# Patient Record
Sex: MALE | Race: WHITE | NOT HISPANIC OR LATINO | Employment: FULL TIME | URBAN - METROPOLITAN AREA
[De-identification: names, ages, dates, MRNs, and addresses within clinical notes are randomized per-mention and may not be internally consistent; named-entity substitution may affect disease eponyms.]

---

## 2017-04-11 ENCOUNTER — TELEPHONE (OUTPATIENT)
Dept: FAMILY MEDICINE | Facility: CLINIC | Age: 62
End: 2017-04-11

## 2017-04-11 NOTE — TELEPHONE ENCOUNTER
Message handled by Nurse Triage with [facility] - provider name: DANIELITO VÁSQUEZ     Called pt advised appt needed due to dx of HTN.  The patient indicates understanding of these issues and agrees with the plan.     Appt made    Lois Bhatia RN    [facility] [facility]  .

## 2017-04-11 NOTE — TELEPHONE ENCOUNTER
Reason for Call:  Other prescription    Detailed comments: Pt calling to see if we can prescribe him Viagra without seeing him.  States he ws last in about 7 months ago and states he did not bring this up with MD.  If he needs to be seen please advise.  If we can call something in he uses [facility].    Phone Number Patient can be reached at: Cell number on file:    Telephone Information:   Mobile [phone]       Best Time:     Can we leave a detailed message on this number? YES    Call taken on 4/11/2017 at 12:30 PM by Heather Milton

## 2017-06-26 DIAGNOSIS — I10 HYPERTENSION GOAL BP (BLOOD PRESSURE) < 140/90: ICD-10-CM

## 2017-06-26 NOTE — TELEPHONE ENCOUNTER
Lisinopril      Last Written Prescription Date: 4-20-16  Last Fill Quantity: 90, # refills: 3  Last Office Visit with [facility], [facility] or [facility] prescribing provider: 10-11-16       Potassium   Date Value Ref Range Status   05/03/2016 4.9 3.4 - 5.3 mmol/L Final     Creatinine   Date Value Ref Range Status   05/03/2016 2.57 (H) 0.66 - 1.25 mg/dL Final     BP Readings from Last 3 Encounters:   10/11/16 112/66   05/17/16 (!) 138/100   05/03/16 122/84     Thank you,  Erin Mortensen, [facility] [place]

## 2017-06-28 RX ORDER — LISINOPRIL/HYDROCHLOROTHIAZIDE 10-12.5 MG
2 TABLET ORAL DAILY
Qty: 60 TABLET | Refills: 0 | Status: SHIPPED | OUTPATIENT
Start: 2017-06-28 | End: 2017-09-22

## 2017-06-28 NOTE — TELEPHONE ENCOUNTER
Medication is being filled for 30 days only due to:  Patient needs to be seen because it has been more than one year since last visit. Due for labs as well.   Pharmacy advised.    KATHY Oro, RN, PHN  [facility]

## 2017-09-08 ENCOUNTER — TELEPHONE (OUTPATIENT)
Dept: FAMILY MEDICINE | Facility: CLINIC | Age: 62
End: 2017-09-08

## 2017-09-08 NOTE — TELEPHONE ENCOUNTER
----- Message from Jennie Rios sent at 9/6/2017  4:07 PM CDT -----  Regarding: Patient for MTM Referral  [employer] has identified this patient as someone who may benefit from Medication Therapy Management (MTM) Services. This patient was identified as being at risk of experiencing medication related problems based upon their medical and prescription claims history.    We plan to contact this patient on your behalf to indicate that they may benefit from a visit with an MTM pharmacist. They will be encouraged to schedule an MTM appointment. If you have any reservations regarding contacting this patient, please notify me within the next week.    Thank you,  Cathi Rios    On behalf of:  Daina Coleman, Medication Therapy Management Pharmacist, [facility]

## 2017-09-08 NOTE — TELEPHONE ENCOUNTER
Please call him to arrange a blood pressure med recheck appointment or annual physical would be fine too.

## 2017-09-08 NOTE — TELEPHONE ENCOUNTER
Will have  call patient.    Lois Henriquez, KATHY, RN, N  [facility]) [phone]

## 2017-09-08 NOTE — TELEPHONE ENCOUNTER
Left non-detailed message for patient to call back.  (see message below)    Mariajose Ramírez

## 2017-09-22 ENCOUNTER — RADIANT APPOINTMENT (OUTPATIENT)
Dept: GENERAL RADIOLOGY | Facility: CLINIC | Age: 62
End: 2017-09-22
Payer: COMMERCIAL

## 2017-09-22 ENCOUNTER — OFFICE VISIT (OUTPATIENT)
Dept: FAMILY MEDICINE | Facility: CLINIC | Age: 62
End: 2017-09-22
Payer: COMMERCIAL

## 2017-09-22 VITALS
HEART RATE: 92 BPM | TEMPERATURE: 98 F | BODY MASS INDEX: 26.81 KG/M2 | HEIGHT: 69 IN | OXYGEN SATURATION: 96 % | RESPIRATION RATE: 12 BRPM | SYSTOLIC BLOOD PRESSURE: 160 MMHG | DIASTOLIC BLOOD PRESSURE: 90 MMHG | WEIGHT: 181 LBS

## 2017-09-22 DIAGNOSIS — I10 HYPERTENSION GOAL BP (BLOOD PRESSURE) < 140/90: ICD-10-CM

## 2017-09-22 DIAGNOSIS — S89.92XA KNEE INJURY, LEFT, INITIAL ENCOUNTER: Primary | ICD-10-CM

## 2017-09-22 DIAGNOSIS — S89.92XA LEFT KNEE INJURY: ICD-10-CM

## 2017-09-22 PROCEDURE — 99213 OFFICE O/P EST LOW 20 MIN: CPT | Performed by: PHYSICIAN ASSISTANT

## 2017-09-22 PROCEDURE — 73560 X-RAY EXAM OF KNEE 1 OR 2: CPT | Mod: LT

## 2017-09-22 RX ORDER — LISINOPRIL/HYDROCHLOROTHIAZIDE 10-12.5 MG
TABLET ORAL
Qty: 60 TABLET | Refills: 0 | OUTPATIENT
Start: 2017-09-22

## 2017-09-22 RX ORDER — LISINOPRIL/HYDROCHLOROTHIAZIDE 10-12.5 MG
2 TABLET ORAL DAILY
Qty: 60 TABLET | Refills: 0 | Status: SHIPPED | OUTPATIENT
Start: 2017-09-22 | End: 2017-11-01

## 2017-09-22 NOTE — TELEPHONE ENCOUNTER
Reason for Call:  Medication or medication refill:    Do you use a [facility]?  Name of the pharmacy and phone number for the current request: [facility] [place] [address] [place] [address]    Name of the medication requested: lisinopril-hydrochlorothiazide (PRINZIDE/ZESTORETIC) 10-12.5 MG per tablet     Other request: Pt states he is out of medication     Can we leave a detailed message on this number? YES    Phone number patient can be reached at: Home number on file [phone] (home)    Best Time: anytime     Call taken on 9/22/2017 at 10:37 AM by Anu Macias

## 2017-09-22 NOTE — TELEPHONE ENCOUNTER
agree - needs recheck - please call and schedule an appointment with me. 1 month sent already by RN

## 2017-09-22 NOTE — TELEPHONE ENCOUNTER
Noted. Declined and close.  See other refill encounter for today.    Lois Henriquez, BS, RN, PHN  [facility]) [phone]

## 2017-09-22 NOTE — NURSING NOTE
"Chief Complaint   Patient presents with     Knee Pain     Left Knee pain/DOI 09/16/2017       Initial /90  Pulse 92  Temp 98  F (36.7  C) (Tympanic)  Resp 12  Ht 5' 9\" (1.753 m)  Wt 181 lb (82.1 kg)  SpO2 96%  BMI 26.73 kg/m2 Estimated body mass index is 26.73 kg/(m^2) as calculated from the following:    Height as of this encounter: 5' 9\" (1.753 m).    Weight as of this encounter: 181 lb (82.1 kg).  Medication Reconciliation: complete   Marcellamike Orozco LPN    "

## 2017-09-22 NOTE — TELEPHONE ENCOUNTER
LOV: 09/22/2017; patient still due for labs/PX  Due for an Office visit for further refills, only fill for 30 days       Elise Bowling RN  [place] [facility]

## 2017-09-22 NOTE — MR AVS SNAPSHOT
"              After Visit Summary   9/22/2017    Matteo Barnes    MRN: [id number]           Patient Information     Date Of Birth          1955        Visit Information        Provider Department      9/22/2017 10:00 AM Beryl Cruz PA-C [facility]        Today's Diagnoses     Knee injury, left, initial encounter    -  1       Follow-ups after your visit        Who to contact     If you have questions or need follow up information about today's clinic visit or your schedule please contact [facility] directly at [phone].  Normal or non-critical lab and imaging results will be communicated to you by eXelatehart, letter or phone within 4 business days after the clinic has received the results. If you do not hear from us within 7 days, please contact the clinic through [employer] or phone. If you have a critical or abnormal lab result, we will notify you by phone as soon as possible.  Submit refill requests through [employer] or call your pharmacy and they will forward the refill request to us. Please allow 3 business days for your refill to be completed.          Additional Information About Your Visit        MyChart Information     [employer] gives you secure access to your electronic health record. If you see a primary care provider, you can also send messages to your care team and make appointments. If you have questions, please call your primary care clinic.  If you do not have a primary care provider, please call [phone] and they will assist you.        Care EveryWhere ID     This is your Care EveryWhere ID. This could be used by other organizations to access your [facility] medical records  [id number]        Your Vitals Were     Pulse Temperature Respirations Height Pulse Oximetry BMI (Body Mass Index)    92 98  F (36.7  C) (Tympanic) 12 5' 9\" (1.753 m) 96% 26.73 kg/m2       Blood Pressure from Last 3 Encounters:   09/22/17 160/90   10/11/16 112/66   05/17/16 (!) " 138/100    Weight from Last 3 Encounters:   09/22/17 181 lb (82.1 kg)   10/11/16 179 lb (81.2 kg)   05/17/16 175 lb 12.8 oz (79.7 kg)                 Where to get your medicines      These medications were sent to [facility] [place] - [place], [place] - [address]  [address], [facility] [address]     Phone:  [phone]     lisinopril-hydrochlorothiazide 10-12.5 MG per tablet          Primary Care Provider Office Phone # Fax #    Davidson Capone [phone]       [address] [place] [address]        Equal Access to Services     JUSTEN SUMMERS : Hadii sherron rogero Solucio, waaxda luqadaha, qaybta kaalmada adeegyada, pernell marshall. So [facility] [phone].    ATENCIÓN: Si habla español, tiene a trejo disposición servicios gratuitos de asistencia lingüística. [facility] [phone].    We comply with applicable federal civil rights laws and [place] laws. We do not discriminate on the basis of race, color, national origin, age, disability sex, sexual orientation or gender identity.            Thank you!     Thank you for choosing [facility]  for your care. Our goal is always to provide you with excellent care. Hearing back from our patients is one way we can continue to improve our services. Please take a few minutes to complete the written survey that you may receive in the mail after your visit with us. Thank you!             Your Updated Medication List - Protect others around you: Learn how to safely use, store and throw away your medicines at www.disposemymeds.org.          This list is accurate as of: 9/22/17 11:59 PM.  Always use your most recent med list.                   Brand Name Dispense Instructions for use Diagnosis    cyclobenzaprine 5 MG tablet    FLEXERIL    30 tablet    Take 1 tablet (5 mg) by mouth 3 times daily as needed for muscle spasms    Back muscle spasm        lisinopril-hydrochlorothiazide 10-12.5 MG per tablet    PRINZIDE/ZESTORETIC    60 tablet    Take 2 tablets by mouth daily    Hypertension goal BP (blood pressure) < 140/90

## 2017-09-22 NOTE — PROGRESS NOTES
"  SUBJECTIVE:                                                    Matteo Barnes is a 62 year old male who presents to clinic today for the following health issues:      Joint Pain / Twisted  Left  Knee    Onset: 09/16/2017    Description:   Location: left knee  Character: Dull ache    Intensity: moderate    Progression of Symptoms: worse    Accompanying Signs & Symptoms:  Other symptoms: swelling    History:   Previous similar pain: no       Precipitating factors:   Trauma or overuse:   YES   Fell on left knee on [place]    Alleviating factors:  Improved by: ice and NSAID -     Therapies Tried and outcome: Advil    Patient reports that he did not twist the knee, he fell onto [place] when he was playing with the dog.  This happened this past Sat night.    Swelling has improved.  Patient has been icing it.    He reports that the pain has gotten better since Sat.  He says that it is not getting worse.       Problem list and histories reviewed & adjusted, as indicated.  Additional history: as documented      ROS:  Constitutional, HEENT, cardiovascular, pulmonary, GI, , musculoskeletal, neuro, skin, endocrine and psych systems are negative, except as otherwise noted.    OBJECTIVE:                                                    /90  Pulse 92  Temp 98  F (36.7  C) (Tympanic)  Resp 12  Ht 5' 9\" (1.753 m)  Wt 181 lb (82.1 kg)  SpO2 96%  BMI 26.73 kg/m2  Body mass index is 26.73 kg/(m^2).  GENERAL: healthy, alert and no distress  EYES: Eyes grossly normal to inspection, PERRL and conjunctivae and sclerae normal  MS: no gross musculoskeletal defects noted, no edema  SKIN: no suspicious lesions or rashes  NEURO: Normal strength and tone, mentation intact and speech normal  PSYCH: mentation appears normal, affect normal/bright    Diagnostic Test Results:  Xray - Unremarkable     ASSESSMENT/PLAN:                                                      Matteo was seen today for knee pain.    Diagnoses and all " orders for this visit:    Left knee injury  -     XR Knee Left 1/2 Views; Future    - XR does not show any evidence of fracture.  Patient advised to continue home cares of icing and/or heat for 20 minutes at a time.  Patient advised not to take ibuprofen or aleve for pain control due to kidney disease.  He can take tylenol instead.    - Patient to followup if symptoms do not continue to improve.      - Patient agrees with and understands the plan today.      See Patient Instructions        Beryl Cruz PA-C    [facility] [place]

## 2017-09-22 NOTE — TELEPHONE ENCOUNTER
Routing refill request to provider for review/approval because:  Labs out of range:  BP      KATHY Oro, RN, PHN  [facility]  Ph) [phone]

## 2017-09-22 NOTE — TELEPHONE ENCOUNTER
lisinopril-hydrochlorothiazide (PRINZIDE/ZESTORETIC) 10-12.5 MG per tablet      Last Written Prescription Date: 6/28/2017  Last Fill Quantity: 60 tablet, # refills: 0  Last Office Visit with [facility], [facility] or [facility] prescribing provider: 10/11/2016       Potassium   Date Value Ref Range Status   05/03/2016 4.9 3.4 - 5.3 mmol/L Final     Creatinine   Date Value Ref Range Status   05/03/2016 2.57 (H) 0.66 - 1.25 mg/dL Final     BP Readings from Last 3 Encounters:   09/22/17 160/90   10/11/16 112/66   05/17/16 (!) 138/100

## 2017-09-22 NOTE — TELEPHONE ENCOUNTER
lisinopril-hydrochlorothiazide (PRINZIDE/ZESTORETIC) 10-12.5 MG per tablet      This is a duplicate refill request.  Please decline and close.

## 2017-09-26 NOTE — TELEPHONE ENCOUNTER
Left non-detailed message for patient to call back.  (see message below)  Patient seen 9/22/17    Mariajose Ramírez

## 2017-11-01 DIAGNOSIS — I10 HYPERTENSION GOAL BP (BLOOD PRESSURE) < 140/90: ICD-10-CM

## 2017-11-01 NOTE — TELEPHONE ENCOUNTER
Requested Prescriptions   Pending Prescriptions Disp Refills     lisinopril-hydrochlorothiazide (PRINZIDE/ZESTORETIC) 10-12.5 MG per tablet [Pharmacy Med Name: LISINOPRIL-HYDROCHLORO 10-12.5 TABS] 60 tablet 0     Sig: TAKE TWO TABLETS BY MOUTH EVERY DAY (NEED TO BE SEEN IN CLINIC FOR FURTHER REFILLS)    Diuretics (Including Combos) Protocol Failed    11/1/2017  2:08 PM       Failed - Blood pressure under 140/90    BP Readings from Last 3 Encounters:   09/22/17 160/90   10/11/16 112/66   05/17/16 (!) 138/100                Failed - Normal serum creatinine on file in past 12 months    Recent Labs   Lab Test  05/03/16   1550   CR  2.57*             Failed - Normal serum potassium on file in past 12 months    Recent Labs   Lab Test  05/03/16   1550   POTASSIUM  4.9                   Failed - Normal serum sodium on file in past 12 months    Recent Labs   Lab Test  05/03/16   1550   NA  140             Passed - Recent or future visit with authorizing provider's specialty    Patient had office visit in the last year or has a visit in the next 30 days with authorizing provider.  See chart review.              Passed - Patient is age 18 or older        Pt did not follow up per last refill request, pt is due for BP check and OV.   Routing refill request to provider for review/approval because:  Barbara given x1 and patient did not follow up, please advise  Diya Smith RN  [place] [facility]

## 2017-11-01 NOTE — LETTER
[facility]  [address] [place] [address]                                                                                                       [phone]    November 10, 2017    Matteo Barnes  [address] [address]      To Whom it May Concern:    My staff have been attempting to reach you in regards to a recent refill request for:    lisinopril-hydrochlorothiazide (PRINZIDE/ZESTORETIC) 10-12.5 MG per tablet    After reviewing your chart, you are overdue for a fasting physical.  You can schedule this via [employer] or by calling the clinic at [phone].     Thank you for your time.        Sincerely,          Romeo Capone M.D./PHOEBE, RN

## 2017-11-03 RX ORDER — LISINOPRIL/HYDROCHLOROTHIAZIDE 10-12.5 MG
TABLET ORAL
Qty: 30 TABLET | Refills: 0 | Status: SHIPPED | OUTPATIENT
Start: 2017-11-03 | End: 2018-01-03

## 2017-11-03 NOTE — TELEPHONE ENCOUNTER
Refill(s) for 15 days only - given 30 day marine period last month.  Please call the patient and schedule a followup appointment  - needs labs and recheck of blood pressure -

## 2017-11-03 NOTE — TELEPHONE ENCOUNTER
Attempt #1  Called [phone] - Left a non-detailed message to call back.    If patient calls back, please schedule a FASTING PHYSICAL and close encounter    Elise Bowling RN  [facility]

## 2017-11-08 NOTE — TELEPHONE ENCOUNTER
Second attempt - Left non-detailed message for patient to call back.  (see message below)    Mariajose Ramírez

## 2017-11-10 NOTE — TELEPHONE ENCOUNTER
Attempt #3  Called # below - Left a non-detailed message to call back and speak with any triage nurse.    Letter Sent  Encounter Closed    Elise Bowling RN  [place] [facility]

## 2018-01-03 DIAGNOSIS — I10 HYPERTENSION GOAL BP (BLOOD PRESSURE) < 140/90: ICD-10-CM

## 2018-01-03 NOTE — TELEPHONE ENCOUNTER
Spoke to patient--- stated that he ran out of medication this last Saturday.  Told him he needs an appointment and he stated that he would not be able to come in for at least a week or two--- told him clinic would call him to discuss.     -Martha Broussard,Certified Pharmacy Technician, [facility], Ph. [phone]    Or on behalf of [facility], [phone],      and [facility], [phone]

## 2018-01-06 NOTE — TELEPHONE ENCOUNTER
Last Written Prescription Date:  11/3/2017  Last Fill Quantity: 30,  # refills: 0   Last Office Visit with [facility], [facility] or [facility] prescribing provider:  9/22/2017   Future Office Visit:       Requested Prescriptions   Pending Prescriptions Disp Refills     lisinopril-hydrochlorothiazide (PRINZIDE/ZESTORETIC) 10-12.5 MG per tablet [Pharmacy Med Name: LISINOPRIL-HYDROCHLORO 10-12.5 TABS] 30 tablet 0     Sig: TAKE TWO TABLETS BY MOUTH EVERY DAY (NEED TO BE SEEN IN CLINIC FOR FURTHER REFILLS)    Diuretics (Including Combos) Protocol Failed    1/3/2018  1:05 PM       Failed - Blood pressure under 140/90    BP Readings from Last 3 Encounters:   09/22/17 160/90   10/11/16 112/66   05/17/16 (!) 138/100                Failed - Normal serum creatinine on file in past 12 months    Recent Labs   Lab Test  05/03/16   1550   CR  2.57*             Failed - Normal serum potassium on file in past 12 months    Recent Labs   Lab Test  05/03/16   1550   POTASSIUM  4.9                   Failed - Normal serum sodium on file in past 12 months    Recent Labs   Lab Test  05/03/16   1550   NA  140             Passed - Recent or future visit with authorizing provider's specialty    Patient had office visit in the last year or has a visit in the next 30 days with authorizing provider.  See chart review.              Passed - Patient is age 18 or older        Due for BP check and labs.   Routing refill request to provider for review/approval because:  Patient needs to be seen because:  See note above    Diya Smith RN  [facility]

## 2018-01-08 RX ORDER — LISINOPRIL/HYDROCHLOROTHIAZIDE 10-12.5 MG
TABLET ORAL
Qty: 15 TABLET | Refills: 0 | Status: SHIPPED | OUTPATIENT
Start: 2018-01-08 | End: 2018-01-29

## 2018-01-08 NOTE — TELEPHONE ENCOUNTER
Called [phone]    Advised of MD DANIELITO message below - Patient stated an understanding and agreed with plan.  Patient stated he did not know his work schedule yet and will call back to schedule.     Elise Bowling RN  [facility]

## 2018-01-08 NOTE — TELEPHONE ENCOUNTER
2 weeks supply - please schedule a followup appointment and needs labs too - OK for complete physical instead as that is due too.

## 2018-01-24 ENCOUNTER — TELEPHONE (OUTPATIENT)
Dept: FAMILY MEDICINE | Facility: CLINIC | Age: 63
End: 2018-01-24

## 2018-01-24 NOTE — TELEPHONE ENCOUNTER
1/24/2018    Call Regarding Preventive Health Screening Colonoscopy    Attempt 1    Message on voicemail     Comments:       Outreach   CC

## 2018-01-29 DIAGNOSIS — I10 HYPERTENSION GOAL BP (BLOOD PRESSURE) < 140/90: ICD-10-CM

## 2018-01-29 RX ORDER — LISINOPRIL/HYDROCHLOROTHIAZIDE 10-12.5 MG
TABLET ORAL
Qty: 15 TABLET | Refills: 0 | Status: SHIPPED | OUTPATIENT
Start: 2018-01-29 | End: 2018-03-20

## 2018-01-29 NOTE — TELEPHONE ENCOUNTER
Patient calling for refill of his Lisinopril-HCTZ.  He is all out of medication.  He is reporting lightheadedness, but denies headache, blurred vision or chest pain.  Appointment scheduled for 2/9/2018 with RL for follow up.  #15 approved and sent to our clinic pharmacy.      KATHY Oro, RN, N  [facility]) [phone]

## 2018-02-02 NOTE — TELEPHONE ENCOUNTER
2/2/2018    Call Regarding Preventive Health Screening Colonoscopy    Attempt 2    Message spoke with patient    Comments:   Patient would like a callback in 1 week     Outreach   Ignacia Anton

## 2018-02-12 NOTE — TELEPHONE ENCOUNTER
2/12/2018    Call Regarding Preventive Health Screening Colonoscopy    Attempt 3    Message on voicemail    Comments:       Outreach   Ignacia Anton

## 2018-03-20 ENCOUNTER — OFFICE VISIT (OUTPATIENT)
Dept: FAMILY MEDICINE | Facility: CLINIC | Age: 63
End: 2018-03-20
Payer: COMMERCIAL

## 2018-03-20 VITALS
TEMPERATURE: 98.4 F | WEIGHT: 193 LBS | BODY MASS INDEX: 28.58 KG/M2 | HEIGHT: 69 IN | DIASTOLIC BLOOD PRESSURE: 80 MMHG | SYSTOLIC BLOOD PRESSURE: 178 MMHG | OXYGEN SATURATION: 98 % | HEART RATE: 74 BPM

## 2018-03-20 DIAGNOSIS — I10 HYPERTENSION GOAL BP (BLOOD PRESSURE) < 140/90: Primary | ICD-10-CM

## 2018-03-20 DIAGNOSIS — N18.4 CKD (CHRONIC KIDNEY DISEASE) STAGE 4, GFR 15-29 ML/MIN (H): ICD-10-CM

## 2018-03-20 LAB
ERYTHROCYTE [DISTWIDTH] IN BLOOD BY AUTOMATED COUNT: 12.5 % (ref 10–15)
HBA1C MFR BLD: 4.9 % (ref 4.3–6)
HCT VFR BLD AUTO: 38.5 % (ref 40–53)
HGB BLD-MCNC: 13.2 G/DL (ref 13.3–17.7)
MCH RBC QN AUTO: 31.7 PG (ref 26.5–33)
MCHC RBC AUTO-ENTMCNC: 34.3 G/DL (ref 31.5–36.5)
MCV RBC AUTO: 92 FL (ref 78–100)
PLATELET # BLD AUTO: 208 10E9/L (ref 150–450)
RBC # BLD AUTO: 4.17 10E12/L (ref 4.4–5.9)
WBC # BLD AUTO: 7.6 10E9/L (ref 4–11)

## 2018-03-20 PROCEDURE — 83036 HEMOGLOBIN GLYCOSYLATED A1C: CPT | Performed by: PHYSICIAN ASSISTANT

## 2018-03-20 PROCEDURE — 36415 COLL VENOUS BLD VENIPUNCTURE: CPT | Performed by: PHYSICIAN ASSISTANT

## 2018-03-20 PROCEDURE — 80053 COMPREHEN METABOLIC PANEL: CPT | Performed by: PHYSICIAN ASSISTANT

## 2018-03-20 PROCEDURE — 84443 ASSAY THYROID STIM HORMONE: CPT | Performed by: PHYSICIAN ASSISTANT

## 2018-03-20 PROCEDURE — 99214 OFFICE O/P EST MOD 30 MIN: CPT | Performed by: PHYSICIAN ASSISTANT

## 2018-03-20 PROCEDURE — 82043 UR ALBUMIN QUANTITATIVE: CPT | Performed by: PHYSICIAN ASSISTANT

## 2018-03-20 PROCEDURE — 85027 COMPLETE CBC AUTOMATED: CPT | Performed by: PHYSICIAN ASSISTANT

## 2018-03-20 RX ORDER — LISINOPRIL/HYDROCHLOROTHIAZIDE 10-12.5 MG
TABLET ORAL
Qty: 30 TABLET | Refills: 0 | Status: SHIPPED | OUTPATIENT
Start: 2018-03-20 | End: 2018-04-03 | Stop reason: DRUGHIGH

## 2018-03-20 NOTE — MR AVS SNAPSHOT
After Visit Summary   3/20/2018    Matteo Barnes    MRN: [id number]           Patient Information     Date Of Birth          1955        Visit Information        Provider Department      3/20/2018 4:00 PM Daylin Harrell PA-C [facility]        Today's Diagnoses     Hypertension goal BP (blood pressure) < 140/90    -  1    CKD (chronic kidney disease) stage 4, GFR 15-29 ml/min (H)           Follow-ups after your visit        Additional Services     NEPHROLOGY ADULT REFERRAL       Your provider has referred you to: [facility]: Kidney (Nephrology) Clinic [facility] [phone]   [email or web address]    Please be aware that coverage of these services is subject to the terms and limitations of your health insurance plan.  Call member services at your health plan with any benefit or coverage questions.      Reason for referral:  Single eGFR in past 12 months <30 ml/min.   Please bring the following to your appointment:    >>   Any x-rays, CTs or MRIs which have been performed.  Contact the facility where they were done to arrange for  prior to your scheduled appointment.   >>   List of current medications   >>   This referral request   >>   Any documents/labs given to you for this referral                  Future tests that were ordered for you today     Open Future Orders        Priority Expected Expires Ordered    US Abdomen Complete w Doppler Complete Routine  3/20/2019 3/20/2018            Who to contact     If you have questions or need follow up information about today's clinic visit or your schedule please contact [facility] directly at [phone].  Normal or non-critical lab and imaging results will be communicated to you by MyChart, letter or phone within 4 business days after the clinic has received the results. If you do not hear from us within 7 days, please contact the clinic through MYRt or  "phone. If you have a critical or abnormal lab result, we will notify you by phone as soon as possible.  Submit refill requests through [employer] or call your pharmacy and they will forward the refill request to us. Please allow 3 business days for your refill to be completed.          Additional Information About Your Visit        [email or web address] Information     [employer] gives you secure access to your electronic health record. If you see a primary care provider, you can also send messages to your care team and make appointments. If you have questions, please call your primary care clinic.  If you do not have a primary care provider, please call [phone] and they will assist you.        Care EveryWhere ID     This is your Care EveryWhere ID. This could be used by other organizations to access your [place] medical records  [id number]        Your Vitals Were     Pulse Temperature Height Pulse Oximetry BMI (Body Mass Index)       74 98.4  F (36.9  C) (Oral) 5' 9\" (1.753 m) 98% 28.5 kg/m2        Blood Pressure from Last 3 Encounters:   03/20/18 178/80   09/22/17 160/90   10/11/16 112/66    Weight from Last 3 Encounters:   03/20/18 193 lb (87.5 kg)   09/22/17 181 lb (82.1 kg)   10/11/16 179 lb (81.2 kg)              We Performed the Following     Albumin Random Urine Quantitative with Creat Ratio     CBC with platelets     Comprehensive metabolic panel     Hemoglobin A1c     NEPHROLOGY ADULT REFERRAL          Where to get your medicines      These medications were sent to [facility] [place] [id number]     Phone:  [phone]     lisinopril-hydrochlorothiazide 10-12.5 MG per tablet          Primary Care Provider Office Phone # Fax #    Davidson Capone [phone]       [address] [place] [address]        Equal Access to Services     JUSTEN SUMMERS AH: Radha Brysno, ester dover, qaybta " pernell ro alysia swan ah. [facility] [phone].    ATENCIÓN: Si gordo mooney, tiene a trejo disposición servicios gratuitos de asistencia lingüística. Estrella al [phone].    We comply with applicable federal civil rights laws and [place] laws. We do not discriminate on the basis of race, color, national origin, age, disability, sex, sexual orientation, or gender identity.            Thank you!     Thank you for choosing [facility]  for your care. Our goal is always to provide you with excellent care. Hearing back from our patients is one way we can continue to improve our services. Please take a few minutes to complete the written survey that you may receive in the mail after your visit with us. Thank you!             Your Updated Medication List - Protect others around you: Learn how to safely use, store and throw away your medicines at www.disposemymeds.org.          This list is accurate as of 3/20/18  4:08 PM.  Always use your most recent med list.                   Brand Name Dispense Instructions for use Diagnosis    cyclobenzaprine 5 MG tablet    FLEXERIL    30 tablet    Take 1 tablet (5 mg) by mouth 3 times daily as needed for muscle spasms    Back muscle spasm       lisinopril-hydrochlorothiazide 10-12.5 MG per tablet    PRINZIDE/ZESTORETIC    30 tablet    TAKE TWO TABLETS BY MOUTH EVERY DAY (NEED TO BE SEEN IN CLINIC FOR FURTHER REFILLS)    Hypertension goal BP (blood pressure) < 140/90, CKD (chronic kidney disease) stage 4, GFR 15-29 ml/min (H)

## 2018-03-20 NOTE — NURSING NOTE
"Chief Complaint   Patient presents with     Recheck Medication     BP med - out for 2 weeks       Initial /80 (BP Location: Right arm, Patient Position: Chair, Cuff Size: Adult Large)  Pulse 74  Temp 98.4  F (36.9  C) (Oral)  Ht 5' 9\" (1.753 m)  Wt 193 lb (87.5 kg)  SpO2 98%  BMI 28.5 kg/m2 Estimated body mass index is 28.5 kg/(m^2) as calculated from the following:    Height as of this encounter: 5' 9\" (1.753 m).    Weight as of this encounter: 193 lb (87.5 kg).  Medication Reconciliation: complete   Csaba Mlnarik CMA    "
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
## 2018-03-21 LAB
ALBUMIN SERPL-MCNC: 3.8 G/DL (ref 3.4–5)
ALP SERPL-CCNC: 65 U/L (ref 40–150)
ALT SERPL W P-5'-P-CCNC: 21 U/L (ref 0–70)
ANION GAP SERPL CALCULATED.3IONS-SCNC: 9 MMOL/L (ref 3–14)
AST SERPL W P-5'-P-CCNC: 18 U/L (ref 0–45)
BILIRUB SERPL-MCNC: 0.4 MG/DL (ref 0.2–1.3)
BUN SERPL-MCNC: 35 MG/DL (ref 7–30)
CALCIUM SERPL-MCNC: 8.7 MG/DL (ref 8.5–10.1)
CHLORIDE SERPL-SCNC: 109 MMOL/L (ref 94–109)
CO2 SERPL-SCNC: 21 MMOL/L (ref 20–32)
CREAT SERPL-MCNC: 2.55 MG/DL (ref 0.66–1.25)
CREAT UR-MCNC: 76 MG/DL
GFR SERPL CREATININE-BSD FRML MDRD: 26 ML/MIN/1.7M2
GLUCOSE SERPL-MCNC: 103 MG/DL (ref 70–99)
MICROALBUMIN UR-MCNC: 553 MG/L
MICROALBUMIN/CREAT UR: 728.59 MG/G CR (ref 0–17)
POTASSIUM SERPL-SCNC: 4.7 MMOL/L (ref 3.4–5.3)
PROT SERPL-MCNC: 7 G/DL (ref 6.8–8.8)
SODIUM SERPL-SCNC: 139 MMOL/L (ref 133–144)
TSH SERPL DL<=0.005 MIU/L-ACNC: 2.54 MU/L (ref 0.4–4)

## 2018-04-03 ENCOUNTER — OFFICE VISIT (OUTPATIENT)
Dept: FAMILY MEDICINE | Facility: CLINIC | Age: 63
End: 2018-04-03
Payer: COMMERCIAL

## 2018-04-03 VITALS
HEIGHT: 69 IN | SYSTOLIC BLOOD PRESSURE: 142 MMHG | TEMPERATURE: 97.6 F | HEART RATE: 77 BPM | WEIGHT: 188.38 LBS | DIASTOLIC BLOOD PRESSURE: 82 MMHG | BODY MASS INDEX: 27.9 KG/M2 | OXYGEN SATURATION: 100 %

## 2018-04-03 DIAGNOSIS — R80.9 PROTEINURIA, UNSPECIFIED TYPE: ICD-10-CM

## 2018-04-03 DIAGNOSIS — I10 HYPERTENSION GOAL BP (BLOOD PRESSURE) < 140/90: Primary | ICD-10-CM

## 2018-04-03 DIAGNOSIS — N18.4 CKD (CHRONIC KIDNEY DISEASE) STAGE 4, GFR 15-29 ML/MIN (H): ICD-10-CM

## 2018-04-03 PROCEDURE — 36415 COLL VENOUS BLD VENIPUNCTURE: CPT | Performed by: PHYSICIAN ASSISTANT

## 2018-04-03 PROCEDURE — 99214 OFFICE O/P EST MOD 30 MIN: CPT | Performed by: PHYSICIAN ASSISTANT

## 2018-04-03 PROCEDURE — 80048 BASIC METABOLIC PNL TOTAL CA: CPT | Performed by: PHYSICIAN ASSISTANT

## 2018-04-03 RX ORDER — LISINOPRIL AND HYDROCHLOROTHIAZIDE 20; 25 MG/1; MG/1
1 TABLET ORAL DAILY
Qty: 30 TABLET | Refills: 0 | Status: SHIPPED | OUTPATIENT
Start: 2018-04-03 | End: 2018-05-01

## 2018-04-03 RX ORDER — AMLODIPINE BESYLATE 5 MG/1
5 TABLET ORAL DAILY
Qty: 30 TABLET | Refills: 0 | Status: SHIPPED | OUTPATIENT
Start: 2018-04-03 | End: 2018-05-01

## 2018-04-03 NOTE — MR AVS SNAPSHOT
After Visit Summary   4/3/2018    Matteo Barnes    MRN: [id number]           Patient Information     Date Of Birth          1955        Visit Information        Provider Department      4/3/2018 3:00 PM Daylin Harrell PA-C [facility]        Today's Diagnoses     Hypertension goal BP (blood pressure) < 140/90    -  1    CKD (chronic kidney disease) stage 4, GFR 15-29 ml/min (H)        Proteinuria, unspecified type          Care Instructions      Diet for Chronic Kidney Disease  Following a special diet when you have kidney disease can help you stay as healthy as possible. Your healthcare provider or dietitian should make a special diet plan just for you.    Eating right  Here are some good eating rules to follow:    Protein. Eating protein is important for your body. But too much protein can put a strain on your kidneys. Eating less protein may slow the progression of chronic kidney disease. Foods high in protein include meat, fish, eggs, cheese, and other dairy products. A registered dietitian can help you plan a diet that has the right amount of protein for you.    Sodium. Having too much salt in your diet can make your body hold onto (retain) water. Ask your provider or dietitian how much sodium per day you are allowed. This will help you avoid fluid buildup in your body (fluid retention). It can also help control high blood pressure. Learn to read food labels to know how much sodium is in one serving. Foods high in salt include processed meats, canned and boxed foods, sauces, salted chips and snacks, pickled foods, frozen dinners, and restaurant and fast food.    Fluids. If you have advanced kidney disease, you will need to limit the water and fluids you drink. If you don t, then too much water will build up in your body. The exact amount of fluid you can drink depends on how well your kidneys are working. Ask your provider how much water you can safely drink each  "day.    Potassium. In advanced kidney disease, your potassium level can go dangerously high. This affects your heart. It can cause an irregular heartbeat (arrhythmia). Ask your provider or dietitian if you should limit potassium in your diet. Foods high in potassium include dairy products (milk, yogurt, cheese), dried beans, bananas, oranges, potatoes, tomatoes, spinach, cantaloupe, honeydew melon, dried fruits, and nuts.     Calcium. Calcium is important to build strong bones. But foods high in calcium are also high in phosphorus, which can take calcium from your bones. Limiting foods high in phosphorus will help keep calcium in your bones. Ask your provider how much calcium you should get each day.    Phosphorus. In advanced kidney disease, your phosphorus level can go dangerously high. This affects many systems in the body and can damage your heart. Limit your intake of phosphorus-rich foods. These include dried beans and peas, nuts, peanut butter, cocoa, beer, cola drinks, and dairy products.  Date Last Reviewed: 8/1/2016 2000-2017 [employer]. [address] [place] [address]. All rights reserved. This information is not intended as a substitute for professional medical care. Always follow your healthcare professional's instructions.      High Blood Pressure and Chronic Kidney Disease (CKD)  What is high blood pressure?  For CKD patients, a normal blood pressure is 130/80 (or \"130 over 80\"). When blood pressure stays at 140/90 or higher, it is called high blood pressure (hypertension).  How are kidney disease and high blood pressure related?    More than half of the patients who have chronic kidney disease also have high blood pressure.    High blood pressure increases the chance that kidney disease will get worse.    High blood pressure is a major cause of CKD.   Damaged blood vessels send less blood to the important organs in your body, including your kidneys. Your kidneys filter " waste from your blood. When your kidneys can't clean your blood as well as they should, this waste builds up in your body.    CKD can also cause high blood pressure. Your kidneys help keep your blood pressure in a healthy range. Controlling your blood pressure will keep your kidneys from getting worse. This will make CKD easier for you and your doctor to manage.  How do I treat high blood pressure and CKD?    Your doctor will give you blood pressure goals to meet and show you how to check your blood pressure at home.    It is important that you check your pressure as directed. High blood pressure often has no symptoms.    Make sure to write down the date, time and result of your readings.    If you take blood pressure medicine, take it before you measure blood pressure. This helps us know if your medicine is working the way it should.    Stop smoking.    Follow your diet and exercise plan.    Take all medicines as directed.    If you have kidney disease from diabetes or if you have protein in your urine, the best blood pressure medicines for your treatment are angiotensin converting enzyme (ACE) inhibitors or angiotensin receptor blockers (ARB).    If you have any side effects from your blood pressure medicine, tell your doctor. He or she may switch you to a different medicine.  How often should I see my doctor?    Your CKD team will outline a treatment plan for you after you are diagnosed. Most patients come to the clinic 1 or 2 times per year. We'll ask you to come in more often if:    You start a new medicine or we change your medicine dose    Your kidney function is getting worse    You blood pressure is not controlled    At each visit, we will test your blood and urine and measure your blood pressure. (Remember to check your blood pressure at home to help guide your treatment.)    DON'T be afraid to ask questions. We are here to help you.  Other resources  National Kidney Foundation    www.kidney.org  8-989-946-7149  For informational purposes only. Not to replace the advice of your health care provider.   Copyright   2016 [facility]. All rights reserved. [employer] 386089 - 03/16.            Follow-ups after your visit        Additional Services     NEPHROLOGY ADULT REFERRAL       Your provider has referred you to: [facility]: Lisbet Grover [phone]   [email or web address]    Please be aware that coverage of these services is subject to the terms and limitations of your health insurance plan.  Call member services at your health plan with any benefit or coverage questions.      Reason for referral:  Single eGFR in past 12 months <30 ml/min.   Please bring the following to your appointment:    >>   Any x-rays, CTs or MRIs which have been performed.  Contact the facility where they were done to arrange for  prior to your scheduled appointment.   >>   List of current medications   >>   This referral request   >>   Any documents/labs given to you for this referral                  Follow-up notes from your care team     Return in about 3 months (around 7/3/2018) for Lab Work, Routine Visit.      Your next 10 appointments already scheduled     Apr 06, 2018  8:00 AM CDT   US ABDOMEN/PELVIS DUPLEX COMPLETE with [id number]   [facility] [place] ([facility])    [address] [address] [address] [phone]           Please bring a list of your medicines (including vitamins, minerals and over-the-counter drugs). Also, tell your doctor about any allergies you may have. Wear comfortable clothes and leave your valuables at home.  Adults: No eating or drinking for 8 hours before the exam. You may take medicine with a small sip of water.  Children: - Children 6+ years: No food or drink for 6 hours before exam. - Children 1-5 years: No food or drink for 4 hours before exam. - Infants, breast-fed: may  "have breast milk up to 2 hours before exam. - Infants, formula: may have bottle until 4 hours before exam.  Please call the Imaging Department at your exam site with any questions.              Who to contact     If you have questions or need follow up information about today's clinic visit or your schedule please contact [facility] directly at [phone].  Normal or non-critical lab and imaging results will be communicated to you by MyChart, letter or phone within 4 business days after the clinic has received the results. If you do not hear from us within 7 days, please contact the clinic through [employer] or phone. If you have a critical or abnormal lab result, we will notify you by phone as soon as possible.  Submit refill requests through [employer] or call your pharmacy and they will forward the refill request to us. Please allow 3 business days for your refill to be completed.          Additional Information About Your Visit        [employer]logs Information     [employer] gives you secure access to your electronic health record. If you see a primary care provider, you can also send messages to your care team and make appointments. If you have questions, please call your primary care clinic.  If you do not have a primary care provider, please call [phone] and they will assist you.        Care EveryWhere ID     This is your Care EveryWhere ID. This could be used by other organizations to access your [facility] medical records  [id number]        Your Vitals Were     Pulse Temperature Height Pulse Oximetry BMI (Body Mass Index)       77 97.6  F (36.4  C) (Tympanic) 5' 9\" (1.753 m) 100% 27.82 kg/m2        Blood Pressure from Last 3 Encounters:   04/03/18 142/82   03/20/18 178/80   09/22/17 160/90    Weight from Last 3 Encounters:   04/03/18 188 lb 6 oz (85.4 kg)   03/20/18 193 lb (87.5 kg)   09/22/17 181 lb (82.1 kg)              We Performed the Following     Basic metabolic panel  (Ca, Cl, CO2, Creat, " Gluc, K, Na, BUN)     NEPHROLOGY ADULT REFERRAL          Today's Medication Changes          These changes are accurate as of 4/3/18  3:14 PM.  If you have any questions, ask your nurse or doctor.               Start taking these medicines.        Dose/Directions    amLODIPine 5 MG tablet   Commonly known as:  NORVASC   Used for:  Hypertension goal BP (blood pressure) < 140/90, CKD (chronic kidney disease) stage 4, GFR 15-29 ml/min (H)   Started by:  Daylin Harrell PA-C        Dose:  5 mg   Take 1 tablet (5 mg) by mouth daily   Quantity:  30 tablet   Refills:  0       lisinopril-hydrochlorothiazide 20-25 MG per tablet   Commonly known as:  PRINZIDE/ZESTORETIC   Used for:  Hypertension goal BP (blood pressure) < 140/90, CKD (chronic kidney disease) stage 4, GFR 15-29 ml/min (H)   Replaces:  lisinopril-hydrochlorothiazide 10-12.5 MG per tablet   Started by:  Daylin Harrell PA-C        Dose:  1 tablet   Take 1 tablet by mouth daily   Quantity:  30 tablet   Refills:  0         Stop taking these medicines if you haven't already. Please contact your care team if you have questions.     lisinopril-hydrochlorothiazide 10-12.5 MG per tablet   Commonly known as:  PRINZIDE/ZESTORETIC   Replaced by:  lisinopril-hydrochlorothiazide 20-25 MG per tablet   Stopped by:  Daylin Harrell PA-C                Where to get your medicines      These medications were sent to [facility] [address] [address]     Phone:  [phone]     amLODIPine 5 MG tablet    lisinopril-hydrochlorothiazide 20-25 MG per tablet                Primary Care Provider Office Phone # Fax #    Davidson Capone [phone]       [address] [place] [id number]        Equal Access to Services     JUSTEN SUMMERS AH: Radha lal hadasho Soomaali, waaxda luqadaha, qaybta kaalmada adeegyada, pernell marshall. So  [facility] [phone].    ATENCIÓN: Si lainela jesica, tiene a trejo disposición servicios gratuitos de asistencia lingüística. Estrella beth [phone].    We comply with applicable federal civil rights laws and [place] laws. We do not discriminate on the basis of race, color, national origin, age, disability, sex, sexual orientation, or gender identity.            Thank you!     Thank you for choosing [facility]  for your care. Our goal is always to provide you with excellent care. Hearing back from our patients is one way we can continue to improve our services. Please take a few minutes to complete the written survey that you may receive in the mail after your visit with us. Thank you!             Your Updated Medication List - Protect others around you: Learn how to safely use, store and throw away your medicines at www.disposemymeds.org.          This list is accurate as of 4/3/18  3:14 PM.  Always use your most recent med list.                   Brand Name Dispense Instructions for use Diagnosis    amLODIPine 5 MG tablet    NORVASC    30 tablet    Take 1 tablet (5 mg) by mouth daily    Hypertension goal BP (blood pressure) < 140/90, CKD (chronic kidney disease) stage 4, GFR 15-29 ml/min (H)       lisinopril-hydrochlorothiazide 20-25 MG per tablet    PRINZIDE/ZESTORETIC    30 tablet    Take 1 tablet by mouth daily    Hypertension goal BP (blood pressure) < 140/90, CKD (chronic kidney disease) stage 4, GFR 15-29 ml/min (H)

## 2018-04-03 NOTE — NURSING NOTE
"Chief Complaint   Patient presents with     Recheck Medication     lisinopril refill        Initial /82  Pulse 77  Temp 97.6  F (36.4  C) (Tympanic)  Ht 5' 9\" (1.753 m)  Wt 188 lb 6 oz (85.4 kg)  SpO2 100%  BMI 27.82 kg/m2 Estimated body mass index is 27.82 kg/(m^2) as calculated from the following:    Height as of this encounter: 5' 9\" (1.753 m).    Weight as of this encounter: 188 lb 6 oz (85.4 kg).  Medication Reconciliation: complete    "
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
## 2018-04-03 NOTE — PATIENT INSTRUCTIONS
Diet for Chronic Kidney Disease  Following a special diet when you have kidney disease can help you stay as healthy as possible. Your healthcare provider or dietitian should make a special diet plan just for you.    Eating right  Here are some good eating rules to follow:    Protein. Eating protein is important for your body. But too much protein can put a strain on your kidneys. Eating less protein may slow the progression of chronic kidney disease. Foods high in protein include meat, fish, eggs, cheese, and other dairy products. A registered dietitian can help you plan a diet that has the right amount of protein for you.    Sodium. Having too much salt in your diet can make your body hold onto (retain) water. Ask your provider or dietitian how much sodium per day you are allowed. This will help you avoid fluid buildup in your body (fluid retention). It can also help control high blood pressure. Learn to read food labels to know how much sodium is in one serving. Foods high in salt include processed meats, canned and boxed foods, sauces, salted chips and snacks, pickled foods, frozen dinners, and restaurant and fast food.    Fluids. If you have advanced kidney disease, you will need to limit the water and fluids you drink. If you don t, then too much water will build up in your body. The exact amount of fluid you can drink depends on how well your kidneys are working. Ask your provider how much water you can safely drink each day.    Potassium. In advanced kidney disease, your potassium level can go dangerously high. This affects your heart. It can cause an irregular heartbeat (arrhythmia). Ask your provider or dietitian if you should limit potassium in your diet. Foods high in potassium include dairy products (milk, yogurt, cheese), dried beans, bananas, oranges, potatoes, tomatoes, spinach, cantaloupe, honeydew melon, dried fruits, and nuts.     Calcium. Calcium is important to build strong bones. But foods  "high in calcium are also high in phosphorus, which can take calcium from your bones. Limiting foods high in phosphorus will help keep calcium in your bones. Ask your provider how much calcium you should get each day.    Phosphorus. In advanced kidney disease, your phosphorus level can go dangerously high. This affects many systems in the body and can damage your heart. Limit your intake of phosphorus-rich foods. These include dried beans and peas, nuts, peanut butter, cocoa, beer, cola drinks, and dairy products.  Date Last Reviewed: 8/1/2016 2000-2017 [employer]. [address] [place] [address]. All rights reserved. This information is not intended as a substitute for professional medical care. Always follow your healthcare professional's instructions.      High Blood Pressure and Chronic Kidney Disease (CKD)  What is high blood pressure?  For CKD patients, a normal blood pressure is 130/80 (or \"130 over 80\"). When blood pressure stays at 140/90 or higher, it is called high blood pressure (hypertension).  How are kidney disease and high blood pressure related?    More than half of the patients who have chronic kidney disease also have high blood pressure.    High blood pressure increases the chance that kidney disease will get worse.    High blood pressure is a major cause of CKD.   Damaged blood vessels send less blood to the important organs in your body, including your kidneys. Your kidneys filter waste from your blood. When your kidneys can't clean your blood as well as they should, this waste builds up in your body.    CKD can also cause high blood pressure. Your kidneys help keep your blood pressure in a healthy range. Controlling your blood pressure will keep your kidneys from getting worse. This will make CKD easier for you and your doctor to manage.  How do I treat high blood pressure and CKD?    Your doctor will give you blood pressure goals to meet and show you how to check " your blood pressure at home.    It is important that you check your pressure as directed. High blood pressure often has no symptoms.    Make sure to write down the date, time and result of your readings.    If you take blood pressure medicine, take it before you measure blood pressure. This helps us know if your medicine is working the way it should.    Stop smoking.    Follow your diet and exercise plan.    Take all medicines as directed.    If you have kidney disease from diabetes or if you have protein in your urine, the best blood pressure medicines for your treatment are angiotensin converting enzyme (ACE) inhibitors or angiotensin receptor blockers (ARB).    If you have any side effects from your blood pressure medicine, tell your doctor. He or she may switch you to a different medicine.  How often should I see my doctor?    Your CKD team will outline a treatment plan for you after you are diagnosed. Most patients come to the clinic 1 or 2 times per year. We'll ask you to come in more often if:    You start a new medicine or we change your medicine dose    Your kidney function is getting worse    You blood pressure is not controlled    At each visit, we will test your blood and urine and measure your blood pressure. (Remember to check your blood pressure at home to help guide your treatment.)    DON'T be afraid to ask questions. We are here to help you.  Other resources  National Kid[employer] Foundation   www.kidney.org  1-708.316.5008  For informational purposes only. Not to replace the advice of your health care provider.   Copyright   2016 [employer]. All rights reserved. Cleo [id number] - 03/16.

## 2018-04-04 LAB
ANION GAP SERPL CALCULATED.3IONS-SCNC: 7 MMOL/L (ref 3–14)
BUN SERPL-MCNC: 54 MG/DL (ref 7–30)
CALCIUM SERPL-MCNC: 8.8 MG/DL (ref 8.5–10.1)
CHLORIDE SERPL-SCNC: 111 MMOL/L (ref 94–109)
CO2 SERPL-SCNC: 23 MMOL/L (ref 20–32)
CREAT SERPL-MCNC: 3.01 MG/DL (ref 0.66–1.25)
GFR SERPL CREATININE-BSD FRML MDRD: 21 ML/MIN/1.7M2
GLUCOSE SERPL-MCNC: 93 MG/DL (ref 70–99)
POTASSIUM SERPL-SCNC: 5.5 MMOL/L (ref 3.4–5.3)
SODIUM SERPL-SCNC: 141 MMOL/L (ref 133–144)

## 2018-04-06 ENCOUNTER — HOSPITAL ENCOUNTER (OUTPATIENT)
Dept: ULTRASOUND IMAGING | Facility: CLINIC | Age: 63
Discharge: HOME | End: 2018-04-06
Attending: PHYSICIAN ASSISTANT | Admitting: PHYSICIAN ASSISTANT
Payer: COMMERCIAL

## 2018-04-06 DIAGNOSIS — N18.4 CKD (CHRONIC KIDNEY DISEASE) STAGE 4, GFR 15-29 ML/MIN (H): ICD-10-CM

## 2018-04-06 DIAGNOSIS — I10 HYPERTENSION GOAL BP (BLOOD PRESSURE) < 140/90: ICD-10-CM

## 2018-04-06 PROCEDURE — 76770 US EXAM ABDO BACK WALL COMP: CPT

## 2018-04-06 NOTE — PROGRESS NOTES
Triage: please call pt with results and fax results to lucina Felipe  Here are your recent results.  Your kidney ultrasound does not show any evidence of narrowing of the blood vessels to the kidneys.  It does, however, show some changes consistient with chronic kidney disease from high blood pressure.  It is imperative that you follow up with the nephrologist as soon as possible.  Their office should have reached out to you to facilitate scheduling.    If you have any questions please do not hesitate to contact our office via phone ([phone]) or MyChart.    Daylin Harrell, MS, PA-C  [facility]

## 2018-04-09 NOTE — PROGRESS NOTES
If they are able to get him in 3 weeks that is fine.  I will see him this week for f/u.  Reminder I am only here 2.5 days this week.

## 2018-04-10 ENCOUNTER — TELEPHONE (OUTPATIENT)
Dept: FAMILY MEDICINE | Facility: CLINIC | Age: 63
End: 2018-04-10

## 2018-04-10 DIAGNOSIS — I10 HYPERTENSION GOAL BP (BLOOD PRESSURE) < 140/90: ICD-10-CM

## 2018-04-10 PROCEDURE — 36415 COLL VENOUS BLD VENIPUNCTURE: CPT | Performed by: FAMILY MEDICINE

## 2018-04-10 PROCEDURE — 80048 BASIC METABOLIC PNL TOTAL CA: CPT | Performed by: FAMILY MEDICINE

## 2018-04-10 NOTE — TELEPHONE ENCOUNTER
Pt blood pressure 04/10/2018 132/84. BMP was drawn waiting results. Pt did call the specialists today and is awaiting call back for appt. . Darya Driver CMA

## 2018-04-10 NOTE — TELEPHONE ENCOUNTER
Please advise pt that I called and spoke with the kidney specialists and unfortunately he was scheduled with them twice in the past (I'm not sure exactly when) and then no showed his last scheduled appointment.  They have the ability to see him in July.  I would recommend he make that appointment and I will try to call the U to get him seen with them sooner - but if they cannot then July should be okay.  He cannot no-show any other appointments or they will likely not schedule him.      Daylin Harrell MS, PA-C

## 2018-04-11 LAB
ANION GAP SERPL CALCULATED.3IONS-SCNC: 9 MMOL/L (ref 3–14)
BUN SERPL-MCNC: 44 MG/DL (ref 7–30)
CALCIUM SERPL-MCNC: 9.4 MG/DL (ref 8.5–10.1)
CHLORIDE SERPL-SCNC: 109 MMOL/L (ref 94–109)
CO2 SERPL-SCNC: 22 MMOL/L (ref 20–32)
CREAT SERPL-MCNC: 3.19 MG/DL (ref 0.66–1.25)
GFR SERPL CREATININE-BSD FRML MDRD: 20 ML/MIN/1.7M2
GLUCOSE SERPL-MCNC: 92 MG/DL (ref 70–99)
POTASSIUM SERPL-SCNC: 4.9 MMOL/L (ref 3.4–5.3)
SODIUM SERPL-SCNC: 140 MMOL/L (ref 133–144)

## 2018-04-11 NOTE — TELEPHONE ENCOUNTER
BMP still in process.     The patient indicates understanding of these issues and agrees with the plan.  Diya Smith RN  [facility]

## 2018-04-12 NOTE — TELEPHONE ENCOUNTER
BMP done    Component Results   Component Value Flag Ref Range Units Status Collected Lab   Sodium 140  133 - 144 mmol/L Final 04/10/2018  2:58 PM 65   Potassium 4.9  3.4 - 5.3 mmol/L Final 04/10/2018  2:58 PM 65   Chloride 109  94 - 109 mmol/L Final 04/10/2018  2:58 PM 65   Carbon Dioxide 22  20 - 32 mmol/L Final 04/10/2018  2:58 PM 65   Anion Gap 9  3 - 14 mmol/L Final 04/10/2018  2:58 PM 65   Glucose 92  70 - 99 mg/dL Final 04/10/2018  2:58 PM 65   Urea Nitrogen 44 (H) 7 - 30 mg/dL Final 04/10/2018  2:58 PM 65   Creatinine 3.19 (H) 0.66 - 1.25 mg/dL Final 04/10/2018  2:58 PM 65   GFR Estimate 20 (L) >60 mL/min/1.7m2 Final 04/10/2018  2:58 PM 65   Comment:   Non  GFR Calc   GFR Estimate If Black 24 (L) >60 mL/min/1.7m2 Final 04/10/2018  2:58 PM 65   Comment:   African American GFR Calc   Calcium 9.4  8.5 - 10.1 mg/dL Final 04/10/2018  2:58 PM 65       Microalbumin quantitative, random urine (Order [id number])   Component Results   Component Value Flag Ref Range Units Status Collected Lab   Creatinine Urine 161   mg/dL Final 11/24/2015  9:55 AM FrStHsLb   Albumin Urine mg/L 689   mg/L Final 11/24/2015  9:55 AM FrStHsLb   Albumin Urine mg/g Cr 427.95 (H) 0 - 17 mg/g Cr Final 11/24/2015  9:55 AM FrStHsLb       Routing to PCP for further review/recommendations/orders.    Elise Bowling RN  [facility]

## 2018-04-17 ENCOUNTER — TELEPHONE (OUTPATIENT)
Dept: FAMILY MEDICINE | Facility: CLINIC | Age: 63
End: 2018-04-17

## 2018-04-17 DIAGNOSIS — N18.4 CKD (CHRONIC KIDNEY DISEASE) STAGE 4, GFR 15-29 ML/MIN (H): Primary | ICD-10-CM

## 2018-04-17 DIAGNOSIS — I10 HYPERTENSION GOAL BP (BLOOD PRESSURE) < 140/90: ICD-10-CM

## 2018-04-17 NOTE — TELEPHONE ENCOUNTER
Please review and advised on result on 4/10/2018    Thank you     Soila Weiner RN, BSN  [facility]

## 2018-04-17 NOTE — TELEPHONE ENCOUNTER
Reason for Call:  Request for results:    Name of test or procedure: Labs     Date of test of procedure: 4/10/2018    Location of the test or procedure: [facility]    OK to leave the result message on voice mail or with a family member? NO    Phone number Patient can be reached at:  Cell number on file:    Telephone Information:   Mobile [phone]       Additional comments: any    Call taken on 4/17/2018 at 12:12 PM by Diana Castaneda

## 2018-04-20 NOTE — TELEPHONE ENCOUNTER
Called and kidney fxn is down but stable -  Will recheck in ~ 1 mo - BP is better,   Has nephology appt in early June too.

## 2018-04-20 NOTE — PROGRESS NOTES
Dear Matteo,    Here is a summary of your recent test results:  -called with results  Will f/u with nephrology    For additional lab test information, labtestsonline.org is an excellent reference.    Please call us at [phone] (or use [employer]) to address the above recommendations if needed.           Thank you very much for trusting me and [facility].     Healthy regards,  Romeo Capone MD

## 2018-05-01 DIAGNOSIS — R80.9 PROTEINURIA, UNSPECIFIED TYPE: Primary | ICD-10-CM

## 2018-05-01 DIAGNOSIS — N18.4 CKD (CHRONIC KIDNEY DISEASE) STAGE 4, GFR 15-29 ML/MIN (H): ICD-10-CM

## 2018-05-01 DIAGNOSIS — I10 HYPERTENSION GOAL BP (BLOOD PRESSURE) < 140/90: ICD-10-CM

## 2018-05-01 NOTE — TELEPHONE ENCOUNTER
"Requested Prescriptions   Pending Prescriptions Disp Refills     amLODIPine (NORVASC) 5 MG tablet 30 tablet 0        Last Written Prescription Date:  4.3.18  Last Fill Quantity: 30,  # refills: 0   Last Office Visit: No previous visit found   Future Office Visit:      Sig: Take 1 tablet (5 mg) by mouth daily    Calcium Channel Blockers Protocol  Failed    5/1/2018 10:02 AM       Failed - Blood pressure under 140/90 in past 12 months    BP Readings from Last 3 Encounters:   04/03/18 142/82   03/20/18 178/80   09/22/17 160/90                Failed - Normal serum creatinine on file in past 12 months    Recent Labs   Lab Test  04/10/18   1458   CR  3.19*            Passed - Recent (12 mo) or future (30 days) visit within the authorizing provider's specialty    Patient had office visit in the last 12 months or has a visit in the next 30 days with authorizing provider or within the authorizing provider's specialty.  See \"Patient Info\" tab in inbasket, or \"Choose Columns\" in Meds & Orders section of the refill encounter.           Passed - Patient is age 18 or older        lisinopril-hydrochlorothiazide (PRINZIDE/ZESTORETIC) 20-25 MG per tablet 30 tablet 0        Last Written Prescription Date:  4.3.18  Last Fill Quantity: 30,  # refills: 0   Last Office Visit: No previous visit found   Future Office Visit:      Sig: Take 1 tablet by mouth daily    Diuretics (Including Combos) Protocol Failed    5/1/2018 10:02 AM       Failed - Blood pressure under 140/90 in past 12 months    BP Readings from Last 3 Encounters:   04/03/18 142/82   03/20/18 178/80   09/22/17 160/90                Failed - Normal serum creatinine on file in past 12 months    Recent Labs   Lab Test  04/10/18   1458   CR  3.19*             Passed - Recent (12 mo) or future (30 days) visit within the authorizing provider's specialty    Patient had office visit in the last 12 months or has a visit in the next 30 days with authorizing provider or within the " "authorizing provider's specialty.  See \"Patient Info\" tab in inbasket, or \"Choose Columns\" in Meds & Orders section of the refill encounter.           Passed - Patient is age 18 or older       Passed - Normal serum potassium on file in past 12 months    Recent Labs   Lab Test  04/10/18   1458   POTASSIUM  4.9                   Passed - Normal serum sodium on file in past 12 months    Recent Labs   Lab Test  04/10/18   1458   NA  140                "

## 2018-05-01 NOTE — TELEPHONE ENCOUNTER
Reason for Call:  Medication or medication refill:    Do you use a [facility]?  Name of the pharmacy and phone number for the current request:  [facility] - [phone]    Name of the medication requested: lisinopril/HCTZ, amlodipine    Other request: Pt calling asking for refill to be sent to [facility] pharmacy.  Was notified of 72 hour time frame.    Can we leave a detailed message on this number? YES    Phone number patient can be reached at: Cell number on file:    Telephone Information:   Mobile [phone]       Best Time:     Call taken on 5/1/2018 at 10:01 AM by Heather Milton

## 2018-05-01 NOTE — TELEPHONE ENCOUNTER
Routing refill request to provider for review/approval because:  Labs out of range:  creatinine  BP is not at goal.    KATHY Oro, RN, N  [facility]) [phone]

## 2018-05-02 ENCOUNTER — OFFICE VISIT (OUTPATIENT)
Dept: FAMILY MEDICINE | Facility: CLINIC | Age: 63
End: 2018-05-02
Payer: COMMERCIAL

## 2018-05-02 VITALS
HEIGHT: 69 IN | OXYGEN SATURATION: 98 % | DIASTOLIC BLOOD PRESSURE: 84 MMHG | TEMPERATURE: 98.2 F | BODY MASS INDEX: 27.4 KG/M2 | WEIGHT: 185 LBS | HEART RATE: 74 BPM | SYSTOLIC BLOOD PRESSURE: 134 MMHG

## 2018-05-02 DIAGNOSIS — N18.4 CKD (CHRONIC KIDNEY DISEASE) STAGE 4, GFR 15-29 ML/MIN (H): ICD-10-CM

## 2018-05-02 DIAGNOSIS — M77.11 LATERAL EPICONDYLITIS OF RIGHT ELBOW: ICD-10-CM

## 2018-05-02 DIAGNOSIS — M25.521 RIGHT ELBOW PAIN: Primary | ICD-10-CM

## 2018-05-02 LAB
BASOPHILS # BLD AUTO: 0 10E9/L (ref 0–0.2)
BASOPHILS NFR BLD AUTO: 0.4 %
DIFFERENTIAL METHOD BLD: ABNORMAL
EOSINOPHIL # BLD AUTO: 0.1 10E9/L (ref 0–0.7)
EOSINOPHIL NFR BLD AUTO: 1.8 %
ERYTHROCYTE [DISTWIDTH] IN BLOOD BY AUTOMATED COUNT: 12.5 % (ref 10–15)
HCT VFR BLD AUTO: 38.3 % (ref 40–53)
HGB BLD-MCNC: 13.1 G/DL (ref 13.3–17.7)
LYMPHOCYTES # BLD AUTO: 1.8 10E9/L (ref 0.8–5.3)
LYMPHOCYTES NFR BLD AUTO: 22 %
MCH RBC QN AUTO: 31.9 PG (ref 26.5–33)
MCHC RBC AUTO-ENTMCNC: 34.2 G/DL (ref 31.5–36.5)
MCV RBC AUTO: 93 FL (ref 78–100)
MONOCYTES # BLD AUTO: 0.8 10E9/L (ref 0–1.3)
MONOCYTES NFR BLD AUTO: 9.7 %
NEUTROPHILS # BLD AUTO: 5.3 10E9/L (ref 1.6–8.3)
NEUTROPHILS NFR BLD AUTO: 66.1 %
PLATELET # BLD AUTO: 219 10E9/L (ref 150–450)
RBC # BLD AUTO: 4.11 10E12/L (ref 4.4–5.9)
WBC # BLD AUTO: 8 10E9/L (ref 4–11)

## 2018-05-02 PROCEDURE — 36415 COLL VENOUS BLD VENIPUNCTURE: CPT | Performed by: PHYSICIAN ASSISTANT

## 2018-05-02 PROCEDURE — 99213 OFFICE O/P EST LOW 20 MIN: CPT | Performed by: PHYSICIAN ASSISTANT

## 2018-05-02 PROCEDURE — 84550 ASSAY OF BLOOD/URIC ACID: CPT | Performed by: PHYSICIAN ASSISTANT

## 2018-05-02 PROCEDURE — 85025 COMPLETE CBC W/AUTO DIFF WBC: CPT | Performed by: PHYSICIAN ASSISTANT

## 2018-05-02 RX ORDER — AMLODIPINE BESYLATE 5 MG/1
5 TABLET ORAL DAILY
Qty: 30 TABLET | Refills: 0 | Status: SHIPPED | OUTPATIENT
Start: 2018-05-02 | End: 2018-05-07

## 2018-05-02 RX ORDER — LISINOPRIL AND HYDROCHLOROTHIAZIDE 20; 25 MG/1; MG/1
1 TABLET ORAL DAILY
Qty: 30 TABLET | Refills: 0 | Status: SHIPPED | OUTPATIENT
Start: 2018-05-02 | End: 2018-05-07

## 2018-05-02 RX ORDER — PREDNISONE 20 MG/1
TABLET ORAL
Qty: 20 TABLET | Refills: 0 | Status: SHIPPED | OUTPATIENT
Start: 2018-05-02 | End: 2018-06-27

## 2018-05-02 NOTE — MR AVS SNAPSHOT
After Visit Summary   5/2/2018    Matteo Barnes    MRN: [id number]           Patient Information     Date Of Birth          1955        Visit Information        Provider Department      5/2/2018 3:00 PM Beryl Cruz PA-C [facility]        Today's Diagnoses     Right elbow pain    -  1    CKD (chronic kidney disease) stage 4, GFR 15-29 ml/min (H)          Care Instructions      Understanding Lateral Epicondylitis    Tendons are strong bands of tissue that connect muscles to bones. Lateral epicondylitis affects the tendons that connect muscles in the forearm to the lateral epicondyle. This is the bony knob on the outer side of the elbow. The condition occurs if the extensor tendons of the wrist become red and swollen (irritated). This can cause pain in the elbow, forearm, and wrist. Because the condition is sometimes caused by playing tennis, it is also known as  tennis elbow.   How to say it  LA-tuhr-xavier [id number]-[id number]-LY-tis   What causes lateral epicondylitis?  The condition most often occurs because of overuse. This can be from any activity that repeatedly puts stress on the forearm extensor muscles or tendons and wrist. For instance, playing tennis, lifting weights, cutting meat, painting, and typing can all cause the condition. Wear and tear of the tendons from aging or an injury to the tendons can also cause the condition.  Symptoms of lateral epicondylitis  The most common symptom is pain. You may feel it on the outer side of the elbow and down the back of the forearm. It may be worse when moving or using the elbow, forearm, or wrist. You may also feel pain when gripping or lifting things.  Treatment for lateral epicondylitis  Treatments may include:    Resting the elbow, forearm, and wrist. You ll need to avoid movements that can make your symptoms worse. You also may need to avoid certain sports and types of work for a time. This helps relieve symptoms and prevent  further damage to the tendons.    Changing the action that caused the problem. For instance, if the tendons were damaged from playing tennis, it may help to change your playing technique or use different equipment. This helps prevent further damage to the tendons.    Using cold packs. Putting an ice pack on the injured area can help reduce pain and swelling.    Taking pain medicines. Taking prescription or over-the-counter pain medicines may help reduce pain and swelling.      Wearing a brace. This helps reduce strain on the muscles and tendons in the forearm, which may relieve symptoms. It is very important to wear the brace properly.    Doing exercises and physical therapy. These help improve strength and range of motion in the elbow, forearm, and wrist.    Getting shots of medicine into the injured area. These may help relieve symptoms for a time.    Having surgery. This may be an option if other treatments fail to relieve symptoms. In many cases, the surgeon removes the damaged tissue.  Possible complications of lateral epicondylitis  If the tendons involved don t heal properly, symptoms may return or get worse. To help prevent this, follow your treatment plan as directed.  When to call your healthcare provider  Call your healthcare provider right away if you have any of these:    Fever of 100.4 F (38 C) or higher, or as directed    Redness, swelling, or warmth in the elbow or forearm that gets worse    Symptoms that don t get better with treatment, or get worse    New symptoms   Date Last Reviewed: 3/10/2016    9710-0710 [employer]. [address] [place] [address]. All rights reserved. This information is not intended as a substitute for professional medical care. Always follow your healthcare professional's instructions.                Follow-ups after your visit        Follow-up notes from your care team     Return in about 5 days (around 5/7/2018) for If not improving, please be seen  sooner if needed.      Your next 10 appointments already scheduled     May 09, 2018  3:00 PM CDT   LAB with RV LAB   [facility]    [address] [place] [id number] [phone]           Please do not eat 10-12 hours before your appointment if you are coming in fasting for labs on lipids, cholesterol, or glucose (sugar). This does not apply to pregnant women. Water, hot tea and black coffee (with nothing added) are okay. Do not drink other fluids, diet soda or chew gum.              Who to contact     If you have questions or need follow up information about today's clinic visit or your schedule please contact [facility] directly at [phone].  Normal or non-critical lab and imaging results will be communicated to you by MyChart, letter or phone within 4 business days after the clinic has received the results. If you do not hear from us within 7 days, please contact the clinic through MyChart or phone. If you have a critical or abnormal lab result, we will notify you by phone as soon as possible.  Submit refill requests through [employer] or call your pharmacy and they will forward the refill request to us. Please allow 3 business days for your refill to be completed.          Additional Information About Your Visit        [employer] Information     [employer] gives you secure access to your electronic health record. If you see a primary care provider, you can also send messages to your care team and make appointments. If you have questions, please call your primary care clinic.  If you do not have a primary care provider, please call [phone] and they will assist you.        Care EveryWhere ID     This is your Care EveryWhere ID. This could be used by other organizations to access your [place] medical records  [id number]        Your Vitals Were     Pulse Temperature Height Pulse Oximetry BMI (Body Mass Index)       74 98.2  F  "(36.8  C) (Oral) 5' 9\" (1.753 m) 98% 27.32 kg/m2        Blood Pressure from Last 3 Encounters:   05/02/18 134/84   04/03/18 142/82   03/20/18 178/80    Weight from Last 3 Encounters:   05/02/18 185 lb (83.9 kg)   04/03/18 188 lb 6 oz (85.4 kg)   03/20/18 193 lb (87.5 kg)              We Performed the Following     CBC with platelets differential     Uric acid          Today's Medication Changes          These changes are accurate as of 5/2/18  3:56 PM.  If you have any questions, ask your nurse or doctor.               Start taking these medicines.        Dose/Directions    predniSONE 20 MG tablet   Commonly known as:  DELTASONE   Used for:  Right elbow pain   Started by:  Beryl Cruz PA-C        Take 3 tabs (60 mg) by mouth daily x 3 days, 2 tabs (40 mg) daily x 3 days, 1 tab (20 mg) daily x 3 days, then 1/2 tab (10 mg) x 3 days.   Quantity:  20 tablet   Refills:  0            Where to get your medicines      These medications were sent to [facility] [place] [address]     Phone:  [phone]     predniSONE 20 MG tablet                Primary Care Provider Office Phone # Fax #    Davidson Capone [phone]       [address] [place], [place] [id number]        Equal Access to Services     CLARICE SUMMERS AH: Hadii sherron lal hadasho Soomaali, waaxda luqadaha, qaybta kaalmada adeegyada, waxay juan marshall. So [facility] [phone].    ATENCIÓN: Si habla español, tiene a trejo disposición servicios gratuitos de asistencia lingüística. Llame al [phone].    We comply with applicable federal civil rights laws and [place] laws. We do not discriminate on the basis of race, color, national origin, age, disability, sex, sexual orientation, or gender identity.            Thank you!     Thank you for choosing [facility]  for your care. Our goal is always to provide you with " excellent care. Hearing back from our patients is one way we can continue to improve our services. Please take a few minutes to complete the written survey that you may receive in the mail after your visit with us. Thank you!             Your Updated Medication List - Protect others around you: Learn how to safely use, store and throw away your medicines at www.disposemymeds.org.          This list is accurate as of 5/2/18  3:56 PM.  Always use your most recent med list.                   Brand Name Dispense Instructions for use Diagnosis    amLODIPine 5 MG tablet    NORVASC    30 tablet    Take 1 tablet (5 mg) by mouth daily    Hypertension goal BP (blood pressure) < 140/90, CKD (chronic kidney disease) stage 4, GFR 15-29 ml/min (H)       lisinopril-hydrochlorothiazide 20-25 MG per tablet    PRINZIDE/ZESTORETIC    30 tablet    Take 1 tablet by mouth daily    Hypertension goal BP (blood pressure) < 140/90, CKD (chronic kidney disease) stage 4, GFR 15-29 ml/min (H)       predniSONE 20 MG tablet    DELTASONE    20 tablet    Take 3 tabs (60 mg) by mouth daily x 3 days, 2 tabs (40 mg) daily x 3 days, 1 tab (20 mg) daily x 3 days, then 1/2 tab (10 mg) x 3 days.    Right elbow pain

## 2018-05-02 NOTE — TELEPHONE ENCOUNTER
Another month sent - needs followup -  Does he have nephrology appointment set up yet.  He has significant decreased kidney function.

## 2018-05-02 NOTE — NURSING NOTE
"Chief Complaint   Patient presents with     Musculoskeletal Problem       Initial /84 (BP Location: Right arm, Patient Position: Chair, Cuff Size: Adult Large)  Pulse 74  Temp 98.2  F (36.8  C) (Oral)  Ht 5' 9\" (1.753 m)  Wt 185 lb (83.9 kg)  SpO2 98%  BMI 27.32 kg/m2 Estimated body mass index is 27.32 kg/(m^2) as calculated from the following:    Height as of this encounter: 5' 9\" (1.753 m).    Weight as of this encounter: 185 lb (83.9 kg).  Medication Reconciliation: complete   Csaba Mlnarik CMA    "

## 2018-05-02 NOTE — PROGRESS NOTES
SUBJECTIVE:                                                    Matteo Barnes is a 62 year old male who presents to clinic today for the following health issues:      Joint Pain    Onset: x2 days    Description:   Location: right elbow - Right hand dominate  Character: sore - pain - throbbing - cannot sleep    Intensity: moderate, severe    Progression of Symptoms: same    Accompanying Signs & Symptoms:  Other symptoms: restricted movement, weakness    History:   Previous similar pain: YES- 1.5 years ago      Precipitating factors:   Trauma or overuse: no     Alleviating factors:  Improved by: elastic band - gives back some strength    Therapies Tried and outcome: Tylenol, ice - no relief    Patient is here today with elbow pain.  He reports that he has been diagnosed with tennis elbow in the past and this feels similar.  He says that the symptoms came on pretty suddenly on Monday.  He reports that the pain is intense enough to affect his ability to sleep at night.  He is having a harder time holding and grasping things not because of hand pain but because it hurts the elbow.  He reports that he is recently being worked up for kidney disease and therefore has been strongly advised to avoid ibuprofen or any other NSAID use.  He has been trying Tylenol for pain relief but getting minimal benefit.  He reports that he is here today because he needs something to help with the pain.  He denies having any fevers chills sweats or any other symptoms of concern.  No swelling is noted by the patient either.    Patient denies any injury to the elbow.    Problem list and histories reviewed & adjusted, as indicated.  Additional history: as documented      ROS:  Constitutional, HEENT, cardiovascular, pulmonary, GI, , musculoskeletal, neuro, skin, endocrine and psych systems are negative, except as otherwise noted.    OBJECTIVE:                                                    /84 (BP Location: Right arm, Patient  "Position: Chair, Cuff Size: Adult Large)  Pulse 74  Temp 98.2  F (36.8  C) (Oral)  Ht 5' 9\" (1.753 m)  Wt 185 lb (83.9 kg)  SpO2 98%  BMI 27.32 kg/m2  Body mass index is 27.32 kg/(m^2).  GENERAL: healthy, alert and no distress  MS: no gross musculoskeletal defects noted, no edema, tenderness noted along the lateral epicondyle, mild tenderness noted with wrist extension on exam.  No surrounding rash, redness, joint tenderness or warmth on exam.    SKIN: no suspicious lesions or rashes  NEURO: Normal strength and tone, mentation intact and speech normal  PSYCH: mentation appears normal, affect normal/bright    Diagnostic Test Results:  Labs - pending     ASSESSMENT/PLAN:                                                      Matteo was seen today for musculoskeletal problem.    Diagnoses and all orders for this visit:    Right elbow pain  -     Uric acid  -     CBC with platelets differential  -     predniSONE (DELTASONE) 20 MG tablet; Take 3 tabs (60 mg) by mouth daily x 3 days, 2 tabs (40 mg) daily x 3 days, 1 tab (20 mg) daily x 3 days, then 1/2 tab (10 mg) x 3 days.    Lateral epicondylitis of right elbow    CKD (chronic kidney disease) stage 4, GFR 15-29 ml/min (H)      - Exam is consistent with tennis elbow.  Patient was fit with a tennis elbow brace and sent out of clinic with it.   - Labs pending to r/o gout.    - Patient advised to avoid any NSAID use due to his decreased kidney function.  He can use tylenol for pain control and was given Prednisone taper.   - Patient given home cares for tennis elbow and advised to followup if not improving.    - Patient should be seen sooner if needed.   - Patient advised to keep kidney appointments as scheduled in the next month.      -- I have discussed the patient's diagnosis, and my plan of treatment with the patient and/or family. Patient is aware to followup if symptoms do not improve.  Patient has been advised to be seen sooner or seek more immediate care if " symptoms change or worsen.  Patient agrees with and understands the plan today.     See Patient Instructions        Beryl Cruz PA-C    [facility] [place]

## 2018-05-02 NOTE — PATIENT INSTRUCTIONS
Understanding Lateral Epicondylitis    Tendons are strong bands of tissue that connect muscles to bones. Lateral epicondylitis affects the tendons that connect muscles in the forearm to the lateral epicondyle. This is the bony knob on the outer side of the elbow. The condition occurs if the extensor tendons of the wrist become red and swollen (irritated). This can cause pain in the elbow, forearm, and wrist. Because the condition is sometimes caused by playing tennis, it is also known as  tennis elbow.   How to say it  LA-tuhr-xavier [id number]-ty-JBH-duh-LY-tis   What causes lateral epicondylitis?  The condition most often occurs because of overuse. This can be from any activity that repeatedly puts stress on the forearm extensor muscles or tendons and wrist. For instance, playing tennis, lifting weights, cutting meat, painting, and typing can all cause the condition. Wear and tear of the tendons from aging or an injury to the tendons can also cause the condition.  Symptoms of lateral epicondylitis  The most common symptom is pain. You may feel it on the outer side of the elbow and down the back of the forearm. It may be worse when moving or using the elbow, forearm, or wrist. You may also feel pain when gripping or lifting things.  Treatment for lateral epicondylitis  Treatments may include:    Resting the elbow, forearm, and wrist. You ll need to avoid movements that can make your symptoms worse. You also may need to avoid certain sports and types of work for a time. This helps relieve symptoms and prevent further damage to the tendons.    Changing the action that caused the problem. For instance, if the tendons were damaged from playing tennis, it may help to change your playing technique or use different equipment. This helps prevent further damage to the tendons.    Using cold packs. Putting an ice pack on the injured area can help reduce pain and swelling.    Taking pain medicines. Taking prescription or  over-the-counter pain medicines may help reduce pain and swelling.      Wearing a brace. This helps reduce strain on the muscles and tendons in the forearm, which may relieve symptoms. It is very important to wear the brace properly.    Doing exercises and physical therapy. These help improve strength and range of motion in the elbow, forearm, and wrist.    Getting shots of medicine into the injured area. These may help relieve symptoms for a time.    Having surgery. This may be an option if other treatments fail to relieve symptoms. In many cases, the surgeon removes the damaged tissue.  Possible complications of lateral epicondylitis  If the tendons involved don t heal properly, symptoms may return or get worse. To help prevent this, follow your treatment plan as directed.  When to call your healthcare provider  Call your healthcare provider right away if you have any of these:    Fever of 100.4 F (38 C) or higher, or as directed    Redness, swelling, or warmth in the elbow or forearm that gets worse    Symptoms that don t get better with treatment, or get worse    New symptoms   Date Last Reviewed: 3/10/2016    6549-4169 [employer]. [address] [place] [address], [place] [address]. All rights reserved. This information is not intended as a substitute for professional medical care. Always follow your healthcare professional's instructions.

## 2018-05-03 LAB — URATE SERPL-MCNC: 11.4 MG/DL (ref 3.5–7.2)

## 2018-05-04 NOTE — TELEPHONE ENCOUNTER
Attempt #1  Called [phone] - Left a non-detailed message to call back and speak with any triage nurse.    Elise Bowling RN  [facility]

## 2018-05-07 RX ORDER — AMLODIPINE BESYLATE 5 MG/1
5 TABLET ORAL DAILY
Qty: 30 TABLET | Refills: 0 | Status: SHIPPED | OUTPATIENT
Start: 2018-05-07 | End: 2018-06-27 | Stop reason: ALTCHOICE

## 2018-05-07 RX ORDER — LISINOPRIL AND HYDROCHLOROTHIAZIDE 20; 25 MG/1; MG/1
1 TABLET ORAL DAILY
Qty: 30 TABLET | Refills: 0 | Status: SHIPPED | OUTPATIENT
Start: 2018-05-07 | End: 2018-06-27 | Stop reason: ALTCHOICE

## 2018-05-07 NOTE — TELEPHONE ENCOUNTER
Patient returning call    States he has been working with MADISON Harrell PA-C with everything. Would like to stay with [facility] regarding this. Would like refill sent to that provider to review.   Patient is seeing a nephrologist on 06/11/2018.     Patient scheduled for a Lab Only on 05/09/2018 to recheck kidney function.     Routing to [facility] for further review/recommendations/orders.    Elise Bowling RN  [facility]

## 2018-05-07 NOTE — PROGRESS NOTES
Chris Felipe ,    The results from your recent lab work appear stable to labs done previously, but the uric acid level is elevated, and therefore the elbow pain may be due to gout.  Prednisone is also used to treat gout.  Please continued the medication as prescribed.    Please followup if symptoms do not improve.  Please be seen sooner if symptoms change or worsen in any way.        Thank you for choosing [facility] for your health care needs,      Beryl Cruz PA-C

## 2018-05-07 NOTE — TELEPHONE ENCOUNTER
Attempt #2  Called # below - Left a non-detailed message to call back and speak with any triage nurse.    Elise Bowling RN  [facility]

## 2018-05-09 DIAGNOSIS — N18.4 CKD (CHRONIC KIDNEY DISEASE) STAGE 4, GFR 15-29 ML/MIN (H): ICD-10-CM

## 2018-05-09 DIAGNOSIS — R80.9 PROTEINURIA, UNSPECIFIED TYPE: ICD-10-CM

## 2018-05-09 DIAGNOSIS — I10 HYPERTENSION GOAL BP (BLOOD PRESSURE) < 140/90: ICD-10-CM

## 2018-05-09 PROCEDURE — 36415 COLL VENOUS BLD VENIPUNCTURE: CPT | Performed by: PHYSICIAN ASSISTANT

## 2018-05-09 PROCEDURE — 80048 BASIC METABOLIC PNL TOTAL CA: CPT | Performed by: PHYSICIAN ASSISTANT

## 2018-05-09 PROCEDURE — 82043 UR ALBUMIN QUANTITATIVE: CPT | Performed by: PHYSICIAN ASSISTANT

## 2018-05-10 ENCOUNTER — TELEPHONE (OUTPATIENT)
Dept: FAMILY MEDICINE | Facility: CLINIC | Age: 63
End: 2018-05-10

## 2018-05-10 LAB
ANION GAP SERPL CALCULATED.3IONS-SCNC: 12 MMOL/L (ref 3–14)
BUN SERPL-MCNC: 57 MG/DL (ref 7–30)
CALCIUM SERPL-MCNC: 9 MG/DL (ref 8.5–10.1)
CHLORIDE SERPL-SCNC: 109 MMOL/L (ref 94–109)
CO2 SERPL-SCNC: 20 MMOL/L (ref 20–32)
CREAT SERPL-MCNC: 2.82 MG/DL (ref 0.66–1.25)
CREAT UR-MCNC: 55 MG/DL
GFR SERPL CREATININE-BSD FRML MDRD: 23 ML/MIN/1.7M2
GLUCOSE SERPL-MCNC: 126 MG/DL (ref 70–99)
MICROALBUMIN UR-MCNC: 126 MG/L
MICROALBUMIN/CREAT UR: 228.26 MG/G CR (ref 0–17)
POTASSIUM SERPL-SCNC: 5.2 MMOL/L (ref 3.4–5.3)
SODIUM SERPL-SCNC: 141 MMOL/L (ref 133–144)

## 2018-05-10 NOTE — TELEPHONE ENCOUNTER
Reason for Call:  Other call back    Detailed comments: Pt called this afternoon and needs to speak with a  Nurse in regards to his most recent lab results. Please give pt a call once these results are up. Pt lost his login information to [employer] so he cannot see it. Thank you.    Phone Number Patient can be reached at: Home number on file [phone] (home)    Best Time:     Can we leave a detailed message on this number? YES    Call taken on 5/10/2018 at 3:53 PM by Katiuska Busch

## 2018-05-10 NOTE — PROGRESS NOTES
Triage: please advise pt of results and f/u recommendations.  Please also fax to [facility].    Matteo  Here are your recent results.  While still quite abnormal, your kidney function has improved very slightly from 1 month ago.  We will fax these results to [facility] (the kidney doctors) and they should recheck your labs in 1 month.  If your appointment is more than 45 days from now then I would want you to repeat your kidney function in 1 month with a lab only appointment.    If you have any questions please do not hesitate to contact our office via phone ([phone]) or [employer].    Daylin Harrell, MS, PA-C  [facility] [place]

## 2018-06-11 ENCOUNTER — TRANSFERRED RECORDS (OUTPATIENT)
Dept: HEALTH INFORMATION MANAGEMENT | Facility: CLINIC | Age: 63
End: 2018-06-11

## 2018-06-27 ENCOUNTER — OFFICE VISIT (OUTPATIENT)
Dept: FAMILY MEDICINE | Facility: CLINIC | Age: 63
End: 2018-06-27
Payer: COMMERCIAL

## 2018-06-27 VITALS
WEIGHT: 184 LBS | BODY MASS INDEX: 27.25 KG/M2 | HEIGHT: 69 IN | OXYGEN SATURATION: 98 % | SYSTOLIC BLOOD PRESSURE: 112 MMHG | TEMPERATURE: 98 F | HEART RATE: 89 BPM | DIASTOLIC BLOOD PRESSURE: 70 MMHG

## 2018-06-27 DIAGNOSIS — M25.511 ACUTE PAIN OF RIGHT SHOULDER: Primary | ICD-10-CM

## 2018-06-27 DIAGNOSIS — Z12.11 SPECIAL SCREENING FOR MALIGNANT NEOPLASMS, COLON: ICD-10-CM

## 2018-06-27 PROCEDURE — 20610 DRAIN/INJ JOINT/BURSA W/O US: CPT | Mod: RT | Performed by: FAMILY MEDICINE

## 2018-06-27 RX ORDER — ALLOPURINOL 100 MG/1
100 TABLET ORAL DAILY
COMMUNITY
Start: 2018-06-15 | End: 2018-08-08

## 2018-06-27 RX ORDER — AMLODIPINE BESYLATE 10 MG/1
TABLET ORAL
COMMUNITY
Start: 2018-06-15 | End: 2021-09-02

## 2018-06-27 RX ORDER — LISINOPRIL 10 MG/1
TABLET ORAL
COMMUNITY
Start: 2018-06-15 | End: 2018-07-25

## 2018-06-27 NOTE — MR AVS SNAPSHOT
After Visit Summary   6/27/2018    Matteo Barnes    MRN: [id number]           Patient Information     Date Of Birth          1955        Visit Information        Provider Department      6/27/2018 2:20 PM Grzegorz Gleason MD [facility]        Today's Diagnoses     Acute pain of right shoulder    -  1    Special screening for malignant neoplasms, colon           Follow-ups after your visit        Additional Services     GASTROENTEROLOGY ADULT REF PROCEDURE ONLY       Last Lab Result: Creatinine (mg/dL)       Date                     Value                 05/09/2018               2.82 (H)         ----------  There is no height or weight on file to calculate BMI.     Needed:  No  Language:  English    Patient will be contacted to schedule procedure.     Please be aware that coverage of these services is subject to the terms and limitations of your health insurance plan.  Call member services at your health plan with any benefit or coverage questions.  Any procedures must be performed at a [facility] facility OR coordinated by your clinic's referral office.    Please bring the following with you to your appointment:    (1) Any X-Rays, CTs or MRIs which have been performed.  Contact the facility where they were done to arrange for  prior to your scheduled appointment.    (2) List of current medications   (3) This referral request   (4) Any documents/labs given to you for this referral                  Who to contact     If you have questions or need follow up information about today's clinic visit or your schedule please contact [facility] directly at [phone].  Normal or non-critical lab and imaging results will be communicated to you by MyChart, letter or phone within 4 business days after the clinic has received the results. If you do not hear from us within 7 days, please contact the clinic through MyChart or phone. If you have a critical or  "abnormal lab result, we will notify you by phone as soon as possible.  Submit refill requests through [employer] or call your pharmacy and they will forward the refill request to us. Please allow 3 business days for your refill to be completed.          Additional Information About Your Visit        [employer] Information     [employer] gives you secure access to your electronic health record. If you see a primary care provider, you can also send messages to your care team and make appointments. If you have questions, please call your primary care clinic.  If you do not have a primary care provider, please call [phone] and they will assist you.        Care EveryWhere ID     This is your Care EveryWhere ID. This could be used by other organizations to access your [facility] medical records  [id number]        Your Vitals Were     Pulse Temperature Height Pulse Oximetry BMI (Body Mass Index)       89 98  F (36.7  C) (Oral) 5' 9\" (1.753 m) 98% 27.17 kg/m2        Blood Pressure from Last 3 Encounters:   06/27/18 112/70   05/02/18 134/84   04/03/18 142/82    Weight from Last 3 Encounters:   06/27/18 184 lb (83.5 kg)   05/02/18 185 lb (83.9 kg)   04/03/18 188 lb 6 oz (85.4 kg)              We Performed the Following     GASTROENTEROLOGY ADULT REF PROCEDURE ONLY     Kenalog  80 MG         []     Large Joint/Bursa injection and/or drainage (Shoulder, Knee)          Today's Medication Changes          These changes are accurate as of 6/27/18  3:29 PM.  If you have any questions, ask your nurse or doctor.               These medicines have changed or have updated prescriptions.        Dose/Directions    amLODIPine 10 MG tablet   Commonly known as:  NORVASC   This may have changed:  Another medication with the same name was removed. Continue taking this medication, and follow the directions you see here.   Changed by:  Grzegorz Gleason MD        Refills:  0         Stop taking these medicines if you haven't already. Please " contact your care team if you have questions.     lisinopril-hydrochlorothiazide 20-25 MG per tablet   Commonly known as:  PRINZIDE/ZESTORETIC   Stopped by:  Grzegorz Gleason MD                    Primary Care Provider Office Phone # Fax #    Davidson Capone [phone]       [address] [place] [address]        Equal Access to Services     [facility]: Hadii aad ku hadasho Soomaali, waaxda luqadaha, qaybta kaalmada adeegyada, waxay idiin hayaan adeeg kharash la'aan ah. So [facility] [phone].    ATENCIÓN: Si habla español, tiene a trejo disposición servicios gratuitos de asistencia lingüística. Llame al [phone].    We comply with applicable federal civil rights laws and [place] laws. We do not discriminate on the basis of race, color, national origin, age, disability, sex, sexual orientation, or gender identity.            Thank you!     Thank you for choosing [facility]  for your care. Our goal is always to provide you with excellent care. Hearing back from our patients is one way we can continue to improve our services. Please take a few minutes to complete the written survey that you may receive in the mail after your visit with us. Thank you!             Your Updated Medication List - Protect others around you: Learn how to safely use, store and throw away your medicines at www.disposemymeds.org.          This list is accurate as of 6/27/18  3:29 PM.  Always use your most recent med list.                   Brand Name Dispense Instructions for use Diagnosis    allopurinol 100 MG tablet    ZYLOPRIM     Take 100 mg by mouth daily        amLODIPine 10 MG tablet    NORVASC          lisinopril 10 MG tablet    PRINIVIL/ZESTRIL

## 2018-06-27 NOTE — PROGRESS NOTES
"  SUBJECTIVE:                                                    Matteo Barnes is a 62 year old male who presents to clinic today for the following health issues:    Shoulder Pain    Onset: 2-3 days     Description:   Location: right shoulder - no known injury  Character: ache with sharp intermittent pain    Intensity: at its worst 9/10    Progression of Symptoms: worse    Accompanying Signs & Symptoms:  Other symptoms: none    History:   Previous similar pain: YES- has had injection in that shoulder      Precipitating factors:   Trauma or overuse: no     Alleviating factors:  Improved by: nothing    Therapies Tried and outcome: heat, tylenol with no relief    ROS:  General, neuro, sleep, psych, musculoskeletal system otherwise negative.     /70 (BP Location: Right arm, Patient Position: Chair, Cuff Size: Adult Large)  Pulse 89  Temp 98  F (36.7  C) (Oral)  Ht 5' 9\" (1.753 m)  Wt 184 lb (83.5 kg)  SpO2 98%  BMI 27.17 kg/m2    R shoulder ROM limited with forward flexion  NV intact  supraspinatous painful and subjectively weak, infraspinatous painful, subscap painful ROM  Skin normal    ASSESSMENT:  1. Acute pain of right shoulder  Injection laterally with 1%lido/0.25% marcaine, 80 mg kenalog.   Cleansed with alcohol wipes and done aseptically.  Patient tolerated well.   rc exercises discussed    Pt instructed to come back to the clinic for worsening sx      2. Special screening for malignant neoplasms, colon  Due for scope  - GASTROENTEROLOGY ADULT REF PROCEDURE ONLY     "

## 2018-06-28 ENCOUNTER — TELEPHONE (OUTPATIENT)
Dept: FAMILY MEDICINE | Facility: CLINIC | Age: 63
End: 2018-06-28

## 2018-06-28 NOTE — TELEPHONE ENCOUNTER
Patient calling to report:    Shoulder Pain    Onset: 3-4 days    Description:   Location: right shoulder  Character: just really sore and decreased range of motion - unable to lift shoulder up to touch head    Intensity: 9/10    Progression of Symptoms: same, maybe a little bit worse    Accompanying Signs & Symptoms:  Other symptoms: none    History:   Previous similar pain: YES      Precipitating factors:   Trauma or overuse: no     Alleviating factors:  Improved by: none    Therapies Tried and outcome: 2 Tylenol this morning - has not helped    Patient reports that he felt OK yesterday evening and slept pretty good last night. Is wondering if the injection he had yesterday just needs more time to work or if there is something else he should be doing.    Patient can be reached at [phone]. Please advise. Thank you.    Fiorella Smith RN, BSN  [facility]

## 2018-06-28 NOTE — TELEPHONE ENCOUNTER
Reviewed patient's chart. MD BRANDEN sent patient a [employer] message responding to call.  Fiorella Smith RN, BSN  [facility]

## 2018-07-26 DIAGNOSIS — E87.5 HYPERPOTASSEMIA: ICD-10-CM

## 2018-07-26 DIAGNOSIS — I10 HYPERTENSION GOAL BP (BLOOD PRESSURE) < 140/90: Primary | ICD-10-CM

## 2018-07-26 DIAGNOSIS — N18.4 CKD (CHRONIC KIDNEY DISEASE) STAGE 4, GFR 15-29 ML/MIN (H): ICD-10-CM

## 2018-08-08 ENCOUNTER — OFFICE VISIT (OUTPATIENT)
Dept: FAMILY MEDICINE | Facility: CLINIC | Age: 63
End: 2018-08-08
Payer: COMMERCIAL

## 2018-08-08 VITALS
TEMPERATURE: 98.5 F | SYSTOLIC BLOOD PRESSURE: 126 MMHG | OXYGEN SATURATION: 99 % | WEIGHT: 178.5 LBS | DIASTOLIC BLOOD PRESSURE: 82 MMHG | HEART RATE: 96 BPM | HEIGHT: 69 IN | BODY MASS INDEX: 26.44 KG/M2

## 2018-08-08 DIAGNOSIS — M10.9 ACUTE GOUT OF LEFT ANKLE, UNSPECIFIED CAUSE: Primary | ICD-10-CM

## 2018-08-08 DIAGNOSIS — N18.4 CKD (CHRONIC KIDNEY DISEASE) STAGE 4, GFR 15-29 ML/MIN (H): ICD-10-CM

## 2018-08-08 DIAGNOSIS — I10 HYPERTENSION GOAL BP (BLOOD PRESSURE) < 140/90: ICD-10-CM

## 2018-08-08 PROCEDURE — 99214 OFFICE O/P EST MOD 30 MIN: CPT | Performed by: PHYSICIAN ASSISTANT

## 2018-08-08 PROCEDURE — 36415 COLL VENOUS BLD VENIPUNCTURE: CPT | Performed by: PHYSICIAN ASSISTANT

## 2018-08-08 PROCEDURE — 84550 ASSAY OF BLOOD/URIC ACID: CPT | Performed by: PHYSICIAN ASSISTANT

## 2018-08-08 PROCEDURE — 80069 RENAL FUNCTION PANEL: CPT | Performed by: PHYSICIAN ASSISTANT

## 2018-08-08 RX ORDER — ALLOPURINOL 100 MG/1
200 TABLET ORAL DAILY
Qty: 30 TABLET | COMMUNITY
Start: 2018-08-08 | End: 2021-09-02

## 2018-08-08 RX ORDER — METHYLPREDNISOLONE 4 MG
TABLET, DOSE PACK ORAL
COMMUNITY
Start: 2018-07-31 | End: 2018-08-08

## 2018-08-08 RX ORDER — PREDNISONE 20 MG/1
TABLET ORAL
Qty: 20 TABLET | Refills: 0 | Status: SHIPPED | OUTPATIENT
Start: 2018-08-08 | End: 2018-09-05

## 2018-08-08 NOTE — LETTER
[facility]  [address] [place] [id number]                  [phone]   August 14, 2018    Matteo Barnes  [address] [facility] [address]      Dear Matteo,    Here is a summary of your recent test results:    Great news!  Your kidney function is stable and your uric acid level has decreased dramatically since 3 months ago.  Continue with your nephrologist (and current medications) and we will fax these records over to them for review.     Your test results are enclosed.      Please contact me if you have any questions.    In addition, here is a list of due or overdue Health Maintenance reminders.    Health Maintenance Due   Topic Date Due     Pneumovax Vaccine  08/10/1957     HIV SCREEN (SYSTEM ASSIGNED)  08/10/1973     Colon Cancer Screening - every 10 years.  08/10/2005     Discuss Advance Directive Planning  08/10/2010     Wellness Visit with your Primary Provider - yearly  10/08/2016     Depression Assessment 2 - yearly  10/08/2016     Cholesterol Lab - yearly  11/24/2016     Prostate Test (PSA) - yearly  11/24/2016       Please call us at [phone] (or use [employer]) to address the above recommendations.            Thank you very much for trusting [facility]..     Healthy regards,    Daylin Harrell PA-C        Results for orders placed or performed in visit on 08/08/18   Uric acid   Result Value Ref Range    Uric Acid 5.0 3.5 - 7.2 mg/dL   Renal panel   Result Value Ref Range    Sodium 139 133 - 144 mmol/L    Potassium 5.1 3.4 - 5.3 mmol/L    Chloride 109 94 - 109 mmol/L    Carbon Dioxide 19 (L) 20 - 32 mmol/L    Anion Gap 11 3 - 14 mmol/L    Glucose 86 70 - 99 mg/dL    Urea Nitrogen 58 (H) 7 - 30 mg/dL    Creatinine 2.80 (H) 0.66 - 1.25 mg/dL    GFR Estimate 23 (L) >60 mL/min/1.7m2    GFR Estimate If Black 28 (L) >60 mL/min/1.7m2    Calcium 8.7 8.5 - 10.1 mg/dL    Phosphorus 3.8 2.5 - 4.5 mg/dL    Albumin 3.6 3.4 - 5.0 g/dL
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
## 2018-08-08 NOTE — MR AVS SNAPSHOT
"              After Visit Summary   8/8/2018    Matteo Barnes    MRN: [id number]           Patient Information     Date Of Birth          1955        Visit Information        Provider Department      8/8/2018 3:00 PM Daylin Harrell PA-C [facility]        Today's Diagnoses     Acute gout of left ankle, unspecified cause    -  1    Hypertension goal BP (blood pressure) < 140/90        CKD (chronic kidney disease) stage 4, GFR 15-29 ml/min (H)           Follow-ups after your visit        Who to contact     If you have questions or need follow up information about today's clinic visit or your schedule please contact [facility] directly at [phone].  Normal or non-critical lab and imaging results will be communicated to you by Lightspeedhart, letter or phone within 4 business days after the clinic has received the results. If you do not hear from us within 7 days, please contact the clinic through Lightspeedhart or phone. If you have a critical or abnormal lab result, we will notify you by phone as soon as possible.  Submit refill requests through [employer] or call your pharmacy and they will forward the refill request to us. Please allow 3 business days for your refill to be completed.          Additional Information About Your Visit        MyChart Information     [employer] gives you secure access to your electronic health record. If you see a primary care provider, you can also send messages to your care team and make appointments. If you have questions, please call your primary care clinic.  If you do not have a primary care provider, please call [phone] and they will assist you.        Care EveryWhere ID     This is your Care EveryWhere ID. This could be used by other organizations to access your [place] medical records  [id number]        Your Vitals Were     Pulse Temperature Height Pulse Oximetry BMI (Body Mass Index)       96 98.5  F (36.9  C) (Tympanic) 5' 9\" (1.753 m) 99% " 26.36 kg/m2        Blood Pressure from Last 3 Encounters:   08/08/18 126/82   06/27/18 112/70   05/02/18 134/84    Weight from Last 3 Encounters:   08/08/18 178 lb 8 oz (81 kg)   06/27/18 184 lb (83.5 kg)   05/02/18 185 lb (83.9 kg)              We Performed the Following     Renal panel     Uric acid          Today's Medication Changes          These changes are accurate as of 8/8/18  3:28 PM.  If you have any questions, ask your nurse or doctor.               Start taking these medicines.        Dose/Directions    predniSONE 20 MG tablet   Commonly known as:  DELTASONE   Used for:  Acute gout of left ankle, unspecified cause   Started by:  Daylin Harrell PA-C        Take 60 mg daily for 3 days, then 40 mg daily for 3 days, then 20 mg daily for 3 days, then 10 mg for 4 days   Quantity:  20 tablet   Refills:  0         These medicines have changed or have updated prescriptions.        Dose/Directions    allopurinol 100 MG tablet   Commonly known as:  ZYLOPRIM   This may have changed:  how much to take   Changed by:  Daylin Harrell PA-C        Dose:  200 mg   Take 2 tablets (200 mg) by mouth daily   Quantity:  30 tablet   Refills:  0         Stop taking these medicines if you haven't already. Please contact your care team if you have questions.     lisinopril 10 MG tablet   Commonly known as:  PRINIVIL/ZESTRIL   Stopped by:  Daylin Harrell PA-C                Where to get your medicines      These medications were sent to [facility] - [address] [address]     Phone:  [phone]     predniSONE 20 MG tablet                Primary Care Provider Office Phone # Fax #    Davidson Capone [phone]       [address] [place] [id number]        Equal Access to Services     JUSTEN SUMMERS AH: Radha Bryson, ester luqadaha, qaraimundo escobar, pernell hernandes  tashialexandresigrid luisPitoaadaisy ah. So [facility] [phone].    ATENCIÓN: Si habla jesica, tiene a trejo disposición servicios gratuitos de asistencia lingüística. Estrella al [phone].    We comply with applicable federal civil rights laws and [place] laws. We do not discriminate on the basis of race, color, national origin, age, disability, sex, sexual orientation, or gender identity.            Thank you!     Thank you for choosing [facility]  for your care. Our goal is always to provide you with excellent care. Hearing back from our patients is one way we can continue to improve our services. Please take a few minutes to complete the written survey that you may receive in the mail after your visit with us. Thank you!             Your Updated Medication List - Protect others around you: Learn how to safely use, store and throw away your medicines at www.disposemymeds.org.          This list is accurate as of 8/8/18  3:28 PM.  Always use your most recent med list.                   Brand Name Dispense Instructions for use Diagnosis    allopurinol 100 MG tablet    ZYLOPRIM    30 tablet    Take 2 tablets (200 mg) by mouth daily        amLODIPine 10 MG tablet    NORVASC          predniSONE 20 MG tablet    DELTASONE    20 tablet    Take 60 mg daily for 3 days, then 40 mg daily for 3 days, then 20 mg daily for 3 days, then 10 mg for 4 days    Acute gout of left ankle, unspecified cause

## 2018-08-09 LAB
ALBUMIN SERPL-MCNC: 3.6 G/DL (ref 3.4–5)
ANION GAP SERPL CALCULATED.3IONS-SCNC: 11 MMOL/L (ref 3–14)
BUN SERPL-MCNC: 58 MG/DL (ref 7–30)
CALCIUM SERPL-MCNC: 8.7 MG/DL (ref 8.5–10.1)
CHLORIDE SERPL-SCNC: 109 MMOL/L (ref 94–109)
CO2 SERPL-SCNC: 19 MMOL/L (ref 20–32)
CREAT SERPL-MCNC: 2.8 MG/DL (ref 0.66–1.25)
GFR SERPL CREATININE-BSD FRML MDRD: 23 ML/MIN/1.7M2
GLUCOSE SERPL-MCNC: 86 MG/DL (ref 70–99)
PHOSPHATE SERPL-MCNC: 3.8 MG/DL (ref 2.5–4.5)
POTASSIUM SERPL-SCNC: 5.1 MMOL/L (ref 3.4–5.3)
SODIUM SERPL-SCNC: 139 MMOL/L (ref 133–144)
URATE SERPL-MCNC: 5 MG/DL (ref 3.5–7.2)

## 2018-08-09 NOTE — PROGRESS NOTES
Team:   Please fax last note and labs to Dr. Tia Bernard:    Matteo  Here are your recent results.  Great news!  Your kidney function is stable and your uric acid level has decreased dramatically since 3 months ago.  Continue with your nephrologist (and current medications) and we will fax these records over to them for review.  If you have any questions please do not hesitate to contact our office via phone ([phone]) or MyChart.    Daylin Harrell, MS, PA-C  [facility]

## 2018-08-16 DIAGNOSIS — M10.9 ACUTE GOUT OF LEFT ANKLE, UNSPECIFIED CAUSE: ICD-10-CM

## 2018-08-16 RX ORDER — PREDNISONE 20 MG/1
TABLET ORAL
Qty: 20 TABLET | Refills: 0 | Status: CANCELLED | OUTPATIENT
Start: 2018-08-16

## 2018-08-16 NOTE — TELEPHONE ENCOUNTER
I spoke with patient.  He said he has to go to [place] for a .  He said he would like some extra pills of Prednisone just in case symptoms return after he finishes current supply.  He is unsure of how long he will be in [place].  He is leaving town tomorrow.        He said he does not need a full prescription only a few pills.    Routing to  to review and advise.      KATHY Oro, RN, N  [facility]) [phone]

## 2018-08-16 NOTE — TELEPHONE ENCOUNTER
Reason for Call:  Medication or medication refill:    Do you use a [facility]?  Name of the pharmacy and phone number for the current request:  [facility] - [phone]    Name of the medication requested: predniSONE (DELTASONE) 20 MG tablet    Ptn is not asking for the entire refill. States he is leaving tomorrow  for a  and would like a couple pills in case of a gout flare up.    Other request: NA    Can we leave a detailed message on this number? YES    Phone number patient can be reached at: Home number on file [phone] (home)    Best Time: any    Call taken on 2018 at 11:02 AM by Elise Galvan

## 2018-08-16 NOTE — TELEPHONE ENCOUNTER
Please advise patient that if his symptoms recur we need him to call in and give us advise of his symptoms.  Taking prednisone needs to be done strategically as it interferes with the natural prednisone/steroid made by the body.  Advised him that we always have an on-call physician and she can do E visit as well.

## 2018-08-17 NOTE — TELEPHONE ENCOUNTER
Requested Prescriptions   Pending Prescriptions Disp Refills     predniSONE (DELTASONE) 20 MG tablet    Last Written Prescription Date:  8.8.18  Last Fill Quantity: 30,  # refills: 0   Last Office Visit: 8/8/2018   Future Office Visit:      20 tablet 0     Sig: Take 60 mg daily for 3 days, then 40 mg daily for 3 days, then 20 mg daily for 3 days, then 10 mg for 4 days    There is no refill protocol information for this order

## 2018-08-20 NOTE — TELEPHONE ENCOUNTER
Called #   Telephone Information:   Mobile [phone]       Advised pt on the information below     Patient stated an understanding and agreed with plan.      Soila Weiner RN, BSN  [facility]

## 2018-09-05 ENCOUNTER — TELEPHONE (OUTPATIENT)
Dept: FAMILY MEDICINE | Facility: CLINIC | Age: 63
End: 2018-09-05

## 2018-09-05 ENCOUNTER — NURSE TRIAGE (OUTPATIENT)
Dept: NURSING | Facility: CLINIC | Age: 63
End: 2018-09-05

## 2018-09-05 ENCOUNTER — TELEPHONE (OUTPATIENT)
Dept: NURSING | Facility: CLINIC | Age: 63
End: 2018-09-05

## 2018-09-05 ENCOUNTER — E-VISIT (OUTPATIENT)
Dept: FAMILY MEDICINE | Facility: CLINIC | Age: 63
End: 2018-09-05
Payer: COMMERCIAL

## 2018-09-05 DIAGNOSIS — M10.9 ACUTE GOUT OF LEFT ANKLE, UNSPECIFIED CAUSE: ICD-10-CM

## 2018-09-05 PROCEDURE — 99444 ZZC PHYSICIAN ONLINE EVALUATION & MANAGEMENT SERVICE: CPT | Performed by: PHYSICIAN ASSISTANT

## 2018-09-05 RX ORDER — PREDNISONE 20 MG/1
TABLET ORAL
Qty: 20 TABLET | Refills: 0 | Status: SHIPPED | OUTPATIENT
Start: 2018-09-05 | End: 2019-06-25

## 2018-09-05 NOTE — TELEPHONE ENCOUNTER
Patient is requesting a prescription of prednisone to treat his gout, from Daylin Harrell PA-C. This is the only way they've been able to treat it in the past. Please call the patient about a prescription of prednisone today.  Bere Meyer RN-[facility]

## 2018-09-05 NOTE — TELEPHONE ENCOUNTER
"Name of caller: Matteo  Relationship of Patient: Self    Reason for Call: PT called because he is having a gout flair up and would like to get his steroid injection. Per previous message form LP, PT needs to report his symptoms. He stated \"its the same as always\".     Best phone number to reach pt at is: [phone]  Ok to leave a message with medical info? Yes    Pharmacy preferred (if calling for a refill): [facility]    Fiorella Aguayo  Patient Representative - [facility]    "

## 2018-09-05 NOTE — TELEPHONE ENCOUNTER
Called #   Telephone Information:   Mobile [phone]       Gout/ single inflamed joint   Onset: yesterday morning     Description:   Location: right ankle/heel - right  Joint Swelling: YES  Redness: no   Pain: YES    Intensity: severe, 8-9/10    Progression of Symptoms:  worsening    Accompanying Signs & Symptoms:  Fevers: no     History:   Trauma to the area: no   Previous history of gout: YES   Recent illness:  no     Precipitating factors:   Diet-rich in purine: no  Alcohol use: no   Diuretic use: no     Alleviating factors:  None noted     Therapies Tried and outcome: none    Pt stated he needs steroids for this because he is not allowed to take NSAIDs  r/t his decreased kidney function     Please review and advise     Ok to LM on the above number     Soila Weiner RN, BSN  [place] T[facility]

## 2018-09-05 NOTE — TELEPHONE ENCOUNTER
Pt is frustrated, they feel like they have to jump through so many hoops to get this medication or treated.      Pt does not know their password and declined the help desk number.  Triage said they could leave a detailed vm with the [employer] help desk number for the pt to call later. Pt declined.    Pt wanted this routed back to LP as a FYI. They said they may change to [facility] because of the run around they are getting here. Triage advised the pt that an evisit is a very simple way to do an appointment without being seen and get treatment or recommendations.    Diya Smith, RN  [facility]

## 2018-09-05 NOTE — TELEPHONE ENCOUNTER
See note with LP recommendations. Pt refused evisit.  Diya Smith RN  [facility]

## 2018-12-19 ENCOUNTER — TRANSFERRED RECORDS (OUTPATIENT)
Dept: HEALTH INFORMATION MANAGEMENT | Facility: CLINIC | Age: 63
End: 2018-12-19

## 2018-12-26 DIAGNOSIS — N18.4 CHRONIC KIDNEY DISEASE, STAGE IV (SEVERE) (H): Primary | ICD-10-CM

## 2019-01-24 ENCOUNTER — OFFICE VISIT (OUTPATIENT)
Dept: FAMILY MEDICINE | Facility: CLINIC | Age: 64
End: 2019-01-24
Payer: COMMERCIAL

## 2019-01-24 VITALS
HEIGHT: 69 IN | WEIGHT: 186 LBS | OXYGEN SATURATION: 97 % | BODY MASS INDEX: 27.55 KG/M2 | DIASTOLIC BLOOD PRESSURE: 98 MMHG | TEMPERATURE: 97.7 F | SYSTOLIC BLOOD PRESSURE: 128 MMHG | HEART RATE: 87 BPM

## 2019-01-24 DIAGNOSIS — M10.9 ACUTE GOUTY ARTHRITIS: Primary | ICD-10-CM

## 2019-01-24 DIAGNOSIS — N18.4 CKD (CHRONIC KIDNEY DISEASE) STAGE 4, GFR 15-29 ML/MIN (H): ICD-10-CM

## 2019-01-24 PROCEDURE — 99213 OFFICE O/P EST LOW 20 MIN: CPT | Performed by: NURSE PRACTITIONER

## 2019-01-24 RX ORDER — LISINOPRIL 2.5 MG/1
TABLET ORAL
COMMUNITY
Start: 2018-12-26 | End: 2019-06-25

## 2019-01-24 RX ORDER — PREDNISONE 20 MG/1
40 TABLET ORAL DAILY
Qty: 10 TABLET | Refills: 1 | Status: SHIPPED | OUTPATIENT
Start: 2019-01-24 | End: 2019-06-25

## 2019-01-24 ASSESSMENT — MIFFLIN-ST. JEOR: SCORE: 1629.07
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
## 2019-01-24 NOTE — PATIENT INSTRUCTIONS
Matteo was seen today for arthritis.    Diagnoses and all orders for this visit:    Acute gouty arthritis  -     predniSONE (DELTASONE) 20 MG tablet; Take 40 mg by mouth daily for 5 days May use as needed for gout flare up.  Recommend follow-up in clinic to address further gout flare ups.      Follow-up with no improvement or worsening of symptoms.

## 2019-06-19 ENCOUNTER — MEDICAL CORRESPONDENCE (OUTPATIENT)
Dept: HEALTH INFORMATION MANAGEMENT | Facility: CLINIC | Age: 64
End: 2019-06-19

## 2019-06-19 ENCOUNTER — TELEPHONE (OUTPATIENT)
Dept: FAMILY MEDICINE | Facility: CLINIC | Age: 64
End: 2019-06-19

## 2019-06-19 NOTE — TELEPHONE ENCOUNTER
Due for office visit - wellness visit would be fine if desired and will fill then as I have never prescribed this for him .

## 2019-06-19 NOTE — TELEPHONE ENCOUNTER
Reason for Call:  Medication or medication refill:    Do you use a [facility]?  Name of the pharmacy and phone number for the current request:  [facility] - [phone]    Name of the medication requested: viagra    Other request: na    Can we leave a detailed message on this number? YES    Phone number patient can be reached at: Home number on file [phone] (home)    Best Time: anytime, Kidney dr ordered other meds and is wanting to  altogether   Pt would like today or asap.  Pt told to give us 3 business days for meds    Call taken on 6/19/2019 at 9:51 AM by Ella Claire

## 2019-06-19 NOTE — TELEPHONE ENCOUNTER
Won called back, stated Dr. Luna okayed Rx yesterday.  Said Dr. Luna was going to put in orders for it.  Won is going to call Dr. Luna.       Katherine Han

## 2019-06-19 NOTE — TELEPHONE ENCOUNTER
Looks like Patient has not been prescribed this medication in the past, asked for it in 2017 then no showed the appointment. Has not been seen for this medication.     Routing to PCP, Does patient need appointment for this new prescription?    Marie Bond MA

## 2019-06-19 NOTE — TELEPHONE ENCOUNTER
Call to patient,  for him to call back regarding his medication request. See Dr. Capone's message, patient needs to schedule.     Marie Bond MA

## 2019-06-20 ENCOUNTER — TELEPHONE (OUTPATIENT)
Dept: FAMILY MEDICINE | Facility: CLINIC | Age: 64
End: 2019-06-20

## 2019-06-20 DIAGNOSIS — I10 ESSENTIAL HYPERTENSION, MALIGNANT: ICD-10-CM

## 2019-06-20 DIAGNOSIS — E87.5 HYPERPOTASSEMIA: ICD-10-CM

## 2019-06-20 DIAGNOSIS — N18.4 CHRONIC KIDNEY DISEASE, STAGE IV (SEVERE) (H): Primary | ICD-10-CM

## 2019-06-20 NOTE — TELEPHONE ENCOUNTER
Reason for Call:  Medication or medication refill:    Do you use a [facility]?  Name of the pharmacy and phone number for the current request:  [facility] - [phone]    Name of the medication requested: Viagra 100MG.   Patient said 3 tablets because it's $20 each    Other request: First fill of this    Can we leave a detailed message on this number? YES    Phone number patient can be reached at: Cell number on file:    Telephone Information:   Mobile [phone]       Best Time: any    Call taken on 6/20/2019 at 9:16 AM by Diana Castaneda

## 2019-06-20 NOTE — TELEPHONE ENCOUNTER
Patient was already informed to contact Dr. Miguel who the patient said approved it for him.     Check with pharmacy to see if there was an rx sent in, there was not.    See Telephone encounter: 6/19/2019    Marie Bond MA

## 2019-06-25 ENCOUNTER — TELEPHONE (OUTPATIENT)
Dept: FAMILY MEDICINE | Facility: CLINIC | Age: 64
End: 2019-06-25

## 2019-06-25 ENCOUNTER — OFFICE VISIT (OUTPATIENT)
Dept: FAMILY MEDICINE | Facility: CLINIC | Age: 64
End: 2019-06-25
Payer: COMMERCIAL

## 2019-06-25 VITALS
DIASTOLIC BLOOD PRESSURE: 88 MMHG | TEMPERATURE: 97.8 F | HEART RATE: 84 BPM | WEIGHT: 192 LBS | HEIGHT: 69 IN | SYSTOLIC BLOOD PRESSURE: 138 MMHG | BODY MASS INDEX: 28.44 KG/M2 | OXYGEN SATURATION: 95 %

## 2019-06-25 DIAGNOSIS — E78.5 HYPERLIPIDEMIA LDL GOAL <130: Primary | ICD-10-CM

## 2019-06-25 DIAGNOSIS — N52.9 ERECTILE DYSFUNCTION, UNSPECIFIED ERECTILE DYSFUNCTION TYPE: Primary | ICD-10-CM

## 2019-06-25 DIAGNOSIS — I10 HYPERTENSION GOAL BP (BLOOD PRESSURE) < 140/90: ICD-10-CM

## 2019-06-25 DIAGNOSIS — N52.9 ERECTILE DYSFUNCTION, UNSPECIFIED ERECTILE DYSFUNCTION TYPE: ICD-10-CM

## 2019-06-25 DIAGNOSIS — Z12.11 SCREEN FOR COLON CANCER: ICD-10-CM

## 2019-06-25 DIAGNOSIS — Z12.5 SCREENING FOR PROSTATE CANCER: ICD-10-CM

## 2019-06-25 DIAGNOSIS — N18.4 CKD (CHRONIC KIDNEY DISEASE) STAGE 4, GFR 15-29 ML/MIN (H): ICD-10-CM

## 2019-06-25 PROCEDURE — 99214 OFFICE O/P EST MOD 30 MIN: CPT | Performed by: NURSE PRACTITIONER

## 2019-06-25 RX ORDER — SILDENAFIL 25 MG/1
25 TABLET, FILM COATED ORAL DAILY PRN
Qty: 20 TABLET | Refills: 1 | Status: SHIPPED | OUTPATIENT
Start: 2019-06-25 | End: 2019-06-25

## 2019-06-25 RX ORDER — SILDENAFIL CITRATE 20 MG/1
20-40 TABLET ORAL DAILY PRN
Qty: 30 TABLET | Refills: 2 | Status: SHIPPED | OUTPATIENT
Start: 2019-06-25 | End: 2020-08-03

## 2019-06-25 RX ORDER — SILDENAFIL 50 MG/1
25-50 TABLET, FILM COATED ORAL DAILY PRN
Qty: 20 TABLET | Refills: 1 | Status: SHIPPED | OUTPATIENT
Start: 2019-06-25 | End: 2019-06-25

## 2019-06-25 ASSESSMENT — MIFFLIN-ST. JEOR: SCORE: 1656.29

## 2019-06-25 NOTE — TELEPHONE ENCOUNTER
Matteo Perez had a new RX come in today for Sildenafil 50mg. Insurance does not cover this and it's very expensive. It's also not eligible for a prior authorization.   After talking to Matteo I suggested that we could try and switch to 20mg tablets, which are roughly a dollar per pill, much cheaper than the 50mg. If you agree with this change, could you please send new RX to [facility]?  Thank you.
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
## 2019-07-18 DIAGNOSIS — I10 HYPERTENSION GOAL BP (BLOOD PRESSURE) < 140/90: ICD-10-CM

## 2019-07-18 DIAGNOSIS — E87.5 HYPERPOTASSEMIA: ICD-10-CM

## 2019-07-18 DIAGNOSIS — Z12.5 SCREENING FOR PROSTATE CANCER: ICD-10-CM

## 2019-07-18 DIAGNOSIS — E78.5 HYPERLIPIDEMIA LDL GOAL <130: ICD-10-CM

## 2019-07-18 DIAGNOSIS — E78.5 HYPERLIPIDEMIA, UNSPECIFIED HYPERLIPIDEMIA TYPE: Primary | ICD-10-CM

## 2019-07-18 DIAGNOSIS — I10 ESSENTIAL HYPERTENSION, MALIGNANT: ICD-10-CM

## 2019-07-18 DIAGNOSIS — N18.4 CHRONIC KIDNEY DISEASE, STAGE IV (SEVERE) (H): ICD-10-CM

## 2019-07-18 LAB
CHOLEST SERPL-MCNC: 239 MG/DL
CREAT UR-MCNC: 139 MG/DL
ERYTHROCYTE [DISTWIDTH] IN BLOOD BY AUTOMATED COUNT: 12.9 % (ref 10–15)
HCT VFR BLD AUTO: 36.7 % (ref 40–53)
HDLC SERPL-MCNC: 58 MG/DL
HGB BLD-MCNC: 12.5 G/DL (ref 13.3–17.7)
LDLC SERPL CALC-MCNC: 133 MG/DL
MCH RBC QN AUTO: 31.5 PG (ref 26.5–33)
MCHC RBC AUTO-ENTMCNC: 34.1 G/DL (ref 31.5–36.5)
MCV RBC AUTO: 92 FL (ref 78–100)
MICROALBUMIN UR-MCNC: 1220 MG/L
MICROALBUMIN/CREAT UR: 877.7 MG/G CR (ref 0–17)
NONHDLC SERPL-MCNC: 181 MG/DL
PLATELET # BLD AUTO: 225 10E9/L (ref 150–450)
RBC # BLD AUTO: 3.97 10E12/L (ref 4.4–5.9)
TRIGL SERPL-MCNC: 242 MG/DL
WBC # BLD AUTO: 5.4 10E9/L (ref 4–11)

## 2019-07-18 PROCEDURE — 80048 BASIC METABOLIC PNL TOTAL CA: CPT | Performed by: INTERNAL MEDICINE

## 2019-07-18 PROCEDURE — 82043 UR ALBUMIN QUANTITATIVE: CPT | Performed by: NURSE PRACTITIONER

## 2019-07-18 PROCEDURE — 85027 COMPLETE CBC AUTOMATED: CPT | Performed by: NURSE PRACTITIONER

## 2019-07-18 PROCEDURE — 36415 COLL VENOUS BLD VENIPUNCTURE: CPT | Performed by: INTERNAL MEDICINE

## 2019-07-18 PROCEDURE — 99000 SPECIMEN HANDLING OFFICE-LAB: CPT | Performed by: NURSE PRACTITIONER

## 2019-07-18 PROCEDURE — 80061 LIPID PANEL: CPT | Performed by: NURSE PRACTITIONER

## 2019-07-18 PROCEDURE — 84154 ASSAY OF PSA FREE: CPT | Mod: 90 | Performed by: NURSE PRACTITIONER

## 2019-07-18 PROCEDURE — 84153 ASSAY OF PSA TOTAL: CPT | Mod: 90 | Performed by: NURSE PRACTITIONER

## 2019-07-18 NOTE — RESULT ENCOUNTER NOTE
Dear Matteo,     WBC (white blood cell) is normal, platelets is normal and hemoglobin is stable at 12.5 and similar to previous levels.      -LDL(bad) cholesterol level is elevated, and your triglycerides are elevated which can increase your heart disease risk.  A diet high in fat and simple carbohydrates, genetics and being overweight can contribute to this. ADVISE: exercising 150 minutes of aerobic exercise per week (30 minutes for 5 days per week or 50 minutes for 3 days per week are options), eating a low saturated fat/low carbohydrate diet, and omega-3 fatty acids (fish oil) 7190-8319 mg daily are helpful to improve this. In 3-6 months, you should recheck your fasting cholesterol panel by scheduling a lab-only appointment.  Your 10 year risk score for stroke and heart disease is 15.1% and I would want to consider starting a cholesterol medication.  Let's plan to repeat your lipid panel in 3-6 months and re-evaluate at that time.        -Microalbumin (urine protein) level is elevated.     I asked lab to fax your BMP and urine microalbumin results to your nephrologist.       Please send a [employer] message or call [phone]  if you have any questions.      Molly Mascorro, ZEHRA, CNP  [facility]    If you have further questions about the interpretation of your labs, [email or web address] is a good website to check out for further information.

## 2019-07-19 LAB
ANION GAP SERPL CALCULATED.3IONS-SCNC: 12 MMOL/L (ref 3–14)
BUN SERPL-MCNC: 44 MG/DL (ref 7–30)
CALCIUM SERPL-MCNC: 8.8 MG/DL (ref 8.5–10.1)
CHLORIDE SERPL-SCNC: 113 MMOL/L (ref 94–109)
CO2 SERPL-SCNC: 16 MMOL/L (ref 20–32)
CREAT SERPL-MCNC: 2.9 MG/DL (ref 0.66–1.25)
GFR SERPL CREATININE-BSD FRML MDRD: 22 ML/MIN/{1.73_M2}
GLUCOSE SERPL-MCNC: 85 MG/DL (ref 70–99)
POTASSIUM SERPL-SCNC: 4.5 MMOL/L (ref 3.4–5.3)
PSA FREE MFR SERPL: 50 %
PSA FREE SERPL-MCNC: 0.4 NG/ML
PSA SERPL-MCNC: 0.8 NG/ML (ref 0–4)
SODIUM SERPL-SCNC: 141 MMOL/L (ref 133–144)

## 2019-07-19 NOTE — RESULT ENCOUNTER NOTE
Dear Matteo,     -PSA (prostate specific antigen) test is normal.  This indicates a low likelihood of prostate cancer.  ADVISE: rechecking this in 1 year.      Please send a [employer] message or call [phone]  if you have any questions.      Molly Mascorro, ZEHRA, CNP  [facility]    If you have further questions about the interpretation of your labs, [email or web address] is a good website to check out for further information.

## 2020-06-01 DIAGNOSIS — R80.9 PROTEINURIA, UNSPECIFIED TYPE: ICD-10-CM

## 2020-06-01 DIAGNOSIS — M10.9 GOUT, UNSPECIFIED: ICD-10-CM

## 2020-06-01 DIAGNOSIS — N18.4 CHRONIC KIDNEY DISEASE, STAGE IV (SEVERE) (H): Primary | ICD-10-CM

## 2020-06-09 DIAGNOSIS — N18.4 CHRONIC KIDNEY DISEASE, STAGE IV (SEVERE) (H): ICD-10-CM

## 2020-06-09 DIAGNOSIS — R80.9 PROTEINURIA, UNSPECIFIED TYPE: ICD-10-CM

## 2020-06-09 DIAGNOSIS — M10.9 GOUT, UNSPECIFIED: ICD-10-CM

## 2020-06-09 LAB
ALBUMIN SERPL-MCNC: 3.8 G/DL (ref 3.4–5)
ANION GAP SERPL CALCULATED.3IONS-SCNC: 6 MMOL/L (ref 3–14)
BUN SERPL-MCNC: 48 MG/DL (ref 7–30)
CALCIUM SERPL-MCNC: 8.7 MG/DL (ref 8.5–10.1)
CHLORIDE SERPL-SCNC: 113 MMOL/L (ref 94–109)
CO2 SERPL-SCNC: 20 MMOL/L (ref 20–32)
CREAT SERPL-MCNC: 4.19 MG/DL (ref 0.66–1.25)
CREAT UR-MCNC: 165 MG/DL
GFR SERPL CREATININE-BSD FRML MDRD: 14 ML/MIN/{1.73_M2}
GLUCOSE SERPL-MCNC: 94 MG/DL (ref 70–99)
HGB BLD-MCNC: 12 G/DL (ref 13.3–17.7)
MICROALBUMIN UR-MCNC: 1180 MG/L
MICROALBUMIN/CREAT UR: 715.15 MG/G CR (ref 0–17)
PHOSPHATE SERPL-MCNC: 3.6 MG/DL (ref 2.5–4.5)
POTASSIUM SERPL-SCNC: 4.9 MMOL/L (ref 3.4–5.3)
SODIUM SERPL-SCNC: 139 MMOL/L (ref 133–144)
URATE SERPL-MCNC: 5.8 MG/DL (ref 3.5–7.2)

## 2020-06-09 PROCEDURE — 80069 RENAL FUNCTION PANEL: CPT | Performed by: INTERNAL MEDICINE

## 2020-06-09 PROCEDURE — 82043 UR ALBUMIN QUANTITATIVE: CPT | Performed by: INTERNAL MEDICINE

## 2020-06-09 PROCEDURE — 85018 HEMOGLOBIN: CPT | Performed by: INTERNAL MEDICINE

## 2020-06-09 PROCEDURE — 84550 ASSAY OF BLOOD/URIC ACID: CPT | Performed by: INTERNAL MEDICINE

## 2020-06-09 PROCEDURE — 36415 COLL VENOUS BLD VENIPUNCTURE: CPT | Performed by: INTERNAL MEDICINE

## 2020-06-11 DIAGNOSIS — R80.8 NEPHROGENOUS PROTEINURIA: ICD-10-CM

## 2020-06-11 DIAGNOSIS — N18.5 CHRONIC KIDNEY DISEASE, STAGE V (H): Primary | ICD-10-CM

## 2020-06-30 DIAGNOSIS — N18.5 CHRONIC KIDNEY DISEASE, STAGE V (H): ICD-10-CM

## 2020-06-30 DIAGNOSIS — R80.8 NEPHROGENOUS PROTEINURIA: ICD-10-CM

## 2020-06-30 LAB
ALBUMIN SERPL-MCNC: 3.5 G/DL (ref 3.4–5)
ALBUMIN UR-MCNC: >=300 MG/DL
ANION GAP SERPL CALCULATED.3IONS-SCNC: 11 MMOL/L (ref 3–14)
APPEARANCE UR: CLEAR
BACTERIA #/AREA URNS HPF: ABNORMAL /HPF
BASOPHILS # BLD AUTO: 0 10E9/L (ref 0–0.2)
BASOPHILS NFR BLD AUTO: 0.7 %
BILIRUB UR QL STRIP: NEGATIVE
BUN SERPL-MCNC: 46 MG/DL (ref 7–30)
CALCIUM SERPL-MCNC: 8.6 MG/DL (ref 8.5–10.1)
CHLORIDE SERPL-SCNC: 110 MMOL/L (ref 94–109)
CO2 SERPL-SCNC: 18 MMOL/L (ref 20–32)
COLOR UR AUTO: YELLOW
CREAT SERPL-MCNC: 4.54 MG/DL (ref 0.66–1.25)
CREAT UR-MCNC: 116 MG/DL
DEPRECATED CALCIDIOL+CALCIFEROL SERPL-MC: 80 UG/L (ref 20–75)
DIFFERENTIAL METHOD BLD: ABNORMAL
EOSINOPHIL # BLD AUTO: 0.2 10E9/L (ref 0–0.7)
EOSINOPHIL NFR BLD AUTO: 2.8 %
ERYTHROCYTE [DISTWIDTH] IN BLOOD BY AUTOMATED COUNT: 12.8 % (ref 10–15)
GFR SERPL CREATININE-BSD FRML MDRD: 13 ML/MIN/{1.73_M2}
GLUCOSE SERPL-MCNC: 91 MG/DL (ref 70–99)
GLUCOSE UR STRIP-MCNC: NEGATIVE MG/DL
HCT VFR BLD AUTO: 34.1 % (ref 40–53)
HGB BLD-MCNC: 11.7 G/DL (ref 13.3–17.7)
HGB UR QL STRIP: ABNORMAL
KETONES UR STRIP-MCNC: NEGATIVE MG/DL
LEUKOCYTE ESTERASE UR QL STRIP: NEGATIVE
LYMPHOCYTES # BLD AUTO: 1.1 10E9/L (ref 0.8–5.3)
LYMPHOCYTES NFR BLD AUTO: 18.8 %
MCH RBC QN AUTO: 31.5 PG (ref 26.5–33)
MCHC RBC AUTO-ENTMCNC: 34.3 G/DL (ref 31.5–36.5)
MCV RBC AUTO: 92 FL (ref 78–100)
MONOCYTES # BLD AUTO: 0.5 10E9/L (ref 0–1.3)
MONOCYTES NFR BLD AUTO: 7.9 %
MUCOUS THREADS #/AREA URNS LPF: PRESENT /LPF
NEUTROPHILS # BLD AUTO: 4 10E9/L (ref 1.6–8.3)
NEUTROPHILS NFR BLD AUTO: 69.8 %
NITRATE UR QL: NEGATIVE
PH UR STRIP: 5.5 PH (ref 5–7)
PHOSPHATE SERPL-MCNC: 3.6 MG/DL (ref 2.5–4.5)
PLATELET # BLD AUTO: 229 10E9/L (ref 150–450)
POTASSIUM SERPL-SCNC: 4.7 MMOL/L (ref 3.4–5.3)
PROT UR-MCNC: 1.8 G/L
PROT/CREAT 24H UR: 1.55 G/G CR (ref 0–0.2)
PTH-INTACT SERPL-MCNC: 123 PG/ML (ref 18–80)
RBC # BLD AUTO: 3.71 10E12/L (ref 4.4–5.9)
RBC #/AREA URNS AUTO: ABNORMAL /HPF
SODIUM SERPL-SCNC: 139 MMOL/L (ref 133–144)
SOURCE: ABNORMAL
SP GR UR STRIP: 1.02 (ref 1–1.03)
UROBILINOGEN UR STRIP-ACNC: 0.2 EU/DL (ref 0.2–1)
WBC # BLD AUTO: 5.7 10E9/L (ref 4–11)
WBC #/AREA URNS AUTO: ABNORMAL /HPF

## 2020-06-30 PROCEDURE — 83970 ASSAY OF PARATHORMONE: CPT | Performed by: PHYSICIAN ASSISTANT

## 2020-06-30 PROCEDURE — 82306 VITAMIN D 25 HYDROXY: CPT | Performed by: PHYSICIAN ASSISTANT

## 2020-06-30 PROCEDURE — 85025 COMPLETE CBC W/AUTO DIFF WBC: CPT | Performed by: PHYSICIAN ASSISTANT

## 2020-06-30 PROCEDURE — 36415 COLL VENOUS BLD VENIPUNCTURE: CPT | Performed by: PHYSICIAN ASSISTANT

## 2020-06-30 PROCEDURE — 84156 ASSAY OF PROTEIN URINE: CPT | Performed by: PHYSICIAN ASSISTANT

## 2020-06-30 PROCEDURE — 80069 RENAL FUNCTION PANEL: CPT | Performed by: PHYSICIAN ASSISTANT

## 2020-06-30 PROCEDURE — 81001 URINALYSIS AUTO W/SCOPE: CPT | Performed by: PHYSICIAN ASSISTANT

## 2020-07-07 ENCOUNTER — TRANSFERRED RECORDS (OUTPATIENT)
Dept: HEALTH INFORMATION MANAGEMENT | Facility: CLINIC | Age: 65
End: 2020-07-07

## 2020-07-07 LAB
CREAT SERPL-MCNC: 4.41 MG/DL (ref 0.7–1.25)
GFR SERPL CREATININE-BSD FRML MDRD: 13 ML/MIN/1.73M2
GLUCOSE SERPL-MCNC: 97 MG/DL (ref 65–99)
HEP C HIM: NORMAL
POTASSIUM SERPL-SCNC: 4.7 MMOL/L (ref 3.5–5.3)

## 2020-07-08 ENCOUNTER — VIRTUAL VISIT (OUTPATIENT)
Dept: FAMILY MEDICINE | Facility: CLINIC | Age: 65
End: 2020-07-08
Payer: COMMERCIAL

## 2020-07-08 DIAGNOSIS — Z53.9 NO SHOW: Primary | ICD-10-CM

## 2020-07-08 NOTE — PROGRESS NOTES
Called patient at 10:38 Am, patient said he was at work, I asked for a better time and he said call him back in 20 min, called back twice and left voicemail stating that we will have to reschedule this apt since it is 11:03 Am     Kimmie Mercado Certified Medical Assistant

## 2020-07-14 ENCOUNTER — HOSPITAL ENCOUNTER (OUTPATIENT)
Dept: ULTRASOUND IMAGING | Facility: CLINIC | Age: 65
Discharge: HOME | End: 2020-07-14
Attending: INTERNAL MEDICINE | Admitting: INTERNAL MEDICINE
Payer: COMMERCIAL

## 2020-07-14 DIAGNOSIS — N17.9 ARF (ACUTE RENAL FAILURE) (H): ICD-10-CM

## 2020-07-14 DIAGNOSIS — N18.4 CHRONIC KIDNEY DISEASE (CKD), STAGE IV (SEVERE) (H): ICD-10-CM

## 2020-07-14 PROCEDURE — 76770 US EXAM ABDO BACK WALL COMP: CPT

## 2020-07-21 ENCOUNTER — MEDICAL CORRESPONDENCE (OUTPATIENT)
Dept: HEALTH INFORMATION MANAGEMENT | Facility: CLINIC | Age: 65
End: 2020-07-21

## 2020-07-21 DIAGNOSIS — N18.4 CHRONIC KIDNEY DISEASE, STAGE IV (SEVERE) (H): Primary | ICD-10-CM

## 2020-07-30 DIAGNOSIS — N18.4 CHRONIC KIDNEY DISEASE, STAGE IV (SEVERE) (H): ICD-10-CM

## 2020-07-30 PROCEDURE — 36415 COLL VENOUS BLD VENIPUNCTURE: CPT | Performed by: INTERNAL MEDICINE

## 2020-07-30 PROCEDURE — 80048 BASIC METABOLIC PNL TOTAL CA: CPT | Performed by: INTERNAL MEDICINE

## 2020-07-31 LAB
ANION GAP SERPL CALCULATED.3IONS-SCNC: 10 MMOL/L (ref 3–14)
BUN SERPL-MCNC: 65 MG/DL (ref 7–30)
CALCIUM SERPL-MCNC: 8.1 MG/DL (ref 8.5–10.1)
CHLORIDE SERPL-SCNC: 114 MMOL/L (ref 94–109)
CO2 SERPL-SCNC: 16 MMOL/L (ref 20–32)
CREAT SERPL-MCNC: 4.95 MG/DL (ref 0.66–1.25)
GFR SERPL CREATININE-BSD FRML MDRD: 11 ML/MIN/{1.73_M2}
GLUCOSE SERPL-MCNC: 104 MG/DL (ref 70–99)
POTASSIUM SERPL-SCNC: 4.7 MMOL/L (ref 3.4–5.3)
SODIUM SERPL-SCNC: 140 MMOL/L (ref 133–144)

## 2020-07-31 NOTE — PROGRESS NOTES
"Matteo Barnes is a 64 year old male who is being evaluated via a billable telephone visit.      The patient has been notified of following:     \"This telephone visit will be conducted via a call between you and your physician/provider. We have found that certain health care needs can be provided without the need for a physical exam.  This service lets us provide the care you need with a short phone conversation.  If a prescription is necessary we can send it directly to your pharmacy.  If lab work is needed we can place an order for that and you can then stop by our lab to have the test done at a later time.    Telephone visits are billed at different rates depending on your insurance coverage. During this emergency period, for some insurers they may be billed the same as an in-person visit.  Please reach out to your insurance provider with any questions.    If during the course of the call the physician/provider feels a telephone visit is not appropriate, you will not be charged for this service.\"    Patient has given verbal consent for Telephone visit?  Yes    What phone number would you like to be contacted at? [phone]    How would you like to obtain your AVS? Patience Stanton     Matteo Barnes is a 64 year old male who presents via phone visit today for the following health issues:    HPI    Medication Followup of sildenafil (REVATIO) 20 MG tablet     Taking Medication as prescribed: yes    Side Effects:  None    Medication Helping Symptoms:  yes     Taking between 20-60 mg when needed. Medication is helpful. No adverse effects -- denies any chest pain, palpitations, lightheadedness, dizziness, flushing.      Reviewed and updated as needed this visit by Provider  Allergies  Meds  Med Hx  Surg Hx         Review of Systems   Constitutional, HEENT, cardiovascular, pulmonary, gi and gu systems are negative, except as otherwise noted.       Objective   Reported vitals:  There were no vitals taken " for this visit.   healthy, alert and no distress  PSYCH: Alert and oriented times 3; coherent speech, normal   rate and volume, able to articulate logical thoughts, able   to abstract reason, no tangential thoughts, no hallucinations   or delusions  His affect is normal  RESP: No cough, no audible wheezing, able to talk in full sentences  Remainder of exam unable to be completed due to telephone visits    Diagnostic Test Results:  none         Assessment/Plan:    1. Erectile dysfunction, unspecified erectile dysfunction type: medication working well. Refill signed.  - sildenafil (REVATIO) 20 MG tablet; Take 1-3 tablets (20-60 mg) by mouth daily as needed (30 min to 1 hour prior to intercourse)  Dispense: 30 tablet; Refill: 3    Return if symptoms worsen or fail to improve.      Phone call duration:  7 minutes    Carvedilol 12.5 mg BID    Dutch Duke,

## 2020-08-03 ENCOUNTER — VIRTUAL VISIT (OUTPATIENT)
Dept: FAMILY MEDICINE | Facility: CLINIC | Age: 65
End: 2020-08-03
Payer: COMMERCIAL

## 2020-08-03 DIAGNOSIS — N52.9 ERECTILE DYSFUNCTION, UNSPECIFIED ERECTILE DYSFUNCTION TYPE: ICD-10-CM

## 2020-08-03 DIAGNOSIS — N18.4 CKD (CHRONIC KIDNEY DISEASE) STAGE 4, GFR 15-29 ML/MIN (H): ICD-10-CM

## 2020-08-03 PROCEDURE — 99213 OFFICE O/P EST LOW 20 MIN: CPT | Performed by: FAMILY MEDICINE

## 2020-08-03 RX ORDER — SILDENAFIL CITRATE 20 MG/1
20-60 TABLET ORAL DAILY PRN
Qty: 30 TABLET | Refills: 3 | Status: SHIPPED | OUTPATIENT
Start: 2020-08-03 | End: 2022-03-07

## 2020-08-03 RX ORDER — CARVEDILOL 12.5 MG/1
12.5 TABLET ORAL 2 TIMES DAILY WITH MEALS
COMMUNITY
Start: 2020-08-03 | End: 2021-09-02

## 2020-08-05 DIAGNOSIS — Z11.59 ENCOUNTER FOR SCREENING FOR OTHER VIRAL DISEASES: Primary | ICD-10-CM

## 2020-08-10 DIAGNOSIS — Z11.59 ENCOUNTER FOR SCREENING FOR OTHER VIRAL DISEASES: ICD-10-CM

## 2020-08-10 PROCEDURE — U0003 INFECTIOUS AGENT DETECTION BY NUCLEIC ACID (DNA OR RNA); SEVERE ACUTE RESPIRATORY SYNDROME CORONAVIRUS 2 (SARS-COV-2) (CORONAVIRUS DISEASE [COVID-19]), AMPLIFIED PROBE TECHNIQUE, MAKING USE OF HIGH THROUGHPUT TECHNOLOGIES AS DESCRIBED BY CMS-2020-01-R: HCPCS | Performed by: INTERNAL MEDICINE

## 2020-08-11 LAB
SARS-COV-2 RNA SPEC QL NAA+PROBE: NOT DETECTED
SPECIMEN SOURCE: NORMAL

## 2020-08-12 ENCOUNTER — TELEPHONE (OUTPATIENT)
Dept: MEDSURG UNIT | Facility: CLINIC | Age: 65
End: 2020-08-12

## 2020-08-12 RX ORDER — DEXTROSE MONOHYDRATE 25 G/50ML
25-50 INJECTION, SOLUTION INTRAVENOUS
Status: CANCELLED | OUTPATIENT
Start: 2020-08-12

## 2020-08-12 RX ORDER — LIDOCAINE 40 MG/G
CREAM TOPICAL
Status: CANCELLED | OUTPATIENT
Start: 2020-08-12

## 2020-08-12 RX ORDER — NALOXONE HYDROCHLORIDE 0.4 MG/ML
.1-.4 INJECTION, SOLUTION INTRAMUSCULAR; INTRAVENOUS; SUBCUTANEOUS
Status: CANCELLED | OUTPATIENT
Start: 2020-08-12

## 2020-08-12 RX ORDER — HYDROMORPHONE HYDROCHLORIDE 1 MG/ML
0.2 INJECTION, SOLUTION INTRAMUSCULAR; INTRAVENOUS; SUBCUTANEOUS ONCE
Status: CANCELLED | OUTPATIENT
Start: 2020-08-12 | End: 2020-08-12

## 2020-08-12 RX ORDER — CLONIDINE HYDROCHLORIDE 0.1 MG/1
0.1 TABLET ORAL
Status: CANCELLED | OUTPATIENT
Start: 2020-08-12

## 2020-08-12 RX ORDER — NICOTINE POLACRILEX 4 MG
15-30 LOZENGE BUCCAL
Status: CANCELLED | OUTPATIENT
Start: 2020-08-12

## 2020-08-12 NOTE — TELEPHONE ENCOUNTER
Pre-Procedure Negative COVID Test     Step 1 Patient Notification  Patient notified of the negative COVID test result    Step 2 Patient Information  Verified the patient remains symptom free   Patient informed to contact the ordering provider if any of the symptoms develop prior to the procedure    Fever/Chills   Cough   Shortness of breath   New loss of taste or smell  Sore throat  Muscle or body aches  Headaches  Fatigue  Vomiting or diarrhea    Step 3 Review Visitor Policy  Patient informed of the updated visitor policy   1 visitor allowed per patient   Visitor must screen negative for COVID symptoms   Visitor must wear a mask  Waiting rooms continue to be closed to visitors    Elise Acevedo RN

## 2020-08-13 ENCOUNTER — HOSPITAL ENCOUNTER (OUTPATIENT)
Facility: CLINIC | Age: 65
Discharge: HOME | End: 2020-08-13
Admitting: INTERNAL MEDICINE
Payer: COMMERCIAL

## 2020-08-13 ENCOUNTER — TRANSFERRED RECORDS (OUTPATIENT)
Dept: HEALTH INFORMATION MANAGEMENT | Facility: CLINIC | Age: 65
End: 2020-08-13

## 2020-08-13 ENCOUNTER — HOSPITAL ENCOUNTER (OUTPATIENT)
Dept: ULTRASOUND IMAGING | Facility: CLINIC | Age: 65
End: 2020-08-13
Attending: INTERNAL MEDICINE
Payer: COMMERCIAL

## 2020-08-13 VITALS
HEIGHT: 69 IN | TEMPERATURE: 97.7 F | OXYGEN SATURATION: 94 % | HEART RATE: 64 BPM | BODY MASS INDEX: 28.44 KG/M2 | DIASTOLIC BLOOD PRESSURE: 89 MMHG | SYSTOLIC BLOOD PRESSURE: 139 MMHG | RESPIRATION RATE: 16 BRPM | WEIGHT: 192 LBS

## 2020-08-13 DIAGNOSIS — N18.5 CHRONIC KIDNEY DISEASE, STAGE V (H): ICD-10-CM

## 2020-08-13 LAB
CLOSURE TME COLL+EPINEP BLD: 91 SEC
ERYTHROCYTE [DISTWIDTH] IN BLOOD BY AUTOMATED COUNT: 13.5 % (ref 10–15)
HCT VFR BLD AUTO: 35.3 % (ref 40–53)
HGB BLD-MCNC: 11.9 G/DL (ref 13.3–17.7)
INR PPP: 1.09 (ref 0.86–1.14)
MCH RBC QN AUTO: 31.2 PG (ref 26.5–33)
MCHC RBC AUTO-ENTMCNC: 33.7 G/DL (ref 31.5–36.5)
MCV RBC AUTO: 93 FL (ref 78–100)
PLATELET # BLD AUTO: 222 10E9/L (ref 150–450)
RBC # BLD AUTO: 3.81 10E12/L (ref 4.4–5.9)
WBC # BLD AUTO: 5.6 10E9/L (ref 4–11)

## 2020-08-13 PROCEDURE — 96374 THER/PROPH/DIAG INJ IV PUSH: CPT

## 2020-08-13 PROCEDURE — 88305 TISSUE EXAM BY PATHOLOGIST: CPT | Performed by: PATHOLOGY

## 2020-08-13 PROCEDURE — 88348 ELECTRON MICROSCOPY DX: CPT | Performed by: PATHOLOGY

## 2020-08-13 PROCEDURE — 76942 ECHO GUIDE FOR BIOPSY: CPT

## 2020-08-13 PROCEDURE — 85027 COMPLETE CBC AUTOMATED: CPT | Performed by: INTERNAL MEDICINE

## 2020-08-13 PROCEDURE — 88313 SPECIAL STAINS GROUP 2: CPT | Performed by: PATHOLOGY

## 2020-08-13 PROCEDURE — 85576 BLOOD PLATELET AGGREGATION: CPT | Performed by: INTERNAL MEDICINE

## 2020-08-13 PROCEDURE — 25000128 H RX IP 250 OP 636: Performed by: INTERNAL MEDICINE

## 2020-08-13 PROCEDURE — 85610 PROTHROMBIN TIME: CPT | Performed by: INTERNAL MEDICINE

## 2020-08-13 PROCEDURE — 40000863 ZZH STATISTIC RADIOLOGY XRAY, US, CT, MAR, NM

## 2020-08-13 PROCEDURE — 88346 IMFLUOR 1ST 1ANTB STAIN PX: CPT

## 2020-08-13 PROCEDURE — 36415 COLL VENOUS BLD VENIPUNCTURE: CPT

## 2020-08-13 PROCEDURE — 25000132 ZZH RX MED GY IP 250 OP 250 PS 637: Performed by: INTERNAL MEDICINE

## 2020-08-13 PROCEDURE — 25000125 ZZHC RX 250: Performed by: INTERNAL MEDICINE

## 2020-08-13 RX ORDER — NICOTINE POLACRILEX 4 MG
15-30 LOZENGE BUCCAL
Status: DISCONTINUED | OUTPATIENT
Start: 2020-08-13 | End: 2020-08-13

## 2020-08-13 RX ORDER — CLONIDINE HYDROCHLORIDE 0.1 MG/1
0.1 TABLET ORAL
Status: DISCONTINUED | OUTPATIENT
Start: 2020-08-13 | End: 2020-08-13

## 2020-08-13 RX ORDER — LIDOCAINE HYDROCHLORIDE 10 MG/ML
10 INJECTION, SOLUTION EPIDURAL; INFILTRATION; INTRACAUDAL; PERINEURAL ONCE
Status: DISCONTINUED | OUTPATIENT
Start: 2020-08-13 | End: 2020-08-13 | Stop reason: HOSPADM

## 2020-08-13 RX ORDER — LIDOCAINE HYDROCHLORIDE 10 MG/ML
15 INJECTION, SOLUTION EPIDURAL; INFILTRATION; INTRACAUDAL; PERINEURAL ONCE
Status: COMPLETED | OUTPATIENT
Start: 2020-08-13 | End: 2020-08-13

## 2020-08-13 RX ORDER — ACETAMINOPHEN 325 MG/1
650 TABLET ORAL EVERY 4 HOURS PRN
Status: DISCONTINUED | OUTPATIENT
Start: 2020-08-13 | End: 2020-08-13 | Stop reason: HOSPADM

## 2020-08-13 RX ORDER — NALOXONE HYDROCHLORIDE 0.4 MG/ML
.1-.4 INJECTION, SOLUTION INTRAMUSCULAR; INTRAVENOUS; SUBCUTANEOUS
Status: DISCONTINUED | OUTPATIENT
Start: 2020-08-13 | End: 2020-08-13

## 2020-08-13 RX ORDER — OXYCODONE HYDROCHLORIDE 5 MG/1
5 TABLET ORAL EVERY 4 HOURS PRN
Status: DISCONTINUED | OUTPATIENT
Start: 2020-08-13 | End: 2020-08-13 | Stop reason: HOSPADM

## 2020-08-13 RX ORDER — DEXTROSE MONOHYDRATE 25 G/50ML
25-50 INJECTION, SOLUTION INTRAVENOUS
Status: DISCONTINUED | OUTPATIENT
Start: 2020-08-13 | End: 2020-08-13

## 2020-08-13 RX ORDER — LIDOCAINE 40 MG/G
CREAM TOPICAL
Status: DISCONTINUED | OUTPATIENT
Start: 2020-08-13 | End: 2020-08-13

## 2020-08-13 RX ORDER — HYDROMORPHONE HYDROCHLORIDE 1 MG/ML
0.2 INJECTION, SOLUTION INTRAMUSCULAR; INTRAVENOUS; SUBCUTANEOUS ONCE
Status: COMPLETED | OUTPATIENT
Start: 2020-08-13 | End: 2020-08-13

## 2020-08-13 RX ADMIN — DIAZEPAM 1 MG: 2 TABLET ORAL at 08:06

## 2020-08-13 RX ADMIN — OXYCODONE HYDROCHLORIDE 5 MG: 5 TABLET ORAL at 11:33

## 2020-08-13 RX ADMIN — HYDROMORPHONE HYDROCHLORIDE 0.2 MG: 1 INJECTION, SOLUTION INTRAMUSCULAR; INTRAVENOUS; SUBCUTANEOUS at 08:06

## 2020-08-13 RX ADMIN — LIDOCAINE HYDROCHLORIDE 15 ML: 10 INJECTION, SOLUTION EPIDURAL; INFILTRATION; INTRACAUDAL; PERINEURAL at 09:09

## 2020-08-13 ASSESSMENT — MIFFLIN-ST. JEOR: SCORE: 1646.29

## 2020-08-13 NOTE — DISCHARGE INSTRUCTIONS
Renal Biopsy Discharge Instructions     After you go home:      You may resume your normal diet  Watch closely for blood in your urine. If excess blood in urine, notify Dr. Ray of lots of blood or clots of blood in urine.       For 24 hours - due to the sedation you received:    Relax and take it easy    Do NOT make any important or legal decisions    Do NOT drive or operate machines at home or at work    Do NOT drink alcohol    Care of Puncture Site:      For the first 48 hrs, check your puncture site every couple hours while you are awake     You may remove/change the bandaid tomorrow    You may shower tomorrow    No tub baths, whirlpools or swimming until your puncture site has fully healed    Activity       You may go back to normal activity in 24 hours     Wait 48 hours before lifting, straining, exercise or other strenuous activity    Medicines:      You may resume all medications    Resume your Platelet Inhibitors and Aspirin tomorrow at your regular dose    For minor pain, you may take Acetaminophen (Tylenol) or Ibuprofen (Advil)            Call the provider who ordered this test if:      Increased pain or a large or growing hard lump around the site    Blood or fluid is draining from the site    The site is red, swollen, hot or tender    Chills or a fever greater than 101 F (38 C)    Pain that is getting worse    Any questions or concerns    Call  911 or go to the Emergency Room if:      Severe pain or trouble breathing    Bleeding that you cannot control        If you have questions call:          [facility] Radiology Dept @ [phone]      The provider who performed your procedure was Dr. Ray.

## 2020-08-13 NOTE — PROGRESS NOTES
1220 Report received from Esther Green RN.  1230 Pt A/O. Resting quietly. No complaints.   1325 Report given to Esther Green RN.

## 2020-08-13 NOTE — PROGRESS NOTES
Care Suites Post Procedure Note    Patient Information  Name: Matteo Barnes  Age: 65 year old    Post Procedure  Time patient returned to Care Suites: 0940  Concerns/abnormal assessment: none  If abnormal assessment, provider notified: N/A  Plan/Other: observe until discharge. Discharge planned for 1400. Pt states tolerated left renal biopsy well. Bandaid site left flank soft, clean, dry. Pt sipping on coffee and water now. VSS, denies any pain, very pleasant, cooperative. Pt instructed to notify nurse of any pink or red in urine when he gets up to void.     Esther Green RN

## 2020-08-13 NOTE — PROCEDURES
After obtaining informed consent patient was transferred to US and placed prone. He was identified by name tag and inquiry (Pause ) for identity and procedure. The left kidney was identified by US and noted to be 10 cm below the skin surface. The area was prepped, draped and infiltrated with lidocaine. A skin incision was made and a biopsy device was advanced to the cortex. A core of tissue was obtained. This was repeated for 5 cores total. Tissue was sent for LM, EM and IF.    Complications: no immediate complications. Pt transferred to Care Suites for observation in good condition.

## 2020-08-13 NOTE — PROGRESS NOTES
Care Suites Discharge Nursing Note    Patient Information  Name: Matteo Barnes  Age: 65 year old    Discharge Education:  Discharge instructions reviewed: Yes  Additional education/resources provided: n/a  Patient/patient representative verbalizes understanding: Yes  Patient discharging on new medications: No  Medication education completed: N/A    Discharge Plans:   Discharge location: home  Discharge ride contacted: Yes  Approximate discharge time: 1400    Discharge Criteria:  Discharge criteria met and vital signs stable: Yes, up to BR 2nd time to void and states no red or pink in urine. Taking PO fluids well, VSS, discharged to home per S/O in stable condition.     Patient Belongs:  Patient belongings returned to patient: Yes    Esther Green RN

## 2020-08-13 NOTE — PROGRESS NOTES
PATIENT/VISITOR WELLNESS SCREENING    Step 1 Patient Screening    1. In the last month, have you been in contact with someone who was confirmed or suspected to have Coronavirus/COVID-19? No    2. Do you have the following symptoms?  Fever/Chills? No   Cough? No   Shortness of breath? No   New loss of taste or smell? No  Sore throat? No  Muscle or body aches? No  Headaches? No  Fatigue? No  Vomiting or diarrhea? No        Step 3 Refer to logic grid below for actions    NO SYMPTOM(S)    ACTIONS:  1. Standard rooming process  2. Provider to assess per normal protocol  3. Implement precautions as needed and per guidelines     POSITIVE SYMPTOM(S)  If positive for ANY of the following symptoms: fever, cough, shortness of breath, rash    ACTION:  1. Continue to have the patient wear a mask   2. Room patient as soon as possible  3. Don appropriate PPE when entering room  4. Provider evaluation

## 2020-08-13 NOTE — PROGRESS NOTES
Care Suites Admission Nursing Note    Patient Information  Name: Matteo Barnes  Age: 65 year old  Reason for admission: renal biopsy  Care Suites arrival time: 0700    Visitor Information  Name: none  Informed of visitor restrictions: N/A  1 visitor allowed per patient   Visitor must screen negative for COVID symptoms   Visitor must wear a mask  Waiting rooms closed to visitors    Patient Admission/Assessment   Pre-procedure assessment complete: Yes  If abnormal assessment/labs, provider notified: N/A  NPO: Yes  Medications held per instructions/orders: Yes  Consent: deferred  If applicable, pregnancy test status: deferred  Patient oriented to room: Yes  Education/questions answered: Yes  Plan/other: renal biopsy    Discharge Planning  Discharge name/phone number: S/O Vanita [phone]  Overnight post sedation caregiver: Vanita  Discharge location: [place]    Esther Green RN

## 2020-08-13 NOTE — PROGRESS NOTES
"         Assessment and Plan:   Patient admitted for kidney biopsy . He is followed by Dr. Roy. I reviewed the H&P from 20. No changes in meds or medical condition. RIsks of procedure explained and patient agreed to proceed. Consent signed.     Labs show Hgb 11.9, Plt count 222. INR 1.09. PFCT nl at 91.   Will proceed with biopsy.             Interval History:   Progressive CKD, proteinuria  Hypertension        Hyperuricemia           Review of Systems:   No new complaints.           Medications:     Medications Prior to Admission   Medication Sig Dispense Refill Last Dose     allopurinol (ZYLOPRIM) 100 MG tablet Take 2 tablets (200 mg) by mouth daily 30 tablet  2020 at 0530     amLODIPine (NORVASC) 10 MG tablet    2020 at 0530     carvedilol (COREG) 12.5 MG tablet Take 1 tablet (12.5 mg) by mouth 2 times daily (with meals)   2020 at 0530     sildenafil (REVATIO) 20 MG tablet Take 1-3 tablets (20-60 mg) by mouth daily as needed (30 min to 1 hour prior to intercourse) 30 tablet 3 More than a month at Unknown time     Current active medications and PTA medications reviewed, see medication list for details.            Physical Exam:   Vitals were reviewed  Patient Vitals for the past 24 hrs:   BP Temp Temp src Pulse Resp SpO2 Height Weight   20 0703 (!) 149/90 97.4  F (36.3  C) Oral 60 16 97 % 1.753 m (5' 9\") 87.1 kg (192 lb)       Temp:  [97.4  F (36.3  C)] 97.4  F (36.3  C)  Pulse:  [60] 60  Resp:  [16] 16  BP: (149)/(90) 149/90  SpO2:  [97 %] 97 %    Temperatures:  Current - Temp: 97.4  F (36.3  C); Max - Temp  Av.4  F (36.3  C)  Min: 97.4  F (36.3  C)  Max: 97.4  F (36.3  C)  Respiration range: Resp  Av  Min: 16  Max: 16  Pulse range: Pulse  Av  Min: 60  Max: 60  Blood pressure range: Systolic (24hrs), Av , Min:149 , Max:149   ; Diastolic (24hrs), Av, Min:90, Max:90    Pulse oximetry range: SpO2  Av %  Min: 97 %  Max: 97 %    No intake/output data " recorded.    No intake or output data in the 24 hours ending 08/13/20 0810    Alert and responsive  Skin neg  Lungs with clear BS, no wheezes or rales  Cor RRR nl S1 S2 no M or rub  LE no edema, good PT pulses       Wt Readings from Last 4 Encounters:   08/13/20 87.1 kg (192 lb)   06/25/19 87.1 kg (192 lb)   01/24/19 84.4 kg (186 lb)   08/08/18 81 kg (178 lb 8 oz)          Data:          Lab Results   Component Value Date     07/30/2020     06/30/2020     06/09/2020    Lab Results   Component Value Date    CHLORIDE 114 07/30/2020    CHLORIDE 110 06/30/2020    CHLORIDE 113 06/09/2020    Lab Results   Component Value Date    BUN 65 07/30/2020    BUN 46 06/30/2020    BUN 48 06/09/2020      Lab Results   Component Value Date    POTASSIUM 4.7 07/30/2020    POTASSIUM 4.7 06/30/2020    POTASSIUM 4.9 06/09/2020    Lab Results   Component Value Date    CO2 16 07/30/2020    CO2 18 06/30/2020    CO2 20 06/09/2020    Lab Results   Component Value Date    CR 4.95 07/30/2020    CR 4.54 06/30/2020    CR 4.19 06/09/2020        Recent Labs   Lab Test 08/13/20  0730 06/30/20  0738 06/09/20  0859 07/18/19  0757   WBC 5.6 5.7  --  5.4   HGB 11.9* 11.7* 12.0* 12.5*   HCT 35.3* 34.1*  --  36.7*   MCV 93 92  --  92    229  --  225     Recent Labs   Lab Test 03/20/18  1609   AST 18   ALT 21   ALKPHOS 65   BILITOTAL 0.4       No results for input(s): MAG in the last 57931 hours.  Recent Labs   Lab Test 06/30/20  0738 06/09/20  0859 08/08/18  1538   PHOS 3.6 3.6 3.8     Recent Labs   Lab Test 07/30/20  1429 06/30/20  0738 06/09/20  0859   ANNETTE 8.1* 8.6 8.7       Lab Results   Component Value Date    ANNETTE 8.1 (L) 07/30/2020     Lab Results   Component Value Date    WBC 5.6 08/13/2020    HGB 11.9 (L) 08/13/2020    HCT 35.3 (L) 08/13/2020    MCV 93 08/13/2020     08/13/2020     Lab Results   Component Value Date     07/30/2020    POTASSIUM 4.7 07/30/2020    CHLORIDE 114 (H) 07/30/2020    CO2 16 (L) 07/30/2020      (H) 07/30/2020     Lab Results   Component Value Date    BUN 65 (H) 07/30/2020    CR 4.95 (H) 07/30/2020     No results found for: MAG  Lab Results   Component Value Date    PHOS 3.6 06/30/2020       Creatinine   Date Value Ref Range Status   07/30/2020 4.95 (H) 0.66 - 1.25 mg/dL Final   06/30/2020 4.54 (H) 0.66 - 1.25 mg/dL Final   06/09/2020 4.19 (H) 0.66 - 1.25 mg/dL Final   07/18/2019 2.90 (H) 0.66 - 1.25 mg/dL Final   08/08/2018 2.80 (H) 0.66 - 1.25 mg/dL Final   05/09/2018 2.82 (H) 0.66 - 1.25 mg/dL Final       Attestation:  I have reviewed today's vital signs, notes, medications, labs and imaging.     Michele Ray MD

## 2020-08-13 NOTE — PROGRESS NOTES
Pt up to BR to void without any difficulty and pt states urine was yellow. Oral pain med given for c/o left flank pain 6/10. Biopsy site remains soft with bandaid clean and dry. Pt eating box lunch now, taking PO fluids well. AVS reviewed and given to pt.

## 2020-09-02 DIAGNOSIS — N18.5 UNSPECIFIED HYPERTENSIVE KIDNEY DISEASE WITH CHRONIC KIDNEY DISEASE STAGE V OR END STAGE RENAL DISEASE(403.91) (H): Primary | ICD-10-CM

## 2020-09-02 DIAGNOSIS — I12.0 UNSPECIFIED HYPERTENSIVE KIDNEY DISEASE WITH CHRONIC KIDNEY DISEASE STAGE V OR END STAGE RENAL DISEASE(403.91) (H): Primary | ICD-10-CM

## 2020-09-17 ENCOUNTER — TRANSFERRED RECORDS (OUTPATIENT)
Dept: HEALTH INFORMATION MANAGEMENT | Facility: CLINIC | Age: 65
End: 2020-09-17

## 2020-09-17 DIAGNOSIS — I12.0 UNSPECIFIED HYPERTENSIVE KIDNEY DISEASE WITH CHRONIC KIDNEY DISEASE STAGE V OR END STAGE RENAL DISEASE(403.91) (H): ICD-10-CM

## 2020-09-17 DIAGNOSIS — N18.5 UNSPECIFIED HYPERTENSIVE KIDNEY DISEASE WITH CHRONIC KIDNEY DISEASE STAGE V OR END STAGE RENAL DISEASE(403.91) (H): ICD-10-CM

## 2020-09-17 PROCEDURE — 80048 BASIC METABOLIC PNL TOTAL CA: CPT | Performed by: INTERNAL MEDICINE

## 2020-09-17 PROCEDURE — 36415 COLL VENOUS BLD VENIPUNCTURE: CPT | Performed by: INTERNAL MEDICINE

## 2020-09-18 LAB
ANION GAP SERPL CALCULATED.3IONS-SCNC: 8 MMOL/L (ref 3–14)
BUN SERPL-MCNC: 65 MG/DL (ref 7–30)
CALCIUM SERPL-MCNC: 8.6 MG/DL (ref 8.5–10.1)
CHLORIDE SERPL-SCNC: 111 MMOL/L (ref 94–109)
CO2 SERPL-SCNC: 18 MMOL/L (ref 20–32)
CREAT SERPL-MCNC: 4.56 MG/DL (ref 0.66–1.25)
GFR SERPL CREATININE-BSD FRML MDRD: 13 ML/MIN/{1.73_M2}
GLUCOSE SERPL-MCNC: 90 MG/DL (ref 70–99)
POTASSIUM SERPL-SCNC: 5.3 MMOL/L (ref 3.4–5.3)
SODIUM SERPL-SCNC: 137 MMOL/L (ref 133–144)

## 2020-11-10 ENCOUNTER — TRANSFERRED RECORDS (OUTPATIENT)
Dept: HEALTH INFORMATION MANAGEMENT | Facility: CLINIC | Age: 65
End: 2020-11-10

## 2020-11-16 ENCOUNTER — MEDICAL CORRESPONDENCE (OUTPATIENT)
Dept: HEALTH INFORMATION MANAGEMENT | Facility: CLINIC | Age: 65
End: 2020-11-16

## 2020-11-24 ENCOUNTER — REFERRAL (OUTPATIENT)
Dept: TRANSPLANT | Facility: CLINIC | Age: 65
End: 2020-11-24

## 2020-11-24 DIAGNOSIS — Z01.818 PRE-TRANSPLANT EVALUATION FOR KIDNEY TRANSPLANT: ICD-10-CM

## 2020-11-24 DIAGNOSIS — N05.1 FSGS (FOCAL SEGMENTAL GLOMERULOSCLEROSIS): Primary | ICD-10-CM

## 2020-11-24 DIAGNOSIS — N18.9 CHRONIC RENAL FAILURE: ICD-10-CM

## 2020-11-24 NOTE — LETTER
December 3, 2020    Matteo Barnes  [address]  [facility] [address]    Dear Matteo,    Thank you for your interest in the Transplant Center at [facility], [facility]. We look forward to being a part of your care team and assisting you through the transplant process.    As we discussed, your transplant coordinator is Julian Beltrán (Kidney).  You may call your coordinator at any time with questions or concerns.  Your first scheduled call will be on December 14, 2020.  If this needs to change, call [phone].    Please complete the following.    1. Fill out and return the enclosed forms    Authorization for Electronic Communication    Authorization to Discuss Protected Health Information    Authorization for Release of Protected Health Information    Authorization for Care Everywhere Release of Information    2. Sign up for:    [employer], access to your electronic medical record (see enclosed pamphlet)    [email or web address], a transplant education website    You can use these tools to learn more about your transplant, communicate with your care team, and track your medical details    Sincerely,    Solid Organ Transplant  [facility], [facility]    cc: KENYA Roy MD; Davidson Capone MD (PCP)

## 2020-11-30 VITALS — HEIGHT: 69 IN | WEIGHT: 194 LBS | BODY MASS INDEX: 28.73 KG/M2

## 2020-11-30 ASSESSMENT — MIFFLIN-ST. JEOR: SCORE: 1655.36

## 2020-11-30 NOTE — TELEPHONE ENCOUNTER
PCP: Davidson Capone MD   Referring Provider: Yomi Miguel MD  Referring Diagnosis: Focal glomerulosclerosis    Is patient under the age of 65? n  Is patient diabetic? n  Is patient on insulin? n  Was patient offered a pancreas transplant referral? n    Is patient in a group home/assisted living? y  Does patient have a guardian? y    Referral intake process completed.  Patient is aware that after financial approval is received, medical records will be requested.   Patient confirmed for a callback from transplant coordinator on December 14, 2020. (within 2 weeks)  Tentative evaluation date December 30, 2020. (within 4 weeks)    Confirmed coordinator will discuss evaluation process in more detail at the time of their call.   Patient is aware of the need to arrange age appropriate cancer screening, vaccinations, and dental care.  Reminded patient to complete questionnaire, complete medical records release, and review packet prior to evaluation visit .  Assessed patient for special needs (ie--wheelchair, assistance, guardian, and ):  none   Patient instructed to call [phone] with questions.     Patient gave verbal consent during intake call to obtain medical records and documents outside of [facility]:  yes

## 2020-12-15 NOTE — TELEPHONE ENCOUNTER
Contacted patient and introduced myself as their Transplant Coordinator, also introduced the role of the Transplant Coordinator in the transplant process.  Explained the purpose of this call including reviewing next steps and answering questions.    Confirmed Referring Provider and Primary Care Physician. Notified patient of the importance of continued communication with referring providers and primary care physicians.    Reviewed components of transplant evaluation process including necessary appointments, tests, and procedures. Confirmed date for virtual PKE on 12/30/20.  Answered questions for patient regarding evaluation, provided my name and contact information and requested they call with any additional questions.    Notified patients that they will hear from a Transplant  to confirm evaluation.    Patient not on dialysis, understands testing will be after virtual PKE. He will review MTP, sign up for MyChart and expect Docusign consents via email prior to eval.

## 2020-12-29 ENCOUNTER — DOCUMENTATION ONLY (OUTPATIENT)
Dept: TRANSPLANT | Facility: CLINIC | Age: 65
End: 2020-12-29

## 2020-12-29 NOTE — PROGRESS NOTES
Kidney Transplant Referral - 11/24/2020  VAL/Nicole Milton, RN BSN called Matteo Barnes he requests to be cancelled as he did not take off the morning for the Evaluation.  He requests to be rescheduled in January.    VAL/Nicole will send note to Karen Wyatt, Julian Beltrán, Nancy Henriquez, Sarah Miller to cancel PKE for 12/30.  Pt is not on dialysis and is aware and understands he will take a full block of time off from Work 7:30 to 2pm on the EVAL date that is rescheduled.   Matteo has NOT viewed the 15 video modules yet and is aware he will do so prior to his EVAL date in the future.  He is at work today and cannot talk any longer.

## 2020-12-30 NOTE — TELEPHONE ENCOUNTER
Called patient to schedule PKE clinic. Reviewed equipment needed for virtual visits. Patient confirmed he has equipment for virtual evaluation and would like to schedule for Tuesday 1/5/2021. Reviewed that patient will receive a call between 730-8am from [facility] on date of evaluation, and then will have appts with Surgery, Nephrology, SW and Dietician to follow. Informed patient that evaluation clinic will last 3-4hours. Informed patient we would send information about appointments via [employer] week prior to clinic and Transplant coordinator will call to review education.
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
## 2021-01-05 ENCOUNTER — ALLIED HEALTH/NURSE VISIT (OUTPATIENT)
Dept: TRANSPLANT | Facility: CLINIC | Age: 66
End: 2021-01-05
Attending: INTERNAL MEDICINE
Payer: COMMERCIAL

## 2021-01-05 ENCOUNTER — DOCUMENTATION ONLY (OUTPATIENT)
Dept: TRANSPLANT | Facility: CLINIC | Age: 66
End: 2021-01-05

## 2021-01-05 VITALS — BODY MASS INDEX: 27.49 KG/M2 | WEIGHT: 192 LBS | HEIGHT: 70 IN

## 2021-01-05 DIAGNOSIS — N18.4 CKD (CHRONIC KIDNEY DISEASE) STAGE 4, GFR 15-29 ML/MIN (H): Primary | ICD-10-CM

## 2021-01-05 DIAGNOSIS — Z76.82 ORGAN TRANSPLANT CANDIDATE: Primary | ICD-10-CM

## 2021-01-05 DIAGNOSIS — Z76.82 ORGAN TRANSPLANT CANDIDATE: ICD-10-CM

## 2021-01-05 DIAGNOSIS — N18.5 CHRONIC KIDNEY DISEASE, STAGE 5, KIDNEY FAILURE (H): Primary | ICD-10-CM

## 2021-01-05 DIAGNOSIS — N05.1 FSGS (FOCAL SEGMENTAL GLOMERULOSCLEROSIS): ICD-10-CM

## 2021-01-05 DIAGNOSIS — Z01.818 PRE-TRANSPLANT EVALUATION FOR KIDNEY TRANSPLANT: ICD-10-CM

## 2021-01-05 DIAGNOSIS — N18.9 CHRONIC RENAL FAILURE: ICD-10-CM

## 2021-01-05 DIAGNOSIS — I10 HYPERTENSION GOAL BP (BLOOD PRESSURE) < 140/90: ICD-10-CM

## 2021-01-05 DIAGNOSIS — I10 HYPERTENSION, UNSPECIFIED TYPE: ICD-10-CM

## 2021-01-05 PROCEDURE — 99205 OFFICE O/P NEW HI 60 MIN: CPT | Mod: 95

## 2021-01-05 PROCEDURE — 99204 OFFICE O/P NEW MOD 45 MIN: CPT | Mod: 95 | Performed by: TRANSPLANT SURGERY

## 2021-01-05 ASSESSMENT — MIFFLIN-ST. JEOR: SCORE: 1654.22

## 2021-01-05 ASSESSMENT — PAIN SCALES - GENERAL: PAINLEVEL: NO PAIN (0)

## 2021-01-05 NOTE — PROGRESS NOTES
Kidney eval note for 64 yo man with CKD 4/5 (eGFR about 15).  Feels fine, not come to  with impending need for renal support.    I/P:  1. Good candidate.  No obvious surgical contraindications or concerns.  2. At 64 yo old and hx of HTN, needs thorough eval for CAD and cerebrovascular disease.  3. Routine Ca and infection screening.  4. He was surprised about need for impending renal replacement therapy.  Needs time to talk with Dr Luna (nephrol).  We spoke at length about different types of therapy and relative risks/benefits associated with different types.  5. Will eventually need assessment of adequacy of LE vascular and bladder, but until can put off until he is a bit more ready.  6. Would do much better with a LDKT, if feasible.  This was stressed and the idea of a preemptive transplant was also encouraged.  I spent over 45 min on video call with him, over half in counseling.    HPI.  Impending ESRD.  For kidney eval.  Lkely HTN as cause.  Not on dialysis.  eGFR 10-15.  No heart disease, stroke, infection.  No diseases other than HTN.  No prior abdominal surgery.    PE alert, moves everything symmetrically.  Conjugate gaze  No cough, unlabored breathing  Non tender abd to self palpation  Able to interact appropriately    Current Outpatient Medications   Medication     allopurinol (ZYLOPRIM) 100 MG tablet     amLODIPine (NORVASC) 10 MG tablet     carvedilol (COREG) 12.5 MG tablet     sildenafil (REVATIO) 20 MG tablet     No current facility-administered medications for this visit.          I had a long discussion with the patient regarding kidney transplantation which included but was not limited to the following points:    Kidney transplant evaluation process to assess whether (s)he can safely undergo a kidney transplant and take long-term immunosuppression.    The selection committee process was discussed.  The federal rules for cadaveric waiting list and blood type matching of the organ. The  availability of living-related donor transplantation.  The types of donors: brain death donors, non-heart beating donors and living donor grafts  Extended criteria  Donors and the increased  risk of primary non-function using these extended criteria donors  [facility] high risk donors, risk of donor transmitted infections and donor transmitted malignancy  The kidney transplant operation and the associated risks and technical complications which can include intraoperative death, post operative death, primary non-function, bleeding requiring re-operations, vascular and ureteral complications, bowel perforations, and intra abdominal abscess. Some of these complications may require a second operation.  The postoperative course and risk of postoperative complications which can include sepsis, MI, stroke, brain injury, pneumonia, pleural effusions, and renal dysfunction.  The current 1 year and 5 year graft and patient survivals.  The need for life long immunosuppressive therapy and the side effects of these medications, including the possibility of toxicity, opportunistic infections, risk of cancer including lymphoma, and the possibility of rejection even if the patient is taking the medication exactly as prescribed.  The need for compliance with medications and follow-up visits in the clinic and thereafter.  The patient and family understand these risks and wish to proceed to transplantation

## 2021-01-05 NOTE — PROGRESS NOTES
"Psychosocial Assessment  Patient Name/ Age: Matteo Barnes 65 year old   Medical Record #: [id number]  Duration of Interview:     40   min  Process:   Phone Interview                (counseling < 50%)   Present at Appointment: Matteo        :TEN Martinez, [facility] Date:  January 5, 2021        Type of transplant: Kidney    Donor type:   Matteo indicated he does not know of any potential donors at this time.   Cadaver   Prior Transplants:    No Status of Transplant:       Current Living Situation    Location:   [address] [place] [address]  With Whom: lives alone       Family/ Social Support:    Matteo has two brothers ([facility] [place]) and one sister ([place], [place]).   Available, helpful   Committed relationship:  Matteo indicated he is divorce and does not consider his ex-spouse to be a part of his support system.    Matteo indicated he has been in a relationship for 10 years with Vanita and does consider her to be a part of his support system.   Single          Available, helpful   Other supports:   Friends Available, helpful       Activities/ Functional Ability    Current level: Ambulatory and independent with ADL's     Transportation Drives self       Vocational/Employment/Financial     Employment  [employer]   Full time   Job Description  Commercial       Income   Salary/wages   Insurance  [employer] through employer and Medicare Part A    At this time, patient can afford medication costs:  Yes  Private Insurance and Medicare       Medical Status    Current mode of treatment for ESRD None   Complications - None diabetic        Behavioral    Tobacco Use No Chemical Dependency No    Matteo indicated he may have a beer a week.     Psychiatric Impairment No    Reading ability Good  Education Level: Some College Recent Legal History No    Coping Style/Strategies: Matteo indicated when under stress he will \"shake it off\".     Ability to Adhere to Complex Medical Regime: " Yes     Adherence History:  Matteo indicated he will usually follow his physician's recommendations.        Education  _X_ Medicare  _X_ Rehabilitation  _X_ Donor issues  _X_ Community resources  _X_ Post discharge housing  _X_ Financial resources  _X_ Medical insurance options  _X_ Psych adjustment  _X_ Family adjustment  _X_ Health Care Directive - Yes, Will Provide Psychosocial Risks of Transplant Reviewed and Discussed:  _X_ Increased stress related to emotional,            family, social, employment or financial           situation  _X_ Affect on work and/or disability benefits  _X_ Affect on future life insurance  _X_ Transplant outcome expectations may           not be met  _X_ Mental Health Risks: anxiety,           depression, PTSD, guilt, grief and           chronic fatigue     Notable Items:   None noted.       Final Evaluation/Assessment   Patient seemed to process information well. Appeared well informed, motivated and able to follow post transplant requirements. Behavior was appropriate during interview. Has adequate income and insurance coverage. Adequate social support. No major contraindications noted for transplant.  At this time patient appears to understand the risks and benefits of transplant.      Recommendation  Acceptable    Selection Criteria Met:  Plan for support Yes   Chemical Dependence Yes   Smoking Yes   Mental Health Yes   Adequate Finances Yes    Signature: TEN Martinez, [facility] HospitalSW   Title: Clinical

## 2021-01-05 NOTE — PROGRESS NOTES
[facility] Solid Organ Transplant  Outpatient MNT: Kidney Transplant Evaluation    Current BMI: 28.7 (HT 69 in,  lbs/88 kg)- data from 11/30   BMI is within recommendation of <35 for kidney transplant     Time Spent: 15 minutes  Visit Type: Initial   Referring Physician: Maegan   Pt accompanied by: self     History of previous txp: none   Dialysis: no     Nutrition Assessment  No specific renal diets. Feels good and healthy overall, maybe a little thirstier than baseline. Trying to eat lower sodium. Threw away his salt shaker.     Appetite: good/baseline     Vitamins, Supplements, Pertinent Meds: MVI   Herbal Medicines/Supplements: none     Weight hx: stable     Edema: none     Diet Recall  Breakfast Oatmeal with apples    Lunch Sub sandwich; [employer]; ham and cheese s/w that he brings from home    Dinner Seafood, ribs, leftovers + veggie; has reduced potatoes d/t K    Snacks Very occasional beef jerky    Beverages Water, black tea, coffee    Alcohol 1-2 beers/week    Dining out 2x/week      Physical Activity  Goes to the gym (pre-covid)  Walks with dog 2-4x/week, up to 30 min (weather dependent)  Active at work     Labs  11/10 K 5.5 with h/o some elevated values  Phos 3.9 with h/o wnl values     Nutrition Diagnosis  No nutrition diagnosis identified at this time     Nutrition Intervention  Nutrition education provided:  Discussed sodium intake (low sodium foods and drinks, seasoning food without salt and tips for low sodium diet). Reviewed K/Phos levels and K-rich foods to limit. Consider looking online ahead of ordering take out to help guide decision based on Na content.     Reviewed post txp diet guidelines in brief (will review in further detail post txp):  (1) Review of proper food safety measures d/t immunosuppressant therapy post-op and increased risk for food-borne illness    (2) Avoid the following post txp d/t risk for rejection, unknown effects on the organs, and/or potential interactions  with immunosuppressants:  - Herbal, Chinese, holistic, chiropractic, natural, alternative medicines and supplements  - Detoxes and cleanses  - Weight loss pills  - Protein powders or other products with extracts or herbs (ie green tea extract)    (3) Med regimen and possible side effects    Patient Understanding: Pt verbalized understanding of education provided.  Expected Engagement: Good  Follow-Up Plans: PRN     Nutrition Goals  No nutrition goals identified at this time     Beryl Guerrero, RD, LD, CCTD    Pt evaluated via billable telephone visit: 15 min

## 2021-01-05 NOTE — LETTER
1/5/2021         RE: Matteo Barnes  [address]  [facility] [address]        Dear Colleague,    Thank you for referring your patient, Matteo Barnes, to the [facility] TRANSPLANT [facility]INIC. Please see a copy of my visit note below.    Kidney eval note for 64 yo man with CKD 4/5 (eGFR about 15).  Feels fine, not come to  with impending need for renal support.    I/P:  1. Good candidate.  No obvious surgical contraindications or concerns.  2. At 64 yo old and hx of HTN, needs thorough eval for CAD and cerebrovascular disease.  3. Routine Ca and infection screening.  4. He was surprised about need for impending renal replacement therapy.  Needs time to talk with Dr Luna (nephrol).  We spoke at length about different types of therapy and relative risks/benefits associated with different types.  5. Will eventually need assessment of adequacy of LE vascular and bladder, but until can put off until he is a bit more ready.  6. Would do much better with a LDKT, if feasible.  This was stressed and the idea of a preemptive transplant was also encouraged.  I spent over 45 min on video call with him, over half in counseling.    HPI.  Impending ESRD.  For kidney eval.  Lkely HTN as cause.  Not on dialysis.  eGFR 10-15.  No heart disease, stroke, infection.  No diseases other than HTN.  No prior abdominal surgery.    PE alert, moves everything symmetrically.  Conjugate gaze  No cough, unlabored breathing  Non tender abd to self palpation  Able to interact appropriately    Current Outpatient Medications   Medication     allopurinol (ZYLOPRIM) 100 MG tablet     amLODIPine (NORVASC) 10 MG tablet     carvedilol (COREG) 12.5 MG tablet     sildenafil (REVATIO) 20 MG tablet     No current facility-administered medications for this visit.          I had a long discussion with the patient regarding kidney transplantation which included but was not limited to the following points:    Kidney transplant  evaluation process to assess whether (s)he can safely undergo a kidney transplant and take long-term immunosuppression.    The selection committee process was discussed.  The federal rules for cadaveric waiting list and blood type matching of the organ. The availability of living-related donor transplantation.  The types of donors: brain death donors, non-heart beating donors and living donor grafts  Extended criteria  Donors and the increased  risk of primary non-function using these extended criteria donors  The [facility] high risk donors, risk of donor transmitted infections and donor transmitted malignancy  The kidney transplant operation and the associated risks and technical complications which can include intraoperative death, post operative death, primary non-function, bleeding requiring re-operations, vascular and ureteral complications, bowel perforations, and intra abdominal abscess. Some of these complications may require a second operation.  The postoperative course and risk of postoperative complications which can include sepsis, MI, stroke, brain injury, pneumonia, pleural effusions, and renal dysfunction.  The current 1 year and 5 year graft and patient survivals.  The need for life long immunosuppressive therapy and the side effects of these medications, including the possibility of toxicity, opportunistic infections, risk of cancer including lymphoma, and the possibility of rejection even if the patient is taking the medication exactly as prescribed.  The need for compliance with medications and follow-up visits in the clinic and thereafter.  The patient and family understand these risks and wish to proceed to transplantation        Again, thank you for allowing me to participate in the care of your patient.        Sincerely,        LEIA

## 2021-01-05 NOTE — PROGRESS NOTES
Summary    Team s concerns/comments: Called patient today and spoke to him after evaluation appts. Pt expressed all went well, he got all of his questions answered today. Pt wanting to know next steps, explained that Julian will compile the outcome of his appts for the Selection Committee Meeting on 01/13/2021 where we make 3 standard decisions: candidacy/next steps/listing. After the meeting Julian will call him with the outcome of the meeting and review plans to complete evaluation as warranted. Pt expressed very good understanding of all and was in good agreement with the plan.     Team recommends the following items to complete evaluation:  - cardiology   - colonoscopy     Candidacy category: Yellow    Action/Plan: Continue evaluation     Expected Selection Meeting Discussion: 01/13/2021
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
## 2021-01-13 ENCOUNTER — COMMITTEE REVIEW (OUTPATIENT)
Dept: TRANSPLANT | Facility: CLINIC | Age: 66
End: 2021-01-13

## 2021-01-13 DIAGNOSIS — N18.6 ESRD (END STAGE RENAL DISEASE) (H): Primary | ICD-10-CM

## 2021-01-13 DIAGNOSIS — Z01.818 PRE-TRANSPLANT EVALUATION FOR KIDNEY TRANSPLANT: ICD-10-CM

## 2021-01-13 NOTE — LETTER
01/21/21        Matteo Barnes  [address]  [facility] [address]        Dear Matteo,    It was a pleasure to see you recently for consideration of kidney transplantation. Your pre-transplant evaluation results were reviewed at our Multidisciplinary Selection Committee. The committee is requesting the following items are completed before determining your candidacy:    1. EKG  2. Echocardiogram  3. Chest Xray  4. Evaluation lab work  5. Colonoscopy  6. PSA  7. CT Abdomen/Pelvis w/o  8. Iliac Ultrasound    For any questions, please contact the Transplant Office at [phone].      Sincerely,      Solid Organ Transplant  [facility], [facility]

## 2021-01-13 NOTE — LETTER
01/21/21        Matteo Barnes  [address]  [facility] [address]        Dear Matteo,    It was a pleasure to see you recently for consideration of kidney transplantation. Your pre-transplant evaluation results were reviewed at our Multidisciplinary Selection Committee. The committee is requesting the following items are completed before determining your candidacy:    1. EKG  2. Echocardiogram  3. Chest Xray  4. Evaluation lab work  5. Colonoscopy  6. PSA  7. CT Abdomen/Pelvis w/o  8. Iliac Ultrasound    For any questions, please contact the Transplant Office at [phone].      Sincerely,      Solid Organ Transplant  [facility], [facility]

## 2021-01-13 NOTE — COMMITTEE REVIEW
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)  Secondary Diagnosis:     Committee Review Members:  Nephrology Kaitlin Andersen PA-C, Manny Bolaños, APRN CNP, Alden Marrufo MD   Nutrition Beryl Guerrero,    Pharmacy Judith Escobedo, [facility]    - Clinical Haley Cueva, [facility], Edith Haq, [facility]   Transplant Kaitlin Andersen PA-C, Carolyne Lopez, JONATHAN, Graciela Stevens, RN, Andrew Gonzalez MD, Lucy Colmenares, RN, Jessica Hernandez, RN, Daylin Brothers, RN, Julian Beltrán, RN, Ilda Terry, RN, Nicole Milton, JONATHAN, Marcel Dale MD, Hue Dickson RN       Transplant Eligibility: Irreversible chronic kidney disease treated w/dialysis or expected need for dialysis    Committee Review Decision: Needs Re-presentation    Relative Contraindications: None    Absolute Contraindications: None    Committee Chair Andrew Gonzalez MD verbally attested to the committee's decision.    Committee Discussion Details: May continue evaluation and list inactive when listing required testing complete.  Will also need:  1. EKG, ECHO, CXR, Labs  2. Health maintenance  3. CT Abd/pelvis  4. Iliac US  5. In-person surgeon

## 2021-01-15 ENCOUNTER — HEALTH MAINTENANCE LETTER (OUTPATIENT)
Age: 66
End: 2021-01-15

## 2021-01-20 ENCOUNTER — TELEPHONE (OUTPATIENT)
Dept: TRANSPLANT | Facility: CLINIC | Age: 66
End: 2021-01-20

## 2021-01-20 NOTE — TELEPHONE ENCOUNTER
Patient Call: Matteo is wondering about  the out come of the committee review  Please call Matteo         Call back needed? Yes    Return Call Needed  Same as documented in contacts section  When to return call?: Same day: Route High Priority

## 2021-02-01 ENCOUNTER — ANCILLARY PROCEDURE (OUTPATIENT)
Dept: GENERAL RADIOLOGY | Facility: CLINIC | Age: 66
End: 2021-02-01
Attending: TRANSPLANT SURGERY
Payer: COMMERCIAL

## 2021-02-01 ENCOUNTER — ANCILLARY PROCEDURE (OUTPATIENT)
Dept: CT IMAGING | Facility: CLINIC | Age: 66
End: 2021-02-01
Attending: TRANSPLANT SURGERY
Payer: COMMERCIAL

## 2021-02-01 ENCOUNTER — OFFICE VISIT (OUTPATIENT)
Dept: TRANSPLANT | Facility: CLINIC | Age: 66
End: 2021-02-01
Attending: SURGERY
Payer: COMMERCIAL

## 2021-02-01 ENCOUNTER — ANCILLARY PROCEDURE (OUTPATIENT)
Dept: CARDIOLOGY | Facility: CLINIC | Age: 66
End: 2021-02-01
Attending: TRANSPLANT SURGERY
Payer: COMMERCIAL

## 2021-02-01 VITALS
BODY MASS INDEX: 27.95 KG/M2 | SYSTOLIC BLOOD PRESSURE: 167 MMHG | OXYGEN SATURATION: 97 % | HEART RATE: 71 BPM | WEIGHT: 192 LBS | DIASTOLIC BLOOD PRESSURE: 112 MMHG

## 2021-02-01 DIAGNOSIS — N18.6 ESRD (END STAGE RENAL DISEASE) (H): ICD-10-CM

## 2021-02-01 DIAGNOSIS — Z76.82 ORGAN TRANSPLANT CANDIDATE: ICD-10-CM

## 2021-02-01 DIAGNOSIS — Z01.818 PRE-TRANSPLANT EVALUATION FOR KIDNEY TRANSPLANT: ICD-10-CM

## 2021-02-01 DIAGNOSIS — N18.4 CKD (CHRONIC KIDNEY DISEASE) STAGE 4, GFR 15-29 ML/MIN (H): ICD-10-CM

## 2021-02-01 DIAGNOSIS — I10 HYPERTENSION GOAL BP (BLOOD PRESSURE) < 140/90: ICD-10-CM

## 2021-02-01 LAB
ABO + RH BLD: NORMAL
ALBUMIN SERPL-MCNC: 4.1 G/DL (ref 3.4–5)
ALBUMIN UR-MCNC: 100 MG/DL
ALP SERPL-CCNC: 101 U/L (ref 40–150)
ALT SERPL W P-5'-P-CCNC: 31 U/L (ref 0–70)
ANION GAP SERPL CALCULATED.3IONS-SCNC: 9 MMOL/L (ref 3–14)
APPEARANCE UR: CLEAR
APTT PPP: 29 SEC (ref 22–37)
AST SERPL W P-5'-P-CCNC: 12 U/L (ref 0–45)
BASOPHILS # BLD AUTO: 0 10E9/L (ref 0–0.2)
BASOPHILS NFR BLD AUTO: 0.7 %
BILIRUB SERPL-MCNC: 0.4 MG/DL (ref 0.2–1.3)
BILIRUB UR QL STRIP: NEGATIVE
BLD GP AB SCN SERPL QL: NORMAL
BLOOD BANK CMNT PATIENT-IMP: NORMAL
BUN SERPL-MCNC: 66 MG/DL (ref 7–30)
CALCIUM SERPL-MCNC: 8.8 MG/DL (ref 8.5–10.1)
CARDIOLIPIN ANTIBODY IGG: <1.6 GPL-U/ML (ref 0–19.9)
CARDIOLIPIN ANTIBODY IGM: 2.1 MPL-U/ML (ref 0–19.9)
CHLORIDE SERPL-SCNC: 109 MMOL/L (ref 94–109)
CMV IGG SERPL QL IA: 0.2 AI (ref 0–0.8)
CO2 SERPL-SCNC: 21 MMOL/L (ref 20–32)
COLOR UR AUTO: YELLOW
CREAT SERPL-MCNC: 4.77 MG/DL (ref 0.66–1.25)
DIFFERENTIAL METHOD BLD: ABNORMAL
EBV VCA IGG SER QL IA: >8 AI (ref 0–0.8)
EOSINOPHIL # BLD AUTO: 0.1 10E9/L (ref 0–0.7)
EOSINOPHIL NFR BLD AUTO: 2 %
ERYTHROCYTE [DISTWIDTH] IN BLOOD BY AUTOMATED COUNT: 13.1 % (ref 10–15)
GFR SERPL CREATININE-BSD FRML MDRD: 12 ML/MIN/{1.73_M2}
GLUCOSE SERPL-MCNC: 96 MG/DL (ref 70–99)
GLUCOSE UR STRIP-MCNC: NEGATIVE MG/DL
HBV CORE AB SERPL QL IA: NONREACTIVE
HBV SURFACE AB SERPL IA-ACNC: 0 M[IU]/ML
HBV SURFACE AG SERPL QL IA: NONREACTIVE
HCT VFR BLD AUTO: 37.2 % (ref 40–53)
HCV AB SERPL QL IA: NONREACTIVE
HGB BLD-MCNC: 12.5 G/DL (ref 13.3–17.7)
HGB UR QL STRIP: NEGATIVE
HIV 1+2 AB+HIV1 P24 AG SERPL QL IA: NONREACTIVE
IMM GRANULOCYTES # BLD: 0 10E9/L (ref 0–0.4)
IMM GRANULOCYTES NFR BLD: 0.7 %
INR PPP: 1.12 (ref 0.86–1.14)
KETONES UR STRIP-MCNC: NEGATIVE MG/DL
LEUKOCYTE ESTERASE UR QL STRIP: NEGATIVE
LYMPHOCYTES # BLD AUTO: 1 10E9/L (ref 0.8–5.3)
LYMPHOCYTES NFR BLD AUTO: 18.1 %
MCH RBC QN AUTO: 31.3 PG (ref 26.5–33)
MCHC RBC AUTO-ENTMCNC: 33.6 G/DL (ref 31.5–36.5)
MCV RBC AUTO: 93 FL (ref 78–100)
MONOCYTES # BLD AUTO: 0.4 10E9/L (ref 0–1.3)
MONOCYTES NFR BLD AUTO: 6.9 %
NEUTROPHILS # BLD AUTO: 3.9 10E9/L (ref 1.6–8.3)
NEUTROPHILS NFR BLD AUTO: 71.6 %
NITRATE UR QL: NEGATIVE
NRBC # BLD AUTO: 0 10*3/UL
NRBC BLD AUTO-RTO: 0 /100
PH UR STRIP: 6 PH (ref 5–7)
PHOSPHATE SERPL-MCNC: 3.8 MG/DL (ref 2.5–4.5)
PLATELET # BLD AUTO: 211 10E9/L (ref 150–450)
POTASSIUM SERPL-SCNC: 4.5 MMOL/L (ref 3.4–5.3)
PROT SERPL-MCNC: 7.8 G/DL (ref 6.8–8.8)
PSA SERPL-ACNC: 1.18 UG/L (ref 0–4)
RBC # BLD AUTO: 3.99 10E12/L (ref 4.4–5.9)
RBC #/AREA URNS AUTO: <1 /HPF (ref 0–2)
SODIUM SERPL-SCNC: 139 MMOL/L (ref 133–144)
SOURCE: ABNORMAL
SP GR UR STRIP: 1.01 (ref 1–1.03)
SPECIMEN EXP DATE BLD: NORMAL
SPECIMEN EXP DATE BLD: NORMAL
T PALLIDUM AB SER QL: NONREACTIVE
THROMBIN TIME: 16.4 SEC (ref 13–19)
UROBILINOGEN UR STRIP-MCNC: 0 MG/DL (ref 0–2)
VZV IGG SER QL IA: 6.8 AI (ref 0–0.8)
WBC # BLD AUTO: 5.5 10E9/L (ref 4–11)
WBC #/AREA URNS AUTO: 0 /HPF (ref 0–5)

## 2021-02-01 PROCEDURE — 86481 TB AG RESPONSE T-CELL SUSP: CPT | Mod: 90 | Performed by: PATHOLOGY

## 2021-02-01 PROCEDURE — 86706 HEP B SURFACE ANTIBODY: CPT | Mod: 90 | Performed by: PATHOLOGY

## 2021-02-01 PROCEDURE — 86644 CMV ANTIBODY: CPT | Mod: 90 | Performed by: PATHOLOGY

## 2021-02-01 PROCEDURE — 86780 TREPONEMA PALLIDUM: CPT | Mod: 90 | Performed by: PATHOLOGY

## 2021-02-01 PROCEDURE — 81241 F5 GENE: CPT | Mod: 90 | Performed by: PATHOLOGY

## 2021-02-01 PROCEDURE — 86665 EPSTEIN-BARR CAPSID VCA: CPT | Mod: 90 | Performed by: PATHOLOGY

## 2021-02-01 PROCEDURE — 86147 CARDIOLIPIN ANTIBODY EA IG: CPT | Mod: 90 | Performed by: PATHOLOGY

## 2021-02-01 PROCEDURE — 81001 URINALYSIS AUTO W/SCOPE: CPT | Performed by: PATHOLOGY

## 2021-02-01 PROCEDURE — 87340 HEPATITIS B SURFACE AG IA: CPT | Mod: 90 | Performed by: PATHOLOGY

## 2021-02-01 PROCEDURE — 86850 RBC ANTIBODY SCREEN: CPT | Mod: 90 | Performed by: PATHOLOGY

## 2021-02-01 PROCEDURE — 86787 VARICELLA-ZOSTER ANTIBODY: CPT | Mod: 90 | Performed by: PATHOLOGY

## 2021-02-01 PROCEDURE — 80053 COMPREHEN METABOLIC PANEL: CPT | Performed by: PATHOLOGY

## 2021-02-01 PROCEDURE — 86803 HEPATITIS C AB TEST: CPT | Mod: 90 | Performed by: PATHOLOGY

## 2021-02-01 PROCEDURE — 85610 PROTHROMBIN TIME: CPT | Performed by: PATHOLOGY

## 2021-02-01 PROCEDURE — 71046 X-RAY EXAM CHEST 2 VIEWS: CPT | Mod: GC | Performed by: RADIOLOGY

## 2021-02-01 PROCEDURE — 86901 BLOOD TYPING SEROLOGIC RH(D): CPT | Mod: 90 | Performed by: PATHOLOGY

## 2021-02-01 PROCEDURE — 36415 COLL VENOUS BLD VENIPUNCTURE: CPT | Performed by: PATHOLOGY

## 2021-02-01 PROCEDURE — 99215 OFFICE O/P EST HI 40 MIN: CPT | Performed by: SURGERY

## 2021-02-01 PROCEDURE — 85730 THROMBOPLASTIN TIME PARTIAL: CPT | Performed by: PATHOLOGY

## 2021-02-01 PROCEDURE — 85730 THROMBOPLASTIN TIME PARTIAL: CPT | Mod: 90 | Performed by: PATHOLOGY

## 2021-02-01 PROCEDURE — 93306 TTE W/DOPPLER COMPLETE: CPT | Performed by: INTERNAL MEDICINE

## 2021-02-01 PROCEDURE — 74176 CT ABD & PELVIS W/O CONTRAST: CPT | Performed by: RADIOLOGY

## 2021-02-01 PROCEDURE — 86900 BLOOD TYPING SEROLOGIC ABO: CPT | Mod: 90 | Performed by: PATHOLOGY

## 2021-02-01 PROCEDURE — 85613 RUSSELL VIPER VENOM DILUTED: CPT | Performed by: PATHOLOGY

## 2021-02-01 PROCEDURE — 99000 SPECIMEN HANDLING OFFICE-LAB: CPT | Performed by: PATHOLOGY

## 2021-02-01 PROCEDURE — 81240 F2 GENE: CPT | Mod: 90 | Performed by: PATHOLOGY

## 2021-02-01 PROCEDURE — 85670 THROMBIN TIME PLASMA: CPT | Mod: 90 | Performed by: PATHOLOGY

## 2021-02-01 PROCEDURE — 86886 COOMBS TEST INDIRECT TITER: CPT | Mod: 90 | Performed by: PATHOLOGY

## 2021-02-01 PROCEDURE — G0103 PSA SCREENING: HCPCS | Mod: 90 | Performed by: PATHOLOGY

## 2021-02-01 PROCEDURE — G0452 MOLECULAR PATHOLOGY INTERPR: HCPCS | Mod: 90 | Performed by: PATHOLOGY

## 2021-02-01 PROCEDURE — 85025 COMPLETE CBC W/AUTO DIFF WBC: CPT | Performed by: PATHOLOGY

## 2021-02-01 PROCEDURE — 86704 HEP B CORE ANTIBODY TOTAL: CPT | Mod: 90 | Performed by: PATHOLOGY

## 2021-02-01 PROCEDURE — 84100 ASSAY OF PHOSPHORUS: CPT | Performed by: PATHOLOGY
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
## 2021-02-02 LAB
BLD GP AB SCN TITR SERPL: NORMAL {TITER}
GAMMA INTERFERON BACKGROUND BLD IA-ACNC: 0.01 IU/ML
INTERPRETATION ECG - MUSE: NORMAL
M TB IFN-G CD4+ BCKGRND COR BLD-ACNC: 9.99 IU/ML
M TB TUBERC IFN-G BLD QL: NEGATIVE
MITOGEN IGNF BCKGRD COR BLD-ACNC: 0.03 IU/ML
MITOGEN IGNF BCKGRD COR BLD-ACNC: 0.06 IU/ML

## 2021-02-03 LAB
A* LOCUS: NORMAL
A*: NORMAL
ABTEST METHOD: NORMAL
B* LOCUS: NORMAL
B*: NORMAL
BW-1: NORMAL
C* LOCUS: NORMAL
C*: NORMAL
DPA1* NMDP: NORMAL
DPA1*: NORMAL
DPB1* NMDP: NORMAL
DPB1*: NORMAL
DQA1*: NORMAL
DQA1*LOCUS: NORMAL
DQB1* LOCUS: NORMAL
DQB1*: NORMAL
DRB1* LOCUS: NORMAL
DRB1*: NORMAL
DRB5* LOCUS: NORMAL
DRSSO TEST METHOD: NORMAL

## 2021-02-04 LAB — COPATH REPORT: NORMAL

## 2021-02-04 NOTE — RESULT ENCOUNTER NOTE
Dear Matteo,    Here is a summary of your recent test results:  The patient is negative for the Factor V 1691G>A ([place]) and negative for    the Factor 2 mutation.    For additional lab test information, labtestsonline.org is an excellent reference.    In addition, here is a list of due or overdue Health Maintenance reminders:    Colorectal Cancer Screening due on 08/10/1965    Annual Wellness Visit due on 08/10/2020        Please call us at [phone] (or use NodePing) to address the above recommendations if needed.           Thank you very much for trusting me and [facility].     Have a peaceful day.    Healthy regards,  Romeo Capone MD

## 2021-02-05 LAB — LA PPP-IMP: NEGATIVE

## 2021-02-07 LAB
PROTOCOL CUTOFF: NORMAL
SA1 CELL: NORMAL
SA1 COMMENTS: NORMAL
SA1 HI RISK ABY: NORMAL
SA1 MOD RISK ABY: NORMAL
SA1 TEST METHOD: NORMAL
SA2 CELL: NORMAL
SA2 COMMENTS: NORMAL
SA2 HI RISK ABY UA: NORMAL
SA2 MOD RISK ABY: NORMAL
SA2 TEST METHOD: NORMAL
UNACCEPTABLE ANTIGEN: NORMAL
UNOS CPRA: 54

## 2021-03-01 ENCOUNTER — TELEPHONE (OUTPATIENT)
Dept: TRANSPLANT | Facility: CLINIC | Age: 66
End: 2021-03-01

## 2021-03-02 NOTE — TELEPHONE ENCOUNTER
Notified Matteo that he is ready to be listed Inactive for kidney transplant and he will have a new coordinator, Hue Jeff.  Once he has completed the following, will be reviewed for active status:  1. Cardiology  2. Iliacs  3. Review Health maintenance and dental

## 2021-03-03 ENCOUNTER — TELEPHONE (OUTPATIENT)
Dept: TRANSPLANT | Facility: CLINIC | Age: 66
End: 2021-03-03

## 2021-03-03 ENCOUNTER — DOCUMENTATION ONLY (OUTPATIENT)
Dept: TRANSPLANT | Facility: CLINIC | Age: 66
End: 2021-03-03

## 2021-03-03 NOTE — PROGRESS NOTES
Contacted patient to review outcome of selection committee meeting (See selection committee encounter).   Explained to patient that he/she needs to complete all components of the evaluation to be eligible for active status on the waiting list or to proceed with a live donor kidney transplant.   Reviewed next steps based on outcomes:   Patient will be listed as inactive (is not on dialysis and evaluation is not complete)-will receive:   -An Evaluation Summary Letter indicating what is needed to complete evaluation-discussed with patient if they would like to have testing done with [facility] or locally   -A listing letter indicating inactive status  Confirmed with patient that on successful completion of outstanding components, patient is eligible for active status and they will receive a follow-up call.   Confirmed that patient has contact information for additional questions or concerns.

## 2021-03-03 NOTE — TELEPHONE ENCOUNTER
Spoke with Matteo about listing inactive. He understands that he needs a colonoscopy, cardiology for risk assessment and iliac US, and has an appt for the dentist (last appt 7 months ago). He is working on getting the rest of the items scheduled. When asked about living donors, he reported that he did not have any that he knew of - previously documented that he did have living donors. He had no other questions and understands that his care will be transferred to the wait list team.

## 2021-03-03 NOTE — LETTER
03/03/21    Matteo Barnes  [address]  [place] [address]    Dear Matteo,    It was a pleasure to see you recently for consideration of kidney transplantation. As a reminder, the committee is requesting the following items are completed before determining your candidacy:    1. Cardiology consult for risk assessment before surgery- on the phone today, we weren't sure if you needed this but while looking through your chart, the surgery team did think it was necessary. This can be a virtual visit. You have already completed the testing.  2. Health Maintenance updates- Talk to your primary care doctor about setting up a colonoscopy, and be sure to call us when you see your dentist next.  3. Iliac ultrasound- this test helps the transplant team assess your vessels and plan where a kidney could be hooked up.    I will have our schedulers call you to set up #1 and #3. For any questions, please contact the Transplant Office at [phone].      Sincerely,    Hue Dickson RN BSN CCTN  Solid Organ Transplant  [facility], [facility]            CC: Davidson Capone MD; Dutch Duke DO; KENYA Roy MD

## 2021-03-03 NOTE — Clinical Note
Hello! Patient was listed inactive on the wait list for kidney transplant today. Thank you for your help!

## 2021-03-03 NOTE — LETTER
March 3, 2021    Matteo Barnes  [address]  [place] [address]      Dear Mr. Barnes,    This letter is sent to confirm that you are in the process of completing your transplant work-up and you are a candidate in the kidney transplant program at the [facility].  You were placed on the kidney inactive waitlist on 21.  This means you will accumulate waiting time but not receive  donor calls.       Items we will need from you:      We have received approval from you insurance company for the transplant procedure.  It is critical that you notify us if there is any change in your insurance.  It is also important that you familiarize yourself with the details of your specific insurance policy.  Our patient  is available to assist you if you should have any questions regarding your coverage.      During this waiting period, we may request additional periodic laboratory tests with your primary physician.  It will be your responsibility to remind your physician to forward your results to the Transplant Office.      We need to be kept informed of any changes in your medical condition such as:    o changes in your medications,   o significant changes in your health  o significant infections (such as pneumonia or abscesses)  o blood transfusions  o any condition which requires hospitalization  o any surgery      Remember to complete any routine cancer screening tests required before your transplant.  This includes colonoscopy; prostrate screening for men, and mammogram and gynecologic testing for women, as well as dental work.  Your primary care clinic can assist you with arranging for these exams.  Remind your caregivers to forward copies of the records and final reports.    We want you to know that our program has physician and surgeon coverage 24 hours a day, 365 days a year. If this coverage changes or there are substantial  program changes, you will be notified in writing by letter.     Attached is a letter from the [employer] for Organ Sharing (UNOS). It describes the services and information offered to patients by UNOS and the Organ Procurement and Transplantation Network.    We appreciate having had the opportunity to participate in your care.  If you have questions, please feel free to call the Transplant Office at [phone] or [phone].      Sincerely,     Hue Dickson RN BSN CCTN  Kidney Transplant Program    Enclosures: Telephone Contact List, Travel Resources, UNOS Letter, Waitlist Information Update and While You Are Waiting  CC:   Davidson Capone MD; KENYA Roy MD                                The Organ Procurement and Transplantation Network  Toll-free patient services line:     Your resource for organ transplant information    If you have a question regarding your own medical care, you always should call your transplant hospital first. However, for general organ transplant-related information, you can call the Organ Procurement and Transplantation Network (OPTN) toll-free patient services line at [phone]. Anyone, including potential transplant candidates, candidates, recipients, family members, friends, living donors, and donor family members, can call this number to:          Talk about organ donation, living donation, the transplant process, the donation process, and transplant policies.    Get a free patient information kit with helpful booklets, waiting list and transplant information, and a list of all transplant hospitals.    Ask questions about the OPTN website (https://optn.transplant.hrsa.gov/), the [employer] for Organ Sharing s (UNOS) website (https://unos.org/), or the UNOS website for living donors and transplant recipients. (https://www.transplantliving.org/).    Learn how the OPTN can help you.    Talk about any concerns that you may have with a transplant hospital.    The  [facility] transplant system, the OPTN, is managed under federal contract by the [employer] Organ Sharing (UNOS), which is a non-profit charitable organization. The OPTN helps create and define organ sharing policies that make the best use of donated organs. This process continuously evaluating new advances and discoveries so policies can be adapted to best serve patients waiting for transplants. To do so, the OPTN works closely with transplant professionals, transplant patients, transplant candidates, donor families, living donors, and the public. All transplant programs and organ procurement organizations throughout the country are OPTN members and are obligated to follow the policies the OPTN creates for allocating organs.    The OPTN also is responsible for:      Providing educational material for patients, the public, and professionals.    Raising awareness of the need for donated organs and tissue.    Coordinating organ procurement, matching, and placement.    Collecting information about every organ transplant and donation that occurs in the [place].    Remember, you should contact your transplant hospital directly if you have questions or concerns about your own medical care including medical records, work-up progress, and test results.    We are not your transplant hospital, and our staff will not be able to answer questions about your case, so please keep your transplant hospital s phone number handy.    However, while you research your transplant needs and learn as much as you can about transplantation and donation, we welcome your call to our toll-free patient services line at [phone].          Updated 4/1/2019

## 2021-05-04 ENCOUNTER — TELEPHONE (OUTPATIENT)
Dept: TRANSPLANT | Facility: CLINIC | Age: 66
End: 2021-05-04

## 2021-05-04 NOTE — TELEPHONE ENCOUNTER
Called pt to introduce myself as [facility] and go over items needed for active status. Pt will need cards appt rescheduled, iliacs rescheduled and colonoscopy set up with PCP. Pt will get these completed and let RNCC know. Gave pt direct line. Pt verbalized understanding of information and has no further questions. Encouraged to reach out if questions arise.

## 2021-05-05 ENCOUNTER — TELEPHONE (OUTPATIENT)
Dept: TRANSPLANT | Facility: CLINIC | Age: 66
End: 2021-05-05

## 2021-05-05 ENCOUNTER — DOCUMENTATION ONLY (OUTPATIENT)
Dept: TRANSPLANT | Facility: CLINIC | Age: 66
End: 2021-05-05

## 2021-06-01 ENCOUNTER — VIRTUAL VISIT (OUTPATIENT)
Dept: CARDIOLOGY | Facility: CLINIC | Age: 66
End: 2021-06-01
Attending: SURGERY
Payer: COMMERCIAL

## 2021-06-01 DIAGNOSIS — N18.5 CKD (CHRONIC KIDNEY DISEASE), STAGE V (H): Primary | ICD-10-CM

## 2021-06-01 DIAGNOSIS — I10 HYPERTENSION GOAL BP (BLOOD PRESSURE) < 140/90: ICD-10-CM

## 2021-06-01 DIAGNOSIS — Z76.82 ORGAN TRANSPLANT CANDIDATE: ICD-10-CM

## 2021-06-01 DIAGNOSIS — R06.01 ORTHOPNEA: ICD-10-CM

## 2021-06-01 PROCEDURE — 99203 OFFICE O/P NEW LOW 30 MIN: CPT | Mod: 95 | Performed by: INTERNAL MEDICINE
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
## 2021-06-01 NOTE — PROGRESS NOTES
"Matteo is a 65 year old who is being evaluated via a billable video visit.      How would you like to obtain your AVS? Mail a copy  If the video visit is dropped, the invitation should be resent by: Text to cell phone: [phone]  Will anyone else be joining your video visit? No      Vitals - Patient Reported  Weight (Patient Reported): 85.7 kg (189 lb)  Height (Patient Reported): 175.3 cm (5' 9\")  BMI (Based on Pt Reported Ht/Wt): 27.91  Pain Score: No Pain (0)(Pt has a liitle SOB)      Vitals were taken and medications where reconciled.    Jamie Nolasco, EMT  12:03 PM    "

## 2021-06-01 NOTE — PATIENT INSTRUCTIONS
You were seen at the [facility] today.  You saw Dr. Prince Medina  Here are your Instructions:    1. Exercise stress echocardiogram at your convenience.  2. Follow up with Dr. Medina to be determined based on results of stress echocardiogram.        If you have questions after this visit:  Send a [employer] message or contact Heidi Styles LPN  Office:  [phone] option #1, then #4 & ask for Heidi (nurse line)  Fax:  [phone]  After Hours:  [phone] option #4 ask to speak to he on-call Cardiologist  Appointments:  [phone] option #1, then option #1

## 2021-06-29 ENCOUNTER — TELEPHONE (OUTPATIENT)
Dept: CARDIOLOGY | Facility: CLINIC | Age: 66
End: 2021-06-29

## 2021-07-22 ENCOUNTER — TELEPHONE (OUTPATIENT)
Dept: TRANSPLANT | Facility: CLINIC | Age: 66
End: 2021-07-22

## 2021-07-22 NOTE — TELEPHONE ENCOUNTER
Patient Call: Voicemail  Date/Time: 07/22/2021 at 0913  Reason for call: Would like to get a US scheduled in [place] if possible     room air

## 2021-07-23 ENCOUNTER — TELEPHONE (OUTPATIENT)
Dept: TRANSPLANT | Facility: CLINIC | Age: 66
End: 2021-07-23

## 2021-07-23 NOTE — TELEPHONE ENCOUNTER
Left message for patient to schedule [facility] Ultrasound and Exercise Stress test.  Asked patient to call back to schedule.

## 2021-08-17 ENCOUNTER — HOSPITAL ENCOUNTER (OUTPATIENT)
Dept: CARDIOLOGY | Facility: CLINIC | Age: 66
Discharge: HOME | End: 2021-08-17
Attending: INTERNAL MEDICINE | Admitting: INTERNAL MEDICINE
Payer: COMMERCIAL

## 2021-08-17 DIAGNOSIS — N18.5 CKD (CHRONIC KIDNEY DISEASE), STAGE V (H): ICD-10-CM

## 2021-08-17 DIAGNOSIS — Z76.82 ORGAN TRANSPLANT CANDIDATE: ICD-10-CM

## 2021-08-17 DIAGNOSIS — I10 HYPERTENSION GOAL BP (BLOOD PRESSURE) < 140/90: ICD-10-CM

## 2021-08-17 PROCEDURE — 93325 DOPPLER ECHO COLOR FLOW MAPG: CPT | Mod: 26 | Performed by: STUDENT IN AN ORGANIZED HEALTH CARE EDUCATION/TRAINING PROGRAM

## 2021-08-17 PROCEDURE — 93321 DOPPLER ECHO F-UP/LMTD STD: CPT | Mod: 26 | Performed by: STUDENT IN AN ORGANIZED HEALTH CARE EDUCATION/TRAINING PROGRAM

## 2021-08-17 PROCEDURE — 93018 CV STRESS TEST I&R ONLY: CPT | Performed by: STUDENT IN AN ORGANIZED HEALTH CARE EDUCATION/TRAINING PROGRAM

## 2021-08-17 PROCEDURE — 93350 STRESS TTE ONLY: CPT | Mod: 26 | Performed by: STUDENT IN AN ORGANIZED HEALTH CARE EDUCATION/TRAINING PROGRAM

## 2021-08-17 PROCEDURE — 93016 CV STRESS TEST SUPVJ ONLY: CPT | Performed by: STUDENT IN AN ORGANIZED HEALTH CARE EDUCATION/TRAINING PROGRAM

## 2021-08-17 PROCEDURE — 255N000002 HC RX 255 OP 636: Performed by: STUDENT IN AN ORGANIZED HEALTH CARE EDUCATION/TRAINING PROGRAM

## 2021-08-17 PROCEDURE — C8928 TTE W OR W/O FOL W/CON,STRES: HCPCS

## 2021-08-17 PROCEDURE — 999N000208 ECHO STRESS ECHOCARDIOGRAM

## 2021-08-17 RX ADMIN — PERFLUTREN 3 ML: 6.52 INJECTION, SUSPENSION INTRAVENOUS at 08:17

## 2021-08-18 ENCOUNTER — MEDICAL CORRESPONDENCE (OUTPATIENT)
Dept: HEALTH INFORMATION MANAGEMENT | Facility: CLINIC | Age: 66
End: 2021-08-18

## 2021-08-19 ENCOUNTER — DOCUMENTATION ONLY (OUTPATIENT)
Dept: LAB | Facility: CLINIC | Age: 66
End: 2021-08-19

## 2021-08-25 ENCOUNTER — LAB (OUTPATIENT)
Dept: LAB | Facility: CLINIC | Age: 66
End: 2021-08-25
Payer: COMMERCIAL

## 2021-08-25 DIAGNOSIS — N18.5 CHRONIC KIDNEY DISEASE (CKD), STAGE V (H): ICD-10-CM

## 2021-08-25 DIAGNOSIS — E87.5 HYPERKALURIA: ICD-10-CM

## 2021-08-25 PROCEDURE — 36415 COLL VENOUS BLD VENIPUNCTURE: CPT

## 2021-08-25 PROCEDURE — 80069 RENAL FUNCTION PANEL: CPT

## 2021-08-27 ENCOUNTER — TELEPHONE (OUTPATIENT)
Dept: FAMILY MEDICINE | Facility: CLINIC | Age: 66
End: 2021-08-27

## 2021-08-27 LAB
ALBUMIN SERPL-MCNC: 3.7 G/DL (ref 3.4–5)
ANION GAP SERPL CALCULATED.3IONS-SCNC: 12 MMOL/L (ref 3–14)
BUN SERPL-MCNC: 76 MG/DL (ref 7–30)
CALCIUM SERPL-MCNC: 8.4 MG/DL (ref 8.5–10.1)
CHLORIDE BLD-SCNC: 110 MMOL/L (ref 94–109)
CO2 SERPL-SCNC: 17 MMOL/L (ref 20–32)
CREAT SERPL-MCNC: 6.67 MG/DL (ref 0.66–1.25)
GFR SERPL CREATININE-BSD FRML MDRD: 8 ML/MIN/1.73M2
GLUCOSE BLD-MCNC: 106 MG/DL (ref 70–99)
PHOSPHATE SERPL-MCNC: 4.8 MG/DL (ref 2.5–4.5)
POTASSIUM BLD-SCNC: 5.3 MMOL/L (ref 3.4–5.3)
SODIUM SERPL-SCNC: 139 MMOL/L (ref 133–144)

## 2021-09-02 ENCOUNTER — OFFICE VISIT (OUTPATIENT)
Dept: FAMILY MEDICINE | Facility: CLINIC | Age: 66
End: 2021-09-02
Payer: COMMERCIAL

## 2021-09-02 VITALS
BODY MASS INDEX: 26.92 KG/M2 | WEIGHT: 188 LBS | HEIGHT: 70 IN | OXYGEN SATURATION: 98 % | TEMPERATURE: 97.8 F | SYSTOLIC BLOOD PRESSURE: 128 MMHG | HEART RATE: 62 BPM | DIASTOLIC BLOOD PRESSURE: 76 MMHG | RESPIRATION RATE: 14 BRPM

## 2021-09-02 DIAGNOSIS — N18.5 CKD (CHRONIC KIDNEY DISEASE) STAGE 5, GFR LESS THAN 15 ML/MIN (H): ICD-10-CM

## 2021-09-02 DIAGNOSIS — E78.5 HYPERLIPIDEMIA LDL GOAL <130: ICD-10-CM

## 2021-09-02 DIAGNOSIS — M10.9 GOUT, UNSPECIFIED CAUSE, UNSPECIFIED CHRONICITY, UNSPECIFIED SITE: ICD-10-CM

## 2021-09-02 DIAGNOSIS — F41.9 ANXIETY: Primary | ICD-10-CM

## 2021-09-02 DIAGNOSIS — I10 HYPERTENSION GOAL BP (BLOOD PRESSURE) < 140/90: ICD-10-CM

## 2021-09-02 DIAGNOSIS — R80.9 PROTEINURIA, UNSPECIFIED TYPE: ICD-10-CM

## 2021-09-02 PROBLEM — T39.395A ADVERSE EFFECT OF OTHER NONSTEROIDAL ANTI-INFLAMMATORY DRUGS (NSAID), INITIAL ENCOUNTER: Status: ACTIVE | Noted: 2018-06-11

## 2021-09-02 PROBLEM — N05.1 FOCAL SEGMENTAL GLOMERULOSCLEROSIS: Status: ACTIVE | Noted: 2020-08-21

## 2021-09-02 PROCEDURE — 99214 OFFICE O/P EST MOD 30 MIN: CPT | Performed by: FAMILY MEDICINE

## 2021-09-02 PROCEDURE — 96127 BRIEF EMOTIONAL/BEHAV ASSMT: CPT | Mod: 59 | Performed by: FAMILY MEDICINE

## 2021-09-02 RX ORDER — CALCITRIOL 0.25 UG/1
0.25 CAPSULE, LIQUID FILLED ORAL DAILY
COMMUNITY
Start: 2021-07-20 | End: 2023-01-26

## 2021-09-02 RX ORDER — SODIUM BICARBONATE 650 MG/1
1950 TABLET ORAL 2 TIMES DAILY
COMMUNITY
Start: 2021-08-27 | End: 2022-08-28

## 2021-09-02 RX ORDER — ALPRAZOLAM 0.25 MG
0.25 TABLET ORAL DAILY PRN
Qty: 20 TABLET | Refills: 0 | Status: SHIPPED | OUTPATIENT
Start: 2021-09-02 | End: 2021-10-11

## 2021-09-02 RX ORDER — CARVEDILOL 12.5 MG/1
TABLET ORAL
COMMUNITY
Start: 2021-07-13 | End: 2023-01-01

## 2021-09-02 RX ORDER — AMLODIPINE BESYLATE 10 MG/1
10 TABLET ORAL
COMMUNITY
Start: 2021-07-13 | End: 2022-07-14

## 2021-09-02 ASSESSMENT — MIFFLIN-ST. JEOR: SCORE: 1631.07

## 2021-09-02 ASSESSMENT — PATIENT HEALTH QUESTIONNAIRE - PHQ9
SUM OF ALL RESPONSES TO PHQ QUESTIONS 1-9: 2
SUM OF ALL RESPONSES TO PHQ QUESTIONS 1-9: 2
10. IF YOU CHECKED OFF ANY PROBLEMS, HOW DIFFICULT HAVE THESE PROBLEMS MADE IT FOR YOU TO DO YOUR WORK, TAKE CARE OF THINGS AT HOME, OR GET ALONG WITH OTHER PEOPLE: NOT DIFFICULT AT ALL

## 2021-09-02 ASSESSMENT — ANXIETY QUESTIONNAIRES
7. FEELING AFRAID AS IF SOMETHING AWFUL MIGHT HAPPEN: NOT AT ALL
6. BECOMING EASILY ANNOYED OR IRRITABLE: NOT AT ALL
GAD7 TOTAL SCORE: 5
5. BEING SO RESTLESS THAT IT IS HARD TO SIT STILL: SEVERAL DAYS
4. TROUBLE RELAXING: SEVERAL DAYS
8. IF YOU CHECKED OFF ANY PROBLEMS, HOW DIFFICULT HAVE THESE MADE IT FOR YOU TO DO YOUR WORK, TAKE CARE OF THINGS AT HOME, OR GET ALONG WITH OTHER PEOPLE?: NOT DIFFICULT AT ALL
GAD7 TOTAL SCORE: 5
1. FEELING NERVOUS, ANXIOUS, OR ON EDGE: SEVERAL DAYS
GAD7 TOTAL SCORE: 5
7. FEELING AFRAID AS IF SOMETHING AWFUL MIGHT HAPPEN: NOT AT ALL
3. WORRYING TOO MUCH ABOUT DIFFERENT THINGS: SEVERAL DAYS
2. NOT BEING ABLE TO STOP OR CONTROL WORRYING: SEVERAL DAYS

## 2021-09-02 ASSESSMENT — ENCOUNTER SYMPTOMS: NERVOUS/ANXIOUS: 1

## 2021-09-02 NOTE — PROGRESS NOTES
"    Assessment & Plan   Problem List Items Addressed This Visit        Active Problems    Hyperlipidemia LDL goal <130    Hypertension goal BP (blood pressure) < 140/90    CKD (chronic kidney disease) stage 5, GFR less than 15 ml/min (H)    Proteinuria    Gout      Other Visit Diagnoses     Anxiety    -  Primary    Relevant Medications    ALPRAZolam (XANAX) 0.25 MG tablet         We also discussed other possible etiologies / further evaluation of his symptoms.  Recommend that his girlfriend watch him closer any possible apneic episodes.  If she does notice any of these things we should pursue a sleep study for evaluation of BOY.  He does not describe any palpitations or chest pain or pressure and with recent negative stress test I think these episodes are less likely to be cardiac in etiology.  He does describe what sounds like new onset panic attacks.  Will prescribe as needed Xanax to be used very sparingly.  Discussed risks of sedation dependence/addiction.  If it seems like he is taking medication more than a few times per month, would recommend daily antianxiety medication.       BMI:   Estimated body mass index is 27.36 kg/m  as calculated from the following:    Height as of this encounter: 1.765 m (5' 9.5\").    Weight as of this encounter: 85.3 kg (188 lb).       Return in about 1 month (around 10/2/2021) for follow up if symptoms not improving.    Dutch Duke DO  [facility] YEISON Felipe is a 66 year old who presents for the following health issues     Anxiety    History of Present Illness       Mental Health Follow-up:  Patient presents to follow-up on Anxiety.    Patient's anxiety since last visit has been:  Good  The patient is not having other symptoms associated with anxiety.  Any significant life events: job concerns  Patient is feeling anxious or having panic attacks.  Patient has no concerns about alcohol or drug use.     Social History  Tobacco Use    Smoking " "status: Never Smoker    Smokeless tobacco: Never Used  Alcohol use: Yes    Alcohol/week: 0.0 standard drinks    Comment: 1-2 beers twice weekly  Drug use: No      Today's PHQ-9         PHQ-9 Total Score:     (P) 2   PHQ-9 Q9 Thoughts of better off dead/self-harm past 2 weeks :   (P) Not at all   Thoughts of suicide or self harm:      Self-harm Plan:        Self-harm Action:          Safety concerns for self or others:            ROCIO-7 SCORE 9/2/2021   Total Score 5 (mild anxiety)   Total Score 5     PHQ 9/2/2021   PHQ-9 Total Score 2   Q9: Thoughts of better off dead/self-harm past 2 weeks Not at all     Over the past month, Matteo has had 2 episodes where he was going to bed and started having dyspnea, shortness of breath, and swelling of his hands and feet.  He felt like he was \"freaking out\".  He did not know what was going on so he called his girlfriend.  She told him that she should take one of her Xanax tablets.  He did and his symptoms completely resolved afterwards.  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  He states once he falls asleep he is sleeping well.  His girlfriend has told him he snores but he does not think he has had any apparent apneic episodes.  He feels well rested throughout the day.  During these episodes, he did not experience any chest pain or pressure, wheezing, nausea or vomiting, headaches/lightheadedness/dizziness, vision changes.  He does not describe any lower extremity edema.  No orthopnea.    Matteo does not use any tobacco or recreational drugs.  He might drink 1-2 beers per week.  He actually had a stress echocardiogram completed within the past month which was largely normal.  He has not noticed any change in his endurance or exercise tolerance.  He states he is quite active at work " "without issue.      Social History     Tobacco Use     Smoking status: Never Smoker     Smokeless tobacco: Never Used   Vaping Use     Vaping Use: Never used   Substance Use Topics     Alcohol use: Yes     Alcohol/week: 0.0 standard drinks     Comment: 1-2 beers twice weekly     Drug use: No     PHQ 9/2/2021   PHQ-9 Total Score 2   Q9: Thoughts of better off dead/self-harm past 2 weeks Not at all     ROCIO-7 SCORE 9/2/2021   Total Score 5 (mild anxiety)   Total Score 5     Last PHQ-9 9/2/2021   1.  Little interest or pleasure in doing things 0   2.  Feeling down, depressed, or hopeless 0   3.  Trouble falling or staying asleep, or sleeping too much 1   4.  Feeling tired or having little energy 1   5.  Poor appetite or overeating 0   6.  Feeling bad about yourself 0   7.  Trouble concentrating 0   8.  Moving slowly or restless 0   Q9: Thoughts of better off dead/self-harm past 2 weeks 0   PHQ-9 Total Score 2     ROCIO-7  9/2/2021   1. Feeling nervous, anxious, or on edge 1   2. Not being able to stop or control worrying 1   3. Worrying too much about different things 1   4. Trouble relaxing 1   5. Being so restless that it is hard to sit still 1   6. Becoming easily annoyed or irritable 0   7. Feeling afraid, as if something awful might happen 0   ROCIO-7 Total Score 5       Review of Systems   Psychiatric/Behavioral: The patient is nervous/anxious.       Constitutional, HEENT, cardiovascular, pulmonary, gi and gu systems are negative, except as otherwise noted.      Objective    /76   Pulse 62   Temp 97.8  F (36.6  C) (Tympanic)   Resp 14   Ht 1.765 m (5' 9.5\")   Wt 85.3 kg (188 lb)   SpO2 98%   BMI 27.36 kg/m    Body mass index is 27.36 kg/m .  Physical Exam   GENERAL: healthy, alert and no distress  NECK: no adenopathy, no asymmetry, masses, or scars and thyroid normal to palpation  RESP: lungs clear to auscultation - no rales, rhonchi or wheezes  CV: regular rate and rhythm, normal S1 S2, no S3 or S4, no " murmur, click or rub, no peripheral edema and peripheral pulses strong  MS: no gross musculoskeletal defects noted, no edema

## 2021-09-03 ASSESSMENT — PATIENT HEALTH QUESTIONNAIRE - PHQ9: SUM OF ALL RESPONSES TO PHQ QUESTIONS 1-9: 2

## 2021-09-03 ASSESSMENT — ANXIETY QUESTIONNAIRES: GAD7 TOTAL SCORE: 5

## 2021-09-05 ENCOUNTER — HEALTH MAINTENANCE LETTER (OUTPATIENT)
Age: 66
End: 2021-09-05

## 2021-09-15 DIAGNOSIS — N18.5 CKD (CHRONIC KIDNEY DISEASE), STAGE V (H): ICD-10-CM

## 2021-09-15 DIAGNOSIS — Z01.818 PRE-TRANSPLANT EVALUATION FOR KIDNEY TRANSPLANT: ICD-10-CM

## 2021-09-15 DIAGNOSIS — R93.1 EQUIVOCAL STRESS ECHOCARDIOGRAM: ICD-10-CM

## 2021-09-15 DIAGNOSIS — Z76.82 ORGAN TRANSPLANT CANDIDATE: Primary | ICD-10-CM

## 2021-09-16 ENCOUNTER — TELEPHONE (OUTPATIENT)
Dept: CARDIOLOGY | Facility: CLINIC | Age: 66
End: 2021-09-16

## 2021-09-21 ENCOUNTER — TELEPHONE (OUTPATIENT)
Dept: CARDIOLOGY | Facility: CLINIC | Age: 66
End: 2021-09-21

## 2021-09-21 NOTE — TELEPHONE ENCOUNTER
Called Matteo regarding results of stress echo. Left call back number for scheduling dobutamine echo.    Natalie Phelps RN

## 2021-09-28 ENCOUNTER — TELEPHONE (OUTPATIENT)
Dept: TRANSPLANT | Facility: CLINIC | Age: 66
End: 2021-09-28

## 2021-09-28 NOTE — TELEPHONE ENCOUNTER
Called pt for update on colonoscopy and iliac US as those are the last two things he needs done for active status consideration. Left VM with direct line for return call.

## 2021-10-05 DIAGNOSIS — F41.9 ANXIETY: ICD-10-CM

## 2021-10-06 ENCOUNTER — HOSPITAL ENCOUNTER (OUTPATIENT)
Dept: CARDIOLOGY | Facility: CLINIC | Age: 66
Discharge: HOME | End: 2021-10-06
Attending: INTERNAL MEDICINE | Admitting: INTERNAL MEDICINE
Payer: COMMERCIAL

## 2021-10-06 DIAGNOSIS — N18.5 CKD (CHRONIC KIDNEY DISEASE), STAGE V (H): ICD-10-CM

## 2021-10-06 DIAGNOSIS — Z76.82 ORGAN TRANSPLANT CANDIDATE: ICD-10-CM

## 2021-10-06 DIAGNOSIS — R93.1 EQUIVOCAL STRESS ECHOCARDIOGRAM: ICD-10-CM

## 2021-10-06 DIAGNOSIS — Z01.818 PRE-TRANSPLANT EVALUATION FOR KIDNEY TRANSPLANT: ICD-10-CM

## 2021-10-06 PROCEDURE — C8928 TTE W OR W/O FOL W/CON,STRES: HCPCS

## 2021-10-06 PROCEDURE — 93325 DOPPLER ECHO COLOR FLOW MAPG: CPT | Mod: 26 | Performed by: INTERNAL MEDICINE

## 2021-10-06 PROCEDURE — 93321 DOPPLER ECHO F-UP/LMTD STD: CPT | Mod: 26 | Performed by: INTERNAL MEDICINE

## 2021-10-06 PROCEDURE — 250N000011 HC RX IP 250 OP 636: Performed by: INTERNAL MEDICINE

## 2021-10-06 PROCEDURE — 255N000002 HC RX 255 OP 636: Performed by: INTERNAL MEDICINE

## 2021-10-06 PROCEDURE — 93016 CV STRESS TEST SUPVJ ONLY: CPT | Performed by: INTERNAL MEDICINE

## 2021-10-06 PROCEDURE — 93018 CV STRESS TEST I&R ONLY: CPT | Performed by: INTERNAL MEDICINE

## 2021-10-06 PROCEDURE — 250N000009 HC RX 250: Performed by: INTERNAL MEDICINE

## 2021-10-06 PROCEDURE — 93350 STRESS TTE ONLY: CPT | Mod: 26 | Performed by: INTERNAL MEDICINE

## 2021-10-06 PROCEDURE — 93321 DOPPLER ECHO F-UP/LMTD STD: CPT | Mod: TC

## 2021-10-06 RX ORDER — ATROPINE SULFATE 0.4 MG/ML
.2-2 AMPUL (ML) INJECTION
Status: COMPLETED | OUTPATIENT
Start: 2021-10-06 | End: 2021-10-06

## 2021-10-06 RX ORDER — DOBUTAMINE HYDROCHLORIDE 200 MG/100ML
10-50 INJECTION INTRAVENOUS CONTINUOUS
Status: ACTIVE | OUTPATIENT
Start: 2021-10-06 | End: 2021-10-06

## 2021-10-06 RX ORDER — METOPROLOL TARTRATE 1 MG/ML
1-20 INJECTION, SOLUTION INTRAVENOUS
Status: ACTIVE | OUTPATIENT
Start: 2021-10-06 | End: 2021-10-06

## 2021-10-06 RX ORDER — SODIUM CHLORIDE 9 MG/ML
INJECTION, SOLUTION INTRAVENOUS CONTINUOUS
Status: ACTIVE | OUTPATIENT
Start: 2021-10-06 | End: 2021-10-06

## 2021-10-06 RX ADMIN — DOBUTAMINE HYDROCHLORIDE 10 MCG/KG/MIN: 200 INJECTION INTRAVENOUS at 09:53

## 2021-10-06 RX ADMIN — ATROPINE SULFATE 0.4 MG: 0.4 INJECTION, SOLUTION INTRAMUSCULAR; INTRAVENOUS; SUBCUTANEOUS at 09:56

## 2021-10-06 RX ADMIN — METOPROLOL TARTRATE 2 MG: 1 INJECTION, SOLUTION INTRAVENOUS at 10:00

## 2021-10-06 RX ADMIN — PERFLUTREN 4 ML: 6.52 INJECTION, SUSPENSION INTRAVENOUS at 10:07

## 2021-10-06 NOTE — PROGRESS NOTES
Pt here for dobutamine stress test. Test, meds and side effects reviewed with patient. Achieved target HR at 20 mcg Dobutamine and a total of 0.4 mg IV atropine. Gave a total of 2 mg IV Metoprolol to bring HR back to baseline. Post monitoring complete and VSS. Pt escorted out to the gold waiting room.

## 2021-10-07 RX ORDER — ALPRAZOLAM 0.25 MG
0.25 TABLET ORAL DAILY PRN
Qty: 20 TABLET | Refills: 0 | OUTPATIENT
Start: 2021-10-07

## 2021-10-07 NOTE — TELEPHONE ENCOUNTER
Controlled Substance Refill Request for   ALPRAZolam (XANAX) 0.25 MG tablet 20 tablet 0 9/2/2021  --   Sig - Route: Take 1 tablet (0.25 mg) by mouth daily as needed for anxiety - Oral   Sent to pharmacy as: ALPRAZolam 0.25 MG Oral Tablet (XANAX)   Class: E-Prescribe       Problem List Complete:  No     PROVIDER TO CONSIDER COMPLETION OF PROBLEM LIST AND OVERVIEW/CONTROLLED SUBSTANCE AGREEMENT    THE MOST RECENT OFFICE VISIT MUST BE WITHIN THE PAST 3 MONTHS. AT LEAST ONE FACE TO FACE VISIT MUST OCCUR EVERY 6 MONTHS. ADDITIONAL VISITS CAN BE VIRTUAL.  (THIS STATEMENT SHOULD BE DELETED.)    [place] Office Visit with [facility] [place] care provider: 9/2/2021     Future Office visit: No future appointments.     Controlled substance agreement:   Encounter-Level CSA:    There are no encounter-level csa.     Patient-Level CSA:    There are no patient-level csa.         Last Urine Drug Screen: No results found for: CDAUT, No results found for: COMDAT, No results found for: THC13, PCP13, COC13, MAMP13, OPI13, AMP13, BZO13, TCA13, MTD13, BAR13, OXY13, PPX13, BUP13     Processing:  Rx to be electronically transmitted to pharmacy by provider      [email or web address]     Routing refill request to provider for review/approval because:  Drug not on the [facility] refill protocol     Conchita Moffett RN  [facility]

## 2021-10-08 NOTE — TELEPHONE ENCOUNTER
Follow-up was recommended in 1 month at last visit with Dr. Duke.  Please set up follow-up and further discussion can be had about whether to refill this medicine or also consider adding a daily medicine to reduce anxiety and the need for Xanax.

## 2021-10-08 NOTE — TELEPHONE ENCOUNTER
Called and spoke with patient.     Virtual appointment scheduled for Monday 10/11    Conchita Moffett RN  [facility]

## 2021-10-11 ENCOUNTER — VIRTUAL VISIT (OUTPATIENT)
Dept: FAMILY MEDICINE | Facility: CLINIC | Age: 66
End: 2021-10-11
Payer: COMMERCIAL

## 2021-10-11 DIAGNOSIS — F41.9 ANXIETY: ICD-10-CM

## 2021-10-11 PROCEDURE — 99213 OFFICE O/P EST LOW 20 MIN: CPT | Mod: TEL | Performed by: FAMILY MEDICINE

## 2021-10-11 RX ORDER — ALPRAZOLAM 0.25 MG
0.25 TABLET ORAL DAILY PRN
Qty: 20 TABLET | Refills: 0 | Status: SHIPPED | OUTPATIENT
Start: 2021-10-11 | End: 2021-11-30

## 2021-10-11 NOTE — PROGRESS NOTES
"Matteo is a 66 year old who is being evaluated via a billable telephone visit.      What phone number would you like to be contacted at? [phone]  How would you like to obtain your AVS? MyChart    Assessment & Plan     Anxiety: Okay to continue as needed Xanax for anxiety.  Discussed need to use sparingly.  Could consider controller medication if starting to use more frequently.  Again advised him to have girlfriend monitor sleeping to rule out apneic episodes that could be triggering symptoms as well.  - ALPRAZolam (XANAX) 0.25 MG tablet; Take 1 tablet (0.25 mg) by mouth daily as needed for anxiety          BMI:   Estimated body mass index is 27.36 kg/m  as calculated from the following:    Height as of 9/2/21: 1.765 m (5' 9.5\").    Weight as of 9/2/21: 85.3 kg (188 lb).         Return in about 3 months (around 1/11/2022) for follow up anxiety.    Dutch Duke DO  [facility] [place]    Subjective   Matteo is a 66 year old who presents for the following health issues     HPI     Anxiety Follow-Up    How are you doing with your anxiety since your last visit? Improved slightly - sleep is better with it     Are you having other symptoms that might be associated with anxiety? No    Have you had a significant life event? No     Are you feeling depressed? No    Do you have any concerns with your use of alcohol or other drugs? No     Overall he is doing better since we last talked.  As needed Xanax anxiety.  His girlfriend has not been able to evaluate for any apneic episodes or excessive snoring.    Social History     Tobacco Use     Smoking status: Never Smoker     Smokeless tobacco: Never Used   Vaping Use     Vaping Use: Never used   Substance Use Topics     Alcohol use: Yes     Alcohol/week: 0.0 standard drinks     Comment: 1-2 beers twice weekly     Drug use: No     ROCIO-7 SCORE 9/2/2021   Total Score 5 (mild anxiety)   Total Score 5     PHQ 9/2/2021   PHQ-9 Total Score 2   Q9: Thoughts of better " off dead/self-harm past 2 weeks Not at all       Review of Systems   Constitutional, HEENT, cardiovascular, pulmonary, gi and gu systems are negative, except as otherwise noted.      Objective           Vitals:  No vitals were obtained today due to virtual visit.    Physical Exam   healthy, alert and no distress  PSYCH: Alert and oriented times 3; coherent speech, normal   rate and volume, able to articulate logical thoughts, able   to abstract reason, no tangential thoughts, no hallucinations   or delusions  His affect is normal  RESP: No cough, no audible wheezing, able to talk in full sentences  Remainder of exam unable to be completed due to telephone visits          Phone call duration: 3 minutes

## 2021-10-16 ENCOUNTER — APPOINTMENT (OUTPATIENT)
Dept: GENERAL RADIOLOGY | Facility: CLINIC | Age: 66
DRG: 177 | End: 2021-10-16
Attending: EMERGENCY MEDICINE
Payer: COMMERCIAL

## 2021-10-16 ENCOUNTER — HOSPITAL ENCOUNTER (INPATIENT)
Facility: CLINIC | Age: 66
LOS: 7 days | Discharge: HOME | DRG: 177 | End: 2021-10-23
Attending: EMERGENCY MEDICINE | Admitting: HOSPITALIST
Payer: COMMERCIAL

## 2021-10-16 DIAGNOSIS — R53.1 WEAKNESS: ICD-10-CM

## 2021-10-16 DIAGNOSIS — R05.9 COUGH: ICD-10-CM

## 2021-10-16 DIAGNOSIS — N17.9 ACUTE RENAL FAILURE SUPERIMPOSED ON STAGE 5 CHRONIC KIDNEY DISEASE, NOT ON CHRONIC DIALYSIS, UNSPECIFIED ACUTE RENAL FAILURE TYPE (H): Primary | ICD-10-CM

## 2021-10-16 DIAGNOSIS — N18.5 ACUTE RENAL FAILURE SUPERIMPOSED ON STAGE 5 CHRONIC KIDNEY DISEASE, NOT ON CHRONIC DIALYSIS, UNSPECIFIED ACUTE RENAL FAILURE TYPE (H): Primary | ICD-10-CM

## 2021-10-16 DIAGNOSIS — J12.82 PNEUMONIA DUE TO 2019 NOVEL CORONAVIRUS: ICD-10-CM

## 2021-10-16 DIAGNOSIS — U07.1 PNEUMONIA DUE TO 2019 NOVEL CORONAVIRUS: ICD-10-CM

## 2021-10-16 LAB
ALBUMIN SERPL-MCNC: 3.7 G/DL (ref 3.4–5)
ALBUMIN UR-MCNC: 300 MG/DL
ALP SERPL-CCNC: 68 U/L (ref 40–150)
ALT SERPL W P-5'-P-CCNC: 26 U/L (ref 0–70)
ANION GAP SERPL CALCULATED.3IONS-SCNC: 12 MMOL/L (ref 3–14)
APPEARANCE UR: CLEAR
AST SERPL W P-5'-P-CCNC: 25 U/L (ref 0–45)
BACTERIA #/AREA URNS HPF: ABNORMAL /HPF
BASOPHILS # BLD AUTO: 0 10E3/UL (ref 0–0.2)
BASOPHILS # BLD MANUAL: 0 10E3/UL (ref 0–0.2)
BASOPHILS NFR BLD AUTO: 0 %
BASOPHILS NFR BLD MANUAL: 0 %
BILIRUB SERPL-MCNC: 0.4 MG/DL (ref 0.2–1.3)
BILIRUB UR QL STRIP: NEGATIVE
BUN SERPL-MCNC: 62 MG/DL (ref 7–30)
CALCIUM SERPL-MCNC: 8.4 MG/DL (ref 8.5–10.1)
CHLORIDE BLD-SCNC: 103 MMOL/L (ref 94–109)
CO2 SERPL-SCNC: 17 MMOL/L (ref 20–32)
COLOR UR AUTO: ABNORMAL
CREAT SERPL-MCNC: 8.25 MG/DL (ref 0.66–1.25)
EOSINOPHIL # BLD AUTO: 0 10E3/UL (ref 0–0.7)
EOSINOPHIL # BLD MANUAL: 0 10E3/UL (ref 0–0.7)
EOSINOPHIL NFR BLD AUTO: 0 %
EOSINOPHIL NFR BLD MANUAL: 1 %
ERYTHROCYTE [DISTWIDTH] IN BLOOD BY AUTOMATED COUNT: 13 % (ref 10–15)
ERYTHROCYTE [DISTWIDTH] IN BLOOD BY AUTOMATED COUNT: 13.1 % (ref 10–15)
GFR SERPL CREATININE-BSD FRML MDRD: 6 ML/MIN/1.73M2
GLUCOSE BLD-MCNC: 103 MG/DL (ref 70–99)
GLUCOSE UR STRIP-MCNC: NEGATIVE MG/DL
HCT VFR BLD AUTO: 31.2 % (ref 40–53)
HCT VFR BLD AUTO: 31.7 % (ref 40–53)
HGB BLD-MCNC: 10.6 G/DL (ref 13.3–17.7)
HGB BLD-MCNC: 10.6 G/DL (ref 13.3–17.7)
HGB UR QL STRIP: ABNORMAL
HOLD SPECIMEN: NORMAL
IMM GRANULOCYTES # BLD: 0 10E3/UL
IMM GRANULOCYTES NFR BLD: 1 %
KETONES UR STRIP-MCNC: NEGATIVE MG/DL
LACTATE SERPL-SCNC: 1 MMOL/L (ref 0.7–2)
LEUKOCYTE ESTERASE UR QL STRIP: NEGATIVE
LIPASE SERPL-CCNC: 461 U/L (ref 73–393)
LYMPHOCYTES # BLD AUTO: 0.4 10E3/UL (ref 0.8–5.3)
LYMPHOCYTES # BLD MANUAL: 0.2 10E3/UL (ref 0.8–5.3)
LYMPHOCYTES NFR BLD AUTO: 9 %
LYMPHOCYTES NFR BLD MANUAL: 5 %
MCH RBC QN AUTO: 31.6 PG (ref 26.5–33)
MCH RBC QN AUTO: 32.6 PG (ref 26.5–33)
MCHC RBC AUTO-ENTMCNC: 33.4 G/DL (ref 31.5–36.5)
MCHC RBC AUTO-ENTMCNC: 34 G/DL (ref 31.5–36.5)
MCV RBC AUTO: 95 FL (ref 78–100)
MCV RBC AUTO: 96 FL (ref 78–100)
MONOCYTES # BLD AUTO: 0.3 10E3/UL (ref 0–1.3)
MONOCYTES # BLD MANUAL: 0.1 10E3/UL (ref 0–1.3)
MONOCYTES NFR BLD AUTO: 7 %
MONOCYTES NFR BLD MANUAL: 3 %
MUCOUS THREADS #/AREA URNS LPF: PRESENT /LPF
NEUTROPHILS # BLD AUTO: 3.9 10E3/UL (ref 1.6–8.3)
NEUTROPHILS # BLD MANUAL: 4.3 10E3/UL (ref 1.6–8.3)
NEUTROPHILS NFR BLD AUTO: 83 %
NEUTROPHILS NFR BLD MANUAL: 91 %
NITRATE UR QL: NEGATIVE
NRBC # BLD AUTO: 0 10E3/UL
NRBC BLD AUTO-RTO: 0 /100
PH UR STRIP: 6.5 [PH] (ref 5–7)
PLAT MORPH BLD: ABNORMAL
PLATELET # BLD AUTO: 150 10E3/UL (ref 150–450)
PLATELET # BLD AUTO: 162 10E3/UL (ref 150–450)
POTASSIUM BLD-SCNC: 5.1 MMOL/L (ref 3.4–5.3)
PROT SERPL-MCNC: 8 G/DL (ref 6.8–8.8)
RBC # BLD AUTO: 3.25 10E6/UL (ref 4.4–5.9)
RBC # BLD AUTO: 3.35 10E6/UL (ref 4.4–5.9)
RBC MORPH BLD: ABNORMAL
RBC URINE: 1 /HPF
SARS-COV-2 RNA RESP QL NAA+PROBE: POSITIVE
SODIUM SERPL-SCNC: 132 MMOL/L (ref 133–144)
SP GR UR STRIP: 1.01 (ref 1–1.03)
UROBILINOGEN UR STRIP-MCNC: NORMAL MG/DL
WBC # BLD AUTO: 4.6 10E3/UL (ref 4–11)
WBC # BLD AUTO: 4.7 10E3/UL (ref 4–11)
WBC URINE: 1 /HPF

## 2021-10-16 PROCEDURE — 94640 AIRWAY INHALATION TREATMENT: CPT

## 2021-10-16 PROCEDURE — 250N000011 HC RX IP 250 OP 636: Performed by: HOSPITALIST

## 2021-10-16 PROCEDURE — 36415 COLL VENOUS BLD VENIPUNCTURE: CPT | Performed by: EMERGENCY MEDICINE

## 2021-10-16 PROCEDURE — 83605 ASSAY OF LACTIC ACID: CPT | Performed by: EMERGENCY MEDICINE

## 2021-10-16 PROCEDURE — 96374 THER/PROPH/DIAG INJ IV PUSH: CPT

## 2021-10-16 PROCEDURE — 99285 EMERGENCY DEPT VISIT HI MDM: CPT | Mod: 25

## 2021-10-16 PROCEDURE — 87635 SARS-COV-2 COVID-19 AMP PRB: CPT | Performed by: EMERGENCY MEDICINE

## 2021-10-16 PROCEDURE — 80053 COMPREHEN METABOLIC PANEL: CPT | Performed by: EMERGENCY MEDICINE

## 2021-10-16 PROCEDURE — 81001 URINALYSIS AUTO W/SCOPE: CPT | Performed by: EMERGENCY MEDICINE

## 2021-10-16 PROCEDURE — 83690 ASSAY OF LIPASE: CPT | Performed by: EMERGENCY MEDICINE

## 2021-10-16 PROCEDURE — 85025 COMPLETE CBC W/AUTO DIFF WBC: CPT | Performed by: EMERGENCY MEDICINE

## 2021-10-16 PROCEDURE — 250N000013 HC RX MED GY IP 250 OP 250 PS 637: Performed by: HOSPITALIST

## 2021-10-16 PROCEDURE — 87040 BLOOD CULTURE FOR BACTERIA: CPT | Performed by: EMERGENCY MEDICINE

## 2021-10-16 PROCEDURE — 85027 COMPLETE CBC AUTOMATED: CPT | Performed by: HOSPITALIST

## 2021-10-16 PROCEDURE — 96361 HYDRATE IV INFUSION ADD-ON: CPT

## 2021-10-16 PROCEDURE — 36415 COLL VENOUS BLD VENIPUNCTURE: CPT | Performed by: HOSPITALIST

## 2021-10-16 PROCEDURE — 999N000157 HC STATISTIC RCP TIME EA 10 MIN

## 2021-10-16 PROCEDURE — C9803 HOPD COVID-19 SPEC COLLECT: HCPCS

## 2021-10-16 PROCEDURE — XW043F3 INTRODUCTION OF OTHER NEW TECHNOLOGY THERAPEUTIC SUBSTANCE INTO CENTRAL VEIN, PERCUTANEOUS APPROACH, NEW TECHNOLOGY GROUP 3: ICD-10-PCS | Performed by: HOSPITALIST

## 2021-10-16 PROCEDURE — 99223 1ST HOSP IP/OBS HIGH 75: CPT | Mod: AI | Performed by: HOSPITALIST

## 2021-10-16 PROCEDURE — 250N000011 HC RX IP 250 OP 636: Performed by: EMERGENCY MEDICINE

## 2021-10-16 PROCEDURE — 258N000003 HC RX IP 258 OP 636: Performed by: EMERGENCY MEDICINE

## 2021-10-16 PROCEDURE — 71045 X-RAY EXAM CHEST 1 VIEW: CPT

## 2021-10-16 PROCEDURE — 120N000001 HC R&B MED SURG/OB

## 2021-10-16 PROCEDURE — 94640 AIRWAY INHALATION TREATMENT: CPT | Mod: 76

## 2021-10-16 PROCEDURE — 96375 TX/PRO/DX INJ NEW DRUG ADDON: CPT

## 2021-10-16 RX ORDER — AMOXICILLIN 250 MG
2 CAPSULE ORAL 2 TIMES DAILY PRN
Status: DISCONTINUED | OUTPATIENT
Start: 2021-10-16 | End: 2021-10-23 | Stop reason: HOSPADM

## 2021-10-16 RX ORDER — DEXAMETHASONE SODIUM PHOSPHATE 10 MG/ML
10 INJECTION, SOLUTION INTRAMUSCULAR; INTRAVENOUS ONCE
Status: COMPLETED | OUTPATIENT
Start: 2021-10-16 | End: 2021-10-16

## 2021-10-16 RX ORDER — ACETAMINOPHEN 500 MG
500-1000 TABLET ORAL EVERY 6 HOURS PRN
COMMUNITY

## 2021-10-16 RX ORDER — ALBUTEROL SULFATE 90 UG/1
2 AEROSOL, METERED RESPIRATORY (INHALATION) 4 TIMES DAILY
Status: DISCONTINUED | OUTPATIENT
Start: 2021-10-16 | End: 2021-10-17 | Stop reason: ALTCHOICE

## 2021-10-16 RX ORDER — ALPRAZOLAM 0.25 MG
0.25 TABLET ORAL DAILY PRN
Status: DISCONTINUED | OUTPATIENT
Start: 2021-10-16 | End: 2021-10-23 | Stop reason: HOSPADM

## 2021-10-16 RX ORDER — LIDOCAINE 40 MG/G
CREAM TOPICAL
Status: DISCONTINUED | OUTPATIENT
Start: 2021-10-16 | End: 2021-10-23 | Stop reason: HOSPADM

## 2021-10-16 RX ORDER — POLYETHYLENE GLYCOL 3350 17 G/17G
17 POWDER, FOR SOLUTION ORAL DAILY PRN
Status: DISCONTINUED | OUTPATIENT
Start: 2021-10-16 | End: 2021-10-23 | Stop reason: HOSPADM

## 2021-10-16 RX ORDER — ONDANSETRON 2 MG/ML
4 INJECTION INTRAMUSCULAR; INTRAVENOUS ONCE
Status: COMPLETED | OUTPATIENT
Start: 2021-10-16 | End: 2021-10-16

## 2021-10-16 RX ORDER — AMOXICILLIN 250 MG
1 CAPSULE ORAL 2 TIMES DAILY PRN
Status: DISCONTINUED | OUTPATIENT
Start: 2021-10-16 | End: 2021-10-23 | Stop reason: HOSPADM

## 2021-10-16 RX ORDER — BISACODYL 10 MG
10 SUPPOSITORY, RECTAL RECTAL DAILY PRN
Status: DISCONTINUED | OUTPATIENT
Start: 2021-10-16 | End: 2021-10-23 | Stop reason: HOSPADM

## 2021-10-16 RX ORDER — ONDANSETRON 2 MG/ML
4 INJECTION INTRAMUSCULAR; INTRAVENOUS EVERY 6 HOURS PRN
Status: DISCONTINUED | OUTPATIENT
Start: 2021-10-16 | End: 2021-10-23 | Stop reason: HOSPADM

## 2021-10-16 RX ORDER — HEPARIN SODIUM 5000 [USP'U]/.5ML
5000 INJECTION, SOLUTION INTRAVENOUS; SUBCUTANEOUS EVERY 8 HOURS SCHEDULED
Status: DISCONTINUED | OUTPATIENT
Start: 2021-10-16 | End: 2021-10-23 | Stop reason: HOSPADM

## 2021-10-16 RX ORDER — ONDANSETRON 4 MG/1
4 TABLET, ORALLY DISINTEGRATING ORAL EVERY 6 HOURS PRN
Status: DISCONTINUED | OUTPATIENT
Start: 2021-10-16 | End: 2021-10-23 | Stop reason: HOSPADM

## 2021-10-16 RX ORDER — ALBUTEROL SULFATE 90 UG/1
2 AEROSOL, METERED RESPIRATORY (INHALATION) EVERY 4 HOURS PRN
Status: DISCONTINUED | OUTPATIENT
Start: 2021-10-16 | End: 2021-10-23 | Stop reason: HOSPADM

## 2021-10-16 RX ORDER — ACETAMINOPHEN 325 MG/1
650 TABLET ORAL EVERY 4 HOURS PRN
Status: DISCONTINUED | OUTPATIENT
Start: 2021-10-16 | End: 2021-10-23 | Stop reason: HOSPADM

## 2021-10-16 RX ORDER — ALLOPURINOL 100 MG/1
100 TABLET ORAL DAILY
Status: ON HOLD | COMMUNITY
End: 2023-01-01

## 2021-10-16 RX ORDER — ACETAMINOPHEN 650 MG/1
650 SUPPOSITORY RECTAL EVERY 4 HOURS PRN
Status: DISCONTINUED | OUTPATIENT
Start: 2021-10-16 | End: 2021-10-23 | Stop reason: HOSPADM

## 2021-10-16 RX ADMIN — ACETAMINOPHEN 650 MG: 325 TABLET, FILM COATED ORAL at 17:33

## 2021-10-16 RX ADMIN — ALBUTEROL SULFATE 2 PUFF: 90 AEROSOL, METERED RESPIRATORY (INHALATION) at 19:50

## 2021-10-16 RX ADMIN — ONDANSETRON 4 MG: 2 INJECTION INTRAMUSCULAR; INTRAVENOUS at 10:04

## 2021-10-16 RX ADMIN — SODIUM CHLORIDE 1000 ML: 9 INJECTION, SOLUTION INTRAVENOUS at 10:03

## 2021-10-16 RX ADMIN — ALBUTEROL SULFATE 2 PUFF: 90 AEROSOL, METERED RESPIRATORY (INHALATION) at 16:39

## 2021-10-16 RX ADMIN — HEPARIN SODIUM 5000 UNITS: 10000 INJECTION, SOLUTION INTRAVENOUS; SUBCUTANEOUS at 16:53

## 2021-10-16 RX ADMIN — HEPARIN SODIUM 5000 UNITS: 10000 INJECTION, SOLUTION INTRAVENOUS; SUBCUTANEOUS at 21:26

## 2021-10-16 RX ADMIN — DEXAMETHASONE SODIUM PHOSPHATE 10 MG: 10 INJECTION INTRAMUSCULAR; INTRAVENOUS at 13:36

## 2021-10-16 ASSESSMENT — ENCOUNTER SYMPTOMS
WEAKNESS: 1
DYSURIA: 0
COUGH: 1
NAUSEA: 1
VOMITING: 1
BLOOD IN STOOL: 0
ABDOMINAL PAIN: 0
MYALGIAS: 1

## 2021-10-16 ASSESSMENT — MIFFLIN-ST. JEOR: SCORE: 1580.96

## 2021-10-16 ASSESSMENT — ACTIVITIES OF DAILY LIVING (ADL)
ADLS_ACUITY_SCORE: 12
ADLS_ACUITY_SCORE: 5

## 2021-10-16 NOTE — PHARMACY-ADMISSION MEDICATION HISTORY
Admission medication history interview status for this patient is complete. See [facility] admission navigator for allergy information, prior to admission medications and immunization status.     Medication history interview done, indicate source(s): Patient  Medication history resources (including written lists, pill bottles, clinic record): SureScripts and Care Everywhere  Pharmacy: [facility] for discharge    Changes made to PTA medication list:  Added: APAP and allopurinol  Changed: none  Reported as Not Taking: none  Removed: none    Actions taken by pharmacist (provider contacted, etc): Called patient (due to COVID+) to verify home med list.     Additional medication history information: Patient last took medications 3 days ago but vomited soon after taking meds. Of note, patient has been taking 1500-2000mg acetaminophen PRN, patient was counseled on taking 500-1000mg APAP at a time, max of 3000mg/day.     Medication reconciliation/reorder completed by provider prior to medication history?  Y   (Y/N)     Prior to Admission medications    Medication Sig Last Dose Taking? Auth Provider   acetaminophen (TYLENOL) 500 MG tablet Take 500-1,000 mg by mouth every 6 hours as needed for mild pain 10/15/2021 at PM Yes Unknown, Entered By History   allopurinol (ZYLOPRIM) 100 MG tablet Take 200 mg by mouth daily 10/13/2021 at AM Yes Unknown, Entered By History   amLODIPine (NORVASC) 10 MG tablet Take 10 mg by mouth 10/13/2021 at AM Yes Reported, Patient   calcitRIOL (ROCALTROL) 0.25 MCG capsule Take 0.25 mcg by mouth daily  10/13/2021 at AM Yes Reported, Patient   carvedilol (COREG) 12.5 MG tablet Take 25 mg (2 tablets) in the am and 12.5 mg (1 tablet) in the pm 10/13/2021 at AM Yes Reported, Patient   sildenafil (REVATIO) 20 MG tablet Take 1-3 tablets (20-60 mg) by mouth daily as needed (30 min to 1 hour prior to intercourse) Past Month at Unknown time Yes Dutch Duke, DO   sodium bicarbonate 650 MG  tablet Take 1,950 mg by mouth 2 times daily  10/13/2021 at AM Yes Reported, Patient   ALPRAZolam (XANAX) 0.25 MG tablet Take 1 tablet (0.25 mg) by mouth daily as needed for anxiety   Dutch Duke, DO
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
## 2021-10-16 NOTE — ED TRIAGE NOTES
Patient presents with complaints of cough, fever, nausea, and  Fever which started on Monday. Patient had a negative COVID test but continues to have symptoms. . ABC intact without need for intervention at this time.

## 2021-10-16 NOTE — LETTER
[facility]  Dialysis Referral    Patient Name: Matteo Barnes  YOB: 1955  Attending Nephrologist (Outpatient Dialysis): Dr. Acuña    Dialysis Diagnosis: End Stage Renal Disease  Modality: In-Center Hemodialysis  Access Type: Catheter    First Day of Dialysis Ever: 10/18/21  Anticipated Start Date for this referral: 10/22/21  Preferred Schedule: Patient is Flexible    Requested Facility(s) or [facility] Code: [address] [place] [address]    Special Needs: Pt is Covid +    Allergies: No Known Allergies    Current Facility-Administered Medications   Medication     acetaminophen (TYLENOL) tablet 650 mg    Or     acetaminophen (TYLENOL) Suppository 650 mg     albuterol (PROAIR HFA/PROVENTIL HFA/VENTOLIN HFA) 108 (90 Base) MCG/ACT inhaler 2 puff     ALPRAZolam (XANAX) tablet 0.25 mg     bisacodyl (DULCOLAX) Suppository 10 mg     calcium acetate (PHOSLO) capsule 667 mg     dexamethasone (DECADRON) tablet 6 mg     heparin ANTICOAGULANT injection 5,000 Units     lidocaine (LMX4) cream     lidocaine 1 % 0.1-1 mL     Medication instructions: Do NOT use nebulized medications     NaCl 0.45 % 1,000 mL with sodium bicarbonate 100 mEq/L infusion     ondansetron (ZOFRAN-ODT) ODT tab 4 mg    Or     ondansetron (ZOFRAN) injection 4 mg     polyethylene glycol (MIRALAX) Packet 17 g     senna-docusate (SENOKOT-S/PERICOLACE) 8.6-50 MG per tablet 1 tablet    Or     senna-docusate (SENOKOT-S/PERICOLACE) 8.6-50 MG per tablet 2 tablet     sodium chloride (PF) 0.9% PF flush 3 mL     sodium chloride (PF) 0.9% PF flush 3 mL       Pertinent Labs:   Recent Labs   Lab Test 02/01/21  0830   AUSAB 0.00   HEPBANG Nonreactive   HBCAB Nonreactive       Recent Labs   Lab Test 10/17/21  0813   *   POTASSIUM 5.5*   CHLORIDE 106   ANNETTE 8.0*   CO2 14*   BUN 75*   CR 8.06*   *       Imaging Studies:  Results for orders placed or performed during the hospital encounter of 10/16/21   XR Chest Port 1 View     Narrative    EXAM: XR CHEST PORT 1 VIEW  LOCATION: [facility]  DATE/TIME: 10/16/2021 10:01 AM    INDICATION: fever, Cough, ?COVID, fully vaccinated  COMPARISON: 02/01/2021      Impression    IMPRESSION: There are new minimal, peripheral, asymmetric opacities in the right upper lobe and both lower lobes. Findings certainly consistent with a Covid pneumonia. No effusions. Heart and pulmonary vascularity are normal.       Gisel Shaffer RN

## 2021-10-16 NOTE — ED PROVIDER NOTES
History   Chief Complaint:  Cough and Nausea & Vomiting       The history is provided by the patient.      Matteo Barnes is a 66 year old male with coughing, nausea, and vomiting who presents alone. The patient is fully vaccinated against COVID-19, however, on 10/11 he has an onset of coughing, myalgias, vomiting, and feeling unwell. On 10/11, the patient also tested negative for COVID-19, but his fevers and productive cough continued and progressed into nausea, vomiting, and weakness. In addition, there has been a decrease in appetite. He is able to urinate without dysuria and has no blood in stool. He denies any over the counter medications being taken at home. The patient denies any chest pain and abdominal pain. In addition, he has not had any recent travels or known sick contacts.    Of note, the patient is in chronic renal failure and is scheduled to have a fistula placed next week. The nephrologist is going to place the fistula.    Review of Systems   Respiratory: Positive for cough (productive).    Cardiovascular: Negative for chest pain.   Gastrointestinal: Positive for nausea and vomiting. Negative for abdominal pain and blood in stool.   Genitourinary: Negative for dysuria.   Musculoskeletal: Positive for myalgias.   Neurological: Positive for weakness.   All other systems reviewed and are negative.      Allergies:  The patient has no known allergies.    Medications:  ALPRAZolam (XANAX) 0.25 MG tablet  amLODIPine (NORVASC) 10 MG tablet  calcitRIOL (ROCALTROL) 0.25 MCG capsule  carvedilol (COREG) 12.5 MG tablet  sildenafil (REVATIO) 20 MG tablet  sodium bicarbonate 650 MG tablet    Past Medical History:     CKD (chronic kidney disease), stage IV  Gout   Hyperlipidemia  Hypertension  Proteinuria  Erectile dysfunction  Gout  Focal segmental glomerulosclerosis  Adverse effect of other nonsteroidal anti-inflammatory drugs (NSAID), initial encounter    Past Surgical History:     Biopsy  Colonoscopy  Orthopedic Surgery  Facial fracture repair   Tonsillectomy      Family History:    Hypertension, Mother  Hyperlipidemia, Mother  Myocardial Infarction, Mother    Physical Exam     Patient Vitals for the past 24 hrs:   BP Temp Pulse Resp SpO2   10/16/21 1200 (!) 167/103 -- 94 -- 94 %   10/16/21 1130 (!) 163/100 -- 93 -- 93 %   10/16/21 1100 (!) 174/97 -- 93 -- 93 %   10/16/21 1045 (!) 165/85 -- 90 -- 95 %   10/16/21 1030 (!) 171/87 -- 87 -- 95 %   10/16/21 1015 (!) 174/99 -- 90 -- 96 %   10/16/21 1000 (!) 162/111 -- 89 -- 93 %   10/16/21 0945 (!) 155/97 -- -- -- 96 %   10/16/21 0930 (!) 177/104 -- 88 -- --   10/16/21 0918 (!) 170/109 98.9  F (37.2  C) 92 20 98 %     Physical Exam  General: Alert, appears well-developed and well-nourished. Cooperative.     In mild distress  HEENT:  Head:  Atraumatic  Ears:  External ears are normal  Mouth/Throat:  Oropharynx is without erythema or exudate and mucous membranes are moist.   Eyes:   Conjunctivae normal and EOM are normal. No scleral icterus.  CV:  Normal rate, regular rhythm, normal heart sounds and radial pulses are 2+ and symmetric.  No murmur.  Resp:  Breath sounds are clear bilaterally.  Dry cough..     Non-labored, no retractions or accessory muscle use  GI:  Abdomen is soft, no distension, no tenderness. No rebound or guarding.  No CVA tenderness bilaterally  MS:  Normal range of motion. No edema.    Normal strength in all 4 extremities.     Back atraumatic.    No midline cervical, thoracic, or lumbar tenderness  Skin:  Warm and dry.  No rash or lesions noted.  Neuro: Alert. Normal strength.  GCS: 15  Psych:  Normal mood and affect.    Emergency Department Course     Imaging:  XR Chest Port 1 View   Final Result   IMPRESSION: There are new minimal, peripheral, asymmetric opacities in the right upper lobe and both lower lobes. Findings certainly consistent with a Covid pneumonia. No effusions. Heart and pulmonary vascularity are normal.         Report per radiology    Laboratory:  Labs Ordered and Resulted from Time of ED Arrival Up to the Time of Departure from the ED   COMPREHENSIVE METABOLIC PANEL - Abnormal; Notable for the following components:       Result Value    Sodium 132 (*)     Carbon Dioxide (CO2) 17 (*)     Urea Nitrogen 62 (*)     Creatinine 8.25 (*)     Calcium 8.4 (*)     Glucose 103 (*)     GFR Estimate 6 (*)     All other components within normal limits   LIPASE - Abnormal; Notable for the following components:    Lipase 461 (*)     All other components within normal limits   COVID-19 VIRUS (CORONAVIRUS) BY PCR - Abnormal; Notable for the following components:    SARS CoV2 PCR Positive (*)     All other components within normal limits    Narrative:     Testing was performed using the bharat  SARS-CoV-2 & Influenza A/B Assay on the bharat  [facility]  System.  This test should be ordered for the detection of SARS-COV-2 in individuals who meet SARS-CoV-2 clinical and/or epidemiological criteria. Test performance is unknown in asymptomatic patients.  This test is for in vitro diagnostic use under the FDA EUA for laboratories certified under CLIA to perform moderate and/or high complexity testing. This test has not been FDA cleared or approved.  A negative test does not rule out the presence of PCR inhibitors in the specimen or target RNA in concentration below the limit of detection for the assay. The possibility of a false negative should be considered if the patient's recent exposure or clinical presentation suggests COVID-19.  [facility] are certified under the Clinical Laboratory Improvement Amendments of 1988 (CLIA-88) as qualified to perform moderate and/or high complexity laboratory testing.   ROUTINE UA WITH MICROSCOPIC REFLEX TO CULTURE - Abnormal; Notable for the following components:    Blood Urine Moderate (*)     Protein Albumin Urine 300  (*)     Bacteria Urine Few (*)     Mucus Urine Present (*)     All other  components within normal limits    Narrative:     Urine Culture not indicated   CBC WITH PLATELETS AND DIFFERENTIAL - Abnormal; Notable for the following components:    RBC Count 3.35 (*)     Hemoglobin 10.6 (*)     Hematocrit 31.7 (*)     Absolute Lymphocytes 0.4 (*)     All other components within normal limits   LACTIC ACID WHOLE BLOOD - Normal   NURSING DRAW AND HOLD   BLOOD CULTURE   BLOOD CULTURE   CBC WITH PLATELETS & DIFFERENTIAL    Narrative:     The following orders were created for panel order CBC with platelets differential.  Procedure                               Abnormality         Status                     ---------                               -----------         ------                     CBC with platelets and d...[[id number]]  Abnormal            Final result                 Please view results for these tests on the individual orders.      Emergency Department Course:  Reviewed:  I reviewed nursing notes, vitals, past medical history, Care Everywhere and [facility]    Assessments:  0933 I obtained history and examined the patient as noted above.   1105 I rechecked the patient and explained findings.     Consults:  1235 I discussed the case with Dr. Hensley, the hospitalist.     Interventions:  1003: 0.9% sodium chloride BOLUS 1,000 mL, IV  1004: ondansetron (ZOFRAN) injection 4 mg, IV    Disposition:  The patient was admitted to the hospital under the care of Dr. Hensley.     Impression & Plan     Medical Decision Making:   Patient is a 66-year-old male with a history of end-stage renal disease who is awaiting fistula formation prior to dialysis initiation. He presents with fatigue, cough, and fever since Monday.  Patient had a negative Covid test on Monday and is fully vaccinated but unfortunately Covid returned positive today.  I do have concern he has been symptomatic for approximately 5 days at this point.  Chest x-ray today concerning for minimal peripheral asymmetric opacities in the right upper lobe  and both lower lobes consistent with Covid pneumonia.  I do suspect this is viral and have low suspicion for superimposed bacterial pneumonia particularly with no active fever here or leukocytosis.  Patient does appear to have acute on chronic renal failure and in discussion with nephrology they would like to have the patient admitted to the hospital for fluid resuscitation and determination of whether he may need to start hemodialysis sooner through peripheral catheter versus awaiting surgical fistula intervention.  Unfortunately patient was planned to have a fistula placed this week and certainly this may be placed on hold due to active Covid infection at this time.  Urinalysis thankfully shows no evidence of infection.  I spoke with , the hospitalist service who agreed to admission and advised the use of Decadron.  First dose of decadron given while in ED.  Admitted.     Diagnosis:    ICD-10-CM    1. Acute renal failure superimposed on stage 5 chronic kidney disease, not on chronic dialysis, unspecified acute renal failure type (H)  N17.9     N18.5    2. Pneumonia due to 2019 novel coronavirus  U07.1     J12.82    3. Cough  R05.9    4. Weakness  R53.1        Discharge Medications:  New Prescriptions    No medications on file       Scribe Disclosure:  IElsa, am serving as a scribe at 9:33 AM on 10/16/2021 to document services personally performed by Dutch Coles MD based on my observations and the provider's statements to me.     I, Veena Saenz, am serving as a scribe at 9:33 AM on 10/16/2021 to document services personally performed by Dutch Coles MD based on my observations and the provider's statements to me.      Dutch Coles MD  10/16/21 1959

## 2021-10-16 NOTE — ED NOTES
.  [facility]  ED Nurse Handoff Report    Matteo Barnes is a 66 year old male   ED Chief complaint: Cough and Nausea & Vomiting  . ED Diagnosis:   Final diagnoses:   None     Allergies: No Known Allergies    Code Status: Full Code  Activity level - Baseline/Home:  Independent. Activity Level - Current:   Stand by Assist. Lift room needed: No. Bariatric: No   Needed: No   Isolation: Yes. Infection: Not Applicable  COVID r/o and special precautions.     Vital Signs:   Vitals:    10/16/21 1000 10/16/21 1015 10/16/21 1030 10/16/21 1045   BP: (!) 162/111 (!) 174/99 (!) 171/87 (!) 165/85   Pulse: 89 90 87 90   Resp:       Temp:       SpO2: 93% 96% 95% 95%       Cardiac Rhythm:  ,      Pain level:    Patient confused: No. Patient Falls Risk: Yes.   Elimination Status: Has voided   Patient Report - Initial Complaint: 66 year old male with coughing, nausea, and vomiting who presents alone. The patient is fully vaccinated, however was experiencing coughing, myalgias, vomiting, and felt unwell. On Monday (10/11), the patient tested negative for COVID-19, with a continuation of fevers and coughs. He has not had any recent travels or known sick contacts. Since then, the prior symptoms have continued and progressed into nausea, vomiting, and weakness. In addition, there has been a decrease in appetite. He is quincy to urinate without dysuria and has no blood in stool. There are currently no over the counter medications being taken at home. The patient denies any chest pain and abdominal pain.     Of note, the patient is in chronic renal failure and is scheduled to have a fistula placed next week. The nephrologist is going to place the fistula.  Focused Assessment:   Respiratory: Positive for cough.    Cardiovascular: Negative for chest pain.   Gastrointestinal: Positive for nausea and vomiting. Negative for abdominal pain and blood in stool.   Genitourinary: Negative for dysuria.   Musculoskeletal:  Positive for myalgias.   Neurological: Positive for weakness.      Tests Performed: labs, UA, CXR. Abnormal Results: .  Labs Ordered and Resulted from Time of ED Arrival Up to the Time of Departure from the ED   COMPREHENSIVE METABOLIC PANEL - Abnormal; Notable for the following components:       Result Value    Sodium 132 (*)     Carbon Dioxide (CO2) 17 (*)     Urea Nitrogen 62 (*)     Creatinine 8.25 (*)     Calcium 8.4 (*)     Glucose 103 (*)     GFR Estimate 6 (*)     All other components within normal limits   LIPASE - Abnormal; Notable for the following components:    Lipase 461 (*)     All other components within normal limits   COVID-19 VIRUS (CORONAVIRUS) BY PCR - Abnormal; Notable for the following components:    SARS CoV2 PCR Positive (*)     All other components within normal limits    Narrative:     Testing was performed using the bharat  SARS-CoV-2 & Influenza A/B Assay on the bharat  Elaine  System.  This test should be ordered for the detection of SARS-COV-2 in individuals who meet SARS-CoV-2 clinical and/or epidemiological criteria. Test performance is unknown in asymptomatic patients.  This test is for in vitro diagnostic use under the FDA EUA for laboratories certified under CLIA to perform moderate and/or high complexity testing. This test has not been FDA cleared or approved.  A negative test does not rule out the presence of PCR inhibitors in the specimen or target RNA in concentration below the limit of detection for the assay. The possibility of a false negative should be considered if the patient's recent exposure or clinical presentation suggests COVID-19.  [facility] are certified under the Clinical Laboratory Improvement Amendments of 1988 (CLIA-88) as qualified to perform moderate and/or high complexity laboratory testing.   ROUTINE UA WITH MICROSCOPIC REFLEX TO CULTURE - Abnormal; Notable for the following components:    Blood Urine Moderate (*)     Protein Albumin Urine  300  (*)     Bacteria Urine Few (*)     Mucus Urine Present (*)     All other components within normal limits    Narrative:     Urine Culture not indicated   CBC WITH PLATELETS AND DIFFERENTIAL - Abnormal; Notable for the following components:    RBC Count 3.35 (*)     Hemoglobin 10.6 (*)     Hematocrit 31.7 (*)     Absolute Lymphocytes 0.4 (*)     All other components within normal limits   LACTIC ACID WHOLE BLOOD - Normal   NURSING DRAW AND HOLD   BLOOD CULTURE   BLOOD CULTURE   CBC WITH PLATELETS & DIFFERENTIAL    Narrative:     The following orders were created for panel order CBC with platelets differential.  Procedure                               Abnormality         Status                     ---------                               -----------         ------                     CBC with platelets and d...[[id number]]  Abnormal            Final result                 Please view results for these tests on the individual orders.     .  XR Chest Port 1 View   Final Result   IMPRESSION: There are new minimal, peripheral, asymmetric opacities in the right upper lobe and both lower lobes. Findings certainly consistent with a Covid pneumonia. No effusions. Heart and pulmonary vascularity are normal.         Treatments provided: see ED meds below  Family Comments: NA  OBS brochure/video discussed/provided to patient:  N/A  ED Medications:   Medications   0.9% sodium chloride BOLUS (0 mLs Intravenous Stopped 10/16/21 1116)   ondansetron (ZOFRAN) injection 4 mg (4 mg Intravenous Given 10/16/21 1004)     Drips infusing:  No  For the majority of the shift, the patient's behavior Green. Interventions performed were NA.    Sepsis treatment initiated: No     Patient tested for COVID 19 prior to admission: YES - is positive    ED Nurse Name/Phone Number: Adela Kent RN,   11:16 AM  RECEIVING UNIT ED HANDOFF REVIEW    Above ED Nurse Handoff Report was reviewed: Yes  Reviewed by: Mary Jo Severance, RN on October 16, 2021 at  2:12 PM

## 2021-10-17 LAB
ANION GAP SERPL CALCULATED.3IONS-SCNC: 10 MMOL/L (ref 3–14)
BUN SERPL-MCNC: 75 MG/DL (ref 7–30)
CALCIUM SERPL-MCNC: 8 MG/DL (ref 8.5–10.1)
CHLORIDE BLD-SCNC: 106 MMOL/L (ref 94–109)
CO2 SERPL-SCNC: 14 MMOL/L (ref 20–32)
CREAT SERPL-MCNC: 8.06 MG/DL (ref 0.66–1.25)
CRP SERPL-MCNC: 143 MG/L (ref 0–8)
D DIMER PPP FEU-MCNC: 1.17 UG/ML FEU (ref 0–0.5)
ERYTHROCYTE [DISTWIDTH] IN BLOOD BY AUTOMATED COUNT: 12.9 % (ref 10–15)
GFR SERPL CREATININE-BSD FRML MDRD: 6 ML/MIN/1.73M2
GLUCOSE BLD-MCNC: 118 MG/DL (ref 70–99)
HCT VFR BLD AUTO: 29.7 % (ref 40–53)
HGB BLD-MCNC: 10 G/DL (ref 13.3–17.7)
MCH RBC QN AUTO: 31.6 PG (ref 26.5–33)
MCHC RBC AUTO-ENTMCNC: 33.7 G/DL (ref 31.5–36.5)
MCV RBC AUTO: 94 FL (ref 78–100)
PLATELET # BLD AUTO: 167 10E3/UL (ref 150–450)
POTASSIUM BLD-SCNC: 5.5 MMOL/L (ref 3.4–5.3)
RBC # BLD AUTO: 3.16 10E6/UL (ref 4.4–5.9)
SODIUM SERPL-SCNC: 130 MMOL/L (ref 133–144)
WBC # BLD AUTO: 5.3 10E3/UL (ref 4–11)

## 2021-10-17 PROCEDURE — 85379 FIBRIN DEGRADATION QUANT: CPT | Performed by: HOSPITALIST

## 2021-10-17 PROCEDURE — 120N000001 HC R&B MED SURG/OB

## 2021-10-17 PROCEDURE — 250N000011 HC RX IP 250 OP 636: Performed by: HOSPITALIST

## 2021-10-17 PROCEDURE — 250N000012 HC RX MED GY IP 250 OP 636 PS 637: Performed by: HOSPITALIST

## 2021-10-17 PROCEDURE — 86140 C-REACTIVE PROTEIN: CPT | Performed by: HOSPITALIST

## 2021-10-17 PROCEDURE — 99223 1ST HOSP IP/OBS HIGH 75: CPT | Performed by: INTERNAL MEDICINE

## 2021-10-17 PROCEDURE — 999N000156 HC STATISTIC RCP CONSULT EA 30 MIN

## 2021-10-17 PROCEDURE — 250N000009 HC RX 250: Performed by: INTERNAL MEDICINE

## 2021-10-17 PROCEDURE — 94640 AIRWAY INHALATION TREATMENT: CPT | Mod: 76

## 2021-10-17 PROCEDURE — 36415 COLL VENOUS BLD VENIPUNCTURE: CPT | Performed by: HOSPITALIST

## 2021-10-17 PROCEDURE — 250N000013 HC RX MED GY IP 250 OP 250 PS 637: Performed by: HOSPITALIST

## 2021-10-17 PROCEDURE — 85027 COMPLETE CBC AUTOMATED: CPT | Performed by: HOSPITALIST

## 2021-10-17 PROCEDURE — G0463 HOSPITAL OUTPT CLINIC VISIT: HCPCS

## 2021-10-17 PROCEDURE — 250N000013 HC RX MED GY IP 250 OP 250 PS 637: Performed by: INTERNAL MEDICINE

## 2021-10-17 PROCEDURE — 99233 SBSQ HOSP IP/OBS HIGH 50: CPT | Performed by: HOSPITALIST

## 2021-10-17 PROCEDURE — 80048 BASIC METABOLIC PNL TOTAL CA: CPT | Performed by: HOSPITALIST

## 2021-10-17 PROCEDURE — 999N000157 HC STATISTIC RCP TIME EA 10 MIN

## 2021-10-17 PROCEDURE — 258N000002 HC RX IP 258 OP 250: Performed by: INTERNAL MEDICINE

## 2021-10-17 PROCEDURE — 94640 AIRWAY INHALATION TREATMENT: CPT

## 2021-10-17 RX ORDER — NALOXONE HYDROCHLORIDE 0.4 MG/ML
0.2 INJECTION, SOLUTION INTRAMUSCULAR; INTRAVENOUS; SUBCUTANEOUS
Status: DISCONTINUED | OUTPATIENT
Start: 2021-10-17 | End: 2021-10-23 | Stop reason: HOSPADM

## 2021-10-17 RX ORDER — OXYCODONE HYDROCHLORIDE 5 MG/1
5 TABLET ORAL EVERY 4 HOURS PRN
Status: COMPLETED | OUTPATIENT
Start: 2021-10-17 | End: 2021-10-17

## 2021-10-17 RX ORDER — CALCIUM ACETATE 667 MG/1
667 CAPSULE ORAL
Status: DISCONTINUED | OUTPATIENT
Start: 2021-10-17 | End: 2021-10-23 | Stop reason: HOSPADM

## 2021-10-17 RX ORDER — NALOXONE HYDROCHLORIDE 0.4 MG/ML
0.4 INJECTION, SOLUTION INTRAMUSCULAR; INTRAVENOUS; SUBCUTANEOUS
Status: DISCONTINUED | OUTPATIENT
Start: 2021-10-17 | End: 2021-10-23 | Stop reason: HOSPADM

## 2021-10-17 RX ORDER — SODIUM POLYSTYRENE SULFONATE 15 G/60ML
30 SUSPENSION ORAL; RECTAL ONCE
Status: COMPLETED | OUTPATIENT
Start: 2021-10-17 | End: 2021-10-17

## 2021-10-17 RX ADMIN — HEPARIN SODIUM 5000 UNITS: 10000 INJECTION, SOLUTION INTRAVENOUS; SUBCUTANEOUS at 22:06

## 2021-10-17 RX ADMIN — SODIUM BICARBONATE: 84 INJECTION, SOLUTION INTRAVENOUS at 14:33

## 2021-10-17 RX ADMIN — ONDANSETRON 4 MG: 4 TABLET, ORALLY DISINTEGRATING ORAL at 18:34

## 2021-10-17 RX ADMIN — CALCIUM ACETATE 667 MG: 667 CAPSULE ORAL at 18:16

## 2021-10-17 RX ADMIN — ALBUTEROL SULFATE 2 PUFF: 90 AEROSOL, METERED RESPIRATORY (INHALATION) at 13:19

## 2021-10-17 RX ADMIN — HEPARIN SODIUM 5000 UNITS: 10000 INJECTION, SOLUTION INTRAVENOUS; SUBCUTANEOUS at 06:29

## 2021-10-17 RX ADMIN — DEXAMETHASONE 6 MG: 2 TABLET ORAL at 13:15

## 2021-10-17 RX ADMIN — OXYCODONE HYDROCHLORIDE 5 MG: 5 TABLET ORAL at 22:04

## 2021-10-17 RX ADMIN — HEPARIN SODIUM 5000 UNITS: 10000 INJECTION, SOLUTION INTRAVENOUS; SUBCUTANEOUS at 13:16

## 2021-10-17 RX ADMIN — ACETAMINOPHEN 650 MG: 325 TABLET, FILM COATED ORAL at 08:57

## 2021-10-17 RX ADMIN — SODIUM POLYSTYRENE SULFONATE 30 G: 15 SUSPENSION ORAL; RECTAL at 10:15

## 2021-10-17 RX ADMIN — ALPRAZOLAM 0.25 MG: 0.25 TABLET ORAL at 18:17

## 2021-10-17 RX ADMIN — ACETAMINOPHEN 650 MG: 325 TABLET, FILM COATED ORAL at 18:16

## 2021-10-17 RX ADMIN — CALCIUM ACETATE 667 MG: 667 CAPSULE ORAL at 13:16

## 2021-10-17 ASSESSMENT — ACTIVITIES OF DAILY LIVING (ADL)
ADLS_ACUITY_SCORE: 3
ADLS_ACUITY_SCORE: 3
ADLS_ACUITY_SCORE: 5
ADLS_ACUITY_SCORE: 5
ADLS_ACUITY_SCORE: 3
ADLS_ACUITY_SCORE: 3

## 2021-10-17 NOTE — PLAN OF CARE
No fevers or chills .  VSS.  Appetite down.  Vding ok but reminded to use urinal for I+O.  Plan for broderick  cath placement tomorrow followed by dialyse    Several diarrhea stools today after kayexalate

## 2021-10-17 NOTE — PLAN OF CARE
VSS.  AOX4.  Breathing is unlabored and pt denies SOB.  Regular diet with poor appetite; pt did state he had a good portion of dinner but the writer did not witness.  Independent mobility.  Pt denies pain.    Pt had temp of 99.6; PRN tylenol given and down to 98.3; pt stated he also felt a lot better.    Cr 8.25    K+ 5.1    Pt dehydrated and encouraged to drink fluids.    IS given to encourage activity.

## 2021-10-17 NOTE — CONSULTS
Care Management Initial Consult    General Information  Assessment completed with:  ,         Primary Care Provider verified and updated as needed:     Readmission within the last 30 days:           Advance Care Planning:            Communication Assessment  Patient's communication style: spoken language (English or Bilingual)    Hearing Difficulty or Deaf: no   Wear Glasses or Blind: no    Cognitive  Cognitive/Neuro/Behavioral: WDL                      Living Environment:   People in home:       Current living Arrangements:        Able to return to prior arrangements:         Family/Social Support:  Care provided by:    Provides care for:                  Description of Support System:           Current Resources:   Patient receiving home care services:       Community Resources:    Equipment currently used at home: none  Supplies currently used at home:      Employment/Financial:  Employment Status:          Financial Concerns:             Lifestyle & Psychosocial Needs:  Social Determinants of Health     Tobacco Use: Low Risk      Smoking Tobacco Use: Never Smoker     Smokeless Tobacco Use: Never Used   Alcohol Use:      Frequency of Alcohol Consumption:      Average Number of Drinks:      Frequency of Binge Drinking:    Financial Resource Strain:      Difficulty of Paying Living Expenses:    Food Insecurity:      Worried About Running Out of Food in the Last Year:      Ran Out of Food in the Last Year:    Transportation Needs:      Lack of Transportation (Medical):      Lack of Transportation (Non-Medical):    Physical Activity:      Days of Exercise per Week:      Minutes of Exercise per Session:    Stress:      Feeling of Stress :    Social Connections:      Frequency of Communication with Friends and Family:      Frequency of Social Gatherings with Friends and Family:      Attends [employer] Services:      Active Member of Clubs or Organizations:      Attends Club or Organization Meetings:      Marital Status:     Intimate Partner Violence:      Fear of Current or Ex-Partner:      Emotionally Abused:      Physically Abused:      Sexually Abused:    Depression: Not at risk     PHQ-2 Score: 0   Housing Stability:      Unable to Pay for Housing in the Last Year:      Number of Places Lived in the Last Year:      Unstable Housing in the Last Year:          Additional Information:  Pt needing new dialysis, lives [place] and would like to start at [facility]. New dialysis referral sent to [facility] Admissions 10/17. Pt likely having catheter place Monday and first run. Of note pt is Covid +.    CM to continue to follow with dialysis needs.    Breonna Chan RN, BSN CTS  Care Coordinator  [facility]   [phone]

## 2021-10-17 NOTE — PROGRESS NOTES
"[facility] RCAT     Date: 10/17/21  Admission Dx: COVID-19 PNA  Pulmonary History: None  Home Nebulizer/MDI Use: None  Home Oxygen: None  Acuity Level (RCAT flow sheet): 4  Aerosol Therapy initiated: Albuterol MDI Q4 prn      Pulmonary Hygiene initiated: Coughing techniques       Volume Expansion initiated: Incentive Spirometry       Current Oxygen Requirements: Room air  Current SpO2: 97%    Re-evaluation date: 10/20/21    Patient Education: Discussed use and benefits of respiratory medications.       See \"RT Assessments\" flow sheet for patient assessment scoring and Acuity Level Details.             "

## 2021-10-17 NOTE — CONSULTS
Consult Date: 10/17/2021    REQUESTING PHYSICIAN:  Dr. Hensley    CONSULTANT:  Dr. Ray    REASON FOR CONSULTATION:  End-stage renal disease.    HISTORY OF PRESENT ILLNESS:  This is a 66-year-old admitted last night with fever, shortness of breath.  He turned out to be COVID positive and has a diagnosis of COVID pneumonia.  He has had symptoms for about a week.  He is status post 2 doses of the Pfizer vaccine in 03/2021 and 04/2021.  He has had poor p.o. intake and anorexia over the last week.  He follows with Dr. Diaz for biopsy-demonstrated focal segmental glomerulosclerosis and has been approaching end-stage renal disease.  He was due for a fistula placement and has been referred for transplant at the [facility].  Prior to admission, he was on amlodipine, sodium bicarbonate and calcitriol.    PAST MEDICAL HISTORY:  Includes:  1.  Chronic kidney disease, stage V, with proteinuria and FSGS.  2.  Hypertension.  3.  Gout.    PAST SURGICAL HISTORY:  Includes:  1.  Left arm fracture.  2.  T plus A.  3.  Kidney biopsy in 08/2020.    SOCIAL HISTORY:  He does not smoke.  He has an occasional drink.  He works in a car dealership, and he lives in [place], [place].    REVIEW OF SYSTEMS:  He feels improved today.  He has mild shortness of breath, cough and anorexia.    PHYSICAL EXAMINATION:   He is alert and oriented with normal speech, mood and affect.  He is a good medical historian.  I talked to him on the phone next to the room rather than go in due to his COVID positivity.  His weight is 81.1 kg, blood pressure 133/76, respiratory rate 18, pulse 81.    LABORATORY DATA:  In 08/2021, the creatinine was 6.67, bicarbonate 17, potassium 5.3.  Today, his sodium is 130, potassium 5.5, chloride 106, bicarbonate 14, BUN 75, creatinine 8.06, estimated GFR 6.  Hemoglobin 10.  In 02/2021, a CT scan showed some stones on the left with normal kidneys, no hydronephrosis and normal bladder.    IMPRESSION:     1.   COVID-19 pneumonia, being treated with Decadron:  This will hopefully be a mild case as he is vaccinated.  2.  End-stage renal disease:  I put in an order for a tunneled catheter to be placed in the morning and for dialysis to follow.  We will dialyze him a few days in a row to get him tuned up.  For tonight, we will put him on half normal saline with 100 of bicarbonate at 75 an hour.  He has been referred for transplant at the [facility].  I will ask the care coordinator to arrange for outpatient dialysis at the [facility].  3.  Anemia:  We will start EPO and Venofer while he is on dialysis.    Michele Ray MD        D: 10/17/2021   T: 10/17/2021   MT: REAL    Name:     MARQUES TOUSSAINT  MRN:      [id number]        Account:      [id number]   :      1955           Consult Date: 10/17/2021     Document: [id number]

## 2021-10-17 NOTE — PROGRESS NOTES
[facility]    Hospitalist Progress Note  Name: Matteo Barnes    MRN: [id number]  Provider:  Matteo Hensley DO MPH  Date of Service: 10/17/2021    Summary of Stay: Matteo Barnes is a 66 year old male with a history of stage V chronic kidney disease, gout, hypertension, hyperlipidemia, proteinuria, FSGS, and anxiety who presents with fever and shortness of breath.     The patient is fully vaccinated against COVID-19 with the Pfizer series starting in March and completing in April.  He has not yet had his booster.  On 10/11 he noticed onset of coughing, myalgias, vomiting, and generalized weakness with fatigue.  He also tested negative for COVID-19 on that date.  His symptoms continued and he has had decreased appetite and tells me he has not eaten in 1 week he does have advancing a progressive chronic kidney disease and has been setting up for initiation of dialysis in the near future.  He was supposed to have a fistula created this week.  Due to ongoing deterioration in his symptoms and clinical status he came to the ED today for evaluation.     Here his temperature is 98.9, heart rate 88, blood pressure 155/97, respiratory 20 and oxygen saturation 96% on room air.  Laboratory work is notable for sodium 132, potassium 5.1, creatinine 8.2, lipase 461.  Lactic acid and liver function tests are normal.  CBC notable for hemoglobin of 10.6 with normal platelets and normal white blood cells.  COVID-19 PCR is positive.  Blood culture has been sent.  Chest x-ray shows infiltrates consistent with COVID-19 pneumonia.  He was given dexamethasone.  Nephrology contacted and will see him in consultation.     Problem List:  #COVID-19 pneumonia: Currently not hypoxic.  Given infiltrate seen on chest x-ray and high risk patient will initiate 10-day course of oral dexamethasone.  Currently has contraindications to remdesivir (and doesn't meet criteria for usage) in the sense of renal failure so that will not be  administered.  Not an appropriate candidate for Tocilizumab.  Continue to monitor oxygenation closely.  Reassuringly he is fully vaccinated for COVID-19 with the Pfizer series.    #Stage V chronic kidney disease: Creatinine has progressively been worsening over the past several months.  He tells me he follows with Dr. Roy in the nephrology clinic and has been preparing for initiation of dialysis and actually was supposed to have fistula creation later this week.  We will request formal consultation with nephrology.  He does report making urine at baseline.  He received 1 L of normal saline in the emergency department.  He does now have hyperkalemia which will need to be monitored closely and I will give him 30 gm Kayexalate x1.      #Hypovolemic hyponatremia: Received 1 L of normal saline in the ED.  Will recheck basic metabolic panel tomorrow but actually lower now at 130.    #Hyperkalemia: Ordered kayexalate.  Low potassium diet ordered.    #Non-anion gap metabolic acidosis: Likely due to deteriorating renal function.  Monitor closely.  I believe he is on oral bicarbonate supplements as an outpatient.    #Hypertension: Blood pressure is well controlled.  We will watch for now while holding prior to admission amlodipine and carvedilol until we ensure stability from infection.    DVT Prophylaxis: Heparin SQ  Code Status: Full Code  Diet: Renal Diet (non-dialysis)    Duval Catheter: Not present  Disposition: Expected discharge in 1-3 days to home. Goals prior to discharge include manage renal failure.   Incidental Findings: None.  Family updated today: No     Interval History   The patient reports doing well. No chest pain or shortness of breath. No nausea, vomiting, diarrhea, constipation. No fevers. No other specific complaints identified.     -Data reviewed today: I personally reviewed all new labs and imaging results over the last 24 hours.     Physical Exam   Temp: 97.6  F (36.4  C) Temp src: Oral BP: 133/76  Pulse: 81   Resp: 18 SpO2: 93 % O2 Device: None (Room air)    Vitals:    10/16/21 1444   Weight: 81.1 kg (178 lb 11.2 oz)     Vital Signs with Ranges  Temp:  [97.6  F (36.4  C)-100.3  F (37.9  C)] 97.6  F (36.4  C)  Pulse:  [] 81  Resp:  [18-20] 18  BP: (119-177)/() 133/76  SpO2:  [93 %-97 %] 93 %  I/O last 3 completed shifts:  In: 1000 [IV Piggyback:1000]  Out: -     GENERAL: No apparent distress. Awake, alert, and fully oriented.  HEENT: Normocephalic, atraumatic. Extraocular movements intact.  CARDIOVASCULAR: Regular rate and rhythm without murmurs or rubs. No S3.  PULMONARY: Clear bilaterally.  GASTROINTESTINAL: Soft, non-tender, non-distended. Bowel sounds normoactive.   EXTREMITIES: No cyanosis or clubbing. No edema.  NEUROLOGICAL: CN 2-12 grossly intact, no focal neurological deficits.  DERMATOLOGICAL: No rash, ulcer, bruising, nor jaundice.     Medications     - MEDICATION INSTRUCTIONS -         albuterol  2 puff Inhalation 4x Daily     dexamethasone  6 mg Oral Daily     heparin ANTICOAGULANT  5,000 Units Subcutaneous Q8H JOSE     sodium chloride (PF)  3 mL Intracatheter Q8H     sodium polystyrene  30 g Oral Once     Data     Laboratory:  Recent Labs   Lab 10/17/21  0813 10/16/21  1659 10/16/21  0942   WBC 5.3 4.7 4.6   HGB 10.0* 10.6* 10.6*   HCT 29.7* 31.2* 31.7*   MCV 94 96 95    150 162     Recent Labs   Lab 10/17/21  0813 10/16/21  0942   * 132*   POTASSIUM 5.5* 5.1   CHLORIDE 106 103   CO2 14* 17*   ANIONGAP 10 12   * 103*   BUN 75* 62*   CR 8.06* 8.25*   GFRESTIMATED 6* 6*   ANNETTE 8.0* 8.4*     No results for input(s): CULT in the last 168 hours.    Imaging:  Recent Results (from the past 24 hour(s))   XR Chest Port 1 View    Narrative    EXAM: XR CHEST PORT 1 VIEW  LOCATION: [facility]  DATE/TIME: 10/16/2021 10:01 AM    INDICATION: fever, Cough, ?COVID, fully vaccinated  COMPARISON: 02/01/2021      Impression    IMPRESSION: There are new minimal,  peripheral, asymmetric opacities in the right upper lobe and both lower lobes. Findings certainly consistent with a Covid pneumonia. No effusions. Heart and pulmonary vascularity are normal.         Matteo Hensley DO MPH  [facility] Hospitalist  [address] [place] [address]  10/17/2021

## 2021-10-17 NOTE — PLAN OF CARE
Patient is in the office today for a follow up. 1. Have you been to the ER, urgent care clinic since your last visit? Hospitalized since your last visit? No    2. Have you seen or consulted any other health care providers outside of the [address] [place] [address] since your last visit? Include any pap smears or colon screening. No     Verbal order read back per Dr. Gm Ramires flu vaccine. Patient received flu vaccine in right deltoid. Patient was observed for 15 minutes and no signs or symptoms of allergic reaction noted at this time. Patient tolerated well and left without complaints. Patient received flu VIS. Please see flowsheets for detailed vital signs and assessments.   Neuro: A/O  Vital Signs: stable  Pain: denies  O2: mid 90s RA  Tele: N/A  Respiratory: LS WDL  GI: WDL  : voiding w/o difficulty  Skin: WDL  Activity: independent in room  IVF: saline locked  Notable labs: K 5.1, Cr 8.25, Na 132  Protocols: N/A  Plan: anticoagulation, nephrology to see. Continue with plan of care.   Discharge: 2+ days

## 2021-10-18 ENCOUNTER — APPOINTMENT (OUTPATIENT)
Dept: INTERVENTIONAL RADIOLOGY/VASCULAR | Facility: CLINIC | Age: 66
DRG: 177 | End: 2021-10-18
Attending: INTERNAL MEDICINE
Payer: COMMERCIAL

## 2021-10-18 LAB
ALBUMIN SERPL-MCNC: 2.7 G/DL (ref 3.4–5)
ANION GAP SERPL CALCULATED.3IONS-SCNC: 11 MMOL/L (ref 3–14)
BUN SERPL-MCNC: 76 MG/DL (ref 7–30)
CALCIUM SERPL-MCNC: 7.6 MG/DL (ref 8.5–10.1)
CHLORIDE BLD-SCNC: 100 MMOL/L (ref 94–109)
CO2 SERPL-SCNC: 19 MMOL/L (ref 20–32)
CREAT SERPL-MCNC: 7.43 MG/DL (ref 0.66–1.25)
CRP SERPL-MCNC: 108 MG/L (ref 0–8)
D DIMER PPP FEU-MCNC: 1.17 UG/ML FEU (ref 0–0.5)
ERYTHROCYTE [DISTWIDTH] IN BLOOD BY AUTOMATED COUNT: 12.6 % (ref 10–15)
GFR SERPL CREATININE-BSD FRML MDRD: 7 ML/MIN/1.73M2
GLUCOSE BLD-MCNC: 121 MG/DL (ref 70–99)
GLUCOSE BLDC GLUCOMTR-MCNC: 119 MG/DL (ref 70–99)
HCT VFR BLD AUTO: 26.7 % (ref 40–53)
HGB BLD-MCNC: 9.2 G/DL (ref 13.3–17.7)
INR PPP: 1.05 (ref 0.85–1.15)
LACTATE SERPL-SCNC: 1.4 MMOL/L (ref 0.7–2)
MCH RBC QN AUTO: 31.8 PG (ref 26.5–33)
MCHC RBC AUTO-ENTMCNC: 34.5 G/DL (ref 31.5–36.5)
MCV RBC AUTO: 92 FL (ref 78–100)
PHOSPHATE SERPL-MCNC: 4.6 MG/DL (ref 2.5–4.5)
PLATELET # BLD AUTO: 168 10E3/UL (ref 150–450)
POTASSIUM BLD-SCNC: 4 MMOL/L (ref 3.4–5.3)
PROCALCITONIN SERPL-MCNC: 1.11 NG/ML
RBC # BLD AUTO: 2.89 10E6/UL (ref 4.4–5.9)
SODIUM SERPL-SCNC: 130 MMOL/L (ref 133–144)
WBC # BLD AUTO: 7.5 10E3/UL (ref 4–11)

## 2021-10-18 PROCEDURE — 83605 ASSAY OF LACTIC ACID: CPT | Performed by: HOSPITALIST

## 2021-10-18 PROCEDURE — 84145 PROCALCITONIN (PCT): CPT | Performed by: HOSPITALIST

## 2021-10-18 PROCEDURE — 120N000001 HC R&B MED SURG/OB

## 2021-10-18 PROCEDURE — 77001 FLUOROGUIDE FOR VEIN DEVICE: CPT

## 2021-10-18 PROCEDURE — 250N000009 HC RX 250

## 2021-10-18 PROCEDURE — 85027 COMPLETE CBC AUTOMATED: CPT | Performed by: HOSPITALIST

## 2021-10-18 PROCEDURE — 99233 SBSQ HOSP IP/OBS HIGH 50: CPT | Performed by: INTERNAL MEDICINE

## 2021-10-18 PROCEDURE — 99233 SBSQ HOSP IP/OBS HIGH 50: CPT | Performed by: HOSPITALIST

## 2021-10-18 PROCEDURE — 634N000001 HC RX 634: Performed by: INTERNAL MEDICINE

## 2021-10-18 PROCEDURE — 250N000013 HC RX MED GY IP 250 OP 250 PS 637: Performed by: INTERNAL MEDICINE

## 2021-10-18 PROCEDURE — 258N000002 HC RX IP 258 OP 250: Performed by: INTERNAL MEDICINE

## 2021-10-18 PROCEDURE — C1769 GUIDE WIRE: HCPCS

## 2021-10-18 PROCEDURE — 258N000003 HC RX IP 258 OP 636: Performed by: INTERNAL MEDICINE

## 2021-10-18 PROCEDURE — 87040 BLOOD CULTURE FOR BACTERIA: CPT | Performed by: HOSPITALIST

## 2021-10-18 PROCEDURE — 86704 HEP B CORE ANTIBODY TOTAL: CPT | Performed by: INTERNAL MEDICINE

## 2021-10-18 PROCEDURE — 272N000504 HC NEEDLE CR4

## 2021-10-18 PROCEDURE — 36558 INSERT TUNNELED CV CATH: CPT

## 2021-10-18 PROCEDURE — 250N000011 HC RX IP 250 OP 636: Performed by: HOSPITALIST

## 2021-10-18 PROCEDURE — 5A1D70Z PERFORMANCE OF URINARY FILTRATION, INTERMITTENT, LESS THAN 6 HOURS PER DAY: ICD-10-PCS | Performed by: INTERNAL MEDICINE

## 2021-10-18 PROCEDURE — 250N000011 HC RX IP 250 OP 636: Performed by: INTERNAL MEDICINE

## 2021-10-18 PROCEDURE — 250N000012 HC RX MED GY IP 250 OP 636 PS 637: Performed by: HOSPITALIST

## 2021-10-18 PROCEDURE — 80069 RENAL FUNCTION PANEL: CPT | Performed by: INTERNAL MEDICINE

## 2021-10-18 PROCEDURE — 76937 US GUIDE VASCULAR ACCESS: CPT

## 2021-10-18 PROCEDURE — 85610 PROTHROMBIN TIME: CPT | Performed by: INTERNAL MEDICINE

## 2021-10-18 PROCEDURE — 99152 MOD SED SAME PHYS/QHP 5/>YRS: CPT

## 2021-10-18 PROCEDURE — 250N000011 HC RX IP 250 OP 636

## 2021-10-18 PROCEDURE — 90937 HEMODIALYSIS REPEATED EVAL: CPT

## 2021-10-18 PROCEDURE — 272N000116 HC CATH CR1

## 2021-10-18 PROCEDURE — 87340 HEPATITIS B SURFACE AG IA: CPT | Performed by: INTERNAL MEDICINE

## 2021-10-18 PROCEDURE — 02HV33Z INSERTION OF INFUSION DEVICE INTO SUPERIOR VENA CAVA, PERCUTANEOUS APPROACH: ICD-10-PCS | Performed by: RADIOLOGY

## 2021-10-18 PROCEDURE — 36415 COLL VENOUS BLD VENIPUNCTURE: CPT | Performed by: HOSPITALIST

## 2021-10-18 PROCEDURE — 250N000013 HC RX MED GY IP 250 OP 250 PS 637: Performed by: HOSPITALIST

## 2021-10-18 PROCEDURE — 86140 C-REACTIVE PROTEIN: CPT | Performed by: HOSPITALIST

## 2021-10-18 PROCEDURE — 86706 HEP B SURFACE ANTIBODY: CPT | Performed by: INTERNAL MEDICINE

## 2021-10-18 PROCEDURE — C1750 CATH, HEMODIALYSIS,LONG-TERM: HCPCS

## 2021-10-18 PROCEDURE — 0JH63XZ INSERTION OF TUNNELED VASCULAR ACCESS DEVICE INTO CHEST SUBCUTANEOUS TISSUE AND FASCIA, PERCUTANEOUS APPROACH: ICD-10-PCS | Performed by: RADIOLOGY

## 2021-10-18 PROCEDURE — 272N000604 HC WOUND GLUE CR3

## 2021-10-18 PROCEDURE — 85379 FIBRIN DEGRADATION QUANT: CPT | Performed by: HOSPITALIST

## 2021-10-18 PROCEDURE — 250N000009 HC RX 250: Performed by: INTERNAL MEDICINE

## 2021-10-18 RX ORDER — CEFAZOLIN SODIUM 2 G/100ML
INJECTION, SOLUTION INTRAVENOUS
Status: COMPLETED
Start: 2021-10-18 | End: 2021-10-18

## 2021-10-18 RX ORDER — NALOXONE HYDROCHLORIDE 0.4 MG/ML
0.4 INJECTION, SOLUTION INTRAMUSCULAR; INTRAVENOUS; SUBCUTANEOUS
Status: DISCONTINUED | OUTPATIENT
Start: 2021-10-18 | End: 2021-10-21

## 2021-10-18 RX ORDER — CEFAZOLIN SODIUM 2 G/100ML
2 INJECTION, SOLUTION INTRAVENOUS
Status: COMPLETED | OUTPATIENT
Start: 2021-10-18 | End: 2021-10-18

## 2021-10-18 RX ORDER — HEPARIN SODIUM 1000 [USP'U]/ML
INJECTION, SOLUTION INTRAVENOUS; SUBCUTANEOUS
Status: DISCONTINUED
Start: 2021-10-18 | End: 2021-10-23 | Stop reason: HOSPADM

## 2021-10-18 RX ORDER — LIDOCAINE HYDROCHLORIDE AND EPINEPHRINE 10; 10 MG/ML; UG/ML
INJECTION, SOLUTION INFILTRATION; PERINEURAL
Status: DISCONTINUED
Start: 2021-10-18 | End: 2021-10-18 | Stop reason: WASHOUT

## 2021-10-18 RX ORDER — LIDOCAINE HYDROCHLORIDE AND EPINEPHRINE 10; 10 MG/ML; UG/ML
1-30 INJECTION, SOLUTION INFILTRATION; PERINEURAL
Status: DISCONTINUED | OUTPATIENT
Start: 2021-10-18 | End: 2021-10-21

## 2021-10-18 RX ORDER — ALBUMIN (HUMAN) 12.5 G/50ML
50 SOLUTION INTRAVENOUS
Status: DISCONTINUED | OUTPATIENT
Start: 2021-10-18 | End: 2021-10-18

## 2021-10-18 RX ORDER — LIDOCAINE HYDROCHLORIDE 10 MG/ML
INJECTION, SOLUTION EPIDURAL; INFILTRATION; INTRACAUDAL; PERINEURAL
Status: COMPLETED
Start: 2021-10-18 | End: 2021-10-18

## 2021-10-18 RX ORDER — HEPARIN SODIUM 1000 [USP'U]/ML
500 INJECTION, SOLUTION INTRAVENOUS; SUBCUTANEOUS
Status: DISCONTINUED | OUTPATIENT
Start: 2021-10-18 | End: 2021-10-18

## 2021-10-18 RX ORDER — FENTANYL CITRATE 50 UG/ML
INJECTION, SOLUTION INTRAMUSCULAR; INTRAVENOUS
Status: COMPLETED
Start: 2021-10-18 | End: 2021-10-18

## 2021-10-18 RX ORDER — FENTANYL CITRATE 50 UG/ML
25-50 INJECTION, SOLUTION INTRAMUSCULAR; INTRAVENOUS EVERY 5 MIN PRN
Status: DISCONTINUED | OUTPATIENT
Start: 2021-10-18 | End: 2021-10-21

## 2021-10-18 RX ORDER — HEPARIN SODIUM 1000 [USP'U]/ML
500 INJECTION, SOLUTION INTRAVENOUS; SUBCUTANEOUS CONTINUOUS
Status: DISCONTINUED | OUTPATIENT
Start: 2021-10-18 | End: 2021-10-18

## 2021-10-18 RX ORDER — DOXERCALCIFEROL 4 UG/2ML
4 INJECTION INTRAVENOUS
Status: COMPLETED | OUTPATIENT
Start: 2021-10-18 | End: 2021-10-18

## 2021-10-18 RX ORDER — FLUMAZENIL 0.1 MG/ML
0.2 INJECTION, SOLUTION INTRAVENOUS
Status: DISCONTINUED | OUTPATIENT
Start: 2021-10-18 | End: 2021-10-21

## 2021-10-18 RX ORDER — LIDOCAINE HYDROCHLORIDE 10 MG/ML
1-30 INJECTION, SOLUTION EPIDURAL; INFILTRATION; INTRACAUDAL; PERINEURAL
Status: DISCONTINUED | OUTPATIENT
Start: 2021-10-18 | End: 2021-10-21

## 2021-10-18 RX ORDER — NALOXONE HYDROCHLORIDE 0.4 MG/ML
0.2 INJECTION, SOLUTION INTRAMUSCULAR; INTRAVENOUS; SUBCUTANEOUS
Status: DISCONTINUED | OUTPATIENT
Start: 2021-10-18 | End: 2021-10-21

## 2021-10-18 RX ORDER — SODIUM CHLORIDE 9 MG/ML
INJECTION, SOLUTION INTRAVENOUS CONTINUOUS
Status: DISCONTINUED | OUTPATIENT
Start: 2021-10-18 | End: 2021-10-18

## 2021-10-18 RX ORDER — HEPARIN SODIUM 1000 [USP'U]/ML
500-6000 INJECTION, SOLUTION INTRAVENOUS; SUBCUTANEOUS
Status: DISCONTINUED | OUTPATIENT
Start: 2021-10-18 | End: 2021-10-21

## 2021-10-18 RX ADMIN — Medication 1 CAPSULE: at 18:20

## 2021-10-18 RX ADMIN — DEXAMETHASONE 6 MG: 2 TABLET ORAL at 12:45

## 2021-10-18 RX ADMIN — SODIUM BICARBONATE: 84 INJECTION, SOLUTION INTRAVENOUS at 00:55

## 2021-10-18 RX ADMIN — EPOETIN ALFA-EPBX 4000 UNITS: 4000 INJECTION, SOLUTION INTRAVENOUS; SUBCUTANEOUS at 15:20

## 2021-10-18 RX ADMIN — FENTANYL CITRATE 50 MCG: 50 INJECTION, SOLUTION INTRAMUSCULAR; INTRAVENOUS at 10:38

## 2021-10-18 RX ADMIN — CEFAZOLIN SODIUM 2 G: 2 INJECTION, SOLUTION INTRAVENOUS at 10:28

## 2021-10-18 RX ADMIN — LIDOCAINE HYDROCHLORIDE 10 ML: 10 INJECTION, SOLUTION EPIDURAL; INFILTRATION; INTRACAUDAL; PERINEURAL at 10:44

## 2021-10-18 RX ADMIN — SODIUM BICARBONATE: 84 INJECTION, SOLUTION INTRAVENOUS at 12:28

## 2021-10-18 RX ADMIN — SODIUM CHLORIDE 300 ML: 9 INJECTION, SOLUTION INTRAVENOUS at 15:19

## 2021-10-18 RX ADMIN — DOXERCALCIFEROL 4 MCG: 4 INJECTION INTRAVENOUS at 15:20

## 2021-10-18 RX ADMIN — HEPARIN SODIUM 5000 UNITS: 10000 INJECTION, SOLUTION INTRAVENOUS; SUBCUTANEOUS at 05:15

## 2021-10-18 RX ADMIN — CALCIUM ACETATE 667 MG: 667 CAPSULE ORAL at 18:20

## 2021-10-18 RX ADMIN — SODIUM CHLORIDE 250 ML: 9 INJECTION, SOLUTION INTRAVENOUS at 15:19

## 2021-10-18 RX ADMIN — MIDAZOLAM 1 MG: 1 INJECTION INTRAMUSCULAR; INTRAVENOUS at 10:38

## 2021-10-18 RX ADMIN — HEPARIN SODIUM 1600 UNITS: 1000 INJECTION, SOLUTION INTRAVENOUS; SUBCUTANEOUS at 16:45

## 2021-10-18 RX ADMIN — ACETAMINOPHEN 650 MG: 325 TABLET, FILM COATED ORAL at 12:45

## 2021-10-18 RX ADMIN — ONDANSETRON 4 MG: 4 TABLET, ORALLY DISINTEGRATING ORAL at 05:15

## 2021-10-18 RX ADMIN — IRON SUCROSE 100 MG: 20 INJECTION, SOLUTION INTRAVENOUS at 15:21

## 2021-10-18 RX ADMIN — CALCIUM ACETATE 667 MG: 667 CAPSULE ORAL at 12:45

## 2021-10-18 ASSESSMENT — ACTIVITIES OF DAILY LIVING (ADL)
ADLS_ACUITY_SCORE: 8
ADLS_ACUITY_SCORE: 8
ADLS_ACUITY_SCORE: 7
ADLS_ACUITY_SCORE: 5
ADLS_ACUITY_SCORE: 8
ADLS_ACUITY_SCORE: 5
ADLS_ACUITY_SCORE: 8
ADLS_ACUITY_SCORE: 8
ADLS_ACUITY_SCORE: 5
ADLS_ACUITY_SCORE: 8
ADLS_ACUITY_SCORE: 5
ADLS_ACUITY_SCORE: 8
ADLS_ACUITY_SCORE: 5

## 2021-10-18 ASSESSMENT — MIFFLIN-ST. JEOR: SCORE: 1584.13

## 2021-10-18 NOTE — CONSULTS
Care Management Follow Up    Length of Stay (days): 2    Expected Discharge Date: 10/20/2021     Concerns to be Addressed: discharge planning     Patient plan of care discussed at interdisciplinary rounds: Yes    Anticipated Discharge Disposition: Dialysis Services         Additional Information:  RN CC is following for discharge planning and need for new OP HD placement. Referral for new OP HD was sent to [facility] Admissions yesterday. They have received initial referral but will need additional information when received.     Information needing to be faxed to [facility] Admissions at fax: [phone] is:  1. Hepatitis panel (drawn on 10/18, still pending)  2. Catheter placement (10/18)  3. Initial HD notes (10/18)    ** [facility] is aware of patient's wishes to dialyze at [facility] as his preferred facility. Patient is currently covid positive as of 10/16. Per [facility], he will need to dialyze initially at [facility] facility until he is 14 days post covid symptoms and then he can go to his preferred facility. They will continue to process referral request after additional above information is received. Meenu will be the [facility] representative following patient for OP HD placement.    Will cont to follow for discharge plan of care         Gisel Shaffer RN BSN CM  Inpatient Care Coordination  [facility]  [phone]

## 2021-10-18 NOTE — PROCEDURES
[facility]    Procedure: Right Ij tunneled catheter placement.     Date/Time: 10/18/2021 10:30 AM  Performed by: Elise Jaramillo DO  Authorized by: Elise Jaramillo DO     UNIVERSAL PROTOCOL   Site Marked: Yes  Prior Images Obtained and Reviewed:  Yes  Required items: Required blood products, implants, devices and special equipment available    Patient identity confirmed:  Verbally with patient, arm band, provided demographic data and hospital-assigned identification number  Patient was reevaluated immediately before administering moderate or deep sedation or anesthesia  Confirmation Checklist:  Patient's identity using two indicators, relevant allergies, procedure was appropriate and matched the consent or emergent situation and correct equipment/implants were available  Time out: Immediately prior to the procedure a time out was called    Universal Protocol: the Joint Commission Universal Protocol was followed    Preparation: Patient was prepped and draped in usual sterile fashion           ANESTHESIA    Anesthesia: Local infiltration  Local Anesthetic:  Lidocaine 1% without epinephrine      SEDATION    Patient Sedated: Yes    Sedation Type:  Moderate (conscious) sedation  Vital signs: Vital signs monitored during sedation    See dictated procedure note for full details.  Findings: Right internal jugular tunneled catheter placement.     Specimens: none    Complications: None    Condition: Stable    Plan: Ok to use.    PROCEDURE   Patient Tolerance:  Patient tolerated the procedure well with no immediate complications    Length of time physician/provider present for 1:1 monitoring during sedation: 15

## 2021-10-18 NOTE — PROGRESS NOTES
Inpatient Dialysis Progress Note            Assessment and Plan:   1.  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  Initiating HD.  He will need placement into output HD.     2.  Anemia. On EPO.  Will check iron stores.      3.  MBD:  Will check PTH     4.  FEN. K 4.0. Will use K3.    Plan:    HD #2 tomorrow.  Placement.          Interval History:     No N/V today.  Still does not feel great.  He is hoping HD will help him feel better.         Dialysis Parameters:     Wt Readings from Last 4 Encounters:   10/18/21 81.4 kg (179 lb 6.4 oz)   09/02/21 85.3 kg (188 lb)   02/01/21 87.1 kg (192 lb)   01/05/21 87.1 kg (192 lb)     I/O last 3 completed shifts:  In: 120 [P.O.:120]  Out: -   BP Readings from Last 3 Encounters:   10/18/21 135/80   09/02/21 128/76   02/01/21 (!) 167/112       Routine, ONE TIME, Starting today For 1 Occurrences  Weight Loss (kg): 0  Dialysis Temp: 36.5  C  Access Device: CVC  Access Site: R IJ  Dialyzer: Revaclear  Dialysis Bath: K 3  Blood Flow Rate (mL/min): 250  Total Treatment Time (hrs): 2.5  Heparin: no         Medications and Allergies:   Reviewed in EPIC      sodium chloride 0.9%  250 mL Intravenous Once in dialysis/CRRT     sodium chloride 0.9%  300 mL Hemodialysis Machine Once     calcium acetate  667 mg Oral TID w/meals     dexamethasone  6 mg Oral Daily     doxercalciferol  4 mcg Intravenous Once in dialysis/CRRT     epoetin lisa-epbx (RETACRIT) inj ESRD  4,000 Units Intravenous Once in dialysis/CRRT     heparin (porcine)         sodium chloride (PF) 0.9%  1.3-2.6 mL Intracatheter Once    Followed by     heparin  3 mL Intracatheter Once     sodium chloride (PF) 0.9%  1.3-2.6 mL Intracatheter Once in dialysis/CRRT    Followed by     heparin  3 mL Intracatheter Once in dialysis/CRRT     sodium chloride (PF) 0.9%  1.3-2.6 mL Intracatheter Once    Followed by     heparin  3 mL Intracatheter Once     sodium chloride (PF) 0.9%  1.3-2.6 mL Intracatheter Once in dialysis/CRRT    Followed by     heparin  3 mL  Intracatheter Once in dialysis/CRRT     [Held by provider] heparin ANTICOAGULANT  5,000 Units Subcutaneous Q8H JOSE     iron sucrose  100 mg Intravenous Once in dialysis/CRRT     sodium chloride (PF)  3 mL Intracatheter Q8H     sodium chloride (PF)  9 mL Intracatheter During Dialysis/CRRT (from stock)     sodium chloride (PF)  9 mL Intracatheter During Dialysis/CRRT (from stock)     sodium chloride 0.9%, acetaminophen **OR** acetaminophen, albumin human, albuterol, ALPRAZolam, alteplase, alteplase, bisacodyl, fentaNYL, flumazenil, heparin (porcine), lidocaine 4%, lidocaine HCl, lidocaine (buffered or not buffered), lidocaine 1% with EPINEPHrine 1:100,000, - MEDICATION INSTRUCTIONS -, - MEDICATION INSTRUCTIONS -, midazolam, naloxone **OR** naloxone **OR** naloxone **OR** naloxone, naloxone **OR** naloxone **OR** naloxone **OR** naloxone, ondansetron **OR** ondansetron, polyethylene glycol, senna-docusate **OR** senna-docusate, sodium chloride (PF), sodium chloride (PF), sodium chloride (PF), sodium chloride 0.9%   No Known Allergies           Labs:     BMP  Recent Labs   Lab 10/18/21  1200 10/18/21  0653 10/17/21  0813 10/16/21  0942   NA  --  130* 130* 132*   POTASSIUM  --  4.0 5.5* 5.1   CHLORIDE  --  100 106 103   ANNETTE  --  7.6* 8.0* 8.4*   CO2  --  19* 14* 17*   BUN  --  76* 75* 62*   CR  --  7.43* 8.06* 8.25*   * 121* 118* 103*     CBC  Recent Labs   Lab 10/18/21  0653 10/17/21  0813 10/16/21  1659 10/16/21  0942   WBC 7.5 5.3 4.7 4.6   HGB 9.2* 10.0* 10.6* 10.6*   HCT 26.7* 29.7* 31.2* 31.7*   MCV 92 94 96 95    167 150 162     Lab Results   Component Value Date    AST 25 10/16/2021    ALT 26 10/16/2021    ALKPHOS 68 10/16/2021    BILITOTAL 0.4 10/16/2021            Physical Exam:   Vitals were reviewed in [facility]    Wt Readings from Last 3 Encounters:   10/18/21 81.4 kg (179 lb 6.4 oz)   09/02/21 85.3 kg (188 lb)   02/01/21 87.1 kg (192 lb)       Intake/Output Summary (Last 24 hours) at 10/18/2021  1359  Last data filed at 10/18/2021 1232  Gross per 24 hour   Intake 2318 ml   Output --   Net 2318 ml       GENERAL APPEARANCE: pleasant, no distress, a & o  HEENT:  Eyes/ears/nose grossly normal, neck supple  RESP: lungs clear to auscultation with good efforts, no crackles, rhonchi or wheezes  CV: regular rate and rhythm, normal S1 S2, no murmur, click or rub   ABDOMEN: soft, nontender, bowel sounds normal  EXTREMITIES/SKIN: tr ANY edema, no rashes or lesions       Pt seen on dialysis.  Stable run.  Good BFR.      Attestation:  I have reviewed today's vital signs, notes, medications, labs and imaging.     Michelet Curiel MD  [facility] - Nephrology  [phone]

## 2021-10-18 NOTE — PLAN OF CARE
VSS on RA. Pt A/O x 4, able to make needs known. Up independent in room. Denies pain, CP, SOB. PO zofran given x1 for nausea. PIV in left arm, 0.45 NS + bicarb infusing @ 100 mL/hr. K 5.5, AM draw scheduled following dose of kayexalate. PO decadron for Covid. No BM overnight. NPO @ 0000 for Kodak placement this AM for dialysis. Nephro following. Plans to discharge home in 2-3 days. Will continue with plan of care.

## 2021-10-18 NOTE — PRE-PROCEDURE
GENERAL PRE-PROCEDURE:   Procedure:  Tunneled catheter placement  Date/Time:  10/18/2021 10:28 AM    Verbal consent obtained?: Yes    Written consent obtained?: Yes    Risks and benefits: Risks, benefits and alternatives were discussed    Consent given by:  Patient  Patient states understanding of procedure being performed: Yes    Patient's understanding of procedure matches consent: Yes    Procedure consent matches procedure scheduled: Yes    Expected level of sedation:  Moderate  Appropriately NPO:  Yes  ASA Class:  2  Mallampati  :  Grade 2- soft palate, base of uvula, tonsillar pillars, and portion of posterior pharyngeal wall visible  Lungs:  Lungs clear with good breath sounds bilaterally  Heart:  Normal heart sounds and rate  History & Physical reviewed:  History and physical reviewed and no updates needed  Statement of review:  I have reviewed the lab findings, diagnostic data, medications, and the plan for sedation

## 2021-10-18 NOTE — PLAN OF CARE
VSS.  AOX4.  Breathing is unlabored and pt denies SOB.  Regular diet with poor appetite. Independent mobility.  Pt complained of 7/10 generalized pain and had a temp of 99.7; he was very frustrated and somewhat restless; the writer offered PRN xanax with tylenol and pt took but did not improve his discomfort at all; MD paged for something stronger and 5 mg oxycodone PRN ordered; pt states he feels much better and think he will get some sleep tonight.      Cr 8.06     K+ 5.5; kayexalate given on day shift.     Pt had diarrhea x2 this shift; pt on IV bicarb fluids at 100 ml/hr.    Pt NPO at midnight for chadwick placement and HD tomorrow.

## 2021-10-18 NOTE — PROVIDER NOTIFICATION
"MD paged: \"pt had decent amount of bleeding w/ CVC placement, I had to have IR RN come up and help clot/change dsg. Pt has scheduled SQ heparin due, should I give or hold?\"    MD responded: \"let's hold the heparin.\"  "

## 2021-10-18 NOTE — PLAN OF CARE
A/O x 4, flat affect. VSS on 1L O2 per NC except elevated BP and m-temp 101 axillary, PRN PO tylenol given for fever and reported aches; improved. Denied pain, CP, SOB. PIV to left  intact, infusing w/ 0.45% NS + bicarb @ 100 ml/hr. Right CVC placed this AM, hemo at site post procedure, manual pressure applied, cath lab RN evaluated site and changed dsg once hemostasis achieved. Poor PO intake, pt reports no appetite. HD at bedside now doing dialysis run. Maintained covid iso precautions. Will continue POC.

## 2021-10-18 NOTE — PROGRESS NOTES
Potassium   Date Value Ref Range Status   10/18/2021 4.0 3.4 - 5.3 mmol/L Final   02/01/2021 4.5 3.4 - 5.3 mmol/L Final     Hemoglobin   Date Value Ref Range Status   10/18/2021 9.2 (L) 13.3 - 17.7 g/dL Final   02/01/2021 12.5 (L) 13.3 - 17.7 g/dL Final     Creatinine   Date Value Ref Range Status   10/18/2021 7.43 (H) 0.66 - 1.25 mg/dL Final   02/01/2021 4.77 (H) 0.66 - 1.25 mg/dL Final     Urea Nitrogen   Date Value Ref Range Status   10/18/2021 76 (H) 7 - 30 mg/dL Final   02/01/2021 66 (H) 7 - 30 mg/dL Final     Sodium   Date Value Ref Range Status   10/18/2021 130 (L) 133 - 144 mmol/L Final   02/01/2021 139 133 - 144 mmol/L Final     INR   Date Value Ref Range Status   10/18/2021 1.05 0.85 - 1.15 Final   02/01/2021 1.12 0.86 - 1.14 Final       DIALYSIS PROCEDURE NOTE  Hepatitis status of previous patient on machine log was checked and verified ok to use with this patients hepatitis status.  Patient dialyzed for 2.5 hrs. on a K3 bath with a net fluid removal of  0L.  A BFR of 250 ml/min was obtained via a RIJ catheter that was placed today via IR with Ok to use per Dr Jaramillo.    The treatment plan was discussed with Dr. Curiel during the treatment.    Total heparin received during the treatment: 0 units. .   Line flushed, clamped and capped with heparin 1:1000 1.6 mL (1600 units) per lumen    Meds  given: EPO 4,000units Hertoral 4mcg IV Venofer 100mg IV   Complications: Pt was bleeding from cath. Site before dialysis per RNgary pressure dressing applied and bleeding stopped.      Person educated: pt. Knowledge base NONE. Barriers to learning: none. Educated on procedure via verbal mode. Patient verbalized understanding. Pt prefers verbal education style.     ICEBOAT? Timeout performed pre-treatment  I: Patient was identified using 2 identifiers  C:  Consent Signed Yes  E: Equipment preventative maintenance is current and dialysis delivery system OK to use  B: Hepatitis B Surface Antigen: unknown; Draw Date:  10/18/2021      Hepatitis B Surface Antibody: unknown; Draw Date: 10/18/2021  O: Dialysis orders present and complete prior to treatment  A: Vascular access verified and assessed prior to treatment  T: Treatment was performed at a clinically appropriate time  ?: Patient was allowed to ask questions and address concerns prior to treatment  See flowsheet in EPIC for further details and post assessment.  Machine water alarm in place and functioning. Transducer pods intact and checked every 15min.   Pt dialyzed in own room.  Chlorine/Chloramine water system checked every 4 hours.  Outpatient Dialysis at [facility]

## 2021-10-18 NOTE — PROVIDER NOTIFICATION
"MD paged: \"Just updating you that pt has temp of 101 axillary, says he feels weak and tired. I gave him Tylenol. BP is 163/95. Now on 1L O2 per NC.  Kodak sultana is in. No callback needed unless necessary, thanks\"  "

## 2021-10-19 ENCOUNTER — APPOINTMENT (OUTPATIENT)
Dept: GENERAL RADIOLOGY | Facility: CLINIC | Age: 66
DRG: 177 | End: 2021-10-19
Attending: HOSPITALIST
Payer: COMMERCIAL

## 2021-10-19 ENCOUNTER — APPOINTMENT (OUTPATIENT)
Dept: CT IMAGING | Facility: CLINIC | Age: 66
DRG: 177 | End: 2021-10-19
Attending: HOSPITALIST
Payer: COMMERCIAL

## 2021-10-19 LAB
ALBUMIN SERPL-MCNC: 2.4 G/DL (ref 3.4–5)
ANION GAP SERPL CALCULATED.3IONS-SCNC: 5 MMOL/L (ref 3–14)
BUN SERPL-MCNC: 45 MG/DL (ref 7–30)
CALCIUM SERPL-MCNC: 7.1 MG/DL (ref 8.5–10.1)
CHLORIDE BLD-SCNC: 97 MMOL/L (ref 94–109)
CO2 SERPL-SCNC: 29 MMOL/L (ref 20–32)
CREAT SERPL-MCNC: 5.41 MG/DL (ref 0.66–1.25)
CRP SERPL-MCNC: 141 MG/L (ref 0–8)
D DIMER PPP FEU-MCNC: 1.61 UG/ML FEU (ref 0–0.5)
ERYTHROCYTE [DISTWIDTH] IN BLOOD BY AUTOMATED COUNT: 12.6 % (ref 10–15)
FERRITIN SERPL-MCNC: 1735 NG/ML (ref 26–388)
GFR SERPL CREATININE-BSD FRML MDRD: 10 ML/MIN/1.73M2
GLUCOSE BLD-MCNC: 121 MG/DL (ref 70–99)
HBV CORE AB SERPL QL IA: NONREACTIVE
HBV SURFACE AB SERPL IA-ACNC: 0.08 M[IU]/ML
HCT VFR BLD AUTO: 26 % (ref 40–53)
HGB BLD-MCNC: 9.1 G/DL (ref 13.3–17.7)
IRON SATN MFR SERPL: 26 % (ref 15–46)
IRON SERPL-MCNC: 49 UG/DL (ref 35–180)
LACTATE SERPL-SCNC: 1 MMOL/L (ref 0.7–2)
MCH RBC QN AUTO: 32.5 PG (ref 26.5–33)
MCHC RBC AUTO-ENTMCNC: 35 G/DL (ref 31.5–36.5)
MCV RBC AUTO: 93 FL (ref 78–100)
PHOSPHATE SERPL-MCNC: 3.6 MG/DL (ref 2.5–4.5)
PLATELET # BLD AUTO: 152 10E3/UL (ref 150–450)
POTASSIUM BLD-SCNC: 4 MMOL/L (ref 3.4–5.3)
PTH-INTACT SERPL-MCNC: 139 PG/ML (ref 18–80)
RBC # BLD AUTO: 2.8 10E6/UL (ref 4.4–5.9)
SODIUM SERPL-SCNC: 131 MMOL/L (ref 133–144)
TIBC SERPL-MCNC: 188 UG/DL (ref 240–430)
WBC # BLD AUTO: 6.2 10E3/UL (ref 4–11)

## 2021-10-19 PROCEDURE — 82728 ASSAY OF FERRITIN: CPT | Performed by: INTERNAL MEDICINE

## 2021-10-19 PROCEDURE — 83605 ASSAY OF LACTIC ACID: CPT | Performed by: HOSPITALIST

## 2021-10-19 PROCEDURE — 250N000013 HC RX MED GY IP 250 OP 250 PS 637: Performed by: INTERNAL MEDICINE

## 2021-10-19 PROCEDURE — 36415 COLL VENOUS BLD VENIPUNCTURE: CPT | Performed by: INTERNAL MEDICINE

## 2021-10-19 PROCEDURE — 99233 SBSQ HOSP IP/OBS HIGH 50: CPT | Performed by: INTERNAL MEDICINE

## 2021-10-19 PROCEDURE — 85379 FIBRIN DEGRADATION QUANT: CPT | Performed by: HOSPITALIST

## 2021-10-19 PROCEDURE — 250N000013 HC RX MED GY IP 250 OP 250 PS 637: Performed by: HOSPITALIST

## 2021-10-19 PROCEDURE — 83550 IRON BINDING TEST: CPT | Performed by: INTERNAL MEDICINE

## 2021-10-19 PROCEDURE — 250N000011 HC RX IP 250 OP 636: Performed by: HOSPITALIST

## 2021-10-19 PROCEDURE — 71045 X-RAY EXAM CHEST 1 VIEW: CPT

## 2021-10-19 PROCEDURE — 83970 ASSAY OF PARATHORMONE: CPT | Performed by: INTERNAL MEDICINE

## 2021-10-19 PROCEDURE — 258N000003 HC RX IP 258 OP 636: Performed by: INTERNAL MEDICINE

## 2021-10-19 PROCEDURE — 634N000001 HC RX 634: Performed by: INTERNAL MEDICINE

## 2021-10-19 PROCEDURE — 258N000003 HC RX IP 258 OP 636: Performed by: HOSPITALIST

## 2021-10-19 PROCEDURE — 250N000009 HC RX 250: Performed by: HOSPITALIST

## 2021-10-19 PROCEDURE — 250N000012 HC RX MED GY IP 250 OP 636 PS 637: Performed by: HOSPITALIST

## 2021-10-19 PROCEDURE — 36415 COLL VENOUS BLD VENIPUNCTURE: CPT | Performed by: HOSPITALIST

## 2021-10-19 PROCEDURE — 80069 RENAL FUNCTION PANEL: CPT | Performed by: INTERNAL MEDICINE

## 2021-10-19 PROCEDURE — 71275 CT ANGIOGRAPHY CHEST: CPT

## 2021-10-19 PROCEDURE — 120N000001 HC R&B MED SURG/OB

## 2021-10-19 PROCEDURE — 85027 COMPLETE CBC AUTOMATED: CPT | Performed by: HOSPITALIST

## 2021-10-19 PROCEDURE — 250N000011 HC RX IP 250 OP 636: Performed by: INTERNAL MEDICINE

## 2021-10-19 PROCEDURE — 99233 SBSQ HOSP IP/OBS HIGH 50: CPT | Performed by: HOSPITALIST

## 2021-10-19 PROCEDURE — 86140 C-REACTIVE PROTEIN: CPT | Performed by: HOSPITALIST

## 2021-10-19 PROCEDURE — 90937 HEMODIALYSIS REPEATED EVAL: CPT

## 2021-10-19 RX ORDER — CEFTRIAXONE 2 G/1
2 INJECTION, POWDER, FOR SOLUTION INTRAMUSCULAR; INTRAVENOUS EVERY 24 HOURS
Status: DISCONTINUED | OUTPATIENT
Start: 2021-10-19 | End: 2021-10-23 | Stop reason: HOSPADM

## 2021-10-19 RX ORDER — IOPAMIDOL 755 MG/ML
500 INJECTION, SOLUTION INTRAVASCULAR ONCE
Status: COMPLETED | OUTPATIENT
Start: 2021-10-19 | End: 2021-10-19

## 2021-10-19 RX ORDER — AZITHROMYCIN 500 MG/5ML
500 INJECTION, POWDER, LYOPHILIZED, FOR SOLUTION INTRAVENOUS ONCE
Status: COMPLETED | OUTPATIENT
Start: 2021-10-19 | End: 2021-10-19

## 2021-10-19 RX ORDER — AZITHROMYCIN 250 MG/1
250 TABLET, FILM COATED ORAL DAILY
Status: COMPLETED | OUTPATIENT
Start: 2021-10-20 | End: 2021-10-23

## 2021-10-19 RX ADMIN — HEPARIN SODIUM 5000 UNITS: 10000 INJECTION, SOLUTION INTRAVENOUS; SUBCUTANEOUS at 10:16

## 2021-10-19 RX ADMIN — CALCIUM ACETATE 667 MG: 667 CAPSULE ORAL at 16:52

## 2021-10-19 RX ADMIN — IOPAMIDOL 63 ML: 755 INJECTION, SOLUTION INTRAVENOUS at 12:27

## 2021-10-19 RX ADMIN — AZITHROMYCIN MONOHYDRATE 500 MG: 500 INJECTION, POWDER, LYOPHILIZED, FOR SOLUTION INTRAVENOUS at 12:41

## 2021-10-19 RX ADMIN — CEFTRIAXONE 2 G: 2 INJECTION, POWDER, FOR SOLUTION INTRAMUSCULAR; INTRAVENOUS at 10:12

## 2021-10-19 RX ADMIN — Medication 1 CAPSULE: at 10:13

## 2021-10-19 RX ADMIN — ACETAMINOPHEN 650 MG: 325 TABLET, FILM COATED ORAL at 18:46

## 2021-10-19 RX ADMIN — ALPRAZOLAM 0.25 MG: 0.25 TABLET ORAL at 00:30

## 2021-10-19 RX ADMIN — HEPARIN SODIUM 3000 UNITS: 1000 INJECTION, SOLUTION INTRAVENOUS; SUBCUTANEOUS at 07:38

## 2021-10-19 RX ADMIN — EPOETIN ALFA-EPBX 4000 UNITS: 4000 INJECTION, SOLUTION INTRAVENOUS; SUBCUTANEOUS at 07:35

## 2021-10-19 RX ADMIN — SODIUM CHLORIDE 250 ML: 9 INJECTION, SOLUTION INTRAVENOUS at 07:35

## 2021-10-19 RX ADMIN — HEPARIN SODIUM 5000 UNITS: 10000 INJECTION, SOLUTION INTRAVENOUS; SUBCUTANEOUS at 18:46

## 2021-10-19 RX ADMIN — ACETAMINOPHEN 650 MG: 325 TABLET, FILM COATED ORAL at 10:13

## 2021-10-19 RX ADMIN — SODIUM CHLORIDE 82 ML: 9 INJECTION, SOLUTION INTRAVENOUS at 12:27

## 2021-10-19 RX ADMIN — CALCIUM ACETATE 667 MG: 667 CAPSULE ORAL at 10:14

## 2021-10-19 RX ADMIN — DEXAMETHASONE 6 MG: 2 TABLET ORAL at 12:00

## 2021-10-19 RX ADMIN — SODIUM CHLORIDE 300 ML: 9 INJECTION, SOLUTION INTRAVENOUS at 07:34

## 2021-10-19 RX ADMIN — TOCILIZUMAB 600 MG: 20 INJECTION, SOLUTION, CONCENTRATE INTRAVENOUS at 10:53

## 2021-10-19 RX ADMIN — ACETAMINOPHEN 650 MG: 325 TABLET, FILM COATED ORAL at 13:57

## 2021-10-19 ASSESSMENT — ACTIVITIES OF DAILY LIVING (ADL)
ADLS_ACUITY_SCORE: 5
ADLS_ACUITY_SCORE: 5
ADLS_ACUITY_SCORE: 7
ADLS_ACUITY_SCORE: 5
ADLS_ACUITY_SCORE: 7
ADLS_ACUITY_SCORE: 5
ADLS_ACUITY_SCORE: 5
ADLS_ACUITY_SCORE: 7
ADLS_ACUITY_SCORE: 5

## 2021-10-19 ASSESSMENT — MIFFLIN-ST. JEOR: SCORE: 1590.03

## 2021-10-19 NOTE — PROGRESS NOTES
[facility]    Hospitalist Progress Note  Name: Matteo Barnes    MRN: [id number]  Provider:  Matteo Hensley DO MPH  Date of Service: 10/19/2021    Summary of Stay: Matteo Barnes is a 66 year old male with a history of stage V chronic kidney disease, gout, hypertension, hyperlipidemia, proteinuria, FSGS, and anxiety who presents with fever and shortness of breath.     The patient is fully vaccinated against COVID-19 with the Pfizer series starting in March and completing in April.  He has not yet had his booster.  On 10/11 he noticed onset of coughing, myalgias, vomiting, and generalized weakness with fatigue.  He also tested negative for COVID-19 on that date.  His symptoms continued and he has had decreased appetite and tells me he has not eaten in 1 week he does have advancing a progressive chronic kidney disease and has been setting up for initiation of dialysis in the near future.  He was supposed to have a fistula created this week.  Due to ongoing deterioration in his symptoms and clinical status he came to the ED today for evaluation.     Here his temperature is 98.9, heart rate 88, blood pressure 155/97, respiratory 20 and oxygen saturation 96% on room air.  Laboratory work is notable for sodium 132, potassium 5.1, creatinine 8.2, lipase 461.  Lactic acid and liver function tests are normal.  CBC notable for hemoglobin of 10.6 with normal platelets and normal white blood cells.  COVID-19 PCR is positive.  Blood culture has been sent.  Chest x-ray shows infiltrates consistent with COVID-19 pneumonia.  He was given dexamethasone.  Nephrology was consulted.    Following admission recommendation was to initiate on dialysis.  Yesterday he had a Kodak HD catheter placed.  After returning from  he developed a new fever with worsening hypoxia into the evening and overnight.  Dexamethasone has now been started.  Discussed with the intensivist and will give a dose of Tocilizumab today.   Unfortunately appears as though he is now showing fairly rapid deterioration from COVID-19 induced respiratory failure.     Problem List:  #Acute hypoxic respiratory failure secondary to COVID-19 pneumonia: He is fully vaccinated with the Pfizer series.  Initially and for a few days was not hypoxic.  Given infiltrate seen on chest x-ray and high risk patient we initiated a 10-day course of oral dexamethasone.  Currently has contraindications to remdesivir in the sense of renal failure so that will not be administered.  Yesterday developed fever and worsening hypoxia into the evening and overnight.  Now on 10 L oxygen mask.  -Continue 6 mg dexamethasone for 10 days.  -Continue supplemental oxygen and adjust as needed.  -Discussed with pulmonary ICU MD today, will give a dose of Tocilizumab.  -Continue subcutaneous heparin for now.  -Not appropriate for remdesivir given renal failure.  -Discussed with nephrology, will be fairly aggressive with fluid removal during dialysis.  -Discussed with nephrology, will check CT PE protocol to rule out pulmonary malaise and.  -Patient showing fairly rapid clinical deterioration and I am concerned that he may require BiPAP or even intubation later today.  I have updated the ANS regarding this concern given critical ICU bed shortage in [place] and [place].    #ESRD: Creatinine has progressively been worsening over the past several months.  He tells me he follows with Dr. Roy in the nephrology clinic and has been preparing for initiation of dialysis and actually was supposed to have fistula creation later this week.  Appreciate formal consultation from nephrology.  Bina Candelario catheter placement yesterday and initiation of dialysis.  Had another run this morning.    #Hypovolemic hyponatremia: Received 1 L of normal saline in the ED. sodium fairly stable at 131.    #Hyperkalemia: Ordered kayexalate and potassium normalized.  Can be further addressed with  dialysis.    #Non-anion gap metabolic acidosis: Likely due to deteriorating renal function.  Monitor closely.  I believe he is on oral bicarbonate supplements as an outpatient.  This has improved with bicarbonate infusion initially now hemodialysis.    #Hypertension: Blood pressure is stable.  We will watch for now while holding prior to admission amlodipine and carvedilol.  Tolerating dialysis.    DVT Prophylaxis: Heparin SQ  Code Status: Full Code  Diet: Renal Diet (non-dialysis)    Duval Catheter: Not present  Disposition: Expected discharge in 3+ days to home. Goals prior to discharge include manage renal failure and respiratory failure from COVID.   Incidental Findings: None.  Family updated today: No     Interval History   The patient reports doing well. No chest pain but now coughing and shortness of breath. No nausea, vomiting, diarrhea, constipation. Had fevers overnight. No other specific complaints identified.     Now hypoxic showing deterioration.  Discussed the case with bedside RN, Dr. Curiel, and Dr. Demetria Boyd.    -Data reviewed today: I personally reviewed all new labs and imaging results over the last 24 hours.     Physical Exam   Temp: (!) 101  F (38.3  C) Temp src: Axillary BP: (!) 160/93 Pulse: 96   Resp: 22 SpO2: 96 % O2 Device: Oxymask Oxygen Delivery: 10 LPM  Vitals:    10/16/21 1444 10/18/21 0512 10/19/21 0401   Weight: 81.1 kg (178 lb 11.2 oz) 81.4 kg (179 lb 6.4 oz) 82 kg (180 lb 11.2 oz)     Vital Signs with Ranges  Temp:  [98  F (36.7  C)-101  F (38.3  C)] 101  F (38.3  C)  Pulse:  [] 96  Resp:  [15-24] 22  BP: ()/(52-97) 160/93  SpO2:  [85 %-96 %] 96 %  I/O last 3 completed shifts:  In: 2561 [P.O.:480; I.V.:2081]  Out: 0     GENERAL: No apparent distress. Awake, alert, and fully oriented.  HEENT: Normocephalic, atraumatic. Extraocular movements intact.  CARDIOVASCULAR: Regular rate and rhythm without murmurs or rubs. No S3.  PULMONARY: Clear  bilaterally.  GASTROINTESTINAL: Soft, non-tender, non-distended. Bowel sounds normoactive.   EXTREMITIES: No cyanosis or clubbing. No edema.  NEUROLOGICAL: CN 2-12 grossly intact, no focal neurological deficits.  DERMATOLOGICAL: No rash, ulcer, bruising, nor jaundice.     Medications     - MEDICATION INSTRUCTIONS -       - MEDICATION INSTRUCTIONS -       sodium chloride 0.9%         [START ON 10/20/2021] azithromycin  250 mg Oral Daily     azithromycin  500 mg Intravenous Once     calcium acetate  667 mg Oral TID w/meals     cefTRIAXone  2 g Intravenous Q24H     dexamethasone  6 mg Oral Daily     heparin ANTICOAGULANT  5,000 Units Subcutaneous Q8H JOSE     multivitamin RENAL  1 capsule Oral Daily     sodium chloride (PF)  3 mL Intracatheter Q8H     tocilizumab  600 mg Intravenous Once     Data     Laboratory:  Recent Labs   Lab 10/19/21  0640 10/18/21  0653 10/17/21  0813   WBC 6.2 7.5 5.3   HGB 9.1* 9.2* 10.0*   HCT 26.0* 26.7* 29.7*   MCV 93 92 94    168 167     Recent Labs   Lab 10/19/21  0640 10/18/21  1200 10/18/21  0653 10/17/21  0813 10/17/21  0813   *  --  130*  --  130*   POTASSIUM 4.0  --  4.0  --  5.5*   CHLORIDE 97  --  100  --  106   CO2 29  --  19*  --  14*   ANIONGAP 5  --  11  --  10   * 119* 121*   < > 118*   BUN 45*  --  76*  --  75*   CR 5.41*  --  7.43*  --  8.06*   GFRESTIMATED 10*  --  7*  --  6*   ANNETTE 7.1*  --  7.6*  --  8.0*    < > = values in this interval not displayed.     No results for input(s): CULT in the last 168 hours.    Imaging:  No results found for this or any previous visit (from the past 24 hour(s)).      Matteo Hensley DO MPH  Formerly [facility] Hospitalist  [address] [facility] [place] [address]  10/19/2021

## 2021-10-19 NOTE — CONSULTS
"NUTRITION ASSESSMENT    Recommendations Ordered by Registered Dietitian (RD):   Oral nutrition supplement   Future/Additional Recommendations:  COVID19 trajectory prior to intubation if prolonged BiPAP reliance anticipated beyond next 1-2 days: consider early nasoenteric FT placement while window exists for placement on HFNC   Malnutrition:   % Intake:</= 50% for >/= 5 days  % Weight Loss:Up to 5% in 1 month   Subcutaneous Fat Loss: Mild, as outlined below  Muscle Loss: Moderate, as outlined below  Fluid/Edema: none charted, HD     Malnutrition diagnosis: Severe malnutrition  In the context of: Acute illness or injury with underlying   Chronic illness or disease     REASON FOR ASSESSMENT:  Matteo Barnes is a 66 year old male seen by Registered Dietitian for positive malnutrition risk screen   History of stage V chronic kidney disease, gout, hypertension, hyperlipidemia, proteinuria, FSGS, and anxiety who presents with fever and shortness of breath.  Admitted with complications related to COVID19 infection  CURRENT DIET AND NOURISHMENT ORDER:    Information obtained from patient, limited during HD run    Food allergies/intolerances: NKFA    Patient is on a regular diet at home - renal restrictions, no added salt and avoiding high potassium foods    Typical food/fluid intake: minimal intake x7 days prior to admit, consistent with COVID19 infection    Factors affecting nutrition intake include:nausea, vomiting, decreased appetite, malaise, generalized weakness       Current Diet: Renal Diet (non-dialysis)    Current Intake/Tolerance: Per flow sheet review, % intake for documented meals - minimal portions and low intake. Overall meeting </=50% of estimated needs since admit    Factors affecting nutrition intake include: decreased appetite, early satiety, dyspnea    ANTHROPOMETRICS  Height: 5' 9\"  Weight: 81 kg   Body mass index is 26.68 kg/m .  Weight Status:  Overweight BMI 25-29.9  Weight History: 4.2% weight " loss related to COVID infection, as noted below  Wt Readings from Last 10 Encounters:   10/19/21 82 kg (180 lb 11.2 oz)   09/02/21 85.3 kg (188 lb)   02/01/21 87.1 kg (192 lb)   01/05/21 87.1 kg (192 lb)   11/30/20 88 kg (194 lb)   08/13/20 87.1 kg (192 lb)   06/25/19 87.1 kg (192 lb)   01/24/19 84.4 kg (186 lb)   08/08/18 81 kg (178 lb 8 oz)   06/27/18 83.5 kg (184 lb)       ASSESSED NUTRITION NEEDS (PER APPROVED PRACTICE GUIDELINES, Dosing weight: 82 kg)  Estimated Energy Needs:  25-30 kcal per kg  Justification: minimum maintenance while hospitalized with active COVID19 infection  Estimated Protein Needs: 1.2-1.5+ grams per kg  Justification: preservation of lean body mass, new HD  Estimated Fluid Needs: per provider     LABS/MEDS/PHYSICAL FINDINGS:  Nutrition focused physical exam: limited due to HD run    Muscle loss: moderate in calves, clavicle, acromion and temporal regions    Fat loss: mild in upper and lower arms, thoracic region; moderate in orbital region    Pt reports decreased functional capacity  Tenuous respiratory status - BiPAP needs this afternoon   Last BM: 10/19, diarrhea  Generalized weakness   Fluid: no edema documented  Meds reviewed  Labs reviewed    Nutrition Diagnosis:  Malnutrition related to poor appetite and oral intake, now with tenuous respiratory status due to active COVID19 infection as evidenced by intake meeting of <50% of estimated needs for >5  days, 4% weight loss, fat and muscle wasting     INTERVENTIONS  Recommendations / Nutrition Prescription  Dialysis diet - change in medical condition this AM (defer diet changes for now) + oral nutrition supplements to increase calorie and protein intake  Renal Multivitamin  COVID19 trajectory prior to intubation if prolonged BiPAP reliance anticipated beyond next 1-2 days: consider early nasoenteric FT placement while window exists for placement on HFNC    Implementation:  Nutrition Education:  reviewed oral nutrition supplements  options, increased calories and protein through nutrient dense options, small frequent meals, hypercatabolic nature of COVID and goal of strength + weight preservation  Vitamins/Minerals: nephrocaps  Diet order: consider liberalization to regular as medically able  Medical food supplement: Ensure 10-2  Collaboration and Referral of care: Discussed patient during interdisciplinary care rounds this morning    Goals  Diet tolerance (O2 needs) within 48 hours versus nutrition support initiation     Follow Up/Monitoring:   Progress towards goals will be monitored and evaluated per protocol and Practice Guidelines        Radha Ospina MS, RDN-AP, LD, CNSC  Pager - 3rd floor/ICU: [phone]  Pager - All other floors: [phone]  Pager - Weekend/holiday: [phone]  Office: [phone]

## 2021-10-19 NOTE — PLAN OF CARE
A/Ox4.  Pt desatted to 85% on RA, placed 2.5L O2 NC and pt now maintaining sats in low to mid 90s.  Other VSS.  Denies pain.  LS dim, GROVES.  IS at bedside, self-administers.  No tele, denies CP.  Kodak to the R chest wall, minimal dried blood on dressing otherwise WDL.  L PIV SL.  Renal diet.  Up ad phan.  Covid precautions maintained.  Anticipatory HD tomm.  Discharge tbd.  Continue POC.

## 2021-10-19 NOTE — PLAN OF CARE
Ox4. VSS on 5-10 L O2 per oxymask except m-temp 101 axillary, improved w/ PRN PO tylenol. Denied pain, CP. GROVES. Dsg to right chest CVC CDI, had HD run this AM, CHG bath given. PIV to left intact, SL. Poor PO intake, but pt does report liking Ensure. Anticipatory HD run 10/20. Maintained covid iso precautions. Will continue POC.

## 2021-10-19 NOTE — PROGRESS NOTES
Care Management Follow Up    Length of Stay (days): 3    Expected Discharge Date: 10/22/2021     Concerns to be Addressed: discharge planning     Patient plan of care discussed at interdisciplinary rounds: Yes    Anticipated Discharge Disposition: Dialysis Services     Referrals Placed by CM/SW: Specialty Providers    Additional Information:  CM following for coordination of new OP Hemodialysis with [facility]. Sent records for CVC placement, HD notes and Hep B antibody. Still awaiting results for Hep B surface antigen and Hep B core antibody, will send once resulted.     CM will continue to follow along and assess pt for additional discharge needs once medically appropriate.    Vita Blevins RN BSN   Inpatient Care Coordination  [facility]   Phone [phone]

## 2021-10-19 NOTE — PROGRESS NOTES
Potassium   Date Value Ref Range Status   10/19/2021 4.0 3.4 - 5.3 mmol/L Final   02/01/2021 4.5 3.4 - 5.3 mmol/L Final     Hemoglobin   Date Value Ref Range Status   10/19/2021 9.1 (L) 13.3 - 17.7 g/dL Final   02/01/2021 12.5 (L) 13.3 - 17.7 g/dL Final     Creatinine   Date Value Ref Range Status   10/19/2021 5.41 (H) 0.66 - 1.25 mg/dL Final   02/01/2021 4.77 (H) 0.66 - 1.25 mg/dL Final     Urea Nitrogen   Date Value Ref Range Status   10/19/2021 45 (H) 7 - 30 mg/dL Final   02/01/2021 66 (H) 7 - 30 mg/dL Final     Sodium   Date Value Ref Range Status   10/19/2021 131 (L) 133 - 144 mmol/L Final   02/01/2021 139 133 - 144 mmol/L Final     INR   Date Value Ref Range Status   10/18/2021 1.05 0.85 - 1.15 Final   02/01/2021 1.12 0.86 - 1.14 Final       DIALYSIS PROCEDURE NOTE  Hepatitis status of previous patient on machine log was checked and verified ok to use with this patients hepatitis status.  Patient dialyzed for 3 hrs. on a K3 bath with a net fluid removal of  2.5L started out a 1kg then increased due to pt needing more oxygen ok per Dr Curiel.  A BFR of 300 ml/min was obtained via a RIJ catheter.      The treatment plan was discussed with Dr. Curiel during the treatment.    Total heparin received during the treatment: 0 units.   Line flushed, clamped and capped with heparin 1:1000 1.6 mL (1600 units) per lumen    Meds  given: EPO 4,000units IV   Complications: Demand for oxygen needs went up to a oxymask at 10L with O2 sat at 94%, Dr Curiel notified and increased UF to 2.5 kg per Dr Curiel. Pt had low BP at the end of the treatment. Bp went back up after NS flush and rinseback. Dr Curiel aware.    Person educated: pt. Knowledge base none. Barriers to learning: none. Educated on access care via verbal mode. patient verbalized understanding. Pt prefers verbal education style.     ICEBOAT? Timeout performed pre-treatment  I: Patient was identified using 2 identifiers  C:  Consent Signed Yes  E: Equipment  preventative maintenance is current and dialysis delivery system OK to use  B: Hepatitis B Surface Antigen: unknown; Draw Date: 10/18/2021      Hepatitis B Surface Antibody: unknown; Draw Date: 10/18/2021  O: Dialysis orders present and complete prior to treatment  A: Vascular access verified and assessed prior to treatment  T: Treatment was performed at a clinically appropriate time  ?: Patient was allowed to ask questions and address concerns prior to treatment  See flowsheet in EPIC for further details and post assessment.  Machine water alarm in place and functioning. Transducer pods intact and checked every 15min.   Pt dialyzed in own room.  Chlorine/Chloramine water system checked every 4 hours.  Outpatient Dialysis at [facility]

## 2021-10-19 NOTE — PROGRESS NOTES
Inpatient Dialysis Progress Note            Assessment and Plan:   1.  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  HD #2 today.  . 3H.  Needs placement in outpt HD though it may be to COVID-19 cohort unit first.     2.  COVID-19: Increased oxygen demands.  Pulling 3 kg today to see if it helps hypoxia.  He is on dexamethasone.  Toci ordered today.      2.  Anemia. HGB 9.1.  On EPO 4000 units.  Ferritin 1735 reflecting COVID-19 and acute inflammation.  ISAT 26%.    3.  HTN. BP was ~160/90 falling to 127/88 with UF 3 kg.      4.  MBD: PTH in process.    5.  FEN:  K 4.0.  On K 3 bath.    Plan:    Next run tomorrow.  HD #3.      As BP fell at end of treatment, we removed only 2.5 L.          Interval History:     Oxygen demands increasing.  He is on 10 LPM oxymask.  Pulling 3 kg today to see if helps, though it may not be volume related.   He is on dexamethasone and toci added today.  TDC exit site no longer bleeding.  He is on subcutaneous heparin.          Dialysis Parameters:     Wt Readings from Last 4 Encounters:   10/19/21 82 kg (180 lb 11.2 oz)   09/02/21 85.3 kg (188 lb)   02/01/21 87.1 kg (192 lb)   01/05/21 87.1 kg (192 lb)     I/O last 3 completed shifts:  In: 2561 [P.O.:480; I.V.:2081]  Out: 0   BP Readings from Last 3 Encounters:   10/19/21 (!) 146/97   09/02/21 128/76   02/01/21 (!) 167/112       Routine, ONE TIME, Starting today For 1 Occurrences  Weight Loss (kg): 3  Dialysis Temp: 36.5  C  Access Device: CVC   Access Site: R IJ  Dialyzer: Revaclear  Dialysis Bath: K 3  Blood Flow Rate (mL/min): 300  Total Treatment Time (hrs): 3  Heparin: no         Medications and Allergies:   Reviewed in EPIC      [START ON 10/20/2021] azithromycin  250 mg Oral Daily     azithromycin  500 mg Intravenous Once     calcium acetate  667 mg Oral TID w/meals     cefTRIAXone  2 g Intravenous Q24H     dexamethasone  6 mg Oral Daily     heparin ANTICOAGULANT  5,000 Units Subcutaneous Q8H JOSE     multivitamin RENAL  1 capsule Oral Daily      sodium chloride (PF)  3 mL Intracatheter Q8H     tocilizumab  600 mg Intravenous Once     acetaminophen **OR** acetaminophen, albuterol, ALPRAZolam, bisacodyl, fentaNYL, flumazenil, heparin (porcine), lidocaine 4%, lidocaine HCl, lidocaine (buffered or not buffered), lidocaine 1% with EPINEPHrine 1:100,000, - MEDICATION INSTRUCTIONS -, - MEDICATION INSTRUCTIONS -, midazolam, naloxone **OR** naloxone **OR** naloxone **OR** naloxone, naloxone **OR** naloxone **OR** naloxone **OR** naloxone, ondansetron **OR** ondansetron, polyethylene glycol, senna-docusate **OR** senna-docusate, sodium chloride (PF), sodium chloride 0.9%   No Known Allergies           Labs:     BMP  Recent Labs   Lab 10/19/21  0640 10/18/21  1200 10/18/21  0653 10/17/21  0813 10/16/21  0942 10/16/21  0942   *  --  130* 130*  --  132*   POTASSIUM 4.0  --  4.0 5.5*  --  5.1   CHLORIDE 97  --  100 106  --  103   ANNETTE 7.1*  --  7.6* 8.0*  --  8.4*   CO2 29  --  19* 14*  --  17*   BUN 45*  --  76* 75*  --  62*   CR 5.41*  --  7.43* 8.06*  --  8.25*   * 119* 121* 118*   < > 103*    < > = values in this interval not displayed.     CBC  Recent Labs   Lab 10/19/21  0640 10/18/21  0653 10/17/21  0813 10/16/21  1659   WBC 6.2 7.5 5.3 4.7   HGB 9.1* 9.2* 10.0* 10.6*   HCT 26.0* 26.7* 29.7* 31.2*   MCV 93 92 94 96    168 167 150     Lab Results   Component Value Date    AST 25 10/16/2021    ALT 26 10/16/2021    ALKPHOS 68 10/16/2021    BILITOTAL 0.4 10/16/2021            Physical Exam:   Vitals were reviewed in EPIC    Wt Readings from Last 3 Encounters:   10/19/21 82 kg (180 lb 11.2 oz)   09/02/21 85.3 kg (188 lb)   02/01/21 87.1 kg (192 lb)       Intake/Output Summary (Last 24 hours) at 10/19/2021 0942  Last data filed at 10/18/2021 2051  Gross per 24 hour   Intake 2561 ml   Output 0 ml   Net 2561 ml       Pt seen on dialysis.  Stable run.  Good BFR.      Attestation:  I have reviewed today's vital signs, notes, medications, labs and  imaging.     Michelet Curiel MD  [facility] - Nephrology  [phone]

## 2021-10-19 NOTE — PLAN OF CARE
VSS on 5 L O2 NC. Started on 2.5 L, sating 87-89%, increased O2 needs throughout shift. Pt A/O x 4, able to make needs known. Up independent in room. Denies pain. PRN Xanax given x1 for anxiety. [employer] placed 10/18, dressing C/D/I, covered w/ foam tape. Scheduled for dialysis early this AM. Plans to discharge home in 2-3 days w/ outpatient dialysis, SW following. Will continue with plan of care.

## 2021-10-20 LAB
ALBUMIN SERPL-MCNC: 2.5 G/DL (ref 3.4–5)
ANION GAP SERPL CALCULATED.3IONS-SCNC: 8 MMOL/L (ref 3–14)
BUN SERPL-MCNC: 40 MG/DL (ref 7–30)
CALCIUM SERPL-MCNC: 7.6 MG/DL (ref 8.5–10.1)
CHLORIDE BLD-SCNC: 98 MMOL/L (ref 94–109)
CO2 SERPL-SCNC: 26 MMOL/L (ref 20–32)
CREAT SERPL-MCNC: 4.69 MG/DL (ref 0.66–1.25)
CRP SERPL-MCNC: 140 MG/L (ref 0–8)
D DIMER PPP FEU-MCNC: 2.08 UG/ML FEU (ref 0–0.5)
ERYTHROCYTE [DISTWIDTH] IN BLOOD BY AUTOMATED COUNT: 12.9 % (ref 10–15)
GFR SERPL CREATININE-BSD FRML MDRD: 12 ML/MIN/1.73M2
GLUCOSE BLD-MCNC: 123 MG/DL (ref 70–99)
HBV SURFACE AG SERPL QL IA: NONREACTIVE
HCT VFR BLD AUTO: 27.4 % (ref 40–53)
HGB BLD-MCNC: 9.2 G/DL (ref 13.3–17.7)
LACTATE SERPL-SCNC: 1.5 MMOL/L (ref 0.7–2)
MCH RBC QN AUTO: 31.1 PG (ref 26.5–33)
MCHC RBC AUTO-ENTMCNC: 33.6 G/DL (ref 31.5–36.5)
MCV RBC AUTO: 93 FL (ref 78–100)
PHOSPHATE SERPL-MCNC: 3.5 MG/DL (ref 2.5–4.5)
PLATELET # BLD AUTO: 181 10E3/UL (ref 150–450)
POTASSIUM BLD-SCNC: 3.9 MMOL/L (ref 3.4–5.3)
RBC # BLD AUTO: 2.96 10E6/UL (ref 4.4–5.9)
SODIUM SERPL-SCNC: 132 MMOL/L (ref 133–144)
WBC # BLD AUTO: 8.4 10E3/UL (ref 4–11)

## 2021-10-20 PROCEDURE — 250N000012 HC RX MED GY IP 250 OP 636 PS 637: Performed by: HOSPITALIST

## 2021-10-20 PROCEDURE — 36415 COLL VENOUS BLD VENIPUNCTURE: CPT | Performed by: HOSPITALIST

## 2021-10-20 PROCEDURE — 258N000003 HC RX IP 258 OP 636: Performed by: INTERNAL MEDICINE

## 2021-10-20 PROCEDURE — 86140 C-REACTIVE PROTEIN: CPT | Performed by: HOSPITALIST

## 2021-10-20 PROCEDURE — 90937 HEMODIALYSIS REPEATED EVAL: CPT

## 2021-10-20 PROCEDURE — 250N000013 HC RX MED GY IP 250 OP 250 PS 637: Performed by: INTERNAL MEDICINE

## 2021-10-20 PROCEDURE — 99232 SBSQ HOSP IP/OBS MODERATE 35: CPT | Performed by: INTERNAL MEDICINE

## 2021-10-20 PROCEDURE — 85379 FIBRIN DEGRADATION QUANT: CPT | Performed by: HOSPITALIST

## 2021-10-20 PROCEDURE — 99233 SBSQ HOSP IP/OBS HIGH 50: CPT | Performed by: INTERNAL MEDICINE

## 2021-10-20 PROCEDURE — 120N000001 HC R&B MED SURG/OB

## 2021-10-20 PROCEDURE — 250N000013 HC RX MED GY IP 250 OP 250 PS 637: Performed by: HOSPITALIST

## 2021-10-20 PROCEDURE — 634N000001 HC RX 634: Performed by: INTERNAL MEDICINE

## 2021-10-20 PROCEDURE — 250N000011 HC RX IP 250 OP 636: Performed by: HOSPITALIST

## 2021-10-20 PROCEDURE — 85027 COMPLETE CBC AUTOMATED: CPT | Performed by: HOSPITALIST

## 2021-10-20 PROCEDURE — 83605 ASSAY OF LACTIC ACID: CPT | Performed by: HOSPITALIST

## 2021-10-20 PROCEDURE — 80069 RENAL FUNCTION PANEL: CPT | Performed by: INTERNAL MEDICINE

## 2021-10-20 RX ORDER — HEPARIN SODIUM 1000 [USP'U]/ML
500 INJECTION, SOLUTION INTRAVENOUS; SUBCUTANEOUS
Status: DISCONTINUED | OUTPATIENT
Start: 2021-10-20 | End: 2021-10-20

## 2021-10-20 RX ORDER — HEPARIN SODIUM 1000 [USP'U]/ML
500 INJECTION, SOLUTION INTRAVENOUS; SUBCUTANEOUS CONTINUOUS
Status: DISCONTINUED | OUTPATIENT
Start: 2021-10-20 | End: 2021-10-20

## 2021-10-20 RX ORDER — CARVEDILOL 3.12 MG/1
3.12 TABLET ORAL 2 TIMES DAILY WITH MEALS
Status: DISCONTINUED | OUTPATIENT
Start: 2021-10-20 | End: 2021-10-22

## 2021-10-20 RX ORDER — ALLOPURINOL 100 MG/1
200 TABLET ORAL DAILY
Status: DISCONTINUED | OUTPATIENT
Start: 2021-10-20 | End: 2021-10-23 | Stop reason: HOSPADM

## 2021-10-20 RX ADMIN — SODIUM CHLORIDE 300 ML: 9 INJECTION, SOLUTION INTRAVENOUS at 16:04

## 2021-10-20 RX ADMIN — HEPARIN SODIUM 5000 UNITS: 10000 INJECTION, SOLUTION INTRAVENOUS; SUBCUTANEOUS at 03:28

## 2021-10-20 RX ADMIN — CARVEDILOL 3.12 MG: 3.12 TABLET, FILM COATED ORAL at 19:27

## 2021-10-20 RX ADMIN — EPOETIN ALFA-EPBX 4000 UNITS: 4000 INJECTION, SOLUTION INTRAVENOUS; SUBCUTANEOUS at 16:04

## 2021-10-20 RX ADMIN — Medication 1 CAPSULE: at 08:27

## 2021-10-20 RX ADMIN — HEPARIN SODIUM 5000 UNITS: 10000 INJECTION, SOLUTION INTRAVENOUS; SUBCUTANEOUS at 19:28

## 2021-10-20 RX ADMIN — DEXAMETHASONE 6 MG: 2 TABLET ORAL at 13:57

## 2021-10-20 RX ADMIN — CEFTRIAXONE 2 G: 2 INJECTION, POWDER, FOR SOLUTION INTRAMUSCULAR; INTRAVENOUS at 08:28

## 2021-10-20 RX ADMIN — AZITHROMYCIN MONOHYDRATE 250 MG: 250 TABLET ORAL at 08:27

## 2021-10-20 RX ADMIN — CALCIUM ACETATE 667 MG: 667 CAPSULE ORAL at 08:27

## 2021-10-20 RX ADMIN — CALCIUM ACETATE 667 MG: 667 CAPSULE ORAL at 13:57

## 2021-10-20 RX ADMIN — CALCIUM ACETATE 667 MG: 667 CAPSULE ORAL at 19:26

## 2021-10-20 RX ADMIN — SODIUM CHLORIDE 250 ML: 9 INJECTION, SOLUTION INTRAVENOUS at 16:05

## 2021-10-20 RX ADMIN — HEPARIN SODIUM 5000 UNITS: 10000 INJECTION, SOLUTION INTRAVENOUS; SUBCUTANEOUS at 09:47

## 2021-10-20 RX ADMIN — ALLOPURINOL 200 MG: 100 TABLET ORAL at 19:27

## 2021-10-20 ASSESSMENT — ACTIVITIES OF DAILY LIVING (ADL)
ADLS_ACUITY_SCORE: 5
ADLS_ACUITY_SCORE: 6
ADLS_ACUITY_SCORE: 5

## 2021-10-20 ASSESSMENT — MIFFLIN-ST. JEOR: SCORE: 1571.89

## 2021-10-20 NOTE — PLAN OF CARE
VSS except SPO2 (Desats to mid-upper 80s). Pt is alert and oriented x 4. Pt is sinus rhythm. Lung sounds are diminished. IS at bedside. Pt using the oxymask @7L. Able to use NC at 6L to eat dinner this shift. Pt on a renal diet. Bowel sounds are hyperactive and pt reported episodes of diarrhea. Pt is indpendent in room. Hemodialysis scheduled for 10/20.

## 2021-10-20 NOTE — PROGRESS NOTES
I faxed Hep B surface antigen, Hep B CORE antibody and covid results to [facility] fax: [phone].      Estefany Miller RN BSN   Inpatient Care Coordination  [facility]  [phone]

## 2021-10-20 NOTE — PLAN OF CARE
VSS trying to wean o2. 7L oxymask to 5L NC 92-95% GROVES. On PO Decadron, PO Zithromax and IV Rocephin. Encouraging IS. Pt up independently in room. Pt stated feel better today and having more energy. Cr elevated.  Right Dialysis cath site. Plan for Dialysis this afternoon.

## 2021-10-20 NOTE — PROGRESS NOTES
[facility]    Medicine Progress Note - Hospitalist Service       Date of Admission:  10/16/2021    Assessment & Plan           Matteo Barnes is a 66 year old male with a history of stage V chronic kidney disease, gout, hypertension, hyperlipidemia, proteinuria, FSGS, and anxiety who presents with fever and shortness of breath.     The patient is fully vaccinated against COVID-19 with the Pfizer series starting in March and completing in April.  He has not yet had his booster.  On 10/11 he noticed onset of coughing, myalgias, vomiting, and generalized weakness with fatigue.  He also tested negative for COVID-19 on that date.  His symptoms continued and he has had decreased appetite and tells me he has not eaten in 1 week he does have advancing a progressive chronic kidney disease and has been setting up for initiation of dialysis in the near future.  He was supposed to have a fistula created this week.  Due to ongoing deterioration in his symptoms and clinical status he came to the ED for evaluation.    COVID course.  Onset of symptoms 10/11.  Initial positive SARS-CoV-2 PCR 10/16.  Hospitalized 10/16.    1.  COVID-19 pneumonia.  Despite previous fully vaccinated state.  Possible bacterial superinfection.  Not a candidate for IV remdesivir due to end-stage kidney disease.  Was started on dexamethasone initially.  -Continue dexamethasone 6 mg a day with plan for 10-day course.  -Did have dose of IV Tocilizumab on 10/19.  -Started on azithromycin and IV ceftriaxone on 10/19 to cover possible bacterial superinfection.  -Supplemental oxygen as needed.  -Subcutaneous heparin for DVT prophylaxis.    2.  Acute hypoxic respiratory failure.  Likely multifactorial due to COVID-19 pneumonia, possible bacterial pneumonia superinfection, and fluid overload.  -Hemodialysis per nephrology.  -Treat COVID-19 pneumonia and possible bacterial superinfection as above.  -Supplemental oxygen as needed.    3.   End-stage kidney disease.  Not previously on hemodialysis.  Appreciate nephrology input.  Initiated on hemodialysis during this hospital stay.  -Continue hemodialysis per nephrology.    4.  Hypertension.  Normally on amlodipine and carvedilol in outpatient setting.  -Restart carvedilol at 3.125 mg twice a day.  -Hold amlodipine for now.    5.  Hyperkalemia.  Did have a dose of sodium polystyrene on 10/17.  Potassium is now normalized.  -Continue to monitor metabolic panel intermittently.    6.  Hyponatremia.  Mild.  Stable.  -Monitor metabolic panel intermittently.    7.  Hypocalcemia.  Mild.  -Monitor metabolic panel intermittently.    8.  Anemia.  Acute on chronic.  Suspect chronic is due to chronic kidney disease.  Mild acute worsening likely due to COVID-19 infection and iatrogenic blood draws.  No known recent acute hemorrhage.  Did have a dose of IV iron sucrose on 10/18.  -Continue to monitor CBC intermittently.       Diet: Dialysis Diet  Snacks/Supplements Adult: Ensure Enlive; Between Meals    DVT Prophylaxis: Heparin SQ  Duval Catheter: Not present  Central Lines: None  Code Status: Full Code        Mt Reed DO  Hospitalist Service  [facility]  Securely message with the Vocera Web Console (learn more here)  Text page via [employer] Paging/Directory      Clinically Significant Risk Factors Present on Admission                ______________________________________________________________________    Interval History   Short of breath.  Occasional cough.  Loose stools.  Notes that breathing seems to be better than previous.  Denies chest pain, fevers, chills, nausea, or vomiting.    Data reviewed today: I reviewed all medications, new labs and imaging results over the last 24 hours.    Physical Exam   Vital Signs: Temp: 97.9  F (36.6  C) Temp src: Oral BP: (!) 155/98 Pulse: 90   Resp: 20 SpO2: 92 % O2 Device: Nasal cannula Oxygen Delivery: 5 LPM  Weight: 176 lbs 11.2 oz  Gen:   NAD, A&Ox3.  Eyes:  PERRL, sclera anicteric.  OP:  MMM, no lesions.  Neck:  Supple.  CV:  Regular, no murmurs.  Lung:  CTA b/l, normal effort.  Ab:  +BS, soft.  Skin:  Warm, dry to touch.  No rash.  Ext:  No pitting edema LE b/l.      Data   Recent Labs   Lab 10/20/21  0553 10/19/21  0640 10/18/21  1200 10/18/21  0653 10/18/21  0653 10/16/21  1659 10/16/21  0942   WBC 8.4 6.2  --   --  7.5   < > 4.6   HGB 9.2* 9.1*  --   --  9.2*   < > 10.6*   MCV 93 93  --   --  92   < > 95    152  --   --  168   < > 162   INR  --   --   --   --  1.05  --   --    * 131*  --   --  130*   < > 132*   POTASSIUM 3.9 4.0  --   --  4.0   < > 5.1   CHLORIDE 98 97  --   --  100   < > 103   CO2 26 29  --   --  19*   < > 17*   BUN 40* 45*  --   --  76*   < > 62*   CR 4.69* 5.41*  --   --  7.43*   < > 8.25*   ANIONGAP 8 5  --   --  11   < > 12   ANNETTE 7.6* 7.1*  --   --  7.6*   < > 8.4*   * 121* 119*  --  121*   < > 103*   ALBUMIN 2.5* 2.4*  --    < > 2.7*  --  3.7   PROTTOTAL  --   --   --   --   --   --  8.0   BILITOTAL  --   --   --   --   --   --  0.4   ALKPHOS  --   --   --   --   --   --  68   ALT  --   --   --   --   --   --  26   AST  --   --   --   --   --   --  25   LIPASE  --   --   --   --   --   --  461*    < > = values in this interval not displayed.

## 2021-10-20 NOTE — PROGRESS NOTES
Potassium   Date Value Ref Range Status   10/20/2021 3.9 3.4 - 5.3 mmol/L Final   02/01/2021 4.5 3.4 - 5.3 mmol/L Final     Hemoglobin   Date Value Ref Range Status   10/20/2021 9.2 (L) 13.3 - 17.7 g/dL Final   02/01/2021 12.5 (L) 13.3 - 17.7 g/dL Final     Creatinine   Date Value Ref Range Status   10/20/2021 4.69 (H) 0.66 - 1.25 mg/dL Final   02/01/2021 4.77 (H) 0.66 - 1.25 mg/dL Final     Urea Nitrogen   Date Value Ref Range Status   10/20/2021 40 (H) 7 - 30 mg/dL Final   02/01/2021 66 (H) 7 - 30 mg/dL Final     Sodium   Date Value Ref Range Status   10/20/2021 132 (L) 133 - 144 mmol/L Final   02/01/2021 139 133 - 144 mmol/L Final     INR   Date Value Ref Range Status   10/18/2021 1.05 0.85 - 1.15 Final   02/01/2021 1.12 0.86 - 1.14 Final       DIALYSIS PROCEDURE NOTE  Hepatitis status of previous patient on machine log was checked and verified ok to use with this patients hepatitis status.  Patient dialyzed for 3 hrs. on a K3 bath with a net fluid removal of  2L.  A BFR of 400 ml/min was obtained via a RIJ.      The treatment plan was discussed with Dr. Pickering during the treatment.    Total heparin received during the treatment: 2000units.     Line flushed, clamped and capped with heparin 1:1000 1.6 mL (1600 units) per lumen    Meds  given: epogen   Complications: none      Person educated: patient. Knowledge base minimal. Barriers to learning: none. Educated on access care via verbal mode. patient/family verbalized understanding. Pt prefers verbal education style.     ICEBOAT? Timeout performed pre-treatment  I: Patient was identified using 2 identifiers  C:  Consent Signed Yes  E: Equipment preventative maintenance is current and dialysis delivery system OK to use  B: Hepatitis B Surface Antigen: Negative; Draw Date: 10/18/21      Hepatitis B Surface Antibody: Susceptible; Draw Date: 10/18/21  O: Dialysis orders present and complete prior to treatment  A: Vascular access verified and assessed prior to  treatment  T: Treatment was performed at a clinically appropriate time  ?: Patient was allowed to ask questions and address concerns prior to treatment  See flowsheet in EPIC for further details and post assessment.  Machine water alarm in place and functioning. Transducer pods intact and checked every 15min.   Pt dialyzed at bedside.  Chlorine/Chloramine water system checked every 4 hours.

## 2021-10-20 NOTE — PLAN OF CARE
VSS pt desats to 87-89% while ambulating around room and shortly after rest. 92-94% on 9L O2 oxymask. A&Ox4. Pt denies chest pain or SOB. Independent in room and bathroom. LS diminished. IS at bedside did not complete overnight. Pt is on a renal diet. Hemodialysis scheduled for am 10/20.

## 2021-10-20 NOTE — PROGRESS NOTES
Inpatient Dialysis Progress Note            Assessment and Plan:   1.  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  HD #3 today.    Needs placement in outpt HD though it may be to COVID-19 cohort unit first.  Discussed with .     2.  COVID-19: Increased oxygen demands.  Pulling 3 kg today to see if it helps hypoxia.  He is on dexamethasone.  Toci ordered today.      2.  Anemia. HGB 9.2.  On EPO 4000 units.  Ferritin 1735 reflecting COVID-19 and acute inflammation.  ISAT 26%.    3.  HTN.  BP trending down with UF.  2 to 3 L ultrafiltration today.    4.  MBD: , acceptable for level of CKD..  Phosphorus okay on PhosLo.    5.  FEN:  K 3.9.  On K 3 bath.    Plan:    Dialysis today.  Discussed with care coordinator to look for outpatient placement.            Interval History:     He reports feeling much better.  Had dialysis yesterday with 2.5 L ultrafiltration.  Oxygen requirement weaned down to 5 L oxygen mask.  Feels stronger.  For dialysis later today.  Third session today.  Afebrile.  No nausea, vomiting.        Dialysis Parameters:     Wt Readings from Last 4 Encounters:   10/20/21 80.2 kg (176 lb 11.2 oz)   09/02/21 85.3 kg (188 lb)   02/01/21 87.1 kg (192 lb)   01/05/21 87.1 kg (192 lb)     I/O last 3 completed shifts:  In: 363 [P.O.:360; I.V.:3]  Out: 2775 [Urine:275; Other:2500]  BP Readings from Last 3 Encounters:   10/20/21 (!) 155/98   09/02/21 128/76   02/01/21 (!) 167/112       Routine, ONE TIME, Starting today For 1 Occurrences  Weight Loss (kg): 3  Dialysis Temp: 36.5  C  Access Device: CVC   Access Site: R IJ  Dialyzer: Revaclear  Dialysis Bath: K 3  Blood Flow Rate (mL/min): 300  Total Treatment Time (hrs): 3  Heparin: no         Medications and Allergies:   Reviewed in EPIC      - MEDICATION INSTRUCTIONS for Dialysis Patients -   Does not apply See Admin Instructions     azithromycin  250 mg Oral Daily     calcium acetate  667 mg Oral TID w/meals     cefTRIAXone  2 g Intravenous Q24H     dexamethasone  6  mg Oral Daily     heparin ANTICOAGULANT  5,000 Units Subcutaneous Q8H JOSE     multivitamin RENAL  1 capsule Oral Daily     sodium chloride (PF)  3 mL Intracatheter Q8H     acetaminophen **OR** acetaminophen, albuterol, ALPRAZolam, bisacodyl, fentaNYL, flumazenil, heparin (porcine), lidocaine 4%, lidocaine HCl, lidocaine (buffered or not buffered), lidocaine 1% with EPINEPHrine 1:100,000, - MEDICATION INSTRUCTIONS -, - MEDICATION INSTRUCTIONS -, midazolam, naloxone **OR** naloxone **OR** naloxone **OR** naloxone, naloxone **OR** naloxone **OR** naloxone **OR** naloxone, ondansetron **OR** ondansetron, polyethylene glycol, senna-docusate **OR** senna-docusate, sodium chloride (PF), sodium chloride 0.9%   No Known Allergies           Labs:     BMP  Recent Labs   Lab 10/20/21  0553 10/19/21  0640 10/18/21  1200 10/18/21  0653 10/17/21  0813 10/17/21  0813   * 131*  --  130*  --  130*   POTASSIUM 3.9 4.0  --  4.0  --  5.5*   CHLORIDE 98 97  --  100  --  106   ANNETTE 7.6* 7.1*  --  7.6*  --  8.0*   CO2 26 29  --  19*  --  14*   BUN 40* 45*  --  76*  --  75*   CR 4.69* 5.41*  --  7.43*  --  8.06*   * 121* 119* 121*   < > 118*    < > = values in this interval not displayed.     CBC  Recent Labs   Lab 10/20/21  0553 10/19/21  0640 10/18/21  0653 10/17/21  0813   WBC 8.4 6.2 7.5 5.3   HGB 9.2* 9.1* 9.2* 10.0*   HCT 27.4* 26.0* 26.7* 29.7*   MCV 93 93 92 94    152 168 167     Lab Results   Component Value Date    AST 25 10/16/2021    ALT 26 10/16/2021    ALKPHOS 68 10/16/2021    BILITOTAL 0.4 10/16/2021            Physical Exam:   Vitals were reviewed in [facility]    Wt Readings from Last 3 Encounters:   10/20/21 80.2 kg (176 lb 11.2 oz)   09/02/21 85.3 kg (188 lb)   02/01/21 87.1 kg (192 lb)       Limited examination secondary to COVID-19 positive status.  Interviewed through telehealth.  Plan for dialysis later today.      Attestation:  I have reviewed today's vital signs, notes, medications, labs and imaging.      Meeta Pickering MD  [facility] - Nephrology  [phone]

## 2021-10-21 LAB
BACTERIA BLD CULT: NO GROWTH
BACTERIA BLD CULT: NO GROWTH

## 2021-10-21 PROCEDURE — 250N000013 HC RX MED GY IP 250 OP 250 PS 637: Performed by: INTERNAL MEDICINE

## 2021-10-21 PROCEDURE — 250N000012 HC RX MED GY IP 250 OP 636 PS 637: Performed by: HOSPITALIST

## 2021-10-21 PROCEDURE — 250N000013 HC RX MED GY IP 250 OP 250 PS 637: Performed by: RADIOLOGY

## 2021-10-21 PROCEDURE — 120N000001 HC R&B MED SURG/OB

## 2021-10-21 PROCEDURE — 250N000011 HC RX IP 250 OP 636: Performed by: RADIOLOGY

## 2021-10-21 PROCEDURE — 250N000011 HC RX IP 250 OP 636: Performed by: HOSPITALIST

## 2021-10-21 PROCEDURE — 99233 SBSQ HOSP IP/OBS HIGH 50: CPT | Performed by: INTERNAL MEDICINE

## 2021-10-21 PROCEDURE — 250N000013 HC RX MED GY IP 250 OP 250 PS 637: Performed by: HOSPITALIST

## 2021-10-21 RX ORDER — HEPARIN SODIUM 1000 [USP'U]/ML
500 INJECTION, SOLUTION INTRAVENOUS; SUBCUTANEOUS
Status: CANCELLED | OUTPATIENT
Start: 2021-10-21 | End: 2021-10-21

## 2021-10-21 RX ORDER — AMLODIPINE BESYLATE 2.5 MG/1
2.5 TABLET ORAL DAILY
Status: DISCONTINUED | OUTPATIENT
Start: 2021-10-21 | End: 2021-10-22

## 2021-10-21 RX ORDER — HEPARIN SODIUM 1000 [USP'U]/ML
500 INJECTION, SOLUTION INTRAVENOUS; SUBCUTANEOUS CONTINUOUS
Status: CANCELLED | OUTPATIENT
Start: 2021-10-21

## 2021-10-21 RX ADMIN — AZITHROMYCIN MONOHYDRATE 250 MG: 250 TABLET ORAL at 08:40

## 2021-10-21 RX ADMIN — HEPARIN SODIUM 5000 UNITS: 10000 INJECTION, SOLUTION INTRAVENOUS; SUBCUTANEOUS at 02:28

## 2021-10-21 RX ADMIN — CALCIUM ACETATE 667 MG: 667 CAPSULE ORAL at 16:10

## 2021-10-21 RX ADMIN — CALCIUM ACETATE 667 MG: 667 CAPSULE ORAL at 12:50

## 2021-10-21 RX ADMIN — HEPARIN SODIUM 5000 UNITS: 10000 INJECTION, SOLUTION INTRAVENOUS; SUBCUTANEOUS at 18:00

## 2021-10-21 RX ADMIN — ACETAMINOPHEN 650 MG: 325 TABLET, FILM COATED ORAL at 13:36

## 2021-10-21 RX ADMIN — CALCIUM ACETATE 667 MG: 667 CAPSULE ORAL at 08:40

## 2021-10-21 RX ADMIN — ALLOPURINOL 200 MG: 100 TABLET ORAL at 08:40

## 2021-10-21 RX ADMIN — CARVEDILOL 3.12 MG: 3.12 TABLET, FILM COATED ORAL at 18:00

## 2021-10-21 RX ADMIN — AMLODIPINE BESYLATE 2.5 MG: 2.5 TABLET ORAL at 13:31

## 2021-10-21 RX ADMIN — CARVEDILOL 3.12 MG: 3.12 TABLET, FILM COATED ORAL at 08:45

## 2021-10-21 RX ADMIN — Medication 1 CAPSULE: at 08:40

## 2021-10-21 RX ADMIN — DEXAMETHASONE 6 MG: 2 TABLET ORAL at 12:50

## 2021-10-21 RX ADMIN — ALPRAZOLAM 0.25 MG: 0.25 TABLET ORAL at 21:33

## 2021-10-21 RX ADMIN — HEPARIN SODIUM 5000 UNITS: 10000 INJECTION, SOLUTION INTRAVENOUS; SUBCUTANEOUS at 12:50

## 2021-10-21 RX ADMIN — CEFTRIAXONE 2 G: 2 INJECTION, POWDER, FOR SOLUTION INTRAMUSCULAR; INTRAVENOUS at 08:40

## 2021-10-21 ASSESSMENT — ACTIVITIES OF DAILY LIVING (ADL)
ADLS_ACUITY_SCORE: 5
ADLS_ACUITY_SCORE: 6
ADLS_ACUITY_SCORE: 5
ADLS_ACUITY_SCORE: 6
ADLS_ACUITY_SCORE: 5
ADLS_ACUITY_SCORE: 6
ADLS_ACUITY_SCORE: 5
ADLS_ACUITY_SCORE: 6
ADLS_ACUITY_SCORE: 6
ADLS_ACUITY_SCORE: 5

## 2021-10-21 NOTE — PROGRESS NOTES
Care Management Follow Up    Length of Stay (days): 5    Expected Discharge Date: 10/23/2021     Concerns to be Addressed: discharge planning       Anticipated Discharge Disposition: Dialysis Services       Additional Information:  RN CC following in collaboration with [facility] Admissions and Nephrology for initiation of new OP HD.    Final confirmation plan for new OP HD is as follows:  Patient will be discharging to home. Because of his covid status he will initiall have outpatient dialysis at:    [facility],   [address], [place], [phone]    First Outpatient run will be determined.   Patient should arrive at 0915 for the first run to complete paperwork.  For subsequent runs, chair time will be every TRS at 1000am. Arrival should be 15 min before chair time. He will be at this facility for 10-20 days from his 10/16 +covid test. He will then move to the [facility], [address] , [address]  [place] [address]. He will then dialyze on MWF schedule at 3:30pm. He will need to arrive at 2:45 firtst day.  Transportation to and from dialysis will be provided by: his sister, brother or friend.    Once patient is at the [facility] he will need to inform them that he prefers [facility]. They will then put him on a waiting list and get him transferred when a bed opens up.     AVS discharge instructions and patient were updated on final plan of care. RNCC called into patient room and explained above process. Information was written up, printed and given to patient. Spoke with MD on expected discharge date. He is not yet sure when patient will be ready for discharge and OP HD start date. RN CC will call and update Meenu [phone] [phone] at [facility] Intake tomorrow regarding discharge plan.        Gisel Shaffer RN BSN CM  Inpatient Care Coordination  [facility]  [phone]

## 2021-10-21 NOTE — PLAN OF CARE
A&Ox4. VSS. 5L O2 via NC, pt says this feels ok but has GROVES. Up ad phan. Renal diet, good appetite. Had dialysis, 2L off. Cath side intact. PIV saline locked. LS dim, encouraging IS use. Will continue POC    Cruz Davenport RN on 10/20/2021 at 10:29 PM

## 2021-10-21 NOTE — PROGRESS NOTES
Care Management Follow Up    Length of Stay (days): 5    Expected Discharge Date: 10/23/2021     Concerns to be Addressed: discharge planning     Patient plan of care discussed at interdisciplinary rounds: Yes    Anticipated Discharge Disposition: Dialysis Services       Additional Information:  Call received from Meenu at [facility]missions [phone] ext [phone]. They have received all of patient's referral information and are continuing to work on facility placement. At this time the following [facility] Facilities are at capacity: [facility], [place], [place], [place], [place], [place], [place], [place], and [place]. Depending on where patient is placed will determine his HD placement at Covid facility. He will need to dialyze at a [facility] [place] facility for between 10-20 days from his +covid test on 10/16 depending on the policy of his final OP HD facility. Meenu will call RN CC when facilities have been found.         Gisel Shaffer RN BSN CM  Inpatient Care Coordination  [facility]  [phone]

## 2021-10-21 NOTE — PLAN OF CARE
VS slightly elevated on Coreg added Noravsc. Trying to wean o2 as able. 3Lo2 92-96%. Pt able to take a shower, GROVES/SOB felt light headed, O2 sats on RA 86%,applied 2Lo2 87-88%, increased 3Lo2. Pt on PO Decadron, IV Rocephin and PO Zithromax. Right Dialysis site C/D/I. Nephrology following.

## 2021-10-21 NOTE — PROGRESS NOTES
[facility]    Medicine Progress Note - Hospitalist Service       Date of Admission:  10/16/2021    Assessment & Plan           Matteo Barnes is a 66 year old male with a history of stage V chronic kidney disease, gout, hypertension, hyperlipidemia, proteinuria, FSGS, and anxiety who presents with fever and shortness of breath.     The patient is fully vaccinated against COVID-19 with the Pfizer series starting in March and completing in April.  He has not yet had his booster.  On 10/11 he noticed onset of coughing, myalgias, vomiting, and generalized weakness with fatigue.  He also tested negative for COVID-19 on that date.  His symptoms continued and he has had decreased appetite and tells me he has not eaten in 1 week he does have advancing a progressive chronic kidney disease and has been setting up for initiation of dialysis in the near future.  He was supposed to have a fistula created this week.  Due to ongoing deterioration in his symptoms and clinical status he came to the ED for evaluation.     COVID course.  Onset of symptoms 10/11.  Initial positive SARS-CoV-2 PCR 10/16.  Hospitalized 10/16.     1.  COVID-19 pneumonia.  Despite previous fully vaccinated state.  Possible bacterial superinfection.  Not a candidate for IV remdesivir due to end-stage kidney disease.  Was started on dexamethasone initially.  -Continue dexamethasone 6 mg a day with plan for 10-day course.  -Did have dose of IV Tocilizumab on 10/19.  -Continue azithromycin and ceftriaxone to cover possible bacterial superinfection.  -Supplemental oxygen as needed.  -Subcutaneous heparin for DVT prophylaxis.     2.  Acute hypoxic respiratory failure.  Likely multifactorial due to COVID-19 pneumonia, possible bacterial pneumonia superinfection, and fluid overload.  -Hemodialysis per nephrology.  -Treat COVID-19 pneumonia and possible bacterial superinfection as above.  -Supplemental oxygen as needed.     3.  End-stage  kidney disease.  Not previously on hemodialysis.  Appreciate nephrology input.  Initiated on hemodialysis during this hospital stay.  -Continue hemodialysis per nephrology.     4.  Hypertension.  Normally on amlodipine and carvedilol in outpatient setting.  -Continue carvedilol 3.125 mg twice a day.  -Restart amlodipine 2.5 mg a day.     5.  Hyperkalemia.  Did have a dose of sodium polystyrene on 10/17.  Potassium normalized.  -Continue to monitor metabolic panel intermittently.     6.  Hyponatremia.  Mild.  Stable.  -Monitor metabolic panel intermittently.     7.  Hypocalcemia.  -Monitor metabolic panel intermittently.     8.  Anemia.  Acute on chronic.  Suspect chronic is due to chronic kidney disease.  Mild acute worsening likely due to COVID-19 infection and iatrogenic blood draws.  No known recent acute hemorrhage.  Did have a dose of IV iron sucrose on 10/18.  Hemoglobin has been stable during hospital stay.  -Monitor CBC intermittently.       Diet: Dialysis Diet  Snacks/Supplements Adult: Ensure Enlive; Between Meals    DVT Prophylaxis: Heparin SQ  Duval Catheter: Not present  Central Lines: None  Code Status: Full Code        Mt Reed DO  Hospitalist Service  [facility]  Securely message with the Vocera Web Console (learn more here)  Text page via eflow Paging/Directory      Clinically Significant Risk Factors Present on Admission                ______________________________________________________________________    Interval History   Short of breath.  Improved from previous.  Cough improved.  Denies chest pain, fevers, chills, nausea, or vomiting.    Data reviewed today: I reviewed all medications, new labs and imaging results over the last 24 hours.     Physical Exam   Vital Signs: Temp: 98.2  F (36.8  C) Temp src: Oral BP: (!) 155/99 Pulse: 82   Resp: 20 SpO2: 92 % O2 Device: Nasal cannula Oxygen Delivery: 3 LPM  Weight: 176 lbs 11.2 oz  Gen:  NAD, A&Ox3.  Eyes:   PERRL, sclera anicteric.  OP:  MMM, no lesions.  Neck:  Supple.  CV:  Regular, no loud murmurs.  Lung:  CTA b/l, normal effort.  Ab:  +BS, soft.  Skin:  Warm, dry to touch.  No rash.  Ext:  No pitting edema LE b/l.      Data   Recent Labs   Lab 10/20/21  0553 10/19/21  0640 10/18/21  1200 10/18/21  0653 10/18/21  0653 10/16/21  1659 10/16/21  0942   WBC 8.4 6.2  --   --  7.5   < > 4.6   HGB 9.2* 9.1*  --   --  9.2*   < > 10.6*   MCV 93 93  --   --  92   < > 95    152  --   --  168   < > 162   INR  --   --   --   --  1.05  --   --    * 131*  --   --  130*   < > 132*   POTASSIUM 3.9 4.0  --   --  4.0   < > 5.1   CHLORIDE 98 97  --   --  100   < > 103   CO2 26 29  --   --  19*   < > 17*   BUN 40* 45*  --   --  76*   < > 62*   CR 4.69* 5.41*  --   --  7.43*   < > 8.25*   ANIONGAP 8 5  --   --  11   < > 12   ANNETTE 7.6* 7.1*  --   --  7.6*   < > 8.4*   * 121* 119*  --  121*   < > 103*   ALBUMIN 2.5* 2.4*  --    < > 2.7*  --  3.7   PROTTOTAL  --   --   --   --   --   --  8.0   BILITOTAL  --   --   --   --   --   --  0.4   ALKPHOS  --   --   --   --   --   --  68   ALT  --   --   --   --   --   --  26   AST  --   --   --   --   --   --  25   LIPASE  --   --   --   --   --   --  461*    < > = values in this interval not displayed.

## 2021-10-21 NOTE — PLAN OF CARE
"Care from [id number]    Inpatient Progress Note:  For complete assessment see flow sheet documentation.  Vital signs:  Temp: 97.9  F (36.6  C) Temp src: Oral BP: 135/88 Pulse: 84   Resp: 18 SpO2: 91 % O2 Device: Nasal cannula Oxygen Delivery: 5 LPM Height: 175.3 cm (5' 9\") Weight: 80.2 kg (176 lb 11.2 oz)  Estimated body mass index is 26.09 kg/m  as calculated from the following:    Height as of this encounter: 1.753 m (5' 9\").    Weight as of this encounter: 80.2 kg (176 lb 11.2 oz).    Orientation: A&O x 4.  Neuro: WNL  Pain status: Denies   Activity: Independent   Peripheral edema: None noted  Resp: Diminished with infrequent productive cough. 5 L's of O2 via NC.  Cardiac: WNL  GI: WNL  : Pt on hemodialysis    Skin: WNL  LDA: Peripheral and CVC double lumen  Infusions: NS locked  Pertinent Labs: CRP:140  Diet: Renal diet  Consults: Being followed by nephrology    Discharge Plan: TBD    Will continue to monitor and provide cares.     Heather Burden RN   "

## 2021-10-21 NOTE — PROVIDER NOTIFICATION
MARIAELENAI pt  triggered sepsis protocol. Lactic 1.5, VSS     Cruz Davenport RN on 10/20/2021 at 8:58 PM

## 2021-10-22 ENCOUNTER — TRANSFERRED RECORDS (OUTPATIENT)
Dept: HEALTH INFORMATION MANAGEMENT | Facility: CLINIC | Age: 66
End: 2021-10-22
Payer: COMMERCIAL

## 2021-10-22 LAB
ALBUMIN SERPL-MCNC: 2.5 G/DL (ref 3.4–5)
ALP SERPL-CCNC: 67 U/L (ref 40–150)
ALT SERPL W P-5'-P-CCNC: 96 U/L (ref 0–70)
ANION GAP SERPL CALCULATED.3IONS-SCNC: 12 MMOL/L (ref 3–14)
AST SERPL W P-5'-P-CCNC: 88 U/L (ref 0–45)
BILIRUB SERPL-MCNC: 0.3 MG/DL (ref 0.2–1.3)
BUN SERPL-MCNC: 57 MG/DL (ref 7–30)
CALCIUM SERPL-MCNC: 8 MG/DL (ref 8.5–10.1)
CHLORIDE BLD-SCNC: 97 MMOL/L (ref 94–109)
CO2 SERPL-SCNC: 24 MMOL/L (ref 20–32)
CREAT SERPL-MCNC: 5.08 MG/DL (ref 0.66–1.25)
CRP SERPL-MCNC: 42 MG/L (ref 0–8)
D DIMER PPP FEU-MCNC: 2.03 UG/ML FEU (ref 0–0.5)
GFR SERPL CREATININE-BSD FRML MDRD: 11 ML/MIN/1.73M2
GLUCOSE BLD-MCNC: 96 MG/DL (ref 70–99)
POTASSIUM BLD-SCNC: 4 MMOL/L (ref 3.4–5.3)
PROT SERPL-MCNC: 6.4 G/DL (ref 6.8–8.8)
SODIUM SERPL-SCNC: 133 MMOL/L (ref 133–144)

## 2021-10-22 PROCEDURE — 85379 FIBRIN DEGRADATION QUANT: CPT | Performed by: INTERNAL MEDICINE

## 2021-10-22 PROCEDURE — 250N000013 HC RX MED GY IP 250 OP 250 PS 637: Performed by: INTERNAL MEDICINE

## 2021-10-22 PROCEDURE — 250N000011 HC RX IP 250 OP 636: Performed by: HOSPITALIST

## 2021-10-22 PROCEDURE — 86140 C-REACTIVE PROTEIN: CPT | Performed by: INTERNAL MEDICINE

## 2021-10-22 PROCEDURE — 250N000012 HC RX MED GY IP 250 OP 636 PS 637: Performed by: RADIOLOGY

## 2021-10-22 PROCEDURE — 80053 COMPREHEN METABOLIC PANEL: CPT | Performed by: INTERNAL MEDICINE

## 2021-10-22 PROCEDURE — 250N000013 HC RX MED GY IP 250 OP 250 PS 637: Performed by: HOSPITALIST

## 2021-10-22 PROCEDURE — 250N000013 HC RX MED GY IP 250 OP 250 PS 637: Performed by: RADIOLOGY

## 2021-10-22 PROCEDURE — 36415 COLL VENOUS BLD VENIPUNCTURE: CPT | Performed by: INTERNAL MEDICINE

## 2021-10-22 PROCEDURE — 99232 SBSQ HOSP IP/OBS MODERATE 35: CPT | Performed by: INTERNAL MEDICINE

## 2021-10-22 PROCEDURE — 120N000001 HC R&B MED SURG/OB

## 2021-10-22 PROCEDURE — 250N000011 HC RX IP 250 OP 636: Performed by: RADIOLOGY

## 2021-10-22 RX ORDER — AMLODIPINE BESYLATE 5 MG/1
5 TABLET ORAL DAILY
Status: DISCONTINUED | OUTPATIENT
Start: 2021-10-23 | End: 2021-10-22

## 2021-10-22 RX ORDER — CARVEDILOL 6.25 MG/1
6.25 TABLET ORAL 2 TIMES DAILY WITH MEALS
Status: DISCONTINUED | OUTPATIENT
Start: 2021-10-22 | End: 2021-10-23 | Stop reason: HOSPADM

## 2021-10-22 RX ORDER — AMLODIPINE BESYLATE 10 MG/1
10 TABLET ORAL DAILY
Status: DISCONTINUED | OUTPATIENT
Start: 2021-10-23 | End: 2021-10-23 | Stop reason: HOSPADM

## 2021-10-22 RX ADMIN — HEPARIN SODIUM 5000 UNITS: 10000 INJECTION, SOLUTION INTRAVENOUS; SUBCUTANEOUS at 02:34

## 2021-10-22 RX ADMIN — DEXAMETHASONE 6 MG: 2 TABLET ORAL at 12:43

## 2021-10-22 RX ADMIN — CARVEDILOL 3.12 MG: 3.12 TABLET, FILM COATED ORAL at 10:58

## 2021-10-22 RX ADMIN — HEPARIN SODIUM 5000 UNITS: 10000 INJECTION, SOLUTION INTRAVENOUS; SUBCUTANEOUS at 17:40

## 2021-10-22 RX ADMIN — Medication 1 CAPSULE: at 10:59

## 2021-10-22 RX ADMIN — CARVEDILOL 6.25 MG: 6.25 TABLET, FILM COATED ORAL at 17:40

## 2021-10-22 RX ADMIN — ACETAMINOPHEN 650 MG: 325 TABLET, FILM COATED ORAL at 12:47

## 2021-10-22 RX ADMIN — CALCIUM ACETATE 667 MG: 667 CAPSULE ORAL at 10:59

## 2021-10-22 RX ADMIN — AZITHROMYCIN MONOHYDRATE 250 MG: 250 TABLET ORAL at 10:59

## 2021-10-22 RX ADMIN — ALLOPURINOL 200 MG: 100 TABLET ORAL at 10:59

## 2021-10-22 RX ADMIN — AMLODIPINE BESYLATE 2.5 MG: 2.5 TABLET ORAL at 10:59

## 2021-10-22 RX ADMIN — CEFTRIAXONE 2 G: 2 INJECTION, POWDER, FOR SOLUTION INTRAMUSCULAR; INTRAVENOUS at 10:58

## 2021-10-22 RX ADMIN — HEPARIN SODIUM 5000 UNITS: 10000 INJECTION, SOLUTION INTRAVENOUS; SUBCUTANEOUS at 10:58

## 2021-10-22 RX ADMIN — CALCIUM ACETATE 667 MG: 667 CAPSULE ORAL at 17:40

## 2021-10-22 RX ADMIN — ALPRAZOLAM 0.25 MG: 0.25 TABLET ORAL at 21:02

## 2021-10-22 ASSESSMENT — ACTIVITIES OF DAILY LIVING (ADL)
ADLS_ACUITY_SCORE: 5
ADLS_ACUITY_SCORE: 3
ADLS_ACUITY_SCORE: 5
ADLS_ACUITY_SCORE: 5
ADLS_ACUITY_SCORE: 3
ADLS_ACUITY_SCORE: 5
ADLS_ACUITY_SCORE: 3
ADLS_ACUITY_SCORE: 5

## 2021-10-22 ASSESSMENT — MIFFLIN-ST. JEOR: SCORE: 1570.53

## 2021-10-22 NOTE — PLAN OF CARE
VSS on 2L O2. Able to tolerate on RA for short periods of time. A/O. Independent in the room. LS, dim, GROVES. Tylenol given for headache x1, see MAR. Plan for dialysis in the am and discharge to home after dialysis. Writer performed frequent checks on pt this shift. Will continue POC.

## 2021-10-22 NOTE — PROGRESS NOTES
[facility]    Medicine Progress Note - Hospitalist Service       Date of Admission:  10/16/2021    Assessment & Plan           Matteo Barnes is a 66 year old male with a history of stage V chronic kidney disease, gout, hypertension, hyperlipidemia, proteinuria, FSGS, and anxiety who presents with fever and shortness of breath.     The patient is fully vaccinated against COVID-19 with the Pfizer series starting in March and completing in April.  He has not yet had his booster.  On 10/11 he noticed onset of coughing, myalgias, vomiting, and generalized weakness with fatigue.  He also tested negative for COVID-19 on that date.  His symptoms continued and he has had decreased appetite and tells me he has not eaten in 1 week he does have advancing a progressive chronic kidney disease and has been setting up for initiation of dialysis in the near future.  He was supposed to have a fistula created this week.  Due to ongoing deterioration in his symptoms and clinical status he came to the ED for evaluation.     COVID course.  Onset of symptoms 10/11.  Initial positive SARS-CoV-2 PCR 10/16.  Hospitalized 10/16.     1.  COVID-19 pneumonia.  Despite previous fully vaccinated state.  Possible bacterial superinfection.  Not a candidate for IV remdesivir due to end-stage kidney disease.  Was started on dexamethasone initially.  -Continue dexamethasone 6 mg a day with plan for 10-day course.  -Did have dose of IV Tocilizumab on 10/19.  -Continue azithromycin and ceftriaxone to cover possible bacterial superinfection.  -Supplemental oxygen as needed.  -Subcutaneous heparin for DVT prophylaxis.     2.  Acute hypoxic respiratory failure.  Likely multifactorial due to COVID-19 pneumonia, possible bacterial pneumonia superinfection, and fluid overload.  -Hemodialysis per nephrology.  -Treat COVID-19 pneumonia and possible bacterial superinfection as above.  -Supplemental oxygen as needed.     3.  End-stage  kidney disease.  Not previously on hemodialysis.  Appreciate nephrology input.  Initiated on hemodialysis during this hospital stay.  -Continue hemodialysis per nephrology.     4.  Hypertension.  Normally on amlodipine and carvedilol in outpatient setting.  -Increase carvedilol 6.25 mg twice a day.  -Increase amlodipine to 10 mg a day.     5.  Hyperkalemia.  Did have a dose of sodium polystyrene on 10/17.  Potassium normalized.  -Continue to monitor metabolic panel intermittently.     6.  Hyponatremia.  Mild.  Stable.  -Monitor metabolic panel intermittently.     7.  Hypocalcemia.  -Monitor metabolic panel intermittently.     8.  Anemia.  Acute on chronic.  Suspect chronic is due to chronic kidney disease.  Mild acute worsening likely due to COVID-19 infection and iatrogenic blood draws.  No known recent acute hemorrhage.  Did have a dose of IV iron sucrose on 10/18.  Hemoglobin has been stable during hospital stay.  -Monitor CBC intermittently.          Diet: Dialysis Diet  Snacks/Supplements Adult: Ensure Enlive; Between Meals    DVT Prophylaxis: Heparin SQ  Duval Catheter: Not present  Central Lines: None  Code Status: Full Code          Mt Reed DO  Hospitalist Service  [facility]  Securely message with the Vocera Web Console (learn more here)  Text page via [employer] Paging/Directory      Clinically Significant Risk Factors Present on Admission                ______________________________________________________________________    Interval History   Short of breath.  Still coughing.  Does feel that breathing is mildly improved from previous.  Denies chest pain, fevers, chills, nausea, or vomiting.    Data reviewed today: I reviewed all medications, new labs and imaging results over the last 24 hours.     Physical Exam   Vital Signs: Temp: 97.4  F (36.3  C) Temp src: Oral BP: (!) 160/101 Pulse: 74   Resp: 20 SpO2: 94 % O2 Device: Nasal cannula Oxygen Delivery: 3 LPM  Weight: 176  lbs 6.4 oz  Gen:  NAD, A&Ox3.  Eyes:  PERRL, sclera anicteric.  OP:  MMM, no lesions.  Neck:  Supple.  CV:  Regular, no murmurs.  Lung:  CTA b/l, normal effort.  Ab:  +BS, soft.  Skin:  Warm, dry to touch.  No rash.  Ext:  No pitting edema LE b/l.      Data   Recent Labs   Lab 10/22/21  0649 10/20/21  0553 10/19/21  0640 10/19/21  0640 10/18/21  1200 10/18/21  0653 10/16/21  1659 10/16/21  0942   WBC  --  8.4  --  6.2  --  7.5   < > 4.6   HGB  --  9.2*  --  9.1*  --  9.2*   < > 10.6*   MCV  --  93  --  93  --  92   < > 95   PLT  --  181  --  152  --  168   < > 162   INR  --   --   --   --   --  1.05  --   --     132*  --  131*  --  130*   < > 132*   POTASSIUM 4.0 3.9  --  4.0  --  4.0   < > 5.1   CHLORIDE 97 98  --  97  --  100   < > 103   CO2 24 26  --  29  --  19*   < > 17*   BUN 57* 40*  --  45*  --  76*   < > 62*   CR 5.08* 4.69*  --  5.41*  --  7.43*   < > 8.25*   ANIONGAP 12 8  --  5  --  11   < > 12   ANNETTE 8.0* 7.6*  --  7.1*  --  7.6*   < > 8.4*   GLC 96 123*  --  121*   < > 121*   < > 103*   ALBUMIN 2.5* 2.5*   < > 2.4*  --  2.7*  --  3.7   PROTTOTAL 6.4*  --   --   --   --   --   --  8.0   BILITOTAL 0.3  --   --   --   --   --   --  0.4   ALKPHOS 67  --   --   --   --   --   --  68   ALT 96*  --   --   --   --   --   --  26   AST 88*  --   --   --   --   --   --  25   LIPASE  --   --   --   --   --   --   --  461*    < > = values in this interval not displayed.

## 2021-10-22 NOTE — PROGRESS NOTES
Inpatient Dialysis Progress Note            Assessment and Plan:   1.  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  HD #3 today.    Needs placement in outpt HD though it may be to [facility] cohort unit first.  Discussed with .     2.  COVID-19: Oxygen demand improving.  On dexamethasone.      2.  Anemia. HGB 9.2.  On EPO 4000 units.  Ferritin 1735 reflecting COVID-19 and acute inflammation.  ISAT 26%.    3.  HTN.  Blood pressure high.  Increase amlodipine to 5 mg daily.  Ultrafilter 2 to 3 L with dialysis tomorrow.    4.  MBD: , acceptable for level of CKD..  Phosphorus okay on PhosLo.    5.  FEN:  K 4.  On K 3 bath.    Plan:  Patient has a chair time for Tuesday Thursday Saturday at 10 AM at [facility] unit.  We will skip dialysis today and plan to dialyze tomorrow morning to get into Tuesday Thursday Saturday schedule.  2 to 3 L ultrafiltration with dialysis.  He is scheduled to move to [place] unit on a Monday Wednesday Friday schedule at 3:30 PM after Covid isolation is done.  Eventually wants to move to [facility].  We will work on fistula placement as outpatient once he is over the Covid isolation.    Okay to discharge from renal standpoint after dialysis tomorrow if otherwise stable.            Interval History:     He reports feeling much better.  Had 3 dialysis sessions in a row.  Oxygen requirement down to 3 L.  Eager to be discharged.  Afebrile.  No nausea, vomiting.        Dialysis Parameters:     Wt Readings from Last 4 Encounters:   10/22/21 80 kg (176 lb 6.4 oz)   09/02/21 85.3 kg (188 lb)   02/01/21 87.1 kg (192 lb)   01/05/21 87.1 kg (192 lb)     No intake/output data recorded.  BP Readings from Last 3 Encounters:   10/22/21 (!) 160/101   09/02/21 128/76   02/01/21 (!) 167/112       Routine, ONE TIME, Starting today For 1 Occurrences  Weight Loss (kg): 3  Dialysis Temp: 36.5  C  Access Device: CVC   Access Site: R IJ  Dialyzer: Revaclear  Dialysis Bath: K 3  Blood Flow Rate (mL/min): 300  Total  Treatment Time (hrs): 3  Heparin: no         Medications and Allergies:   Reviewed in EPIC      - MEDICATION INSTRUCTIONS for Dialysis Patients -   Does not apply See Admin Instructions     allopurinol  200 mg Oral Daily     [START ON 10/23/2021] amLODIPine  5 mg Oral Daily     azithromycin  250 mg Oral Daily     calcium acetate  667 mg Oral TID w/meals     carvedilol  3.125 mg Oral BID w/meals     cefTRIAXone  2 g Intravenous Q24H     dexamethasone  6 mg Oral Daily     heparin ANTICOAGULANT  5,000 Units Subcutaneous Q8H JOSE     multivitamin RENAL  1 capsule Oral Daily     sodium chloride (PF)  3 mL Intracatheter Q8H     acetaminophen **OR** acetaminophen, albuterol, ALPRAZolam, bisacodyl, lidocaine 4%, lidocaine (buffered or not buffered), - MEDICATION INSTRUCTIONS -, naloxone **OR** naloxone **OR** naloxone **OR** naloxone, ondansetron **OR** ondansetron, polyethylene glycol, senna-docusate **OR** senna-docusate, sodium chloride (PF)   No Known Allergies           Labs:     BMP  Recent Labs   Lab 10/22/21  0649 10/20/21  0553 10/19/21  0640 10/18/21  1200 10/18/21  0653    132* 131*  --  130*   POTASSIUM 4.0 3.9 4.0  --  4.0   CHLORIDE 97 98 97  --  100   ANNETTE 8.0* 7.6* 7.1*  --  7.6*   CO2 24 26 29  --  19*   BUN 57* 40* 45*  --  76*   CR 5.08* 4.69* 5.41*  --  7.43*   GLC 96 123* 121* 119* 121*     CBC  Recent Labs   Lab 10/20/21  0553 10/19/21  0640 10/18/21  0653 10/17/21  0813   WBC 8.4 6.2 7.5 5.3   HGB 9.2* 9.1* 9.2* 10.0*   HCT 27.4* 26.0* 26.7* 29.7*   MCV 93 93 92 94    152 168 167     Lab Results   Component Value Date    AST 88 (H) 10/22/2021    ALT 96 (H) 10/22/2021    ALKPHOS 67 10/22/2021    BILITOTAL 0.3 10/22/2021            Physical Exam:   Vitals were reviewed in EPIC    Wt Readings from Last 3 Encounters:   10/22/21 80 kg (176 lb 6.4 oz)   09/02/21 85.3 kg (188 lb)   02/01/21 87.1 kg (192 lb)       Limited examination secondary to COVID-19 positive status.  Interviewed through  telehealth.        Attestation:  I have reviewed today's vital signs, notes, medications, labs and imaging.     Meeta Pickering MD  [facility] - Nephrology  [phone]

## 2021-10-22 NOTE — PLAN OF CARE
A&O x 4. Vital signs stable ex elevated BPs ; treated with meds (see mar). Pain denied. Ambulation: independent. O2 stable on 3L via NC at beginning of shift.  O2 dropped to 2.5L mid shift and O2 remained stable. Continue POC, discharge plan 10/23.

## 2021-10-22 NOTE — PROGRESS NOTES
Care Management Follow Up    Length of Stay (days): 6    Expected Discharge Date: 10/23/2021     Concerns to be Addressed: discharge planning     Patient plan of care discussed at interdisciplinary rounds: Yes    Anticipated Discharge Disposition: Dialysis Services     Patient/Family in Agreement with the Plan: yes    Referrals Placed by CM/SW: Specialty Providers    Additional Information:  CM following for new HD, per chart review, pt has a confirmed chair time for [facility] for Tuesday, Thursday, Saturday schedule at 10am. Per Dr. Reed, pt will discharge from [facility] tomorrow after his HD run.     Called [facility] admissions to update, pt will start at [facility] on Tuesday 10/26, he should arrive at 0915 to complete paperwork. AVS updated.     Called and updated pt, no other needs at this time.     Vita Blevins RN BSN   Inpatient Care Coordination  [facility]   Phone [phone]

## 2021-10-22 NOTE — PLAN OF CARE
"BP (!) 152/97 (BP Location: Right arm)   Pulse 72   Temp 98.6  F (37  C) (Oral)   Resp 22   Ht 1.753 m (5' 9\")   Wt 80.2 kg (176 lb 11.2 oz)   SpO2 92%   BMI 26.09 kg/m        Afebrile. 3L oxygen NC. Ao x4. Ind in room. Denies pain. Dialysis today. PIV SL. Expected discharge 10/23. Will continue POC.   "

## 2021-10-23 ENCOUNTER — DOCUMENTATION ONLY (OUTPATIENT)
Dept: MEDSURG UNIT | Facility: CLINIC | Age: 66
End: 2021-10-23

## 2021-10-23 VITALS
SYSTOLIC BLOOD PRESSURE: 149 MMHG | HEIGHT: 69 IN | WEIGHT: 176.4 LBS | DIASTOLIC BLOOD PRESSURE: 97 MMHG | HEART RATE: 78 BPM | BODY MASS INDEX: 26.13 KG/M2 | TEMPERATURE: 97.4 F | RESPIRATION RATE: 18 BRPM | OXYGEN SATURATION: 94 %

## 2021-10-23 LAB
ANION GAP SERPL CALCULATED.3IONS-SCNC: 11 MMOL/L (ref 3–14)
BACTERIA BLD CULT: NO GROWTH
BACTERIA BLD CULT: NO GROWTH
BUN SERPL-MCNC: 76 MG/DL (ref 7–30)
CALCIUM SERPL-MCNC: 8.6 MG/DL (ref 8.5–10.1)
CHLORIDE BLD-SCNC: 98 MMOL/L (ref 94–109)
CO2 SERPL-SCNC: 22 MMOL/L (ref 20–32)
CREAT SERPL-MCNC: 5.63 MG/DL (ref 0.66–1.25)
CRP SERPL-MCNC: 22.2 MG/L (ref 0–8)
D DIMER PPP FEU-MCNC: 1.8 UG/ML FEU (ref 0–0.5)
ERYTHROCYTE [DISTWIDTH] IN BLOOD BY AUTOMATED COUNT: 12.8 % (ref 10–15)
GFR SERPL CREATININE-BSD FRML MDRD: 10 ML/MIN/1.73M2
GLUCOSE BLD-MCNC: 105 MG/DL (ref 70–99)
HCT VFR BLD AUTO: 27.3 % (ref 40–53)
HGB BLD-MCNC: 9.5 G/DL (ref 13.3–17.7)
HOLD SPECIMEN: NORMAL
MCH RBC QN AUTO: 32.2 PG (ref 26.5–33)
MCHC RBC AUTO-ENTMCNC: 34.8 G/DL (ref 31.5–36.5)
MCV RBC AUTO: 93 FL (ref 78–100)
PLATELET # BLD AUTO: 235 10E3/UL (ref 150–450)
POTASSIUM BLD-SCNC: 4.2 MMOL/L (ref 3.4–5.3)
RBC # BLD AUTO: 2.95 10E6/UL (ref 4.4–5.9)
SODIUM SERPL-SCNC: 131 MMOL/L (ref 133–144)
WBC # BLD AUTO: 12 10E3/UL (ref 4–11)

## 2021-10-23 PROCEDURE — 85027 COMPLETE CBC AUTOMATED: CPT | Performed by: INTERNAL MEDICINE

## 2021-10-23 PROCEDURE — 250N000011 HC RX IP 250 OP 636: Performed by: HOSPITALIST

## 2021-10-23 PROCEDURE — 250N000012 HC RX MED GY IP 250 OP 636 PS 637: Performed by: RADIOLOGY

## 2021-10-23 PROCEDURE — 250N000013 HC RX MED GY IP 250 OP 250 PS 637: Performed by: RADIOLOGY

## 2021-10-23 PROCEDURE — 90937 HEMODIALYSIS REPEATED EVAL: CPT

## 2021-10-23 PROCEDURE — 99239 HOSP IP/OBS DSCHRG MGMT >30: CPT | Performed by: INTERNAL MEDICINE

## 2021-10-23 PROCEDURE — 80048 BASIC METABOLIC PNL TOTAL CA: CPT | Performed by: INTERNAL MEDICINE

## 2021-10-23 PROCEDURE — 85379 FIBRIN DEGRADATION QUANT: CPT | Performed by: INTERNAL MEDICINE

## 2021-10-23 PROCEDURE — 86140 C-REACTIVE PROTEIN: CPT | Performed by: INTERNAL MEDICINE

## 2021-10-23 PROCEDURE — 90935 HEMODIALYSIS ONE EVALUATION: CPT | Performed by: INTERNAL MEDICINE

## 2021-10-23 PROCEDURE — 250N000011 HC RX IP 250 OP 636: Performed by: RADIOLOGY

## 2021-10-23 PROCEDURE — 250N000013 HC RX MED GY IP 250 OP 250 PS 637: Performed by: INTERNAL MEDICINE

## 2021-10-23 PROCEDURE — 36415 COLL VENOUS BLD VENIPUNCTURE: CPT | Performed by: INTERNAL MEDICINE

## 2021-10-23 PROCEDURE — 250N000013 HC RX MED GY IP 250 OP 250 PS 637: Performed by: HOSPITALIST

## 2021-10-23 RX ORDER — HEPARIN SODIUM 1000 [USP'U]/ML
500 INJECTION, SOLUTION INTRAVENOUS; SUBCUTANEOUS
Status: DISCONTINUED | OUTPATIENT
Start: 2021-10-23 | End: 2021-10-23

## 2021-10-23 RX ORDER — DEXAMETHASONE 6 MG/1
6 TABLET ORAL DAILY
Qty: 2 TABLET | Refills: 0 | Status: SHIPPED | OUTPATIENT
Start: 2021-10-24 | End: 2021-11-18

## 2021-10-23 RX ORDER — HEPARIN SODIUM 1000 [USP'U]/ML
500 INJECTION, SOLUTION INTRAVENOUS; SUBCUTANEOUS CONTINUOUS
Status: DISCONTINUED | OUTPATIENT
Start: 2021-10-23 | End: 2021-10-23

## 2021-10-23 RX ADMIN — CEFTRIAXONE 2 G: 2 INJECTION, POWDER, FOR SOLUTION INTRAMUSCULAR; INTRAVENOUS at 12:30

## 2021-10-23 RX ADMIN — AZITHROMYCIN MONOHYDRATE 250 MG: 250 TABLET ORAL at 12:23

## 2021-10-23 RX ADMIN — Medication 1 CAPSULE: at 12:23

## 2021-10-23 RX ADMIN — ALLOPURINOL 200 MG: 100 TABLET ORAL at 12:23

## 2021-10-23 RX ADMIN — CARVEDILOL 6.25 MG: 6.25 TABLET, FILM COATED ORAL at 12:23

## 2021-10-23 RX ADMIN — ACETAMINOPHEN 650 MG: 325 TABLET, FILM COATED ORAL at 02:54

## 2021-10-23 RX ADMIN — AMLODIPINE BESYLATE 10 MG: 10 TABLET ORAL at 12:23

## 2021-10-23 RX ADMIN — CALCIUM ACETATE 667 MG: 667 CAPSULE ORAL at 12:23

## 2021-10-23 RX ADMIN — HEPARIN SODIUM 5000 UNITS: 10000 INJECTION, SOLUTION INTRAVENOUS; SUBCUTANEOUS at 02:51

## 2021-10-23 RX ADMIN — DEXAMETHASONE 6 MG: 2 TABLET ORAL at 12:24

## 2021-10-23 RX ADMIN — HEPARIN SODIUM 5000 UNITS: 10000 INJECTION, SOLUTION INTRAVENOUS; SUBCUTANEOUS at 12:23

## 2021-10-23 ASSESSMENT — ACTIVITIES OF DAILY LIVING (ADL)
ADLS_ACUITY_SCORE: 3

## 2021-10-23 NOTE — PROGRESS NOTES
Patient has been assessed for Home Oxygen needs. Oxygen readings:    *Pulse oximetry (SpO2) = 87% on room air at rest while awake.    *SpO2 improved to 94% on 2liters/minute at rest.    *SpO2 = 84% on room air during activity/with exercise.    *SpO2 improved to 92% on 2liters/minute during activity/with exercise.

## 2021-10-23 NOTE — PROGRESS NOTES
Potassium   Date Value Ref Range Status   10/23/2021 4.2 3.4 - 5.3 mmol/L Final   02/01/2021 4.5 3.4 - 5.3 mmol/L Final     Hemoglobin   Date Value Ref Range Status   10/23/2021 9.5 (L) 13.3 - 17.7 g/dL Final   02/01/2021 12.5 (L) 13.3 - 17.7 g/dL Final     Creatinine   Date Value Ref Range Status   10/23/2021 5.63 (H) 0.66 - 1.25 mg/dL Final   02/01/2021 4.77 (H) 0.66 - 1.25 mg/dL Final     Urea Nitrogen   Date Value Ref Range Status   10/23/2021 76 (H) 7 - 30 mg/dL Final   02/01/2021 66 (H) 7 - 30 mg/dL Final     Sodium   Date Value Ref Range Status   10/23/2021 131 (L) 133 - 144 mmol/L Final   02/01/2021 139 133 - 144 mmol/L Final     INR   Date Value Ref Range Status   10/18/2021 1.05 0.85 - 1.15 Final   02/01/2021 1.12 0.86 - 1.14 Final       DIALYSIS PROCEDURE NOTE  Hepatitis status of previous patient on machine log was checked and verified ok to use with this patients hepatitis status.  Patient dialyzed for 3.5 hrs. on a K3 bath with a net fluid removal of  3 L.  A BFR of 400 ml/min was obtained via a RIJ tunneled catheter.  The treatment plan was discussed with Dr. Pickering during the treatment.    Total heparin received during the treatment: 2200 units.   Line flushed, clamped and capped with heparin 1:1000 1.6 mL (1600 units) per lumen -verbal order from Dr. Pickering for Heparin lock    Meds  given: Epoetin 4000u IV   Complications: none      Person educated: Patient. Knowledge base minimal. Barriers to learning: none. Educated on procedure, medications, access care via verbal mode. Patient verbalized understanding. Pt prefers verbal education style.     ICEBOAT? Timeout performed pre-treatment  I: Patient was identified using 2 identifiers  C:  Consent Signed Yes  E: Equipment preventative maintenance is current and dialysis delivery system OK to use  B: Hepatitis B Surface Antigen: Negative; Draw Date: 10/18/2021      Hepatitis B Surface Antibody: Susceptible; Draw Date: 10/18/2021  O: Dialysis orders present  and complete prior to treatment  A: Vascular access verified and assessed prior to treatment  T: Treatment was performed at a clinically appropriate time  ?: Patient was allowed to ask questions and address concerns prior to treatment  See flowsheet in EPIC for further details and post assessment.  Machine water alarm in place and functioning. Transducer pods intact and checked every 15min.   Pt dialyzed at bedside due to covid-19 isolation, isolation measures maintained.  Chlorine/Chloramine water system checked every 4 hours.  Outpatient Dialysis at [facility], then MWF at [facility].

## 2021-10-23 NOTE — PROGRESS NOTES
Received intake call for home oxygen at 1:58pm. Reviewed patient's chart; Patient qualifies under insurance guidelines and all documentation is in the chart including a good order. However, length of need is one month.  2:03pm- Called care coordinator Hue to see if length of need can be addended to 99 or lifetime. Left voicemail  2:33pm- Called Hue again, she said she paged provider.   3:15pm- Called Hue back in regards to a page she sent. She said provider is unwilling to extend length of need. I let her know I would call patient to offer choice.  3:17pm- Called to offer choice and patient is okay with [facility] setting them up. Discussed equipment with patient. He told me he is discharging at 4:30pm, and is giong to leave whether or not we are there to deliver oxygen. I encouraged him to stay because we want to make sure he has oxygen on the way home, and that the 4:30pm timeframe is not possible with where  currently is. He said oxygen can be delivered to his home but that he is not waiting for it.  3:20pm- I called uHe to let her know patient may leave before oxygen is delivered. Lvm.  3:39pm- I called Hue again, let her know patient is refusing to stay to wait for oxygen, and that we will deliver to his home.

## 2021-10-23 NOTE — PLAN OF CARE
Pt to discharge back home. All discharge medications and instructions were reviewed with patient and all questions were answered. Pt acknowledged and verbalized understanding. All belongings were sent home with patient at time of discharge. Per care coordinator home O2 will be delivered to house per pt request as he is not willing to wait for it to be delivered to the hospital prior to discharge. All VSS. Pt tolerating RA sats 94%.

## 2021-10-23 NOTE — PROGRESS NOTES
Care Management Discharge Note    Discharge Date: 10/23/2021       Discharge Disposition: Dialysis Services    Patient/Family in Agreement with the Plan: yes    Handoff Referral Completed: Yes    Additional Information:  CM following for new home oxygen needs.  Patient's SpO2 readings at rest and with activity have been completed and documented by nursing staff.   Awaiting provider to place orders for home oxygen with face to face progress note.   Once provider documentation complete, then CM will call [facility] [phone] to make them aware of new orders.    Update 1320: Paged provider Derek requesting documentation for oxygen at discharge.     Update 1350: Noted documentation completed by provider. Awaiting call back from [facility] to review documentation and arrange for oxygen delivery.     Update 5077: Received call back from Home Medical. Paged provider per home medical request to change length of oxygen need to greater than 99 years/ lifetime.  [facility] will follow-up with patient to discuss process, offer choice and update AVS.     Update 1500 Spoke with provider Derek who stated that will keep current length of oxygen order for 1 month as does not anticipate patient to need long term. Called and updated home medical of provider's order for oxygen.     Update 1540: Received call from home medical. Patient is agreeable to oxygen through [facility]. Per home medical, patient is not agreeable to staying in hospital for oxygen delivery, but is agreeable to oxygen delivery at home. Updated bedside RN of plan for oxygen delivery to home and patient refusing to stay in hospital for delivery.         Hue Larson, RN      Hue Larson RN Case Manager  Inpatient Care Coordination  [facility]   [phone]

## 2021-10-23 NOTE — DISCHARGE SUMMARY
[facility]  Hospitalist Discharge Summary      Date of Admission:  10/16/2021  Date of Discharge:  10/23/2021  Discharging Provider: [facility] Reed DO      Discharge Diagnoses   1.  Acute hypoxic respiratory failure.  2.  COVID-19 pneumonia with possible bacterial superinfection.  3.  End-stage kidney disease that has progressed to need for hemodialysis.  4.  Hypertension.  5.  Hyperkalemia.  6.  Hyponatremia.  7.  Hypocalcemia.  8.  Acute on chronic anemia.    Follow-ups Needed After Discharge   Follow-up Appointments     Follow-up and recommended labs and tests       Follow up with primary care provider, Davidson Capone, within 7 days for   hospital follow- up.  The following labs/tests are recommended: BMP in 7   days.  Follow-up with dialysis in 3 days.             Discharge Disposition   Discharged to home  Condition at discharge: Stable      Hospital Course   Matteo Barnes is a 66 year old male with a history of stage V chronic kidney disease, gout, hypertension, hyperlipidemia, proteinuria, FSGS, and anxiety who presents with fever and shortness of breath.     The patient is fully vaccinated against COVID-19 with the Pfizer series starting in March and completing in April.  He has not yet had his booster.  On 10/11 he noticed onset of coughing, myalgias, vomiting, and generalized weakness with fatigue.  He also tested negative for COVID-19 on that date.  His symptoms continued and he has had decreased appetite and tells me he has not eaten in 1 week he does have advancing a progressive chronic kidney disease and has been setting up for initiation of dialysis in the near future.  He was supposed to have a fistula created this week.  Due to ongoing deterioration in his symptoms and clinical status he came to the ED for evaluation.     COVID course.  Onset of symptoms 10/11.  Initial positive SARS-CoV-2 PCR 10/16.  Hospitalized 10/16.    He is requiring supplemental oxygen during  hospital stay.  He was started on dexamethasone.  Continue dexamethasone in the outpatient setting for 2 more days to complete a 10-day course.  6 mg a day.  He did have a dose of Tocilizumab IV once during hospital stay.  He was also on antibiotics with azithromycin and ceftriaxone to cover possible bacterial superinfection.  He has completed a 5-day course of both antibiotics.  Stop antibiotics at time of discharge.  He does need continued oxygen in the outpatient setting at this time due to continued episodes of intermittent hypoxia.  Did require initiation of hemodialysis during hospital stay.  Has been followed by nephrology.  Continue with hemodialysis in the outpatient setting per nephrology.  He has been on subcutaneous heparin during hospital stay for DVT prophylaxis.  He is up and mobile at this time.  Stop heparin.  No other anticoagulants at this time.  Continue to be up and mobile every day.    Consultations This Hospital Stay   NEPHROLOGY IP CONSULT  SOCIAL WORK IP CONSULT  CARE MANAGEMENT / SOCIAL WORK IP CONSULT  CARE MANAGEMENT / SOCIAL WORK IP CONSULT    Code Status   Full Code    Time Spent on this Encounter   I spent 40 minutes with Mr. Barnes and working on discharge on 10/23/2021.       [facility] [address] [facility]  [address] [place] [address]  Phone: [phone]  Fax: [phone]  ______________________________________________________________________    Physical Exam   Vital Signs: Temp: 97.9  F (36.6  C) Temp src: Axillary BP: (!) 136/94 Pulse: 80   Resp: 20 SpO2: 96 % O2 Device: None (Room air) Oxygen Delivery: 2.5 LPM  Weight: 176 lbs 6.4 oz  Gen:  NAD, A&Ox3.  Eyes:  PERRL, sclera anicteric.  OP:  MMM, no lesions.  Neck:  Supple.  CV:  Regular, no murmurs.  Lung:  CTA b/l, normal effort.  Ab:  +BS, soft.  Skin:  Warm, dry to touch.  No rash.  Ext:  No pitting edema LE b/l.         Primary Care Physician   Davidson RETANA  Liborio    Discharge Orders      Care Coordination Referral      Reason for your hospital stay    COVID-19 pneumonia.     Follow-up and recommended labs and tests     Follow up with primary care provider, Davidson Capone, within 7 days for hospital follow- up.  The following labs/tests are recommended: BMP in 7 days.  Follow-up with dialysis in 3 days.     Activity    Your activity upon discharge: activity as tolerated     Oxygen Adult/Peds    Oxygen Documentation:   I certify that this patient, Matteo Barnes has been under my care (or a nurse practitioner or physican's assistant working with me). This is the face-to-face encounter for oxygen medical necessity.      Matteo Barnes is now in a chronic stable state and continues to require supplemental oxygen. Patient has continued oxygen desaturation due to COVID-19 pneumonia.    Alternative treatment(s) tried or considered and deemed clinically infective for treatment of COVID-19 pneumonia include inhalers and steroids.  If portability is ordered, is the patient mobile within the home? yes     Diet    Follow this diet upon discharge:             Dialysis Diet         Discharge Medications   Current Discharge Medication List      START taking these medications    Details   dexamethasone (DECADRON) 6 MG tablet Take 1 tablet (6 mg) by mouth daily for 2 days  Qty: 2 tablet, Refills: 0    Associated Diagnoses: Pneumonia due to 2019 novel coronavirus      multivitamin RENAL (NEPHROCAPS/TRIPHROCAPS) 1 MG capsule Take 1 capsule by mouth daily  Qty: 30 capsule, Refills: 0    Associated Diagnoses: Pneumonia due to 2019 novel coronavirus         CONTINUE these medications which have NOT CHANGED    Details   acetaminophen (TYLENOL) 500 MG tablet Take 500-1,000 mg by mouth every 6 hours as needed for mild pain      allopurinol (ZYLOPRIM) 100 MG tablet Take 200 mg by mouth daily      amLODIPine (NORVASC) 10 MG tablet Take 10 mg by mouth      calcitRIOL (ROCALTROL) 0.25 MCG  capsule Take 0.25 mcg by mouth daily       carvedilol (COREG) 12.5 MG tablet Take 25 mg (2 tablets) in the am and 12.5 mg (1 tablet) in the pm      sildenafil (REVATIO) 20 MG tablet Take 1-3 tablets (20-60 mg) by mouth daily as needed (30 min to 1 hour prior to intercourse)  Qty: 30 tablet, Refills: 3    Associated Diagnoses: Erectile dysfunction, unspecified erectile dysfunction type      sodium bicarbonate 650 MG tablet Take 1,950 mg by mouth 2 times daily       ALPRAZolam (XANAX) 0.25 MG tablet Take 1 tablet (0.25 mg) by mouth daily as needed for anxiety  Qty: 20 tablet, Refills: 0    Associated Diagnoses: Anxiety           Allergies   No Known Allergies

## 2021-10-23 NOTE — PLAN OF CARE
"BP (!) 158/108 (BP Location: Right arm)   Pulse 68   Temp 97.9  F (36.6  C) (Oral)   Resp 22   Ht 1.753 m (5' 9\")   Wt 80 kg (176 lb 6.4 oz)   SpO2 93%   BMI 26.05 kg/m    A&Ox4. Tylenol at 0300 for headache. Effective. 02 @2.5 L. Denies SOB. Infrequent cough, non-prod cough.  Isolation precautions maintained. Dialysis schedule T, TH, SAT. LS diminished. Plan: discharge home today.   "

## 2021-10-23 NOTE — DISCHARGE INSTRUCTIONS
Because of your Covid status, you will get your outpatient dialysis initially at:  [facility]  [address]   [phone]    Your first outpatient run will be on Tuesday 10/26.  Please arrive at 9:15am to complete paperwork.  For subsequent runs, your chair time at this facility will be every  Tuesday, Thursday and Saturday at 10:00am.  You should plan to arrive 15 minutes prior to your chair time.  You will have dialysis at this facility for 10-20 days from your covid + date 10/16. You will then go to [facility]. They will determine your start date at their facility.     [facility]  [address] , [address]  [place] [address]  At the [facility] you will dialyze on a MWF schedule at 3:30pm. You will need to arrive at 2:45am on the first day.    Please bring:   Two forms of ID  Insurance cards  Your CURRENT medication list  Wear a button up shirt  Bring something quiet to do  Bring a pillow or a small blanket because the room can be chilly.    **Once you are at the [facility] please let them know that you would like the [facility] closer to your home. They will put you on a waiting list for when a spot opens and transfer you to that facility.     Oxygen Provider:  Arranged through [facility] [employer] [facility], contact number [phone].  If you have any questions or concerns please call the oxygen company directly.

## 2021-10-23 NOTE — PLAN OF CARE
VSS on 2L. A/O. Independent in the room. Plan to discharge home this evening with home O2. Will continue POC.

## 2021-10-23 NOTE — PROGRESS NOTES
Oxygen Documentation:   I certify that this patient, Matteo Barnes has been under my care (or a nurse practitioner or physican's assistant working with me). This is the face-to-face encounter for oxygen medical necessity.       Matteo Barnes is now in a chronic stable state and continues to require supplemental oxygen. Patient has continued oxygen desaturation due to COVID-19 pneumonia.     Alternative treatment(s) tried or considered and deemed clinically infective for treatment of COVID-19 pneumonia include inhalers and steroids.  If portability is ordered, is the patient mobile within the home? yes

## 2021-10-23 NOTE — PROGRESS NOTES
Inpatient Dialysis Progress Note            Assessment and Plan:   1.  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      2.  COVID-19: Oxygen demand improving.  On dexamethasone.      2.  Anemia. HGB 9.2.  On EPO 4000 units.  Ferritin 1735 reflecting COVID-19 and acute inflammation.  ISAT 26%.    3.  HTN.  Blood pressure high, amlodipine dose increased.  Blood pressure better than yesterday.    Ultrafilter 2 to 3 L with dialysis tomorrow.    4.  MBD: , acceptable for level of CKD..  Phosphorus okay on PhosLo.    5.  FEN:  K 4.  On K 3 bath.    Plan:  Patient has a chair time for Tuesday Thursday Saturday at 10 AM at [facility].  He is scheduled to move to [facility] unit on a Monday Wednesday Friday schedule at 3:30 PM after Covid isolation is done.  Eventually wants to move to [facility].  We will work on fistula placement as outpatient once he is over the Covid isolation.    Okay to discharge from renal standpoint after dialysis today            Interval History:     On dialysis.  Doing much better.  Laying flat.  On room air.  Dialysis running okay.  2 L 3 L ultrafiltration plan.        Dialysis Parameters:     Wt Readings from Last 4 Encounters:   10/22/21 80 kg (176 lb 6.4 oz)   09/02/21 85.3 kg (188 lb)   02/01/21 87.1 kg (192 lb)   01/05/21 87.1 kg (192 lb)     I/O last 3 completed shifts:  In: 240 [P.O.:240]  Out: -   BP Readings from Last 3 Encounters:   10/23/21 (!) 146/97   09/02/21 128/76   02/01/21 (!) 167/112       Routine, ONE TIME, Starting today For 1 Occurrences  Weight Loss (kg): 3  Dialysis Temp: 36.5  C  Access Device: CVC   Access Site: R IJ  Dialyzer: Revaclear  Dialysis Bath: K 3  Blood Flow Rate (mL/min): 300  Total Treatment Time (hrs): 3  Heparin: no         Medications and Allergies:   Reviewed in EPIC      - MEDICATION INSTRUCTIONS for Dialysis Patients -   Does not apply See Admin Instructions     sodium chloride 0.9%  250 mL Intravenous Once in dialysis/CRRT     sodium chloride 0.9%  300 mL  Hemodialysis Machine Once     allopurinol  200 mg Oral Daily     amLODIPine  10 mg Oral Daily     sodium chloride (PF) 0.9%  1.3-2.6 mL Intracatheter Once in dialysis/CRRT    Followed by     anticoagulant citrate  3 mL Intracatheter Once in dialysis/CRRT     sodium chloride (PF) 0.9%  1.3-2.6 mL Intracatheter Once in dialysis/CRRT    Followed by     anticoagulant citrate  3 mL Intracatheter Once in dialysis/CRRT     azithromycin  250 mg Oral Daily     calcium acetate  667 mg Oral TID w/meals     carvedilol  6.25 mg Oral BID w/meals     cefTRIAXone  2 g Intravenous Q24H     dexamethasone  6 mg Oral Daily     epoetin lisa-epbx (RETACRIT) inj ESRD  4,000 Units Intravenous Once in dialysis/CRRT     heparin (porcine)  500 Units Hemodialysis Machine OR IV Push Once in dialysis/CRRT     heparin ANTICOAGULANT  5,000 Units Subcutaneous Q8H JOSE     multivitamin RENAL  1 capsule Oral Daily     sodium chloride (PF)  3 mL Intracatheter Q8H     sodium chloride 0.9%, acetaminophen **OR** acetaminophen, albuterol, ALPRAZolam, bisacodyl, lidocaine 4%, lidocaine (buffered or not buffered), - MEDICATION INSTRUCTIONS -, naloxone **OR** naloxone **OR** naloxone **OR** naloxone, ondansetron **OR** ondansetron, polyethylene glycol, senna-docusate **OR** senna-docusate, sodium chloride (PF)   No Known Allergies           Labs:     BMP  Recent Labs   Lab 10/23/21  0619 10/22/21  0649 10/20/21  0553 10/19/21  0640   * 133 132* 131*   POTASSIUM 4.2 4.0 3.9 4.0   CHLORIDE 98 97 98 97   ANNETTE 8.6 8.0* 7.6* 7.1*   CO2 22 24 26 29   BUN 76* 57* 40* 45*   CR 5.63* 5.08* 4.69* 5.41*   * 96 123* 121*     CBC  Recent Labs   Lab 10/23/21  0619 10/20/21  0553 10/19/21  0640 10/18/21  0653   WBC 12.0* 8.4 6.2 7.5   HGB 9.5* 9.2* 9.1* 9.2*   HCT 27.3* 27.4* 26.0* 26.7*   MCV 93 93 93 92    181 152 168     Lab Results   Component Value Date    AST 88 (H) 10/22/2021    ALT 96 (H) 10/22/2021    ALKPHOS 67 10/22/2021    BILITOTAL 0.3  10/22/2021            Physical Exam:   Vitals were reviewed in EPIC    Wt Readings from Last 3 Encounters:   10/22/21 80 kg (176 lb 6.4 oz)   09/02/21 85.3 kg (188 lb)   02/01/21 87.1 kg (192 lb)       Limited examination secondary to COVID-19 positive status.  Interviewed through telehealth.        Attestation:  I have reviewed today's vital signs, notes, medications, labs and imaging.     Meeta Pickering MD  [facility] - Nephrology  [phone]

## 2021-10-25 ENCOUNTER — PATIENT OUTREACH (OUTPATIENT)
Dept: FAMILY MEDICINE | Facility: CLINIC | Age: 66
End: 2021-10-25

## 2021-10-25 NOTE — TELEPHONE ENCOUNTER
"    Called #   Telephone Information:   Mobile [phone]     ED/Discharge Protocol    \"Hi, my name is Soila Weiner RN, a registered nurse, and I am calling on behalf of Dr. Capone's office at [place].  I am calling to follow up and see how things are going for you after your recent visit.\"    \"I see that you were in the (ER/UC/IP) on 10/23/2021.    How are you doing now that you are home?\" I am doing okay     Is patient experiencing symptoms that may require a hospital visit?  None     Discharge Instructions    \"Let's review your discharge instructions.  What is/are the follow-up recommendations?  Pt. Response: I do need to be seen for f/u     \"Were you instructed to make a follow-up appointment?\"  Pt. Response: Yes.  Has appointment been made?   No.  \"Can I help you schedule that appointment?\" I will       \"When you see the provider, I would recommend that you bring your discharge instructions with you.    Medications    \"How many new medications are you on since your hospitalization/ED visit?\"    0-1  \"How many of your current medicines changed (dose, timing, name, etc.) while you were in the hospital/ED visit?\"   0-1  \"Do you have questions about your medications?\"   No  \"Were you newly diagnosed with heart failure, COPD, diabetes or did you have a heart attack?\"   No  For patients on insulin: \"Did you start on insulin in the hospital or did you have your insulin dose changed?\"   No  Post Discharge Medication Reconciliation Status: discharge medications reconciled, continue medications without change.    Was MTM referral placed (*Make sure to put transitions as reason for referral)?   No    Call Summary    \"Do you have any questions or concerns about your condition or care plan at the moment?\"    No  Triage nurse advice given: once you know your dialysis schedule please call the clinic to make the hosp f/u cathie    Patient was in ER 1 in the past year (assess appropriateness of ER visits.)      \"If you " "have questions or things don't continue to improve, we encourage you contact us through the main clinic number,  [phone].  Even if the clinic is not open, triage nurses are available 24/7 to help you.     We would like you to know that our clinic has extended hours (provide information).  We also have urgent care (provide details on closest location and hours/contact info)\"      \"Thank you for your time and take care!\"             "

## 2021-10-27 ENCOUNTER — TRANSFERRED RECORDS (OUTPATIENT)
Dept: HEALTH INFORMATION MANAGEMENT | Facility: CLINIC | Age: 66
End: 2021-10-27
Payer: COMMERCIAL

## 2021-10-29 ENCOUNTER — TELEPHONE (OUTPATIENT)
Dept: OTHER | Facility: CLINIC | Age: 66
End: 2021-10-29

## 2021-10-29 DIAGNOSIS — I77.0 AVF (ARTERIOVENOUS FISTULA) (H): Primary | ICD-10-CM

## 2021-10-29 NOTE — TELEPHONE ENCOUNTER
Patient is being referred by Dr. Pickering and Lizbeth Aaron for vein mapping and consult to discuss AVF creation. I held time on 11/18/21 for imaging and consult with Dr. Monterroso. Routing to nurse to enter orders.

## 2021-10-29 NOTE — TELEPHONE ENCOUNTER
Bilateral upper extremity vein mapping ordered.    Fabienne CHAVEZN, RN    [facility]  [facility]  Office: [phone]  Fax: [phone]

## 2021-11-01 ENCOUNTER — APPOINTMENT (OUTPATIENT)
Dept: INTERVENTIONAL RADIOLOGY/VASCULAR | Facility: CLINIC | Age: 66
End: 2021-11-01
Attending: RADIOLOGY
Payer: COMMERCIAL

## 2021-11-01 ENCOUNTER — HOSPITAL ENCOUNTER (OUTPATIENT)
Facility: CLINIC | Age: 66
Discharge: HOME | End: 2021-11-01
Attending: RADIOLOGY | Admitting: RADIOLOGY
Payer: COMMERCIAL

## 2021-11-01 VITALS
RESPIRATION RATE: 18 BRPM | DIASTOLIC BLOOD PRESSURE: 87 MMHG | HEIGHT: 69 IN | WEIGHT: 182 LBS | SYSTOLIC BLOOD PRESSURE: 149 MMHG | BODY MASS INDEX: 26.96 KG/M2 | OXYGEN SATURATION: 96 % | TEMPERATURE: 98.1 F | HEART RATE: 83 BPM

## 2021-11-01 DIAGNOSIS — Z99.2 DIALYSIS PATIENT (H): ICD-10-CM

## 2021-11-01 PROCEDURE — C1750 CATH, HEMODIALYSIS,LONG-TERM: HCPCS

## 2021-11-01 PROCEDURE — 250N000011 HC RX IP 250 OP 636: Performed by: RADIOLOGY

## 2021-11-01 PROCEDURE — 250N000011 HC RX IP 250 OP 636: Performed by: NURSE PRACTITIONER

## 2021-11-01 PROCEDURE — C1769 GUIDE WIRE: HCPCS

## 2021-11-01 PROCEDURE — 250N000009 HC RX 250: Performed by: NURSE PRACTITIONER

## 2021-11-01 PROCEDURE — 36581 REPLACE TUNNELED CV CATH: CPT

## 2021-11-01 PROCEDURE — 999N000163 HC STATISTIC SIMPLE TUBE INSERTION/CHARGE, PORT, CATH, FISTULOGRAM

## 2021-11-01 RX ORDER — ACETAMINOPHEN 325 MG/1
650 TABLET ORAL
Status: DISCONTINUED | OUTPATIENT
Start: 2021-11-01 | End: 2021-11-01 | Stop reason: HOSPADM

## 2021-11-01 RX ORDER — FENTANYL CITRATE 50 UG/ML
25-50 INJECTION, SOLUTION INTRAMUSCULAR; INTRAVENOUS EVERY 5 MIN PRN
Status: DISCONTINUED | OUTPATIENT
Start: 2021-11-01 | End: 2021-11-01 | Stop reason: HOSPADM

## 2021-11-01 RX ORDER — FLUMAZENIL 0.1 MG/ML
0.2 INJECTION, SOLUTION INTRAVENOUS
Status: DISCONTINUED | OUTPATIENT
Start: 2021-11-01 | End: 2021-11-01 | Stop reason: HOSPADM

## 2021-11-01 RX ORDER — CEFAZOLIN SODIUM 2 G/100ML
2 INJECTION, SOLUTION INTRAVENOUS
Status: COMPLETED | OUTPATIENT
Start: 2021-11-01 | End: 2021-11-01

## 2021-11-01 RX ORDER — NALOXONE HYDROCHLORIDE 0.4 MG/ML
0.2 INJECTION, SOLUTION INTRAMUSCULAR; INTRAVENOUS; SUBCUTANEOUS
Status: DISCONTINUED | OUTPATIENT
Start: 2021-11-01 | End: 2021-11-01 | Stop reason: HOSPADM

## 2021-11-01 RX ORDER — NALOXONE HYDROCHLORIDE 0.4 MG/ML
0.4 INJECTION, SOLUTION INTRAMUSCULAR; INTRAVENOUS; SUBCUTANEOUS
Status: DISCONTINUED | OUTPATIENT
Start: 2021-11-01 | End: 2021-11-01 | Stop reason: HOSPADM

## 2021-11-01 RX ORDER — HEPARIN SODIUM 1000 [USP'U]/ML
5000 INJECTION, SOLUTION INTRAVENOUS; SUBCUTANEOUS ONCE
Status: COMPLETED | OUTPATIENT
Start: 2021-11-01 | End: 2021-11-01

## 2021-11-01 RX ADMIN — MIDAZOLAM HYDROCHLORIDE 1 MG: 1 INJECTION, SOLUTION INTRAMUSCULAR; INTRAVENOUS at 12:35

## 2021-11-01 RX ADMIN — HEPARIN SODIUM 5000 UNITS: 1000 INJECTION INTRAVENOUS; SUBCUTANEOUS at 12:42

## 2021-11-01 RX ADMIN — FENTANYL CITRATE 50 MCG: 50 INJECTION, SOLUTION INTRAMUSCULAR; INTRAVENOUS at 12:35

## 2021-11-01 RX ADMIN — CEFAZOLIN SODIUM 2 G: 2 INJECTION, SOLUTION INTRAVENOUS at 12:04

## 2021-11-01 RX ADMIN — FENTANYL CITRATE 50 MCG: 50 INJECTION, SOLUTION INTRAMUSCULAR; INTRAVENOUS at 12:29

## 2021-11-01 RX ADMIN — MIDAZOLAM HYDROCHLORIDE 1 MG: 1 INJECTION, SOLUTION INTRAMUSCULAR; INTRAVENOUS at 12:28

## 2021-11-01 RX ADMIN — LIDOCAINE HYDROCHLORIDE 3 ML: 10 INJECTION, SOLUTION INFILTRATION; PERINEURAL at 12:36

## 2021-11-01 ASSESSMENT — MIFFLIN-ST. JEOR: SCORE: 1595.93

## 2021-11-01 NOTE — PROGRESS NOTES
Care Suites Post Procedure Note    Patient Information  Name: Matteo Barnes  Age: 66 year old    Post Procedure  Time patient returned to Care Suites: 1250  Concerns/abnormal assessment: none  If abnormal assessment, provider notified: N/A  Plan/Other: observe until discharge. Plan for discharge around 1340    Esther Green RN

## 2021-11-01 NOTE — IR NOTE
Interventional Radiology Intra-procedural Nursing Note    Patient Name: Matteo Barnes  Medical Record Number: [id number]  Today's Date: November 1, 2021    Start Time: 1234  End of procedure time: 1241  Procedure: CVC revision  Report given to: LUC Pearson RN  Time pt departs:  1244  : no    Other Notes: Pt came to IR suite 1 on a cart.  Pt was moved over to the table and was placed in supine position.  Pt was draped and prepped with chlorhexidine soap.  Pt was given fentanyl and versed for comfort.      MEDICATIONS: Last Dose 1235    Fentanyl 100 mcg  Versed 2 mg  Lidocaine 3 ml's  Heparin 5000 units    José Friend RN

## 2021-11-01 NOTE — PRE-PROCEDURE
GENERAL PRE-PROCEDURE:   Procedure:  Right tunneled dialysis catheter exchange with intravenous moderate sedation   Date/Time:  11/1/2021 11:20 AM    Written consent obtained?: Yes    Risks and benefits: Risks, benefits and alternatives were discussed    Consent given by:  Patient  Patient states understanding of procedure being performed: Yes    Patient's understanding of procedure matches consent: Yes    Procedure consent matches procedure scheduled: Yes    Expected level of sedation:  Moderate  Appropriately NPO:  Yes  ASA Class:  3  Mallampati  :  Grade 1- soft palate, uvula, tonsillar pillars, and posterior pharyngeal wall visible  Lungs:  Lungs clear with good breath sounds bilaterally and other (comment)  Lung exam comment:  Decreased in bases   Heart:  Normal heart sounds and rate  History & Physical reviewed:  History and physical reviewed and no updates needed  Statement of review:  I have reviewed the lab findings, diagnostic data, medications, and the plan for sedation

## 2021-11-01 NOTE — PROGRESS NOTES
Care Suites Admission Nursing Note    Patient Information  Name: Matteo Barnes  Age: 66 year old  Reason for admission: tunnel cath exchange2  Care Suites arrival time: 1045    Visitor Information  Name: DEISY    Patient Admission/Assessment   Pre-procedure assessment complete: Yes  If abnormal assessment/labs, provider notified: N/A  NPO: Yes  Medications held per instructions/orders: N/A  Consent: obtained  If applicable, pregnancy test status: obtained  Patient oriented to room: Yes  Education/questions answered: Yes  Plan/other: proceed as scheduled    Discharge Planning  Discharge name/phone number: Vanita [phone]  Overnight post sedation caregiver: Fiorella-friend  Discharge location: [place]    Janice Crowell RN         PATIENT/VISITOR WELLNESS SCREENING    Step 1 Patient Screening    1. In the last month, have you been in contact with someone who was confirmed or suspected to have Coronavirus/COVID-19? Yes: Pt tested + on 10/16/21    2. Do you have the following symptoms?  Fever/Chills? No   Cough? No   Shortness of breath? No   New loss of taste or smell? No  Sore throat? No  Muscle or body aches? No  Headaches? No  Fatigue? No  Vomiting or diarrhea? No    Step 2 Visitor Screening    1. Name of Visitor (1 visitor per patient): DEISY

## 2021-11-01 NOTE — PROCEDURES
[facility]    Procedure: Tunneled catheter exchange.    Date/Time: 11/1/2021 3:14 PM  Performed by: Elise Jaramillo DO  Authorized by: Elise Jaramillo DO     UNIVERSAL PROTOCOL   Site Marked: Yes  Prior Images Obtained and Reviewed:  Yes  Required items: Required blood products, implants, devices and special equipment available    Patient identity confirmed:  Verbally with patient, arm band, provided demographic data and hospital-assigned identification number  Patient was reevaluated immediately before administering moderate or deep sedation or anesthesia  Confirmation Checklist:  Patient's identity using two indicators, relevant allergies, procedure was appropriate and matched the consent or emergent situation and correct equipment/implants were available  Time out: Immediately prior to the procedure a time out was called    Universal Protocol: the Joint Commission Universal Protocol was followed    Preparation: Patient was prepped and draped in usual sterile fashion           ANESTHESIA    Anesthesia: Local infiltration  Local Anesthetic:  Lidocaine 1% without epinephrine      SEDATION    Patient Sedated: Yes    Sedation Type:  Moderate (conscious) sedation  Vital signs: Vital signs monitored during sedation    See dictated procedure note for full details.  Findings: Tunneled catheter exchange.    Specimens: none    Complications: None    Condition: Stable    Plan: To use.     PROCEDURE   Patient Tolerance:  Patient tolerated the procedure well with no immediate complications    Length of time physician/provider present for 1:1 monitoring during sedation: 10

## 2021-11-01 NOTE — PROGRESS NOTES
Care Suites Discharge Nursing Note    Patient Information  Name: Matteo Barnes  Age: 66 year old    Discharge Education:  Discharge instructions reviewed: Yes  Additional education/resources provided: n/a  Patient/patient representative verbalizes understanding: Yes  Patient discharging on new medications: No  Medication education completed: Yes    Discharge Plans:   Discharge location: home  Discharge ride contacted: Yes  Approximate discharge time: 1340    Discharge Criteria:  Discharge criteria met and vital signs stable: Yes, ate box lunch, taking PO water, ambulatory, denies any c/o, tunnel cath appears intact. Discharged to home with girlfriend's daughter driving, in stable condition.     Patient Belongs:  Patient belongings returned to patient: Yes    Esther Green RN

## 2021-11-01 NOTE — DISCHARGE INSTRUCTIONS
Tunnel Cath Insertion/Exchange Discharge Instructions     After you go home:      You may resume your normal diet    Have an adult stay with you for 6 hours       For 24 hours - due to the sedation you received:    Relax and take it easy    Do NOT make any important or legal decisions    Do NOT drive or operate machines at home or at work    Do NOT drink alcohol    Care of Puncture Site:      Keep the dressing clean & dry    Check the site twice a day for signs of infection    Do not swim while you have the tunnel catheter (to prevent infection)    If you shower, place a waterproof cover over the dressing (such as plastic wrap)    You may take a bath if the water stays below your waist. Sponge bathe your upper body to keep the dressing from getting wet    Caring for the Catheter:      Check the skin around the tube each day for redness, swelling, drainage or tenderness. These are all signs of infection    Take your temperature daily     Check your catheter every day:      Make sure the clamps are closed over both ends of the tubing    Check that the caps are tight    If a cap comes off, you may have bleeding from the tube, or air could get into the bloodstream.  Wrap the end of the tube in a clean gauze and call your provider or dialysis unit immediately    Your catheter is not likely to fall out. It is secured with stitches to your skin.  Also, a small cuff under the skin helps to hold the catheter in place.  If the catheter does fall out, put firm pressure on the exit site with a clean gauze & seek medical attention immediately    Activity:       You may go back to normal activity in 24 hours     Avoid heavy lifting (greater than 10 pounds), strenuous activity or the overuse of your shoulder for 3 days    Bleeding:      If you start bleeding from the incision sites in your chest or neck - or have swelling in your neck, sit down and press on the site for 5-10 minutes.     If bleeding has not stopped after 10  minutes, call your provider.        Call 911 right away if you have heavy bleeding or bleeding that does not stop.      Medicines:      You may resume all medications    Resume your Warfarin/Coumadin at your regular dose today. Follow up with your provider to have your INR rechecked    Resume your Platelet Inhibitors and Aspirin tomorrow at your regular dose    For minor pain, you may take Acetaminophen (Tylenol) or Ibuprofen (Advil)                 Call the provider who ordered this procedure if:      The site is red, swollen, hot or tender or there is drainage at the site    You have chills or a fever greater than 100 F (37.8 C)    A cap comes off    The catheter falls out    Any questions or concerns    Call  911 or go to the Emergency Room if you have:      Trouble breathing    Chest or shoulder pain not related to procedure    Bleeding that you cannot control      If you have questions call:          [facility] Radiology Dept @ [phone]        The provider who performed your procedure was _________________.

## 2021-11-18 ENCOUNTER — HOSPITAL ENCOUNTER (OUTPATIENT)
Dept: ULTRASOUND IMAGING | Facility: CLINIC | Age: 66
Discharge: HOME | End: 2021-11-18
Attending: SURGERY | Admitting: SURGERY
Payer: COMMERCIAL

## 2021-11-18 ENCOUNTER — OFFICE VISIT (OUTPATIENT)
Dept: SURGERY | Facility: CLINIC | Age: 66
End: 2021-11-18
Attending: SURGERY
Payer: COMMERCIAL

## 2021-11-18 VITALS
WEIGHT: 182 LBS | HEART RATE: 65 BPM | SYSTOLIC BLOOD PRESSURE: 122 MMHG | BODY MASS INDEX: 26.96 KG/M2 | OXYGEN SATURATION: 99 % | DIASTOLIC BLOOD PRESSURE: 82 MMHG | HEIGHT: 69 IN | RESPIRATION RATE: 16 BRPM

## 2021-11-18 DIAGNOSIS — I77.0 AVF (ARTERIOVENOUS FISTULA) (H): ICD-10-CM

## 2021-11-18 DIAGNOSIS — N18.5 CHRONIC KIDNEY DISEASE, STAGE V (H): Primary | ICD-10-CM

## 2021-11-18 DIAGNOSIS — N18.5 CKD (CHRONIC KIDNEY DISEASE) STAGE 5, GFR LESS THAN 15 ML/MIN (H): Primary | ICD-10-CM

## 2021-11-18 PROCEDURE — 93970 EXTREMITY STUDY: CPT

## 2021-11-18 PROCEDURE — 99203 OFFICE O/P NEW LOW 30 MIN: CPT | Performed by: SURGERY

## 2021-11-18 ASSESSMENT — MIFFLIN-ST. JEOR: SCORE: 1595.93

## 2021-11-19 NOTE — PROGRESS NOTES
I had the pleasure of seeing Mr. Matteo Barnes in the vascular surgery clinic today.  He is a right-hand-dominant male who has chronic kidney disease but due to recent cold infection went into end-stage renal disease and required placement of a tunneled cuffed catheter into the right internal jugular vein.    In reviewing his imaging he has a good left upper extremity basilic vein.    Diagnosis: Chronic kidney disease, stage V.    Plan: I explained that for long-term he is better suited to hemodialysis by way of an autologous fistula.  We will plan on proceeding with creation of left upper extremity brachiobasilic arteriovenous fistula.

## 2021-11-22 ENCOUNTER — TELEPHONE (OUTPATIENT)
Dept: OTHER | Facility: CLINIC | Age: 66
End: 2021-11-22
Payer: COMMERCIAL

## 2021-11-22 ENCOUNTER — DOCUMENTATION ONLY (OUTPATIENT)
Dept: OTHER | Facility: CLINIC | Age: 66
End: 2021-11-22
Payer: COMMERCIAL

## 2021-11-22 NOTE — TELEPHONE ENCOUNTER
Patient Education    Procedure: CREATION, LEFT UPPER EXTREMITY ARTERIOVENOUS FISTULA  Diagnosis: CKD STAGE 5  Anticoagulation Instruction: N/A  Pre-Operative Physical Exam: You need to have a pre-op physical exam within 30 days of your procedure. Your procedure may be cancelled if you do not have a current History and Physical. Call your PCP's office to schedule.  Allergies:  Updated in Epic  Bowel Prep: N/A  NPO per protocol.   Post Procedure Education: [facility] patient post-procedure fact sheet reviewed with patient.    COVID-19 instructions for isolation and pre testing reviewed with pt.   Pt had COVID 19 recently, does not need to retest for 90 days post positive test.       Pt states he was in hospital recently, however will need pre op physical as last H&P outside of 30 day time frame.     Learner(s):patient  Method: Listening  Barriers to Learning:No Barrier  Outcome: Patient did verbalize understanding of above education.    KATHY PortilloN, RN  [facility]  Office:  [phone] Fax: [phone]

## 2021-11-22 NOTE — PROGRESS NOTES
Verified with pt, dialyzes at [facility], [facility]. New nephrologist Dr. Ochoa, was seeing Dr. Roy.     Annabelle Avery, KATHYN, RN  [facility]  Office:  [phone] Fax: [phone]

## 2021-11-24 DIAGNOSIS — F41.9 ANXIETY: ICD-10-CM

## 2021-11-25 NOTE — TELEPHONE ENCOUNTER
Routing refill request to provider for review/approval because:  Drug not on the [facility] refill protocol   Kiki Ochoa RN, BSN

## 2021-11-30 ENCOUNTER — TELEPHONE (OUTPATIENT)
Dept: OTHER | Facility: CLINIC | Age: 66
End: 2021-11-30
Payer: COMMERCIAL

## 2021-11-30 DIAGNOSIS — Z11.59 ENCOUNTER FOR SCREENING FOR OTHER VIRAL DISEASES: ICD-10-CM

## 2021-11-30 RX ORDER — ALPRAZOLAM 0.25 MG
0.25 TABLET ORAL DAILY PRN
Qty: 20 TABLET | Refills: 0 | Status: SHIPPED | OUTPATIENT
Start: 2021-11-30 | End: 2022-01-14

## 2021-12-13 ENCOUNTER — ANESTHESIA EVENT (OUTPATIENT)
Dept: SURGERY | Facility: CLINIC | Age: 66
End: 2021-12-13
Payer: COMMERCIAL

## 2021-12-13 ENCOUNTER — TELEPHONE (OUTPATIENT)
Dept: OTHER | Facility: CLINIC | Age: 66
End: 2021-12-13
Payer: COMMERCIAL

## 2021-12-13 NOTE — TELEPHONE ENCOUNTER
[facility]    Who is the name of the provider?:  Loc     What is the location you see this provider at?: [place]    Reason for call:  Surg 12/14.  No history & physical..  Patient states he was told he did not need one.     Can we leave a detailed message on this number?  YES

## 2021-12-13 NOTE — TELEPHONE ENCOUNTER
Discussed with Dr. Monterroso, verified Dr. Monterroso will do the pre op upon pt arrival for surgery.   KATHY PortilloN, RN  [facility]  Office:  [phone] Fax: [phone]

## 2021-12-13 NOTE — TELEPHONE ENCOUNTER
Returned Siria's call to relay message.  Anita Nash RN BSN  [facility]  [phone]

## 2021-12-14 ENCOUNTER — ANESTHESIA (OUTPATIENT)
Dept: SURGERY | Facility: CLINIC | Age: 66
End: 2021-12-14
Payer: COMMERCIAL

## 2021-12-14 ENCOUNTER — APPOINTMENT (OUTPATIENT)
Dept: SURGERY | Facility: PHYSICIAN GROUP | Age: 66
End: 2021-12-14
Payer: COMMERCIAL

## 2021-12-14 ENCOUNTER — HOSPITAL ENCOUNTER (OUTPATIENT)
Facility: CLINIC | Age: 66
Discharge: HOME | End: 2021-12-14
Attending: SURGERY | Admitting: SURGERY
Payer: COMMERCIAL

## 2021-12-14 VITALS
HEART RATE: 70 BPM | HEIGHT: 69 IN | DIASTOLIC BLOOD PRESSURE: 84 MMHG | BODY MASS INDEX: 26.35 KG/M2 | RESPIRATION RATE: 16 BRPM | TEMPERATURE: 97.6 F | OXYGEN SATURATION: 96 % | WEIGHT: 177.9 LBS | SYSTOLIC BLOOD PRESSURE: 127 MMHG

## 2021-12-14 DIAGNOSIS — N18.5 CKD (CHRONIC KIDNEY DISEASE) STAGE 5, GFR LESS THAN 15 ML/MIN (H): Primary | ICD-10-CM

## 2021-12-14 LAB
CREAT SERPL-MCNC: 6.9 MG/DL (ref 0.66–1.25)
GFR SERPL CREATININE-BSD FRML MDRD: 8 ML/MIN/1.73M2
HGB BLD-MCNC: 11.1 G/DL (ref 13.3–17.7)
POTASSIUM BLD-SCNC: 4.6 MMOL/L (ref 3.4–5.3)

## 2021-12-14 PROCEDURE — 36415 COLL VENOUS BLD VENIPUNCTURE: CPT | Performed by: ANESTHESIOLOGY

## 2021-12-14 PROCEDURE — 250N000011 HC RX IP 250 OP 636: Performed by: ANESTHESIOLOGY

## 2021-12-14 PROCEDURE — 250N000009 HC RX 250: Performed by: SURGERY

## 2021-12-14 PROCEDURE — 250N000011 HC RX IP 250 OP 636: Performed by: NURSE ANESTHETIST, CERTIFIED REGISTERED

## 2021-12-14 PROCEDURE — 710N000009 HC RECOVERY PHASE 1, LEVEL 1, PER MIN: Performed by: SURGERY

## 2021-12-14 PROCEDURE — 250N000011 HC RX IP 250 OP 636: Performed by: SURGERY

## 2021-12-14 PROCEDURE — 85018 HEMOGLOBIN: CPT | Performed by: ANESTHESIOLOGY

## 2021-12-14 PROCEDURE — 370N000017 HC ANESTHESIA TECHNICAL FEE, PER MIN: Performed by: SURGERY

## 2021-12-14 PROCEDURE — 999N000141 HC STATISTIC PRE-PROCEDURE NURSING ASSESSMENT: Performed by: SURGERY

## 2021-12-14 PROCEDURE — 272N000001 HC OR GENERAL SUPPLY STERILE: Performed by: SURGERY

## 2021-12-14 PROCEDURE — 710N000012 HC RECOVERY PHASE 2, PER MINUTE: Performed by: SURGERY

## 2021-12-14 PROCEDURE — 36821 AV FUSION DIRECT ANY SITE: CPT | Performed by: SURGERY

## 2021-12-14 PROCEDURE — 84132 ASSAY OF SERUM POTASSIUM: CPT | Performed by: ANESTHESIOLOGY

## 2021-12-14 PROCEDURE — 258N000003 HC RX IP 258 OP 636: Performed by: SURGERY

## 2021-12-14 PROCEDURE — 82565 ASSAY OF CREATININE: CPT | Performed by: ANESTHESIOLOGY

## 2021-12-14 PROCEDURE — 250N000009 HC RX 250: Performed by: NURSE ANESTHETIST, CERTIFIED REGISTERED

## 2021-12-14 PROCEDURE — 360N000076 HC SURGERY LEVEL 3, PER MIN: Performed by: SURGERY

## 2021-12-14 PROCEDURE — 250N000013 HC RX MED GY IP 250 OP 250 PS 637: Performed by: ANESTHESIOLOGY

## 2021-12-14 PROCEDURE — 258N000003 HC RX IP 258 OP 636: Performed by: ANESTHESIOLOGY

## 2021-12-14 RX ORDER — FENTANYL CITRATE 0.05 MG/ML
25 INJECTION, SOLUTION INTRAMUSCULAR; INTRAVENOUS EVERY 5 MIN PRN
Status: DISCONTINUED | OUTPATIENT
Start: 2021-12-14 | End: 2021-12-14 | Stop reason: HOSPADM

## 2021-12-14 RX ORDER — OXYCODONE HYDROCHLORIDE 5 MG/1
5 TABLET ORAL ONCE
Status: COMPLETED | OUTPATIENT
Start: 2021-12-14 | End: 2021-12-14

## 2021-12-14 RX ORDER — ONDANSETRON 2 MG/ML
4 INJECTION INTRAMUSCULAR; INTRAVENOUS EVERY 30 MIN PRN
Status: DISCONTINUED | OUTPATIENT
Start: 2021-12-14 | End: 2021-12-14 | Stop reason: HOSPADM

## 2021-12-14 RX ORDER — FENTANYL CITRATE 0.05 MG/ML
25 INJECTION, SOLUTION INTRAMUSCULAR; INTRAVENOUS
Status: DISCONTINUED | OUTPATIENT
Start: 2021-12-14 | End: 2021-12-14 | Stop reason: HOSPADM

## 2021-12-14 RX ORDER — PROPOFOL 10 MG/ML
INJECTION, EMULSION INTRAVENOUS PRN
Status: DISCONTINUED | OUTPATIENT
Start: 2021-12-14 | End: 2021-12-14

## 2021-12-14 RX ORDER — OXYCODONE HYDROCHLORIDE 5 MG/1
5 TABLET ORAL EVERY 6 HOURS PRN
Qty: 18 TABLET | Refills: 0 | Status: SHIPPED | OUTPATIENT
Start: 2021-12-14 | End: 2021-12-21

## 2021-12-14 RX ORDER — LIDOCAINE 40 MG/G
CREAM TOPICAL
Status: DISCONTINUED | OUTPATIENT
Start: 2021-12-14 | End: 2021-12-14 | Stop reason: HOSPADM

## 2021-12-14 RX ORDER — OXYCODONE HYDROCHLORIDE 5 MG/1
5 TABLET ORAL EVERY 6 HOURS PRN
Qty: 12 TABLET | Refills: 0 | Status: CANCELLED | OUTPATIENT
Start: 2021-12-14 | End: 2021-12-17

## 2021-12-14 RX ORDER — HEPARIN SODIUM 1000 [USP'U]/ML
INJECTION, SOLUTION INTRAVENOUS; SUBCUTANEOUS PRN
Status: DISCONTINUED | OUTPATIENT
Start: 2021-12-14 | End: 2021-12-14 | Stop reason: HOSPADM

## 2021-12-14 RX ORDER — ONDANSETRON 2 MG/ML
INJECTION INTRAMUSCULAR; INTRAVENOUS PRN
Status: DISCONTINUED | OUTPATIENT
Start: 2021-12-14 | End: 2021-12-14

## 2021-12-14 RX ORDER — SODIUM CHLORIDE, SODIUM LACTATE, POTASSIUM CHLORIDE, CALCIUM CHLORIDE 600; 310; 30; 20 MG/100ML; MG/100ML; MG/100ML; MG/100ML
INJECTION, SOLUTION INTRAVENOUS CONTINUOUS
Status: DISCONTINUED | OUTPATIENT
Start: 2021-12-14 | End: 2021-12-14 | Stop reason: HOSPADM

## 2021-12-14 RX ORDER — MAGNESIUM HYDROXIDE 1200 MG/15ML
LIQUID ORAL PRN
Status: DISCONTINUED | OUTPATIENT
Start: 2021-12-14 | End: 2021-12-14 | Stop reason: HOSPADM

## 2021-12-14 RX ORDER — SENNA AND DOCUSATE SODIUM 50; 8.6 MG/1; MG/1
1 TABLET, FILM COATED ORAL AT BEDTIME
Qty: 20 TABLET | Refills: 0 | Status: SHIPPED | OUTPATIENT
Start: 2021-12-14 | End: 2023-01-26

## 2021-12-14 RX ORDER — PROPOFOL 10 MG/ML
INJECTION, EMULSION INTRAVENOUS CONTINUOUS PRN
Status: DISCONTINUED | OUTPATIENT
Start: 2021-12-14 | End: 2021-12-14

## 2021-12-14 RX ORDER — HEPARIN SODIUM 1000 [USP'U]/ML
INJECTION, SOLUTION INTRAVENOUS; SUBCUTANEOUS PRN
Status: DISCONTINUED | OUTPATIENT
Start: 2021-12-14 | End: 2021-12-14

## 2021-12-14 RX ORDER — LIDOCAINE HYDROCHLORIDE 20 MG/ML
INJECTION, SOLUTION INFILTRATION; PERINEURAL PRN
Status: DISCONTINUED | OUTPATIENT
Start: 2021-12-14 | End: 2021-12-14

## 2021-12-14 RX ORDER — ONDANSETRON 4 MG/1
4 TABLET, ORALLY DISINTEGRATING ORAL EVERY 30 MIN PRN
Status: DISCONTINUED | OUTPATIENT
Start: 2021-12-14 | End: 2021-12-14 | Stop reason: HOSPADM

## 2021-12-14 RX ORDER — HYDROMORPHONE HCL IN WATER/PF 6 MG/30 ML
0.2 PATIENT CONTROLLED ANALGESIA SYRINGE INTRAVENOUS EVERY 5 MIN PRN
Status: DISCONTINUED | OUTPATIENT
Start: 2021-12-14 | End: 2021-12-14 | Stop reason: HOSPADM

## 2021-12-14 RX ORDER — MEPERIDINE HYDROCHLORIDE 25 MG/ML
12.5 INJECTION INTRAMUSCULAR; INTRAVENOUS; SUBCUTANEOUS
Status: DISCONTINUED | OUTPATIENT
Start: 2021-12-14 | End: 2021-12-14 | Stop reason: HOSPADM

## 2021-12-14 RX ORDER — DEXAMETHASONE SODIUM PHOSPHATE 4 MG/ML
INJECTION, SOLUTION INTRA-ARTICULAR; INTRALESIONAL; INTRAMUSCULAR; INTRAVENOUS; SOFT TISSUE PRN
Status: DISCONTINUED | OUTPATIENT
Start: 2021-12-14 | End: 2021-12-14

## 2021-12-14 RX ORDER — FENTANYL CITRATE 0.05 MG/ML
50 INJECTION, SOLUTION INTRAMUSCULAR; INTRAVENOUS
Status: DISCONTINUED | OUTPATIENT
Start: 2021-12-14 | End: 2021-12-14 | Stop reason: HOSPADM

## 2021-12-14 RX ORDER — CEFAZOLIN SODIUM 2 G/100ML
2 INJECTION, SOLUTION INTRAVENOUS
Status: COMPLETED | OUTPATIENT
Start: 2021-12-14 | End: 2021-12-14

## 2021-12-14 RX ADMIN — PROPOFOL 30 MG: 10 INJECTION, EMULSION INTRAVENOUS at 11:19

## 2021-12-14 RX ADMIN — MIDAZOLAM HYDROCHLORIDE 1 MG: 1 INJECTION, SOLUTION INTRAMUSCULAR; INTRAVENOUS at 10:54

## 2021-12-14 RX ADMIN — ONDANSETRON 4 MG: 2 INJECTION INTRAMUSCULAR; INTRAVENOUS at 11:35

## 2021-12-14 RX ADMIN — HEPARIN SODIUM 4000 UNITS: 1000 INJECTION INTRAVENOUS; SUBCUTANEOUS at 12:36

## 2021-12-14 RX ADMIN — MIDAZOLAM HYDROCHLORIDE 1 MG: 1 INJECTION, SOLUTION INTRAMUSCULAR; INTRAVENOUS at 11:00

## 2021-12-14 RX ADMIN — PROPOFOL 100 MCG/KG/MIN: 10 INJECTION, EMULSION INTRAVENOUS at 11:19

## 2021-12-14 RX ADMIN — HYDROMORPHONE HYDROCHLORIDE 0.2 MG: 0.2 INJECTION, SOLUTION INTRAMUSCULAR; INTRAVENOUS; SUBCUTANEOUS at 15:05

## 2021-12-14 RX ADMIN — OXYCODONE HYDROCHLORIDE 5 MG: 5 TABLET ORAL at 14:48

## 2021-12-14 RX ADMIN — FENTANYL CITRATE 50 MCG: 0.05 INJECTION, SOLUTION INTRAMUSCULAR; INTRAVENOUS at 10:55

## 2021-12-14 RX ADMIN — DEXAMETHASONE SODIUM PHOSPHATE 4 MG: 4 INJECTION, SOLUTION INTRA-ARTICULAR; INTRALESIONAL; INTRAMUSCULAR; INTRAVENOUS; SOFT TISSUE at 11:39

## 2021-12-14 RX ADMIN — FENTANYL CITRATE 25 MCG: 0.05 INJECTION, SOLUTION INTRAMUSCULAR; INTRAVENOUS at 14:10

## 2021-12-14 RX ADMIN — FENTANYL CITRATE 25 MCG: 0.05 INJECTION, SOLUTION INTRAMUSCULAR; INTRAVENOUS at 14:17

## 2021-12-14 RX ADMIN — CEFAZOLIN SODIUM 2 G: 2 INJECTION, SOLUTION INTRAVENOUS at 11:15

## 2021-12-14 RX ADMIN — MEPIVACAINE HYDROCHLORIDE 25 ML: 15 INJECTION, SOLUTION EPIDURAL; INFILTRATION at 11:10

## 2021-12-14 RX ADMIN — LIDOCAINE HYDROCHLORIDE 60 MG: 20 INJECTION, SOLUTION INFILTRATION; PERINEURAL at 11:19

## 2021-12-14 RX ADMIN — SODIUM CHLORIDE, POTASSIUM CHLORIDE, SODIUM LACTATE AND CALCIUM CHLORIDE: 600; 310; 30; 20 INJECTION, SOLUTION INTRAVENOUS at 11:15

## 2021-12-14 RX ADMIN — HYDROMORPHONE HYDROCHLORIDE 0.2 MG: 0.2 INJECTION, SOLUTION INTRAMUSCULAR; INTRAVENOUS; SUBCUTANEOUS at 14:38

## 2021-12-14 ASSESSMENT — MIFFLIN-ST. JEOR: SCORE: 1577.33

## 2021-12-14 NOTE — ANESTHESIA PROCEDURE NOTES
Brachial plexus Procedure Note    Pre-Procedure   Staff -        Anesthesiologist:  Scott Clarke MD       Performed By: anesthesiologist       Location: pre-op       Procedure Start/Stop Times: 12/14/2021 11:05 AM and 12/14/2021 11:15 AM       Pre-Anesthestic Checklist: patient identified, IV checked, site marked, risks and benefits discussed, informed consent, monitors and equipment checked, pre-op evaluation and at physician/surgeon's request  Timeout:       Correct Patient: Yes        Correct Procedure: Yes        Correct Site: Yes        Correct Position: Yes        Correct Laterality: Yes        Site Marked: Yes  Procedure Documentation  Procedure: Brachial plexus       Laterality: left       Patient Position: supine       Skin prep: Chloraprep (axillary approach).       Needle Type: short bevel       Needle Gauge: 21.        Needle Length (Inches): 4        Ultrasound guided       1. Ultrasound was used to identify targeted nerve, plexus, vascular marker, or fascial plane and place a needle adjacent to it in real-time.       2. Ultrasound was used to visualize the spread of anesthetic in close proximity to the above referenced structure.       3. A permanent image is entered into the patient's record.       4. The visualized anatomic structures appeared normal.       5. There were no apparent abnormal pathologic findings.    Assessment/Narrative         The placement was negative for: blood aspirated, painful injection and site bleeding       Paresthesias: No.      Bolus given via needle. No blood aspirated via catheter.        Secured via.        Insertion/Infusion Method: Single Shot       Complications: none       Injection made incrementally with aspirations every 3 mL.    Medication(s) Administered   Mepivacaine 1.5% PF (Perineural), 25 mL  Medication Administration Time: 12/14/2021 11:10 AM     Comments:  Easily placed. No paresthesias. No immediate complications.

## 2021-12-14 NOTE — OR NURSING
Discharge to home.  Discharge instructions to pt and pt friend.  No questions or concerns. Pt denies pain or discomfort.  Sealed prescriptions placed in pt belongings bag along with discharge instructions.

## 2021-12-14 NOTE — DISCHARGE INSTRUCTIONS
Same Day Surgery Discharge Instructions for  Sedation and General Anesthesia       It's not unusual to feel dizzy, light-headed or faint for up to 24 hours after surgery or while taking pain medication.  If you have these symptoms: sit for a few minutes before standing and have someone assist you when you get up to walk or use the bathroom.      You should rest and relax for the next 24 hours. We recommend you make arrangements to have an adult stay with you for at least 24 hours after your discharge.  Avoid hazardous and strenuous activity.      DO NOT DRIVE any vehicle or operate mechanical equipment for 24 hours following the end of your surgery.  Even though you may feel normal, your reactions may be affected by the medication you have received.      Do not drink alcoholic beverages for 24 hours following surgery.       Slowly progress to your regular diet as you feel able. It's not unusual to feel nauseated and/or vomit after receiving anesthesia.  If you develop these symptoms, drink clear liquids (apple juice, ginger ale, broth, 7-up, etc. ) until you feel better.  If your nausea and vomiting persists for 24 hours, please notify your surgeon.        All narcotic pain medications, along with inactivity and anesthesia, can cause constipation. Drinking plenty of liquids and increasing fiber intake will help.      For any questions of a medical nature, call your surgeon.      Do not make important decisions for 24 hours.      If you had general anesthesia, you may have a sore throat for a couple of days related to the breathing tube used during surgery.  You may use Cepacol lozenges to help with this discomfort.  If it worsens or if you develop a fever, contact your surgeon.       If you feel your pain is not well managed with the pain medications prescribed by your surgeon, please contact your surgeon's office to let them know so they can address your concerns.       CoVid 19 Information    We want to give you  "information regarding Covid. Please consult your primary care provider with any questions you might have.     Patient who have symptoms (cough, fever, or shortness of breath), need to isolate for 7 days from when symptoms started OR 72 hours after fever resolves (without fever reducing medications) AND improvement of respiratory symptoms (whichever is longer).      Isolate yourself at home (in own room/own bathroom if possible)    Do Not allow any visitors    Do Not go to work or school    Do Not go to [employer],  centers, shopping, or other public places.    Do Not shake hands.    Avoid close and intimate contact with others (hugging, kissing).    Follow CDC recommendations for household cleaning of frequently touched services.     After the initial 7 days, continue to isolate yourself from household members as much as possible. To continue decrease the risk of community spread and exposure, you and any members of your household should limit activities in public for 14 days after starting home isolation.     You can reference the following CDC link for helpful home isolation/care tips:  https://www.cdc.gov/coronavirus/2019-ncov/downloads/10Things.pdf    Protect Others:    Cover Your Mouth and Nose with a mask, disposable tissue or wash cloth to avoid spreading germs to others.    Wash your hands and face frequently with soap and water    Call Your Primary Doctor If: Breathing difficulty develops or you become worse.    For more information about COVID19 and options for caring for yourself at home, please visit the CDC website at https://www.cdc.gov/coronavirus/2019-ncov/about/steps-when-sick.html  For more options for care at [facility], please visit our website at [email or web address]      ARTERIAL VENOUS FISTULA  Discharge Instructions       You may be able to feel the blood flowing through your fistula, it feels similar to a purring cat. This is called a \"thrill\"  Remove " "outer dressing in 48 hours, leave steri strips (small white pieces of tape) on.  Let them fall off on their own or gently remove them in 7 days.  Okay to shower once outer dressing is removed.  No soaking for 4 weeks.    Call your Surgeon If You Have Any of the Following:  Fever of 100.4 F or higher  Signs of infection at the incision site, such as increased redness or swelling, warmth, worsening pain, bleeding, or foul-smelling drainage  Lack of a \"thrill\" (you can t feel it)  Pain or numbness in your fingers, hand, or arm  Bleeding, redness, or warmth around your fistula  Sudden bulging of the fistula (more than usual; a slight bulge is normal)   Follow-Up  The doctor will check your fistula within 1 to 2 weeks after the procedure.     It will likely take about 6 to 8 weeks for the fistula to enlarge enough to start dialysis. After that, make sure the fistula is checked each time you have dialysis.       **If you have questions or concerns about your procedure  call Dr. Monterroso at [phone]**    "

## 2021-12-14 NOTE — OR NURSING
Pt changed his mind and would like medications filled here.  Dr Monterroso notified.  MD will change oxycodone to be filled here.  [facility] in [place] called and canceled oxycodone
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
## 2021-12-14 NOTE — ANESTHESIA POSTPROCEDURE EVALUATION
Patient: Matteo Barnes    Procedure: Procedure(s):  LEFT UPPER EXTREMITY ARTERIOVENOUS FISTULA CREATION       Diagnosis:CKD (chronic kidney disease) stage 5, GFR less than 15 ml/min (H) [N18.5]  Diagnosis Additional Information: No value filed.    Anesthesia Type:  Peripheral Nerve Block    Note:  Disposition: Outpatient   Postop Pain Control: Uneventful            Sign Out: Post surgical pain. Treating with IV and oral pain medications.   PONV: No   Neuro/Psych: Uneventful            Sign Out: Acceptable/Baseline neuro status   Airway/Respiratory: Uneventful            Sign Out: Acceptable/Baseline resp. status   CV/Hemodynamics: Uneventful            Sign Out: Acceptable CV status   Other NRE: NONE   DID A NON-ROUTINE EVENT OCCUR? No    Event details/Postop Comments:  Doing well. Please call with questions.           Last vitals:  Vitals Value Taken Time   /82 12/14/21 1500   Temp 36.3  C (97.3  F) 12/14/21 1445   Pulse 64 12/14/21 1509   Resp 12 12/14/21 1509   SpO2 93 % 12/14/21 1511   Vitals shown include unvalidated device data.    Electronically Signed By: Scott Clarke MD  December 14, 2021  3:15 PM

## 2021-12-14 NOTE — H&P
Presenting complaint: Dizziness, stage V.    History of presenting illness.  Matteo is a very pleasant right-hand-dominant 66-year-old male with chronic kidney disease who currently dialyzes with a right internal jugular cath.  It has already been changed once and he has been having problems with flow issues.  He is here for creation of left upper extremity arteriovenous fistula.  His cause of renal failure is focal segmental glomerulosclerosis.    Past medical history is notable for hypertension and gout.    Past surgical history is notable for left arm fracture plus tonsillectomy and adenoidectomy.    Social history: He does not smoke he has an occasional drink.  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    Review of systems: Negative for recent fevers, chills or shortness of breath.    On examination: He appears comfortable and he is in no acute distress.  Vital signs reviewed.  HEENT: Head is atraumatic and swelling, mucosa pink.  Cardiac: Regular rate and rhythm, S1 plus S2 +0.  Chest: Clear to auscultation bilaterally.  Abdomen: Soft and nontender.  Mental: Alert and oriented x4, judgment and insight are good.  Eyes: Extraocular motions are intact, sclerae are anicteric.  Vascular: 3+ bilateral radial and brachial pulses.    Imaging data: He recently underwent duplex sonography of upper extremity severely vein system and he has a suitable left upper extremity basilic vein.    Diagnosis: Chronic kidney disease, stage V.    Plan: He will receive left upper extremity regional anesthesia and we will proceed with single-stage brachiobasilic arteriovenous fistula creation.

## 2021-12-14 NOTE — ANESTHESIA CARE TRANSFER NOTE
Patient: Matteo Barnes    Procedure: Procedure(s):  LEFT UPPER EXTREMITY ARTERIOVENOUS FISTULA CREATION       Diagnosis: CKD (chronic kidney disease) stage 5, GFR less than 15 ml/min (H) [N18.5]  Diagnosis Additional Information: No value filed.    Anesthesia Type:   Peripheral Nerve Block     Note:    Oropharynx: oropharynx clear of all foreign objects  Level of Consciousness: awake  Oxygen Supplementation: face mask    Independent Airway: airway patency satisfactory and stable  Dentition: dentition unchanged      Patient transferred to: PACU  Comments: At end of procedure, spontaneous respirations, patient alert to voice, able to follow commands. Oxygen via facemask at 6 liters per minute to PACU. Oxygen tubing connected to wall O2 in PACU, SpO2, NiBP, and EKG monitors and alarms on and functioning, report on patient's clinical status given to PACU RN, RN questions answered.  Handoff Report: Identifed the Patient, Identified the Reponsible Provider, Reviewed the pertinent medical history, Discussed the surgical course, Reviewed Intra-OP anesthesia mangement and issues during anesthesia, Set expectations for post-procedure period and Allowed opportunity for questions and acknowledgement of understanding      Vitals:  Vitals Value Taken Time   /74 12/14/21 1415   Temp 36.6  C (97.9  F) 12/14/21 1405   Pulse 64 12/14/21 1416   Resp 11 12/14/21 1416   SpO2 95 % 12/14/21 1416   Vitals shown include unvalidated device data.    Electronically Signed By: ZEHRA Perez CRNA  December 14, 2021  2:17 PM

## 2021-12-14 NOTE — OP NOTE
Preoperative diagnosis: Chronic kidney disease, stage V.    Postoperative diagnosis: Same.    Procedure: Left upper extremity single-stage brachiobasilic arteriovenous fistula creation.    Surgeon: David Monterroso M.D.  Assistant: Ismael Coles M.D.    Anesthesia: Regional with intravenous sedation.  Estimated blood loss: About 50 mL.  Complications: None.    Heparin: 4000 units.    Indication for procedure: This is a 66-year-old male with chronic kidney disease. He is currently dialyzing with an internal jugular catheter. He has reasonable vein in the left upper extremity. I mapped out the basilic vein from the proximal forearm to the mid arm that would be suitable for fistula creation.    Procedure details: Patient was identified and then taken to the operating room and placed in supine position. He had received left upper extremity regional anesthesia. Left upper extremity was prepped and a sterile surgical field was created. Preprocedure timeout was conducted. Preoperative intravenous antibiotics were administered. Below the elbow crease and overlying the forearm and incision was made and the vein was dissected out and placed in Vesseloops. Above the elbow crease 2 longitudinal incisions were made with a skin bridge in between and the basilic vein was dissected out. Side branches were ligated with silk suture. To be above the 2 incisions the basilic vein was dissected out but it was a small caliber higher up in the arm with a large venous outflow branch going into the brachial vein. I decided that that would be the best outflow and we will have reviewed this available length. 4000 units of heparin were given. The distal end of the vein was ligated with silk suture. 2 small incisions were made anteriorly and laterally over the arm and the vein was tunneled in a superficial location and brought out next to the brachial artery. The brachial artery had been dissected out to the lower of the arm incisions. Vesseloops  were placed around it. After adequate circulation time the brachial artery was clamped proximally and distally. A 6 mm oblique arteriotomy was created. The vein was spatulated and sutured to the artery in end to side fashion using running 7-0 Prolene. 2 suture lines started, 1 at the heel and 1 at the toe. Prior to completion of the suture line adequate for bleeding and backbleeding was performed. Suture line was completed. Flow was established into the fistula. It had an excellent thrill and. Adequate hemostasis was confirmed and then the wound was closed. The vein harvest sites were closed with 3-0 Vicryl sutures. After I closed the upper arm incision we lost a pulse in the fistula and that incision was opened and it was obvious that this was due to kinking from the tissue. Tissue was excised to prevent any kinking of the fistula. And then after I reapproximated the tissue there was a continued excellent thrill. The wound was then closed with 3-0 Vicryl and skin was approximated with 4-0 chromic sutures.    Consult instruments, sponges and needle was noted to be correct.    Patient was then taken to the recovery room in stable condition.

## 2021-12-24 NOTE — ADDENDUM NOTE
Addendum  created 12/24/21 1121 by Scott Clarke MD    Clinical Note Signed, Diagnosis association updated, Intraprocedure Blocks edited

## 2022-01-06 ENCOUNTER — OFFICE VISIT (OUTPATIENT)
Dept: SURGERY | Facility: CLINIC | Age: 67
End: 2022-01-06
Payer: COMMERCIAL

## 2022-01-06 VITALS
DIASTOLIC BLOOD PRESSURE: 78 MMHG | OXYGEN SATURATION: 98 % | WEIGHT: 177 LBS | HEART RATE: 67 BPM | BODY MASS INDEX: 26.22 KG/M2 | HEIGHT: 69 IN | SYSTOLIC BLOOD PRESSURE: 126 MMHG | RESPIRATION RATE: 16 BRPM

## 2022-01-06 DIAGNOSIS — Z09 SURGICAL FOLLOWUP VISIT: Primary | ICD-10-CM

## 2022-01-06 PROCEDURE — 99024 POSTOP FOLLOW-UP VISIT: CPT | Performed by: SURGERY

## 2022-01-06 ASSESSMENT — MIFFLIN-ST. JEOR: SCORE: 1573.25

## 2022-01-10 DIAGNOSIS — F41.9 ANXIETY: ICD-10-CM

## 2022-01-14 ENCOUNTER — TELEPHONE (OUTPATIENT)
Dept: OTHER | Facility: CLINIC | Age: 67
End: 2022-01-14
Payer: COMMERCIAL

## 2022-01-14 RX ORDER — ALPRAZOLAM 0.25 MG
0.25 TABLET ORAL DAILY PRN
Qty: 20 TABLET | Refills: 0 | Status: SHIPPED | OUTPATIENT
Start: 2022-01-14 | End: 2022-03-08

## 2022-01-14 NOTE — TELEPHONE ENCOUNTER
"Returned patient's call.    He was in two weeks ago - and the\" girls at dialysis won't use his fistula - they don't have the work order\".    I apologized for the delay and explained that health care is at a high demand currently and asked for understanding.  He told me he was done with this conversation and proceeded to end the call.    Dr. Monterroso's note from 1/6 is not complete.  I will route him this request for permission to access the dialysis graft.    Anita Nash RN BSN  [facility]  [phone]        "

## 2022-01-17 ENCOUNTER — TRANSFERRED RECORDS (OUTPATIENT)
Dept: HEALTH INFORMATION MANAGEMENT | Facility: CLINIC | Age: 67
End: 2022-01-17
Payer: COMMERCIAL

## 2022-01-17 LAB
ALT SERPL-CCNC: 25 U/L (ref 10–49)
AST SERPL-CCNC: 19 U/L (ref 0–34)
CREATININE (EXTERNAL): 8.79 MG/DL (ref 0.7–1.3)
POTASSIUM (EXTERNAL): 5.1 MEQ/L (ref 3.5–5.5)

## 2022-01-17 NOTE — PROGRESS NOTES
Mr. MCDANIEL underwent creation of left upper extremity brachiobasilic arteriovenous fistula on 12/14/2021. He has an excellent thrill in the fistula. It can be used for dialysis.

## 2022-01-17 NOTE — TELEPHONE ENCOUNTER
I called [facility], notified them pt okay to start using Fistula per Dr. Monterroso.     Annabelle Avrey, KATHYN, RN  [facility]  Office:  [phone] Fax: [phone]

## 2022-02-02 DIAGNOSIS — Z99.2 ESRD (END STAGE RENAL DISEASE) ON DIALYSIS (H): Primary | ICD-10-CM

## 2022-02-02 DIAGNOSIS — N18.6 ESRD (END STAGE RENAL DISEASE) ON DIALYSIS (H): Primary | ICD-10-CM

## 2022-02-03 ENCOUNTER — DOCUMENTATION ONLY (OUTPATIENT)
Dept: TRANSPLANT | Facility: CLINIC | Age: 67
End: 2022-02-03

## 2022-02-03 DIAGNOSIS — Z11.59 ENCOUNTER FOR SCREENING FOR OTHER VIRAL DISEASES: Primary | ICD-10-CM

## 2022-02-03 NOTE — PROGRESS NOTES
Ordering Clinic:  [facility] procedure #:  Procedure: CVC removal  Reason for procedure: Functioning fistula  Arrival date: 2/10/22  Arrival time: 0800  Covid-19 testing requirements explained: Y  NPO explained: NA                 Does patient have transportation available pre and post procedure?   Y  Check-in procedure explained: Y  Allergies reviewed: NKDA  Blood thinners: N  Labs: NA  Results:_____________________________________  H&P:  NA  H&P requested?:   Letter sent/email?: email  Does patient require /language?        PMH:

## 2022-02-05 ENCOUNTER — TRANSFERRED RECORDS (OUTPATIENT)
Dept: HEALTH INFORMATION MANAGEMENT | Facility: CLINIC | Age: 67
End: 2022-02-05
Payer: COMMERCIAL

## 2022-02-06 ENCOUNTER — LAB (OUTPATIENT)
Dept: LAB | Facility: CLINIC | Age: 67
End: 2022-02-06
Attending: PHYSICIAN ASSISTANT
Payer: COMMERCIAL

## 2022-02-06 DIAGNOSIS — Z11.59 ENCOUNTER FOR SCREENING FOR OTHER VIRAL DISEASES: ICD-10-CM

## 2022-02-06 PROCEDURE — U0003 INFECTIOUS AGENT DETECTION BY NUCLEIC ACID (DNA OR RNA); SEVERE ACUTE RESPIRATORY SYNDROME CORONAVIRUS 2 (SARS-COV-2) (CORONAVIRUS DISEASE [COVID-19]), AMPLIFIED PROBE TECHNIQUE, MAKING USE OF HIGH THROUGHPUT TECHNOLOGIES AS DESCRIBED BY CMS-2020-01-R: HCPCS

## 2022-02-07 LAB — SARS-COV-2 RNA RESP QL NAA+PROBE: NEGATIVE

## 2022-02-10 ENCOUNTER — HOSPITAL ENCOUNTER (OUTPATIENT)
Facility: CLINIC | Age: 67
Discharge: HOME | End: 2022-02-10
Attending: RADIOLOGY | Admitting: RADIOLOGY
Payer: COMMERCIAL

## 2022-02-10 ENCOUNTER — APPOINTMENT (OUTPATIENT)
Dept: INTERVENTIONAL RADIOLOGY/VASCULAR | Facility: CLINIC | Age: 67
End: 2022-02-10
Attending: PHYSICIAN ASSISTANT
Payer: COMMERCIAL

## 2022-02-10 DIAGNOSIS — N18.6 ESRD (END STAGE RENAL DISEASE) ON DIALYSIS (H): ICD-10-CM

## 2022-02-10 DIAGNOSIS — Z99.2 ESRD (END STAGE RENAL DISEASE) ON DIALYSIS (H): ICD-10-CM

## 2022-02-10 PROCEDURE — 36589 REMOVAL TUNNELED CV CATH: CPT

## 2022-02-10 RX ORDER — LIDOCAINE HYDROCHLORIDE 10 MG/ML
INJECTION, SOLUTION EPIDURAL; INFILTRATION; INTRACAUDAL; PERINEURAL
Status: DISCONTINUED
Start: 2022-02-10 | End: 2022-02-10 | Stop reason: HOSPADM

## 2022-02-10 RX ORDER — LIDOCAINE HYDROCHLORIDE 10 MG/ML
1-30 INJECTION, SOLUTION EPIDURAL; INFILTRATION; INTRACAUDAL; PERINEURAL
Status: DISCONTINUED | OUTPATIENT
Start: 2022-02-10 | End: 2022-02-10 | Stop reason: HOSPADM

## 2022-02-10 NOTE — DISCHARGE INSTRUCTIONS
Going Home after the   Removal of Your Port or Tunneled Dialysis Catheter  ______________________________________________________________________    Patient Name:  Matteo Barnes  Today's Date:  February 10, 2022    The doctor who removed your port or catheter was: Dr. Palacios at [facility]) in:    When you get home:    No driving or drinking alcohol until tomorrow. You may still have side effects from   the medicine you received today (you may feel drowsy, unsteady or forgetful).    You may go back to your regular diet today.     If you take aspirin or Plavix, you may begin taking it again tomorrow. You may restart all other medicines today. Use pain medicine as directed.    Avoid heavy lifting or the overuse of your shoulder for three days.    Port site and bandage care    Keep the wound clean and dry for three days. Cover it with plastic before taking a shower.     Change the bandage if it gets wet or dirty. Use bacitracin (antibiotic cream) and clean gauze.     After three days, you may use Band-Aids until the wound has healed.    If you have oozing or bleeding from the port site or catheter (tube) site:   o Put direct pressure on the wound for 5 to 10 minutes with a gauze pad.  If you still have bleeding after 10 minutes, call your doctor.  o If you are bleeding a lot and can t control it with direct pressure, call 911.    Call your primary doctor if you have:    Swelling in your neck.    Signs of infection:   o fever over 100  F (37.8 C) under the tongue  o the wound is red, tender or draining.

## 2022-02-10 NOTE — IP AVS SNAPSHOT
[facility]  [address]  [facility] [address]  Phone: [phone]  Fax: [phone]                                  After Visit Summary   2/10/2022    Matteo Barnes   MRN: [id number]           After Visit Summary Signature Page    I have received my discharge instructions, and my questions have been answered. I have discussed any challenges I see with this plan with the nurse or doctor.    ..........................................................................................................................................  Patient/Patient Representative Signature      ..........................................................................................................................................  Patient Representative Print Name and Relationship to Patient    ..................................................               ................................................  Date                                   Time    ..........................................................................................................................................  Reviewed by Signature/Title    ...................................................              ..............................................  Date                                               Time          22EPIC Rev 08/18

## 2022-03-03 DIAGNOSIS — F41.9 ANXIETY: ICD-10-CM

## 2022-03-03 DIAGNOSIS — N52.9 ERECTILE DYSFUNCTION, UNSPECIFIED ERECTILE DYSFUNCTION TYPE: ICD-10-CM

## 2022-03-04 NOTE — TELEPHONE ENCOUNTER
Routing refill request to provider for review/approval because:  Drug not on the [facility] refill protocol     Diya Rondon RN

## 2022-03-04 NOTE — TELEPHONE ENCOUNTER
Routing refill request to provider for review/approval because:    Break in medication last filled 8/2020    Diya Rondon, RN

## 2022-03-07 ENCOUNTER — TRANSFERRED RECORDS (OUTPATIENT)
Dept: HEALTH INFORMATION MANAGEMENT | Facility: CLINIC | Age: 67
End: 2022-03-07
Payer: COMMERCIAL

## 2022-03-07 RX ORDER — SILDENAFIL CITRATE 20 MG/1
TABLET ORAL
Qty: 30 TABLET | Refills: 0 | Status: SHIPPED | OUTPATIENT
Start: 2022-03-07 | End: 2023-01-26

## 2022-03-07 NOTE — TELEPHONE ENCOUNTER
Patient scheduled a virtual med check with Dr. Duke as he ha a lot going on right now with dialysis.     Oliverio Boss

## 2022-03-07 NOTE — TELEPHONE ENCOUNTER
Due for wellness and 1 prescription sent for now.  Please schedule  Last visit in this dept:    10/11/2021     Next visit in this dept:       Health Maintenance   Topic Date Due     ADVANCE CARE PLANNING  Never done     Pneumococcal Vaccine: 65+ Years (1 of 4 - PCV13) Never done     COLORECTAL CANCER SCREENING  Never done     HEPATITIS B IMMUNIZATION (1 of 3 - Risk Recombivax 3-dose series) Never done     ZOSTER IMMUNIZATION (1 of 2) Never done     LIPID  07/18/2020     MEDICARE ANNUAL WELLNESS VISIT  08/10/2020     FALL RISK ASSESSMENT  Never done     MICROALBUMIN  12/30/2020     INFLUENZA VACCINE (1) 09/01/2021     PHQ-2  01/01/2022     BMP  01/23/2022     PSA  02/01/2022     COVID-19 Vaccine (4 - Booster for Pfizer series) 06/04/2022     HEMOGLOBIN  06/14/2022     ANNUAL REVIEW OF HM ORDERS  09/02/2022     DTAP/TDAP/TD IMMUNIZATION (2 - Td or Tdap) 10/08/2025     PARATHYROID  Completed     PHOSPHORUS  Completed     HEPATITIS C SCREENING  Completed     URINALYSIS  Completed     ALK PHOS  Completed     AORTIC ANEURYSM SCREENING (SYSTEM ASSIGNED)  Completed     IPV IMMUNIZATION  Aged Out     MENINGITIS IMMUNIZATION  Aged Out

## 2022-03-08 RX ORDER — ALPRAZOLAM 0.25 MG
0.25 TABLET ORAL DAILY PRN
Qty: 20 TABLET | Refills: 0 | Status: SHIPPED | OUTPATIENT
Start: 2022-03-08 | End: 2022-09-26

## 2022-03-18 DIAGNOSIS — Z11.59 ENCOUNTER FOR SCREENING FOR OTHER VIRAL DISEASES: Primary | ICD-10-CM

## 2022-03-18 DIAGNOSIS — Z99.2 ESRD (END STAGE RENAL DISEASE) ON DIALYSIS (H): Primary | ICD-10-CM

## 2022-03-18 DIAGNOSIS — N18.6 ESRD (END STAGE RENAL DISEASE) ON DIALYSIS (H): Primary | ICD-10-CM

## 2022-03-18 NOTE — PROGRESS NOTES
Ordering Clinic:  [facility] procedure #:  Procedure: Fistulogram  Reason for procedure: prolonged bleeding, whistle  Arrival date: 4/6/22  Arrival time: 0715  Covid-19 testing requirements explained: Y  NPO explained: Y                 Does patient have transportation available pre and post procedure?   Y  Check-in procedure explained: Y  Allergies reviewed: NKA  Blood thinners: none  Labs: per protocol  Results:_____________________________________  H&P:  Need updated. Since pt rescheduled.   H&P requested?:   Letter sent/email?: email  Does patient require /language?    N    PMH:

## 2022-03-19 ENCOUNTER — TRANSFERRED RECORDS (OUTPATIENT)
Dept: HEALTH INFORMATION MANAGEMENT | Facility: CLINIC | Age: 67
End: 2022-03-19
Payer: COMMERCIAL

## 2022-03-28 ENCOUNTER — TRANSFERRED RECORDS (OUTPATIENT)
Dept: HEALTH INFORMATION MANAGEMENT | Facility: CLINIC | Age: 67
End: 2022-03-28
Payer: COMMERCIAL

## 2022-04-04 ENCOUNTER — LAB (OUTPATIENT)
Dept: LAB | Facility: CLINIC | Age: 67
End: 2022-04-04
Attending: PHYSICIAN ASSISTANT
Payer: COMMERCIAL

## 2022-04-04 DIAGNOSIS — Z11.59 ENCOUNTER FOR SCREENING FOR OTHER VIRAL DISEASES: ICD-10-CM

## 2022-04-04 PROCEDURE — U0005 INFEC AGEN DETEC AMPLI PROBE: HCPCS

## 2022-04-04 PROCEDURE — U0003 INFECTIOUS AGENT DETECTION BY NUCLEIC ACID (DNA OR RNA); SEVERE ACUTE RESPIRATORY SYNDROME CORONAVIRUS 2 (SARS-COV-2) (CORONAVIRUS DISEASE [COVID-19]), AMPLIFIED PROBE TECHNIQUE, MAKING USE OF HIGH THROUGHPUT TECHNOLOGIES AS DESCRIBED BY CMS-2020-01-R: HCPCS

## 2022-04-05 LAB — SARS-COV-2 RNA RESP QL NAA+PROBE: NEGATIVE

## 2022-04-06 ENCOUNTER — HOSPITAL ENCOUNTER (OUTPATIENT)
Facility: CLINIC | Age: 67
Discharge: HOME | End: 2022-04-06
Attending: RADIOLOGY | Admitting: RADIOLOGY
Payer: COMMERCIAL

## 2022-04-06 ENCOUNTER — APPOINTMENT (OUTPATIENT)
Dept: INTERVENTIONAL RADIOLOGY/VASCULAR | Facility: CLINIC | Age: 67
End: 2022-04-06
Attending: PHYSICIAN ASSISTANT
Payer: COMMERCIAL

## 2022-04-06 VITALS
DIASTOLIC BLOOD PRESSURE: 88 MMHG | HEART RATE: 62 BPM | OXYGEN SATURATION: 98 % | TEMPERATURE: 97.5 F | RESPIRATION RATE: 20 BRPM | SYSTOLIC BLOOD PRESSURE: 145 MMHG

## 2022-04-06 DIAGNOSIS — Z99.2 ESRD (END STAGE RENAL DISEASE) ON DIALYSIS (H): ICD-10-CM

## 2022-04-06 DIAGNOSIS — N18.6 ESRD (END STAGE RENAL DISEASE) ON DIALYSIS (H): ICD-10-CM

## 2022-04-06 PROCEDURE — C1757 CATH, THROMBECTOMY/EMBOLECT: HCPCS

## 2022-04-06 PROCEDURE — 272N000116 HC CATH CR1

## 2022-04-06 PROCEDURE — 272N000196 HC ACCESSORY CR5

## 2022-04-06 PROCEDURE — 272N000504 HC NEEDLE CR4

## 2022-04-06 PROCEDURE — C1725 CATH, TRANSLUMIN NON-LASER: HCPCS

## 2022-04-06 PROCEDURE — 250N000009 HC RX 250

## 2022-04-06 PROCEDURE — 99152 MOD SED SAME PHYS/QHP 5/>YRS: CPT

## 2022-04-06 PROCEDURE — 36902 INTRO CATH DIALYSIS CIRCUIT: CPT

## 2022-04-06 PROCEDURE — 99153 MOD SED SAME PHYS/QHP EA: CPT

## 2022-04-06 PROCEDURE — 250N000011 HC RX IP 250 OP 636: Performed by: RADIOLOGY

## 2022-04-06 PROCEDURE — C1769 GUIDE WIRE: HCPCS

## 2022-04-06 PROCEDURE — 76937 US GUIDE VASCULAR ACCESS: CPT

## 2022-04-06 RX ORDER — FENTANYL CITRATE 50 UG/ML
INJECTION, SOLUTION INTRAMUSCULAR; INTRAVENOUS
Status: DISCONTINUED
Start: 2022-04-06 | End: 2022-04-06 | Stop reason: HOSPADM

## 2022-04-06 RX ORDER — HEPARIN SODIUM 200 [USP'U]/100ML
1 INJECTION, SOLUTION INTRAVENOUS CONTINUOUS PRN
Status: DISCONTINUED | OUTPATIENT
Start: 2022-04-06 | End: 2022-04-06 | Stop reason: HOSPADM

## 2022-04-06 RX ORDER — NALOXONE HYDROCHLORIDE 0.4 MG/ML
0.4 INJECTION, SOLUTION INTRAMUSCULAR; INTRAVENOUS; SUBCUTANEOUS
Status: DISCONTINUED | OUTPATIENT
Start: 2022-04-06 | End: 2022-04-06 | Stop reason: HOSPADM

## 2022-04-06 RX ORDER — NALOXONE HYDROCHLORIDE 0.4 MG/ML
0.2 INJECTION, SOLUTION INTRAMUSCULAR; INTRAVENOUS; SUBCUTANEOUS
Status: DISCONTINUED | OUTPATIENT
Start: 2022-04-06 | End: 2022-04-06 | Stop reason: HOSPADM

## 2022-04-06 RX ORDER — LIDOCAINE 40 MG/G
CREAM TOPICAL
Status: DISCONTINUED | OUTPATIENT
Start: 2022-04-06 | End: 2022-04-06 | Stop reason: HOSPADM

## 2022-04-06 RX ORDER — FLUMAZENIL 0.1 MG/ML
0.2 INJECTION, SOLUTION INTRAVENOUS
Status: DISCONTINUED | OUTPATIENT
Start: 2022-04-06 | End: 2022-04-06 | Stop reason: HOSPADM

## 2022-04-06 RX ORDER — HEPARIN SODIUM 1000 [USP'U]/ML
INJECTION, SOLUTION INTRAVENOUS; SUBCUTANEOUS
Status: DISCONTINUED
Start: 2022-04-06 | End: 2022-04-06 | Stop reason: HOSPADM

## 2022-04-06 RX ORDER — LIDOCAINE HYDROCHLORIDE 10 MG/ML
INJECTION, SOLUTION EPIDURAL; INFILTRATION; INTRACAUDAL; PERINEURAL
Status: COMPLETED
Start: 2022-04-06 | End: 2022-04-06

## 2022-04-06 RX ORDER — FENTANYL CITRATE 50 UG/ML
25-50 INJECTION, SOLUTION INTRAMUSCULAR; INTRAVENOUS EVERY 5 MIN PRN
Status: DISCONTINUED | OUTPATIENT
Start: 2022-04-06 | End: 2022-04-06 | Stop reason: HOSPADM

## 2022-04-06 RX ADMIN — FENTANYL CITRATE 50 MCG: 50 INJECTION, SOLUTION INTRAMUSCULAR; INTRAVENOUS at 08:33

## 2022-04-06 RX ADMIN — LIDOCAINE HYDROCHLORIDE 20 MG: 10 INJECTION, SOLUTION EPIDURAL; INFILTRATION; INTRACAUDAL; PERINEURAL at 08:33

## 2022-04-06 RX ADMIN — FENTANYL CITRATE 25 MCG: 50 INJECTION, SOLUTION INTRAMUSCULAR; INTRAVENOUS at 08:40

## 2022-04-06 RX ADMIN — MIDAZOLAM 0.5 MG: 1 INJECTION INTRAMUSCULAR; INTRAVENOUS at 08:45

## 2022-04-06 RX ADMIN — MIDAZOLAM 1 MG: 1 INJECTION INTRAMUSCULAR; INTRAVENOUS at 08:33

## 2022-04-06 NOTE — PLAN OF CARE
Post Procedure Summary:  Prior to the start of the procedure and with procedural staff participation, I verbally confirmed the patient s identity using two indicators, relevant allergies, that the procedure was appropriate and matched the consent or emergent situation, and that the correct equipment/implants were available. Immediately prior to starting the procedure I conducted the Time Out with the procedural staff and re-confirmed the patient s name, procedure, and site/side. (The Joint Commission universal protocol was followed.)  Yes       Sedatives: Fentanyl and Midazolam (Versed)    Vital signs, airway and pulse oximetry were monitored and remained stable throughout the procedure and sedation was maintained until the procedure was complete.  The patient was monitored by staff until sedation discharge criteria were met.    Patient tolerance: Patient tolerated the procedure well with no immediate complications.    Time of sedation in minutes: 30 Minutes minutes from beginning to end of physician one to one monitoring.

## 2022-04-06 NOTE — IP AVS SNAPSHOT
[facility]  [address]  [facility] [address]  Phone: [phone]                                    After Visit Summary   4/6/2022    Matteo Barnes   MRN: [id number]           After Visit Summary Signature Page    I have received my discharge instructions, and my questions have been answered. I have discussed any challenges I see with this plan with the nurse or doctor.    ..........................................................................................................................................  Patient/Patient Representative Signature      ..........................................................................................................................................  Patient Representative Print Name and Relationship to Patient    ..................................................               ................................................  Date                                   Time    ..........................................................................................................................................  Reviewed by Signature/Title    ...................................................              ..............................................  Date                                               Time          22EPIC Rev 08/18

## 2022-04-06 NOTE — IP AVS SNAPSHOT
After Visit Summary Template Not Found    This Print Group is only intended to be used in the After Visit Summary and can only be used in a report that uses a released After Visit Summary Template.                       MRN:[id number]                      After Visit Summary   4/6/2022    Matteo Barnes   MRN: [id number]           Visit Information        Department      4/6/2022  7:07 AM [facility]          Review of your medicines      UNREVIEWED medicines. Ask your doctor about these medicines       Dose / Directions   acetaminophen 500 MG tablet  Commonly known as: TYLENOL      Dose: 500-1,000 mg  Take 500-1,000 mg by mouth every 6 hours as needed for mild pain  Refills: 0     allopurinol 100 MG tablet  Commonly known as: ZYLOPRIM      Dose: 200 mg  Take 200 mg by mouth daily  Refills: 0     ALPRAZolam 0.25 MG tablet  Commonly known as: XANAX  Used for: Anxiety      Dose: 0.25 mg  Take 1 tablet (0.25 mg) by mouth daily as needed for anxiety - 30 day supply  Quantity: 20 tablet  Refills: 0     amLODIPine 10 MG tablet  Commonly known as: NORVASC      Dose: 10 mg  Take 10 mg by mouth  Refills: 0     calcitRIOL 0.25 MCG capsule  Commonly known as: ROCALTROL      Dose: 0.25 mcg  Take 0.25 mcg by mouth daily  Refills: 0     carvedilol 12.5 MG tablet  Commonly known as: COREG      Take 25 mg (2 tablets) in the am and 12.5 mg (1 tablet) in the pm  Refills: 0     multivitamin RENAL 1 MG capsule  Used for: Pneumonia due to 2019 novel coronavirus      Dose: 1 capsule  Take 1 capsule by mouth daily  Quantity: 30 capsule  Refills: 0     SENNA-docusate sodium 8.6-50 MG tablet  Commonly known as: SENNA S  Used for: CKD (chronic kidney disease) stage 5, GFR less than 15 ml/min (H)      Dose: 1 tablet  Take 1 tablet by mouth At Bedtime  Quantity: 20 tablet  Refills: 0     sildenafil 20 MG tablet  Commonly known as: REVATIO  Used for: Erectile dysfunction, unspecified erectile dysfunction type      TAKE  1-3 TABLETS BY MOUTH DAILY AS NEEDED 30 MINUTES TO 1 HOUR PRIOR TO INTERCOARSE  Quantity: 30 tablet  Refills: 0     sodium bicarbonate 650 MG tablet      Dose: 1,950 mg  Take 1,950 mg by mouth 2 times daily  Refills: 0              Protect others around you: Learn how to safely use, store and throw away your medicines at www.disposemymeds.org.       Follow-ups after your visit       Your next 10 appointments already scheduled    Apr 06, 2022  8:00 AM  (Arrive by 7:15 AM)  IR DIALYSIS FISTULOGRAM LEFT with RHIR11, CATHIRTEAM, RH IR RAD  [facility] Interventional Radiology ([facility] ) [address] [facility] [address] [phone]   How do I prepare for my exam? (Food and drink instructions)  Adults and Children over 2 years old: No eating for 8 hours before your procedure. You may have clear liquids up until 2 hours beforehand. These include water, clear tea, black coffee and fruit juice without pulp.  Children under 2 years old:  No eating or formula for 6 hours before your procedure. You may have clear liquids up until 2 hours beforehand. No breast milk for 4 hours before your procedure.    How do I prepare for my exam? (Other instructions)  We will call you to talk about your procedure and answer your questions. We will tell you what time to arrive. We will also ask about any medicines you are taking.  You will need to have a history and physical within 30 days before this procedure.    What should I wear: Please wear loose clothing, such as a sweat suit or jogging clothes. You will be asked to undress and put on a hospital gown.    How long does the exam take: You should expect to be at the facility for approximately 4 hours    What should I bring: Please bring any scans or X-rays taken at other hospitals, if similar tests were done. Also bring a list of your medicines, including vitamins, minerals and over-the-counter drugs. It is safest to leave personal items  at home.    Do I need a :  Yes, you may not drive or take a bus or taxi by yourself. You will need an adult to take you home. It is recommended that a responsible adult remain with you for 6 hours.    What do I need to tell my doctor:  Tell your doctor if:  * You have ever had an allergic reaction to X-ray dye (contrast fluid).  * If there's any chance you are pregnant.    What should I do after the exam:  Rest and do quiet activities for 24 hours. Avoid any heavy activity (lifting, vacuuming, exercise) on the day of the exam.  Do not make any major decisions and ensure you have a responsible adult with you for the remainder of the day.   Do not drive or use dangerous machines or tools for 24 hours.  Eat small, frequent meals to prevent nausea and vomiting.   Drink liquids as directed. Do not drink alcohol or take medicines that make you drowsy.  You should be able to return to your everyday activities the next day.    Who should I call with questions: If you have any questions, please call the Imaging Department where you will have your exam. Directions, parking instructions, and other information are available on our website, [email or web address].        Care Instructions       Further instructions from your care team       Invasive Radiology Procedures Discharge Instructions  Fistulagram       The doctor who did your procedure today was Dr. Sánchez     Diet and medicines      Go back to your normal diet.    You may start taking your normal medicines again (including Coumadin, or warfarin),         as shown on your medicine sheet.     If you take aspirin, Plavix or other anti-platelet drugs: Start taking it tomorrow.     If take Coumadin (warfarin): Ask your doctor when to have your INR checked        For minor pain, you may take Tylenol (acetaminophen) or Advil (ibuprofen).    Activity and puncture site    You may go back to normal activity in 24 hours. Wait 48 hours before lifting,straining, exercise or  other strenuous activity.    For the next day or two, check your puncture site often while you are awake.    Change the Band-Aid or bandage tomorrow.    You may shower tomorrow morning. No bathing or swimming until yourpuncture site has fully healed.     If you received IV medicine to sedate you: ___versed___fentanyl  You may feel drowsy, forgetful or unsteady. For the next 24 hours, do not drive,  drink alcohol or make any important decisions.    ??After a fistulagram  -?If you go home with sheaths in place, keep the arm straight. Don t bend or use your arm  until after dialysis.  -?For the first 24 hours, keep the arm raised on pillows as much as you can. This reduces swelling.  -?If you have bleeding or swelling at the puncture site, use your fingertip to put gentle pressure  on the area. Do this until the bleeding stops or the swelling goes down.  -?If you lose the pulse in your fistula, or you no longer fill a thrill (buzzing feeling), call your  doctor or dialysis clinic.  ??Know when to call for help  Call your doctor if you have:  -?A fever greater over 101 F (38.3 C), taken under the tongue.  -?A lot of bleeding or swelling at your puncture site.  -?Pain that is getting worse.  -?Shortness of breath.  Call 911 or go the emergency room if you have:  -?Severe chest pain or trouble breathing.  -?A tube that falls out.  -?Increased blood in your sputum (phlegm).  -?Bleeding that you cannot control.  Important phone numbers:  ??[facility] ..................................................................... [phone]  ??[facility] ........................................................... [phone]  ??[facility] ................................................................ [phone]  ??[facility].............. [phone]  X______________________________ __________________________ ____________  Person  "receiving instructions Instructor Date/Time    Additional Information About Your Visit       [facility] [facility] Information    [employer] lets you send messages to your doctor, view your test results, renew your prescriptions, schedule appointments and more. To sign up, go to [email or web address] . Click on \"Log in\" on the left side of the screen, which will take you to the Welcome page. Then click on \"Sign up Now\" on the right side of the page.     You will be asked to enter the access code listed below, as well as some personal information. Please follow the directions to create your username and password.     Your access code is: [email or web address]  Expires: 2022  6:27 AM     Your access code will  in 60 days. If you need help or a new code, please call your   [facility] clinic or [phone].       Care EveryWhere ID    This is your [facility] EveryWhere ID. This could be used by other organizations to access your [place] medical records  [id number]       Your Vitals Were  Most recent update: 2022  7:25 AM    Pulse   63    Temperature   98.1  F (36.7  C) (Temporal)    Respirations   20    Pulse Oximetry   96%           Primary Care Provider Office Phone # Fax #    Davidson Capone [phone]      Equal Access to Services    [facility]: Hadii aad ku hadasho Soomaali, waaxda luqadaha, qaybta kaalmada adeegyada, pernell swan . So [facility] [phone].    ATENCIÓN: Si habla español, tiene a trejo disposición servicios gratuitos de asistencia lingüística. Estrella al [phone].    We comply with applicable federal and state civil rights laws, including the [place] Human Rights Act. We do not discriminate on the basis of race, color, creed, Evangelical, national origin, marital status, age, disability, sex, sexual orientation, or gender identity.    If you would like an itemization of your charges they will now be available in [employer] 30 days after discharge. To access " the itemized statements in [employer] go to billing/billing summary. From there select view account. There will be multiple tabs showing an overview of your account, detail, payments, and communications. From the communications tab you can see your monthly statements, your itemized statements, and any billing letters generated for your account. If you do not have a [employer] account and need help getting access please contact [employer] support at [phone].  If you would prefer to have your itemized statements mailed please contact our automated itemized bill request line at [phone] option  2.       Thank you!    Thank you for choosing [facility] for your care. Our goal is always to provide you with excellent care. Hearing back from our patients is one way we can continue to improve our services. Please take a few minutes to complete the written survey that you may receive in the mail after you visit. If you would like to speak to someone directly about your visit please contact Patient Relations at [phone]. Thank you!              Medication List      ASK your doctor about these medications          Morning Afternoon Evening Bedtime As Needed    acetaminophen 500 MG tablet  Also known as: TYLENOL  INSTRUCTIONS: Take 500-1,000 mg by mouth every 6 hours as needed for mild pain                     allopurinol 100 MG tablet  Also known as: ZYLOPRIM  INSTRUCTIONS: Take 200 mg by mouth daily                     ALPRAZolam 0.25 MG tablet  Also known as: XANAX  INSTRUCTIONS: Take 1 tablet (0.25 mg) by mouth daily as needed for anxiety - 30 day supply                     amLODIPine 10 MG tablet  Also known as: NORVASC  INSTRUCTIONS: Take 10 mg by mouth                     calcitRIOL 0.25 MCG capsule  Also known as: ROCALTROL  INSTRUCTIONS: Take 0.25 mcg by mouth daily                     carvedilol 12.5 MG tablet  Also known as: COREG  INSTRUCTIONS: Take 25 mg (2 tablets) in the am and 12.5 mg (1 tablet)  in the pm                     multivitamin RENAL 1 MG capsule  INSTRUCTIONS: Take 1 capsule by mouth daily                     SENNA-docusate sodium 8.6-50 MG tablet  Also known as: SENNA S  INSTRUCTIONS: Take 1 tablet by mouth At Bedtime                     sildenafil 20 MG tablet  Also known as: REVATIO  INSTRUCTIONS: TAKE 1-3 TABLETS BY MOUTH DAILY AS NEEDED 30 MINUTES TO 1 HOUR PRIOR TO INTERCOARSE                     sodium bicarbonate 650 MG tablet  INSTRUCTIONS: Take 1,950 mg by mouth 2 times daily

## 2022-04-06 NOTE — DISCHARGE INSTRUCTIONS
audit performed Mammogram entered into     Invasive Radiology Procedures Discharge Instructions  Fistulagram       The doctor who did your procedure today was Dr. Sánchez     Diet and medicines      Go back to your normal diet.    You may start taking your normal medicines again (including Coumadin, or warfarin),         as shown on your medicine sheet.     If you take aspirin, Plavix or other anti-platelet drugs: Start taking it tomorrow.     If take Coumadin (warfarin): Ask your doctor when to have your INR checked        For minor pain, you may take Tylenol (acetaminophen) or Advil (ibuprofen).    Activity and puncture site    You may go back to normal activity in 24 hours. Wait 48 hours before lifting,straining, exercise or other strenuous activity.    For the next day or two, check your puncture site often while you are awake.    Change the Band-Aid or bandage tomorrow.    You may shower tomorrow morning. No bathing or swimming until yourpuncture site has fully healed.     If you received IV medicine to sedate you: ___versed___fentanyl  You may feel drowsy, forgetful or unsteady. For the next 24 hours, do not drive,  drink alcohol or make any important decisions.    ??After a fistulagram  -?If you go home with sheaths in place, keep the arm straight. Don t bend or use your arm  until after dialysis.  -?For the first 24 hours, keep the arm raised on pillows as much as you can. This reduces swelling.  -?If you have bleeding or swelling at the puncture site, use your fingertip to put gentle pressure  on the area. Do this until the bleeding stops or the swelling goes down.  -?If you lose the pulse in your fistula, or you no longer fill a thrill (buzzing feeling), call your  doctor or dialysis clinic.  ??Know when to call for help  Call your doctor if you have:  -?A fever greater over 101 F (38.3 C), taken under the tongue.  -?A lot of bleeding or swelling at your puncture site.  -?Pain that is getting worse.  -?Shortness of breath.  Call 911 or go the  emergency room if you have:  -?Severe chest pain or trouble breathing.  -?A tube that falls out.  -?Increased blood in your sputum (phlegm).  -?Bleeding that you cannot control.  Important phone numbers:  ??[facility] ..................................................................... [phone]  ??[facility] ........................................................... [phone]  ??[facility] ................................................................ [phone]  ??[facility].............. [phone]  X______________________________ __________________________ ____________  Person receiving instructions Instructor Date/Time   PAST SURGICAL HISTORY:  Brain surgery within last 3 months Crainotomy 2018    H/O inguinal hernia repair     Status post deep brain stimulator placement 2017- [facility]

## 2022-04-06 NOTE — DISCHARGE SUMMARY
Pt A&O. VSS. Tolerated PO. Fistula dressing CDI. Bruit/thrill present. Denies pain. PIV removed. Pt received and verbalized understanding of discharge instructions. Walked out of hospital with RN, family providing transport home.

## 2022-04-18 ENCOUNTER — TRANSFERRED RECORDS (OUTPATIENT)
Dept: HEALTH INFORMATION MANAGEMENT | Facility: CLINIC | Age: 67
End: 2022-04-18
Payer: COMMERCIAL

## 2022-04-18 LAB
ALT SERPL-CCNC: 20 U/L (ref 10–49)
AST SERPL-CCNC: 12 U/L (ref 0–33)
CREATININE (EXTERNAL): 8.73 MG/DL (ref 0.7–1.3)
POTASSIUM (EXTERNAL): 5.1 MEQ/L (ref 3.5–5.5)

## 2022-05-02 ENCOUNTER — TRANSFERRED RECORDS (OUTPATIENT)
Dept: HEALTH INFORMATION MANAGEMENT | Facility: CLINIC | Age: 67
End: 2022-05-02
Payer: COMMERCIAL

## 2022-05-02 LAB — POTASSIUM (EXTERNAL): 5.1 MEQ/L (ref 3.5–5.3)

## 2022-05-10 ENCOUNTER — TELEPHONE (OUTPATIENT)
Dept: TRANSPLANT | Facility: CLINIC | Age: 67
End: 2022-05-10
Payer: COMMERCIAL

## 2022-05-10 NOTE — TELEPHONE ENCOUNTER
Called pt for update, left VM with direct line for return call. Wondering if pt is still interested in transplant. He has had months to get two tests done for active status consideration and has not completed these tests.

## 2022-05-11 ENCOUNTER — DOCUMENTATION ONLY (OUTPATIENT)
Dept: TRANSPLANT | Facility: CLINIC | Age: 67
End: 2022-05-11

## 2022-05-11 DIAGNOSIS — Z76.82 ORGAN TRANSPLANT CANDIDATE: ICD-10-CM

## 2022-05-11 DIAGNOSIS — N18.6 ESRD (END STAGE RENAL DISEASE) (H): ICD-10-CM

## 2022-05-11 NOTE — PROGRESS NOTES
Spoke with patient about remaining items on list to complete for active status consideration. Pt to get colonoscopy scheduled with PCP in [place]. Pt would like to get iliacs done at [facility], he will call to get set up. R[facility] visits are also due since pt is 65. Will order neph and SW to get completed and then once appts are completed will bring to committee for active status consideration. Pt verbalized understanding of information and has no further questions. Encouraged to reach out if questions arise.

## 2022-05-13 ENCOUNTER — DOCUMENTATION ONLY (OUTPATIENT)
Dept: TRANSPLANT | Facility: CLINIC | Age: 67
End: 2022-05-13

## 2022-05-14 NOTE — TELEPHONE ENCOUNTER
Pharmacy faxed request for medication refill.    Preferred pharmacy set up and verified    Informed Joanna, the weekend is not included in the 72 hour refill request notice. She will be out of medication 5/17.   Smita RN calling from [facility] consultants wondering about the renal panel drawn on 8/25 and it is still in process. She is wondering if there is a delay since normally it results much sooner. Please call [phone] and update once it results.   Pooja Roman

## 2022-05-25 ENCOUNTER — TELEPHONE (OUTPATIENT)
Dept: NEPHROLOGY | Facility: CLINIC | Age: 67
End: 2022-05-25
Payer: COMMERCIAL

## 2022-05-25 NOTE — TELEPHONE ENCOUNTER
Called patient with a appointment reminder for Tues. 5/31/22 @ 1:00 pm with Dr. Bolaños. Ptient stated he gets his labs done weekly at [facility] in [place] weekly.    Brian Decker MA on 5/25/2022 at 1:34 PM

## 2022-05-26 ENCOUNTER — DOCUMENTATION ONLY (OUTPATIENT)
Dept: TRANSPLANT | Facility: CLINIC | Age: 67
End: 2022-05-26

## 2022-05-31 ENCOUNTER — VIRTUAL VISIT (OUTPATIENT)
Dept: NEPHROLOGY | Facility: CLINIC | Age: 67
End: 2022-05-31
Attending: INTERNAL MEDICINE
Payer: COMMERCIAL

## 2022-05-31 VITALS — BODY MASS INDEX: 27.4 KG/M2 | WEIGHT: 185 LBS | HEIGHT: 69 IN

## 2022-05-31 DIAGNOSIS — Z76.82 ORGAN TRANSPLANT CANDIDATE: ICD-10-CM

## 2022-05-31 DIAGNOSIS — N18.6 ESRD (END STAGE RENAL DISEASE) (H): Primary | ICD-10-CM

## 2022-05-31 PROBLEM — N18.9 ANEMIA IN CHRONIC KIDNEY DISEASE: Status: ACTIVE | Noted: 2022-05-31

## 2022-05-31 PROBLEM — D63.1 ANEMIA IN CHRONIC KIDNEY DISEASE: Status: ACTIVE | Noted: 2022-05-31

## 2022-05-31 PROBLEM — E87.20 METABOLIC ACIDOSIS: Status: ACTIVE | Noted: 2022-05-31

## 2022-05-31 PROBLEM — R05.9 COUGH: Status: RESOLVED | Noted: 2021-10-16 | Resolved: 2022-05-31

## 2022-05-31 PROCEDURE — 99214 OFFICE O/P EST MOD 30 MIN: CPT | Mod: 95 | Performed by: NURSE PRACTITIONER

## 2022-05-31 ASSESSMENT — PAIN SCALES - GENERAL: PAINLEVEL: NO PAIN (0)

## 2022-05-31 NOTE — LETTER
5/31/2022     RE: Matteo Barnes  [address]  [place] [address]     Dear Colleague,    Thank you for referring your patient, Matteo Barnes, to the [facility] [place] at [facility]. Please see a copy of my visit note below.    Matteo is a 66 year old who is being evaluated via a billable video visit.      How would you like to obtain your AVS? MyChart  If the video visit is dropped, the invitation should be resent by: Text to cell phone: [phone]  Will anyone else be joining your video visit? No      Video Start Time: 1:05 pm   Video-Visit Details    Type of service:  Video Visit    Video End Time:1:15 pm     Originating Location (pt. Location): Other work     Distant Location (provider location):  [facility] [place]     Platform used for Video Visit: [employer]       TRANSPLANT NEPHROLOGY WAITLIST VISIT    Assessment and Plan:  # Kidney Transplant Wait List Evaluation: Patient is a good candidate overall. Patient should be inactive on the wait list as he still has pending items to complete for initial work up.      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 He is ABO-B, CPRA 54% and mentions his girlfriend may be interested in donating.    # Cardiac Risk: he was cleared by cardiology in October 2021 with negative stress testing.     # COVID pna (10/2021):  needing 7 day hospitalization and was on home oxygen for a short period, but has since recovered well. Reports he has had 2 negative COVID tests since for which records will need to be obtained.      # Health Maintenance: Colonoscopy: Not up to date and Dental: Not up to date, PSA not UTD.      Discussed the risks and benefits of a transplant, including the risk of surgery and immunosuppression medications.    KDPI: We discussed  approximate remaining wait time and how that is influenced by issues such as blood type and sensitization (PRA) and access to a living donor. I contrasted potential waiting time for living vs  donor kidneys from  normal (0-85%) or higher (%) kidney donor profile index (KDPI) donors and their associated outcomes. I would recommend Mr. Barnes to consider kidneys from high KDPI donors. The reason for this decision is best summarized as: decreased dialysis related morbidity/mortality, accepting lower kidney graft survival rates.    Patient presently appears to be enough of an acceptable kidney transplant recipient candidate to have any potential kidney donors start the evaluation process.  Patient s overall re-evaluation may require further discussion in the Transplant Program s multidisciplinary selection committee for a final recommendation on the patient s suitability for transplant.     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     Date of Initial Transplant Evaluation:  2021        Current Transplant Phase: Waitlist: Inactive  Official UNOS Listing Date: 3/3/2021  Blood Type: B        cPRA: 54%       Date of cPRA: 2021  Transplant Coordinator: [facility] Office phone number [phone]     Previous Medical Issues:  # cards: done  # HM: pending      History of Present Illness:   Matteo Barnes is a 66-year-old gentleman who presents for waitlist evaluation with history of ESKD secondary to biopsy-proven FSGS (likely secondary) on hemodialysis since 2021, hypertension, hyperlipidemia and gout.           Interim Events: Admitted in 2021 for 7 days with COVID-19 pna and started hemodialysis. He required supplemental oxygen during hospital stay and also required home oxygen for a short time. He has since recovered well, working  full time in car sales. States he has had 2 negative COVID tests since. Dialysis is going well and his girlfriend may want to donate.          Kidney Disease:        Kidney Disease Dx: Focal segmental glomerulosclerosis (likely secondary)       On Dialysis: Yes, Date initiated: 10/2021 and Dialysis Type: Incenter HD; dialyzing only 2 days per week on Mondays and Fridays. BPs averaging 140-150/90 mmHg on dual agents, still making urine every day.        Primary Nephrologist: Dr. Ochoa          Cardiac/Vascular Disease History:       Known CAD: No       Known PAD/Claudication Symptoms: No       Known Heart Failure: No       Arrhythmia:  No       Pulmonary Hypertension: No        Valvular Disease: No       Other: None       New Cardiac/Vascular Events: No         Functional Capacity/Frailty:        Exercises on occasion by lifting weights at his apartment complex gym without chest pains or shortness of breath. 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      # COVID Vaccination Up To Date: Yes        Other Pertinent Transplant Surgical Issues:  Recent Blood Transfusion: No  Previous Abdominal Transplant: No  Bladder Dysfunction: No  Chronic/Recurrent Infections: No  Chronic Anticoagulation: No  Jehovah s Witness: No       Active Problem List:   Patient Active Problem List   Diagnosis     Hyperlipidemia LDL goal <130     Hypertension goal BP (blood pressure) < 140/90     CKD (chronic kidney disease) stage 5, GFR less than 15 ml/min (H)     Proteinuria     Erectile dysfunction     Gout     Focal segmental glomerulosclerosis     Adverse effect of other nonsteroidal anti-inflammatory drugs (NSAID), initial encounter     Cough     Weakness     Acute renal failure superimposed on stage 5 chronic kidney disease, not on chronic dialysis, unspecified acute renal failure type (H)     Pneumonia due to 2019 novel coronavirus       Personal History:  Lives with alone Prior  "[place].      Allergies:  No Known Allergies     Medications:  Current Outpatient Medications   Medication Sig     acetaminophen (TYLENOL) 500 MG tablet Take 500-1,000 mg by mouth every 6 hours as needed for mild pain     allopurinol (ZYLOPRIM) 100 MG tablet Take 200 mg by mouth daily     ALPRAZolam (XANAX) 0.25 MG tablet Take 1 tablet (0.25 mg) by mouth daily as needed for anxiety - 30 day supply     amLODIPine (NORVASC) 10 MG tablet Take 10 mg by mouth     calcitRIOL (ROCALTROL) 0.25 MCG capsule Take 0.25 mcg by mouth daily      carvedilol (COREG) 12.5 MG tablet Take 25 mg (2 tablets) in the am and 12.5 mg (1 tablet) in the pm     multivitamin RENAL (NEPHROCAPS/TRIPHROCAPS) 1 MG capsule Take 1 capsule by mouth daily     SENNA-docusate sodium (SENNA S) 8.6-50 MG tablet Take 1 tablet by mouth At Bedtime     sildenafil (REVATIO) 20 MG tablet TAKE 1-3 TABLETS BY MOUTH DAILY AS NEEDED 30 MINUTES TO 1 HOUR PRIOR TO INTERCOARSE     sodium bicarbonate 650 MG tablet Take 1,950 mg by mouth 2 times daily      No current facility-administered medications for this visit.        Vitals:  Ht 1.753 m (5' 9\")   Wt 83.9 kg (185 lb)   BMI 27.32 kg/m       Exam:  GENERAL APPEARANCE: alert and no distress  HENT: no obvious abnormalities on appearance  RESP: breathing appears unremarkable with normal rate, no audible wheezing or cough and no apparent shortness of breath with conversation  MS: extremities normal - no gross deformities noted  SKIN: no apparent rash and normal skin tone  NEURO: speech is clear with no obvious neurological deficits  PSYCH: mentation appears normal and affect normal    Again, thank you for allowing me to participate in the care of your patient.      Sincerely,    Manny Bolaños, APRN CNP    "

## 2022-05-31 NOTE — PROGRESS NOTES
Matteo is a 66 year old who is being evaluated via a billable video visit.      How would you like to obtain your AVS? MyChart  If the video visit is dropped, the invitation should be resent by: Text to cell phone: [phone]  Will anyone else be joining your video visit? No      Video Start Time: 1:05 pm   Video-Visit Details    Type of service:  Video Visit    Video End Time:1:15 pm     Originating Location (pt. Location): Other work     Distant Location (provider location):  [facility] [place]     Platform used for Video Visit: [employer]       TRANSPLANT NEPHROLOGY WAITLIST VISIT    Assessment and Plan:  # Kidney Transplant Wait List Evaluation: Patient is a good candidate overall. Patient should be inactive on the wait list as he still has pending items to complete for initial work up.      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 He is ABO-B, CPRA 54% and mentions his girlfriend may be interested in donating.    # Cardiac Risk: he was cleared by cardiology in 2021 with negative stress testing.     # COVID pna (10/2021):  needing 7 day hospitalization and was on home oxygen for a short period, but has since recovered well. Reports he has had 2 negative COVID tests since for which records will need to be obtained.      # Health Maintenance: Colonoscopy: Not up to date and Dental: Not up to date, PSA not UTD.      Discussed the risks and benefits of a transplant, including the risk of surgery and immunosuppression medications.    KDPI: We discussed approximate remaining wait time and how that is influenced by issues such as blood type and sensitization (PRA) and access to a living donor. I contrasted potential waiting time for living vs  donor kidneys from  normal (0-85%) or higher (%) kidney donor profile index (KDPI) donors and their associated  outcomes. I would recommend Mr. Barnes to consider kidneys from high KDPI donors. The reason for this decision is best summarized as: decreased dialysis related morbidity/mortality, accepting lower kidney graft survival rates.    Patient presently appears to be enough of an acceptable kidney transplant recipient candidate to have any potential kidney donors start the evaluation process.  Patient s overall re-evaluation may require further discussion in the Transplant Program s multidisciplinary selection committee for a final recommendation on the patient s suitability for transplant.     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     Date of Initial Transplant Evaluation:  1/2021        Current Transplant Phase: Waitlist: Inactive  Official UNOS Listing Date: 3/3/2021  Blood Type: B        cPRA: 54%       Date of cPRA: 2/2021  Transplant Coordinator: [facility] Office phone number [phone]     Previous Medical Issues:  # cards: done  # HM: pending      History of Present Illness:   Matteo Barnes is a 66-year-old gentleman who presents for waitlist evaluation with history of ESKD secondary to biopsy-proven FSGS (likely secondary) on hemodialysis since October 2021, hypertension, hyperlipidemia and gout.           Interim Events: Admitted in October 2021 for 7 days with COVID-19 pna and started hemodialysis. He required supplemental oxygen during hospital stay and also required home oxygen for a short time. He has since recovered well, working full time in car sales. States he has had 2 negative COVID tests since. Dialysis is going well and his girlfriend may want to donate.          Kidney Disease:        Kidney Disease Dx: Focal segmental glomerulosclerosis (likely secondary)       On Dialysis: Yes, Date initiated: 10/2021 and Dialysis Type: Incenter HD;  dialyzing only 2 days per week on Mondays and Fridays. BPs averaging 140-150/90 mmHg on dual agents, still making urine every day.        Primary Nephrologist: Dr. Ochoa          Cardiac/Vascular Disease History:       Known CAD: No       Known PAD/Claudication Symptoms: No       Known Heart Failure: No       Arrhythmia:  No       Pulmonary Hypertension: No        Valvular Disease: No       Other: None       New Cardiac/Vascular Events: No         Functional Capacity/Frailty:        Exercises on occasion by lifting weights at his apartment complex gym without chest pains or shortness of breath. 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      # COVID Vaccination Up To Date: Yes        Other Pertinent Transplant Surgical Issues:  Recent Blood Transfusion: No  Previous Abdominal Transplant: No  Bladder Dysfunction: No  Chronic/Recurrent Infections: No  Chronic Anticoagulation: No  Jehovah s Witness: No       Active Problem List:   Patient Active Problem List   Diagnosis     Hyperlipidemia LDL goal <130     Hypertension goal BP (blood pressure) < 140/90     CKD (chronic kidney disease) stage 5, GFR less than 15 ml/min (H)     Proteinuria     Erectile dysfunction     Gout     Focal segmental glomerulosclerosis     Adverse effect of other nonsteroidal anti-inflammatory drugs (NSAID), initial encounter     Cough     Weakness     Acute renal failure superimposed on stage 5 chronic kidney disease, not on chronic dialysis, unspecified acute renal failure type (H)     Pneumonia due to 2019 novel coronavirus       Personal History:  Lives with alone [place].      Allergies:  No Known Allergies     Medications:  Current Outpatient Medications   Medication Sig     acetaminophen (TYLENOL) 500 MG tablet Take 500-1,000 mg by mouth every 6 hours as needed for mild pain     allopurinol (ZYLOPRIM) 100 MG tablet Take 200 mg by mouth daily     ALPRAZolam (XANAX) 0.25 MG  "tablet Take 1 tablet (0.25 mg) by mouth daily as needed for anxiety - 30 day supply     amLODIPine (NORVASC) 10 MG tablet Take 10 mg by mouth     calcitRIOL (ROCALTROL) 0.25 MCG capsule Take 0.25 mcg by mouth daily      carvedilol (COREG) 12.5 MG tablet Take 25 mg (2 tablets) in the am and 12.5 mg (1 tablet) in the pm     multivitamin RENAL (NEPHROCAPS/TRIPHROCAPS) 1 MG capsule Take 1 capsule by mouth daily     SENNA-docusate sodium (SENNA S) 8.6-50 MG tablet Take 1 tablet by mouth At Bedtime     sildenafil (REVATIO) 20 MG tablet TAKE 1-3 TABLETS BY MOUTH DAILY AS NEEDED 30 MINUTES TO 1 HOUR PRIOR TO INTERCOARSE     sodium bicarbonate 650 MG tablet Take 1,950 mg by mouth 2 times daily      No current facility-administered medications for this visit.        Vitals:  Ht 1.753 m (5' 9\")   Wt 83.9 kg (185 lb)   BMI 27.32 kg/m       Exam:  GENERAL APPEARANCE: alert and no distress  HENT: no obvious abnormalities on appearance  RESP: breathing appears unremarkable with normal rate, no audible wheezing or cough and no apparent shortness of breath with conversation  MS: extremities normal - no gross deformities noted  SKIN: no apparent rash and normal skin tone  NEURO: speech is clear with no obvious neurological deficits  PSYCH: mentation appears normal and affect normal      "

## 2022-07-25 ENCOUNTER — TELEPHONE (OUTPATIENT)
Dept: FAMILY MEDICINE | Facility: CLINIC | Age: 67
End: 2022-07-25

## 2022-07-25 NOTE — TELEPHONE ENCOUNTER
Needs of attention regarding:  -Colon Cancer Screening  -Wellness (Physical) Visit     Health Maintenance Topics with due status: Overdue       Topic Date Due    ADVANCE CARE PLANNING Never done    Pneumococcal Vaccine: 65+ Years Never done    COLORECTAL CANCER SCREENING Never done    ZOSTER IMMUNIZATION Never done    HEPATITIS B IMMUNIZATION Never done    LIPID 07/18/2020    MEDICARE ANNUAL WELLNESS VISIT 08/10/2020    FALL RISK ASSESSMENT Never done    MICROALBUMIN 12/30/2020    BMP 01/23/2022    PSA 02/01/2022     Health Maintenance Topics with due status: Due On       Topic Date Due    HEMOGLOBIN 06/14/2022       Communication:  See Letter

## 2022-07-25 NOTE — LETTER
[facility]  [address] [place] [address] [phone]       July 25, 2022    Matteo Barnes  [address]  [place] [address]    Dear Matteo,    We care about your health and have reviewed your health plan and are making recommendations based on this review, to optimize your health.    You are in particular need of attention regarding:  -Colon Cancer Screening  -Wellness (Physical) Visit     We are recommending that you:                                              -schedule a WELLNESS (Physical) APPOINTMENT. We will check fasting labs the same day. You should have nothing to eat except water and meds for 8-10 hours prior.                                                                                                                                            -schedule a COLONOSCOPY to look for colon cancer (due every 10 years or 5 years in higher risk situations.)        Colon cancer is now the second leading cause of cancer-related deaths in the [place] for both men and women and there are over 130,000 new cases and 50,000 deaths per year from colon cancer.  Colonoscopies can prevent 90-95% of these deaths.  Problem lesions can be removed before they ever become cancer.  This test is not only looking for cancer, but also getting rid of precancerious lesions.    If you are under/uninsured, we recommend you contact the [employer] program. [employer] is a free colorectal cancer screening program that provides colonoscopies for eligible under/uninsured [place] men and women. If you are interested in receiving a free colonoscopy, please call [employer] [facility] at [phone] (mention code [employer]) to see if you re eligible.     If you do not wish to do a colonoscopy or cannot afford to do one, at this time, there is another option. It is called a FIT test or Fecal Immunochemical Occult Blood Test (take home stool sample kit).  It does not replace the  colonoscopy for colorectal cancer screening, but it can detect hidden bleeding in the lower colon.  It does need to be repeated every year and if a positive result is obtained, you would be referred for a colonoscopy.       If you have completed either one of these tests at another facility, please call with the details of when and where the tests were done and if they were normal or not. Or have the records sent to our clinic so that we can best coordinate your care.                                                                                                                                                   In addition, here is a list of due or overdue Health Maintenance reminders.    Health Maintenance Due   Topic Date Due     Discuss Advance Care Planning  Never done     Pneumococcal Vaccine (1 - PCV) Never done     Colorectal Cancer Screening  Never done     Zoster (Shingles) Vaccine (1 of 2) Never done     Hepatitis B Vaccine (1 of 3 - Risk Recombivax 3-dose series) Never done     Cholesterol Lab  07/18/2020     Annual Wellness Visit  08/10/2020     FALL RISK ASSESSMENT  Never done     Kidney Microalbumin Urine Test  12/30/2020     Basic Metabolic Panel  01/23/2022     Prostate Test  02/01/2022     Hemoglobin  06/14/2022       To address the above recommendations, we encourage you to contact us at [phone], via [employer] or by contacting Central Scheduling toll free at [phone] 24 hours a day. They will assist you with finding the most convenient time and location..    Thank you for trusting [facility] and we appreciate the opportunity to serve you.  We look forward to supporting your healthcare needs in the future.    Healthy Regards,    Your [facility] Team

## 2022-08-17 ENCOUNTER — TELEPHONE (OUTPATIENT)
Dept: TRANSPLANT | Facility: CLINIC | Age: 67
End: 2022-08-17

## 2022-08-17 DIAGNOSIS — Z76.82 ORGAN TRANSPLANT CANDIDATE: ICD-10-CM

## 2022-08-17 DIAGNOSIS — N18.6 ESRD (END STAGE RENAL DISEASE) (H): ICD-10-CM

## 2022-08-17 NOTE — TELEPHONE ENCOUNTER
Spoke to patient who will set up colonoscopy and update us. Writer assisted in scheduling Iliac US and PSA lab at [facility] on

## 2022-09-08 ENCOUNTER — TELEPHONE (OUTPATIENT)
Dept: TRANSPLANT | Facility: CLINIC | Age: 67
End: 2022-09-08

## 2022-09-08 NOTE — TELEPHONE ENCOUNTER
Spoke with patient about reschedule Iliac US and lab at [facility] on 9/14/22. Patient mentions that Dr. Ochoa would also like him to have L arm US for his fistula at the same time as his Iliac US.  Writer sent msg to ELI Bolaños for orders and or direction.

## 2022-09-09 DIAGNOSIS — N18.6 ESRD (END STAGE RENAL DISEASE) ON DIALYSIS (H): Primary | ICD-10-CM

## 2022-09-09 DIAGNOSIS — Z99.2 ESRD (END STAGE RENAL DISEASE) ON DIALYSIS (H): Primary | ICD-10-CM

## 2022-09-12 DIAGNOSIS — L98.8 FISTULA: ICD-10-CM

## 2022-09-12 DIAGNOSIS — N18.6 END STAGE KIDNEY DISEASE (H): Primary | ICD-10-CM

## 2022-09-22 ENCOUNTER — ANCILLARY PROCEDURE (OUTPATIENT)
Dept: ULTRASOUND IMAGING | Facility: CLINIC | Age: 67
End: 2022-09-22
Attending: NURSE PRACTITIONER
Payer: COMMERCIAL

## 2022-09-22 ENCOUNTER — LAB (OUTPATIENT)
Dept: LAB | Facility: CLINIC | Age: 67
End: 2022-09-22
Payer: COMMERCIAL

## 2022-09-22 DIAGNOSIS — Z76.82 ORGAN TRANSPLANT CANDIDATE: ICD-10-CM

## 2022-09-22 DIAGNOSIS — N18.6 END STAGE KIDNEY DISEASE (H): ICD-10-CM

## 2022-09-22 DIAGNOSIS — L98.8 FISTULA: ICD-10-CM

## 2022-09-22 DIAGNOSIS — N18.6 ESRD (END STAGE RENAL DISEASE) (H): ICD-10-CM

## 2022-09-22 LAB — PSA SERPL-MCNC: 0.64 NG/ML (ref 0–4.5)

## 2022-09-22 PROCEDURE — 99000 SPECIMEN HANDLING OFFICE-LAB: CPT | Performed by: PATHOLOGY

## 2022-09-22 PROCEDURE — 36415 COLL VENOUS BLD VENIPUNCTURE: CPT | Performed by: PATHOLOGY

## 2022-09-22 PROCEDURE — G0103 PSA SCREENING: HCPCS | Mod: 90 | Performed by: PATHOLOGY

## 2022-09-22 PROCEDURE — 93990 DOPPLER FLOW TESTING: CPT | Performed by: RADIOLOGY

## 2022-09-23 DIAGNOSIS — Z76.82 ORGAN TRANSPLANT CANDIDATE: ICD-10-CM

## 2022-09-23 DIAGNOSIS — N18.6 ESRD (END STAGE RENAL DISEASE) (H): ICD-10-CM

## 2022-09-30 ENCOUNTER — LAB (OUTPATIENT)
Dept: LAB | Facility: CLINIC | Age: 67
End: 2022-09-30

## 2022-09-30 DIAGNOSIS — Z76.82 ORGAN TRANSPLANT CANDIDATE: ICD-10-CM

## 2022-09-30 DIAGNOSIS — N18.6 ESRD (END STAGE RENAL DISEASE) (H): ICD-10-CM

## 2022-09-30 PROCEDURE — 86832 HLA CLASS I HIGH DEFIN QUAL: CPT | Performed by: SURGERY

## 2022-09-30 PROCEDURE — 86833 HLA CLASS II HIGH DEFIN QUAL: CPT | Performed by: SURGERY

## 2022-10-03 LAB
PROTOCOL CUTOFF: NORMAL
SA 1 CELL: NORMAL
SA 1 TEST METHOD: NORMAL
SA 2 CELL: NORMAL
SA 2 TEST METHOD: NORMAL
SA1 HI RISK ABY: NORMAL
SA1 MOD RISK ABY: NORMAL
SA2 HI RISK ABY: NORMAL
SA2 MOD RISK ABY: NORMAL
UNACCEPTABLE ANTIGENS: NORMAL
UNOS CPRA: 54
ZZZSA 1  COMMENTS: NORMAL
ZZZSA 2 COMMENTS: NORMAL

## 2022-12-02 ENCOUNTER — LAB (OUTPATIENT)
Dept: LAB | Facility: CLINIC | Age: 67
End: 2022-12-02
Payer: COMMERCIAL

## 2022-12-02 DIAGNOSIS — Z76.82 ORGAN TRANSPLANT CANDIDATE: ICD-10-CM

## 2022-12-02 DIAGNOSIS — N18.6 ESRD (END STAGE RENAL DISEASE) (H): ICD-10-CM

## 2022-12-02 PROCEDURE — 86832 HLA CLASS I HIGH DEFIN QUAL: CPT

## 2022-12-02 PROCEDURE — 86833 HLA CLASS II HIGH DEFIN QUAL: CPT

## 2022-12-13 ENCOUNTER — TELEPHONE (OUTPATIENT)
Dept: TRANSPLANT | Facility: CLINIC | Age: 67
End: 2022-12-13

## 2022-12-13 NOTE — TELEPHONE ENCOUNTER
Called pt about colonoscopy, pt states he will get it done and email [facility] when he completes it. Will follow up once complete. Pt verbalized understanding of information and has no further questions. Encouraged to reach out if questions arise.

## 2022-12-21 DIAGNOSIS — F41.9 ANXIETY: ICD-10-CM

## 2022-12-21 PROBLEM — T39.395A ADVERSE EFFECT OF OTHER NONSTEROIDAL ANTI-INFLAMMATORY DRUGS (NSAID), INITIAL ENCOUNTER: Status: RESOLVED | Noted: 2018-06-11 | Resolved: 2022-12-21

## 2022-12-21 RX ORDER — ALPRAZOLAM 0.25 MG
TABLET ORAL
Qty: 20 TABLET | Refills: 0 | Status: SHIPPED | OUTPATIENT
Start: 2022-12-21 | End: 2023-01-26

## 2022-12-21 NOTE — TELEPHONE ENCOUNTER
Due for recheck or wellness, no further refills until seen    Last visit in this dept:    10/11/2021     Next visit in this dept:   Future Appointments 12/21/2022 - 6/19/2023    None          Health Maintenance   Topic Date Due     ADVANCE CARE PLANNING  Never done     Pneumococcal Vaccine: 65+ Years (1 - PCV) Never done     COLORECTAL CANCER SCREENING  Never done     ZOSTER IMMUNIZATION (1 of 2) Never done     LIPID  07/18/2020     MEDICARE ANNUAL WELLNESS VISIT  08/10/2020     FALL RISK ASSESSMENT  Never done     MICROALBUMIN  12/30/2020     BMP  01/23/2022     HEMOGLOBIN  06/14/2022     ANNUAL REVIEW OF HM ORDERS  09/02/2022     PSA  09/22/2023     DTAP/TDAP/TD IMMUNIZATION (2 - Td or Tdap) 10/08/2025     PARATHYROID  Completed     PHOSPHORUS  Completed     HEPATITIS C SCREENING  Completed     PHQ-2 (once per calendar year)  Completed     INFLUENZA VACCINE  Completed     URINALYSIS  Completed     ALK PHOS  Completed     AORTIC ANEURYSM SCREENING (SYSTEM ASSIGNED)  Completed     COVID-19 Vaccine  Completed     IPV IMMUNIZATION  Aged Out     MENINGITIS IMMUNIZATION  Aged Out

## 2023-01-01 ENCOUNTER — VIRTUAL VISIT (OUTPATIENT)
Dept: PHARMACY | Facility: CLINIC | Age: 68
End: 2023-01-01
Payer: COMMERCIAL

## 2023-01-01 ENCOUNTER — TELEPHONE (OUTPATIENT)
Dept: TRANSPLANT | Facility: CLINIC | Age: 68
End: 2023-01-01
Payer: COMMERCIAL

## 2023-01-01 ENCOUNTER — INFUSION THERAPY VISIT (OUTPATIENT)
Dept: INFUSION THERAPY | Facility: CLINIC | Age: 68
End: 2023-01-01
Attending: NURSE PRACTITIONER
Payer: COMMERCIAL

## 2023-01-01 ENCOUNTER — LAB (OUTPATIENT)
Dept: LAB | Facility: CLINIC | Age: 68
End: 2023-01-01
Payer: COMMERCIAL

## 2023-01-01 ENCOUNTER — LAB (OUTPATIENT)
Dept: LAB | Facility: CLINIC | Age: 68
End: 2023-01-01
Attending: INTERNAL MEDICINE
Payer: COMMERCIAL

## 2023-01-01 ENCOUNTER — PATIENT OUTREACH (OUTPATIENT)
Dept: CARE COORDINATION | Facility: CLINIC | Age: 68
End: 2023-01-01
Payer: COMMERCIAL

## 2023-01-01 ENCOUNTER — TELEPHONE (OUTPATIENT)
Dept: TRANSPLANT | Facility: CLINIC | Age: 68
End: 2023-01-01

## 2023-01-01 ENCOUNTER — ORGAN (OUTPATIENT)
Dept: TRANSPLANT | Facility: CLINIC | Age: 68
End: 2023-01-01
Payer: COMMERCIAL

## 2023-01-01 ENCOUNTER — OFFICE VISIT (OUTPATIENT)
Dept: TRANSPLANT | Facility: CLINIC | Age: 68
End: 2023-01-01
Attending: INTERNAL MEDICINE
Payer: COMMERCIAL

## 2023-01-01 ENCOUNTER — APPOINTMENT (OUTPATIENT)
Dept: ULTRASOUND IMAGING | Facility: CLINIC | Age: 68
DRG: 651 | End: 2023-01-01
Attending: STUDENT IN AN ORGANIZED HEALTH CARE EDUCATION/TRAINING PROGRAM
Payer: COMMERCIAL

## 2023-01-01 ENCOUNTER — ANCILLARY PROCEDURE (OUTPATIENT)
Dept: GENERAL RADIOLOGY | Facility: CLINIC | Age: 68
End: 2023-01-01
Attending: INTERNAL MEDICINE
Payer: COMMERCIAL

## 2023-01-01 ENCOUNTER — CARE COORDINATION (OUTPATIENT)
Dept: CARDIOLOGY | Facility: CLINIC | Age: 68
End: 2023-01-01

## 2023-01-01 ENCOUNTER — APPOINTMENT (OUTPATIENT)
Dept: GENERAL RADIOLOGY | Facility: CLINIC | Age: 68
DRG: 651 | End: 2023-01-01
Attending: STUDENT IN AN ORGANIZED HEALTH CARE EDUCATION/TRAINING PROGRAM
Payer: COMMERCIAL

## 2023-01-01 ENCOUNTER — DOCUMENTATION ONLY (OUTPATIENT)
Dept: TRANSPLANT | Facility: CLINIC | Age: 68
End: 2023-01-01
Payer: COMMERCIAL

## 2023-01-01 ENCOUNTER — APPOINTMENT (OUTPATIENT)
Dept: GENERAL RADIOLOGY | Facility: CLINIC | Age: 68
DRG: 651 | End: 2023-01-01
Attending: SURGERY
Payer: COMMERCIAL

## 2023-01-01 ENCOUNTER — DOCUMENTATION ONLY (OUTPATIENT)
Dept: TRANSPLANT | Facility: CLINIC | Age: 68
End: 2023-01-01

## 2023-01-01 ENCOUNTER — HOSPITAL ENCOUNTER (OUTPATIENT)
Facility: CLINIC | Age: 68
Discharge: HOME | End: 2023-05-10
Attending: INTERNAL MEDICINE | Admitting: INTERNAL MEDICINE
Payer: COMMERCIAL

## 2023-01-01 ENCOUNTER — HOSPITAL ENCOUNTER (OUTPATIENT)
Facility: CLINIC | Age: 68
End: 2023-01-01
Attending: SURGERY | Admitting: SURGERY
Payer: COMMERCIAL

## 2023-01-01 ENCOUNTER — PATIENT OUTREACH (OUTPATIENT)
Dept: CARE COORDINATION | Facility: CLINIC | Age: 68
End: 2023-01-01

## 2023-01-01 ENCOUNTER — HOSPITAL ENCOUNTER (OUTPATIENT)
Dept: CARDIOLOGY | Facility: CLINIC | Age: 68
Discharge: HOME | End: 2023-08-03
Attending: INTERNAL MEDICINE | Admitting: INTERNAL MEDICINE
Payer: COMMERCIAL

## 2023-01-01 ENCOUNTER — COMMITTEE REVIEW (OUTPATIENT)
Dept: TRANSPLANT | Facility: CLINIC | Age: 68
End: 2023-01-01

## 2023-01-01 ENCOUNTER — ANESTHESIA EVENT (OUTPATIENT)
Dept: SURGERY | Facility: CLINIC | Age: 68
DRG: 651 | End: 2023-01-01
Payer: COMMERCIAL

## 2023-01-01 ENCOUNTER — OFFICE VISIT (OUTPATIENT)
Dept: TRANSPLANT | Facility: CLINIC | Age: 68
End: 2023-01-01
Attending: NURSE PRACTITIONER
Payer: COMMERCIAL

## 2023-01-01 ENCOUNTER — ANESTHESIA (OUTPATIENT)
Dept: SURGERY | Facility: CLINIC | Age: 68
DRG: 651 | End: 2023-01-01
Payer: COMMERCIAL

## 2023-01-01 ENCOUNTER — HEALTH MAINTENANCE LETTER (OUTPATIENT)
Age: 68
End: 2023-01-01

## 2023-01-01 ENCOUNTER — ANCILLARY PROCEDURE (OUTPATIENT)
Dept: GENERAL RADIOLOGY | Facility: CLINIC | Age: 68
End: 2023-01-01
Attending: NURSE PRACTITIONER
Payer: COMMERCIAL

## 2023-01-01 ENCOUNTER — OFFICE VISIT (OUTPATIENT)
Dept: CARDIOLOGY | Facility: CLINIC | Age: 68
End: 2023-01-01
Attending: NURSE PRACTITIONER
Payer: COMMERCIAL

## 2023-01-01 ENCOUNTER — HOSPITAL ENCOUNTER (INPATIENT)
Facility: CLINIC | Age: 68
LOS: 4 days | Discharge: HOME | DRG: 651 | End: 2023-11-01
Attending: SURGERY | Admitting: SURGERY
Payer: COMMERCIAL

## 2023-01-01 ENCOUNTER — TELEPHONE (OUTPATIENT)
Dept: PHARMACY | Facility: CLINIC | Age: 68
End: 2023-01-01

## 2023-01-01 ENCOUNTER — TELEPHONE (OUTPATIENT)
Dept: PHARMACY | Facility: CLINIC | Age: 68
End: 2023-01-01
Payer: COMMERCIAL

## 2023-01-01 ENCOUNTER — INFUSION THERAPY VISIT (OUTPATIENT)
Dept: INFUSION THERAPY | Facility: CLINIC | Age: 68
End: 2023-01-01
Attending: INTERNAL MEDICINE
Payer: COMMERCIAL

## 2023-01-01 ENCOUNTER — LAB (OUTPATIENT)
Dept: LAB | Facility: CLINIC | Age: 68
End: 2023-01-01
Attending: NURSE PRACTITIONER
Payer: COMMERCIAL

## 2023-01-01 VITALS
DIASTOLIC BLOOD PRESSURE: 89 MMHG | OXYGEN SATURATION: 97 % | WEIGHT: 170.9 LBS | HEART RATE: 65 BPM | BODY MASS INDEX: 25.31 KG/M2 | HEIGHT: 69 IN | SYSTOLIC BLOOD PRESSURE: 164 MMHG

## 2023-01-01 VITALS
SYSTOLIC BLOOD PRESSURE: 142 MMHG | TEMPERATURE: 97.8 F | HEART RATE: 62 BPM | DIASTOLIC BLOOD PRESSURE: 77 MMHG | OXYGEN SATURATION: 99 % | RESPIRATION RATE: 16 BRPM | WEIGHT: 179 LBS | BODY MASS INDEX: 26.51 KG/M2 | HEIGHT: 69 IN

## 2023-01-01 VITALS — WEIGHT: 180.12 LBS | BODY MASS INDEX: 26.41 KG/M2

## 2023-01-01 VITALS
WEIGHT: 180.6 LBS | OXYGEN SATURATION: 100 % | BODY MASS INDEX: 26.48 KG/M2 | TEMPERATURE: 97.6 F | RESPIRATION RATE: 16 BRPM

## 2023-01-01 VITALS
DIASTOLIC BLOOD PRESSURE: 87 MMHG | TEMPERATURE: 97.9 F | SYSTOLIC BLOOD PRESSURE: 149 MMHG | WEIGHT: 180 LBS | OXYGEN SATURATION: 99 % | BODY MASS INDEX: 26.39 KG/M2 | HEART RATE: 77 BPM | RESPIRATION RATE: 18 BRPM

## 2023-01-01 VITALS
SYSTOLIC BLOOD PRESSURE: 134 MMHG | BODY MASS INDEX: 23.49 KG/M2 | HEART RATE: 67 BPM | DIASTOLIC BLOOD PRESSURE: 77 MMHG | OXYGEN SATURATION: 100 % | WEIGHT: 160.2 LBS | TEMPERATURE: 97.7 F

## 2023-01-01 VITALS — SYSTOLIC BLOOD PRESSURE: 134 MMHG | WEIGHT: 160.27 LBS | DIASTOLIC BLOOD PRESSURE: 77 MMHG | BODY MASS INDEX: 23.5 KG/M2

## 2023-01-01 VITALS
OXYGEN SATURATION: 100 % | SYSTOLIC BLOOD PRESSURE: 124 MMHG | TEMPERATURE: 97.9 F | HEART RATE: 63 BPM | BODY MASS INDEX: 24 KG/M2 | DIASTOLIC BLOOD PRESSURE: 70 MMHG | WEIGHT: 163.7 LBS

## 2023-01-01 VITALS
OXYGEN SATURATION: 99 % | WEIGHT: 164.7 LBS | SYSTOLIC BLOOD PRESSURE: 132 MMHG | DIASTOLIC BLOOD PRESSURE: 75 MMHG | BODY MASS INDEX: 24.15 KG/M2 | HEART RATE: 74 BPM

## 2023-01-01 VITALS
TEMPERATURE: 98 F | WEIGHT: 183.2 LBS | RESPIRATION RATE: 18 BRPM | BODY MASS INDEX: 26.86 KG/M2 | OXYGEN SATURATION: 100 %

## 2023-01-01 VITALS
HEART RATE: 68 BPM | TEMPERATURE: 97.7 F | OXYGEN SATURATION: 100 % | SYSTOLIC BLOOD PRESSURE: 127 MMHG | DIASTOLIC BLOOD PRESSURE: 68 MMHG | RESPIRATION RATE: 18 BRPM

## 2023-01-01 VITALS
DIASTOLIC BLOOD PRESSURE: 81 MMHG | SYSTOLIC BLOOD PRESSURE: 144 MMHG | HEART RATE: 61 BPM | OXYGEN SATURATION: 100 % | WEIGHT: 170.3 LBS | BODY MASS INDEX: 25.15 KG/M2

## 2023-01-01 DIAGNOSIS — Z48.298 AFTERCARE FOLLOWING ORGAN TRANSPLANT: ICD-10-CM

## 2023-01-01 DIAGNOSIS — Z78.9 TAKES DIETARY SUPPLEMENTS: ICD-10-CM

## 2023-01-01 DIAGNOSIS — Z48.298 AFTERCARE FOLLOWING ORGAN TRANSPLANT: Primary | ICD-10-CM

## 2023-01-01 DIAGNOSIS — Z79.899 ENCOUNTER FOR LONG-TERM CURRENT USE OF MEDICATION: ICD-10-CM

## 2023-01-01 DIAGNOSIS — Z20.828 CONTACT WITH AND (SUSPECTED) EXPOSURE TO OTHER VIRAL COMMUNICABLE DISEASES: ICD-10-CM

## 2023-01-01 DIAGNOSIS — Z12.11 COLON CANCER SCREENING: Primary | ICD-10-CM

## 2023-01-01 DIAGNOSIS — Z98.890 OTHER SPECIFIED POSTPROCEDURAL STATES: ICD-10-CM

## 2023-01-01 DIAGNOSIS — R79.89 LOW SERUM BICARBONATE: Primary | ICD-10-CM

## 2023-01-01 DIAGNOSIS — R06.02 SHORTNESS OF BREATH: ICD-10-CM

## 2023-01-01 DIAGNOSIS — Z76.82 ORGAN TRANSPLANT CANDIDATE: ICD-10-CM

## 2023-01-01 DIAGNOSIS — Z94.0 KIDNEY REPLACED BY TRANSPLANT: ICD-10-CM

## 2023-01-01 DIAGNOSIS — Z94.0 DECEASED-DONOR KIDNEY TRANSPLANT: ICD-10-CM

## 2023-01-01 DIAGNOSIS — E87.5 HYPERKALEMIA: ICD-10-CM

## 2023-01-01 DIAGNOSIS — E78.5 DYSLIPIDEMIA: ICD-10-CM

## 2023-01-01 DIAGNOSIS — E78.5 HYPERLIPIDEMIA LDL GOAL <130: ICD-10-CM

## 2023-01-01 DIAGNOSIS — E83.39 HYPOPHOSPHATEMIA: Primary | ICD-10-CM

## 2023-01-01 DIAGNOSIS — F41.9 ANXIETY: ICD-10-CM

## 2023-01-01 DIAGNOSIS — D84.9 IMMUNOSUPPRESSED STATUS (H): Primary | ICD-10-CM

## 2023-01-01 DIAGNOSIS — I15.1 HTN, KIDNEY TRANSPLANT RELATED: ICD-10-CM

## 2023-01-01 DIAGNOSIS — N18.6 ESRD (END STAGE RENAL DISEASE) (H): ICD-10-CM

## 2023-01-01 DIAGNOSIS — I10 HYPERTENSION GOAL BP (BLOOD PRESSURE) < 140/90: ICD-10-CM

## 2023-01-01 DIAGNOSIS — E83.42 HYPOMAGNESEMIA: Primary | ICD-10-CM

## 2023-01-01 DIAGNOSIS — N18.6 ESRD (END STAGE RENAL DISEASE) (H): Primary | ICD-10-CM

## 2023-01-01 DIAGNOSIS — Z94.0 KIDNEY TRANSPLANT RECIPIENT: Primary | ICD-10-CM

## 2023-01-01 DIAGNOSIS — Z01.810 PRE-OPERATIVE CARDIOVASCULAR EXAMINATION: ICD-10-CM

## 2023-01-01 DIAGNOSIS — R19.7 DIARRHEA: ICD-10-CM

## 2023-01-01 DIAGNOSIS — Z94.0 DECEASED-DONOR KIDNEY TRANSPLANT: Primary | ICD-10-CM

## 2023-01-01 DIAGNOSIS — E83.42 HYPOMAGNESEMIA: ICD-10-CM

## 2023-01-01 DIAGNOSIS — N18.9 ANEMIA IN CHRONIC KIDNEY DISEASE: ICD-10-CM

## 2023-01-01 DIAGNOSIS — Z94.0 KIDNEY REPLACED BY TRANSPLANT: Primary | ICD-10-CM

## 2023-01-01 DIAGNOSIS — N18.4 ANEMIA IN STAGE 4 CHRONIC KIDNEY DISEASE (H): Primary | ICD-10-CM

## 2023-01-01 DIAGNOSIS — Z76.82 AWAITING ORGAN TRANSPLANT: Primary | ICD-10-CM

## 2023-01-01 DIAGNOSIS — Z94.0 HTN, KIDNEY TRANSPLANT RELATED: ICD-10-CM

## 2023-01-01 DIAGNOSIS — N18.4 ANEMIA IN STAGE 4 CHRONIC KIDNEY DISEASE (H): ICD-10-CM

## 2023-01-01 DIAGNOSIS — A49.8 CLOSTRIDIUM DIFFICILE INFECTION: Primary | ICD-10-CM

## 2023-01-01 DIAGNOSIS — D63.1 ANEMIA IN STAGE 4 CHRONIC KIDNEY DISEASE (H): ICD-10-CM

## 2023-01-01 DIAGNOSIS — Z96.0 URETERAL STENT RETAINED: Primary | ICD-10-CM

## 2023-01-01 DIAGNOSIS — D64.9 ANEMIA: Primary | ICD-10-CM

## 2023-01-01 DIAGNOSIS — D84.9 IMMUNOSUPPRESSED STATUS (H): ICD-10-CM

## 2023-01-01 DIAGNOSIS — I10 HYPERTENSION GOAL BP (BLOOD PRESSURE) < 140/90: Primary | ICD-10-CM

## 2023-01-01 DIAGNOSIS — D64.9 ANEMIA: ICD-10-CM

## 2023-01-01 DIAGNOSIS — R19.5 LOOSE STOOLS: ICD-10-CM

## 2023-01-01 DIAGNOSIS — D63.1 ANEMIA IN STAGE 4 CHRONIC KIDNEY DISEASE (H): Primary | ICD-10-CM

## 2023-01-01 DIAGNOSIS — D63.1 ANEMIA IN CHRONIC KIDNEY DISEASE: ICD-10-CM

## 2023-01-01 DIAGNOSIS — K21.9 GASTROESOPHAGEAL REFLUX DISEASE, UNSPECIFIED WHETHER ESOPHAGITIS PRESENT: ICD-10-CM

## 2023-01-01 DIAGNOSIS — R52 PAIN: ICD-10-CM

## 2023-01-01 LAB
A1 AB TITR SERPL: 128 {TITER}
A1 AB TITR SERPL: 32 {TITER}
A2 AB TITR SERPL: 8 {TITER}
A2 AB TITR SERPL: 8 {TITER}
ABO/RH(D): NORMAL
ABO/RH(D): NORMAL
ADV 40+41 DNA STL QL NAA+NON-PROBE: NEGATIVE
ADV 40+41 DNA STL QL NAA+NON-PROBE: NEGATIVE
ALBUMIN MFR UR ELPH: 17.2 MG/DL
ALBUMIN MFR UR ELPH: 31.4 MG/DL
ALBUMIN MFR UR ELPH: 31.8 MG/DL
ALBUMIN MFR UR ELPH: 49.8 MG/DL
ALBUMIN MFR UR ELPH: 70.9 MG/DL
ALBUMIN MFR UR ELPH: 88.5 MG/DL
ALBUMIN MFR UR ELPH: 93.6 MG/DL
ALBUMIN SERPL BCG-MCNC: 3.9 G/DL (ref 3.5–5.2)
ALBUMIN SERPL BCG-MCNC: 4.5 G/DL (ref 3.5–5.2)
ALBUMIN UR-MCNC: 100 MG/DL
ALP SERPL-CCNC: 57 U/L (ref 40–129)
ALT SERPL W P-5'-P-CCNC: 11 U/L (ref 10–50)
ALT SERPL W P-5'-P-CCNC: 12 U/L (ref 0–70)
ANION GAP SERPL CALCULATED.3IONS-SCNC: 10 MMOL/L (ref 7–15)
ANION GAP SERPL CALCULATED.3IONS-SCNC: 11 MMOL/L (ref 7–15)
ANION GAP SERPL CALCULATED.3IONS-SCNC: 12 MMOL/L (ref 7–15)
ANION GAP SERPL CALCULATED.3IONS-SCNC: 12 MMOL/L (ref 7–15)
ANION GAP SERPL CALCULATED.3IONS-SCNC: 13 MMOL/L (ref 7–15)
ANION GAP SERPL CALCULATED.3IONS-SCNC: 14 MMOL/L (ref 7–15)
ANION GAP SERPL CALCULATED.3IONS-SCNC: 15 MMOL/L (ref 7–15)
ANION GAP SERPL CALCULATED.3IONS-SCNC: 15 MMOL/L (ref 7–15)
ANION GAP SERPL CALCULATED.3IONS-SCNC: 16 MMOL/L (ref 7–15)
ANION GAP SERPL CALCULATED.3IONS-SCNC: 17 MMOL/L (ref 7–15)
ANION GAP SERPL CALCULATED.3IONS-SCNC: 18 MMOL/L (ref 7–15)
ANION GAP SERPL CALCULATED.3IONS-SCNC: 23 MMOL/L (ref 7–15)
ANION GAP SERPL CALCULATED.3IONS-SCNC: 8 MMOL/L (ref 7–15)
ANION GAP SERPL CALCULATED.3IONS-SCNC: 9 MMOL/L (ref 7–15)
ANTIBODY SCREEN: NEGATIVE
ANTIBODY SCREEN: NEGATIVE
ANTIBODY TITER IGM SCREEN: NEGATIVE
APPEARANCE UR: CLEAR
APTT PPP: 30 SECONDS (ref 22–38)
AST SERPL W P-5'-P-CCNC: 13 U/L (ref 0–45)
ASTRO TYP 1-8 RNA STL QL NAA+NON-PROBE: NEGATIVE
ASTRO TYP 1-8 RNA STL QL NAA+NON-PROBE: NEGATIVE
ATRIAL RATE - MUSE: 69 BPM
BASE EXCESS BLDV CALC-SCNC: -1.3 MMOL/L (ref -8.1–1.9)
BASE EXCESS BLDV CALC-SCNC: -2.5 MMOL/L (ref -8.1–1.9)
BASE EXCESS BLDV CALC-SCNC: -2.9 MMOL/L (ref -8.1–1.9)
BASOPHILS # BLD AUTO: 0 10E3/UL (ref 0–0.2)
BASOPHILS NFR BLD AUTO: 0 %
BASOPHILS NFR BLD AUTO: 1 %
BILIRUB SERPL-MCNC: 0.3 MG/DL
BILIRUB UR QL STRIP: NEGATIVE
BK VIRUS IGG ANTIBODY: ABNORMAL TITER
BKV DNA # SPEC NAA+PROBE: NOT DETECTED COPIES/ML
BUN SERPL-MCNC: 20.3 MG/DL (ref 8–23)
BUN SERPL-MCNC: 23.9 MG/DL (ref 8–23)
BUN SERPL-MCNC: 24 MG/DL (ref 8–23)
BUN SERPL-MCNC: 24.2 MG/DL (ref 8–23)
BUN SERPL-MCNC: 24.6 MG/DL (ref 8–23)
BUN SERPL-MCNC: 29.3 MG/DL (ref 8–23)
BUN SERPL-MCNC: 30.1 MG/DL (ref 8–23)
BUN SERPL-MCNC: 30.7 MG/DL (ref 8–23)
BUN SERPL-MCNC: 31.3 MG/DL (ref 8–23)
BUN SERPL-MCNC: 33.8 MG/DL (ref 8–23)
BUN SERPL-MCNC: 34.5 MG/DL (ref 8–23)
BUN SERPL-MCNC: 34.7 MG/DL (ref 8–23)
BUN SERPL-MCNC: 40.1 MG/DL (ref 8–23)
BUN SERPL-MCNC: 41.9 MG/DL (ref 8–23)
BUN SERPL-MCNC: 43 MG/DL (ref 8–23)
BUN SERPL-MCNC: 50.3 MG/DL (ref 8–23)
BUN SERPL-MCNC: 56.2 MG/DL (ref 8–23)
BUN SERPL-MCNC: 56.8 MG/DL (ref 8–23)
BUN SERPL-MCNC: 58.6 MG/DL (ref 8–23)
BUN SERPL-MCNC: 62.8 MG/DL (ref 8–23)
BUN SERPL-MCNC: 64.1 MG/DL (ref 8–23)
BUN SERPL-MCNC: 82.9 MG/DL (ref 8–23)
BUN SERPL-MCNC: 89.4 MG/DL (ref 8–23)
BUN SERPL-MCNC: 93.4 MG/DL (ref 8–23)
BUN SERPL-MCNC: 97 MG/DL (ref 8–23)
BUN SERPL-MCNC: 99.1 MG/DL (ref 8–23)
C CAYETANENSIS DNA STL QL NAA+NON-PROBE: NEGATIVE
C CAYETANENSIS DNA STL QL NAA+NON-PROBE: NEGATIVE
C DIFF GDH STL QL IA: POSITIVE
C DIFF TOX A+B STL QL IA: NEGATIVE
C DIFF TOX B STL QL: POSITIVE
CA-I BLD-MCNC: 4 MG/DL (ref 4.4–5.2)
CA-I BLD-MCNC: 4.1 MG/DL (ref 4.4–5.2)
CA-I BLD-MCNC: 4.5 MG/DL (ref 4.4–5.2)
CALCIUM SERPL-MCNC: 7.8 MG/DL (ref 8.8–10.2)
CALCIUM SERPL-MCNC: 8.1 MG/DL (ref 8.8–10.2)
CALCIUM SERPL-MCNC: 8.2 MG/DL (ref 8.8–10.2)
CALCIUM SERPL-MCNC: 8.2 MG/DL (ref 8.8–10.2)
CALCIUM SERPL-MCNC: 8.3 MG/DL (ref 8.8–10.2)
CALCIUM SERPL-MCNC: 8.4 MG/DL (ref 8.8–10.2)
CALCIUM SERPL-MCNC: 8.5 MG/DL (ref 8.8–10.2)
CALCIUM SERPL-MCNC: 8.5 MG/DL (ref 8.8–10.2)
CALCIUM SERPL-MCNC: 8.6 MG/DL (ref 8.8–10.2)
CALCIUM SERPL-MCNC: 8.7 MG/DL (ref 8.8–10.2)
CALCIUM SERPL-MCNC: 8.8 MG/DL (ref 8.8–10.2)
CALCIUM SERPL-MCNC: 8.9 MG/DL (ref 8.8–10.2)
CALCIUM SERPL-MCNC: 8.9 MG/DL (ref 8.8–10.2)
CALCIUM SERPL-MCNC: 9 MG/DL (ref 8.8–10.2)
CALCIUM SERPL-MCNC: 9 MG/DL (ref 8.8–10.2)
CALCIUM SERPL-MCNC: 9.1 MG/DL (ref 8.8–10.2)
CALCIUM SERPL-MCNC: 9.1 MG/DL (ref 8.8–10.2)
CALCIUM SERPL-MCNC: 9.4 MG/DL (ref 8.8–10.2)
CAMPYLOBACTER DNA SPEC NAA+PROBE: NEGATIVE
CAMPYLOBACTER DNA SPEC NAA+PROBE: NEGATIVE
CELL TYPE ALLO: NORMAL
CELL TYPE AUTO: NORMAL
CHANNELSHIFTALLOB1: -13
CHANNELSHIFTALLOB2: -2
CHANNELSHIFTALLOT1: 12
CHANNELSHIFTALLOT2: 42
CHANNELSHIFTAUTOB1: -38
CHANNELSHIFTAUTOB2: -15
CHANNELSHIFTAUTOT1: -12
CHANNELSHIFTAUTOT2: -17
CHLORIDE SERPL-SCNC: 100 MMOL/L (ref 98–107)
CHLORIDE SERPL-SCNC: 101 MMOL/L (ref 98–107)
CHLORIDE SERPL-SCNC: 103 MMOL/L (ref 98–107)
CHLORIDE SERPL-SCNC: 105 MMOL/L (ref 98–107)
CHLORIDE SERPL-SCNC: 108 MMOL/L (ref 98–107)
CHLORIDE SERPL-SCNC: 108 MMOL/L (ref 98–107)
CHLORIDE SERPL-SCNC: 109 MMOL/L (ref 98–107)
CHLORIDE SERPL-SCNC: 110 MMOL/L (ref 98–107)
CHLORIDE SERPL-SCNC: 111 MMOL/L (ref 98–107)
CHLORIDE SERPL-SCNC: 94 MMOL/L (ref 98–107)
CHLORIDE SERPL-SCNC: 97 MMOL/L (ref 98–107)
CHLORIDE SERPL-SCNC: 97 MMOL/L (ref 98–107)
CHLORIDE SERPL-SCNC: 98 MMOL/L (ref 98–107)
CHLORIDE SERPL-SCNC: 99 MMOL/L (ref 98–107)
CHLORIDE SERPL-SCNC: 99 MMOL/L (ref 98–107)
CHOLEST SERPL-MCNC: 231 MG/DL
CHOLEST SERPL-MCNC: 251 MG/DL
CMV DNA SPEC NAA+PROBE-ACNC: <35 IU/ML
CMV DNA SPEC NAA+PROBE-ACNC: NOT DETECTED IU/ML
CMV DNA SPEC NAA+PROBE-ACNC: NOT DETECTED IU/ML
CMV DNA SPEC NAA+PROBE-LOG#: <1.5 {LOG_COPIES}/ML
CMV IGG SERPL IA-ACNC: <0.2 U/ML
CMV IGG SERPL IA-ACNC: NORMAL
COLONOSCOPY: NORMAL
COLOR UR AUTO: ABNORMAL
CREAT SERPL-MCNC: 1.13 MG/DL (ref 0.67–1.17)
CREAT SERPL-MCNC: 1.49 MG/DL (ref 0.67–1.17)
CREAT SERPL-MCNC: 1.59 MG/DL (ref 0.67–1.17)
CREAT SERPL-MCNC: 1.65 MG/DL (ref 0.67–1.17)
CREAT SERPL-MCNC: 1.65 MG/DL (ref 0.67–1.17)
CREAT SERPL-MCNC: 1.7 MG/DL (ref 0.67–1.17)
CREAT SERPL-MCNC: 1.89 MG/DL (ref 0.67–1.17)
CREAT SERPL-MCNC: 1.9 MG/DL (ref 0.67–1.17)
CREAT SERPL-MCNC: 1.95 MG/DL (ref 0.67–1.17)
CREAT SERPL-MCNC: 1.96 MG/DL (ref 0.67–1.17)
CREAT SERPL-MCNC: 2.08 MG/DL (ref 0.67–1.17)
CREAT SERPL-MCNC: 2.75 MG/DL (ref 0.67–1.17)
CREAT SERPL-MCNC: 2.85 MG/DL (ref 0.67–1.17)
CREAT SERPL-MCNC: 4.27 MG/DL (ref 0.67–1.17)
CREAT SERPL-MCNC: 6.5 MG/DL (ref 0.67–1.17)
CREAT SERPL-MCNC: 6.57 MG/DL (ref 0.67–1.17)
CREAT SERPL-MCNC: 6.84 MG/DL (ref 0.67–1.17)
CREAT SERPL-MCNC: 6.95 MG/DL (ref 0.67–1.17)
CREAT SERPL-MCNC: 7.41 MG/DL (ref 0.67–1.17)
CREAT SERPL-MCNC: 7.96 MG/DL (ref 0.67–1.17)
CREAT SERPL-MCNC: 8.19 MG/DL (ref 0.67–1.17)
CREAT SERPL-MCNC: 8.34 MG/DL (ref 0.67–1.17)
CREAT SERPL-MCNC: 8.73 MG/DL (ref 0.67–1.17)
CREAT SERPL-MCNC: 8.93 MG/DL (ref 0.67–1.17)
CREAT SERPL-MCNC: 9.2 MG/DL (ref 0.67–1.17)
CREAT SERPL-MCNC: 9.65 MG/DL (ref 0.67–1.17)
CREAT UR-MCNC: 146 MG/DL
CREAT UR-MCNC: 190.7 MG/DL
CREAT UR-MCNC: 21.1 MG/DL
CREAT UR-MCNC: 23.5 MG/DL
CREAT UR-MCNC: 43.9 MG/DL
CREAT UR-MCNC: 44.1 MG/DL
CREAT UR-MCNC: 74.9 MG/DL
CROSSMATCHDATEALLO: NORMAL
CROSSMATCHDATEAUTO: NORMAL
CRYPTOSP DNA STL QL NAA+NON-PROBE: NEGATIVE
CRYPTOSP DNA STL QL NAA+NON-PROBE: NEGATIVE
DEPRECATED HCO3 PLAS-SCNC: 15 MMOL/L (ref 22–29)
DEPRECATED HCO3 PLAS-SCNC: 15 MMOL/L (ref 22–29)
DEPRECATED HCO3 PLAS-SCNC: 17 MMOL/L (ref 22–29)
DEPRECATED HCO3 PLAS-SCNC: 18 MMOL/L (ref 22–29)
DEPRECATED HCO3 PLAS-SCNC: 19 MMOL/L (ref 22–29)
DEPRECATED HCO3 PLAS-SCNC: 20 MMOL/L (ref 22–29)
DEPRECATED HCO3 PLAS-SCNC: 21 MMOL/L (ref 22–29)
DEPRECATED HCO3 PLAS-SCNC: 21 MMOL/L (ref 22–29)
DEPRECATED HCO3 PLAS-SCNC: 23 MMOL/L (ref 22–29)
DEPRECATED HCO3 PLAS-SCNC: 24 MMOL/L (ref 22–29)
DEPRECATED HCO3 PLAS-SCNC: 26 MMOL/L (ref 22–29)
DEPRECATED HCO3 PLAS-SCNC: 27 MMOL/L (ref 22–29)
DIASTOLIC BLOOD PRESSURE - MUSE: NORMAL MMHG
DONOR ALLO: NORMAL
DONOR AUTO: NORMAL
DONOR IDENTIFICATION: NORMAL
DONOR IDENTIFICATION: NORMAL
DONORCELLDATE ALLO: NORMAL
DONORCELLDATE AUTO: NORMAL
DSA COMMENTS: NORMAL
DSA COMMENTS: NORMAL
DSA PRESENT: NO
DSA PRESENT: NO
DSA TEST METHOD: NORMAL
DSA TEST METHOD: NORMAL
E COLI O157 DNA STL QL NAA+NON-PROBE: NORMAL
E COLI O157 DNA STL QL NAA+NON-PROBE: NORMAL
E HISTOLYT DNA STL QL NAA+NON-PROBE: NEGATIVE
E HISTOLYT DNA STL QL NAA+NON-PROBE: NEGATIVE
EAEC ASTA GENE ISLT QL NAA+PROBE: NEGATIVE
EAEC ASTA GENE ISLT QL NAA+PROBE: NEGATIVE
EBV VCA IGG SER IA-ACNC: >750 U/ML
EBV VCA IGG SER IA-ACNC: POSITIVE
EBV VCA IGM SER IA-ACNC: <10 U/ML
EBV VCA IGM SER IA-ACNC: NORMAL
EC STX1+STX2 GENES STL QL NAA+NON-PROBE: NEGATIVE
EC STX1+STX2 GENES STL QL NAA+NON-PROBE: NEGATIVE
EGFRCR SERPLBLD CKD-EPI 2021: 14 ML/MIN/1.73M2
EGFRCR SERPLBLD CKD-EPI 2021: 23 ML/MIN/1.73M2
EGFRCR SERPLBLD CKD-EPI 2021: 24 ML/MIN/1.73M2
EGFRCR SERPLBLD CKD-EPI 2021: 34 ML/MIN/1.73M2
EGFRCR SERPLBLD CKD-EPI 2021: 37 ML/MIN/1.73M2
EGFRCR SERPLBLD CKD-EPI 2021: 37 ML/MIN/1.73M2
EGFRCR SERPLBLD CKD-EPI 2021: 38 ML/MIN/1.73M2
EGFRCR SERPLBLD CKD-EPI 2021: 38 ML/MIN/1.73M2
EGFRCR SERPLBLD CKD-EPI 2021: 43 ML/MIN/1.73M2
EGFRCR SERPLBLD CKD-EPI 2021: 45 ML/MIN/1.73M2
EGFRCR SERPLBLD CKD-EPI 2021: 45 ML/MIN/1.73M2
EGFRCR SERPLBLD CKD-EPI 2021: 47 ML/MIN/1.73M2
EGFRCR SERPLBLD CKD-EPI 2021: 5 ML/MIN/1.73M2
EGFRCR SERPLBLD CKD-EPI 2021: 51 ML/MIN/1.73M2
EGFRCR SERPLBLD CKD-EPI 2021: 6 ML/MIN/1.73M2
EGFRCR SERPLBLD CKD-EPI 2021: 7 ML/MIN/1.73M2
EGFRCR SERPLBLD CKD-EPI 2021: 71 ML/MIN/1.73M2
EGFRCR SERPLBLD CKD-EPI 2021: 8 ML/MIN/1.73M2
EGFRCR SERPLBLD CKD-EPI 2021: 8 ML/MIN/1.73M2
EGFRCR SERPLBLD CKD-EPI 2021: 9 ML/MIN/1.73M2
EOSINOPHIL # BLD AUTO: 0 10E3/UL (ref 0–0.7)
EOSINOPHIL # BLD AUTO: 0.1 10E3/UL (ref 0–0.7)
EOSINOPHIL NFR BLD AUTO: 0 %
EOSINOPHIL NFR BLD AUTO: 2 %
EPEC EAE GENE STL QL NAA+NON-PROBE: NEGATIVE
EPEC EAE GENE STL QL NAA+NON-PROBE: NEGATIVE
ERYTHROCYTE [DISTWIDTH] IN BLOOD BY AUTOMATED COUNT: 12.1 % (ref 10–15)
ERYTHROCYTE [DISTWIDTH] IN BLOOD BY AUTOMATED COUNT: 12.2 % (ref 10–15)
ERYTHROCYTE [DISTWIDTH] IN BLOOD BY AUTOMATED COUNT: 12.6 % (ref 10–15)
ERYTHROCYTE [DISTWIDTH] IN BLOOD BY AUTOMATED COUNT: 12.7 % (ref 10–15)
ERYTHROCYTE [DISTWIDTH] IN BLOOD BY AUTOMATED COUNT: 12.8 % (ref 10–15)
ERYTHROCYTE [DISTWIDTH] IN BLOOD BY AUTOMATED COUNT: 12.8 % (ref 10–15)
ERYTHROCYTE [DISTWIDTH] IN BLOOD BY AUTOMATED COUNT: 13 % (ref 10–15)
ERYTHROCYTE [DISTWIDTH] IN BLOOD BY AUTOMATED COUNT: 13.1 % (ref 10–15)
ERYTHROCYTE [DISTWIDTH] IN BLOOD BY AUTOMATED COUNT: 13.3 % (ref 10–15)
ERYTHROCYTE [DISTWIDTH] IN BLOOD BY AUTOMATED COUNT: 14 % (ref 10–15)
ERYTHROCYTE [DISTWIDTH] IN BLOOD BY AUTOMATED COUNT: 14.1 % (ref 10–15)
ERYTHROCYTE [DISTWIDTH] IN BLOOD BY AUTOMATED COUNT: 14.2 % (ref 10–15)
ERYTHROCYTE [DISTWIDTH] IN BLOOD BY AUTOMATED COUNT: 14.2 % (ref 10–15)
ERYTHROCYTE [DISTWIDTH] IN BLOOD BY AUTOMATED COUNT: 14.3 % (ref 10–15)
ERYTHROCYTE [DISTWIDTH] IN BLOOD BY AUTOMATED COUNT: 14.5 % (ref 10–15)
ERYTHROCYTE [DISTWIDTH] IN BLOOD BY AUTOMATED COUNT: 14.6 % (ref 10–15)
ETEC LTA+ST1A+ST1B TOX ST NAA+NON-PROBE: NEGATIVE
ETEC LTA+ST1A+ST1B TOX ST NAA+NON-PROBE: NEGATIVE
FERRITIN SERPL-MCNC: 1146 NG/ML (ref 31–409)
FERRITIN SERPL-MCNC: 1300 NG/ML (ref 31–409)
FERRITIN SERPL-MCNC: 1365 NG/ML (ref 31–409)
FERRITIN SERPL-MCNC: 1368 NG/ML (ref 31–409)
FERRITIN SERPL-MCNC: 3 NG/ML (ref 31–409)
FERRITIN SERPL-MCNC: 919 NG/ML (ref 31–409)
G LAMBLIA DNA STL QL NAA+NON-PROBE: NEGATIVE
G LAMBLIA DNA STL QL NAA+NON-PROBE: NEGATIVE
G6PD RBC-CCNT: 15.4 U/G HB
GFR SERPL CREATININE-BSD FRML MDRD: 9 ML/MIN/1.73M2
GLUCOSE BLD-MCNC: 132 MG/DL (ref 70–99)
GLUCOSE BLD-MCNC: 152 MG/DL (ref 70–99)
GLUCOSE BLD-MCNC: 174 MG/DL (ref 70–99)
GLUCOSE BLDC GLUCOMTR-MCNC: 100 MG/DL (ref 70–99)
GLUCOSE BLDC GLUCOMTR-MCNC: 100 MG/DL (ref 70–99)
GLUCOSE BLDC GLUCOMTR-MCNC: 101 MG/DL (ref 70–99)
GLUCOSE BLDC GLUCOMTR-MCNC: 105 MG/DL (ref 70–99)
GLUCOSE BLDC GLUCOMTR-MCNC: 106 MG/DL (ref 70–99)
GLUCOSE BLDC GLUCOMTR-MCNC: 126 MG/DL (ref 70–99)
GLUCOSE BLDC GLUCOMTR-MCNC: 137 MG/DL (ref 70–99)
GLUCOSE BLDC GLUCOMTR-MCNC: 140 MG/DL (ref 70–99)
GLUCOSE BLDC GLUCOMTR-MCNC: 141 MG/DL (ref 70–99)
GLUCOSE BLDC GLUCOMTR-MCNC: 152 MG/DL (ref 70–99)
GLUCOSE BLDC GLUCOMTR-MCNC: 162 MG/DL (ref 70–99)
GLUCOSE BLDC GLUCOMTR-MCNC: 178 MG/DL (ref 70–99)
GLUCOSE BLDC GLUCOMTR-MCNC: 201 MG/DL (ref 70–99)
GLUCOSE BLDC GLUCOMTR-MCNC: 223 MG/DL (ref 70–99)
GLUCOSE BLDC GLUCOMTR-MCNC: 264 MG/DL (ref 70–99)
GLUCOSE BLDC GLUCOMTR-MCNC: 316 MG/DL (ref 70–99)
GLUCOSE BLDC GLUCOMTR-MCNC: 96 MG/DL (ref 70–99)
GLUCOSE BLDC GLUCOMTR-MCNC: 98 MG/DL (ref 70–99)
GLUCOSE SERPL-MCNC: 100 MG/DL (ref 70–99)
GLUCOSE SERPL-MCNC: 101 MG/DL (ref 70–99)
GLUCOSE SERPL-MCNC: 101 MG/DL (ref 70–99)
GLUCOSE SERPL-MCNC: 103 MG/DL (ref 70–99)
GLUCOSE SERPL-MCNC: 103 MG/DL (ref 70–99)
GLUCOSE SERPL-MCNC: 109 MG/DL (ref 70–99)
GLUCOSE SERPL-MCNC: 112 MG/DL (ref 70–99)
GLUCOSE SERPL-MCNC: 113 MG/DL (ref 70–99)
GLUCOSE SERPL-MCNC: 115 MG/DL (ref 70–99)
GLUCOSE SERPL-MCNC: 115 MG/DL (ref 70–99)
GLUCOSE SERPL-MCNC: 117 MG/DL (ref 70–99)
GLUCOSE SERPL-MCNC: 121 MG/DL (ref 70–99)
GLUCOSE SERPL-MCNC: 125 MG/DL (ref 70–99)
GLUCOSE SERPL-MCNC: 126 MG/DL (ref 70–99)
GLUCOSE SERPL-MCNC: 129 MG/DL (ref 70–99)
GLUCOSE SERPL-MCNC: 131 MG/DL (ref 70–99)
GLUCOSE SERPL-MCNC: 136 MG/DL (ref 70–99)
GLUCOSE SERPL-MCNC: 140 MG/DL (ref 70–99)
GLUCOSE SERPL-MCNC: 143 MG/DL (ref 70–99)
GLUCOSE SERPL-MCNC: 147 MG/DL (ref 70–99)
GLUCOSE SERPL-MCNC: 170 MG/DL (ref 70–99)
GLUCOSE SERPL-MCNC: 91 MG/DL (ref 70–99)
GLUCOSE SERPL-MCNC: 91 MG/DL (ref 70–99)
GLUCOSE SERPL-MCNC: 93 MG/DL (ref 70–99)
GLUCOSE SERPL-MCNC: 96 MG/DL (ref 70–99)
GLUCOSE SERPL-MCNC: 96 MG/DL (ref 70–99)
GLUCOSE UR STRIP-MCNC: 100 MG/DL
HBA1C MFR BLD: 5 %
HBV CORE AB SERPL QL IA: NONREACTIVE
HBV DNA SERPL QL NAA+PROBE: NORMAL
HBV DNA SERPL QL NAA+PROBE: NORMAL
HBV SURFACE AB SERPL IA-ACNC: 9.05 M[IU]/ML
HBV SURFACE AB SERPL IA-ACNC: NORMAL M[IU]/ML
HBV SURFACE AG SERPL QL IA: NONREACTIVE
HCO3 BLDV-SCNC: 23 MMOL/L (ref 21–28)
HCO3 BLDV-SCNC: 23 MMOL/L (ref 21–28)
HCO3 BLDV-SCNC: 25 MMOL/L (ref 21–28)
HCT VFR BLD AUTO: 21 % (ref 40–53)
HCT VFR BLD AUTO: 22.3 % (ref 40–53)
HCT VFR BLD AUTO: 22.7 % (ref 40–53)
HCT VFR BLD AUTO: 23.1 % (ref 40–53)
HCT VFR BLD AUTO: 23.2 % (ref 40–53)
HCT VFR BLD AUTO: 24.1 % (ref 40–53)
HCT VFR BLD AUTO: 24.1 % (ref 40–53)
HCT VFR BLD AUTO: 24.3 % (ref 40–53)
HCT VFR BLD AUTO: 24.7 % (ref 40–53)
HCT VFR BLD AUTO: 24.9 % (ref 40–53)
HCT VFR BLD AUTO: 25 % (ref 40–53)
HCT VFR BLD AUTO: 25.2 % (ref 40–53)
HCT VFR BLD AUTO: 25.3 % (ref 40–53)
HCT VFR BLD AUTO: 25.4 % (ref 40–53)
HCT VFR BLD AUTO: 26.3 % (ref 40–53)
HCT VFR BLD AUTO: 26.4 % (ref 40–53)
HCT VFR BLD AUTO: 26.5 % (ref 40–53)
HCT VFR BLD AUTO: 27 % (ref 40–53)
HCT VFR BLD AUTO: 27 % (ref 40–53)
HCT VFR BLD AUTO: 27.2 % (ref 40–53)
HCT VFR BLD AUTO: 27.3 % (ref 40–53)
HCT VFR BLD AUTO: 27.6 % (ref 40–53)
HCT VFR BLD AUTO: 28.7 % (ref 40–53)
HCT VFR BLD AUTO: 29.9 % (ref 40–53)
HCV AB SERPL QL IA: NONREACTIVE
HCV RNA SERPL QL NAA+PROBE: NORMAL
HCV RNA SERPL QL NAA+PROBE: NORMAL
HDLC SERPL-MCNC: 47 MG/DL
HDLC SERPL-MCNC: 52 MG/DL
HEMOCCULT STL QL: NEGATIVE
HGB BLD-MCNC: 10 G/DL (ref 13.3–17.7)
HGB BLD-MCNC: 10 G/DL (ref 13.3–17.7)
HGB BLD-MCNC: 10.2 G/DL (ref 13.3–17.7)
HGB BLD-MCNC: 10.7 G/DL (ref 13.3–17.7)
HGB BLD-MCNC: 7.1 G/DL (ref 13.3–17.7)
HGB BLD-MCNC: 7.2 G/DL (ref 13.3–17.7)
HGB BLD-MCNC: 7.5 G/DL (ref 13.3–17.7)
HGB BLD-MCNC: 7.9 G/DL (ref 13.3–17.7)
HGB BLD-MCNC: 7.9 G/DL (ref 13.3–17.7)
HGB BLD-MCNC: 8 G/DL (ref 13.3–17.7)
HGB BLD-MCNC: 8.2 G/DL (ref 13.3–17.7)
HGB BLD-MCNC: 8.2 G/DL (ref 13.3–17.7)
HGB BLD-MCNC: 8.3 G/DL (ref 13.3–17.7)
HGB BLD-MCNC: 8.3 G/DL (ref 13.3–17.7)
HGB BLD-MCNC: 8.4 G/DL (ref 13.3–17.7)
HGB BLD-MCNC: 8.6 G/DL (ref 13.3–17.7)
HGB BLD-MCNC: 8.7 G/DL (ref 13.3–17.7)
HGB BLD-MCNC: 8.8 G/DL (ref 13.3–17.7)
HGB BLD-MCNC: 8.9 G/DL (ref 13.3–17.7)
HGB BLD-MCNC: 9 G/DL (ref 13.3–17.7)
HGB BLD-MCNC: 9.1 G/DL (ref 13.3–17.7)
HGB BLD-MCNC: 9.3 G/DL (ref 13.3–17.7)
HGB BLD-MCNC: 9.4 G/DL (ref 13.3–17.7)
HGB BLD-MCNC: 9.5 G/DL (ref 13.3–17.7)
HGB BLD-MCNC: 9.6 G/DL (ref 13.3–17.7)
HGB BLD-MCNC: 9.7 G/DL (ref 13.3–17.7)
HGB BLD-MCNC: 9.8 G/DL (ref 13.3–17.7)
HGB UR QL STRIP: NEGATIVE
HIV 1+2 AB+HIV1 P24 AG SERPL QL IA: NONREACTIVE
HIV1+2 RNA SERPL QL NAA+PROBE: NORMAL
HIV1+2 RNA SERPL QL NAA+PROBE: NORMAL
IMM GRANULOCYTES # BLD: 0 10E3/UL
IMM GRANULOCYTES # BLD: 0.1 10E3/UL
IMM GRANULOCYTES NFR BLD: 0 %
IMM GRANULOCYTES NFR BLD: 1 %
INR PPP: 1.13 (ref 0.85–1.15)
INT SUB RESULT: NORMAL
INTERF SUBSTANCE: NORMAL
INTERPRETATION ECG - MUSE: NORMAL
INTSUB TEST METHOD: NORMAL
IRON BINDING CAPACITY (ROCHE): 179 UG/DL (ref 240–430)
IRON BINDING CAPACITY (ROCHE): 213 UG/DL (ref 240–430)
IRON BINDING CAPACITY (ROCHE): 230 UG/DL (ref 240–430)
IRON BINDING CAPACITY (ROCHE): 232 UG/DL (ref 240–430)
IRON BINDING CAPACITY (ROCHE): 238 UG/DL (ref 240–430)
IRON BINDING CAPACITY (ROCHE): 247 UG/DL (ref 240–430)
IRON SATN MFR SERPL: 18 % (ref 15–46)
IRON SATN MFR SERPL: 21 % (ref 15–46)
IRON SATN MFR SERPL: 26 % (ref 15–46)
IRON SATN MFR SERPL: 35 % (ref 15–46)
IRON SATN MFR SERPL: 43 % (ref 15–46)
IRON SATN MFR SERPL: 69 % (ref 15–46)
IRON SERPL-MCNC: 123 UG/DL (ref 61–157)
IRON SERPL-MCNC: 41 UG/DL (ref 61–157)
IRON SERPL-MCNC: 51 UG/DL (ref 61–157)
IRON SERPL-MCNC: 63 UG/DL (ref 61–157)
IRON SERPL-MCNC: 80 UG/DL (ref 61–157)
IRON SERPL-MCNC: 91 UG/DL (ref 61–157)
IRON SERPL-MCNC: 91 UG/DL (ref 61–157)
KETONES UR STRIP-MCNC: NEGATIVE MG/DL
LACTATE BLD-SCNC: 1.9 MMOL/L
LACTATE BLD-SCNC: 2.6 MMOL/L
LACTATE BLD-SCNC: 3.3 MMOL/L
LDLC SERPL CALC-MCNC: 157 MG/DL
LDLC SERPL CALC-MCNC: 162 MG/DL
LEUKOCYTE ESTERASE UR QL STRIP: NEGATIVE
LYMPHOCYTES # BLD AUTO: 0 10E3/UL (ref 0.8–5.3)
LYMPHOCYTES # BLD AUTO: 0 10E3/UL (ref 0.8–5.3)
LYMPHOCYTES # BLD AUTO: 0.1 10E3/UL (ref 0.8–5.3)
LYMPHOCYTES # BLD AUTO: 1.4 10E3/UL (ref 0.8–5.3)
LYMPHOCYTES NFR BLD AUTO: 0 %
LYMPHOCYTES NFR BLD AUTO: 1 %
LYMPHOCYTES NFR BLD AUTO: 2 %
LYMPHOCYTES NFR BLD AUTO: 21 %
LYMPHOCYTES NFR BLD AUTO: 3 %
MAGNESIUM SERPL-MCNC: 1.3 MG/DL (ref 1.7–2.3)
MAGNESIUM SERPL-MCNC: 1.3 MG/DL (ref 1.7–2.3)
MAGNESIUM SERPL-MCNC: 1.5 MG/DL (ref 1.7–2.3)
MAGNESIUM SERPL-MCNC: 1.6 MG/DL (ref 1.7–2.3)
MAGNESIUM SERPL-MCNC: 1.7 MG/DL (ref 1.7–2.3)
MAGNESIUM SERPL-MCNC: 1.7 MG/DL (ref 1.7–2.3)
MAGNESIUM SERPL-MCNC: 1.8 MG/DL (ref 1.7–2.3)
MAGNESIUM SERPL-MCNC: 2 MG/DL (ref 1.7–2.3)
MAGNESIUM SERPL-MCNC: 2.1 MG/DL (ref 1.7–2.3)
MCH RBC QN AUTO: 32.6 PG (ref 26.5–33)
MCH RBC QN AUTO: 32.8 PG (ref 26.5–33)
MCH RBC QN AUTO: 32.9 PG (ref 26.5–33)
MCH RBC QN AUTO: 33 PG (ref 26.5–33)
MCH RBC QN AUTO: 33.1 PG (ref 26.5–33)
MCH RBC QN AUTO: 33.2 PG (ref 26.5–33)
MCH RBC QN AUTO: 33.2 PG (ref 26.5–33)
MCH RBC QN AUTO: 33.3 PG (ref 26.5–33)
MCH RBC QN AUTO: 33.5 PG (ref 26.5–33)
MCH RBC QN AUTO: 33.6 PG (ref 26.5–33)
MCH RBC QN AUTO: 33.7 PG (ref 26.5–33)
MCH RBC QN AUTO: 33.7 PG (ref 26.5–33)
MCH RBC QN AUTO: 33.8 PG (ref 26.5–33)
MCH RBC QN AUTO: 33.9 PG (ref 26.5–33)
MCH RBC QN AUTO: 34.2 PG (ref 26.5–33)
MCH RBC QN AUTO: 34.6 PG (ref 26.5–33)
MCHC RBC AUTO-ENTMCNC: 32.2 G/DL (ref 31.5–36.5)
MCHC RBC AUTO-ENTMCNC: 32.3 G/DL (ref 31.5–36.5)
MCHC RBC AUTO-ENTMCNC: 32.5 G/DL (ref 31.5–36.5)
MCHC RBC AUTO-ENTMCNC: 32.8 G/DL (ref 31.5–36.5)
MCHC RBC AUTO-ENTMCNC: 32.9 G/DL (ref 31.5–36.5)
MCHC RBC AUTO-ENTMCNC: 33 G/DL (ref 31.5–36.5)
MCHC RBC AUTO-ENTMCNC: 33.3 G/DL (ref 31.5–36.5)
MCHC RBC AUTO-ENTMCNC: 33.4 G/DL (ref 31.5–36.5)
MCHC RBC AUTO-ENTMCNC: 33.5 G/DL (ref 31.5–36.5)
MCHC RBC AUTO-ENTMCNC: 33.7 G/DL (ref 31.5–36.5)
MCHC RBC AUTO-ENTMCNC: 33.8 G/DL (ref 31.5–36.5)
MCHC RBC AUTO-ENTMCNC: 34.2 G/DL (ref 31.5–36.5)
MCHC RBC AUTO-ENTMCNC: 34.3 G/DL (ref 31.5–36.5)
MCHC RBC AUTO-ENTMCNC: 34.4 G/DL (ref 31.5–36.5)
MCHC RBC AUTO-ENTMCNC: 34.8 G/DL (ref 31.5–36.5)
MCHC RBC AUTO-ENTMCNC: 34.8 G/DL (ref 31.5–36.5)
MCHC RBC AUTO-ENTMCNC: 34.9 G/DL (ref 31.5–36.5)
MCHC RBC AUTO-ENTMCNC: 34.9 G/DL (ref 31.5–36.5)
MCHC RBC AUTO-ENTMCNC: 35.3 G/DL (ref 31.5–36.5)
MCV RBC AUTO: 100 FL (ref 78–100)
MCV RBC AUTO: 101 FL (ref 78–100)
MCV RBC AUTO: 102 FL (ref 78–100)
MCV RBC AUTO: 103 FL (ref 78–100)
MCV RBC AUTO: 104 FL (ref 78–100)
MCV RBC AUTO: 94 FL (ref 78–100)
MCV RBC AUTO: 95 FL (ref 78–100)
MCV RBC AUTO: 96 FL (ref 78–100)
MCV RBC AUTO: 97 FL (ref 78–100)
MCV RBC AUTO: 97 FL (ref 78–100)
MCV RBC AUTO: 98 FL (ref 78–100)
MCV RBC AUTO: 99 FL (ref 78–100)
MONOCYTES # BLD AUTO: 0.1 10E3/UL (ref 0–1.3)
MONOCYTES # BLD AUTO: 0.2 10E3/UL (ref 0–1.3)
MONOCYTES # BLD AUTO: 0.3 10E3/UL (ref 0–1.3)
MONOCYTES # BLD AUTO: 0.3 10E3/UL (ref 0–1.3)
MONOCYTES # BLD AUTO: 0.4 10E3/UL (ref 0–1.3)
MONOCYTES # BLD AUTO: 0.6 10E3/UL (ref 0–1.3)
MONOCYTES # BLD AUTO: 0.7 10E3/UL (ref 0–1.3)
MONOCYTES NFR BLD AUTO: 4 %
MONOCYTES NFR BLD AUTO: 5 %
MONOCYTES NFR BLD AUTO: 6 %
MONOCYTES NFR BLD AUTO: 7 %
MONOCYTES NFR BLD AUTO: 8 %
MONOCYTES NFR BLD AUTO: 8 %
MONOCYTES NFR BLD AUTO: 9 %
MYCOPHENOLATE SERPL LC/MS/MS-MCNC: 0.3 MG/L (ref 1–3.5)
MYCOPHENOLATE SERPL LC/MS/MS-MCNC: 0.36 MG/L (ref 1–3.5)
MYCOPHENOLATE SERPL LC/MS/MS-MCNC: 1.04 MG/L (ref 1–3.5)
MYCOPHENOLATE SERPL LC/MS/MS-MCNC: 1.09 MG/L (ref 1–3.5)
MYCOPHENOLATE SERPL LC/MS/MS-MCNC: 1.94 MG/L (ref 1–3.5)
MYCOPHENOLATE SERPL LC/MS/MS-MCNC: 2.53 MG/L (ref 1–3.5)
MYCOPHENOLATE SERPL LC/MS/MS-MCNC: 4.17 MG/L (ref 1–3.5)
MYCOPHENOLATE SERPL LC/MS/MS-MCNC: 5.25 MG/L (ref 1–3.5)
MYCOPHENOLATE SERPL LC/MS/MS-MCNC: <0.25 MG/L (ref 1–3.5)
MYCOPHENOLATE-G SERPL LC/MS/MS-MCNC: 105.7 MG/L (ref 30–95)
MYCOPHENOLATE-G SERPL LC/MS/MS-MCNC: 116.5 MG/L (ref 30–95)
MYCOPHENOLATE-G SERPL LC/MS/MS-MCNC: 118.1 MG/L (ref 30–95)
MYCOPHENOLATE-G SERPL LC/MS/MS-MCNC: 20.3 MG/L (ref 30–95)
MYCOPHENOLATE-G SERPL LC/MS/MS-MCNC: 26.3 MG/L (ref 30–95)
MYCOPHENOLATE-G SERPL LC/MS/MS-MCNC: 35.8 MG/L (ref 30–95)
MYCOPHENOLATE-G SERPL LC/MS/MS-MCNC: 50.6 MG/L (ref 30–95)
MYCOPHENOLATE-G SERPL LC/MS/MS-MCNC: 70.6 MG/L (ref 30–95)
MYCOPHENOLATE-G SERPL LC/MS/MS-MCNC: >200 MG/L (ref 30–95)
NEUTROPHILS # BLD AUTO: 11.4 10E3/UL (ref 1.6–8.3)
NEUTROPHILS # BLD AUTO: 2.2 10E3/UL (ref 1.6–8.3)
NEUTROPHILS # BLD AUTO: 2.7 10E3/UL (ref 1.6–8.3)
NEUTROPHILS # BLD AUTO: 4.4 10E3/UL (ref 1.6–8.3)
NEUTROPHILS # BLD AUTO: 4.4 10E3/UL (ref 1.6–8.3)
NEUTROPHILS # BLD AUTO: 4.9 10E3/UL (ref 1.6–8.3)
NEUTROPHILS # BLD AUTO: 5 10E3/UL (ref 1.6–8.3)
NEUTROPHILS NFR BLD AUTO: 66 %
NEUTROPHILS NFR BLD AUTO: 89 %
NEUTROPHILS NFR BLD AUTO: 90 %
NEUTROPHILS NFR BLD AUTO: 90 %
NEUTROPHILS NFR BLD AUTO: 92 %
NEUTROPHILS NFR BLD AUTO: 93 %
NEUTROPHILS NFR BLD AUTO: 93 %
NITRATE UR QL: NEGATIVE
NONHDLC SERPL-MCNC: 184 MG/DL
NONHDLC SERPL-MCNC: 199 MG/DL
NOROVIRUS GI+II RNA STL QL NAA+NON-PROBE: NEGATIVE
NOROVIRUS GI+II RNA STL QL NAA+NON-PROBE: NEGATIVE
NRBC # BLD AUTO: 0 10E3/UL
NRBC BLD AUTO-RTO: 0 /100
O2/TOTAL GAS SETTING VFR VENT: 30 %
O2/TOTAL GAS SETTING VFR VENT: 32 %
O2/TOTAL GAS SETTING VFR VENT: 35 %
ORGAN: NORMAL
ORGAN: NORMAL
P AXIS - MUSE: 44 DEGREES
P SHIGELLOIDES DNA STL QL NAA+NON-PROBE: NEGATIVE
P SHIGELLOIDES DNA STL QL NAA+NON-PROBE: NEGATIVE
PATH REPORT.COMMENTS IMP SPEC: NORMAL
PATH REPORT.COMMENTS IMP SPEC: NORMAL
PATH REPORT.FINAL DX SPEC: NORMAL
PATH REPORT.GROSS SPEC: NORMAL
PATH REPORT.MICROSCOPIC SPEC OTHER STN: NORMAL
PATH REPORT.RELEVANT HX SPEC: NORMAL
PCO2 BLDV: 44 MM HG (ref 40–50)
PCO2 BLDV: 45 MM HG (ref 40–50)
PCO2 BLDV: 46 MM HG (ref 40–50)
PH BLDV: 7.31 [PH] (ref 7.32–7.43)
PH BLDV: 7.33 [PH] (ref 7.32–7.43)
PH BLDV: 7.34 [PH] (ref 7.32–7.43)
PH UR STRIP: 8.5 [PH] (ref 5–7)
PHOSPHATE SERPL-MCNC: 1.7 MG/DL (ref 2.5–4.5)
PHOSPHATE SERPL-MCNC: 1.8 MG/DL (ref 2.5–4.5)
PHOSPHATE SERPL-MCNC: 1.9 MG/DL (ref 2.5–4.5)
PHOSPHATE SERPL-MCNC: 2.1 MG/DL (ref 2.5–4.5)
PHOSPHATE SERPL-MCNC: 2.2 MG/DL (ref 2.5–4.5)
PHOSPHATE SERPL-MCNC: 2.3 MG/DL (ref 2.5–4.5)
PHOSPHATE SERPL-MCNC: 2.3 MG/DL (ref 2.5–4.5)
PHOSPHATE SERPL-MCNC: 2.4 MG/DL (ref 2.5–4.5)
PHOSPHATE SERPL-MCNC: 4 MG/DL (ref 2.5–4.5)
PHOSPHATE SERPL-MCNC: 4.6 MG/DL (ref 2.5–4.5)
PHOSPHATE SERPL-MCNC: 4.8 MG/DL (ref 2.5–4.5)
PHOSPHATE SERPL-MCNC: 4.9 MG/DL (ref 2.5–4.5)
PHOSPHATE SERPL-MCNC: 4.9 MG/DL (ref 2.5–4.5)
PHOSPHATE SERPL-MCNC: 5.8 MG/DL (ref 2.5–4.5)
PHOSPHATE SERPL-MCNC: 6 MG/DL (ref 2.5–4.5)
PHOTO IMAGE: NORMAL
PLATELET # BLD AUTO: 101 10E3/UL (ref 150–450)
PLATELET # BLD AUTO: 106 10E3/UL (ref 150–450)
PLATELET # BLD AUTO: 112 10E3/UL (ref 150–450)
PLATELET # BLD AUTO: 117 10E3/UL (ref 150–450)
PLATELET # BLD AUTO: 118 10E3/UL (ref 150–450)
PLATELET # BLD AUTO: 136 10E3/UL (ref 150–450)
PLATELET # BLD AUTO: 142 10E3/UL (ref 150–450)
PLATELET # BLD AUTO: 158 10E3/UL (ref 150–450)
PLATELET # BLD AUTO: 160 10E3/UL (ref 150–450)
PLATELET # BLD AUTO: 176 10E3/UL (ref 150–450)
PLATELET # BLD AUTO: 178 10E3/UL (ref 150–450)
PLATELET # BLD AUTO: 180 10E3/UL (ref 150–450)
PLATELET # BLD AUTO: 185 10E3/UL (ref 150–450)
PLATELET # BLD AUTO: 204 10E3/UL (ref 150–450)
PLATELET # BLD AUTO: 218 10E3/UL (ref 150–450)
PLATELET # BLD AUTO: 219 10E3/UL (ref 150–450)
PLATELET # BLD AUTO: 221 10E3/UL (ref 150–450)
PLATELET # BLD AUTO: 225 10E3/UL (ref 150–450)
PLATELET # BLD AUTO: 229 10E3/UL (ref 150–450)
PLATELET # BLD AUTO: 231 10E3/UL (ref 150–450)
PLATELET # BLD AUTO: 249 10E3/UL (ref 150–450)
PLATELET # BLD AUTO: 253 10E3/UL (ref 150–450)
PLATELET # BLD AUTO: 256 10E3/UL (ref 150–450)
PLATELET # BLD AUTO: 273 10E3/UL (ref 150–450)
PO2 BLDV: 40 MM HG (ref 25–47)
PO2 BLDV: 42 MM HG (ref 25–47)
PO2 BLDV: 42 MM HG (ref 25–47)
POS CUT OFF ALLO B: >69
POS CUT OFF ALLO T: >53
POS CUT OFF AUTO B: >69
POS CUT OFF AUTO T: >53
POTASSIUM BLD-SCNC: 4.8 MMOL/L (ref 3.5–5)
POTASSIUM BLD-SCNC: 5.4 MMOL/L (ref 3.5–5)
POTASSIUM BLD-SCNC: 6 MMOL/L (ref 3.5–5)
POTASSIUM SERPL-SCNC: 4.2 MMOL/L (ref 3.4–5.3)
POTASSIUM SERPL-SCNC: 4.5 MMOL/L (ref 3.4–5.3)
POTASSIUM SERPL-SCNC: 4.5 MMOL/L (ref 3.4–5.3)
POTASSIUM SERPL-SCNC: 4.6 MMOL/L (ref 3.4–5.3)
POTASSIUM SERPL-SCNC: 4.7 MMOL/L (ref 3.4–5.3)
POTASSIUM SERPL-SCNC: 4.7 MMOL/L (ref 3.4–5.3)
POTASSIUM SERPL-SCNC: 4.8 MMOL/L (ref 3.4–5.3)
POTASSIUM SERPL-SCNC: 4.8 MMOL/L (ref 3.4–5.3)
POTASSIUM SERPL-SCNC: 4.9 MMOL/L (ref 3.4–5.3)
POTASSIUM SERPL-SCNC: 5 MMOL/L (ref 3.4–5.3)
POTASSIUM SERPL-SCNC: 5.1 MMOL/L (ref 3.4–5.3)
POTASSIUM SERPL-SCNC: 5.2 MMOL/L (ref 3.4–5.3)
POTASSIUM SERPL-SCNC: 5.6 MMOL/L (ref 3.4–5.3)
POTASSIUM SERPL-SCNC: 5.9 MMOL/L (ref 3.4–5.3)
POTASSIUM SERPL-SCNC: 5.9 MMOL/L (ref 3.4–5.3)
POTASSIUM SERPL-SCNC: 6 MMOL/L (ref 3.4–5.3)
POTASSIUM SERPL-SCNC: 6.5 MMOL/L (ref 3.4–5.3)
POTASSIUM SERPL-SCNC: 6.7 MMOL/L (ref 3.4–5.3)
PR INTERVAL - MUSE: 170 MS
PROT SERPL-MCNC: 7.2 G/DL (ref 6.4–8.3)
PROT/CREAT 24H UR: 0.12 MG/MG CR (ref 0–0.2)
PROT/CREAT 24H UR: 0.17 MG/MG CR (ref 0–0.2)
PROT/CREAT 24H UR: 0.95 MG/MG CR (ref 0–0.2)
PROT/CREAT 24H UR: 1.49 MG/MG CR (ref 0–0.2)
PROT/CREAT 24H UR: 2.02 MG/MG CR (ref 0–0.2)
PROT/CREAT 24H UR: 2.12 MG/MG CR (ref 0–0.2)
PROT/CREAT 24H UR: 2.12 MG/MG CR (ref 0–0.2)
PROTOCOL CUTOFF: NORMAL
PROTOCOL CUTOFF: NORMAL
PTH-INTACT SERPL-MCNC: 186 PG/ML (ref 15–65)
PTH-INTACT SERPL-MCNC: 201 PG/ML (ref 15–65)
QRS DURATION - MUSE: 94 MS
QT - MUSE: 424 MS
QTC - MUSE: 454 MS
R AXIS - MUSE: -10 DEGREES
RBC # BLD AUTO: 2.11 10E6/UL (ref 4.4–5.9)
RBC # BLD AUTO: 2.18 10E6/UL (ref 4.4–5.9)
RBC # BLD AUTO: 2.26 10E6/UL (ref 4.4–5.9)
RBC # BLD AUTO: 2.37 10E6/UL (ref 4.4–5.9)
RBC # BLD AUTO: 2.38 10E6/UL (ref 4.4–5.9)
RBC # BLD AUTO: 2.4 10E6/UL (ref 4.4–5.9)
RBC # BLD AUTO: 2.45 10E6/UL (ref 4.4–5.9)
RBC # BLD AUTO: 2.46 10E6/UL (ref 4.4–5.9)
RBC # BLD AUTO: 2.46 10E6/UL (ref 4.4–5.9)
RBC # BLD AUTO: 2.47 10E6/UL (ref 4.4–5.9)
RBC # BLD AUTO: 2.5 10E6/UL (ref 4.4–5.9)
RBC # BLD AUTO: 2.55 10E6/UL (ref 4.4–5.9)
RBC # BLD AUTO: 2.6 10E6/UL (ref 4.4–5.9)
RBC # BLD AUTO: 2.6 10E6/UL (ref 4.4–5.9)
RBC # BLD AUTO: 2.61 10E6/UL (ref 4.4–5.9)
RBC # BLD AUTO: 2.63 10E6/UL (ref 4.4–5.9)
RBC # BLD AUTO: 2.63 10E6/UL (ref 4.4–5.9)
RBC # BLD AUTO: 2.64 10E6/UL (ref 4.4–5.9)
RBC # BLD AUTO: 2.65 10E6/UL (ref 4.4–5.9)
RBC # BLD AUTO: 2.76 10E6/UL (ref 4.4–5.9)
RBC # BLD AUTO: 2.77 10E6/UL (ref 4.4–5.9)
RBC # BLD AUTO: 2.78 10E6/UL (ref 4.4–5.9)
RBC # BLD AUTO: 2.86 10E6/UL (ref 4.4–5.9)
RBC # BLD AUTO: 3.02 10E6/UL (ref 4.4–5.9)
RBC URINE: <1 /HPF
RESULT ALLO B1: NORMAL
RESULT ALLO B2: NORMAL
RESULT ALLO T1: NORMAL
RESULT ALLO T2: NORMAL
RESULT AUTO B1: NORMAL
RESULT AUTO B2: NORMAL
RESULT AUTO T1: NORMAL
RESULT AUTO T2: NORMAL
RVA RNA STL QL NAA+NON-PROBE: NEGATIVE
RVA RNA STL QL NAA+NON-PROBE: NEGATIVE
SA 1 CELL: NORMAL
SA 1 TEST METHOD: NORMAL
SA 2 CELL: NORMAL
SA 2 TEST METHOD: NORMAL
SA1 HI RISK ABY: NORMAL
SA1 MOD RISK ABY: NORMAL
SA2 HI RISK ABY: NORMAL
SA2 MOD RISK ABY: NORMAL
SALMONELLA SP RPOD STL QL NAA+PROBE: NEGATIVE
SALMONELLA SP RPOD STL QL NAA+PROBE: NEGATIVE
SAPO I+II+IV+V RNA STL QL NAA+NON-PROBE: NEGATIVE
SAPO I+II+IV+V RNA STL QL NAA+NON-PROBE: NEGATIVE
SARS-COV-2 RNA RESP QL NAA+PROBE: NEGATIVE
SERUM DATE ALLO B1: NORMAL
SERUM DATE ALLO B2: NORMAL
SERUM DATE ALLO T1: NORMAL
SERUM DATE ALLO T2: NORMAL
SERUM DATE AUTO B1: NORMAL
SERUM DATE AUTO B2: NORMAL
SERUM DATE AUTO T1: NORMAL
SERUM DATE AUTO T2: NORMAL
SHIGELLA SP+EIEC IPAH ST NAA+NON-PROBE: NEGATIVE
SHIGELLA SP+EIEC IPAH ST NAA+NON-PROBE: NEGATIVE
SODIUM BLD-SCNC: 137 MMOL/L (ref 135–145)
SODIUM SERPL-SCNC: 131 MMOL/L (ref 135–145)
SODIUM SERPL-SCNC: 132 MMOL/L (ref 135–145)
SODIUM SERPL-SCNC: 133 MMOL/L (ref 135–145)
SODIUM SERPL-SCNC: 133 MMOL/L (ref 135–145)
SODIUM SERPL-SCNC: 134 MMOL/L (ref 135–145)
SODIUM SERPL-SCNC: 135 MMOL/L (ref 135–145)
SODIUM SERPL-SCNC: 135 MMOL/L (ref 135–145)
SODIUM SERPL-SCNC: 136 MMOL/L (ref 135–145)
SODIUM SERPL-SCNC: 136 MMOL/L (ref 135–145)
SODIUM SERPL-SCNC: 137 MMOL/L (ref 135–145)
SODIUM SERPL-SCNC: 138 MMOL/L (ref 135–145)
SODIUM SERPL-SCNC: 138 MMOL/L (ref 136–145)
SODIUM SERPL-SCNC: 139 MMOL/L (ref 135–145)
SODIUM SERPL-SCNC: 139 MMOL/L (ref 135–145)
SODIUM SERPL-SCNC: 140 MMOL/L (ref 135–145)
SODIUM SERPL-SCNC: 140 MMOL/L (ref 135–145)
SP GR UR STRIP: 1.01 (ref 1–1.03)
SPECIMEN EXPIRATION DATE: NORMAL
SYSTOLIC BLOOD PRESSURE - MUSE: NORMAL MMHG
T AXIS - MUSE: 11 DEGREES
TACROLIMUS BLD-MCNC: 10.1 UG/L (ref 5–15)
TACROLIMUS BLD-MCNC: 10.7 UG/L (ref 5–15)
TACROLIMUS BLD-MCNC: 2.4 UG/L (ref 5–15)
TACROLIMUS BLD-MCNC: 4.5 UG/L (ref 5–15)
TACROLIMUS BLD-MCNC: 6.5 UG/L (ref 5–15)
TACROLIMUS BLD-MCNC: 6.8 UG/L (ref 5–15)
TACROLIMUS BLD-MCNC: 6.8 UG/L (ref 5–15)
TACROLIMUS BLD-MCNC: 6.9 UG/L (ref 5–15)
TACROLIMUS BLD-MCNC: 7 UG/L (ref 5–15)
TACROLIMUS BLD-MCNC: 7.4 UG/L (ref 5–15)
TACROLIMUS BLD-MCNC: 7.8 UG/L (ref 5–15)
TACROLIMUS BLD-MCNC: 7.8 UG/L (ref 5–15)
TACROLIMUS BLD-MCNC: 8.1 UG/L (ref 5–15)
TACROLIMUS BLD-MCNC: 8.1 UG/L (ref 5–15)
TACROLIMUS BLD-MCNC: 8.4 UG/L (ref 5–15)
TACROLIMUS BLD-MCNC: 8.5 UG/L (ref 5–15)
TACROLIMUS BLD-MCNC: 8.5 UG/L (ref 5–15)
TACROLIMUS BLD-MCNC: 8.8 UG/L (ref 5–15)
TACROLIMUS BLD-MCNC: 9.2 UG/L (ref 5–15)
TESTMETHODALLO: NORMAL
TESTMETHODAUTO: NORMAL
TME LAST DOSE: ABNORMAL H
TME LAST DOSE: NORMAL H
TREATMENT ALLO B1: NORMAL
TREATMENT ALLO B2: NORMAL
TREATMENT ALLO T1: NORMAL
TREATMENT ALLO T2: NORMAL
TREATMENT AUTO B1: NORMAL
TREATMENT AUTO B2: NORMAL
TREATMENT AUTO T1: NORMAL
TREATMENT AUTO T2: NORMAL
TRIGL SERPL-MCNC: 135 MG/DL
TRIGL SERPL-MCNC: 185 MG/DL
UNACCEPTABLE ANTIGENS: NORMAL
UNOS CPRA: 53
UROBILINOGEN UR STRIP-MCNC: NORMAL MG/DL
V CHOLERAE DNA SPEC QL NAA+PROBE: NEGATIVE
V CHOLERAE DNA SPEC QL NAA+PROBE: NEGATIVE
VENTRICULAR RATE- MUSE: 69 BPM
VIBRIO DNA SPEC NAA+PROBE: NEGATIVE
VIBRIO DNA SPEC NAA+PROBE: NEGATIVE
VIT D+METAB SERPL-MCNC: 45 NG/ML (ref 20–50)
VIT D+METAB SERPL-MCNC: 47 NG/ML (ref 20–50)
WBC # BLD AUTO: 12.2 10E3/UL (ref 4–11)
WBC # BLD AUTO: 2.5 10E3/UL (ref 4–11)
WBC # BLD AUTO: 3 10E3/UL (ref 4–11)
WBC # BLD AUTO: 3.3 10E3/UL (ref 4–11)
WBC # BLD AUTO: 3.5 10E3/UL (ref 4–11)
WBC # BLD AUTO: 3.6 10E3/UL (ref 4–11)
WBC # BLD AUTO: 3.7 10E3/UL (ref 4–11)
WBC # BLD AUTO: 3.8 10E3/UL (ref 4–11)
WBC # BLD AUTO: 4.4 10E3/UL (ref 4–11)
WBC # BLD AUTO: 4.6 10E3/UL (ref 4–11)
WBC # BLD AUTO: 4.9 10E3/UL (ref 4–11)
WBC # BLD AUTO: 5.3 10E3/UL (ref 4–11)
WBC # BLD AUTO: 5.4 10E3/UL (ref 4–11)
WBC # BLD AUTO: 5.5 10E3/UL (ref 4–11)
WBC # BLD AUTO: 5.6 10E3/UL (ref 4–11)
WBC # BLD AUTO: 5.8 10E3/UL (ref 4–11)
WBC # BLD AUTO: 6.6 10E3/UL (ref 4–11)
WBC # BLD AUTO: 7.2 10E3/UL (ref 4–11)
WBC # BLD AUTO: 8.2 10E3/UL (ref 4–11)
WBC # BLD AUTO: 8.6 10E3/UL (ref 4–11)
WBC URINE: 1 /HPF
Y ENTEROCOL DNA STL QL NAA+PROBE: NEGATIVE
Y ENTEROCOL DNA STL QL NAA+PROBE: NEGATIVE
ZZZCOMMENT ALLOB2: NORMAL
ZZZINT SUB COMMENTS: NORMAL
ZZZSA 1  COMMENTS: NORMAL
ZZZSA 2 COMMENTS: NORMAL

## 2023-01-01 PROCEDURE — 83550 IRON BINDING TEST: CPT

## 2023-01-01 PROCEDURE — 87507 IADNA-DNA/RNA PROBE TQ 12-25: CPT

## 2023-01-01 PROCEDURE — 86832 HLA CLASS I HIGH DEFIN QUAL: CPT | Performed by: SURGERY

## 2023-01-01 PROCEDURE — 82040 ASSAY OF SERUM ALBUMIN: CPT

## 2023-01-01 PROCEDURE — 85018 HEMOGLOBIN: CPT | Performed by: STUDENT IN AN ORGANIZED HEALTH CARE EDUCATION/TRAINING PROGRAM

## 2023-01-01 PROCEDURE — 80048 BASIC METABOLIC PNL TOTAL CA: CPT

## 2023-01-01 PROCEDURE — 80197 ASSAY OF TACROLIMUS: CPT

## 2023-01-01 PROCEDURE — 84100 ASSAY OF PHOSPHORUS: CPT | Performed by: STUDENT IN AN ORGANIZED HEALTH CARE EDUCATION/TRAINING PROGRAM

## 2023-01-01 PROCEDURE — 250N000013 HC RX MED GY IP 250 OP 250 PS 637

## 2023-01-01 PROCEDURE — 86825 HLA X-MATH NON-CYTOTOXIC: CPT

## 2023-01-01 PROCEDURE — 710N000010 HC RECOVERY PHASE 1, LEVEL 2, PER MIN: Performed by: SURGERY

## 2023-01-01 PROCEDURE — G0463 HOSPITAL OUTPT CLINIC VISIT: HCPCS | Performed by: NURSE PRACTITIONER

## 2023-01-01 PROCEDURE — 250N000011 HC RX IP 250 OP 636

## 2023-01-01 PROCEDURE — 80197 ASSAY OF TACROLIMUS: CPT | Performed by: INTERNAL MEDICINE

## 2023-01-01 PROCEDURE — 250N000012 HC RX MED GY IP 250 OP 636 PS 637: Performed by: STUDENT IN AN ORGANIZED HEALTH CARE EDUCATION/TRAINING PROGRAM

## 2023-01-01 PROCEDURE — 80048 BASIC METABOLIC PNL TOTAL CA: CPT | Performed by: STUDENT IN AN ORGANIZED HEALTH CARE EDUCATION/TRAINING PROGRAM

## 2023-01-01 PROCEDURE — 36415 COLL VENOUS BLD VENIPUNCTURE: CPT | Performed by: STUDENT IN AN ORGANIZED HEALTH CARE EDUCATION/TRAINING PROGRAM

## 2023-01-01 PROCEDURE — 258N000001 HC RX 258

## 2023-01-01 PROCEDURE — 250N000013 HC RX MED GY IP 250 OP 250 PS 637: Performed by: STUDENT IN AN ORGANIZED HEALTH CARE EDUCATION/TRAINING PROGRAM

## 2023-01-01 PROCEDURE — 86828 HLA CLASS I&II ANTIBODY QUAL: CPT | Performed by: INTERNAL MEDICINE

## 2023-01-01 PROCEDURE — 83735 ASSAY OF MAGNESIUM: CPT

## 2023-01-01 PROCEDURE — 80180 DRUG SCRN QUAN MYCOPHENOLATE: CPT

## 2023-01-01 PROCEDURE — 86901 BLOOD TYPING SEROLOGIC RH(D): CPT

## 2023-01-01 PROCEDURE — 258N000001 HC RX 258: Performed by: SURGERY

## 2023-01-01 PROCEDURE — 82272 OCCULT BLD FECES 1-3 TESTS: CPT

## 2023-01-01 PROCEDURE — 250N000013 HC RX MED GY IP 250 OP 250 PS 637: Performed by: PHYSICIAN ASSISTANT

## 2023-01-01 PROCEDURE — 86644 CMV ANTIBODY: CPT

## 2023-01-01 PROCEDURE — 99000 SPECIMEN HANDLING OFFICE-LAB: CPT | Performed by: PATHOLOGY

## 2023-01-01 PROCEDURE — 80061 LIPID PANEL: CPT

## 2023-01-01 PROCEDURE — 82310 ASSAY OF CALCIUM: CPT

## 2023-01-01 PROCEDURE — 87799 DETECT AGENT NOS DNA QUANT: CPT

## 2023-01-01 PROCEDURE — 99233 SBSQ HOSP IP/OBS HIGH 50: CPT | Mod: FS | Performed by: NURSE PRACTITIONER

## 2023-01-01 PROCEDURE — 812N000003 HC ACQUISITION KIDNEY CADAVER

## 2023-01-01 PROCEDURE — 250N000011 HC RX IP 250 OP 636: Performed by: PHYSICIAN ASSISTANT

## 2023-01-01 PROCEDURE — 36415 COLL VENOUS BLD VENIPUNCTURE: CPT | Performed by: SURGERY

## 2023-01-01 PROCEDURE — 86833 HLA CLASS II HIGH DEFIN QUAL: CPT

## 2023-01-01 PROCEDURE — 85027 COMPLETE CBC AUTOMATED: CPT

## 2023-01-01 PROCEDURE — 5A1D70Z PERFORMANCE OF URINARY FILTRATION, INTERMITTENT, LESS THAN 6 HOURS PER DAY: ICD-10-PCS | Performed by: INTERNAL MEDICINE

## 2023-01-01 PROCEDURE — 86665 EPSTEIN-BARR CAPSID VCA: CPT

## 2023-01-01 PROCEDURE — 250N000011 HC RX IP 250 OP 636: Mod: JZ | Performed by: PHYSICIAN ASSISTANT

## 2023-01-01 PROCEDURE — 87324 CLOSTRIDIUM AG IA: CPT | Mod: XU | Performed by: NURSE PRACTITIONER

## 2023-01-01 PROCEDURE — 999N000065 XR CHEST PORT 1 VIEW

## 2023-01-01 PROCEDURE — 84156 ASSAY OF PROTEIN URINE: CPT

## 2023-01-01 PROCEDURE — 83970 ASSAY OF PARATHORMONE: CPT | Performed by: PATHOLOGY

## 2023-01-01 PROCEDURE — 84156 ASSAY OF PROTEIN URINE: CPT | Performed by: PHYSICIAN ASSISTANT

## 2023-01-01 PROCEDURE — 99215 OFFICE O/P EST HI 40 MIN: CPT | Performed by: NURSE PRACTITIONER

## 2023-01-01 PROCEDURE — 99255 IP/OBS CONSLTJ NEW/EST HI 80: CPT | Mod: FS | Performed by: NURSE PRACTITIONER

## 2023-01-01 PROCEDURE — 272N000001 HC OR GENERAL SUPPLY STERILE: Performed by: SURGERY

## 2023-01-01 PROCEDURE — 71045 X-RAY EXAM CHEST 1 VIEW: CPT | Mod: 26 | Performed by: STUDENT IN AN ORGANIZED HEALTH CARE EDUCATION/TRAINING PROGRAM

## 2023-01-01 PROCEDURE — 93005 ELECTROCARDIOGRAM TRACING: CPT

## 2023-01-01 PROCEDURE — 80180 DRUG SCRN QUAN MYCOPHENOLATE: CPT | Performed by: NURSE PRACTITIONER

## 2023-01-01 PROCEDURE — 85018 HEMOGLOBIN: CPT

## 2023-01-01 PROCEDURE — 36415 COLL VENOUS BLD VENIPUNCTURE: CPT

## 2023-01-01 PROCEDURE — 258N000003 HC RX IP 258 OP 636: Performed by: SURGERY

## 2023-01-01 PROCEDURE — 82728 ASSAY OF FERRITIN: CPT

## 2023-01-01 PROCEDURE — 84132 ASSAY OF SERUM POTASSIUM: CPT | Performed by: STUDENT IN AN ORGANIZED HEALTH CARE EDUCATION/TRAINING PROGRAM

## 2023-01-01 PROCEDURE — 250N000011 HC RX IP 250 OP 636: Mod: JZ | Performed by: STUDENT IN AN ORGANIZED HEALTH CARE EDUCATION/TRAINING PROGRAM

## 2023-01-01 PROCEDURE — 85730 THROMBOPLASTIN TIME PARTIAL: CPT

## 2023-01-01 PROCEDURE — 250N000011 HC RX IP 250 OP 636: Mod: JZ

## 2023-01-01 PROCEDURE — 83036 HEMOGLOBIN GLYCOSYLATED A1C: CPT

## 2023-01-01 PROCEDURE — 86832 HLA CLASS I HIGH DEFIN QUAL: CPT

## 2023-01-01 PROCEDURE — 258N000003 HC RX IP 258 OP 636: Performed by: PHYSICIAN ASSISTANT

## 2023-01-01 PROCEDURE — 360N000077 HC SURGERY LEVEL 4, PER MIN: Performed by: SURGERY

## 2023-01-01 PROCEDURE — 88305 TISSUE EXAM BY PATHOLOGIST: CPT | Mod: 26 | Performed by: PATHOLOGY

## 2023-01-01 PROCEDURE — 250N000011 HC RX IP 250 OP 636: Performed by: INTERNAL MEDICINE

## 2023-01-01 PROCEDURE — 86803 HEPATITIS C AB TEST: CPT

## 2023-01-01 PROCEDURE — 86900 BLOOD TYPING SEROLOGIC ABO: CPT | Mod: 90 | Performed by: PATHOLOGY

## 2023-01-01 PROCEDURE — 71046 X-RAY EXAM CHEST 2 VIEWS: CPT

## 2023-01-01 PROCEDURE — 120N000011 HC R&B TRANSPLANT UMMC

## 2023-01-01 PROCEDURE — 36415 COLL VENOUS BLD VENIPUNCTURE: CPT | Performed by: PATHOLOGY

## 2023-01-01 PROCEDURE — 99213 OFFICE O/P EST LOW 20 MIN: CPT | Performed by: NURSE PRACTITIONER

## 2023-01-01 PROCEDURE — 85018 HEMOGLOBIN: CPT | Performed by: SURGERY

## 2023-01-01 PROCEDURE — 99214 OFFICE O/P EST MOD 30 MIN: CPT | Performed by: NURSE PRACTITIONER

## 2023-01-01 PROCEDURE — 83735 ASSAY OF MAGNESIUM: CPT | Performed by: PATHOLOGY

## 2023-01-01 PROCEDURE — 87535 HIV-1 PROBE&REVERSE TRNSCRPJ: CPT

## 2023-01-01 PROCEDURE — 87516 HEPATITIS B DNA AMP PROBE: CPT | Performed by: INTERNAL MEDICINE

## 2023-01-01 PROCEDURE — 80069 RENAL FUNCTION PANEL: CPT

## 2023-01-01 PROCEDURE — 86850 RBC ANTIBODY SCREEN: CPT

## 2023-01-01 PROCEDURE — 84100 ASSAY OF PHOSPHORUS: CPT | Performed by: PATHOLOGY

## 2023-01-01 PROCEDURE — 999N000248 HC STATISTIC IV INSERT WITH US BY RN

## 2023-01-01 PROCEDURE — 87340 HEPATITIS B SURFACE AG IA: CPT

## 2023-01-01 PROCEDURE — 82306 VITAMIN D 25 HYDROXY: CPT | Performed by: NURSE PRACTITIONER

## 2023-01-01 PROCEDURE — 80048 BASIC METABOLIC PNL TOTAL CA: CPT | Performed by: PATHOLOGY

## 2023-01-01 PROCEDURE — 99213 OFFICE O/P EST LOW 20 MIN: CPT | Mod: 24 | Performed by: NURSE PRACTITIONER

## 2023-01-01 PROCEDURE — 999N000141 HC STATISTIC PRE-PROCEDURE NURSING ASSESSMENT: Performed by: SURGERY

## 2023-01-01 PROCEDURE — 258N000003 HC RX IP 258 OP 636: Performed by: INTERNAL MEDICINE

## 2023-01-01 PROCEDURE — 86832 HLA CLASS I HIGH DEFIN QUAL: CPT | Performed by: INTERNAL MEDICINE

## 2023-01-01 PROCEDURE — 84156 ASSAY OF PROTEIN URINE: CPT | Performed by: SURGERY

## 2023-01-01 PROCEDURE — 85025 COMPLETE CBC W/AUTO DIFF WBC: CPT | Performed by: PATHOLOGY

## 2023-01-01 PROCEDURE — 87389 HIV-1 AG W/HIV-1&-2 AB AG IA: CPT

## 2023-01-01 PROCEDURE — 85025 COMPLETE CBC W/AUTO DIFF WBC: CPT | Performed by: STUDENT IN AN ORGANIZED HEALTH CARE EDUCATION/TRAINING PROGRAM

## 2023-01-01 PROCEDURE — 370N000017 HC ANESTHESIA TECHNICAL FEE, PER MIN: Performed by: SURGERY

## 2023-01-01 PROCEDURE — 86706 HEP B SURFACE ANTIBODY: CPT

## 2023-01-01 PROCEDURE — 99233 SBSQ HOSP IP/OBS HIGH 50: CPT | Performed by: INTERNAL MEDICINE

## 2023-01-01 PROCEDURE — 80197 ASSAY OF TACROLIMUS: CPT | Performed by: STUDENT IN AN ORGANIZED HEALTH CARE EDUCATION/TRAINING PROGRAM

## 2023-01-01 PROCEDURE — 80053 COMPREHEN METABOLIC PANEL: CPT

## 2023-01-01 PROCEDURE — 80180 DRUG SCRN QUAN MYCOPHENOLATE: CPT | Performed by: INTERNAL MEDICINE

## 2023-01-01 PROCEDURE — 86850 RBC ANTIBODY SCREEN: CPT | Performed by: SURGERY

## 2023-01-01 PROCEDURE — 93016 CV STRESS TEST SUPVJ ONLY: CPT | Performed by: INTERNAL MEDICINE

## 2023-01-01 PROCEDURE — 80197 ASSAY OF TACROLIMUS: CPT | Performed by: NURSE PRACTITIONER

## 2023-01-01 PROCEDURE — 84100 ASSAY OF PHOSPHORUS: CPT

## 2023-01-01 PROCEDURE — 36592 COLLECT BLOOD FROM PICC: CPT | Performed by: STUDENT IN AN ORGANIZED HEALTH CARE EDUCATION/TRAINING PROGRAM

## 2023-01-01 PROCEDURE — 250N000013 HC RX MED GY IP 250 OP 250 PS 637: Performed by: NURSE PRACTITIONER

## 2023-01-01 PROCEDURE — 90935 HEMODIALYSIS ONE EVALUATION: CPT

## 2023-01-01 PROCEDURE — 82306 VITAMIN D 25 HYDROXY: CPT

## 2023-01-01 PROCEDURE — 93350 STRESS TTE ONLY: CPT | Mod: 26 | Performed by: INTERNAL MEDICINE

## 2023-01-01 PROCEDURE — 250N000009 HC RX 250: Performed by: NURSE PRACTITIONER

## 2023-01-01 PROCEDURE — 250N000011 HC RX IP 250 OP 636: Performed by: SURGERY

## 2023-01-01 PROCEDURE — 84460 ALANINE AMINO (ALT) (SGPT): CPT | Performed by: PATHOLOGY

## 2023-01-01 PROCEDURE — 86833 HLA CLASS II HIGH DEFIN QUAL: CPT | Performed by: INTERNAL MEDICINE

## 2023-01-01 PROCEDURE — 45385 COLONOSCOPY W/LESION REMOVAL: CPT | Mod: PT | Performed by: INTERNAL MEDICINE

## 2023-01-01 PROCEDURE — 83540 ASSAY OF IRON: CPT

## 2023-01-01 PROCEDURE — 82435 ASSAY OF BLOOD CHLORIDE: CPT | Performed by: PHYSICIAN ASSISTANT

## 2023-01-01 PROCEDURE — 88305 TISSUE EXAM BY PATHOLOGIST: CPT | Mod: TC | Performed by: INTERNAL MEDICINE

## 2023-01-01 PROCEDURE — 85025 COMPLETE CBC W/AUTO DIFF WBC: CPT

## 2023-01-01 PROCEDURE — 250N000009 HC RX 250: Performed by: PHYSICIAN ASSISTANT

## 2023-01-01 PROCEDURE — 93321 DOPPLER ECHO F-UP/LMTD STD: CPT | Mod: 26 | Performed by: INTERNAL MEDICINE

## 2023-01-01 PROCEDURE — 87635 SARS-COV-2 COVID-19 AMP PRB: CPT

## 2023-01-01 PROCEDURE — 82803 BLOOD GASES ANY COMBINATION: CPT

## 2023-01-01 PROCEDURE — 85041 AUTOMATED RBC COUNT: CPT | Performed by: STUDENT IN AN ORGANIZED HEALTH CARE EDUCATION/TRAINING PROGRAM

## 2023-01-01 PROCEDURE — 85610 PROTHROMBIN TIME: CPT

## 2023-01-01 PROCEDURE — 83735 ASSAY OF MAGNESIUM: CPT | Performed by: STUDENT IN AN ORGANIZED HEALTH CARE EDUCATION/TRAINING PROGRAM

## 2023-01-01 PROCEDURE — 71046 X-RAY EXAM CHEST 2 VIEWS: CPT | Mod: TC | Performed by: RADIOLOGY

## 2023-01-01 PROCEDURE — 80061 LIPID PANEL: CPT | Performed by: PATHOLOGY

## 2023-01-01 PROCEDURE — 84132 ASSAY OF SERUM POTASSIUM: CPT | Performed by: PHYSICIAN ASSISTANT

## 2023-01-01 PROCEDURE — 86704 HEP B CORE ANTIBODY TOTAL: CPT

## 2023-01-01 PROCEDURE — 87493 C DIFF AMPLIFIED PROBE: CPT | Mod: XU | Performed by: NURSE PRACTITIONER

## 2023-01-01 PROCEDURE — 83550 IRON BINDING TEST: CPT | Performed by: PATHOLOGY

## 2023-01-01 PROCEDURE — 250N000011 HC RX IP 250 OP 636: Performed by: STUDENT IN AN ORGANIZED HEALTH CARE EDUCATION/TRAINING PROGRAM

## 2023-01-01 PROCEDURE — 81001 URINALYSIS AUTO W/SCOPE: CPT

## 2023-01-01 PROCEDURE — 93018 CV STRESS TEST I&R ONLY: CPT | Performed by: INTERNAL MEDICINE

## 2023-01-01 PROCEDURE — 96374 THER/PROPH/DIAG INJ IV PUSH: CPT

## 2023-01-01 PROCEDURE — 250N000013 HC RX MED GY IP 250 OP 250 PS 637: Performed by: INTERNAL MEDICINE

## 2023-01-01 PROCEDURE — 93325 DOPPLER ECHO COLOR FLOW MAPG: CPT | Mod: 26 | Performed by: INTERNAL MEDICINE

## 2023-01-01 PROCEDURE — 258N000001 HC RX 258: Performed by: PHYSICIAN ASSISTANT

## 2023-01-01 PROCEDURE — 85027 COMPLETE CBC AUTOMATED: CPT | Performed by: PATHOLOGY

## 2023-01-01 PROCEDURE — 84156 ASSAY OF PROTEIN URINE: CPT | Performed by: PATHOLOGY

## 2023-01-01 PROCEDURE — 250N000012 HC RX MED GY IP 250 OP 636 PS 637: Performed by: PHYSICIAN ASSISTANT

## 2023-01-01 PROCEDURE — 36415 COLL VENOUS BLD VENIPUNCTURE: CPT | Performed by: PHYSICIAN ASSISTANT

## 2023-01-01 PROCEDURE — 99214 OFFICE O/P EST MOD 30 MIN: CPT | Performed by: INTERNAL MEDICINE

## 2023-01-01 PROCEDURE — 86833 HLA CLASS II HIGH DEFIN QUAL: CPT | Performed by: SURGERY

## 2023-01-01 PROCEDURE — 82728 ASSAY OF FERRITIN: CPT | Performed by: PATHOLOGY

## 2023-01-01 PROCEDURE — 255N000002 HC RX 255 OP 636: Performed by: INTERNAL MEDICINE

## 2023-01-01 PROCEDURE — 87507 IADNA-DNA/RNA PROBE TQ 12-25: CPT | Performed by: NURSE PRACTITIONER

## 2023-01-01 PROCEDURE — 83540 ASSAY OF IRON: CPT | Performed by: PATHOLOGY

## 2023-01-01 PROCEDURE — 74018 RADEX ABDOMEN 1 VIEW: CPT | Mod: GC | Performed by: STUDENT IN AN ORGANIZED HEALTH CARE EDUCATION/TRAINING PROGRAM

## 2023-01-01 PROCEDURE — 85027 COMPLETE CBC AUTOMATED: CPT | Performed by: STUDENT IN AN ORGANIZED HEALTH CARE EDUCATION/TRAINING PROGRAM

## 2023-01-01 PROCEDURE — 45380 COLONOSCOPY AND BIOPSY: CPT | Performed by: INTERNAL MEDICINE

## 2023-01-01 PROCEDURE — 99213 OFFICE O/P EST LOW 20 MIN: CPT | Mod: 25 | Performed by: INTERNAL MEDICINE

## 2023-01-01 PROCEDURE — 83970 ASSAY OF PARATHORMONE: CPT

## 2023-01-01 PROCEDURE — C2617 STENT, NON-COR, TEM W/O DEL: HCPCS | Performed by: SURGERY

## 2023-01-01 PROCEDURE — 99215 OFFICE O/P EST HI 40 MIN: CPT | Mod: 24 | Performed by: INTERNAL MEDICINE

## 2023-01-01 PROCEDURE — G0463 HOSPITAL OUTPT CLINIC VISIT: HCPCS | Mod: 27 | Performed by: INTERNAL MEDICINE

## 2023-01-01 PROCEDURE — 86790 VIRUS ANTIBODY NOS: CPT

## 2023-01-01 PROCEDURE — 71046 X-RAY EXAM CHEST 2 VIEWS: CPT | Mod: 26 | Performed by: RADIOLOGY

## 2023-01-01 PROCEDURE — 86901 BLOOD TYPING SEROLOGIC RH(D): CPT | Mod: 90 | Performed by: PATHOLOGY

## 2023-01-01 PROCEDURE — 84132 ASSAY OF SERUM POTASSIUM: CPT

## 2023-01-01 PROCEDURE — G0500 MOD SEDAT ENDO SERVICE >5YRS: HCPCS | Performed by: INTERNAL MEDICINE

## 2023-01-01 PROCEDURE — 82955 ASSAY OF G6PD ENZYME: CPT

## 2023-01-01 PROCEDURE — 76776 US EXAM K TRANSPL W/DOPPLER: CPT

## 2023-01-01 PROCEDURE — 52310 CYSTOSCOPY AND TREATMENT: CPT | Performed by: NURSE PRACTITIONER

## 2023-01-01 PROCEDURE — 82374 ASSAY BLOOD CARBON DIOXIDE: CPT | Performed by: PHYSICIAN ASSISTANT

## 2023-01-01 PROCEDURE — 76776 US EXAM K TRANSPL W/DOPPLER: CPT | Mod: 26 | Performed by: STUDENT IN AN ORGANIZED HEALTH CARE EDUCATION/TRAINING PROGRAM

## 2023-01-01 PROCEDURE — 99607 MTMS BY PHARM ADDL 15 MIN: CPT | Performed by: PHARMACIST

## 2023-01-01 PROCEDURE — 99153 MOD SED SAME PHYS/QHP EA: CPT | Mod: PT | Performed by: INTERNAL MEDICINE

## 2023-01-01 PROCEDURE — 99606 MTMS BY PHARM EST 15 MIN: CPT | Performed by: PHARMACIST

## 2023-01-01 PROCEDURE — 0TY10Z0 TRANSPLANTATION OF LEFT KIDNEY, ALLOGENEIC, OPEN APPROACH: ICD-10-PCS | Performed by: SURGERY

## 2023-01-01 PROCEDURE — 250N000009 HC RX 250

## 2023-01-01 PROCEDURE — 250N000025 HC SEVOFLURANE, PER MIN: Performed by: SURGERY

## 2023-01-01 PROCEDURE — 258N000003 HC RX IP 258 OP 636

## 2023-01-01 PROCEDURE — 250N000009 HC RX 250: Performed by: SURGERY

## 2023-01-01 PROCEDURE — 82374 ASSAY BLOOD CARBON DIOXIDE: CPT

## 2023-01-01 PROCEDURE — 87799 DETECT AGENT NOS DNA QUANT: CPT | Performed by: INTERNAL MEDICINE

## 2023-01-01 PROCEDURE — 99605 MTMS BY PHARM NP 15 MIN: CPT | Performed by: PHARMACIST

## 2023-01-01 DEVICE — SOF-FLEX DOUBLE PIGTAIL URETERAL STENT SET
Type: IMPLANTABLE DEVICE | Site: URETER | Status: NON-FUNCTIONAL
Brand: SOF-FLEX
Removed: 2023-12-01

## 2023-01-01 RX ORDER — ALPRAZOLAM 0.25 MG
TABLET ORAL
Qty: 20 TABLET | Refills: 0 | Status: SHIPPED | OUTPATIENT
Start: 2023-01-01 | End: 2023-01-01

## 2023-01-01 RX ORDER — FUROSEMIDE 10 MG/ML
INJECTION INTRAMUSCULAR; INTRAVENOUS PRN
Status: DISCONTINUED | OUTPATIENT
Start: 2023-01-01 | End: 2023-01-01

## 2023-01-01 RX ORDER — NALOXONE HYDROCHLORIDE 0.4 MG/ML
0.4 INJECTION, SOLUTION INTRAMUSCULAR; INTRAVENOUS; SUBCUTANEOUS
Status: DISCONTINUED | OUTPATIENT
Start: 2023-01-01 | End: 2023-01-01 | Stop reason: HOSPADM

## 2023-01-01 RX ORDER — OXYCODONE HYDROCHLORIDE 5 MG/1
5 TABLET ORAL EVERY 4 HOURS PRN
Status: DISCONTINUED | OUTPATIENT
Start: 2023-01-01 | End: 2023-01-01 | Stop reason: HOSPADM

## 2023-01-01 RX ORDER — VANCOMYCIN HYDROCHLORIDE 125 MG/1
125 CAPSULE ORAL 4 TIMES DAILY
Qty: 40 CAPSULE | Refills: 0 | Status: SHIPPED | OUTPATIENT
Start: 2023-01-01 | End: 2023-01-01

## 2023-01-01 RX ORDER — ATROPINE SULFATE 0.1 MG/ML
1 INJECTION INTRAVENOUS
Status: DISCONTINUED | OUTPATIENT
Start: 2023-01-01 | End: 2023-01-01 | Stop reason: HOSPADM

## 2023-01-01 RX ORDER — DIPHENHYDRAMINE HYDROCHLORIDE 50 MG/ML
50 INJECTION INTRAMUSCULAR; INTRAVENOUS
Status: CANCELLED
Start: 2023-01-01

## 2023-01-01 RX ORDER — HEPARIN SODIUM (PORCINE) LOCK FLUSH IV SOLN 100 UNIT/ML 100 UNIT/ML
5 SOLUTION INTRAVENOUS
Status: CANCELLED | OUTPATIENT
Start: 2023-01-01

## 2023-01-01 RX ORDER — HYDROMORPHONE HCL IN WATER/PF 6 MG/30 ML
0.4 PATIENT CONTROLLED ANALGESIA SYRINGE INTRAVENOUS EVERY 5 MIN PRN
Status: DISCONTINUED | OUTPATIENT
Start: 2023-01-01 | End: 2023-01-01 | Stop reason: HOSPADM

## 2023-01-01 RX ORDER — SIMETHICONE 40MG/0.6ML
133 SUSPENSION, DROPS(FINAL DOSAGE FORM)(ML) ORAL
Status: COMPLETED | OUTPATIENT
Start: 2023-01-01 | End: 2023-01-01

## 2023-01-01 RX ORDER — TACROLIMUS 1 MG/1
4 CAPSULE ORAL 2 TIMES DAILY
Qty: 240 CAPSULE | Refills: 11 | Status: SHIPPED | OUTPATIENT
Start: 2023-01-01 | End: 2023-01-01

## 2023-01-01 RX ORDER — FENTANYL CITRATE 50 UG/ML
50 INJECTION, SOLUTION INTRAMUSCULAR; INTRAVENOUS EVERY 5 MIN PRN
Status: DISCONTINUED | OUTPATIENT
Start: 2023-01-01 | End: 2023-01-01 | Stop reason: HOSPADM

## 2023-01-01 RX ORDER — HEPARIN SODIUM 5000 [USP'U]/.5ML
5000 INJECTION, SOLUTION INTRAVENOUS; SUBCUTANEOUS 3 TIMES DAILY
Status: DISCONTINUED | OUTPATIENT
Start: 2023-01-01 | End: 2023-01-01

## 2023-01-01 RX ORDER — DEXTROSE MONOHYDRATE 25 G/50ML
INJECTION, SOLUTION INTRAVENOUS PRN
Status: DISCONTINUED | OUTPATIENT
Start: 2023-01-01 | End: 2023-01-01

## 2023-01-01 RX ORDER — OXYCODONE HYDROCHLORIDE 5 MG/1
2.5-5 TABLET ORAL EVERY 4 HOURS PRN
Qty: 20 TABLET | Refills: 0 | Status: SHIPPED | OUTPATIENT
Start: 2023-01-01 | End: 2023-01-01

## 2023-01-01 RX ORDER — OXYCODONE HYDROCHLORIDE 5 MG/1
2.5 TABLET ORAL 2 TIMES DAILY PRN
Qty: 4 TABLET | Refills: 0 | Status: SHIPPED | OUTPATIENT
Start: 2023-01-01 | End: 2023-01-01

## 2023-01-01 RX ORDER — VALGANCICLOVIR 450 MG/1
450 TABLET, FILM COATED ORAL DAILY
COMMUNITY
Start: 2023-01-01 | End: 2023-01-01

## 2023-01-01 RX ORDER — FENTANYL CITRATE 50 UG/ML
25 INJECTION, SOLUTION INTRAMUSCULAR; INTRAVENOUS EVERY 5 MIN PRN
Status: DISCONTINUED | OUTPATIENT
Start: 2023-01-01 | End: 2023-01-01 | Stop reason: HOSPADM

## 2023-01-01 RX ORDER — HYDROMORPHONE HCL IN WATER/PF 6 MG/30 ML
0.2 PATIENT CONTROLLED ANALGESIA SYRINGE INTRAVENOUS
Status: DISCONTINUED | OUTPATIENT
Start: 2023-01-01 | End: 2023-01-01

## 2023-01-01 RX ORDER — SODIUM BICARBONATE 650 MG/1
1300 TABLET ORAL 2 TIMES DAILY
Qty: 360 TABLET | Refills: 3 | Status: SHIPPED | OUTPATIENT
Start: 2023-01-01 | End: 2023-01-01

## 2023-01-01 RX ORDER — FUROSEMIDE 10 MG/ML
80 INJECTION INTRAMUSCULAR; INTRAVENOUS ONCE
Status: DISCONTINUED | OUTPATIENT
Start: 2023-01-01 | End: 2023-01-01

## 2023-01-01 RX ORDER — CARVEDILOL 12.5 MG/1
12.5 TABLET ORAL 2 TIMES DAILY WITH MEALS
Status: ON HOLD | COMMUNITY
End: 2023-01-01

## 2023-01-01 RX ORDER — METHYLPREDNISOLONE SODIUM SUCCINATE 125 MG/2ML
100 INJECTION, POWDER, LYOPHILIZED, FOR SOLUTION INTRAMUSCULAR; INTRAVENOUS ONCE
Qty: 2 ML | Refills: 0 | Status: COMPLETED | OUTPATIENT
Start: 2023-01-01 | End: 2023-01-01

## 2023-01-01 RX ORDER — AMOXICILLIN 250 MG
1 CAPSULE ORAL 2 TIMES DAILY
Status: DISCONTINUED | OUTPATIENT
Start: 2023-01-01 | End: 2023-01-01 | Stop reason: HOSPADM

## 2023-01-01 RX ORDER — DIPHENHYDRAMINE HYDROCHLORIDE 50 MG/ML
25-50 INJECTION INTRAMUSCULAR; INTRAVENOUS
Status: DISCONTINUED | OUTPATIENT
Start: 2023-01-01 | End: 2023-01-01 | Stop reason: HOSPADM

## 2023-01-01 RX ORDER — EPINEPHRINE 1 MG/ML
0.3 INJECTION, SOLUTION, CONCENTRATE INTRAVENOUS EVERY 5 MIN PRN
Status: CANCELLED | OUTPATIENT
Start: 2023-01-01

## 2023-01-01 RX ORDER — HEPARIN SODIUM,PORCINE 10 UNIT/ML
5-20 VIAL (ML) INTRAVENOUS DAILY PRN
Status: CANCELLED | OUTPATIENT
Start: 2023-01-01

## 2023-01-01 RX ORDER — ATORVASTATIN CALCIUM 10 MG/1
10 TABLET, FILM COATED ORAL DAILY
Status: DISCONTINUED | OUTPATIENT
Start: 2023-01-01 | End: 2023-01-01 | Stop reason: HOSPADM

## 2023-01-01 RX ORDER — ATORVASTATIN CALCIUM 10 MG/1
10 TABLET, FILM COATED ORAL DAILY
Qty: 30 TABLET | Refills: 11 | Status: SHIPPED | OUTPATIENT
Start: 2023-01-01

## 2023-01-01 RX ORDER — DEXTROSE, SODIUM CHLORIDE, SODIUM LACTATE, POTASSIUM CHLORIDE, AND CALCIUM CHLORIDE 5; .6; .31; .03; .02 G/100ML; G/100ML; G/100ML; G/100ML; G/100ML
INJECTION, SOLUTION INTRAVENOUS CONTINUOUS
Status: DISCONTINUED | OUTPATIENT
Start: 2023-01-01 | End: 2023-01-01

## 2023-01-01 RX ORDER — TACROLIMUS 0.5 MG/1
CAPSULE ORAL
Qty: 60 CAPSULE | Refills: 11 | Status: SHIPPED | OUTPATIENT
Start: 2023-01-01 | End: 2024-01-01

## 2023-01-01 RX ORDER — CALCIUM GLUCONATE 20 MG/ML
1 INJECTION, SOLUTION INTRAVENOUS ONCE
Status: COMPLETED | OUTPATIENT
Start: 2023-01-01 | End: 2023-01-01

## 2023-01-01 RX ORDER — DIPHENHYDRAMINE HCL 12.5MG/5ML
25-50 LIQUID (ML) ORAL ONCE
Qty: 20 ML | Refills: 0 | Status: COMPLETED | OUTPATIENT
Start: 2023-01-01 | End: 2023-01-01

## 2023-01-01 RX ORDER — LIDOCAINE 40 MG/G
CREAM TOPICAL
Status: DISCONTINUED | OUTPATIENT
Start: 2023-01-01 | End: 2023-01-01 | Stop reason: HOSPADM

## 2023-01-01 RX ORDER — PROCHLORPERAZINE MALEATE 5 MG
5 TABLET ORAL EVERY 6 HOURS PRN
Status: DISCONTINUED | OUTPATIENT
Start: 2023-01-01 | End: 2023-01-01 | Stop reason: HOSPADM

## 2023-01-01 RX ORDER — MAGNESIUM OXIDE 400 MG/1
400 TABLET ORAL 2 TIMES DAILY
Qty: 60 TABLET | Refills: 11 | Status: SHIPPED | OUTPATIENT
Start: 2023-01-01 | End: 2023-01-01

## 2023-01-01 RX ORDER — BISACODYL 5 MG/1
TABLET, DELAYED RELEASE ORAL
Qty: 4 TABLET | Refills: 0 | Status: ON HOLD | OUTPATIENT
Start: 2023-01-01 | End: 2023-01-01

## 2023-01-01 RX ORDER — FENTANYL CITRATE 50 UG/ML
INJECTION, SOLUTION INTRAMUSCULAR; INTRAVENOUS PRN
Status: DISCONTINUED | OUTPATIENT
Start: 2023-01-01 | End: 2023-01-01

## 2023-01-01 RX ORDER — NICOTINE POLACRILEX 4 MG
15-30 LOZENGE BUCCAL
Status: DISCONTINUED | OUTPATIENT
Start: 2023-01-01 | End: 2023-01-01 | Stop reason: HOSPADM

## 2023-01-01 RX ORDER — MAGNESIUM OXIDE 400 MG/1
800 TABLET ORAL 2 TIMES DAILY
Qty: 120 TABLET | Refills: 11 | Status: ON HOLD | OUTPATIENT
Start: 2023-01-01 | End: 2024-01-01

## 2023-01-01 RX ORDER — BUMETANIDE 2 MG/1
2 TABLET ORAL
Status: ON HOLD | COMMUNITY
Start: 2023-02-06 | End: 2023-01-01

## 2023-01-01 RX ORDER — DIPHENHYDRAMINE HCL 25 MG
25-50 CAPSULE ORAL ONCE
Qty: 2 CAPSULE | Refills: 0 | Status: COMPLETED | OUTPATIENT
Start: 2023-01-01 | End: 2023-01-01

## 2023-01-01 RX ORDER — HEPARIN SODIUM 5000 [USP'U]/.5ML
5000 INJECTION, SOLUTION INTRAVENOUS; SUBCUTANEOUS EVERY 8 HOURS
Status: DISCONTINUED | OUTPATIENT
Start: 2023-01-01 | End: 2023-01-01

## 2023-01-01 RX ORDER — FLUMAZENIL 0.1 MG/ML
0.2 INJECTION, SOLUTION INTRAVENOUS
Status: DISCONTINUED | OUTPATIENT
Start: 2023-01-01 | End: 2023-01-01 | Stop reason: HOSPADM

## 2023-01-01 RX ORDER — HYDROMORPHONE HCL IN WATER/PF 6 MG/30 ML
0.1 PATIENT CONTROLLED ANALGESIA SYRINGE INTRAVENOUS
Status: DISCONTINUED | OUTPATIENT
Start: 2023-01-01 | End: 2023-01-01

## 2023-01-01 RX ORDER — HYDRALAZINE HYDROCHLORIDE 20 MG/ML
10-20 INJECTION INTRAMUSCULAR; INTRAVENOUS EVERY 6 HOURS PRN
Status: DISCONTINUED | OUTPATIENT
Start: 2023-01-01 | End: 2023-01-01 | Stop reason: HOSPADM

## 2023-01-01 RX ORDER — SODIUM CHLORIDE, SODIUM GLUCONATE, SODIUM ACETATE, POTASSIUM CHLORIDE AND MAGNESIUM CHLORIDE 526; 502; 368; 37; 30 MG/100ML; MG/100ML; MG/100ML; MG/100ML; MG/100ML
INJECTION, SOLUTION INTRAVENOUS CONTINUOUS PRN
Status: DISCONTINUED | OUTPATIENT
Start: 2023-01-01 | End: 2023-01-01

## 2023-01-01 RX ORDER — ACETAMINOPHEN 325 MG/1
650 TABLET ORAL EVERY 4 HOURS PRN
Status: DISCONTINUED | OUTPATIENT
Start: 2023-01-01 | End: 2023-01-01 | Stop reason: HOSPADM

## 2023-01-01 RX ORDER — DEXTROSE MONOHYDRATE 25 G/50ML
25-50 INJECTION, SOLUTION INTRAVENOUS
Status: DISCONTINUED | OUTPATIENT
Start: 2023-01-01 | End: 2023-01-01

## 2023-01-01 RX ORDER — TACROLIMUS 1 MG/1
7 CAPSULE ORAL 2 TIMES DAILY
Qty: 420 CAPSULE | Refills: 11 | Status: SHIPPED | OUTPATIENT
Start: 2023-01-01 | End: 2024-01-01

## 2023-01-01 RX ORDER — VALGANCICLOVIR 450 MG/1
450 TABLET, FILM COATED ORAL
Status: DISCONTINUED | OUTPATIENT
Start: 2023-01-01 | End: 2023-01-01 | Stop reason: HOSPADM

## 2023-01-01 RX ORDER — NICOTINE POLACRILEX 4 MG
15-30 LOZENGE BUCCAL
Status: DISCONTINUED | OUTPATIENT
Start: 2023-01-01 | End: 2023-01-01

## 2023-01-01 RX ORDER — FAMOTIDINE 10 MG
10 TABLET ORAL AT BEDTIME
Status: DISCONTINUED | OUTPATIENT
Start: 2023-01-01 | End: 2023-01-01 | Stop reason: HOSPADM

## 2023-01-01 RX ORDER — SULFAMETHOXAZOLE AND TRIMETHOPRIM 400; 80 MG/1; MG/1
1 TABLET ORAL
Status: DISCONTINUED | OUTPATIENT
Start: 2023-01-01 | End: 2023-01-01 | Stop reason: HOSPADM

## 2023-01-01 RX ORDER — HYDROMORPHONE HCL IN WATER/PF 6 MG/30 ML
0.2 PATIENT CONTROLLED ANALGESIA SYRINGE INTRAVENOUS EVERY 5 MIN PRN
Status: DISCONTINUED | OUTPATIENT
Start: 2023-01-01 | End: 2023-01-01 | Stop reason: HOSPADM

## 2023-01-01 RX ORDER — ONDANSETRON 2 MG/ML
4 INJECTION INTRAMUSCULAR; INTRAVENOUS EVERY 30 MIN PRN
Status: DISCONTINUED | OUTPATIENT
Start: 2023-01-01 | End: 2023-01-01 | Stop reason: HOSPADM

## 2023-01-01 RX ORDER — MYCOPHENOLATE MOFETIL 250 MG/1
750 CAPSULE ORAL 2 TIMES DAILY
Qty: 180 CAPSULE | Refills: 11 | Status: SHIPPED | OUTPATIENT
Start: 2023-01-01 | End: 2023-01-01

## 2023-01-01 RX ORDER — CEFUROXIME SODIUM 1.5 G/16ML
1.5 INJECTION, POWDER, FOR SOLUTION INTRAVENOUS ONCE
Qty: 16.667 ML | Refills: 0 | Status: COMPLETED | OUTPATIENT
Start: 2023-01-01 | End: 2023-01-01

## 2023-01-01 RX ORDER — ACETAMINOPHEN 325 MG/1
650 TABLET ORAL ONCE
Qty: 2 TABLET | Refills: 0 | Status: DISCONTINUED | OUTPATIENT
Start: 2023-01-01 | End: 2023-01-01

## 2023-01-01 RX ORDER — TACROLIMUS 1 MG/1
4 CAPSULE ORAL
Status: DISCONTINUED | OUTPATIENT
Start: 2023-01-01 | End: 2023-01-01 | Stop reason: HOSPADM

## 2023-01-01 RX ORDER — MANNITOL 20 G/100ML
INJECTION, SOLUTION INTRAVENOUS PRN
Status: DISCONTINUED | OUTPATIENT
Start: 2023-01-01 | End: 2023-01-01

## 2023-01-01 RX ORDER — ONDANSETRON 2 MG/ML
4 INJECTION INTRAMUSCULAR; INTRAVENOUS EVERY 6 HOURS PRN
Status: DISCONTINUED | OUTPATIENT
Start: 2023-01-01 | End: 2023-01-01 | Stop reason: HOSPADM

## 2023-01-01 RX ORDER — MEPERIDINE HYDROCHLORIDE 25 MG/ML
25 INJECTION INTRAMUSCULAR; INTRAVENOUS; SUBCUTANEOUS EVERY 30 MIN PRN
Status: CANCELLED | OUTPATIENT
Start: 2023-01-01

## 2023-01-01 RX ORDER — MYCOPHENOLATE MOFETIL 250 MG/1
1000 CAPSULE ORAL 2 TIMES DAILY
Qty: 240 CAPSULE | Refills: 11 | Status: SHIPPED | OUTPATIENT
Start: 2023-01-01 | End: 2024-01-01

## 2023-01-01 RX ORDER — DAPSONE 25 MG/1
50 TABLET ORAL DAILY
Qty: 60 TABLET | Refills: 11 | Status: ON HOLD | OUTPATIENT
Start: 2023-01-01 | End: 2024-01-01

## 2023-01-01 RX ORDER — ONDANSETRON 4 MG/1
4 TABLET, ORALLY DISINTEGRATING ORAL EVERY 6 HOURS PRN
Status: DISCONTINUED | OUTPATIENT
Start: 2023-01-01 | End: 2023-01-01 | Stop reason: HOSPADM

## 2023-01-01 RX ORDER — MAGNESIUM OXIDE 400 MG/1
400 TABLET ORAL
Status: DISCONTINUED | OUTPATIENT
Start: 2023-01-01 | End: 2023-01-01 | Stop reason: HOSPADM

## 2023-01-01 RX ORDER — LIDOCAINE HYDROCHLORIDE 20 MG/ML
INJECTION, SOLUTION INFILTRATION; PERINEURAL PRN
Status: DISCONTINUED | OUTPATIENT
Start: 2023-01-01 | End: 2023-01-01

## 2023-01-01 RX ORDER — SEVELAMER CARBONATE 800 MG/1
800 TABLET, FILM COATED ORAL
Status: ON HOLD | COMMUNITY
End: 2023-01-01

## 2023-01-01 RX ORDER — TACROLIMUS 0.5 MG/1
0.5 CAPSULE ORAL 2 TIMES DAILY PRN
Qty: 60 CAPSULE | Refills: 11 | Status: SHIPPED | OUTPATIENT
Start: 2023-01-01 | End: 2023-01-01

## 2023-01-01 RX ORDER — METHYLPREDNISOLONE SODIUM SUCCINATE 125 MG/2ML
125 INJECTION, POWDER, LYOPHILIZED, FOR SOLUTION INTRAMUSCULAR; INTRAVENOUS
Status: CANCELLED
Start: 2023-01-01

## 2023-01-01 RX ORDER — MYCOPHENOLATE MOFETIL 250 MG/1
750 CAPSULE ORAL
Status: DISCONTINUED | OUTPATIENT
Start: 2023-01-01 | End: 2023-01-01 | Stop reason: HOSPADM

## 2023-01-01 RX ORDER — ALBUTEROL SULFATE 0.83 MG/ML
2.5 SOLUTION RESPIRATORY (INHALATION)
Status: CANCELLED | OUTPATIENT
Start: 2023-01-01

## 2023-01-01 RX ORDER — LOSARTAN POTASSIUM 100 MG/1
100 TABLET ORAL DAILY
Qty: 90 TABLET | Refills: 11 | Status: ON HOLD | OUTPATIENT
Start: 2023-01-01 | End: 2023-01-01

## 2023-01-01 RX ORDER — SODIUM CHLORIDE 9 MG/ML
1000 INJECTION, SOLUTION INTRAVENOUS CONTINUOUS PRN
Status: DISCONTINUED | OUTPATIENT
Start: 2023-01-01 | End: 2023-01-01

## 2023-01-01 RX ORDER — CALCIUM CARBONATE 500 MG/1
1000 TABLET, CHEWABLE ORAL 3 TIMES DAILY PRN
Status: DISCONTINUED | OUTPATIENT
Start: 2023-01-01 | End: 2023-01-01 | Stop reason: HOSPADM

## 2023-01-01 RX ORDER — CALCIUM CHLORIDE 100 MG/ML
INJECTION INTRAVENOUS; INTRAVENTRICULAR PRN
Status: DISCONTINUED | OUTPATIENT
Start: 2023-01-01 | End: 2023-01-01

## 2023-01-01 RX ORDER — CARVEDILOL 6.25 MG/1
6.25 TABLET ORAL 2 TIMES DAILY WITH MEALS
Status: DISCONTINUED | OUTPATIENT
Start: 2023-01-01 | End: 2023-01-01 | Stop reason: HOSPADM

## 2023-01-01 RX ORDER — SODIUM CHLORIDE 450 MG/100ML
INJECTION, SOLUTION INTRAVENOUS CONTINUOUS PRN
Status: DISCONTINUED | OUTPATIENT
Start: 2023-01-01 | End: 2023-01-01

## 2023-01-01 RX ORDER — TACROLIMUS 0.5 MG/1
0.5 CAPSULE ORAL 2 TIMES DAILY
Qty: 60 CAPSULE | Refills: 11 | Status: SHIPPED | OUTPATIENT
Start: 2023-01-01 | End: 2023-01-01

## 2023-01-01 RX ORDER — ALBUTEROL SULFATE 90 UG/1
1-2 AEROSOL, METERED RESPIRATORY (INHALATION)
Status: CANCELLED
Start: 2023-01-01

## 2023-01-01 RX ORDER — TACROLIMUS 1 MG/1
7 CAPSULE ORAL 2 TIMES DAILY
Qty: 420 CAPSULE | Refills: 11 | Status: SHIPPED | OUTPATIENT
Start: 2023-01-01 | End: 2023-01-01

## 2023-01-01 RX ORDER — AMLODIPINE BESYLATE 5 MG/1
5 TABLET ORAL AT BEDTIME
Status: DISCONTINUED | OUTPATIENT
Start: 2023-01-01 | End: 2023-01-01 | Stop reason: HOSPADM

## 2023-01-01 RX ORDER — OXYCODONE HYDROCHLORIDE 5 MG/1
2.5 TABLET ORAL EVERY 8 HOURS PRN
Qty: 3 TABLET | Refills: 0 | Status: SHIPPED | OUTPATIENT
Start: 2023-01-01 | End: 2023-01-01

## 2023-01-01 RX ORDER — FENTANYL CITRATE 50 UG/ML
50-100 INJECTION, SOLUTION INTRAMUSCULAR; INTRAVENOUS EVERY 5 MIN PRN
Status: DISCONTINUED | OUTPATIENT
Start: 2023-01-01 | End: 2023-01-01 | Stop reason: HOSPADM

## 2023-01-01 RX ORDER — EPINEPHRINE 1 MG/ML
0.1 INJECTION, SOLUTION INTRAMUSCULAR; SUBCUTANEOUS
Status: DISCONTINUED | OUTPATIENT
Start: 2023-01-01 | End: 2023-01-01 | Stop reason: HOSPADM

## 2023-01-01 RX ORDER — CEFUROXIME SODIUM 1.5 G/16ML
1.5 INJECTION, POWDER, FOR SOLUTION INTRAVENOUS SEE ADMIN INSTRUCTIONS
Status: DISCONTINUED | OUTPATIENT
Start: 2023-01-01 | End: 2023-01-01 | Stop reason: HOSPADM

## 2023-01-01 RX ORDER — VALGANCICLOVIR 450 MG/1
450 TABLET, FILM COATED ORAL
Qty: 60 TABLET | Refills: 5 | Status: SHIPPED | OUTPATIENT
Start: 2023-01-01 | End: 2023-01-01

## 2023-01-01 RX ORDER — DEXTROSE MONOHYDRATE 25 G/50ML
25 INJECTION, SOLUTION INTRAVENOUS ONCE
Qty: 50 ML | Refills: 0 | Status: COMPLETED | OUTPATIENT
Start: 2023-01-01 | End: 2023-01-01

## 2023-01-01 RX ORDER — BISACODYL 10 MG
10 SUPPOSITORY, RECTAL RECTAL DAILY PRN
Status: DISCONTINUED | OUTPATIENT
Start: 2023-01-01 | End: 2023-01-01 | Stop reason: HOSPADM

## 2023-01-01 RX ORDER — CARVEDILOL 6.25 MG/1
6.25 TABLET ORAL 2 TIMES DAILY WITH MEALS
Qty: 60 TABLET | Refills: 11 | Status: ON HOLD | OUTPATIENT
Start: 2023-01-01 | End: 2024-01-01

## 2023-01-01 RX ORDER — EPINEPHRINE 1 MG/ML
0.3 INJECTION, SOLUTION INTRAMUSCULAR; SUBCUTANEOUS EVERY 5 MIN PRN
Status: CANCELLED | OUTPATIENT
Start: 2023-01-01

## 2023-01-01 RX ORDER — SODIUM CHLORIDE, SODIUM LACTATE, POTASSIUM CHLORIDE, CALCIUM CHLORIDE 600; 310; 30; 20 MG/100ML; MG/100ML; MG/100ML; MG/100ML
INJECTION, SOLUTION INTRAVENOUS CONTINUOUS
Status: DISCONTINUED | OUTPATIENT
Start: 2023-01-01 | End: 2023-01-01 | Stop reason: HOSPADM

## 2023-01-01 RX ORDER — DEXTROSE MONOHYDRATE 25 G/50ML
25-50 INJECTION, SOLUTION INTRAVENOUS
Status: DISCONTINUED | OUTPATIENT
Start: 2023-01-01 | End: 2023-01-01 | Stop reason: HOSPADM

## 2023-01-01 RX ORDER — AMLODIPINE BESYLATE 5 MG/1
5 TABLET ORAL DAILY
Qty: 30 TABLET | Refills: 11 | Status: SHIPPED | OUTPATIENT
Start: 2023-01-01 | End: 2024-01-01

## 2023-01-01 RX ORDER — HEPARIN SODIUM 1000 [USP'U]/ML
INJECTION, SOLUTION INTRAVENOUS; SUBCUTANEOUS PRN
Status: DISCONTINUED | OUTPATIENT
Start: 2023-01-01 | End: 2023-01-01

## 2023-01-01 RX ORDER — FAMOTIDINE 10 MG
10 TABLET ORAL 2 TIMES DAILY
Status: DISCONTINUED | OUTPATIENT
Start: 2023-01-01 | End: 2023-01-01

## 2023-01-01 RX ORDER — SULFAMETHOXAZOLE AND TRIMETHOPRIM 400; 80 MG/1; MG/1
1 TABLET ORAL
Qty: 30 TABLET | Refills: 11 | Status: SHIPPED | OUTPATIENT
Start: 2023-01-01 | End: 2023-01-01

## 2023-01-01 RX ORDER — VALGANCICLOVIR 450 MG/1
450 TABLET, FILM COATED ORAL DAILY
Qty: 30 TABLET | Refills: 11 | Status: ON HOLD | OUTPATIENT
Start: 2023-01-01 | End: 2024-01-01

## 2023-01-01 RX ORDER — ONDANSETRON 4 MG/1
4 TABLET, ORALLY DISINTEGRATING ORAL EVERY 30 MIN PRN
Status: DISCONTINUED | OUTPATIENT
Start: 2023-01-01 | End: 2023-01-01 | Stop reason: HOSPADM

## 2023-01-01 RX ORDER — FAMOTIDINE 10 MG
10 TABLET ORAL AT BEDTIME
Qty: 30 TABLET | Refills: 11 | Status: SHIPPED | OUTPATIENT
Start: 2023-01-01 | End: 2023-01-01

## 2023-01-01 RX ORDER — TACROLIMUS 1 MG/1
2 CAPSULE ORAL
Status: DISCONTINUED | OUTPATIENT
Start: 2023-01-01 | End: 2023-01-01

## 2023-01-01 RX ORDER — NALOXONE HYDROCHLORIDE 0.4 MG/ML
0.2 INJECTION, SOLUTION INTRAMUSCULAR; INTRAVENOUS; SUBCUTANEOUS
Status: DISCONTINUED | OUTPATIENT
Start: 2023-01-01 | End: 2023-01-01 | Stop reason: HOSPADM

## 2023-01-01 RX ORDER — SULFAMETHOXAZOLE AND TRIMETHOPRIM 400; 80 MG/1; MG/1
1 TABLET ORAL DAILY
Qty: 30 TABLET | Refills: 11 | Status: SHIPPED | OUTPATIENT
Start: 2023-01-01 | End: 2023-01-01 | Stop reason: ALTCHOICE

## 2023-01-01 RX ORDER — METHOCARBAMOL 500 MG/1
500 TABLET, FILM COATED ORAL
Qty: 4 TABLET | Refills: 0 | Status: SHIPPED | OUTPATIENT
Start: 2023-01-01 | End: 2023-01-01

## 2023-01-01 RX ORDER — ALPRAZOLAM 0.25 MG
TABLET ORAL
Qty: 20 TABLET | Refills: 0 | Status: SHIPPED | OUTPATIENT
Start: 2023-01-01 | End: 2024-01-01

## 2023-01-01 RX ORDER — TACROLIMUS 1 MG/1
6 CAPSULE ORAL 2 TIMES DAILY
Qty: 240 CAPSULE | Refills: 11 | Status: SHIPPED | OUTPATIENT
Start: 2023-01-01 | End: 2023-01-01

## 2023-01-01 RX ORDER — ACETAMINOPHEN 325 MG/1
650 TABLET ORAL ONCE
Qty: 2 TABLET | Refills: 0 | Status: COMPLETED | OUTPATIENT
Start: 2023-01-01 | End: 2023-01-01

## 2023-01-01 RX ORDER — LEVOFLOXACIN 250 MG/1
500 TABLET, FILM COATED ORAL ONCE
Status: COMPLETED | OUTPATIENT
Start: 2023-01-01 | End: 2023-01-01

## 2023-01-01 RX ORDER — ONDANSETRON 2 MG/ML
4 INJECTION INTRAMUSCULAR; INTRAVENOUS
Status: DISCONTINUED | OUTPATIENT
Start: 2023-01-01 | End: 2023-01-01 | Stop reason: HOSPADM

## 2023-01-01 RX ORDER — AMOXICILLIN 250 MG
1 CAPSULE ORAL 2 TIMES DAILY
Qty: 60 TABLET | Refills: 11 | Status: SHIPPED | OUTPATIENT
Start: 2023-01-01 | End: 2023-01-01

## 2023-01-01 RX ORDER — LIDOCAINE HYDROCHLORIDE 20 MG/ML
JELLY TOPICAL ONCE
Status: COMPLETED | OUTPATIENT
Start: 2023-01-01 | End: 2023-01-01

## 2023-01-01 RX ORDER — SODIUM BICARBONATE 650 MG/1
1300 TABLET ORAL 2 TIMES DAILY
COMMUNITY
Start: 2023-01-01 | End: 2024-01-01

## 2023-01-01 RX ORDER — SODIUM BICARBONATE 650 MG/1
650 TABLET ORAL 3 TIMES DAILY
Qty: 280 TABLET | Refills: 3 | Status: SHIPPED | OUTPATIENT
Start: 2023-01-01 | End: 2023-01-01

## 2023-01-01 RX ORDER — HEPARIN SODIUM 5000 [USP'U]/.5ML
5000 INJECTION, SOLUTION INTRAVENOUS; SUBCUTANEOUS 3 TIMES DAILY
Status: DISCONTINUED | OUTPATIENT
Start: 2023-01-01 | End: 2023-01-01 | Stop reason: HOSPADM

## 2023-01-01 RX ORDER — PROPOFOL 10 MG/ML
INJECTION, EMULSION INTRAVENOUS PRN
Status: DISCONTINUED | OUTPATIENT
Start: 2023-01-01 | End: 2023-01-01

## 2023-01-01 RX ORDER — ACETAMINOPHEN 325 MG/1
975 TABLET ORAL EVERY 8 HOURS
Status: COMPLETED | OUTPATIENT
Start: 2023-01-01 | End: 2023-01-01

## 2023-01-01 RX ORDER — POLYETHYLENE GLYCOL 3350 17 G/17G
17 POWDER, FOR SOLUTION ORAL DAILY
Status: DISCONTINUED | OUTPATIENT
Start: 2023-01-01 | End: 2023-01-01 | Stop reason: HOSPADM

## 2023-01-01 RX ADMIN — METHYLPREDNISOLONE SODIUM SUCCINATE 100 MG: 125 INJECTION, POWDER, FOR SOLUTION INTRAMUSCULAR; INTRAVENOUS at 13:42

## 2023-01-01 RX ADMIN — TACROLIMUS 2 MG: 1 CAPSULE ORAL at 17:45

## 2023-01-01 RX ADMIN — Medication 20 MG: at 15:36

## 2023-01-01 RX ADMIN — HYDROMORPHONE HYDROCHLORIDE 0.2 MG: 0.2 INJECTION, SOLUTION INTRAMUSCULAR; INTRAVENOUS; SUBCUTANEOUS at 05:42

## 2023-01-01 RX ADMIN — HYDROMORPHONE HYDROCHLORIDE 0.2 MG: 0.2 INJECTION, SOLUTION INTRAMUSCULAR; INTRAVENOUS; SUBCUTANEOUS at 08:21

## 2023-01-01 RX ADMIN — SENNOSIDES AND DOCUSATE SODIUM 1 TABLET: 8.6; 5 TABLET ORAL at 07:56

## 2023-01-01 RX ADMIN — ATORVASTATIN CALCIUM 10 MG: 10 TABLET, FILM COATED ORAL at 07:56

## 2023-01-01 RX ADMIN — LIDOCAINE HYDROCHLORIDE 80 MG: 20 INJECTION, SOLUTION INFILTRATION; PERINEURAL at 13:06

## 2023-01-01 RX ADMIN — Medication 20 MG: at 14:26

## 2023-01-01 RX ADMIN — FAMOTIDINE 10 MG: 10 TABLET, FILM COATED ORAL at 21:45

## 2023-01-01 RX ADMIN — MIDAZOLAM 1 MG: 1 INJECTION INTRAMUSCULAR; INTRAVENOUS at 11:23

## 2023-01-01 RX ADMIN — Medication 10 MG: at 14:02

## 2023-01-01 RX ADMIN — ACETAMINOPHEN 975 MG: 325 TABLET, FILM COATED ORAL at 14:25

## 2023-01-01 RX ADMIN — ANTI-THYMOCYTE GLOBULIN (RABBIT) 150 MG: 5 INJECTION, POWDER, LYOPHILIZED, FOR SOLUTION INTRAVENOUS at 14:15

## 2023-01-01 RX ADMIN — FENTANYL CITRATE 50 MCG: 50 INJECTION, SOLUTION INTRAMUSCULAR; INTRAVENOUS at 19:04

## 2023-01-01 RX ADMIN — ONDANSETRON 4 MG: 4 TABLET, ORALLY DISINTEGRATING ORAL at 21:14

## 2023-01-01 RX ADMIN — ONDANSETRON 4 MG: 4 TABLET, ORALLY DISINTEGRATING ORAL at 08:09

## 2023-01-01 RX ADMIN — ANTI-THYMOCYTE GLOBULIN (RABBIT) 150 MG: 5 INJECTION, POWDER, LYOPHILIZED, FOR SOLUTION INTRAVENOUS at 14:20

## 2023-01-01 RX ADMIN — SENNOSIDES AND DOCUSATE SODIUM 1 TABLET: 8.6; 5 TABLET ORAL at 21:18

## 2023-01-01 RX ADMIN — METHYLPREDNISOLONE SODIUM SUCCINATE 250 MG: 125 INJECTION, POWDER, LYOPHILIZED, FOR SOLUTION INTRAMUSCULAR; INTRAVENOUS at 14:35

## 2023-01-01 RX ADMIN — ACETAMINOPHEN 975 MG: 325 TABLET, FILM COATED ORAL at 05:54

## 2023-01-01 RX ADMIN — POLYETHYLENE GLYCOL 3350 17 G: 17 POWDER, FOR SOLUTION ORAL at 08:24

## 2023-01-01 RX ADMIN — SUGAMMADEX 200 MG: 100 INJECTION, SOLUTION INTRAVENOUS at 18:29

## 2023-01-01 RX ADMIN — SODIUM CHLORIDE 500 MG: 9 INJECTION, SOLUTION INTRAVENOUS at 14:00

## 2023-01-01 RX ADMIN — CALCIUM CARBONATE (ANTACID) CHEW TAB 500 MG 1000 MG: 500 CHEW TAB at 03:28

## 2023-01-01 RX ADMIN — HYDROMORPHONE HYDROCHLORIDE 0.2 MG: 0.2 INJECTION, SOLUTION INTRAMUSCULAR; INTRAVENOUS; SUBCUTANEOUS at 19:30

## 2023-01-01 RX ADMIN — MYCOPHENOLATE MOFETIL 750 MG: 250 CAPSULE ORAL at 08:24

## 2023-01-01 RX ADMIN — HEPARIN SODIUM 5000 UNITS: 5000 INJECTION, SOLUTION INTRAVENOUS; SUBCUTANEOUS at 13:55

## 2023-01-01 RX ADMIN — POLYETHYLENE GLYCOL 3350 17 G: 17 POWDER, FOR SOLUTION ORAL at 07:56

## 2023-01-01 RX ADMIN — DEXTROSE MONOHYDRATE 300 ML: 100 INJECTION, SOLUTION INTRAVENOUS at 06:50

## 2023-01-01 RX ADMIN — MYCOPHENOLATE MOFETIL 750 MG: 250 CAPSULE ORAL at 07:55

## 2023-01-01 RX ADMIN — SODIUM CHLORIDE 250 ML: 9 INJECTION, SOLUTION INTRAVENOUS at 08:51

## 2023-01-01 RX ADMIN — TACROLIMUS 2 MG: 1 CAPSULE ORAL at 08:28

## 2023-01-01 RX ADMIN — IRON SUCROSE 200 MG: 20 INJECTION, SOLUTION INTRAVENOUS at 08:52

## 2023-01-01 RX ADMIN — MYCOPHENOLATE MOFETIL 750 MG: 250 CAPSULE ORAL at 08:28

## 2023-01-01 RX ADMIN — SODIUM CHLORIDE 300 ML: 9 INJECTION, SOLUTION INTRAVENOUS at 10:57

## 2023-01-01 RX ADMIN — ATORVASTATIN CALCIUM 10 MG: 10 TABLET, FILM COATED ORAL at 08:24

## 2023-01-01 RX ADMIN — Medication 20 MG: at 16:38

## 2023-01-01 RX ADMIN — MYCOPHENOLATE MOFETIL 750 MG: 250 CAPSULE ORAL at 17:45

## 2023-01-01 RX ADMIN — SIMETHICONE 133 MG: 20 EMULSION ORAL at 11:40

## 2023-01-01 RX ADMIN — ACETAMINOPHEN 975 MG: 325 TABLET, FILM COATED ORAL at 13:57

## 2023-01-01 RX ADMIN — OXYCODONE HYDROCHLORIDE 2.5 MG: 5 TABLET ORAL at 21:18

## 2023-01-01 RX ADMIN — CARVEDILOL 6.25 MG: 6.25 TABLET, FILM COATED ORAL at 17:45

## 2023-01-01 RX ADMIN — PROPOFOL 50 MG: 10 INJECTION, EMULSION INTRAVENOUS at 13:09

## 2023-01-01 RX ADMIN — SENNOSIDES AND DOCUSATE SODIUM 1 TABLET: 8.6; 5 TABLET ORAL at 08:24

## 2023-01-01 RX ADMIN — HYDRALAZINE HYDROCHLORIDE 20 MG: 20 INJECTION INTRAMUSCULAR; INTRAVENOUS at 02:11

## 2023-01-01 RX ADMIN — CALCIUM CHLORIDE INJECTION 500 MG: 100 INJECTION, SOLUTION INTRAVENOUS at 17:10

## 2023-01-01 RX ADMIN — MYCOPHENOLATE MOFETIL 1000 MG: 500 INJECTION, POWDER, LYOPHILIZED, FOR SOLUTION INTRAVENOUS at 14:03

## 2023-01-01 RX ADMIN — ACETAMINOPHEN 975 MG: 325 TABLET, FILM COATED ORAL at 21:18

## 2023-01-01 RX ADMIN — MYCOPHENOLATE MOFETIL 750 MG: 250 CAPSULE ORAL at 07:58

## 2023-01-01 RX ADMIN — INSULIN ASPART 1 UNITS: 100 INJECTION, SOLUTION INTRAVENOUS; SUBCUTANEOUS at 12:18

## 2023-01-01 RX ADMIN — HYDROMORPHONE HYDROCHLORIDE 0.4 MG: 0.2 INJECTION, SOLUTION INTRAMUSCULAR; INTRAVENOUS; SUBCUTANEOUS at 19:15

## 2023-01-01 RX ADMIN — FENTANYL CITRATE 50 MCG: 50 INJECTION, SOLUTION INTRAMUSCULAR; INTRAVENOUS at 11:32

## 2023-01-01 RX ADMIN — FENTANYL CITRATE 25 MCG: 50 INJECTION INTRAMUSCULAR; INTRAVENOUS at 13:40

## 2023-01-01 RX ADMIN — HYDRALAZINE HYDROCHLORIDE 10 MG: 20 INJECTION INTRAMUSCULAR; INTRAVENOUS at 13:38

## 2023-01-01 RX ADMIN — LIDOCAINE HYDROCHLORIDE: 20 JELLY TOPICAL at 10:26

## 2023-01-01 RX ADMIN — HYDRALAZINE HYDROCHLORIDE 10 MG: 20 INJECTION INTRAMUSCULAR; INTRAVENOUS at 10:02

## 2023-01-01 RX ADMIN — MIDAZOLAM 1 MG: 1 INJECTION INTRAMUSCULAR; INTRAVENOUS at 11:18

## 2023-01-01 RX ADMIN — FENTANYL CITRATE 100 MCG: 50 INJECTION, SOLUTION INTRAMUSCULAR; INTRAVENOUS at 11:18

## 2023-01-01 RX ADMIN — PROCHLORPERAZINE MALEATE 5 MG: 5 TABLET ORAL at 17:17

## 2023-01-01 RX ADMIN — HYDROMORPHONE HYDROCHLORIDE 0.2 MG: 0.2 INJECTION, SOLUTION INTRAMUSCULAR; INTRAVENOUS; SUBCUTANEOUS at 15:54

## 2023-01-01 RX ADMIN — HEPARIN SODIUM 5000 UNITS: 5000 INJECTION, SOLUTION INTRAVENOUS; SUBCUTANEOUS at 20:58

## 2023-01-01 RX ADMIN — TACROLIMUS 2 MG: 1 CAPSULE ORAL at 08:24

## 2023-01-01 RX ADMIN — HEPARIN SODIUM 2000 UNITS: 1000 INJECTION INTRAVENOUS; SUBCUTANEOUS at 15:32

## 2023-01-01 RX ADMIN — HYDROMORPHONE HYDROCHLORIDE 0.2 MG: 0.2 INJECTION, SOLUTION INTRAMUSCULAR; INTRAVENOUS; SUBCUTANEOUS at 05:05

## 2023-01-01 RX ADMIN — HEPARIN SODIUM 5000 UNITS: 5000 INJECTION, SOLUTION INTRAVENOUS; SUBCUTANEOUS at 08:24

## 2023-01-01 RX ADMIN — CARVEDILOL 6.25 MG: 6.25 TABLET, FILM COATED ORAL at 08:29

## 2023-01-01 RX ADMIN — FUROSEMIDE 20 MG/HR: 10 INJECTION, SOLUTION INTRAVENOUS at 13:42

## 2023-01-01 RX ADMIN — INSULIN ASPART 1 UNITS: 100 INJECTION, SOLUTION INTRAVENOUS; SUBCUTANEOUS at 08:27

## 2023-01-01 RX ADMIN — LEVOFLOXACIN 500 MG: 250 TABLET, FILM COATED ORAL at 10:26

## 2023-01-01 RX ADMIN — HEPARIN SODIUM 5000 UNITS: 5000 INJECTION, SOLUTION INTRAVENOUS; SUBCUTANEOUS at 07:59

## 2023-01-01 RX ADMIN — ONDANSETRON 4 MG: 2 INJECTION INTRAMUSCULAR; INTRAVENOUS at 18:20

## 2023-01-01 RX ADMIN — CARVEDILOL 6.25 MG: 6.25 TABLET, FILM COATED ORAL at 08:24

## 2023-01-01 RX ADMIN — CARVEDILOL 6.25 MG: 6.25 TABLET, FILM COATED ORAL at 07:58

## 2023-01-01 RX ADMIN — HEPARIN SODIUM 5000 UNITS: 5000 INJECTION, SOLUTION INTRAVENOUS; SUBCUTANEOUS at 14:25

## 2023-01-01 RX ADMIN — DIPHENHYDRAMINE HYDROCHLORIDE 50 MG: 25 CAPSULE ORAL at 13:42

## 2023-01-01 RX ADMIN — CARVEDILOL 6.25 MG: 6.25 TABLET, FILM COATED ORAL at 17:51

## 2023-01-01 RX ADMIN — ONDANSETRON 4 MG: 4 TABLET, ORALLY DISINTEGRATING ORAL at 13:34

## 2023-01-01 RX ADMIN — HYDROMORPHONE HYDROCHLORIDE 0.2 MG: 0.2 INJECTION, SOLUTION INTRAMUSCULAR; INTRAVENOUS; SUBCUTANEOUS at 19:48

## 2023-01-01 RX ADMIN — HEPARIN SODIUM 5000 UNITS: 5000 INJECTION, SOLUTION INTRAVENOUS; SUBCUTANEOUS at 20:15

## 2023-01-01 RX ADMIN — MYCOPHENOLATE MOFETIL 750 MG: 250 CAPSULE ORAL at 21:32

## 2023-01-01 RX ADMIN — CALCIUM CARBONATE (ANTACID) CHEW TAB 500 MG 1000 MG: 500 CHEW TAB at 21:46

## 2023-01-01 RX ADMIN — DEXTROSE 50 % IN WATER (D50W) INTRAVENOUS SYRINGE 25 ML: at 16:34

## 2023-01-01 RX ADMIN — FENTANYL CITRATE 50 MCG: 50 INJECTION INTRAMUSCULAR; INTRAVENOUS at 13:06

## 2023-01-01 RX ADMIN — ATORVASTATIN CALCIUM 10 MG: 10 TABLET, FILM COATED ORAL at 07:58

## 2023-01-01 RX ADMIN — SODIUM CHLORIDE, SODIUM GLUCONATE, SODIUM ACETATE, POTASSIUM CHLORIDE AND MAGNESIUM CHLORIDE: 526; 502; 368; 37; 30 INJECTION, SOLUTION INTRAVENOUS at 16:25

## 2023-01-01 RX ADMIN — SODIUM CHLORIDE, SODIUM LACTATE, POTASSIUM CHLORIDE, CALCIUM CHLORIDE AND DEXTROSE MONOHYDRATE: 5; 600; 310; 30; 20 INJECTION, SOLUTION INTRAVENOUS at 07:27

## 2023-01-01 RX ADMIN — SODIUM CHLORIDE, SODIUM GLUCONATE, SODIUM ACETATE, POTASSIUM CHLORIDE AND MAGNESIUM CHLORIDE: 526; 502; 368; 37; 30 INJECTION, SOLUTION INTRAVENOUS at 13:33

## 2023-01-01 RX ADMIN — OXYCODONE HYDROCHLORIDE 5 MG: 5 TABLET ORAL at 12:24

## 2023-01-01 RX ADMIN — OXYCODONE HYDROCHLORIDE 2.5 MG: 5 TABLET ORAL at 02:11

## 2023-01-01 RX ADMIN — CEFUROXIME 1.5 G: 1.5 INJECTION, POWDER, FOR SOLUTION INTRAVENOUS at 13:50

## 2023-01-01 RX ADMIN — SODIUM CHLORIDE, SODIUM GLUCONATE, SODIUM ACETATE, POTASSIUM CHLORIDE AND MAGNESIUM CHLORIDE: 526; 502; 368; 37; 30 INJECTION, SOLUTION INTRAVENOUS at 13:03

## 2023-01-01 RX ADMIN — SULFAMETHOXAZOLE AND TRIMETHOPRIM 1 TABLET: 400; 80 TABLET ORAL at 11:05

## 2023-01-01 RX ADMIN — DIPHENHYDRAMINE HYDROCHLORIDE 50 MG: 25 CAPSULE ORAL at 13:57

## 2023-01-01 RX ADMIN — ACETAMINOPHEN 975 MG: 325 TABLET, FILM COATED ORAL at 20:15

## 2023-01-01 RX ADMIN — HEPARIN SODIUM 5000 UNITS: 5000 INJECTION, SOLUTION INTRAVENOUS; SUBCUTANEOUS at 14:29

## 2023-01-01 RX ADMIN — OXYCODONE HYDROCHLORIDE 5 MG: 5 TABLET ORAL at 06:08

## 2023-01-01 RX ADMIN — SENNOSIDES AND DOCUSATE SODIUM 1 TABLET: 8.6; 5 TABLET ORAL at 20:15

## 2023-01-01 RX ADMIN — ACETAMINOPHEN 650 MG: 325 TABLET, FILM COATED ORAL at 13:41

## 2023-01-01 RX ADMIN — SODIUM CHLORIDE, SODIUM LACTATE, POTASSIUM CHLORIDE, CALCIUM CHLORIDE AND DEXTROSE MONOHYDRATE: 5; 600; 310; 30; 20 INJECTION, SOLUTION INTRAVENOUS at 19:37

## 2023-01-01 RX ADMIN — INSULIN ASPART 1 UNITS: 100 INJECTION, SOLUTION INTRAVENOUS; SUBCUTANEOUS at 17:45

## 2023-01-01 RX ADMIN — FENTANYL CITRATE 50 MCG: 50 INJECTION, SOLUTION INTRAMUSCULAR; INTRAVENOUS at 11:36

## 2023-01-01 RX ADMIN — DEXTROSE MONOHYDRATE 25 G: 25 INJECTION, SOLUTION INTRAVENOUS at 06:49

## 2023-01-01 RX ADMIN — TACROLIMUS 4 MG: 1 CAPSULE ORAL at 07:58

## 2023-01-01 RX ADMIN — ACETAMINOPHEN 975 MG: 325 TABLET, FILM COATED ORAL at 05:41

## 2023-01-01 RX ADMIN — OXYCODONE HYDROCHLORIDE 5 MG: 5 TABLET ORAL at 00:58

## 2023-01-01 RX ADMIN — TACROLIMUS 2 MG: 1 CAPSULE ORAL at 17:52

## 2023-01-01 RX ADMIN — OXYCODONE HYDROCHLORIDE 2.5 MG: 5 TABLET ORAL at 12:37

## 2023-01-01 RX ADMIN — SODIUM CHLORIDE 250 ML: 9 INJECTION, SOLUTION INTRAVENOUS at 10:57

## 2023-01-01 RX ADMIN — HYDROMORPHONE HYDROCHLORIDE 0.1 MG: 0.2 INJECTION, SOLUTION INTRAMUSCULAR; INTRAVENOUS; SUBCUTANEOUS at 00:30

## 2023-01-01 RX ADMIN — VALGANCICLOVIR 450 MG: 450 TABLET, FILM COATED ORAL at 08:24

## 2023-01-01 RX ADMIN — DEXTROSE 50 % IN WATER (D50W) INTRAVENOUS SYRINGE 25 ML: at 17:03

## 2023-01-01 RX ADMIN — ATORVASTATIN CALCIUM 10 MG: 10 TABLET, FILM COATED ORAL at 08:29

## 2023-01-01 RX ADMIN — PROPOFOL 30 MG: 10 INJECTION, EMULSION INTRAVENOUS at 13:16

## 2023-01-01 RX ADMIN — FUROSEMIDE 10 MG/HR: 10 INJECTION, SOLUTION INTRAVENOUS at 17:08

## 2023-01-01 RX ADMIN — CARVEDILOL 6.25 MG: 6.25 TABLET, FILM COATED ORAL at 18:28

## 2023-01-01 RX ADMIN — FUROSEMIDE 100 MG: 10 INJECTION, SOLUTION INTRAVENOUS at 17:01

## 2023-01-01 RX ADMIN — HUMAN ALBUMIN MICROSPHERES AND PERFLUTREN 3 ML: 10; .22 INJECTION, SOLUTION INTRAVENOUS at 08:44

## 2023-01-01 RX ADMIN — ANTI-THYMOCYTE GLOBULIN (RABBIT) 175 MG: 5 INJECTION, POWDER, LYOPHILIZED, FOR SOLUTION INTRAVENOUS at 15:28

## 2023-01-01 RX ADMIN — TACROLIMUS 4 MG: 1 CAPSULE ORAL at 18:28

## 2023-01-01 RX ADMIN — Medication 50 MG: at 13:09

## 2023-01-01 RX ADMIN — PROPOFOL 120 MG: 10 INJECTION, EMULSION INTRAVENOUS at 13:07

## 2023-01-01 RX ADMIN — OXYCODONE HYDROCHLORIDE 5 MG: 5 TABLET ORAL at 20:59

## 2023-01-01 RX ADMIN — MIDAZOLAM 1 MG: 1 INJECTION INTRAMUSCULAR; INTRAVENOUS at 11:39

## 2023-01-01 RX ADMIN — FUROSEMIDE 100 MG: 10 INJECTION, SOLUTION INTRAVENOUS at 16:07

## 2023-01-01 RX ADMIN — MYCOPHENOLATE MOFETIL 750 MG: 250 CAPSULE ORAL at 17:52

## 2023-01-01 RX ADMIN — ACETAMINOPHEN 975 MG: 325 TABLET, FILM COATED ORAL at 20:59

## 2023-01-01 RX ADMIN — SODIUM CHLORIDE, SODIUM LACTATE, POTASSIUM CHLORIDE, CALCIUM CHLORIDE AND DEXTROSE MONOHYDRATE: 5; 600; 310; 30; 20 INJECTION, SOLUTION INTRAVENOUS at 19:01

## 2023-01-01 RX ADMIN — SENNOSIDES AND DOCUSATE SODIUM 1 TABLET: 8.6; 5 TABLET ORAL at 20:59

## 2023-01-01 RX ADMIN — MANNITOL 125 ML: 20 INJECTION, SOLUTION INTRAVENOUS at 16:07

## 2023-01-01 RX ADMIN — CALCIUM GLUCONATE 1 G: 20 INJECTION, SOLUTION INTRAVENOUS at 06:50

## 2023-01-01 RX ADMIN — SODIUM BICARBONATE 50 MEQ: 84 INJECTION INTRAVENOUS at 07:48

## 2023-01-01 RX ADMIN — OXYCODONE HYDROCHLORIDE 5 MG: 5 TABLET ORAL at 14:26

## 2023-01-01 RX ADMIN — FENTANYL CITRATE 50 MCG: 50 INJECTION, SOLUTION INTRAMUSCULAR; INTRAVENOUS at 18:55

## 2023-01-01 RX ADMIN — HYDROMORPHONE HYDROCHLORIDE 0.2 MG: 0.2 INJECTION, SOLUTION INTRAMUSCULAR; INTRAVENOUS; SUBCUTANEOUS at 01:58

## 2023-01-01 RX ADMIN — HEPARIN SODIUM 5000 UNITS: 5000 INJECTION, SOLUTION INTRAVENOUS; SUBCUTANEOUS at 08:31

## 2023-01-01 RX ADMIN — Medication 10 MG: at 17:16

## 2023-01-01 RX ADMIN — HYDROMORPHONE HYDROCHLORIDE 0.2 MG: 0.2 INJECTION, SOLUTION INTRAMUSCULAR; INTRAVENOUS; SUBCUTANEOUS at 22:16

## 2023-01-01 RX ADMIN — HEPARIN SODIUM 5000 UNITS: 5000 INJECTION, SOLUTION INTRAVENOUS; SUBCUTANEOUS at 20:02

## 2023-01-01 RX ADMIN — TACROLIMUS 2 MG: 1 CAPSULE ORAL at 07:56

## 2023-01-01 RX ADMIN — Medication 20 MG: at 15:12

## 2023-01-01 RX ADMIN — AMLODIPINE BESYLATE 5 MG: 5 TABLET ORAL at 21:45

## 2023-01-01 RX ADMIN — MYCOPHENOLATE MOFETIL 750 MG: 250 CAPSULE ORAL at 18:28

## 2023-01-01 RX ADMIN — FENTANYL CITRATE 25 MCG: 50 INJECTION INTRAMUSCULAR; INTRAVENOUS at 18:50

## 2023-01-01 RX ADMIN — SODIUM CHLORIDE, SODIUM LACTATE, POTASSIUM CHLORIDE, CALCIUM CHLORIDE AND DEXTROSE MONOHYDRATE: 5; 600; 310; 30; 20 INJECTION, SOLUTION INTRAVENOUS at 08:56

## 2023-01-01 RX ADMIN — HUMAN INSULIN 8 UNITS: 100 INJECTION, SOLUTION SUBCUTANEOUS at 06:51

## 2023-01-01 ASSESSMENT — ACTIVITIES OF DAILY LIVING (ADL)
ADLS_ACUITY_SCORE: 26
TOILETING_ISSUES: NO
ADLS_ACUITY_SCORE: 26
ADLS_ACUITY_SCORE: 24
ADLS_ACUITY_SCORE: 24
ADLS_ACUITY_SCORE: 26
ADLS_ACUITY_SCORE: 26
ADLS_ACUITY_SCORE: 35
ADLS_ACUITY_SCORE: 20
ADLS_ACUITY_SCORE: 24
ADLS_ACUITY_SCORE: 26
ADLS_ACUITY_SCORE: 20
ADLS_ACUITY_SCORE: 26
ADLS_ACUITY_SCORE: 26
ADLS_ACUITY_SCORE: 20
ADLS_ACUITY_SCORE: 26
ADLS_ACUITY_SCORE: 24
ADLS_ACUITY_SCORE: 20
WEAR_GLASSES_OR_BLIND: NO
ADLS_ACUITY_SCORE: 26
DIFFICULTY_COMMUNICATING: NO
ADLS_ACUITY_SCORE: 24
ADLS_ACUITY_SCORE: 20
ADLS_ACUITY_SCORE: 26
ADLS_ACUITY_SCORE: 20
ADLS_ACUITY_SCORE: 26
DOING_ERRANDS_INDEPENDENTLY_DIFFICULTY: YES
CHANGE_IN_FUNCTIONAL_STATUS_SINCE_ONSET_OF_CURRENT_ILLNESS/INJURY: NO
CONCENTRATING,_REMEMBERING_OR_MAKING_DECISIONS_DIFFICULTY: NO
ADLS_ACUITY_SCORE: 24
ADLS_ACUITY_SCORE: 20
ADLS_ACUITY_SCORE: 26
ADLS_ACUITY_SCORE: 20
ADLS_ACUITY_SCORE: 26
ADLS_ACUITY_SCORE: 24
ADLS_ACUITY_SCORE: 26
ADLS_ACUITY_SCORE: 26
DIFFICULTY_EATING/SWALLOWING: NO
ADLS_ACUITY_SCORE: 26
DRESSING/BATHING_DIFFICULTY: NO
ADLS_ACUITY_SCORE: 26
WALKING_OR_CLIMBING_STAIRS_DIFFICULTY: NO
ADLS_ACUITY_SCORE: 26
FALL_HISTORY_WITHIN_LAST_SIX_MONTHS: NO
ADLS_ACUITY_SCORE: 20
ADLS_ACUITY_SCORE: 24
ADLS_ACUITY_SCORE: 20
ADLS_ACUITY_SCORE: 26
ADLS_ACUITY_SCORE: 20
ADLS_ACUITY_SCORE: 24
ADLS_ACUITY_SCORE: 24
HEARING_DIFFICULTY_OR_DEAF: NO
ADLS_ACUITY_SCORE: 20

## 2023-01-01 ASSESSMENT — PAIN SCALES - GENERAL
PAINLEVEL: NO PAIN (0)
PAINLEVEL: NO PAIN (0)
PAINLEVEL: MILD PAIN (2)
PAINLEVEL: MODERATE PAIN (5)

## 2023-01-01 ASSESSMENT — LIFESTYLE VARIABLES: TOBACCO_USE: 0

## 2023-01-01 ASSESSMENT — COPD QUESTIONNAIRES: COPD: 0

## 2023-01-26 ENCOUNTER — TELEPHONE (OUTPATIENT)
Dept: FAMILY MEDICINE | Facility: CLINIC | Age: 68
End: 2023-01-26

## 2023-01-26 ENCOUNTER — OFFICE VISIT (OUTPATIENT)
Dept: FAMILY MEDICINE | Facility: CLINIC | Age: 68
End: 2023-01-26
Payer: COMMERCIAL

## 2023-01-26 VITALS
HEIGHT: 69 IN | DIASTOLIC BLOOD PRESSURE: 76 MMHG | OXYGEN SATURATION: 98 % | RESPIRATION RATE: 18 BRPM | HEART RATE: 65 BPM | BODY MASS INDEX: 26.07 KG/M2 | WEIGHT: 176 LBS | SYSTOLIC BLOOD PRESSURE: 138 MMHG | TEMPERATURE: 97.3 F

## 2023-01-26 DIAGNOSIS — D63.1 ANEMIA IN CHRONIC KIDNEY DISEASE, ON CHRONIC DIALYSIS (H): ICD-10-CM

## 2023-01-26 DIAGNOSIS — Z99.2 ANEMIA IN CHRONIC KIDNEY DISEASE, ON CHRONIC DIALYSIS (H): ICD-10-CM

## 2023-01-26 DIAGNOSIS — Z12.5 SCREENING FOR PROSTATE CANCER: ICD-10-CM

## 2023-01-26 DIAGNOSIS — E78.5 HYPERLIPIDEMIA LDL GOAL <130: ICD-10-CM

## 2023-01-26 DIAGNOSIS — Z00.00 ENCOUNTER FOR PREVENTATIVE ADULT HEALTH CARE EXAMINATION: Primary | ICD-10-CM

## 2023-01-26 DIAGNOSIS — Z13.1 SCREENING FOR DIABETES MELLITUS: ICD-10-CM

## 2023-01-26 DIAGNOSIS — N52.9 ERECTILE DYSFUNCTION, UNSPECIFIED ERECTILE DYSFUNCTION TYPE: ICD-10-CM

## 2023-01-26 DIAGNOSIS — N18.6 ANEMIA IN CHRONIC KIDNEY DISEASE, ON CHRONIC DIALYSIS (H): ICD-10-CM

## 2023-01-26 DIAGNOSIS — F41.9 ANXIETY: ICD-10-CM

## 2023-01-26 DIAGNOSIS — Z12.11 SCREEN FOR COLON CANCER: ICD-10-CM

## 2023-01-26 DIAGNOSIS — Z99.2 DEPENDENCE ON RENAL DIALYSIS (H): ICD-10-CM

## 2023-01-26 DIAGNOSIS — I10 HYPERTENSION GOAL BP (BLOOD PRESSURE) < 140/90: ICD-10-CM

## 2023-01-26 LAB
ALBUMIN SERPL BCG-MCNC: 4.6 G/DL (ref 3.5–5.2)
ALP SERPL-CCNC: 47 U/L (ref 40–129)
ALT SERPL W P-5'-P-CCNC: 16 U/L (ref 10–50)
ANION GAP SERPL CALCULATED.3IONS-SCNC: 18 MMOL/L (ref 7–15)
AST SERPL W P-5'-P-CCNC: 15 U/L (ref 10–50)
BILIRUB SERPL-MCNC: 0.3 MG/DL
BUN SERPL-MCNC: 65.2 MG/DL (ref 8–23)
CALCIUM SERPL-MCNC: 9 MG/DL (ref 8.8–10.2)
CHLORIDE SERPL-SCNC: 99 MMOL/L (ref 98–107)
CHOLEST SERPL-MCNC: 227 MG/DL
CREAT SERPL-MCNC: 9.39 MG/DL (ref 0.67–1.17)
DEPRECATED HCO3 PLAS-SCNC: 21 MMOL/L (ref 22–29)
ERYTHROCYTE [DISTWIDTH] IN BLOOD BY AUTOMATED COUNT: 13 % (ref 10–15)
GFR SERPL CREATININE-BSD FRML MDRD: 6 ML/MIN/1.73M2
GLUCOSE SERPL-MCNC: 85 MG/DL (ref 70–99)
HCT VFR BLD AUTO: 29.6 % (ref 40–53)
HDLC SERPL-MCNC: 46 MG/DL
HGB BLD-MCNC: 10.1 G/DL (ref 13.3–17.7)
LDLC SERPL CALC-MCNC: 155 MG/DL
MCH RBC QN AUTO: 33.8 PG (ref 26.5–33)
MCHC RBC AUTO-ENTMCNC: 34.1 G/DL (ref 31.5–36.5)
MCV RBC AUTO: 99 FL (ref 78–100)
NONHDLC SERPL-MCNC: 181 MG/DL
PLATELET # BLD AUTO: 202 10E3/UL (ref 150–450)
POTASSIUM SERPL-SCNC: 5.7 MMOL/L (ref 3.4–5.3)
PROT SERPL-MCNC: 7.1 G/DL (ref 6.4–8.3)
PSA SERPL-MCNC: 0.7 NG/ML (ref 0–4.5)
RBC # BLD AUTO: 2.99 10E6/UL (ref 4.4–5.9)
SODIUM SERPL-SCNC: 138 MMOL/L (ref 136–145)
TRIGL SERPL-MCNC: 132 MG/DL
WBC # BLD AUTO: 5.4 10E3/UL (ref 4–11)

## 2023-01-26 PROCEDURE — 85027 COMPLETE CBC AUTOMATED: CPT | Performed by: FAMILY MEDICINE

## 2023-01-26 PROCEDURE — 80053 COMPREHEN METABOLIC PANEL: CPT | Performed by: FAMILY MEDICINE

## 2023-01-26 PROCEDURE — 99397 PER PM REEVAL EST PAT 65+ YR: CPT | Performed by: FAMILY MEDICINE

## 2023-01-26 PROCEDURE — 36415 COLL VENOUS BLD VENIPUNCTURE: CPT | Performed by: FAMILY MEDICINE

## 2023-01-26 PROCEDURE — 99214 OFFICE O/P EST MOD 30 MIN: CPT | Mod: 25 | Performed by: FAMILY MEDICINE

## 2023-01-26 PROCEDURE — 80061 LIPID PANEL: CPT | Performed by: FAMILY MEDICINE

## 2023-01-26 PROCEDURE — G0103 PSA SCREENING: HCPCS | Performed by: FAMILY MEDICINE

## 2023-01-26 RX ORDER — SODIUM BICARBONATE 650 MG/1
1950 TABLET ORAL 2 TIMES DAILY
Status: ON HOLD | COMMUNITY
Start: 2022-11-28 | End: 2023-01-01

## 2023-01-26 RX ORDER — AMLODIPINE BESYLATE 10 MG/1
1 TABLET ORAL DAILY
Status: ON HOLD | COMMUNITY
Start: 2022-10-25 | End: 2023-01-01

## 2023-01-26 RX ORDER — ALPRAZOLAM 0.25 MG
0.25 TABLET ORAL DAILY PRN
Qty: 20 TABLET | Refills: 0 | Status: SHIPPED | OUTPATIENT
Start: 2023-01-26 | End: 2023-01-01

## 2023-01-26 RX ORDER — VIT B COMP NO.3/FOLIC/C/BIOTIN 1 MG-60 MG
1 TABLET ORAL
Status: ON HOLD | COMMUNITY
Start: 2022-10-02 | End: 2023-01-01

## 2023-01-26 RX ORDER — TADALAFIL 5 MG/1
TABLET ORAL
Qty: 30 TABLET | Refills: 3 | Status: SHIPPED | OUTPATIENT
Start: 2023-01-26 | End: 2023-02-08

## 2023-01-26 ASSESSMENT — ENCOUNTER SYMPTOMS
EYE PAIN: 0
PALPITATIONS: 0
DYSURIA: 0
COUGH: 0
HEARTBURN: 0
DIZZINESS: 0
HEMATOCHEZIA: 0
HEMATURIA: 0
CONSTIPATION: 0
HEADACHES: 0
FREQUENCY: 1
MYALGIAS: 0
ARTHRALGIAS: 1
JOINT SWELLING: 0
NAUSEA: 0
WEAKNESS: 0
CHILLS: 0
SHORTNESS OF BREATH: 0
ABDOMINAL PAIN: 0
PARESTHESIAS: 0
SORE THROAT: 0
DIARRHEA: 0
FEVER: 0
NERVOUS/ANXIOUS: 0

## 2023-01-26 ASSESSMENT — ACTIVITIES OF DAILY LIVING (ADL): CURRENT_FUNCTION: NO ASSISTANCE NEEDED

## 2023-01-26 NOTE — TELEPHONE ENCOUNTER
Please inform the patient that his insurance does not cover this but he can get it typically quite cost effectively using a GoodRx coupon and [facility] the [facility]:

## 2023-01-26 NOTE — PROGRESS NOTES
"SUBJECTIVE:   Matteo is a 67 year old who presents for Preventive Visit.  Patient has been advised of split billing requirements and indicates understanding: Yes  Are you in the first 12 months of your Medicare coverage?  No    Healthy Habits:     In general, how would you rate your overall health?  Good    Frequency of exercise:  2-3 days/week    Duration of exercise:  Less than 15 minutes    Do you usually eat at least 4 servings of fruit and vegetables a day, include whole grains    & fiber and avoid regularly eating high fat or \"junk\" foods?  Yes    Taking medications regularly:  Yes    Medication side effects:  None    Ability to successfully perform activities of daily living:  No assistance needed    Home Safety:  No safety concerns identified    Hearing Impairment:  No hearing concerns    In the past 6 months, have you been bothered by leaking of urine?  No    In general, how would you rate your overall mental or emotional health?  Good      PHQ-2 Total Score: 0    Additional concerns today:  No      Have you ever done Advance Care Planning? (For example, a Health Directive, POLST, or a discussion with a medical provider or your loved ones about your wishes): No, advance care planning information given to patient to review.  Patient plans to discuss their wishes with loved ones or provider.         Fall risk  Fallen 2 or more times in the past year?: No  Any fall with injury in the past year?: No    Cognitive Screening  (REFUSED TO DO)    Mini-CogTM Barak Hurt. Licensed by the author for use in [facility]; reprinted with permission (manoj@.[facility]). All rights reserved.      Do you have sleep apnea, excessive snoring or daytime drowsiness?: no    Reviewed and updated as needed this visit by clinical staff    Allergies  Meds              Reviewed and updated as needed this visit by Provider                 Social History     Tobacco Use     Smoking status: Never     Smokeless tobacco: Never "   Substance Use Topics     Alcohol use: Not Currently     Alcohol/week: 0.0 standard drinks     If you drink alcohol do you typically have >3 drinks per day or >7 drinks per week? No    Alcohol Use 1/26/2023   Prescreen: >3 drinks/day or >7 drinks/week? No   Prescreen: >3 drinks/day or >7 drinks/week? -           Current providers sharing in care for this patient include:   Patient Care Team:  Davidson Capone MD as PCP - General (Family Practice)  KENYA Roy MD as MD (Nephrology)  Dutch Duke DO as Assigned PCP  David Monterroso MD as Assigned Heart and Vascular Provider  Manny Bolaños APRN CNP as Assigned Nephrology Provider  Rene Ochoa MD as MD (Nephrology)    The following health maintenance items are reviewed in Epic and correct as of today:  Health Maintenance   Topic Date Due     Pneumococcal Vaccine: 65+ Years (1 - PCV) Never done     COLORECTAL CANCER SCREENING  Never done     ZOSTER IMMUNIZATION (1 of 2) Never done     LIPID  07/18/2020     BMP  01/23/2022     HEMOGLOBIN  06/14/2022     PSA  09/22/2023     MEDICARE ANNUAL WELLNESS VISIT  01/26/2024     ANNUAL REVIEW OF HM ORDERS  01/26/2024     FALL RISK ASSESSMENT  01/26/2024     DTAP/TDAP/TD IMMUNIZATION (2 - Td or Tdap) 10/08/2025     ADVANCE CARE PLANNING  01/26/2028     PARATHYROID  Completed     PHOSPHORUS  Completed     HEPATITIS C SCREENING  Completed     PHQ-2 (once per calendar year)  Completed     INFLUENZA VACCINE  Completed     URINALYSIS  Completed     ALK PHOS  Completed     AORTIC ANEURYSM SCREENING (SYSTEM ASSIGNED)  Completed     COVID-19 Vaccine  Completed     IPV IMMUNIZATION  Aged Out     MENINGITIS IMMUNIZATION  Aged Out     MICROALBUMIN  Discontinued     Lab work is in process      Review of Systems   Constitutional: Negative for chills and fever.   HENT: Positive for congestion. Negative for ear pain, hearing loss and sore throat.    Eyes: Negative for pain and visual disturbance.   Respiratory:  "Negative for cough and shortness of breath.    Cardiovascular: Negative for chest pain, palpitations and peripheral edema.   Gastrointestinal: Negative for abdominal pain, constipation, diarrhea, heartburn, hematochezia and nausea.   Genitourinary: Positive for frequency. Negative for dysuria, genital sores, hematuria, impotence, penile discharge and urgency.   Musculoskeletal: Positive for arthralgias. Negative for joint swelling and myalgias.   Skin: Negative for rash.   Neurological: Negative for dizziness, weakness, headaches and paresthesias.   Psychiatric/Behavioral: Negative for mood changes. The patient is not nervous/anxious.        OBJECTIVE:   /76   Pulse 65   Temp 97.3  F (36.3  C)   Resp 18   Ht 1.753 m (5' 9\")   Wt 79.8 kg (176 lb)   SpO2 98%   BMI 25.99 kg/m   Estimated body mass index is 25.99 kg/m  as calculated from the following:    Height as of this encounter: 1.753 m (5' 9\").    Weight as of this encounter: 79.8 kg (176 lb).       Physical Exam  GENERAL: healthy, alert and no distress  EYES: Eyes grossly normal to inspection  HENT: normal cephalic/atraumatic, ear canals and TM's normal, nose and mouth without ulcers or lesions, oropharynx clear and oral mucous membranes moist  NECK: no adenopathy and no asymmetry, masses, or scars  RESP: lungs clear to auscultation - no rales, rhonchi or wheezes  CV: regular rates and rhythm, normal S1 S2, no S3 or S4 and no murmur, click or rub  MS: no gross musculoskeletal defects noted, no edema  SKIN: no suspicious lesions or rashes  PSYCH: mentation appears normal, affect normal/bright    Diagnostic Test Results:  Labs reviewed in Epic    ASSESSMENT / PLAN:   1. Encounter for preventative adult health care examination  Health maintenance reviewed and updated. Emphasized importance of balanced diet and regular exercise.    2. Hypertension goal BP (blood pressure) < 140/90  At goal. Continue carvedilol, amlodipine    3. Dependence on renal " "dialysis (H) - twice weekly on Mondays and Fridays  Follows with nephrology; on transplant list    4. Anemia in chronic kidney disease, on chronic dialysis (H)  Recheck blood counts  - CBC with platelets; Future  - CBC with platelets    5. Hyperlipidemia LDL goal <130  - Lipid panel reflex to direct LDL Fasting; Future  - Lipid panel reflex to direct LDL Fasting    6. Anxiety  Stable, using Xanax sparingly.  - ALPRAZolam (XANAX) 0.25 MG tablet; Take 1 tablet (0.25 mg) by mouth daily as needed for anxiety  Dispense: 20 tablet; Refill: 0    7. Erectile dysfunction, unspecified erectile dysfunction type  Sildenafil not working as well. Will try Cialis.  - tadalafil (CIALIS) 5 MG tablet; Take 5 mg as a single dose by mouth 30 minutes prior to anticipated sexual activity; may repeat no more frequently than once every 72 hours.  Dispense: 30 tablet; Refill: 3    8. Screen for colon cancer  - Colonoscopy Screening  Referral; Future    9. Screening for prostate cancer  - PSA, screen; Future  - PSA, screen    10. Screening for diabetes mellitus  - Comprehensive metabolic panel (BMP + Alb, Alk Phos, ALT, AST, Total. Bili, TP); Future  - Comprehensive metabolic panel (BMP + Alb, Alk Phos, ALT, AST, Total. Bili, TP)      Patient has been advised of split billing requirements and indicates understanding: Yes      COUNSELING:  Reviewed preventive health counseling, as reflected in patient instructions      BMI:   Estimated body mass index is 25.99 kg/m  as calculated from the following:    Height as of this encounter: 1.753 m (5' 9\").    Weight as of this encounter: 79.8 kg (176 lb).       He reports that he has never smoked. He has never used smokeless tobacco.      Appropriate preventive services were discussed with this patient, including applicable screening as appropriate for cardiovascular disease, diabetes, osteopenia/osteoporosis, and glaucoma.  As appropriate for age/gender, discussed screening for colorectal " cancer, prostate cancer, breast cancer, and cervical cancer. Checklist reviewing preventive services available has been given to the patient.    Reviewed patients plan of care and provided an AVS. The Basic Care Plan (routine screening as documented in Health Maintenance) for Matteo meets the Care Plan requirement. This Care Plan has been established and reviewed with the Patient.      Dutch Duke [facility]    Identified Health Risks:

## 2023-01-26 NOTE — TELEPHONE ENCOUNTER
Attempt #1  Called Phone # [phone]      Left a non detailed voicemail to please call back and ask for any available triage nurse @ [phone].     Jennie NASH RN   [facility] Triage

## 2023-01-26 NOTE — PATIENT INSTRUCTIONS
Patient Education   Personalized Prevention Plan  You are due for the preventive services outlined below.  Your care team is available to assist you in scheduling these services.  If you have already completed any of these items, please share that information with your care team to update in your medical record.  Health Maintenance Due   Topic Date Due     Pneumococcal Vaccine (1 - PCV) Never done     Colorectal Cancer Screening  Never done     Zoster (Shingles) Vaccine (1 of 2) Never done     Cholesterol Lab  07/18/2020     Kidney Microalbumin Urine Test  12/30/2020     Basic Metabolic Panel  01/23/2022     Hemoglobin  06/14/2022     ANNUAL REVIEW OF HM ORDERS  09/02/2022

## 2023-01-26 NOTE — TELEPHONE ENCOUNTER
PRIOR AUTHORIZATION DENIED    Medication: tadalafil (CIALIS) 5 MG tablet - EPA DENIED    Denial Date: 1/26/2023    Denial Rational:       Appeal Information:

## 2023-01-31 ENCOUNTER — TELEPHONE (OUTPATIENT)
Dept: FAMILY MEDICINE | Facility: CLINIC | Age: 68
End: 2023-01-31
Payer: COMMERCIAL

## 2023-01-31 NOTE — TELEPHONE ENCOUNTER
Patient calls.     Asks for his lab results. Patient anxious about results. Just getting signed up for Mychart.     Routing to provider to review and advise. Please review and route back to RV triage so we can call patient back today.     Conchita Moffett RN  [facility]

## 2023-02-06 DIAGNOSIS — E78.5 HYPERLIPIDEMIA LDL GOAL <130: Primary | ICD-10-CM

## 2023-02-06 RX ORDER — ROSUVASTATIN CALCIUM 5 MG/1
5 TABLET, COATED ORAL DAILY
Qty: 90 TABLET | Refills: 3 | Status: ON HOLD | OUTPATIENT
Start: 2023-02-06 | End: 2023-01-01

## 2023-02-07 NOTE — PROGRESS NOTES
New Rx for rosuvastatin sent to pharmacy. We should recheck lipids and ALT in 3 months. Future labs ordered.    Dutch Duke DO  2/6/2023 11:24 PM

## 2023-02-08 DIAGNOSIS — N52.9 ERECTILE DYSFUNCTION, UNSPECIFIED ERECTILE DYSFUNCTION TYPE: ICD-10-CM

## 2023-02-08 RX ORDER — TADALAFIL 5 MG/1
TABLET ORAL
Qty: 30 TABLET | Refills: 3 | Status: ON HOLD | OUTPATIENT
Start: 2023-02-08 | End: 2023-01-01

## 2023-02-08 NOTE — TELEPHONE ENCOUNTER
Asking for a different pharmacy     Refill per RN protocol     Soila Weiner RN, BSN  [facility]

## 2023-02-08 NOTE — PROGRESS NOTES
Called # [phone]     Advised patient of information below. Patient also asks that Cialis be sent to [facility] pharmacy in [place].    Reese Decker RN  [facility]

## 2023-02-08 NOTE — TELEPHONE ENCOUNTER
Called #   Telephone Information:   Mobile [phone]     Advised pt on the information below     Patient stated an understanding and agreed with plan.      Soila Weiner RN, BSN  [facility]

## 2023-02-14 ENCOUNTER — TELEPHONE (OUTPATIENT)
Dept: GASTROENTEROLOGY | Facility: CLINIC | Age: 68
End: 2023-02-14

## 2023-02-14 NOTE — TELEPHONE ENCOUNTER
Screening Questions  BLUE  KIND OF PREP RED  LOCATION [review exclusion criteria] GREEN  SEDATION TYPE        Y Are you active on mychart?       Dutch Duke Ordering/Referring Provider?        MEDICA What type of coverage do you have?      N Have you had a positive covid test in the last 14 days?     26.0   1. BMI  [BMI 40+ - review exclusion criteria]    Y  2. Are you able to give consent for your medical care? [IF NO,RN REVIEW]          N  3. Are you taking any prescription pain medications on a routine schedule   (ex narcotics: oxycodone, roxicodone, oxycontin,  and percocet)? [RN Review]        N  3a. EXTENDED PREP What kind of prescription?     N 4. Do you have any chemical dependencies such as alcohol, street drugs, or methadone?        **If yes 3- 5 , please schedule with MAC sedation.**          IF YES TO ANY 6 - 10 - HOSPITAL SETTING ONLY.     N 6.   Do you need assistance transferring?     N 7.   Have you had a heart or lung transplant?    Y 8.   Are you currently on dialysis?   N 9.   Do you use daily home oxygen?   N 10. Do you take nitroglycerin?   10a. N If yes, how often?     11. [FEMALES]  N Are you currently pregnant?    11a. N If yes, how many weeks? [ Greater than 12 weeks, OR NEEDED]    N 12. Do you have Pulmonary Hypertension? *NEED PAC APPT AT [facility] w/ [facility]*     N 13. [review exclusion criteria]  Do you have any implantable devices in your body (pacemaker, defib, LVAD)?    N 14. In the past 6 months, have you had any heart related issues including cardiomyopathy or heart attack?     14a. N If yes, did it require cardiac stenting if so when?     N 15. Have you had a stroke or Transient ischemic attack (TIA - aka  mini stroke ) within 6 months?      N 16. Do you have mod to severe Obstructive Sleep Apnea?  [Hospital only]    N 17. Do you have SEVERE AND UNCONTROLLED asthma? *NEED PAC APPT AT [facility] w/MAC*     N 18. Are you currently taking any blood thinners?     18a. If yes, inform patient  "to \"follow up w/ ordering provider for bridging instructions.\"    N 19. Do you take the medication Phentermine?    19a. If yes, \"Hold for 7 days before procedure.  Please consult your prescribing provider if you have questions about holding this medication.\"     STAGE 4  20. Do you have chronic kidney disease?      N  21. Do you have a diagnosis of diabetes?     N  22. On a regular basis do you go 3-5 days between bowel movements?     N 23. Preferred LOCAL Pharmacy for Pre Prescription    [ LIST ONLY ONE PHARMACY]          [facility] [address] [place] - [address]        - CLOSING REMINDERS -    Informed patient they will need an adult    Cannot take any type of public or medical transportation alone    Conscious Sedation- Needs  for 6 hours after the procedure       MAC/General-Needs  for 24 hours after procedure    Pre-Procedure Covid test to be completed [[facility] PCR Testing Required]    Confirmed Nurse will call to complete assessment       - SCHEDULING DETAILS -  Y Hospital Setting Required? If yes, what is the exclusion?: ANTWON ELLISON  Surgeon    5/10/23  Date of Procedure  Lower Endoscopy [Colonoscopy]  Type of Procedure Scheduled  [facility] Location   [facility]-If you answer yes to questions #8, #20, #21Which Colonoscopy Prep was Sent?     MODERATE Sedation Type     N PAC / Pre-op Required                 "

## 2023-03-03 ENCOUNTER — LAB (OUTPATIENT)
Dept: LAB | Facility: CLINIC | Age: 68
End: 2023-03-03
Payer: COMMERCIAL

## 2023-03-03 DIAGNOSIS — Z76.82 ORGAN TRANSPLANT CANDIDATE: ICD-10-CM

## 2023-03-03 DIAGNOSIS — N18.6 ESRD (END STAGE RENAL DISEASE) (H): ICD-10-CM

## 2023-03-03 PROCEDURE — 86832 HLA CLASS I HIGH DEFIN QUAL: CPT

## 2023-03-03 PROCEDURE — 86833 HLA CLASS II HIGH DEFIN QUAL: CPT

## 2023-03-09 LAB
PROTOCOL CUTOFF: NORMAL
SA 1 CELL: NORMAL
SA 1 TEST METHOD: NORMAL
SA 2 CELL: NORMAL
SA 2 TEST METHOD: NORMAL
SA1 HI RISK ABY: NORMAL
SA1 MOD RISK ABY: NORMAL
SA2 HI RISK ABY: NORMAL
SA2 MOD RISK ABY: NORMAL
UNACCEPTABLE ANTIGENS: NORMAL
UNOS CPRA: 53
ZZZSA 1  COMMENTS: NORMAL
ZZZSA 2 COMMENTS: NORMAL
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
## 2023-05-11 NOTE — PROGRESS NOTES
Called pt regarding workup being complete. Will present patient at committee next week. Also discussed A2 to B program.     Called patient to review the A2 or A2B kidney transplant process. Explained A2 to B transplant. Discussed the benefits these offers provide for the patient as an ABO B kidney transplant recipient. Reviewed the candidacy qualifications including blood type of ABO B, quarterly lab draws for titers/antibodies and remaining active on the [facility] list for  donor kidney transplants at the [facility]. Informed patient if at any time their anti-A antibodies increase beyond an acceptable level, they are no longer eligible for this type of kidney.     Discussed the risks of receiving an A2 kidney. All kidney transplant patients have the risk for rejection about two in one hundred patients and receiving an A2 or A2B kidney does not change that risk. Lab levels including creatinine and antibody titers will be watched closely post-transplant.      Patient education letter to be sent to patient.      Pt agrees to draw titers and is aware that consent will need to be signed once candidacy is determined based off titer results.

## 2023-05-17 NOTE — COMMITTEE REVIEW
Abdominal Committee Review Note     Evaluation Date: 2021  Committee Review Date: 2023    Organ being evaluated for: Kidney    Transplant Phase: Waitlist  Transplant Status: Inactive    Transplant Coordinator: Daylin Brothers  Transplant Surgeon:       Referring Physician: KENYA Roy    Primary Diagnosis: Focal Glomerular Sclerosis (Focal Segmental - FSG)  Secondary Diagnosis:     Committee Review Members:  Nephrology Marcus Garcia MD, Kaitlin Andersen PA-C, Manny Bolaños, APRN CNP, Alden Marrufo MD   Nutrition Beryl Guerrero, RD   Pharmacist Naya Mancia, [facility], Lois Vazquez, [facility]   Pharmacy Jean-Pierre Peoples, [facility]    - Clinical Edith Kaycee aHq, [facility], Carola Fontenot, [facility]   Transplant JAKE FLOYD, RN, Graciela Stevens, RN, Lucy Colmenares, RN, Carola Leslie, RN, Daylin Jerry, NP, Daylin Brothers, RN, Julian Beltrán, RN, Conchita Lees, JONATHAN, Ilda Terry, JONATHAN, Hue Dickson, RN   Transplant Surgery Rosales Santoyo MD       Transplant Eligibility: Irreversible chronic kidney disease treated w/dialysis or expected need for dialysis    Committee Review Decision: Make Active    Committee Chair Rosales Santoyo MD verbally attested to the committee's decision.    Committee Discussion Details: Reviewed the followin. Cards- cleared in 2021 with negative stress testing-      2. Covid PNA -required 7 day hospitalization and was on home oxygen for a short period but has well recovered.     3. Health maintenance is up to date     Committee determined pt is cleared for active status on the transplant list      Health maintenance is up to date

## 2023-05-17 NOTE — TELEPHONE ENCOUNTER
Called pt regarding committee this afternoon, pt was cleared for active status. Pt will require insurance clearance- will call pt when approval is acquired. Pt verbalized understanding of information and has no further questions. Encouraged to reach out if questions arise.

## 2023-05-23 NOTE — TELEPHONE ENCOUNTER
Called patient to inform them of status change to ACTIVE  on 23. Status change letter and PRA orders to be mailed. Patient is in agreement with listing ACTIVE status.      Discussed:   - Pt is eligible for  donor offers and pt can receive calls 24 hours a day, 7 days a week, and on call staff need to be able to reach pt or one of emergency contacts within 30 minutes or they might skip over to next recipient on list.   -Please make sure your phone is charged at all times and your voicemail is not full   -Your transplant on call coordinator will give you directions about when and where you will need to come to the hospital for transplant  -The transplant coordinator will call you to discuss your current health status, the offer, and plans to move forward.  Some organ offer calls will require you to come to the hospital immediately; some may not be as time sensitive.  It may be possible for you to come to the hospital and, for various reasons, the transplant could be cancelled.  This is often called a  dry run .  - Reviewed the right to accept or decline offer, without penalty  - Expected length of surgical procedure is about 3-4 hours, expected inpatient length of stay post transplant is 3-4 days, and potential to stay locally post transplant. Offered resources for lodging in the area  - Verified contact information as documented in Epic. Confirmed emergency contact information  -Instructed patient about importance of having PRAs drawn every 3 months via: ([facility]). Encouraged them to set reminder in their preferred calendar to stay on top of their quarterly labs  -Reminded pt to stay up on health maintenance and to call with any updates on their health status, insurance or contact information.   -Reminded pt to inform RNCC as soon as possible if they test positive for COVID.     -Provided name and contact information for additional questions or concerns.  Pt expressed excellent understanding of  all and was in good agreement with the plan.
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
## 2023-06-07 NOTE — PROGRESS NOTES
TRANSPLANT NEPHROLOGY WAITLIST VISIT    Assessment and Plan:  # Kidney Transplant Wait List Evaluation: Patient is a good candidate overall. Patient should be active on the wait list.    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 He is ABO-B, CPRA 54% and mentions his ex girlfriend may be interested in donating.    # Cardiac Risk:follow-up today     # COVID pna (10/2021):  needing 7 day hospitalization and was on home oxygen for a short period, but has since recovered well. Reports he has had 2 negative COVID tests since for which records will need to be obtained.      # Shortness of breath: should discuss with cardiology.  Dialysis is also challenging his dry weight    # Health Maintenance: Colonoscopy: Up to date, Dermatology: Not up to date (should be seen) and Dental: Not up to date     Discussed the risks and benefits of a transplant, including the risk of surgery and immunosuppression medications.    KDPI: We discussed approximate remaining wait time and how that is influenced by issues such as blood type and sensitization (PRA) and access to a living donor. I contrasted potential waiting time for living vs  donor kidneys from  normal (0-85%) or higher (%) kidney donor profile index (KDPI) donors and their associated outcomes. I would not recommend Mr. Barnes to consider kidneys from high KDPI donors. The reason for this decision is best summarized as: improved long term graft survival.    Patient presently appears to be enough of an acceptable kidney transplant recipient candidate to have any potential kidney donors start the evaluation process.  Patient s overall re-evaluation may require further discussion in the Transplant Program s multidisciplinary selection committee for a final recommendation on the patient s suitability for transplant.     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     Date of Initial Transplant Evaluation:  1/2021    Current Transplant Phase: Waitlist: Active  Official [facility] Listing Date: 3/3/2021  Blood Type: B        cPRA: 53%       Date of cPRA: 5/2023  Transplant Coordinator: [facility] phone number [phone]     Previous Medical Issues:  None     History of Present Illness:   Matteo Barnes is a 67-year-old gentleman who presents for waitlist evaluation with history of ESKD secondary to biopsy-proven FSGS (likely secondary) on hemodialysis since October 2021, hypertension, hyperlipidemia and gout.    He is doing well overall.             Interim Events: None          Kidney Disease:        Kidney Disease Dx: Focal segmental glomerulosclerosis (likely secondary)       On Dialysis: Yes, Dialysis Type: [facility];       Primary Nephrologist: Don         Cardiac/Vascular Disease History:       Known CAD: No       Known PAD/Claudication Symptoms: No       Known Heart Failure: No       Arrhythmia:  No       Pulmonary Hypertension: No        Valvular Disease: No       Other: None       New Cardiac/Vascular Events: No         Functional Capacity/Frailty:        Exercises on occasion by lifting weights at his apartment complex gym without chest pains or shortness of breath. 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      Now slightly short of breath, but they will pull more fluid at dialysis     Possible Higher Risk Donor Option Preferences:   Donor with a high Kidney Donor Profile Index (KDPI) score: Not sure   Donor with positive Hepatitis C status: Not sure   Donor with positive Hepatitis B status:  Not sure   Donor with positive HIV status: Not sure   Donor with blood type A2B: Not sure       Allergy Testing  Questions:   Medication that caused a reaction None   Antibiotics used that didn't give an allergic reaction?  None    COVID Vaccination Up To Date: Yes    Other Pertinent Transplant Surgical Issues:  Recent Blood Transfusion: No  Previous Abdominal Transplant: No  Bladder Dysfunction: No  Chronic/Recurrent Infections: No  Chronic Anticoagulation: No  Jehovah s Witness: No       Active Problem List:   Patient Active Problem List   Diagnosis     Hyperlipidemia LDL goal <130     Hypertension goal BP (blood pressure) < 140/90     Proteinuria     Erectile dysfunction     Gout     Focal segmental glomerulosclerosis     Pneumonia due to 2019 novel coronavirus     ESRD (end stage renal disease) (H)     Anemia in chronic kidney disease     Metabolic acidosis     Dependence on renal dialysis (H)       Personal History:       Allergies:  No Known Allergies     Medications:  Current Outpatient Medications   Medication Sig     acetaminophen (TYLENOL) 500 MG tablet Take 500-1,000 mg by mouth every 6 hours as needed for mild pain     allopurinol (ZYLOPRIM) 100 MG tablet Take 200 mg by mouth daily     ALPRAZolam (XANAX) 0.25 MG tablet TAKE ONE TABLET BY MOUTH ONCE DAILY AS NEEDED FOR ANXIETY     amLODIPine (NORVASC) 10 MG tablet Take 1 tablet by mouth daily at 2 pm     B Complex-C-Folic Acid (MICHAEL-FRED RX) 1 mg TABS Take 1 tablet by mouth daily at 2 pm     bisacodyl (DULCOLAX) 5 MG EC tablet Take 2 tablets at 3 pm the day before your procedure. If your procedure is before 11 am, take 2 additional tablets at 11 pm. If your procedure is after 11 am, take 2 additional tablets at 6 am. For additional instructions refer to your colonoscopy prep instructions.     carvedilol (COREG) 12.5 MG tablet Take 25 mg (2 tablets) in the am and 12.5 mg (1 tablet) in the pm     multivitamin RENAL (NEPHROCAPS/TRIPHROCAPS) 1 MG capsule Take 1 capsule by mouth daily     polyethylene glycol (GOLYTELY) 236 g suspension The night before the exam at 6 pm  drink an 8-ounce glass every 15 minutes until the jug is half empty. If you arrive before 11 AM: Drink the other half of the Golytely jug at 11 PM night before procedure. If you arrive after 11 AM: Drink the other half of the Golytely jug at 6 AM day of procedure. For additional instructions refer to your colonoscopy prep instructions.     rosuvastatin (CRESTOR) 5 MG tablet Take 1 tablet (5 mg) by mouth daily     sodium bicarbonate 650 MG tablet Take 650 mg by mouth     tadalafil (CIALIS) 5 MG tablet Take 5 mg as a single dose by mouth 30 minutes prior to anticipated sexual activity; may repeat no more frequently than once every 72 hours.     No current facility-administered medications for this visit.        Vitals:  BP (!) 144/81   Pulse 61   Wt 77.2 kg (170 lb 4.8 oz)   SpO2 100%   BMI 25.15 kg/m       Exam:  GENERAL APPEARANCE: alert and no distress  HENT: mouth without ulcers or lesions  LYMPHATICS: no cervical or supraclavicular nodes  RESP: lungs clear to auscultation - no rales, rhonchi or wheezes  CV: regular rhythm, normal rate, no rub, no murmur  EDEMA: no LE edema bilaterally  ABDOMEN: soft, nondistended, nontender, bowel sounds normal  MS: extremities normal - no gross deformities noted, no evidence of inflammation in joints, no muscle tenderness  SKIN: no rash

## 2023-06-07 NOTE — PATIENT INSTRUCTIONS
Dr. Manning recommends:    Stop taking Coreg/Carvedilol.    Start taking Losartan 100 MG once daily.    Coronary CTA for the near future.    Follow up in 2 years with general cardiology.    Thank you for your visit today.  Please call me with any questions or concerns.   Nash Bermeo RN  Cardiology Care Coordinator  [phone]
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
## 2023-06-07 NOTE — LETTER
2023         RE: Matteo Barnes  [address]  [place] [address]        Dear Colleague,    Thank you for referring your patient, Matteo Barnes, to the [facility]. Please see a copy of my visit note below.    TRANSPLANT NEPHROLOGY WAITLIST VISIT    Assessment and Plan:  # Kidney Transplant Wait List Evaluation: Patient is a good candidate overall. Patient should be active on the wait list.    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 He is ABO-B, CPRA 54% and mentions his ex girlfriend may be interested in donating.    # Cardiac Risk:follow-up today     # COVID pna (10/2021):  needing 7 day hospitalization and was on home oxygen for a short period, but has since recovered well. Reports he has had 2 negative COVID tests since for which records will need to be obtained.      # Shortness of breath: should discuss with cardiology.  Dialysis is also challenging his dry weight    # Health Maintenance: Colonoscopy: Up to date, Dermatology: Not up to date (should be seen) and Dental: Not up to date     Discussed the risks and benefits of a transplant, including the risk of surgery and immunosuppression medications.    KDPI: We discussed approximate remaining wait time and how that is influenced by issues such as blood type and sensitization (PRA) and access to a living donor. I contrasted potential waiting time for living vs  donor kidneys from  normal (0-85%) or higher (%) kidney donor profile index (KDPI) donors and their associated outcomes. I would not recommend Mr. Barnes to consider kidneys from high KDPI donors. The reason for this decision is best summarized as: improved long term graft survival.    Patient presently appears to be enough of an acceptable kidney transplant recipient candidate to have any potential kidney donors  start the evaluation process.  Patient s overall re-evaluation may require further discussion in the Transplant Program s multidisciplinary selection committee for a final recommendation on the patient s suitability for transplant.     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     Date of Initial Transplant Evaluation:  1/2021    Current Transplant Phase: Waitlist: Active  Official UNOS Listing Date: 3/3/2021  Blood Type: B        cPRA: 53%       Date of cPRA: 5/2023  Transplant Coordinator: Daylin Brothers Transplant Office phone number [phone]     Previous Medical Issues:  None     History of Present Illness:   Matteo Barnes is a 67-year-old gentleman who presents for waitlist evaluation with history of ESKD secondary to biopsy-proven FSGS (likely secondary) on hemodialysis since October 2021, hypertension, hyperlipidemia and gout.    He is doing well overall.             Interim Events: None          Kidney Disease:        Kidney Disease Dx: Focal segmental glomerulosclerosis (likely secondary)       On Dialysis: Yes, Dialysis Type: [facility] HD;       Primary Nephrologist: Don         Cardiac/Vascular Disease History:       Known CAD: No       Known PAD/Claudication Symptoms: No       Known Heart Failure: No       Arrhythmia:  No       Pulmonary Hypertension: No        Valvular Disease: No       Other: None       New Cardiac/Vascular Events: No         Functional Capacity/Frailty:        Exercises on occasion by lifting weights at his apartment complex gym without chest pains or shortness of breath. 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      Now slightly short of breath, but they will pull more fluid at dialysis     Possible Higher Risk Donor Option Preferences:   Donor with a high Kidney Donor Profile  Index (KDPI) score: Not sure   Donor with positive Hepatitis C status: Not sure   Donor with positive Hepatitis B status:  Not sure   Donor with positive HIV status: Not sure   Donor with blood type A2B: Not sure       Allergy Testing Questions:   Medication that caused a reaction None   Antibiotics used that didn't give an allergic reaction?  None    COVID Vaccination Up To Date: Yes    Other Pertinent Transplant Surgical Issues:  Recent Blood Transfusion: No  Previous Abdominal Transplant: No  Bladder Dysfunction: No  Chronic/Recurrent Infections: No  Chronic Anticoagulation: No  Jehovah s Witness: No       Active Problem List:   Patient Active Problem List   Diagnosis     Hyperlipidemia LDL goal <130     Hypertension goal BP (blood pressure) < 140/90     Proteinuria     Erectile dysfunction     Gout     Focal segmental glomerulosclerosis     Pneumonia due to 2019 novel coronavirus     ESRD (end stage renal disease) (H)     Anemia in chronic kidney disease     Metabolic acidosis     Dependence on renal dialysis (H)       Personal History:       Allergies:  No Known Allergies     Medications:  Current Outpatient Medications   Medication Sig     acetaminophen (TYLENOL) 500 MG tablet Take 500-1,000 mg by mouth every 6 hours as needed for mild pain     allopurinol (ZYLOPRIM) 100 MG tablet Take 200 mg by mouth daily     ALPRAZolam (XANAX) 0.25 MG tablet TAKE ONE TABLET BY MOUTH ONCE DAILY AS NEEDED FOR ANXIETY     amLODIPine (NORVASC) 10 MG tablet Take 1 tablet by mouth daily at 2 pm     B Complex-C-Folic Acid (MICHAEL-FRED RX) 1 mg TABS Take 1 tablet by mouth daily at 2 pm     bisacodyl (DULCOLAX) 5 MG EC tablet Take 2 tablets at 3 pm the day before your procedure. If your procedure is before 11 am, take 2 additional tablets at 11 pm. If your procedure is after 11 am, take 2 additional tablets at 6 am. For additional instructions refer to your colonoscopy prep instructions.     carvedilol (COREG) 12.5 MG tablet Take 25  mg (2 tablets) in the am and 12.5 mg (1 tablet) in the pm     multivitamin RENAL (NEPHROCAPS/TRIPHROCAPS) 1 MG capsule Take 1 capsule by mouth daily     polyethylene glycol (GOLYTELY) 236 g suspension The night before the exam at 6 pm drink an 8-ounce glass every 15 minutes until the jug is half empty. If you arrive before 11 AM: Drink the other half of the Golytely jug at 11 PM night before procedure. If you arrive after 11 AM: Drink the other half of the Golytely jug at 6 AM day of procedure. For additional instructions refer to your colonoscopy prep instructions.     rosuvastatin (CRESTOR) 5 MG tablet Take 1 tablet (5 mg) by mouth daily     sodium bicarbonate 650 MG tablet Take 650 mg by mouth     tadalafil (CIALIS) 5 MG tablet Take 5 mg as a single dose by mouth 30 minutes prior to anticipated sexual activity; may repeat no more frequently than once every 72 hours.     No current facility-administered medications for this visit.        Vitals:  BP (!) 144/81   Pulse 61   Wt 77.2 kg (170 lb 4.8 oz)   SpO2 100%   BMI 25.15 kg/m       Exam:  GENERAL APPEARANCE: alert and no distress  HENT: mouth without ulcers or lesions  LYMPHATICS: no cervical or supraclavicular nodes  RESP: lungs clear to auscultation - no rales, rhonchi or wheezes  CV: regular rhythm, normal rate, no rub, no murmur  EDEMA: no LE edema bilaterally  ABDOMEN: soft, nondistended, nontender, bowel sounds normal  MS: extremities normal - no gross deformities noted, no evidence of inflammation in joints, no muscle tenderness  SKIN: no rash        Again, thank you for allowing me to participate in the care of your patient.        Sincerely,        Daylin Jerry NP

## 2023-06-07 NOTE — NURSING NOTE
Chief Complaint   Patient presents with     New Patient     Patient is referred by Manny Bolaños APRN CNP for cardiac risk assessment related to possible kidney transplant       Vitals were taken, medications reconciled.    Tracy Paniagua, EMT   11:04 AM

## 2023-06-13 NOTE — PROGRESS NOTES
Nephrologist had concerns about the ordered coronary CTA. Test cancelled and dobutamine stress echo ordered instead.  Date: 6/13/2023    Time of Call: 12:12 PM     Diagnosis:  Pre-op examination prior to possible kidney transplant     [ VORB ] Ordering provider: Dr. Manning  Order: Cancel coronary CTA, order dobutamine stress echo.     Order received by: Nash Bermeo RN     Follow-up/additional notes: Patient called and notified and given scheduling number.

## 2023-06-13 NOTE — TELEPHONE ENCOUNTER
Called pt regarding titers, pt is no longer candidate for A2 to B program due to high titers. This does not impact his DDKT status at all. Pt verbalized understanding of information and has no further questions. Encouraged to reach out if questions arise.

## 2023-08-28 NOTE — TELEPHONE ENCOUNTER
Sent due for wellness, 1/27/2024,    Last visit in this dept:    1/26/2023     Last visit -this provider:  Visit date not found     Next visit in this dept:   Future Appointments 8/28/2023 - 2/24/2024      None            Health Maintenance   Topic Date Due    DIABETIC FOOT EXAM  Never done    EYE EXAM  Never done    Pneumococcal Vaccine: 65+ Years (1 - PCV) Never done    HEPATITIS B IMMUNIZATION (1 of 3 - Risk Dialysis 4-dose series) Never done    ZOSTER IMMUNIZATION (1 of 2) Never done    A1C  06/20/2018    BMP  08/12/2023    INFLUENZA VACCINE (1) 09/01/2023    HEMOGLOBIN  11/12/2023    MEDICARE ANNUAL WELLNESS VISIT  01/26/2024    PSA  01/26/2024    ANNUAL REVIEW OF HM ORDERS  01/26/2024    FALL RISK ASSESSMENT  01/26/2024    LIPID  06/07/2024    DTAP/TDAP/TD IMMUNIZATION (2 - Td or Tdap) 10/08/2025    COLORECTAL CANCER SCREENING  05/10/2026    ADVANCE CARE PLANNING  01/26/2028    PARATHYROID  Completed    PHOSPHORUS  Completed    HEPATITIS C SCREENING  Completed    PHQ-2 (once per calendar year)  Completed    URINALYSIS  Completed    ALK PHOS  Completed    AORTIC ANEURYSM SCREENING (SYSTEM ASSIGNED)  Completed    COVID-19 Vaccine  Completed    IPV IMMUNIZATION  Aged Out    MENINGITIS IMMUNIZATION  Aged Out    MICROALBUMIN  Discontinued        CSA -- Patient Level:    CSA: None found at the patient level.

## 2023-10-27 NOTE — TELEPHONE ENCOUNTER
Organ Offer Encounter Information    Organ Offer Information  Organ offer date & time: 10/27/2023  3:30 PM  Coordinator/Fellow/Attending name: Natalie Brothers RN   Organ(s):  Organ UNOS ID Match Run ID Comment Organ Laterality   Kidney [id number] [facility]         Recent infections?: No      New medications?: No Recent pregnancy?: No     Angicoagulation medications?: No Recent vaccinations?: Yes (Comment: Flu shot 2 weeks ago)     Recent blood transfusions?: Yes Recent hospitalizations?: No   Has your insurance changed in the last 6-12 months?: Neg    Patient last dialyzed: 10/27/2023 12:00 PM  Dialysis type: Hemo  Discussed organ offer with: Patient  Patient/Caregiver name: Matteo  Discussed risk category with Patient/Other: N/A  Patient/Other asked to speak to a surgeon?: No  Discussed program-specific outcomes: Did not have questions regarding SRTR  Right to decline organ offer without penalty, Patient/Other: Aware of option to decline without penalty  Organ offer decision status Patient/Other: Accepted Offer  Organ disposition: Case Cancelled - Donor quality - biopsy  Additional Comments: 10/27/2023 3:38 PM  Kidney: Primary w/o choice  MD: Arthur/Nicolas  OPO Contact: Luis Armando [phone]  VXM Results: Negative, no DSA, zero mismatch  XM Plan (FXM must be done with serum no older than 10 days from transplant): Will admit patient for FXM when blood arrives.   Is this an ABO A2 donor to ABO B Recipient:No  Plan (Admission, NPO, Donor OR): Donor OR complete, will make pt NPO at midnight for early morning recipient OR. Working on getting kidney here tonight and then pumped overnight.   - - -   COVID Screening  In the past month, have you:  Or anyone close to you had a positive COVID test or suspected to have COVID: No   Had any COVID symptoms (Fever, Cough, Short of Breath, Loss of Taste/Smell, Rash): No    Unit: 1540 - Sara, charge RN on 7A, ETA 1700    10/27/2023 4:07 PM:   Per Dr. Gibson, declined R Kidney  d/t biopsy. Called patient to let him know, let [facility] know, informed Sara SAPP on 7A. Luis Armando on-site w [facility] is aware.   Natalie Brothers RN  Transplant Coordinator            Attestation I have discussed all of the above with the Patient/Legal Guardian/Caregiver regarding this organ offer.: Yes  Coordinator/Fellow/Attending name: Natalie Brothers RN

## 2023-10-28 PROBLEM — Z94.0 DECEASED-DONOR KIDNEY TRANSPLANT: Status: ACTIVE | Noted: 2023-01-01
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
## 2023-10-28 NOTE — ANESTHESIA PROCEDURE NOTES
Airway       Patient location during procedure: OR       Procedure Start/Stop Times: 10/28/2023 1:11 PM  Staff -        Anesthesiologist:  Markel Wheeler MD       Resident/Fellow: Hortensia Barnett MD       Performed By: anesthesiologist and with residents       Procedure performed by resident/fellow/CRNA in presence of a teaching physician.  Indications and Patient Condition       Indications for airway management: juan a-procedural       Induction type:intravenous       Mask difficulty assessment: 1 - vent by mask    Final Airway Details       Final airway type: endotracheal airway       Successful airway: ETT - single and Oral  Endotracheal Airway Details        ETT size (mm): 8.0       Cuffed: yes       Successful intubation technique: direct laryngoscopy       DL Blade Type: MAC 4       Grade View of Cords: 1       Adjucts: stylet       Position: Right       Measured from: lips       Secured at (cm): 23       Bite block used: None    Post intubation assessment        Number of attempts at approach: 2       Number of other approaches attempted: 0       Secured with: silk tape       Ease of procedure: easy       Dentition: Intact and Unchanged    Medication(s) Administered   Medication Administration Time: 10/28/2023 1:11 PM    Additional Comments       Initial attempted DL by CA-1 Resident, Hortensia Barnett. Staff placed via DL. Anterior airway.

## 2023-10-28 NOTE — TELEPHONE ENCOUNTER
"TRANSPLANT OR REPORT    Organ: Kidney  Laterality (if known): Left  Organ Location: Import    UNOS ID: [id number]  Donor OR Time: 1100 10/27  Expected/Actual Cross Clamp Time: 1400 10/27  Expected Organ Arrival Time: 1000 10/28    Surgeon: Dr. Gibson  Time in OR: 1100  Time in 3C (N/A for MANUEL): 1000    Recipient Details  Admission ETA: 0100  Unit: 7A  Isolation: None  Latex Allergy: No  : None  Diagnosis: ESRD    Liver Transplants  Bypass/Perfusion: N/A  Cellsaver: N/A  Hemodialysis: N/A  ~ \"RENAL STAFF TEACHING SERVICE MEDICINE\" : Remind MANUEL Fellow to discuss with nephrology on call.  ~ CRRT Resource Nurse: N/A  (Telephone Number for CRRT [phone]. When prompted, the caller should say  CRRT Resource 1\")    Kidney/Panc Transplants  XM Status (Need to wait for XM?): No    Liver or KP/PA Recipients - Vessel Banking:  Donor has positive serologies for HIV/HCV/HBV: N/A  Donor has risk criteria for HIV/HCV/HBV: N/A      Transplant Coordinator Contact Info: Natalie [phone]      Vessel Bank Information  Transplant hospitals must not store a donor s extra vessels if the donor has tested positive for any of the following:   - HIV by antibody, antigen, or nucleic acid test (ALLISON)   - Hepatitis B surface antigen (HBsAg)   - Hepatitis B (HBV) by ALLISON   - Hepatitis C (HCV) by antibody or ALLISON     Extra vessels from donors that do not test positive for HIV, HBV, or HCV as above may be stored    "
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
## 2023-10-28 NOTE — ANESTHESIA CARE TRANSFER NOTE
Patient: Matteo Barnes    Procedure: Procedure(s):  Transplant kidney recipient  donor, ureteral stent placement, bench kidney       Diagnosis: End stage renal disease (H) [N18.6]  Diagnosis Additional Information: No value filed.    Anesthesia Type:   General     Note:    Oropharynx: oropharynx clear of all foreign objects  Level of Consciousness: awake  Oxygen Supplementation: face mask  Level of Supplemental Oxygen (L/min / FiO2): 10  Independent Airway: airway patency satisfactory and stable  Dentition: dentition unchanged  Vital Signs Stable: post-procedure vital signs reviewed and stable  Report to RN Given: handoff report given  Patient transferred to: PACU    Handoff Report: Identifed the Patient, Identified the Reponsible Provider, Reviewed the pertinent medical history, Discussed the surgical course, Reviewed Intra-OP anesthesia mangement and issues during anesthesia, Set expectations for post-procedure period and Allowed opportunity for questions and acknowledgement of understanding      Vitals:  Vitals Value Taken Time   /85 10/28/23 1852   Temp     Pulse 77 10/28/23 1854   Resp 26 10/28/23 1854   SpO2 100 % 10/28/23 185   Vitals shown include unfiled device data.    Electronically Signed By: Hortensia Barnett MD  2023  6:55 PM

## 2023-10-28 NOTE — ANESTHESIA PROCEDURE NOTES
Central Line/PA Catheter Placement    Pre-Procedure   Staff -        Anesthesiologist:  Markel Wheeler MD       Resident/Fellow: Hortensia Barnett MD       Performed By: anesthesiologist and with residents       Procedure performed by resident/fellow/CRNA in presence of a teaching physician.         Location: OR       Pre-Anesthestic Checklist: patient identified, IV checked, site marked, risks and benefits discussed, informed consent, monitors and equipment checked, pre-op evaluation and at physician/surgeon's request  Timeout:       Correct Patient: Yes        Correct Procedure: Yes        Correct Site: Yes        Correct Position: Yes        Correct Laterality: Yes   Line Placement:   This line was placed Post Induction starting at 10/28/2023 1:17 PM and ending at 10/28/2023 1:33 PM    Procedure   Procedure: central line       Laterality: right       Insertion Site: internal jugular.  Sterile Prep        All elements of maximal sterile barrier technique followed       Patient Prep/Sterile Barriers: draped, hand hygiene, gloves , hat , mask , draped, gown, sterile gel and probe cover       Skin prep: Chloraprep  Insertion/Injection        Technique: ultrasound guided and Seldinger Technique        1. Ultrasound was used to evaluate the access site.       2. Vein evaluated via ultrasound for patency/adequacy.       3. Using real-time ultrasound the needle/catheter was observed entering the artery/vein.       Type: CVC       Catheter Size: 8.5 Fr       Catheter Length: 16       Number of Lumens: quad lumen  Narrative         Secured by: suture       Tegaderm and Biopatch dressing used.       Complications: None apparent,        Verification method: Ultrasound

## 2023-10-28 NOTE — PROGRESS NOTES
Patient removed from OS waitlist after  donor Kidney transplant. OS ID [id number] .    Donor Has Risk Criteria for Transmission of HIV/HCV/HBV: no  Recipient Notified of Risk Criteria: no

## 2023-10-28 NOTE — TELEPHONE ENCOUNTER
Organ Offer Encounter Information    Organ Offer Information  Organ offer date & time: 10/27/2023 10:38 PM  Coordinator/Fellow/Attending name: Natalie Brothers RN   Organ(s):  Organ UNOS ID Match Run ID Comment Organ Laterality   Kidney [id number] TNMS         Recent infections?: No      New medications?: No Recent pregnancy?: No     Angicoagulation medications?: No Recent vaccinations?: Yes (Comment: Flu shot 2 weeks ago)     Recent blood transfusions?: No Recent hospitalizations?: No   Has your insurance changed in the last 6-12 months?: Neg    Patient last dialyzed: 10/27/2023 12:00 PM  Dialysis type: Hemo  Discussed organ offer with: Patient  Patient/Caregiver name: Matteo  Discussed risk category with Patient/Other: N/A  Patient/Other asked to speak to a surgeon?: No  Discussed program-specific outcomes: Did not have questions regarding SRTR  Right to decline organ offer without penalty, Patient/Other: Aware of option to decline without penalty  Organ offer decision status Patient/Other: Accepted Offer  Organ disposition: Transplanted  Additional Comments: 10/27/2023 10:41 PM  Kidney: Primary w choice  MD: Arthur/Nicolas  OPO Contact: Emilie [phone]  VXM Results: Negative w no DSA, Dr. Medley verified okay to go to OR w VXM only  XM Plan (FXM must be done with serum no older than 10 days from transplant): Will be done prospectively   Is this an ABO A2 donor to ABO B Recipient: No  Plan (Admission, NPO, Donor OR): Donor OR complete, will admit patient for FXM when bed is available.   - - -   COVID Screening  In the past month, have you:  Or anyone close to you had a positive COVID test or suspected to have COVID: No   Had any COVID symptoms (Fever, Cough, Short of Breath, Loss of Taste/Smell, Rash): No    Admissions: 2245 - [facility] [place] midnight  Unit: 2240 - [place] on 7A, no bed available  Update Provider Entering Orders (XM Plan & COVID Testing):  2300 - Paged ROEL OLIVER - [facility] Id [id number]    Immunology: 2300 - Carrie  Inpatient Lab (COVID Testing [phone], Option 2): Will be ordered STAT  Book OR: 2310 - Elise, booked for 1100  Vessel Storage Confirmation (PA/DAVON/MANUEL): N/A  Blood Bank: [facility]  TransNet/ABO Verification: Printed @ 2308  Add Organ: Left Kidney added @ 2250    10/27/2023 11:19 PM:   Joyce setting up transportation, Airspace Job # [id number], 6:14 AM [place] [employer] [facility] ([place])  Travel time: 2 hr 6 min 8:20 AM [facility] ([facility]) [employer] [id number]  Natalie Brothers RN  Transplant Coordinator    10/28/2023 7:04 AM:  New Flight Information:  Depart [facility] DL [id number]: 0715 CST-- land [place] 0930 EDT  Depart [place] DL 8771: 11:10EDT-- land [facility] 12:45 CST -- Delivery by 1308  Heather Canseco  Transplant Coordinator    10/28/2023 7:31 AM:  Notified Dr. Valenzuela of new flight times/ETA. Spoke to Janette in OR control; Recip OR changed to 1300.   Daina Arnett RN  Donor      10/28/2023 10:01 AM:  Recipient OR changed to 1230 per Dr. Valenzuela; Called OR to change. Kidney has arrived in [place] and is assigned to the next flight, will continue to monitor status; Called airspace to confirm ETA to hospital; they confirmed that the ETA was likely an old one and that the new ETA to the hospital is around 1400.   Daina Arnett RN  Donor            Attestation I have discussed all of the above with the Patient/Legal Guardian/Caregiver regarding this organ offer.: Yes  Coordinator/Fellow/Attending name: Natalie Brothers RN

## 2023-10-28 NOTE — BRIEF OP NOTE
[facility] Brief Operative Note    Pre-operative diagnosis: End stage renal disease (H) [N18.6] 2/2 FSGS   Post-operative diagnosis same   Procedure: Procedure(s):  Transplant kidney recipient  donor, ureteral stent placement, bench kidney   Surgeon(s): Surgeon(s) and Role:     * Hannah Gibson MD - Primary     * River Valenzuela MD - Fellow - Assisting   Estimated blood loss: 50 mL    Specimens: * No specimens in log *   Findings: Urine production prior to implantation.

## 2023-10-28 NOTE — PROGRESS NOTES
Pt admitted from home for kidney transplant. Vitals were stable RA. Pt alert and oriented x4. Up and phan. Pt is NPO, labs were completed, chest x-ray result is pending, piv was placed.Covid test result negative,  UA sent to lab. First shower is done. Pt's belonging are in the room including his wallet.

## 2023-10-28 NOTE — PROGRESS NOTES
Admitted/transferred from:   Time of arrival on unit 0100  2 RN full  skin assessment completed by Shaye KYLE and Quinton LANDAVERDE RN  Skin assessment finding: skin intact, no problems   Interventions/actions:   Will continue to monitor.

## 2023-10-28 NOTE — PHARMACY-ADMISSION MEDICATION HISTORY
Pharmacist Admission Medication History    Admission medication history is complete. The information provided in this note is only as accurate as the sources available at the time of the update.    Information Source(s): Patient via in-person    Pertinent Information: Patient knew his medications well    Changes made to PTA medication list:  Added:   --Sevelamer Carbonate 800 mg by mouth three times daily with meals (of note patient endorses only using with a morning meal)  Deleted:   --Bisacodyl 5 mg tablet -- therapy completed   --Bumetanide 2 mg tablet -- patient endorses no longer taking  --Losartan 100 mg tablet -- patient endorses no longer taking  --Polyethylene glycol -- therapy completed  --Rosuvastatin 5 mg tablet -- patient endorses no longer taking  --Tadalafil 5 mg tablet -- patient endorses no longer taking  --B Complex-C-Folic Acid tablet -- duplicate entry to multivitamin RENAL  Changed:   --Allopurinol clarified to be 100 mg by mouth daily  --Sodium bicarbonate clarified to be be 1950 mg by mouth 2 times daily    Medication Affordability:  Not including over the counter (OTC) medications, was there a time in the past 3 months when you did not take your medications as prescribed because of cost?: No    Allergies reviewed with patient and updates made in EHR: yes    Medication History Completed By: Bryan Donato [facility] 10/28/2023 8:03 AM    PTA Med List   Medication Sig Note Last Dose    acetaminophen (TYLENOL) 500 MG tablet Take 500-1,000 mg by mouth every 6 hours as needed for mild pain  10/27/2023 at PM    allopurinol (ZYLOPRIM) 100 MG tablet Take 100 mg by mouth daily  10/27/2023 at AM    ALPRAZolam (XANAX) 0.25 MG tablet TAKE ONE TABLET BY MOUTH ONCE DAILY AS NEEDED FOR ANXIETY  10/26/2023 at PM    amLODIPine (NORVASC) 10 MG tablet Take 1 tablet by mouth daily  10/27/2023 at AM    carvedilol (COREG) 12.5 MG tablet Take 12.5 mg by mouth 2 times daily (with meals)  10/27/2023 at AM    multivitamin  RENAL (NEPHROCAPS/TRIPHROCAPS) 1 MG capsule Take 1 capsule by mouth daily  10/27/2023 at AM    sevelamer carbonate (RENVELA) 800 MG tablet Take 800 mg by mouth 3 times daily (with meals) 10/28/2023: Patient states he has only been using with a morning meal. 10/27/2023 at AM    sodium bicarbonate 650 MG tablet Take 1,950 mg by mouth 2 times daily  10/27/2023 at PM

## 2023-10-29 PROBLEM — T86.19 DELAYED GRAFT FUNCTION OF KIDNEY: Status: ACTIVE | Noted: 2023-01-01

## 2023-10-29 NOTE — PLAN OF CARE
/80   Pulse 88   Temp 98.1  F (36.7  C) (Oral)   Resp 16   Wt 79.4 kg (175 lb)   SpO2 96%   BMI 25.66 kg/m      Shift: 0565-7906  Isolation Status: None  VS: VSS on 1.5L NC, afebrile  Neuro: Aox4  Behaviors: Calm, pleasant  BG: None  Labs: Hgb 10.2, K 5.0  Respiratory: Diminished LS  Cardiac: WDL  Pain/Nausea: Denies nausea. Pain 6-7/10  PRN: Oxy x1  Diet: Clears  IV Access: R 4 Lumen IJ. R PIV SL. L AVF.  Infusion(s): Lasix gtt @ 10mg/hr, D5LR @ 75ml/hr  Lines/Drains: Duval in place  GI/: UOP pink. 50ml/hr. No BM. Not passing gas.  Skin: R hockey stick incision, stapled. Covered with surgical dressing, minimal drainage, dressing marked.  Mobility: Not OOB this shift.  Events/Education: Medication card and lab book to be started..  Plan: Continue with plan of care

## 2023-10-29 NOTE — PLAN OF CARE
Admitted/transferred from: PACU  Time of arrival on unit 2030  2 RN full  skin assessment completed by Shaye KYLE and Mike RODGERS  Skin assessment finding: other R hockey stick incision, covered with surgical dressing    Interventions/actions: other n/a      Will continue to monitor.

## 2023-10-29 NOTE — PHARMACY-TRANSPLANT NOTE
Adult Kidney Transplant Post Operative Note    67 yo M s/p DDKT 10/28/23 (DBD; CIT 26.6hr) for FSGS     PMH: ESRD on HD since 2020, HTN, anemia, gout, hyperlipidemia     Planned immunosuppression regimen  INDUCTION: High Intensity for DGF (79.4kg x6mg/kg = 476kg)  Thymoglobulin 6 mg/kg total as divided below:  10/28 (POD 0): thymoglobulin 2 mg/kg 150 mg, methylprednisolone 500 mg IV  10/29 (POD 1): thymoglobulin 2 mg/kg 175mg, methylprednisolone 250 mg IV  10/30 (POD 2): thymoglobulin 2 mg/kg 150 mg, methylprednisolone 100 mg IV     MAINTENANCE:   - Mycophenolate 750 mg BID  - Tacrolimus with goal trough levels of 8-10 mcg/L for first 6 months post-transplant     Opportunistic pathogen prophylaxis includes:  - PJP: trimethoprim/sulfamethoxazole indefinitely  - CMV D?/R-: Valganciclovir duration pending donor status    Patient is not enrolled in medication study.    Pharmacy will monitor for medication interactions and immunosuppression levels in conjunction with the team. Medication therapy needs for discharge planning will continue to be addressed throughout the current admission via multidisciplinary rounds and order review.  Pharmacy will make recommendations as appropriate.  Leidy Sauer, [place]  Pharmacy Resident    I attest that the information provided in this note by the pharmacist is complete and accurate    Jose Ramon Lozano, [place]  Inpatient Clinical Pharmacist      Addendum: Spoke with TERESA regarding thymo dosing for POD2. Opting to go with 150 mg vs 175 mg; edited plan above to reflect change.    You Godinez PharmD  PGY-1 Pharmacy Resident

## 2023-10-29 NOTE — PLAN OF CARE
Goal Outcome Evaluation:      Plan of Care Reviewed With: patient    Overall Patient Progress: no changeOverall Patient Progress: no change    Outcome Evaluation: See 10/29 RD note for full assessment

## 2023-10-29 NOTE — PROGRESS NOTES
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 Patient is now s/p  donor kidney transplant with ureteral stent on 10/28/23.    Graft function: Cr increased. UO ~25 ml/hr. K 6.5. Patient did make urine prior to transplant. Will increase lasix gtt to 20mg/hr and shift potassium. Plan for HD today.   -US kidney: patent doppler     Immunosuppression management: Switch to high risk protocol due to DGF.   Thymoglobulin 2mg/kg OR, POD 1   mg OR, 250mg POD 1   mg BID  Tac 2 mg BID    Hematology: Stable  Anemia on chronic kidney disease, acute bleeding: Hb stable. Monitor hgb q4hr.    Cardiorespiratory: Stable. Patient given IS.   Hypertension: PTA on amlodipine 10 mg daily, carvedilol 12.5 mg BID. Start carvedilol 3.125 BID.  -140s  HLD hx: Not on medications PTA. Atorvastatin 10 mg daily started.     GI/Nutrition:   Diet: ZHAO-Low potassium diet.   Bowel regimen ordered.    Neuro:   Acute surgical pain: Tylenol scheduled. Oxycodone PRN. Dilaudid IV PRN.   Anxiety: PTA Xanax daily PRN. Monitor    Endocrine: Steroid hyperglycemia. Treat with Novolog sliding scale PRN.     Fluid/Electrolytes: MIVF: D5 LR 75 ml/hr.   Hyperkalemia: K 6.5 (5). Will increase lasix gtt 20mg/kg. Give Ca gluconate now. Plan to shift potassium with dextrose/insulin/sodium bicarbonate. Nephrology will put in orders for HD. Check next potassium at 0830.    : Duval to remain due to new surgical anastomosis.     Infectious disease: afebrile    Prophylaxis: DVT (mechanical/heparin), viral (Valcyte), PJP (Bactrim).     Disposition: 7A       TERESA/Fellow/Resident Provider: Kristan Wong PA-C    Faculty: Hannah Gibson,  MD    _________________________________________________________________  Transplant History: Admitted 10/28/2023 for DDKT.  10/28/2023 (Kidney), Postoperative day: 1     Interval History: History is obtained from the patient  Overnight events: Pain controlled with medication. Denies CP, SOB or nausea. +Productive cough. Strong cough.     History of COVID in 2021. Patient was on oxygen but was not intubated. Patient said he has some residual GROVES, but is able to walk a few miles a day w/o issues.     ROS:   A 10-point review of systems was negative except as noted above.    Meds:   acetaminophen  975 mg Oral Q8H    atorvastatin  10 mg Oral Daily    calcium gluconate  1 g Intravenous Once    dextrose 10%  300 mL Intravenous Once    dextrose  25 g Intravenous Once    insulin regular  8 Units Intravenous Once    [START ON 10/30/2023] magnesium oxide  400 mg Oral Daily with lunch    mycophenolate  750 mg Oral BID IS    polyethylene glycol  17 g Oral Daily    senna-docusate  1 tablet Oral BID    sodium chloride (PF)  10 mL Intracatheter Q8H    tacrolimus  2 mg Oral BID IS    [START ON 10/30/2023] valGANciclovir  450 mg Oral Once per day on Monday Thursday       Physical Exam:     Admit Weight: 79.4 kg (175 lb)    Current vitals:   BP (!) 152/71 (BP Location: Right arm)   Pulse 88   Temp 98.2  F (36.8  C) (Oral)   Resp 16   Wt 80.9 kg (178 lb 6.4 oz)   SpO2 95%   BMI 26.15 kg/m        Vital sign ranges:    Temp:  [97.6  F (36.4  C)-99.5  F (37.5  C)] 98.2  F (36.8  C)  Pulse:  [67-88] 88  Resp:  [10-26] 16  BP: (125-170)/(71-89) 152/71  SpO2:  [93 %-100 %] 95 %  Patient Vitals for the past 24 hrs:   BP Temp Temp src Pulse Resp SpO2 Weight   10/29/23 0615 -- -- -- -- -- -- 80.9 kg (178 lb 6.4 oz)   10/29/23 0600 (!) 152/71 98.2  F (36.8  C) Oral -- 16 95 % --   10/29/23 0500 -- 97.9  F (36.6  C) Oral -- 16 93 % --   10/29/23 0400 -- 97.8  F (36.6  C) Oral -- 16 95 % --   10/29/23 0300 -- 98  F (36.7  C) Oral -- 18 95 %  --   10/29/23 0200 -- 98.4  F (36.9  C) Oral -- 16 93 % --   10/29/23 0100 -- 98.1  F (36.7  C) Oral -- 16 94 % --   10/29/23 0000 -- 98.7  F (37.1  C) Oral -- 16 -- --   10/28/23 2300 125/80 98.1  F (36.7  C) Oral 88 16 96 % --   10/28/23 2200 (!) 145/80 97.7  F (36.5  C) Oral 87 12 100 % --   10/28/23 2100 135/73 97.6  F (36.4  C) Oral 82 11 98 % --   10/28/23 2046 (!) 140/75 -- -- 84 -- 98 % --   10/28/23 2015 134/72 -- -- 83 21 98 % --   10/28/23 2000 (!) 144/74 97.7  F (36.5  C) Oral 83 14 98 % --   10/28/23 1948 (!) 143/76 -- -- 80 11 100 % --   10/28/23 1945 (!) 143/76 -- -- 82 12 99 % --   10/28/23 1930 (!) 143/83 -- -- 80 14 98 % --   10/28/23 1915 (!) 156/80 -- -- 79 13 99 % --   10/28/23 1900 (!) 166/87 -- -- 76 10 98 % --   10/28/23 1845 (!) 170/82 99.5  F (37.5  C) Oral 80 26 100 % --   10/28/23 1109 (!) 167/89 97.8  F (36.6  C) Oral -- 16 -- --   10/28/23 0952 (!) 154/86 97.9  F (36.6  C) Oral 67 16 97 % --     General Appearance: in no apparent distress.   Skin: normal, dry  Heart: regular rate and rhythm  Lungs: clear to auscultation  Abdomen: The abdomen is obese, and  mildly tender, RLQ. he is not tender over the kidney graft. The wound is covered with surgical dressing. Patient wearing abdominal binder.  : tineo is present. Urine has small gross hematuria.   Extremities: edema: mild  Neurologic: awake, alert, and oriented. Tremor absent.  AV fistula LUE +thrill.   CVC    Data:   CMP  Recent Labs   Lab 10/29/23  0433 10/28/23  2134 10/28/23  1850 10/28/23  1722 10/28/23  1656 10/28/23  1617 10/28/23  0406   *  --  136 137 137   < > 140   POTASSIUM 6.5* 5.0 4.5 4.8 5.4*   < > 4.7   CHLORIDE 94*  --  99  --   --   --  97*   CO2 15*  --  20*  --   --   --  26   *  --  109* 174* 152*   < > 91   BUN 64.1*  --  58.6*  --   --   --  56.8*   CR 9.65*  --  8.73*  --   --   --  8.34*   GFRESTIMATED 5*  --  6*  --   --   --  6*   ANNETTE 8.1*  --  7.8*  --   --   --  8.5*   ICAW  --   --   --  4.5  4.0*   < >  --    MAG 2.0  --  2.1  --   --   --   --    PHOS 4.9*  --  2.4*  --   --   --   --    ALBUMIN  --   --   --   --   --   --  4.5   BILITOTAL  --   --   --   --   --   --  0.3   ALKPHOS  --   --   --   --   --   --  57   AST  --   --   --   --   --   --  13   ALT  --   --   --   --   --   --  12    < > = values in this interval not displayed.     CBC  Recent Labs   Lab 10/29/23  0433 10/28/23  2134 10/28/23  1850 10/28/23  1617 10/28/23  0407   HGB 10.0* 10.2* 9.5*   < > 9.5*   WBC 12.2*  --  4.6  --  6.6     --  136*  --  204   A1C  --   --   --   --  5.0    < > = values in this interval not displayed.     COAGS  Recent Labs   Lab 10/28/23  0406   INR 1.13   PTT 30      Urinalysis  Recent Labs   Lab Test 10/28/23  0231 10/16/21  1056 02/01/21  0826 06/30/20  0739 05/10/16  0738   COLOR Light Yellow Light Yellow   < > Yellow  --    APPEARANCE Clear Clear   < > Clear  --    URINEGLC 100* Negative   < > Negative  --    URINEBILI Negative Negative   < > Negative  --    URINEKETONE Negative Negative   < > Negative  --    SG 1.009 1.015   < > 1.020  --    UBLD Negative Moderate*   < > Trace*  --    URINEPH 8.5* 6.5   < > 5.5  --    PROTEIN 100* 300*   < > >=300*  --    NITRITE Negative Negative   < > Negative  --    LEUKEST Negative Negative   < > Negative  --    RBCU <1 1   < > O - 2  --    WBCU 1 1   < > 0 - 5  --    UTPG  --   --   --  1.55* 0.12    < > = values in this interval not displayed.     Virology:  Hepatitis C Antibody   Date Value Ref Range Status   10/28/2023 Nonreactive Nonreactive Final   02/01/2021 Nonreactive NR^Nonreactive Final     Comment:     Assay performance characteristics have not been established for newborns,   infants, and children

## 2023-10-29 NOTE — PROGRESS NOTES
HEMODIALYSIS TREATMENT NOTE    Date: 10/29/2023  Time: 1:56 PM    Data:  Pre Wt:     Desired Wt:   kg   Post Wt:    Weight change:   kg  Ultrafiltration - Post Run Net Total Removed (mL): 0 mL  Vascular Access Status: patent  Dialyzer Rinse: Moderate  Total Blood Volume Processed: 71 L Liters  Total Dialysis (Treatment) Time: 3 Hours    Lab:   No    Interventions:  Recived verbal order from DR Jose Armando EDMONDSON for 3hrs TX and no fluid removal    Assessment:  TX completed as ordered. Vital signs stable     Plan:    Per renal

## 2023-10-29 NOTE — PLAN OF CARE
Goal Outcome Evaluation:    BP (!) 162/90 (BP Location: Right arm)   Pulse 96   Temp 98.1  F (36.7  C) (Oral)   Resp 18   Wt 80.9 kg (178 lb 6.4 oz)   SpO2 94%   BMI 26.15 kg/m      Shift: 0466-0202  VS: VSS on RA, 's -160's   Pain: abdominal pain 5/10, managed with IV Diluadid 2x, refuses Oxycodone   Neuro: alert and oriented x4, call light appropriate  Cardiac: Tele monitoring with normal sinus   Diet/Appetite:  2 gram potassium, refused breakfast and dinner, ate late lunch,  Denies nausea  /GI: Duval cath with minimal output - MD aware. Bowel sounds hypoactive. No BM, passing gas and burping.   LDA's: internal jugular, quadruplet and R PIV SL. Furosemide gtt 2 ml/hr  and D5 in LR at 75 ml/hr infusing   Skin: Abdominal incision covered with primapore, dressing marked   Activity: Ambulated in unit hallway and in room with A1.   Procedures: HD today for high potasium 6.7. Started Thymoglobulin about 1500, pt tolerating well.   Pertinent Labs/: Hemoglobin 9.3. Potasium 5.6, BG ACHS 178   Pt's friends at the bedside.         Plan of Care Reviewed With: patient    Overall Patient Progress: improvingOverall Patient Progress: improving

## 2023-10-29 NOTE — ANESTHESIA POSTPROCEDURE EVALUATION
Patient: Matteo Barnes    Procedure: Procedure(s):  Transplant kidney recipient  donor, ureteral stent placement, bench kidney       Anesthesia Type:  General    Note:  Disposition: Inpatient   Postop Pain Control: Uneventful            Sign Out: Well controlled pain   PONV: No   Neuro/Psych: Uneventful            Sign Out: Acceptable/Baseline neuro status   Airway/Respiratory: Uneventful            Sign Out: Acceptable/Baseline resp. status   CV/Hemodynamics: Uneventful            Sign Out: Acceptable CV status; No obvious hypovolemia; No obvious fluid overload   Other NRE: NONE   DID A NON-ROUTINE EVENT OCCUR? No           Last vitals:  Vitals Value Taken Time   /74 10/28/23 2000   Temp 36.5  C (97.7  F) 10/28/23 2000   Pulse 83 10/28/23 2003   Resp 15 10/28/23 2003   SpO2 99 % 10/28/23 2003   Vitals shown include unfiled device data.    Electronically Signed By: Jasson Mccullough MD  2023  8:04 PM

## 2023-10-29 NOTE — OR NURSING
Paged resident on call for transplant surgery Lamberto Urbina - notified that patient had 25ml of urine out for the 9464-4216 hour.

## 2023-10-29 NOTE — CONSULTS
Transplant Admission Psychosocial Assessment    Patient Name: Matteo Barnes  : 1955  Age: 68 year old  MRN: [id number]  Date of Initial Social Work Evaluation: 2021    Patient underwent a kidney transplant on 10/28/2023. I met with patient to update psychosocial assessment and provide brief education about SW role while inpatient, as well as expectations/requirements and follow up needs post-transplant. SW also provided education about need for compliance with transplant medications, and explained ESRD Medicare benefits and medication coverage under Medicare part B.  Medicare 2728 forms completed and signed by patient.    Presenting Information   Living Situation: Matteo lives alone in an apartment in [place], [place].  If not local, plans for short term stay:  n/a  Previous Functional Status: independent with ADLs and IADLs  Cultural/Language/Spiritual Considerations: Congregation, English    Support System  Primary Support Person: jodie sheets Vanita, Vanita's son Tommy  Other supports: two brothers ([place], [place]), one sister ([place])  Plan for support in immediate post-transplant period: Matteo reports Vanita's son Tommy will stay overnight a few nights.  Vanita is up [place] taking care of her father who is ill.    Health Care Directive  Decision Maker: patient  Alternate Decision Maker: Patient plans to complete a health care directive.  I offered to request a notary for tomorrow but Matteo declined my offer.  He reports having a notary available at work  Health Care Directive: Provided Copy, Patient considering completing, and Provided education    Mental Health/Coping:   History of Mental Health: no history  History of Chemical Health: no known concerns  Current status: stable  Coping: appropriate to situation  Services Needed/Recommended: none at this time    Financial   Income: wages  Impact of transplant on income: unable to work for a few weeks, patient is undecided about how long to take off  work but has flexibility in returning to work part-time  Insurance and medication coverage: [employer], Medicare part A  Financial concerns: none voiced  Resources needed: ongoing assessment    Education provided by KEVIN: Social Work role inpatient setting, 2720 form    Assessment and recommendations and plan:    I met with Matteo at bedside today, post op day 1.  Matteo reports his significant other Vanita's son Tommy will be staying overnight for the first few nights after he discharges from the hospital.  I asked if Tommy would be providing transportation to his follow up appointments and Matteo said no, and that he plans to drive himself.  I advised him against driving himself, and advised him to use an Uber/Lyft/cab if he does not have anyone to drive him.  I also updated Daylin Jerry NP and Nighat Collins RN to also address this with Matteo.     TEN Dela Cruz, Northern Light Acadia HospitalSW  10/29/2023       Social Work and Care Management Department       SEARCHABLE in [facility] MD - search SOCIAL WORK       [place] (0800 - 1630) Saturday and Sunday     Units: 4A, 4C, & 4E Pager: [phone]     Units: 5A & 5C Pager: [phone]     Units: 6A & 6B   Pager: [phone]     Units: 6C Pager: [phone]     Units: 7A & 7B  Pager: [phone]     Units: 7C Pager: [phone]     Unit: [facility] ED Pager: [phone]      [facility] [place] ([id number]) Saturday and Sunday      Units: 5 Ortho, 5 Med/Surg & WB ED  Pager:[phone]     Units: 6 Med/Surg, 8A, & 10A ICU  Pager: [phone]        After hours (1630 - 0000) Saturday & Sunday; (0325-5927) Mon-Fri; (9256-2903) [facility] Recognized Holidays     Units: ALL  Pager: [phone]

## 2023-10-29 NOTE — PROGRESS NOTES
[facility]   Transplant Nephrology Progress Note  Date of Admission:  10/28/2023  Today's Date: 10/29/2023    Recommendations:  - would change to high intensity induction  -HD today will assess daily  -- UPC monitoring post transplant per FSGS protocol though less likely to recur (thought to be secondary FSGS)     Assessment & Plan   # DDKT: Trend up   - Baseline Creatinine: ~ TBD   - Proteinuria: Normal (<0.2 grams)   - Date DSA Last Checked: Oct/2023      Latest DSA:  pending   - BK Viremia: No   - Kidney Tx Biopsy: No   - Transplant Ureteral Stent: Yes    # Immunosuppression: Tacrolimus immediate release (goal 8-10) and Mycophenolate mofetil (dose 750 mg every 12 hours)   - Induction with Recent Transplant:  Intermediate Intensity Protocol   - Patient is in an immunosuppressed state and will continue to monitor for efficacy and toxicity of immunosuppression medications.   - Changes: Yes - should change to high intensity induction     # Infection Prophylaxis:   - PJP: Sulfa/TMP (Bactrim)  - CMV: Valganciclovir (Valcyte)    # Hypertension: Poorly controlled;  Goal BP: < 150/90   - Volume status: Mildly hypervolemic  EDW ~ 175lb   - Changes: Yes - start coreg 6.25mg BID    # Elevated Blood Glucose: Glucose generally running ~ 120-150   - Management as per primary team.    # Anemia in Chronic Renal Disease: Hgb: Stable      JOSE: No   - Iron studies: Unknown at this time, but checked with dialysis    # Mineral Bone Disorder:   - Secondary renal hyperparathyroidism; PTH level: Unknown at this time, but checked with dialysis        On treatment: None  - Vitamin D; level: Unknown at this time, but checked with dialysis        On supplement: No  - Calcium; level: Low        On supplement:  received one dose IV  - Phosphorus; level: High        On supplement: No    # Electrolytes:   - Potassium; level: High        On supplement: No  - Magnesium; level: Normal        On  supplement: No  - Bicarbonate; level: Low        On supplement: No  - Sodium; level: Low    # COVID pna (10/2021):  needing 7 day hospitalization and was on home oxygen for a short period, but has since recovered well. Reports he has had 2 negative COVID tests since       #Cardiac: cleared by cards on 6/7/2023 with ECHO  Interpretation Summary  Target Heart Rate was not achieved due to patient taking beta blocker.  There was no chest pain or significant ST changes with exercise.  Normal resting wall motion and no stress-induced wall motion abnormality.  This is an indeterminate stress test. Patient did not reach target HR but no  ischemia noted at work rate/heart rate achieved.    # Transplant History:  Etiology of Kidney Failure: Secondary focal segmental glomerulosclerosis (FSGS) - native kidney biopsy::glomerulomegaly, moderate IFTA  Tx:  possible DDKT  Transplant: N/A  Crossmatch at time of Tx: pending  Significant changes in immunosuppression: None  Significant transplant-related complications: None    Recommendations were communicated to the primary team verbally.    Seen and discussed with Dr. Niru Jerry NP   Pager: [phone]        Physician Attestation     I saw and evaluated Matteo Barnes as part of a shared APRN/PA visit.     I personally reviewed the vital signs, medications, labs, and imaging.    I personally performed the substantive portion of the medical decision making for this visit - please see the TERESA's documentation for full details.    Key management decisions made by me and carried out under my direction: s/p DDKTx now with DGF switch to high risk induction. HD today given Alden Matute MD  Date of Service (when I saw the patient): 10/29/23      Interval History   Mr. Barnes's creatinine is 9.65 (10/29 0433); Trend up.  minimal urine output.  Other significant labs/tests/vitals: VSS  No events overnight.  no chest pain or shortness of breath.  no leg  swelling.  no nausea and vomiting.  Bowel movements are none.  no fever, sweats or chills.      Review of Systems   4 point ROS was obtained and negative except as noted in the Interval History.    MEDICATIONS:   acetaminophen  650 mg Oral Once    acetaminophen  975 mg Oral Q8H    anti-thymocyte globulin  175 mg Intravenous Central line Once    atorvastatin  10 mg Oral Daily    diphenhydrAMINE  25-50 mg Oral Once    Or    diphenhydrAMINE  25-50 mg Per NG tube Once    heparin ANTICOAGULANT  5,000 Units Subcutaneous TID    [Held by provider] magnesium oxide  400 mg Oral Daily with lunch    methylPREDNISolone  250 mg Intravenous Once    mycophenolate  750 mg Oral BID IS    polyethylene glycol  17 g Oral Daily    senna-docusate  1 tablet Oral BID    sodium chloride (PF)  10 mL Intracatheter Q8H    tacrolimus  2 mg Oral BID IS    [START ON 10/30/2023] valGANciclovir  450 mg Oral Once per day on       dextrose 5% in lactated ringers 75 mL/hr at 10/29/23 1100    furosemide 20 mg/hr (10/29/23 06)    sodium chloride      sodium chloride         Physical Exam   Temp  Av  F (36.7  C)  Min: 97.6  F (36.4  C)  Max: 99.5  F (37.5  C)      Pulse  Av.5  Min: 65  Max: 88 Resp  Avg: 15.4  Min: 10  Max: 26  SpO2  Av.8 %  Min: 92 %  Max: 100 %    CVP (mmHg): 18 mmHgBP (!) 160/109   Pulse 82   Temp 98.1  F (36.7  C)   Resp 17   Wt 80.9 kg (178 lb 6.4 oz)   SpO2 95%   BMI 26.15 kg/m     Date 10/29/23 07 - 10/30/23 0659   Shift 2195-1955 7919-4659 5100-5194 24 Hour Total   INTAKE   P.O. 120   120   Shift Total(mL/kg) 120(1.48)   120(1.48)   OUTPUT   Urine 210   210   Shift Total(mL/kg) 210(2.6)   210(2.6)   Weight (kg) 80.92 80.92 80.92 80.92      Admit Weight: 79.4 kg (175 lb)     GENERAL APPEARANCE: alert and no distress  HENT: mouth without ulcers or lesions  RESP: lungs clear to auscultation - no rales, rhonchi or wheezes  CV: regular rhythm, normal rate, no rub, no murmur  EDEMA: no LE edema  bilaterally  ABDOMEN: soft, nondistended, nontender, bowel sounds normal  MS: extremities normal - no gross deformities noted, no evidence of inflammation in joints, no muscle tenderness  SKIN: no rash    Data   All labs reviewed by me.  CMP  Recent Labs   Lab 10/29/23  0944 10/29/23  0908 10/29/23  0819 10/29/23  0733 10/29/23  0717 10/29/23  0705 10/29/23  0551 10/29/23  0433 10/28/23  2134 10/28/23  1850 10/28/23  1722 10/28/23  1656 10/28/23  1617 10/28/23  0406   NA  --   --   --   --   --   --   --  132*  --  136 137 137   < > 140   POTASSIUM 5.6*  --   --  5.9*  --   --  6.7* 6.5*   < > 4.5 4.8 5.4*   < > 4.7   CHLORIDE  --   --   --   --   --   --   --  94*  --  99  --   --   --  97*   CO2  --   --   --   --   --   --   --  15*  --  20*  --   --   --  26   ANIONGAP  --   --   --   --   --   --   --  23*  --  17*  --   --   --  17*   GLC  --  201* 223*  --  264* 316*  --  170*  --  109* 174* 152*   < > 91   BUN  --   --   --   --   --   --   --  64.1*  --  58.6*  --   --   --  56.8*   CR  --   --   --   --   --   --   --  9.65*  --  8.73*  --   --   --  8.34*   GFRESTIMATED  --   --   --   --   --   --   --  5*  --  6*  --   --   --  6*   ANNETTE  --   --   --   --   --   --   --  8.1*  --  7.8*  --   --   --  8.5*   MAG  --   --   --   --   --   --   --  2.0  --  2.1  --   --   --   --    PHOS  --   --   --   --   --   --   --  4.9*  --  2.4*  --   --   --   --    PROTTOTAL  --   --   --   --   --   --   --   --   --   --   --   --   --  7.2   ALBUMIN  --   --   --   --   --   --   --   --   --   --   --   --   --  4.5   BILITOTAL  --   --   --   --   --   --   --   --   --   --   --   --   --  0.3   ALKPHOS  --   --   --   --   --   --   --   --   --   --   --   --   --  57   AST  --   --   --   --   --   --   --   --   --   --   --   --   --  13   ALT  --   --   --   --   --   --   --   --   --   --   --   --   --  12    < > = values in this interval not displayed.     CBC  Recent Labs   Lab 10/29/23  8241  10/29/23  0551 10/29/23  0433 10/28/23  2134 10/28/23  1850 10/28/23  1617 10/28/23  0407   HGB 9.8* 10.0* 10.0* 10.2* 9.5*   < > 9.5*   WBC  --   --  12.2*  --  4.6  --  6.6   RBC  --   --  3.02*  --  2.78*  --  2.86*   HCT  --   --  29.9*  --  27.2*  --  27.6*   MCV  --   --  99  --  98  --  97   MCH  --   --  33.1*  --  34.2*  --  33.2*   MCHC  --   --  33.4  --  34.9  --  34.4   RDW  --   --  13.1  --  12.8  --  12.7   PLT  --   --  160  --  136*  --  204    < > = values in this interval not displayed.     INR  Recent Labs   Lab 10/28/23  0406   INR 1.13   PTT 30     ABG  Recent Labs   Lab 10/28/23  1722 10/28/23  1656 10/28/23  1617   O2PER 30.0 32.0 35.0      Urine Studies  Recent Labs   Lab Test 10/28/23  0231 10/16/21  1056 02/01/21  0826 06/30/20  0739   COLOR Light Yellow Light Yellow Yellow Yellow   APPEARANCE Clear Clear Clear Clear   URINEGLC 100* Negative Negative Negative   URINEBILI Negative Negative Negative Negative   URINEKETONE Negative Negative Negative Negative   SG 1.009 1.015 1.012 1.020   UBLD Negative Moderate* Negative Trace*   URINEPH 8.5* 6.5 6.0 5.5   PROTEIN 100* 300* 100* >=300*   UROBILINOGEN  --   --   --  0.2   NITRITE Negative Negative Negative Negative   LEUKEST Negative Negative Negative Negative   RBCU <1 1 <1 O - 2   WBCU 1 1 0 0 - 5     Recent Labs   Lab Test 06/30/20  0739 05/10/16  0738   UTPG 1.55* 0.12     PTH  Recent Labs   Lab Test 10/19/21  0806 06/30/20  0738   PTHI 139* 123*     Iron Studies  Recent Labs   Lab Test 10/19/21  0640   IRON 49   *   IRONSAT 26   ROBB 1,338*       IMAGING:  All imaging studies reviewed by me.

## 2023-10-29 NOTE — PLAN OF CARE
/80   Pulse 88   Temp 97.8  F (36.6  C) (Oral)   Resp 16   Wt 79.4 kg (175 lb)   SpO2 95%   BMI 25.66 kg/m      Shift: [id number]  VS: stable on RA, afebrile  Neuro: AOx4  BG: none  Labs: k+ and Hgb q 4hrs; K+ 6.5 and 6.7 provider notified. Shifting was started at 7:00am  Respiratory: WNL  Pain/Nausea/PRN: denies nausea; pain controlled with PRN dilaudid and oxy   Diet: clear liquids   LDA: R internal jugular and R PIV   GI/: tineo with 15 mL-45 mL hourly output   Skin: ABD incision covered with dressing with scant drainage   Mobility: on bed. Changes position independently   Plan: continue POC     Handoff given to following RN.

## 2023-10-30 NOTE — PLAN OF CARE
BP (!) 165/95   Pulse 94   Temp 98.2  F (36.8  C) (Oral)   Resp 16   Wt 80.9 kg (178 lb 6.4 oz)   SpO2 96%   BMI 26.15 kg/m     Neuro: A/Ox4  VS: Elevated BP in 180s treated by PRN Hydralazine 10mg, the rest of VSS on RA  Pain: Abdominal pain treated by PRN oxycodone 5mg and scheduled tylenol  GI: on 2gr K diet, c/o nausea treated by PRN Zofran. BMx2  : Duval in place with good UOP   and 140 on Novolog sliding scale  RIJ line infusing Thymo 61ml/h and tolerating good.   Abdominal incision dressing intact with marked dry blood not changed  Activity - Independent in room  Education - Specialty pharmacy education completed  Plan of Care - Continue with POC

## 2023-10-30 NOTE — PROGRESS NOTES
BP (!) 150/86   Pulse 87   Temp 98.2  F (36.8  C) (Oral)   Resp 16   Wt 80.9 kg (178 lb 6.4 oz)   SpO2 94%   BMI 26.15 kg/m        [id number]  Hypertensive, OVSS on RA. Did not meet parameters for prn BP med. A+Ox4. Denies pain. Nausea treated with PO compazine x1. 2g K diet. 2 BMs today. ACHS blood sugar checks, 126 at dinner. Internal jugular infusing thymo, 3 lumens SL. PIV SL. Abdominal incision stapled YAQUELIN Duval with adequate UOP. Up SBA. Specialty pharmacy education completed today. Med card and lab book up to date. Will continue to monitor and notify team with changes.

## 2023-10-30 NOTE — PROGRESS NOTES
Care Management Follow Up    Length of Stay (days): 2    Expected Discharge Date: 10/31/2023     Concerns to be Addressed: all concerns addressed in this encounter     Patient plan of care discussed at interdisciplinary rounds: Yes    Anticipated Discharge Disposition: Home     Anticipated Discharge Services: None  Anticipated Discharge DME: None    Patient/family educated on Medicare website which has current facility and service quality ratings: no  Education Provided on the Discharge Plan: Yes  Patient/Family in Agreement with the Plan: yes    Referrals Placed by CM/SW: External Care Coordination  Private pay costs discussed: Not applicable    Additional Information:  Pt s/p kidney transplant.  Per care team rounds anticipate discharge 1-2 days. Pt DGF status  Met with pt.   Introduced RNCC role.  Reviewed anticipated plan for discharge, transplant labs M/TH, ATC appointments and home care RN services.  Pt notes no concerns with his ability to come into clinic for appointments and transplant labs.  Reviewed DGF status.  Per pt he dialyzed at [facility].   Pt notes no concerns with anticipated plan for discharge.     RNCC will continue to monitor.    Nighat Collins RN BSN, PHN, ACM-RN  7A RN Care Coordinator  Phone: [phone]  Pager [phone]    To contact the weekend RNCC  [place] (0800 [phone] 1630) Saturday and Sunday    Units: 5A,5B,5C [phone]    Units: 6A, [phone]    Units 6B, 6C, 6D [phone]    Units: 7A, 7B, 7C, 7D, [phone]    [facility] [place] ([phone]) Saturday and Sunday  Units: 5 Ortho, 5MS & WB ED Pager: [phone]  Units: 6MS, 8A & 10 ICU  Pager [phone]    10/30/2023 12:31 PM

## 2023-10-30 NOTE — TELEPHONE ENCOUNTER
A pharmacist spent 30 minutes providing medication teaching with Matteo Barnes for discharge with a focus on new medications/dose changes.  The discharge medication list was reviewed with the patient/family and the following points were discussed, as applicable: Name, description, purpose, dose/strength, duration of medications, common side effects, food/medications to avoid, action to be taken if dose is missed, when to call MD, and how to obtain refills.  The patient will be responsible for managing medications. Additionally, the following transplant related education was covered: Purpose of medication card, Medication videos, Timing of medications and day of lab draw considerations , Prescription Insurance , and Discharge process for receiving meds   Patient will  transplant supplies including 7 day pill organizer, thermometer, and BP monitor at the discharge pharmacy along with medications.  Patient will use local pharmacy or other mail order for outpatient medications.   Clinical Pharmacy Consult:                                                      Transplant Specific:   Date of Transplant: 10/28/23  Type of Transplant: kidney  First Transplant: yes  History of rejection: no    Immunosuppression Regimen   TAC 2mg qAM & 2mg qPM and MMF 750mg qAM & 750mg qPM  Patient specific goal: 8-10  Most recent level: not drawn yet, date   Immunosuppressant Levels:    Pt adherent to lab draws: yes  Scr:   Lab Results   Component Value Date    CR 6.84 10/30/2023    CR 4.77 02/01/2021     Side effects: Nausea    Prophylactic Medications  Antibacterial:  Bactrim SS three times per week (being held for hyperkalemia)  Scheduled Discontinue Date: Lifelong    Antifungal: Not needed thus far  Scheduled Discontinue Date: N/A    Antiviral: CrCl 10 to 24 mL/minute: Valcyte 450 mg twice weekly   Scheduled Discontinue Date:  TBD    Acid Reducer:   Scheduled Reviewed Date:       Thrombosis Prevention:   Scheduled Discontinue  Date:       Blood Pressure Management  Frequency of home Blood Pressure checks: once daily  Most recent home BP: 153/73  Patient Blood pressure goal: <150/90  Patient blood pressure at goal:  yes  Hospitalizations/ER visits since last assessment: 0      Med rec/DUR performed: yes  Med Rec Discrepancies: no    Reminders:    Bring to first clinic appt: med box, med card, bp monitor, all medications being taken, and lab book.  2.   MTM pharmacist visit on first clinic appt and if ok, again in 3 to 4 months during follow up appt.  3.   Avoid Grapefruit and Grapefruit juice.   4.   Avoid herbal supplements. If wish to take other medications or supplements, call your coordinator.   5.   Keep lab appts.   6.   Can use apps on phone like [email or web address] to help manage medication lists and reminders.   7.   Make sure you are protecting your skin by wearing long sleeves and applying sunscreen to exposed skin, for any significant time in the sun.     Transplant Coordinator is Daylin Martinez [facility]

## 2023-10-30 NOTE — PROGRESS NOTES
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 Patient is now s/p  donor kidney transplant with ureteral stent on 10/28/23.    Graft function: DGF. Dialyzed POD 1 due to hyperkalemia. Patient did make urine prior to transplant. Lasix gtt increased to 20mg/hr 10/29, UOP increasing 10/30 AM. Will decrease to 10 mg/hr.  -US kidney: patent doppler     Immunosuppression management: Switch to high risk protocol due to DGF.   Thymoglobulin 2mg/kg OR, POD 1. 150 mg today for a total of 475 mg or 5.98 mg/kg.   mg OR, 250mg POD 1, 100 mg POD 2   mg BID  Tac 2 mg BID    Hematology: Stable  Anemia on chronic kidney disease, acute bleeding: Hb stable, drifted down to 9     Cardiorespiratory: Stable. Patient given IS.   Hypertension: PTA on amlodipine 10 mg daily, carvedilol 12.5 mg BID. Carvedilol 6.25 BID, will discuss increase with nephrology.  -160s  HLD hx: Not on medications PTA. Atorvastatin 10 mg daily started.     GI/Nutrition:   Diet: ZHAO-Low potassium diet.   Bowel regimen ordered.    Neuro:   Acute surgical pain: Tylenol scheduled. Oxycodone PRN. Dilaudid IV PRN.   Anxiety: PTA Xanax daily PRN. Monitor    Endocrine: Steroid hyperglycemia. Treat with Novolog sliding scale PRN.     Fluid/Electrolytes: MIVF: D5 LR 75 ml/hr- will stop today  Hyperkalemia: HD 10/29. K 5.1, will recheck this afternoon. Will decrease lasix gtt 10mg/kg, discuss switching to intermittent dosing.     : Duval to remain due to new surgical anastomosis, plan for 3 day dwell    Infectious disease: afebrile    Prophylaxis: DVT (mechanical/heparin), viral (Valcyte), PJP (Bactrim).     Disposition: 7A       TERESA/Fellow/Resident Provider: Amparo  CUATE Decker [id number]    Faculty: Hannah Gibson MD    _________________________________________________________________  Transplant History: Admitted 10/28/2023 for DDKT.  10/28/2023 (Kidney), Postoperative day: 2     Interval History: History is obtained from the patient  Overnight events: Pain controlled with medication. Denies CP, SOB or nausea. +Productive cough. Strong cough.     History of COVID in 2021. Patient was on oxygen but was not intubated. Patient said he has some residual GROVES, but is able to walk a few miles a day w/o issues.     ROS:   A 10-point review of systems was negative except as noted above.    Meds:   acetaminophen  650 mg Oral Once    acetaminophen  650 mg Oral Once    acetaminophen  975 mg Oral Q8H    anti-thymocyte globulin  150 mg Intravenous Central line Once    atorvastatin  10 mg Oral Daily    carvedilol  6.25 mg Oral BID w/meals    diphenhydrAMINE  25-50 mg Oral Once    Or    diphenhydrAMINE  25-50 mg Per NG tube Once    heparin ANTICOAGULANT  5,000 Units Subcutaneous TID    insulin aspart  1-7 Units Subcutaneous TID AC    insulin aspart  1-5 Units Subcutaneous At Bedtime    [Held by provider] magnesium oxide  400 mg Oral Daily with lunch    methylPREDNISolone  100 mg Intravenous Once    mycophenolate  750 mg Oral BID IS    polyethylene glycol  17 g Oral Daily    senna-docusate  1 tablet Oral BID    sodium chloride (PF)  10 mL Intracatheter Q8H    tacrolimus  2 mg Oral BID IS    valGANciclovir  450 mg Oral Once per day on Monday Thursday       Physical Exam:     Admit Weight: 79.4 kg (175 lb)    Current vitals:   BP (!) 163/96 (BP Location: Right arm)   Pulse 80   Temp 98.3  F (36.8  C) (Oral)   Resp 16   Wt 80.9 kg (178 lb 6.4 oz)   SpO2 96%   BMI 26.15 kg/m        Vital sign ranges:    Temp:  [97.8  F (36.6  C)-98.8  F (37.1  C)] 98.3  F (36.8  C)  Pulse:  [74-96] 80  Resp:  [16-18] 16  BP: (129-171)/() 163/96  SpO2:  [94 %-100 %] 96 %  Patient Vitals for the past  24 hrs:   BP Temp Temp src Pulse Resp SpO2   10/30/23 1003 (!) 163/96 98.3  F (36.8  C) Oral 80 16 96 %   10/30/23 0610 (!) 153/73 98.3  F (36.8  C) Oral -- 16 95 %   10/30/23 0000 (!) 139/93 97.9  F (36.6  C) Oral 74 18 100 %   10/29/23 2128 (!) 146/87 97.8  F (36.6  C) Oral 89 16 96 %   10/29/23 1734 (!) 169/95 98.8  F (37.1  C) Oral 95 18 94 %   10/29/23 1554 -- -- -- -- -- 94 %   10/29/23 1525 (!) 162/90 98.1  F (36.7  C) Oral 96 18 --   10/29/23 1345 (!) 162/96 98.8  F (37.1  C) Oral -- -- 95 %   10/29/23 1330 (!) 155/98 -- -- 88 -- --   10/29/23 1315 (!) 156/90 -- -- 89 -- --   10/29/23 1300 (!) 161/103 -- -- 90 -- --   10/29/23 1245 (!) 156/98 -- -- 90 -- --   10/29/23 1230 (!) 157/96 -- -- 88 -- --   10/29/23 1215 (!) 154/88 -- -- 93 -- --   10/29/23 1200 (!) 129/95 -- -- 94 -- --   10/29/23 1145 (!) 151/98 -- -- 89 -- --   10/29/23 1130 (!) 171/99 -- -- 85 -- --   10/29/23 1115 (!) 167/99 -- -- 86 -- --   10/29/23 1100 (!) 160/96 -- -- 79 -- --   10/29/23 1045 (!) 160/109 -- -- 82 -- --   10/29/23 1030 (!) 171/99 -- -- 84 -- --     General Appearance: in no apparent distress.   Skin: normal, dry  Heart: regular rate and rhythm  Lungs: nonlabored resps   Abdomen: The abdomen is obese, and  mildly tender, RLQ. he is not tender over the kidney graft. The wound is stpaled, c/d/i. Patient wearing abdominal binder.  : tineo is present. Urine is yellow.   Extremities: edema: mild  Neurologic: awake, alert, and oriented. Tremor absent.  AV fistula LUE +thrill.   CVC    Data:   CMP  Recent Labs   Lab 10/30/23  0724 10/30/23  0538 10/29/23  1733 10/29/23  0944 10/29/23  0551 10/29/23  0433 10/28/23  1850 10/28/23  1722 10/28/23  1656 10/28/23  1617 10/28/23  0406   NA  --  135  --   --   --  132*   < > 137 137   < > 140   POTASSIUM  --  5.1  --  5.6*   < > 6.5*   < > 4.8 5.4*   < > 4.7   CHLORIDE  --  98  --   --   --  94*   < >  --   --   --  97*   CO2  --  23  --   --   --  15*   < >  --   --   --  26   *  147*   < >  --    < > 170*   < > 174* 152*   < > 91   BUN  --  43.0*  --   --   --  64.1*   < >  --   --   --  56.8*   CR  --  6.50*  --   --   --  9.65*   < >  --   --   --  8.34*   GFRESTIMATED  --  9*  --   --   --  5*   < >  --   --   --  6*   ANNETTE  --  8.2*  --   --   --  8.1*   < >  --   --   --  8.5*   ICAW  --   --   --   --   --   --   --  4.5 4.0*   < >  --    MAG  --  1.8  --   --   --  2.0   < >  --   --   --   --    PHOS  --  5.8*  --   --   --  4.9*   < >  --   --   --   --    ALBUMIN  --   --   --   --   --   --   --   --   --   --  4.5   BILITOTAL  --   --   --   --   --   --   --   --   --   --  0.3   ALKPHOS  --   --   --   --   --   --   --   --   --   --  57   AST  --   --   --   --   --   --   --   --   --   --  13   ALT  --   --   --   --   --   --   --   --   --   --  12    < > = values in this interval not displayed.     CBC  Recent Labs   Lab 10/30/23  0538 10/29/23  1717 10/29/23  0551 10/29/23  0433 10/28/23  1617 10/28/23  0407   HGB 8.9* 9.3*   < > 10.0*   < > 9.5*   WBC 5.3  --   --  12.2*   < > 6.6   *  --   --  160   < > 204   A1C  --   --   --   --   --  5.0    < > = values in this interval not displayed.     COAGS  Recent Labs   Lab 10/28/23  0406   INR 1.13   PTT 30      Urinalysis  Recent Labs   Lab Test 10/28/23  0231 10/16/21  1056 02/01/21  0826 06/30/20  0739 05/10/16  0738   COLOR Light Yellow Light Yellow   < > Yellow  --    APPEARANCE Clear Clear   < > Clear  --    URINEGLC 100* Negative   < > Negative  --    URINEBILI Negative Negative   < > Negative  --    URINEKETONE Negative Negative   < > Negative  --    SG 1.009 1.015   < > 1.020  --    UBLD Negative Moderate*   < > Trace*  --    URINEPH 8.5* 6.5   < > 5.5  --    PROTEIN 100* 300*   < > >=300*  --    NITRITE Negative Negative   < > Negative  --    LEUKEST Negative Negative   < > Negative  --    RBCU <1 1   < > O - 2  --    WBCU 1 1   < > 0 - 5  --    UTPG  --   --   --  1.55* 0.12    < > = values in this  interval not displayed.     Virology:  Hepatitis C Antibody   Date Value Ref Range Status   10/28/2023 Nonreactive Nonreactive Final   02/01/2021 Nonreactive NR^Nonreactive Final     Comment:     Assay performance characteristics have not been established for newborns,   infants, and children

## 2023-10-30 NOTE — PROGRESS NOTES
Handoff information     Type of transplant: DDKT  Date of transplant: 10/28/23  Direct/non-direct/PEP- NA  Transplant history: Tx Naive   Outstanding items for patient: None  Pertinent history: ESRD from FSGS, pt had COVID pneumonia 2021   Barriers to post transplant care: None

## 2023-10-30 NOTE — PROGRESS NOTES
[facility]   Transplant Nephrology Progress Note  Date of Admission:  10/28/2023  Today's Date: 10/30/2023    Recommendations:  - No acute indications for dialysis.  - UPC monitoring post transplant per FSGS protocol though less likely to recur (thought to be secondary FSGS).  - Recommend starting amlodipine 5 mg nightly.    Assessment & Plan   # DDKT: DGF with last HD yesterday.  Improving urine output, but he did have ~ 2 liters of urine output with native kidneys PTA.  No acute indications for dialysis.   - Baseline Creatinine: ~ TBD   - Proteinuria: Moderate (1-3 grams)   - Date DSA Last Checked: Oct/2023      Latest DSA:  pending   - BK Viremia: Not checked post transplant   - Kidney Tx Biopsy: No   - Transplant Ureteral Stent: Yes    # Immunosuppression: Tacrolimus immediate release (goal 8-10) and Mycophenolate mofetil (dose 750 mg every 12 hours)   - Induction with Recent Transplant:  High Intensity Protocol due to DGF   - Patient is in an immunosuppressed state and will continue to monitor for efficacy and toxicity of immunosuppression medications.   - Changes: Not at this time    # Infection Prophylaxis:   - PJP: Sulfa/TMP (Bactrim)  - CMV: Valganciclovir (Valcyte); CMV IgG Ab negative with donor pending    # Hypertension: Poorly controlled;  Goal BP: < 150/90   - Volume status: Euvolemic  EDW ~ 178 lbs   - Changes: Yes - Recommend starting amlodipine 5 mg nightly.    # Elevated Blood Glucose: Glucose generally running ~ 140-150   - Management as per primary team.    # Anemia in Chronic Renal Disease: Hgb: Trend down      JOSE: No   - Iron studies: Unknown at this time, but checked with dialysis    # Mineral Bone Disorder:   - Secondary renal hyperparathyroidism; PTH level: Unknown at this time, but checked with dialysis        On treatment: None  - Vitamin D; level: Unknown at this time, but checked with dialysis        On supplement: No  - Calcium; level: Low         On supplement: No  - Phosphorus; level: High        On binder: No    # Electrolytes:   - Potassium; level: Normal        On supplement: No  - Magnesium; level: Normal        On supplement: No  - Bicarbonate; level: Normal        On supplement: No     # HFpEF: Last cardiac echo (Feb/2021) showed normal LVEF ~ 60-65% with grade I diastolic dysfunction.    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    Marcel Dale MD  Transplant Nephrology  Contact information available via [facility] Paging/Directory    Interval History   Mr. Barnes's creatinine is 6.50 (10/30 0538); As expected with dialysis  Increased urine output, but patient did have ~ 2 liters output from native kidneys PTA.  Other significant labs/tests/vitals: Increased phosphorus, but otherwise stable electrolytes.  Stable hemoglobin.  Decreased platelet level.  No new events overnight.  No chest pain or shortness of breath.  No leg swelling.  No nausea and vomiting.  Bowel movements are formed.  No fever, sweats or chills.    Review of Systems   4 point ROS was obtained and negative except as noted in the Interval History.    MEDICATIONS:   acetaminophen  650 mg Oral Once    acetaminophen  650 mg Oral Once    acetaminophen  975 mg Oral Q8H    anti-thymocyte globulin  150 mg Intravenous Central line Once    atorvastatin  10 mg Oral Daily    carvedilol  6.25 mg Oral BID w/meals    diphenhydrAMINE  25-50 mg Oral Once    Or    diphenhydrAMINE  25-50 mg Per NG tube Once     heparin ANTICOAGULANT  5,000 Units Subcutaneous TID    insulin aspart  1-7 Units Subcutaneous TID AC    insulin aspart  1-5 Units Subcutaneous At Bedtime    [Held by provider] magnesium oxide  400 mg Oral Daily with lunch    methylPREDNISolone  100 mg Intravenous Once    mycophenolate  750 mg Oral BID IS    polyethylene glycol  17 g Oral Daily    senna-docusate  1 tablet Oral BID    sodium chloride (PF)  10 mL Intracatheter Q8H    tacrolimus  2 mg Oral BID IS    valGANciclovir  450 mg Oral Once per day on       furosemide 10 mg/hr (10/30/23 1044)       Physical Exam   Temp  Av  F (36.7  C)  Min: 97.6  F (36.4  C)  Max: 99.5  F (37.5  C)      Pulse  Av.5  Min: 65  Max: 88 Resp  Avg: 15.4  Min: 10  Max: 26  SpO2  Av.8 %  Min: 92 %  Max: 100 %    CVP (mmHg): 18 mmHgBP (!) 163/96 (BP Location: Right arm)   Pulse 80   Temp 98.3  F (36.8  C) (Oral)   Resp 16   Wt 80.9 kg (178 lb 6.4 oz)   SpO2 96%   BMI 26.15 kg/m     Date 10/29/23 0700 - 10/30/23 0659   Shift 2456-6226 0130-8282 4094-4499 24 Hour Total   INTAKE   P.O. 120   120   Shift Total(mL/kg) 120(1.48)   120(1.48)   OUTPUT   Urine 210   210   Shift Total(mL/kg) 210(2.6)   210(2.6)   Weight (kg) 80.92 80.92 80.92 80.92      Admit Weight: 79.4 kg (175 lb)     GENERAL APPEARANCE: alert and no distress  HENT: mouth without ulcers or lesions  RESP: lungs clear to auscultation - no rales, rhonchi or wheezes  CV: regular rhythm, normal rate, no rub, no murmur  EDEMA: no LE edema bilaterally  ABDOMEN: soft, nondistended, nontender, bowel sounds normal  MS: extremities normal - no gross deformities noted, no evidence of inflammation in joints, no muscle tenderness  SKIN: no rash  TX KIDNEY: minimal TTP  DIALYSIS ACCESS:  LUE AV fistula with good thrill    Data   All labs reviewed by me.  CMP  Recent Labs   Lab 10/30/23  1151 10/30/23  0724 10/30/23  0538 10/29/23  2126 10/29/23  1733 10/29/23  0944 10/29/23  0819 10/29/23  0733  10/29/23  0705 10/29/23  0551 10/29/23  0433 10/28/23  2134 10/28/23  1850 10/28/23  1722 10/28/23  1617 10/28/23  0406   NA  --   --  135  --   --   --   --   --   --   --  132*  --  136 137   < > 140   POTASSIUM  --   --  5.1  --   --  5.6*  --  5.9*  --  6.7* 6.5*   < > 4.5 4.8   < > 4.7   CHLORIDE  --   --  98  --   --   --   --   --   --   --  94*  --  99  --   --  97*   CO2  --   --  23  --   --   --   --   --   --   --  15*  --  20*  --   --  26   ANIONGAP  --   --  14  --   --   --   --   --   --   --  23*  --  17*  --   --  17*   * 141* 147* 152*   < >  --    < >  --    < >  --  170*  --  109* 174*   < > 91   BUN  --   --  43.0*  --   --   --   --   --   --   --  64.1*  --  58.6*  --   --  56.8*   CR  --   --  6.50*  --   --   --   --   --   --   --  9.65*  --  8.73*  --   --  8.34*   GFRESTIMATED  --   --  9*  --   --   --   --   --   --   --  5*  --  6*  --   --  6*   ANNETTE  --   --  8.2*  --   --   --   --   --   --   --  8.1*  --  7.8*  --   --  8.5*   MAG  --   --  1.8  --   --   --   --   --   --   --  2.0  --  2.1  --   --   --    PHOS  --   --  5.8*  --   --   --   --   --   --   --  4.9*  --  2.4*  --   --   --    PROTTOTAL  --   --   --   --   --   --   --   --   --   --   --   --   --   --   --  7.2   ALBUMIN  --   --   --   --   --   --   --   --   --   --   --   --   --   --   --  4.5   BILITOTAL  --   --   --   --   --   --   --   --   --   --   --   --   --   --   --  0.3   ALKPHOS  --   --   --   --   --   --   --   --   --   --   --   --   --   --   --  57   AST  --   --   --   --   --   --   --   --   --   --   --   --   --   --   --  13   ALT  --   --   --   --   --   --   --   --   --   --   --   --   --   --   --  12    < > = values in this interval not displayed.     CBC  Recent Labs   Lab 10/30/23  0538 10/29/23  1717 10/29/23  0944 10/29/23  0551 10/29/23  1653 10/28/23  2134 10/28/23  1850 10/28/23  1617 10/28/23  0404   HGB 8.9* 9.3* 9.8* 10.0* 10.0*   < > 9.5*   < > 9.5*    WBC 5.3  --   --   --  12.2*  --  4.6  --  6.6   RBC 2.63*  --   --   --  3.02*  --  2.78*  --  2.86*   HCT 26.4*  --   --   --  29.9*  --  27.2*  --  27.6*     --   --   --  99  --  98  --  97   MCH 33.8*  --   --   --  33.1*  --  34.2*  --  33.2*   MCHC 33.7  --   --   --  33.4  --  34.9  --  34.4   RDW 12.7  --   --   --  13.1  --  12.8  --  12.7   *  --   --   --  160  --  136*  --  204    < > = values in this interval not displayed.     INR  Recent Labs   Lab 10/28/23  0406   INR 1.13   PTT 30     ABG  Recent Labs   Lab 10/28/23  1722 10/28/23  1656 10/28/23  1617   O2PER 30.0 32.0 35.0      Urine Studies  Recent Labs   Lab Test 10/28/23  0231 10/16/21  1056 02/01/21  0826 06/30/20  0739   COLOR Light Yellow Light Yellow Yellow Yellow   APPEARANCE Clear Clear Clear Clear   URINEGLC 100* Negative Negative Negative   URINEBILI Negative Negative Negative Negative   URINEKETONE Negative Negative Negative Negative   SG 1.009 1.015 1.012 1.020   UBLD Negative Moderate* Negative Trace*   URINEPH 8.5* 6.5 6.0 5.5   PROTEIN 100* 300* 100* >=300*   UROBILINOGEN  --   --   --  0.2   NITRITE Negative Negative Negative Negative   LEUKEST Negative Negative Negative Negative   RBCU <1 1 <1 O - 2   WBCU 1 1 0 0 - 5     Recent Labs   Lab Test 06/30/20  0739 05/10/16  0738   UTPG 1.55* 0.12     PTH  Recent Labs   Lab Test 10/19/21  0806 06/30/20  0738   PTHI 139* 123*     Iron Studies  Recent Labs   Lab Test 10/19/21  0640   IRON 49   *   IRONSAT 26   ROBB 1,735*       IMAGING:  All imaging studies reviewed by me.

## 2023-10-31 PROBLEM — T38.0X5A STEROID-INDUCED DIABETES MELLITUS (H): Status: ACTIVE | Noted: 2023-01-01

## 2023-10-31 PROBLEM — D84.9 IMMUNOSUPPRESSED STATUS (H): Status: ACTIVE | Noted: 2023-01-01

## 2023-10-31 PROBLEM — E09.9 STEROID-INDUCED DIABETES MELLITUS (H): Status: ACTIVE | Noted: 2023-01-01

## 2023-10-31 NOTE — PROGRESS NOTES
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 Patient is now s/p  donor kidney transplant with ureteral stent on 10/28/23.    Graft function: DGF. Dialyzed POD 1 due to hyperkalemia. Patient did make urine prior to transplant. Lasix gtt increased to 20mg/hr 10/29, UOP increasing 10/30 AM. Will decrease to 10 mg/hr.  -US kidney: patent doppler     Immunosuppression management: Switch to high risk protocol due to DGF.   Thymoglobulin 2mg/kg OR, POD 1. 150 mg today for a total of 475 mg or 5.98 mg/kg.   mg OR, 250mg POD 1, 100 mg POD 2   mg BID  Tac 2 mg BID    Hematology: Stable  Anemia on chronic kidney disease, acute bleeding: Hb stable, drifted down to 9     Cardiorespiratory: Stable. Patient given IS.   Hypertension: PTA on amlodipine 10 mg daily, carvedilol 12.5 mg BID. Carvedilol 6.25 BID, will restart norvasc 5 mg daily.  HLD hx: Not on medications PTA. Atorvastatin 10 mg daily started.     GI/Nutrition:   Diet: ZHAO-Low potassium diet.   Bowel regimen ordered.    Neuro:   Acute surgical pain: Tylenol scheduled. Oxycodone PRN.   Anxiety: PTA Xanax daily PRN. Monitor    Endocrine: Steroid hyperglycemia. Treat with Novolog sliding scale PRN.    Fluid/Electrolytes: MIVF: Stopped  Hyperkalemia: HD 10/29. K 4.9, stable    : Duval to remove today, POD 3    Infectious disease: afebrile    Prophylaxis: DVT (mechanical/heparin), viral (Valcyte), PJP (Bactrim).     Disposition: 7A       TERESA/Fellow/Resident Provider: Amparo Decker PA-C [id number]    Faculty: Tracee Damian MD    _________________________________________________________________  Transplant History: Admitted 10/28/2023 for DDKT.  10/28/2023 (Kidney),  Postoperative day: 3     Interval History: History is obtained from the patient  Overnight events: Pain controlled with medication. Some nausea with tineo removal. +Productive cough. Strong cough.     History of COVID in 2021. Patient was on oxygen but was not intubated. Patient said he has some residual GROVES, but is able to walk a few miles a day w/o issues.     ROS:   A 10-point review of systems was negative except as noted above.    Meds:   acetaminophen  975 mg Oral Q8H    amLODIPine  5 mg Oral At Bedtime    atorvastatin  10 mg Oral Daily    carvedilol  6.25 mg Oral BID w/meals    heparin ANTICOAGULANT  5,000 Units Subcutaneous TID    insulin aspart  1-7 Units Subcutaneous TID AC    insulin aspart  1-5 Units Subcutaneous At Bedtime    [Held by provider] magnesium oxide  400 mg Oral Daily with lunch    mycophenolate  750 mg Oral BID IS    polyethylene glycol  17 g Oral Daily    senna-docusate  1 tablet Oral BID    sodium chloride (PF)  10 mL Intracatheter Q8H    tacrolimus  2 mg Oral BID IS    valGANciclovir  450 mg Oral Once per day on Monday Thursday       Physical Exam:     Admit Weight: 79.4 kg (175 lb)    Current vitals:   BP (!) 171/95   Pulse 82   Temp 97.8  F (36.6  C) (Oral)   Resp 17   Wt 82.3 kg (181 lb 7 oz)   SpO2 98%   BMI 26.60 kg/m        Vital sign ranges:    Temp:  [97.7  F (36.5  C)-98.9  F (37.2  C)] 97.8  F (36.6  C)  Pulse:  [82-94] 82  Resp:  [16-18] 17  BP: (141-197)/() 171/95  SpO2:  [94 %-98 %] 98 %  Patient Vitals for the past 24 hrs:   BP Temp Temp src Pulse Resp SpO2 Weight   10/31/23 1002 (!) 171/95 -- -- -- -- -- --   10/31/23 0950 (!) 178/96 97.8  F (36.6  C) Oral 82 17 98 % --   10/31/23 0859 -- -- -- -- -- -- 82.3 kg (181 lb 7 oz)   10/31/23 0541 (!) 165/91 98.2  F (36.8  C) Oral 84 16 -- --   10/31/23 0230 (!) 154/88 -- -- 89 -- 95 % --   10/31/23 0211 (!) 197/107 -- -- -- -- -- --   10/31/23 0100 -- 97.7  F (36.5  C) Oral -- 16 -- --   10/30/23 2134 (!) 156/99 97.9   F (36.6  C) Oral 82 16 95 % --   10/30/23 1800 (!) 141/95 98.9  F (37.2  C) Oral 91 16 96 % --   10/30/23 1700 (!) 154/85 -- -- 91 -- 95 % --   10/30/23 1601 (!) 150/86 -- -- 87 -- 94 % --   10/30/23 1501 (!) 165/95 98.2  F (36.8  C) Oral 94 16 96 % --   10/30/23 1417 (!) 148/80 98.3  F (36.8  C) Oral 83 18 98 % --   10/30/23 1337 (!) 183/108 -- -- -- -- 97 % --     General Appearance: in no apparent distress.   Skin: normal, dry  Heart: regular rate and rhythm  Lungs: nonlabored resps   Abdomen: The abdomen is obese, and  mildly tender, RLQ. he is not tender over the kidney graft. The wound is stpaled, c/d/i. Patient wearing abdominal binder.  : tineo is present. Urine is yellow- now removed.   Extremities: edema: mild  Neurologic: awake, alert, and oriented. Tremor absent.  AV fistula LUE +thrill.   CVC    Data:   CMP  Recent Labs   Lab 10/31/23  0737 10/31/23  0531 10/30/23  1655 10/30/23  1310 10/30/23  0724 10/30/23  0538 10/28/23  1850 10/28/23  1722 10/28/23  1656 10/28/23  1617 10/28/23  0406   NA  --  137  --  138  --  135   < > 137 137   < > 140   POTASSIUM  --  4.9  --  4.6  --  5.1   < > 4.8 5.4*   < > 4.7   CHLORIDE  --  100  --  98  --  98   < >  --   --   --  97*   CO2  --  21*  --  24  --  23   < >  --   --   --  26   * 115*   < > 112*   < > 147*   < > 174* 152*   < > 91   BUN  --  62.8*  --  50.3*  --  43.0*   < >  --   --   --  56.8*   CR  --  7.41*  --  6.84*  --  6.50*   < >  --   --   --  8.34*   GFRESTIMATED  --  7*  --  8*  --  9*   < >  --   --   --  6*   ANNETTE  --  8.4*  --  8.2*  --  8.2*   < >  --   --   --  8.5*   ICAW  --   --   --   --   --   --   --  4.5 4.0*   < >  --    MAG  --  1.8  --   --   --  1.8   < >  --   --   --   --    PHOS  --  6.0*  --   --   --  5.8*   < >  --   --   --   --    ALBUMIN  --   --   --   --   --   --   --   --   --   --  4.5   BILITOTAL  --   --   --   --   --   --   --   --   --   --  0.3   ALKPHOS  --   --   --   --   --   --   --   --   --   --   57   AST  --   --   --   --   --   --   --   --   --   --  13   ALT  --   --   --   --   --   --   --   --   --   --  12    < > = values in this interval not displayed.     CBC  Recent Labs   Lab 10/31/23  0531 10/30/23  0538 10/28/23  1617 10/28/23  0407   HGB 9.0* 8.9*   < > 9.5*   WBC 2.5* 5.3   < > 6.6   * 117*   < > 204   A1C  --   --   --  5.0    < > = values in this interval not displayed.     COAGS  Recent Labs   Lab 10/28/23  0406   INR 1.13   PTT 30      Urinalysis  Recent Labs   Lab Test 10/28/23  0231 10/16/21  1056 02/01/21  0826 06/30/20  0739 05/10/16  0738   COLOR Light Yellow Light Yellow   < > Yellow  --    APPEARANCE Clear Clear   < > Clear  --    URINEGLC 100* Negative   < > Negative  --    URINEBILI Negative Negative   < > Negative  --    URINEKETONE Negative Negative   < > Negative  --    SG 1.009 1.015   < > 1.020  --    UBLD Negative Moderate*   < > Trace*  --    URINEPH 8.5* 6.5   < > 5.5  --    PROTEIN 100* 300*   < > >=300*  --    NITRITE Negative Negative   < > Negative  --    LEUKEST Negative Negative   < > Negative  --    RBCU <1 1   < > O - 2  --    WBCU 1 1   < > 0 - 5  --    UTPG  --   --   --  1.55* 0.12    < > = values in this interval not displayed.     Virology:  Hepatitis C Antibody   Date Value Ref Range Status   10/28/2023 Nonreactive Nonreactive Final   02/01/2021 Nonreactive NR^Nonreactive Final     Comment:     Assay performance characteristics have not been established for newborns,   infants, and children

## 2023-10-31 NOTE — PLAN OF CARE
BP (!) 145/90 (BP Location: Right arm)   Pulse 83   Temp 98.3  F (36.8  C) (Oral)   Resp 16   Wt 82.3 kg (181 lb 7 oz)   SpO2 96%   BMI 26.60 kg/m    Neuro: A/Ox3   VS: VSS on RA except elevated BP treated by PRN Hydralazine 10mg IV (provider aware)  Pain: Abdominal/incisional pain treated by PRN oxycodone 5mg and scheduled tylenol  GI: On 2gr K diet and tolerating good. Denies nausea. BMx1  : Duval removed this AM, voided 200ml clear urine, PVR 39ml  BG WNL  PIV SL  Abdominal incision intact KIRTI  Activity - Independent in room and require more motivation to walk in wallway  Education - Med card/ lab book updated  Plan of Care - Continue with POC

## 2023-10-31 NOTE — PLAN OF CARE
BP (!) 143/80   Pulse 83   Temp 97.8  F (36.6  C) (Oral)   Resp 16   Wt 82.3 kg (181 lb 7 oz)   SpO2 96%   BMI 26.60 kg/m      Shift: 4160-3606  VS: Slightly hypertensive otherwise all VSS on RA, afebrile  Neuro: Aox4  Behaviors: calm and cooperative  BG: ACHS,  Labs: Creat 7.41  Respiratory: WDL  Cardiac: WDL  Pain/Nausea: reports soreness around incision/denies nausea   Diet: 2 gm K+ diet  IV Access: RPIV, LAV fistula   GI/: voids spontaneously without difficulty, Last BM today   Skin: Abd incision stapled KIRTI  Mobility: indp  Events/Education: internal jugular and tineo removed today, 2nd PVR complete  Plan: continue plan of care

## 2023-10-31 NOTE — PLAN OF CARE
BP (!) 156/99 (BP Location: Right arm)   Pulse 82   Temp 97.9  F (36.6  C) (Oral)   Resp 16   Wt 80.9 kg (178 lb 6.4 oz)   SpO2 95%   BMI 26.15 kg/m      Shift: 0402-0864  VS: stable on RA, afebrile  Neuro: AOx4  BG: ACHS   Labs: pending morning collections   Respiratory: WNL  Pain/Nausea/PRN: PRN Zofran and oxy was given   Diet: 2g K+  LDA: R internal jugular and 1 PIV   GI/: tineo with adequate output   Skin: ABD Incision   Mobility: SBA  Plan: continue POC     Handoff given to following RN.

## 2023-10-31 NOTE — PLAN OF CARE
BP (!) 165/91 (BP Location: Right arm)   Pulse 84   Temp 98.2  F (36.8  C) (Oral)   Resp 16   Wt 80.9 kg (178 lb 6.4 oz)   SpO2 95%   BMI 26.15 kg/m      SHIFT: 9192-9202  ISOLATION: NA  VITALS: HTN (197/101 down to 154/88). AOVSS on RA  BG: ACHS + 0200  Recent Labs   Lab 10/31/23  0531 10/31/23  0154 10/30/23  2253 10/30/23  1655 10/30/23  1310 10/30/23  1151   * 137* 162* 126* 112* 140*   NEURO: A&O x4.  DIET: 2g K Diet  PAIN: No reported pain or nausea, heartburn reported and managed with Tums.  RESPIRATORY: WDL  CARDIAC: WDL  : Voiding adequate amounts of clear, shanthi urine into a tineo catheter.  GI: LBM: 10/30  TUBES: R PIV, L AV Fistula, R chest HD line, tineo cath.  MIVF/GTT/ABX: NA  ASSIST: Ao1/SBA  SAFETY: NA  SKIN: Abodminal incision KIRTI.  LABS:   Recent Labs   Lab 10/31/23  0531 10/30/23  1310 10/30/23  0538 10/29/23  1717 10/29/23  0551 10/29/23  0433   POTASSIUM 4.9 4.6 5.1  --    < > 6.5*   PHOS 6.0*  --  5.8*  --   --  4.9*   MAG 1.8  --  1.8  --   --  2.0   HGB 9.0*  --  8.9* 9.3*   < > 10.0*   CR 7.41* 6.84* 6.50*  --   --  9.65*    < > = values in this interval not displayed.   PLAN: Per Marcel Dale MD:  - No acute indications for dialysis.  - UPC monitoring post transplant per FSGS protocol though less likely to recur (thought to be secondary FSGS).  - Recommend starting amlodipine 5 mg nightly.

## 2023-10-31 NOTE — PROGRESS NOTES
[facility]   Transplant Nephrology Progress Note  Date of Admission:  10/28/2023  Today's Date: 10/31/2023    Recommendations:  - No acute indications for dialysis.  - UPC monitoring post transplant per FSGS protocol though less likely to recur (thought to be secondary FSGS).  - Agree with starting amlodipine 5 mg nightly.  - Would consider starting famotidine 20 mg nightly for heartburn symptoms.    Assessment & Plan   # DDKT: DGF with last HD 10/29.  Good urine output, but he did have ~ 2 liters of urine output with native kidneys PTA, so unclear if native or transplant contributing.  No acute indications for dialysis.   - Baseline Creatinine: ~ TBD   - Proteinuria: Moderate (1-3 grams)   - Date DSA Last Checked: Oct/2023      Latest DSA:  pending   - BK Viremia: Not checked post transplant   - Kidney Tx Biopsy: No   - Transplant Ureteral Stent: Yes    # Immunosuppression: Tacrolimus immediate release (goal 8-10) and Mycophenolate mofetil (dose 750 mg every 12 hours)   - Induction with Recent Transplant:  High Intensity Protocol due to DGF   - Patient is in an immunosuppressed state and will continue to monitor for efficacy and toxicity of immunosuppression medications.   - Changes: Not at this time    # Infection Prophylaxis:   - PJP: Sulfa/TMP (Bactrim)  - CMV: Valganciclovir (Valcyte); CMV IgG Ab negative with donor pending    # Hypertension: Inadequate control;  Goal BP: < 150/90   - Volume status: Euvolemic  EDW ~ 178 lbs   - Changes: Yes - Recommend starting amlodipine 5 mg nightly.    # Elevated Blood Glucose: Glucose generally running ~ 140-150   - Management as per primary team.    # Anemia in Chronic Renal Disease: Hgb: Trend down      JOSE: No   - Iron studies: Unknown at this time, but checked with dialysis    # Mineral Bone Disorder:   - Secondary renal hyperparathyroidism; PTH level: Unknown at this time, but checked with dialysis        On treatment:  None  - Vitamin D; level: Unknown at this time, but checked with dialysis        On supplement: No  - Calcium; level: Low        On supplement: No  - Phosphorus; level: High        On binder: No    # Electrolytes:   - Potassium; level: Normal        On supplement: No  - Magnesium; level: Normal        On supplement: No  - Bicarbonate; level: Stable low        On supplement: No     # HFpEF: Last cardiac echo (Feb/2021) showed normal LVEF ~ 60-65% with grade I diastolic dysfunction.    # Heartburn Symptoms: Patient reports some heartburn symptoms after eating, which is not uncommon for him.   - Would consider starting famotidine 20 mg nightly.    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    Marcel Dale MD  Transplant Nephrology  Contact information available via [facility] Paging/Directory    Interval History   Mr. Barnes's creatinine is 7.41 (10/31 0531); Trend up.  Good urine output.  Other significant labs/tests/vitals: Stable electrolytes.  Stable hemoglobin.  No new events overnight.  No chest pain or shortness of breath.  No leg swelling.  No nausea and vomiting.  Bowel movements are formed.  Some heartburn symptoms.  No fever, sweats or chills.    Review of Systems   4 point ROS was obtained and negative except as noted in the Interval History.    MEDICATIONS:   acetaminophen  975 mg Oral Q8H    amLODIPine  5 mg Oral At Bedtime    atorvastatin  10 mg Oral Daily    carvedilol  6.25 mg Oral BID w/meals    heparin ANTICOAGULANT   5,000 Units Subcutaneous TID    insulin aspart  1-7 Units Subcutaneous TID AC    insulin aspart  1-5 Units Subcutaneous At Bedtime    [Held by provider] magnesium oxide  400 mg Oral Daily with lunch    mycophenolate  750 mg Oral BID IS    polyethylene glycol  17 g Oral Daily    senna-docusate  1 tablet Oral BID    sodium chloride (PF)  10 mL Intracatheter Q8H    tacrolimus  2 mg Oral BID IS    valGANciclovir  450 mg Oral Once per day on        Physical Exam   Temp  Av  F (36.7  C)  Min: 97.6  F (36.4  C)  Max: 99.5  F (37.5  C)      Pulse  Av.5  Min: 65  Max: 88 Resp  Avg: 15.4  Min: 10  Max: 26  SpO2  Av.8 %  Min: 92 %  Max: 100 %    CVP (mmHg): 18 mmHgBP (!) 171/95   Pulse 82   Temp 97.8  F (36.6  C) (Oral)   Resp 17   Wt 82.3 kg (181 lb 7 oz)   SpO2 98%   BMI 26.60 kg/m     Date 10/29/23 0700 - 10/30/23 0659   Shift 6838-4697 7477-5597 3503-1907 24 Hour Total   INTAKE   P.O. 120   120   Shift Total(mL/kg) 120(1.48)   120(1.48)   OUTPUT   Urine 210   210   Shift Total(mL/kg) 210(2.6)   210(2.6)   Weight (kg) 80.92 80.92 80.92 80.92      Admit Weight: 79.4 kg (175 lb)     GENERAL APPEARANCE: alert and no distress  HENT: mouth without ulcers or lesions  RESP: lungs clear to auscultation - no rales, rhonchi or wheezes  CV: regular rhythm, normal rate, no rub, no murmur  EDEMA: no LE edema bilaterally  ABDOMEN: soft, nondistended, nontender, bowel sounds normal  MS: extremities normal - no gross deformities noted, no evidence of inflammation in joints, no muscle tenderness  SKIN: no rash  TX KIDNEY: minimal TTP  DIALYSIS ACCESS:  LUE AV fistula with good thrill    Data   All labs reviewed by me.  CMP  Recent Labs   Lab 10/31/23  1258 10/31/23  0737 10/31/23  0531 10/31/23  0154 10/30/23  1655 10/30/23  1310 10/30/23  0724 10/30/23  0538 10/29/23  1733 10/29/23  0944 10/29/23  0551 10/29/23  0433 10/28/23  2134 10/28/23  1850 10/28/23  1617 10/28/23  0406   NA  --   --  137  --   --   138  --  135  --   --   --  132*  --  136   < > 140   POTASSIUM  --   --  4.9  --   --  4.6  --  5.1  --  5.6*   < > 6.5*   < > 4.5   < > 4.7   CHLORIDE  --   --  100  --   --  98  --  98  --   --   --  94*  --  99  --  97*   CO2  --   --  21*  --   --  24  --  23  --   --   --  15*  --  20*  --  26   ANIONGAP  --   --  16*  --   --  16*  --  14  --   --   --  23*  --  17*  --  17*   GLC 96 101* 115* 137*   < > 112*   < > 147*   < >  --    < > 170*  --  109*   < > 91   BUN  --   --  62.8*  --   --  50.3*  --  43.0*  --   --   --  64.1*  --  58.6*  --  56.8*   CR  --   --  7.41*  --   --  6.84*  --  6.50*  --   --   --  9.65*  --  8.73*  --  8.34*   GFRESTIMATED  --   --  7*  --   --  8*  --  9*  --   --   --  5*  --  6*  --  6*   ANNETTE  --   --  8.4*  --   --  8.2*  --  8.2*  --   --   --  8.1*  --  7.8*  --  8.5*   MAG  --   --  1.8  --   --   --   --  1.8  --   --   --  2.0  --  2.1  --   --    PHOS  --   --  6.0*  --   --   --   --  5.8*  --   --   --  4.9*  --  2.4*  --   --    PROTTOTAL  --   --   --   --   --   --   --   --   --   --   --   --   --   --   --  7.2   ALBUMIN  --   --   --   --   --   --   --   --   --   --   --   --   --   --   --  4.5   BILITOTAL  --   --   --   --   --   --   --   --   --   --   --   --   --   --   --  0.3   ALKPHOS  --   --   --   --   --   --   --   --   --   --   --   --   --   --   --  57   AST  --   --   --   --   --   --   --   --   --   --   --   --   --   --   --  13   ALT  --   --   --   --   --   --   --   --   --   --   --   --   --   --   --  12    < > = values in this interval not displayed.     CBC  Recent Labs   Lab 10/31/23  0531 10/30/23  0538 10/29/23  1717 10/29/23  0944 10/29/23  0551 10/29/23  7293 10/28/23  4012 10/28/23  8640   HGB 9.0* 8.9* 9.3* 9.8*   < > 10.0*   < > 9.5*   WBC 2.5* 5.3  --   --   --  12.2*  --  4.6   RBC 2.76* 2.63*  --   --   --  3.02*  --  2.78*   HCT 27.0* 26.4*  --   --   --  29.9*  --  27.2*   MCV 98 100  --   --   --  99  --  98    MCH 32.6 33.8*  --   --   --  33.1*  --  34.2*   MCHC 33.3 33.7  --   --   --  33.4  --  34.9   RDW 12.8 12.7  --   --   --  13.1  --  12.8   * 117*  --   --   --  160  --  136*    < > = values in this interval not displayed.     INR  Recent Labs   Lab 10/28/23  0406   INR 1.13   PTT 30     ABG  Recent Labs   Lab 10/28/23  1722 10/28/23  1656 10/28/23  1617   O2PER 30.0 32.0 35.0      Urine Studies  Recent Labs   Lab Test 10/28/23  0231 10/16/21  1056 02/01/21  0826 06/30/20  0739   COLOR Light Yellow Light Yellow Yellow Yellow   APPEARANCE Clear Clear Clear Clear   URINEGLC 100* Negative Negative Negative   URINEBILI Negative Negative Negative Negative   URINEKETONE Negative Negative Negative Negative   SG 1.009 1.015 1.012 1.020   UBLD Negative Moderate* Negative Trace*   URINEPH 8.5* 6.5 6.0 5.5   PROTEIN 100* 300* 100* >=300*   UROBILINOGEN  --   --   --  0.2   NITRITE Negative Negative Negative Negative   LEUKEST Negative Negative Negative Negative   RBCU <1 1 <1 O - 2   WBCU 1 1 0 0 - 5     Recent Labs   Lab Test 06/30/20  0739 05/10/16  0738   UTPG 1.55* 0.12     PTH  Recent Labs   Lab Test 10/19/21  0806 06/30/20  0738   PTHI 139* 123*     Iron Studies  Recent Labs   Lab Test 10/19/21  0640   IRON 49   *   IRONSAT 26   ROBB 1,735*       IMAGING:  All imaging studies reviewed by me.

## 2023-10-31 NOTE — DISCHARGE SUMMARY
[facility]    Discharge Summary  Transplant Surgery    Date of Admission:  10/28/2023  Date of Discharge:  2023  Discharging Provider: Amparo Decker PA-C/ Dr. Damian    Discharge Diagnoses   Principal Problem:    -donor kidney transplant  Active Problems:    ESRD (end stage renal disease) (H)    Delayed graft function of kidney    Hyperkalemia    Immunosuppressed status (H24)    Steroid-induced diabetes mellitus         Pre-operative diagnosis: End stage renal disease (H) [N18.6] 2/2 FSGS   Post-operative diagnosis same   Procedure: Procedure(s):  Transplant kidney recipient  donor, ureteral stent placement, bench kidney   Surgeon(s): Surgeon(s) and Role:     * Hannah Gibson MD - Primary     * River Valenzuela MD - Fellow - Assisting   Estimated blood loss: 50 mL         Specimens: * No specimens in log *   Findings: Urine production prior to implantation.         Follow-ups Needed After Discharge       Unresulted Labs Ordered in the Past 30 Days of this Admission       Date and Time Order Name Status Description    10/28/2023  1:55 AM BK Virus IgG Antibody In process         These results will be followed up by transplant coordinator    Discharge Disposition   Discharged to home  Condition at discharge: Good    Hospital Course   Matteo Barnes is a 68 year old male with PMH significant for ESKD secondary to biopsy provien FSGS on HD since 10/2021, HTN, HLD, and gout. He was admitted on 10/28/2023 following a  donor kidney transplant.  The following problems were addressed during his hospitalization:     donor Kidney transplant with ureteral stent, Delayed graft function: Requried dialysis POD #1 due to hyperkalemia. Anuric prior to transplant, improved with lasix gtt. Cr still trending up at the time of discharge, but no acute indications for dialysis. Will continue to monitor as an outpatient. Potassium  stabilized and phosphorus was improving.  Duval removed on POD#3 and he was voiding without retention.  -US kidney: patent doppler    Immunosuppressed due to medications:  -Induction with Thymoglobulin and steroid taper.  -Tacrolimus, goal level 8-10 (12 hour trough).  -Mycophenolate 750mg BID.  -Infectious prophylaxis with Bactrim indefinitely and valganciclovir x 6 months    Transplant coordinator Elsa Adrian  [phone]  Donor type:  DBD  DSA at time of transplant:  No  Ureteral stent: yes  CMV:  Donor ? / Recipient -  EBV:  Donor ? / Recipient +  Thymoglobulin:  5.98mg/kg     Hyperkalemia: as above requried HD 10/29. K 4.5 at discharge, stable    Hypomagensemia: Mag ox daily    Hyperphosphatemia: Monitor with improving renal function    Anemia on chronic kidney disease, acute bleeding: Hgb stable ~9    Hypertension: PTA on amlodipine 10 mg daily, carvedilol 12.5 mg BID. He will discharge on Carvedilol 6.25 BID, norvasc 5 mg daily.    Acute surgical pain: Tylenol scheduled. Oxycodone PRN.     Anxiety: PTA Xanax daily PRN    Steroid induced hyperglycemia. Treat with Novolog sliding scale PRN. He will not discharge on insulin.     Consultations This Hospital Stay   NEPHROLOGY KIDNEY/PANCREAS TRANSPLANT ADULT IP CONSULT  NURSING TO CONSULT FOR VASCULAR ACCESS CARE IP CONSULT  SOT MEDICATION HISTORY IP PHARMACY CONSULT  SOCIAL WORK IP CONSULT  PHARMACY IP CONSULT  NUTRITION SERVICES ADULT IP CONSULT  NEPHROLOGY KIDNEY/PANCREAS TRANSPLANT ADULT IP CONSULT  CARE MANAGEMENT / SOCIAL WORK IP CONSULT    Code Status   Full Code    Time Spent on this Encounter   I, Amparo Decker PA-C, personally saw the patient today and spent greater than 30 minutes discharging this patient.       Amparo Decker PA-C  [facility]  ______________________________________________________________________    Physical Exam   Temp: 97.9  F (36.6  C) Temp src: Oral BP: (!) 149/87 Pulse:  77   Resp: 18 SpO2: 99 % O2 Device: None (Room air)    Vitals:    10/29/23 0615 10/31/23 0859 23 0540   Weight: 80.9 kg (178 lb 6.4 oz) 82.3 kg (181 lb 7 oz) 81.6 kg (180 lb)     Vital Signs with Ranges  Temp:  [97.7  F (36.5  C)-98.3  F (36.8  C)] 97.9  F (36.6  C)  Pulse:  [75-83] 77  Resp:  [16-18] 18  BP: (143-163)/(80-96) 149/87  SpO2:  [94 %-99 %] 99 %  I/O last 3 completed shifts:  In: 360 [P.O.:360]  Out: 1485 [Urine:1485]    Constitutional: Awake, alert, cooperative, no apparent distress, and appears stated age.  Eyes: Lids and lashes normal, pupils equal, round, extra ocular muscles intact, sclera clear, conjunctiva normal.  ENT: Normocephalic, without obvious abnormality, atraumatic  Respiratory: Nonlabored resps on RA  Cardiovascular: Perfused  GI: Soft, non-distended, non-tender, incision stapled, c/d/i  Genitourinary:  Voiding post tineo removal  Skin: No bruising or bleeding, normal skin color, texture  Musculoskeletal: There is no redness, warmth, or swelling of the joints.  Full range of motion noted.    Neurologic: Awake, alert, oriented to name, place and time.   Neuropsychiatric: Calm, normal eye contact, alert, normal affect, oriented to self, place, time and situation, memory for past and recent events intact and thought process normal.    Primary Care Physician   Davidson Capone    Discharge Orders      PAC Visit Referral (For [facility] Only)      Reason for your hospital stay    Patient underwent  donor kidney transplant with ureteral stent with Dr. Gibson on 10/28/23. He underwent dialysis on post op day 1 for hyperkalemia.     Activity    Your activity upon discharge: Walk at least four times a day, lift no greater than 10 pounds for 6 weeks from the time of surgery.  After 6 weeks time please return to your normal exercise routine.  No driving while taking narcotics or until 3 weeks after surgery.     [facility]ult [facility] Follow-up and recommended labs and tests    [facility]  [place] FOLLOW UP:     1. [facility]: Over the next 2 days you will be seen in the [facility] (ph. [phone], option 3). Your labs will be drawn at the beginning of your appointment. DO NOT take your medications prior to having labs drawn. Please bring all your medications with you from home to take after labs are drawn.     2. Follow up with Dr. Gibson in Transplant Clinic in 1-2 weeks.     3. Follow up with Transplant Nephrologist as scheduled.     4.  Ureteral stent removal in 4-6 weeks, to be scheduled by Transplant Coordinator. If a  does not contact you for this, please contact your transplant coordinator.     5. Follow up with your primary care provider in ~8 weeks. Patient to schedule.     Remember to always bring an updated medication list to all appointments.        Call your Transplant Coordinator ([phone]) with questions about Transplant Center appointment scheduling.     LABS:     CBC, BMP, magnesium, phosphorus, tacrolimus level to be drawn daily while in ATC, then every Monday and Thursday by home health care nurse if arranged, or at an outpatient lab.     Monitor and record    Monitor blood pressure and weight daily.     When to contact your care team    WHEN TO CONTACT YOUR  COORDINATOR:     Transplant Coordinator [phone]     Notify your coordinator if you have pain over your kidney, increased redness or drainage from your incision, fever greater than 100F, decreased urine output or new or increased amount of blood in urine.     Notify your coordinator immediately if you are ever unable to take your immunosuppressive medications for any reason.     If you have URGENT concerns after office hours, please call the hospital switchboard at [phone] and ask to have the organ transplant nurse on-call paged. If you have a life-threatening emergency, go to the nearest emergency room.     Wound care and dressings    Instructions to care for your  wound at home: If you have staples in place, they will be removed in 3 weeks after operation. Wash incision daily with soap and water. Do not soak or scrub.     Diet    Follow this diet upon discharge: Low potassium diet until kidney function normalizing         Discharge Medications   Current Discharge Medication List        START taking these medications    Details   atorvastatin (LIPITOR) 10 MG tablet Take 1 tablet (10 mg) by mouth daily  Qty: 30 tablet, Refills: 11    Associated Diagnoses: ESRD (end stage renal disease) (H)      famotidine (PEPCID) 10 MG tablet Take 1 tablet (10 mg) by mouth at bedtime  Qty: 30 tablet, Refills: 11    Associated Diagnoses: -donor kidney transplant      mycophenolate (GENERIC EQUIVALENT) 250 MG capsule Take 3 capsules (750 mg) by mouth 2 times daily  Qty: 180 capsule, Refills: 11    Associated Diagnoses: ESRD (end stage renal disease) (H)      oxyCODONE (ROXICODONE) 5 MG tablet Take 0.5-1 tablets (2.5-5 mg) by mouth every 4 hours as needed for moderate pain  Qty: 20 tablet, Refills: 0    Associated Diagnoses: ESRD (end stage renal disease) (H)      senna-docusate (SENOKOT-S/PERICOLACE) 8.6-50 MG tablet Take 1 tablet by mouth 2 times daily  Qty: 60 tablet, Refills: 11    Associated Diagnoses: ESRD (end stage renal disease) (H)      sulfamethoxazole-trimethoprim (BACTRIM) 400-80 MG tablet Take 1 tablet by mouth three times a week  Qty: 30 tablet, Refills: 11    Comments: Increase dose to 1 tablet by mouth daily as directed by transplant team (based on improving kidney function)  Associated Diagnoses: -donor kidney transplant      !! tacrolimus (GENERIC) 0.5 MG capsule Take 1 capsule (0.5 mg) by mouth 2 times daily as needed (When directed by transplant team for dose titration)  Qty: 60 capsule, Refills: 11    Comments: When directed by transplant team for dose titration  Associated Diagnoses: -donor kidney transplant      !! tacrolimus (GENERIC) 1 MG capsule  Take 4 capsules (4 mg) by mouth 2 times daily  Qty: 240 capsule, Refills: 11    Associated Diagnoses: ESRD (end stage renal disease) (H)      valGANciclovir (VALCYTE) 450 MG tablet Take 1 tablet (450 mg) by mouth twice a week Increase dose to 900 mg by mouth daily when directed by transplant team (based on improving renal function)  Qty: 60 tablet, Refills: 5    Associated Diagnoses: ESRD (end stage renal disease) (H)       !! - Potential duplicate medications found. Please discuss with provider.        CONTINUE these medications which have CHANGED    Details   amLODIPine (NORVASC) 5 MG tablet Take 1 tablet (5 mg) by mouth daily  Qty: 30 tablet, Refills: 11    Associated Diagnoses: -donor kidney transplant; Hypertension goal BP (blood pressure) < 140/90      carvedilol (COREG) 6.25 MG tablet Take 1 tablet (6.25 mg) by mouth 2 times daily (with meals)  Qty: 60 tablet, Refills: 11    Associated Diagnoses: -donor kidney transplant; Hypertension goal BP (blood pressure) < 140/90           CONTINUE these medications which have NOT CHANGED    Details   acetaminophen (TYLENOL) 500 MG tablet Take 500-1,000 mg by mouth every 6 hours as needed for mild pain      ALPRAZolam (XANAX) 0.25 MG tablet TAKE ONE TABLET BY MOUTH ONCE DAILY AS NEEDED FOR ANXIETY  Qty: 20 tablet, Refills: 0    Associated Diagnoses: Anxiety      multivitamin RENAL (NEPHROCAPS/TRIPHROCAPS) 1 MG capsule Take 1 capsule by mouth daily  Qty: 30 capsule, Refills: 0    Associated Diagnoses: Pneumonia due to 2019 novel coronavirus           STOP taking these medications       allopurinol (ZYLOPRIM) 100 MG tablet Comments:   Reason for Stopping:         sevelamer carbonate (RENVELA) 800 MG tablet Comments:   Reason for Stopping:         sodium bicarbonate 650 MG tablet Comments:   Reason for Stopping:             Allergies   No Known Allergies     I have reviewed history, examined patient and discussed plan with the fellow/resident/TERESA.    I  concur with the findings in this note.    Time spent on discharge activities: 45 minutes.

## 2023-10-31 NOTE — PROVIDER NOTIFICATION
7A, Room 7214, [facility]    Pt is reporting some heartburn. Can we get an order for Tums? Thanks!    Order for Tums placed.

## 2023-10-31 NOTE — PLAN OF CARE
Patient's Name: Matteo Barnes    Procedure Date: 10/31/23  Procedure Time 1450    Procedure Performed: Central line removal     The Central Venous Catheter (CVC) was removed per provider order.     The central venous catheter was located: Right Internal Jugular vein, with a total of 4 lumens.     The patient was placed in Trendelenburg position with Insertion site lower than heart.     Valsalva response achieved by: Patient instructed to take a deep breath and bear down while the CVC was removed with a constant steady motion.     Firm pressure was applied at the access site for 5 minutes until bleeding stopped.     Post removal site assessment; Clean and dry without bleeding. Occlusive dressing applied: Yes    CVC tip was inspected and intact: No    Tip was sent to lab for culture per provider order: No    Patient tolerated the procedure: well    2nd RN present during removal (if applicable) Grzegorz ASTORGA RN   10/31/2023   2:54 PM

## 2023-10-31 NOTE — PROGRESS NOTES
Care Management Discharge Note    Discharge Date: 10/31/2023       Discharge Disposition: Home    Discharge Services: None    Discharge DME: None    Discharge Transportation:      Private pay costs discussed: Not applicable    Does the patient's insurance plan have a 3 day qualifying hospital stay waiver?  No    PAS Confirmation Code:    Patient/family educated on Medicare website which has current facility and service quality ratings: no    Education Provided on the Discharge Plan: Yes  Persons Notified of Discharge Plans: Patient  Patient/Family in Agreement with the Plan: yes    Handoff Referral Completed: Yes    Additional Information:  Team anticipates discharge today.  ATC appointments requested.   Pt DGF status.      Updated Isabel Patient Care Supervisor Inpatient HD unit.      Reviewed DGF status with Clotilde Aaron  Ph: [phone] Fax [phone].  Unit would like an update on DGF status and if pt will need OP HD resumed.      Emailed Denice Inpatient HD Unit Manager as pt OP Transplant RNCC regarding DGF status    Nighat Collins RN BSN, PHN, ACM-RN  7A RN Care Coordinator  Phone: [phone]  Pager [phone]    To contact the weekend RNCC  [place] (0800 - 1630) Saturday and Sunday    Units: 5A,5B,5C [phone]    Units: 6A, [phone]    Units 6B, 6C, 6D [phone]    Units: 7A, 7B, 7C, 7D, [phone]    [facility] [phone]) Saturday and Sunday  Units: 5 Ortho, 5MS & WB ED Pager: [phone]  Units: 6MS, 8A & 10 ICU  Pager [phone]    10/31/2023 10:42 AM

## 2023-11-01 NOTE — TELEPHONE ENCOUNTER
Maqrues Barnes is a post-kidney transplant patient who discharged on 11/1/2023. Patient will get medications from local or mail order pharmacy. The patient will be responsible for managing medications.    SOT*LIAISON (MARQUES) IS A (KIDNEY) TRANSPLANT PATIENT  (DATE OF TRANSPLANT: (DATE: 10/28/2023) )     PATIENT ONLY HAS MEDICARE PART A ID# [id number] EFFECTIVE: 8/1/2020    HEALTH BENEFIT: (MEDICA)  ID# [id number] [facility]# [id number] (EFFECTIVE (DATE: 7/1/2022) )      PHARMACY BENEFIT: (MEDICA COMMERCIAL)  PROCESSING INFO: ID# [id number] GRP# 1MEDICA PCN# [id number] BIN# [id number] (EFFECTIVE (DATE: 7/1/2022) ).   DEDUCTIBLE ($5000) & MAX OUT-OF-POCKET ($5000)  COPAY STRUCTURE:  $ (24) FOR GENERIC  $ (100) FOR BRAND  $ (180) FOR NON-FORMULARY MEDICATIONS      TEST CLAIM SPECIALTY #28  MYCOPHENOLATE 250MG (#240/30DS)=$0  PROGRAF 1MG (#180/30DS)=$0  TACROLIMUS 1MG (#60/30DS)=$0  CYCLOSPORINE 100MG (#60/30DS)=$0  VALGANCICLOVIR 450MG (#60/30DS)=$0  VALACYCLOVIR 1GM (#90/30DS) = PLAN LIMITATIONS EXCEEDED     TEST CLAIM DISCHARGE #27 (18 YEARS AND OLDER):  MYCOPHENOLATE 250MG (#240/30DS)=$0  PROGRAF 1MG (#180/30DS)=$0  TACROLIMUS 1MG (#60/30DS)=$0  CYCLOSPORINE 100MG (#60/30DS)=$0  VALGANCICLOVIR 450MG (#60/30DS)=$0  VALACYCLOVIR 1GM (#90/30DS) = PLAN LIMITATIONS EXCEEDED     **CAN PATIENT FILL AT [facility] FOR MEDICATIONS LISTED? ? PATIENT CAN FILL THROUGH [facility] HOWEVER PT HAS A PLAN THROUGH [facility], AND PER OUR CONTRACT WITH THEM, WE ARE ONLY ALLOWED TO SHIP TO PT IF THEY ARE PHYSICALLY UNABLE TO COME IN TO OUR PHARMACY TO . IF PT IS COMFORTABLE WITH US DOCUMENTING THIS IN THEIR RECORD, WE CAN SHIP OUT THEIR MEDS (FOR [place] RESIDENTS ONLY). IF NOT, THEY WILL NEED TO [facility]IZE [facility]([phone]), OR RETAIL, OR  AT ANY [facility] SITE.         **BILL 1ST FILL OF IMMUNOS TO [facility] AT DISCHARGE    Thank You,  Lois Vazquez, PharmD  [facility]  [phone]

## 2023-11-01 NOTE — PHARMACY-TRANSPLANT NOTE
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      Immunosuppression regimen High Intensity for DGF (79.4kg x6mg/kg = 476kg)  Thymoglobulin 6 mg/kg total as divided below:  10/28 (POD 0): thymoglobulin 2 mg/kg 150 mg, methylprednisolone 500 mg IV  10/29 (POD 1): thymoglobulin 2 mg/kg 175mg, methylprednisolone 250 mg IV  10/30 (POD 2): thymoglobulin 2 mg/kg 150 mg, methylprednisolone 100 mg IV     MAINTENANCE:   - Mycophenolate 750 mg BID  - Tacrolimus with goal trough levels of 8-10 mcg/L for first 6 months post-transplant.  Tacro dose on discharge: 5 mg BID     Opportunistic pathogen prophylaxis includes:  - PJP: trimethoprim/sulfamethoxazole indefinitely  - CMV D-/R-: Valganciclovir tentatively 3 month duration    Pharmacy has monitored for medication interactions and immunosuppression levels in conjunction with the multidisciplinary team. In anticipation for discharge, medication therapy needs have been addressed daily throughout the current admission via multidisciplinary rounds and/or discussions, order verification, daily clinical pharmacy review, and communication with prescribers.  You Godinez, AbhinavD  PGY-1 Pharmacy Resident

## 2023-11-01 NOTE — PROGRESS NOTES
BP (!) 159/88 (BP Location: Right arm)   Pulse 78   Temp 97.9  F (36.6  C) (Oral)   Resp 18   Wt 81.6 kg (180 lb)   SpO2 95%   BMI 26.39 kg/m      SHIFT: 1553-5368  ISOLATION: NA  VITALS: Hypertensive (140's-150's) on RA, afebrile  BG: ACHS (106,100)  Recent Labs   Lab 11/01/23  0547 11/01/23  0311 10/31/23  2134 10/31/23  1743 10/31/23  1258 10/31/23  0737   GLC 93 100* 106* 105* 96 101*   NEURO: Aox4, Calm, Cooperative, pleasant  DIET: 2 gram K+ diet  PAIN: pain 5/10 using 0-10 pain scale, 2x oxy  RESPIRATORY: Clear Upper & Lower Lobes Bilaterally  CARDIAC: S1 & s2 audible  : Voids spontaneously w/o difficulty 760 urine output  GI: Last BM 10/31/23  TUBES: NA  MIVF/GTT/ABX: NA  ASSIST: Independent  SAFETY: WDL  SKIN: abdominal incision stapled, open to air  LABS: , Phosphorus 4.6, Creatinine 7.96  Recent Labs   Lab Test 11/01/23  0547 10/31/23  0531 10/30/23  1310 10/30/23  0538   POTASSIUM 4.5 4.9 4.6 5.1   PHOS 4.6* 6.0*  --  5.8*   MAG 1.8 1.8  --  1.8   HGB 8.8* 9.0*  --  8.9*   PLAN: Pt plan for potential discharge 11/01/23

## 2023-11-01 NOTE — PROGRESS NOTES
[facility]   Transplant Nephrology Progress Note  Date of Admission:  10/28/2023  Today's Date: 11/01/2023    Recommendations:  - No acute indications for dialysis.  - UPC monitoring post transplant per FSGS protocol though less likely to recur (thought to be secondary FSGS).    Assessment & Plan   # DDKT: DGF with last HD 10/29.  Good urine output, but he did have ~ 2 liters of urine output with native kidneys PTA, so unclear if native or transplant contributing.  No acute indications for dialysis.   - Baseline Creatinine: ~ TBD   - Proteinuria: Moderate (1-3 grams)   - Date DSA Last Checked: Oct/2023      Latest DSA:  pending   - BK Viremia: Not checked post transplant   - Kidney Tx Biopsy: No   - Transplant Ureteral Stent: Yes    # Immunosuppression: Tacrolimus immediate release (goal 8-10) and Mycophenolate mofetil (dose 750 mg every 12 hours)   - Induction with Recent Transplant:  High Intensity Protocol due to DGF   - Patient is in an immunosuppressed state and will continue to monitor for efficacy and toxicity of immunosuppression medications.   - Changes: Not at this time    # Infection Prophylaxis:   - PJP: Sulfa/TMP (Bactrim)  - CMV: Valganciclovir (Valcyte); CMV IgG Ab negative with donor pending    # Hypertension: Borderline control;  Goal BP: < 150/90   - Volume status: Euvolemic  EDW ~ 178 lbs   - Changes: Not at this time    # Elevated Blood Glucose: Glucose generally running ~ 90-100s   - Management as per primary team.    # Anemia in Chronic Renal Disease: Hgb: Stable, low      JOSE: No   - Iron studies: Unknown at this time, but checked with dialysis    # Mineral Bone Disorder:   - Secondary renal hyperparathyroidism; PTH level: Unknown at this time, but checked with dialysis        On treatment: None  - Vitamin D; level: Unknown at this time, but checked with dialysis        On supplement: No  - Calcium; level: Low        On supplement: No  -  Phosphorus; level: High, but decreased        On binder: No    # Electrolytes:   - Potassium; level: Normal        On supplement: No  - Magnesium; level: Normal        On supplement: No  - Bicarbonate; level: Stable low        On supplement: No  - Sodium; level: Trend down, mildly low.  Likely secondary to kidney dysfunction.     # HFpEF: Last cardiac echo (Feb/2021) showed normal LVEF ~ 60-65% with grade I diastolic dysfunction.    # Heartburn Symptoms: Improved symptoms on H2 blocker.    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    Marcel Dale MD  Transplant Nephrology  Contact information available via [facility] Paging/Directory    Interval History   Mr. Barnes's creatinine is 7.96 (11/01 0547); Trend up.  Good urine output.  Other significant labs/tests/vitals: Trend down in serum phosphorus, otherwise stable electrolytes.  Stable hemoglobin.  No new events overnight.  No chest pain or shortness of breath.  No leg swelling.  No nausea and vomiting.  Bowel movements are formed to soft.  No fever, sweats or chills.    Review of Systems   4 point ROS was obtained and negative except as noted in the Interval History.    MEDICATIONS:   amLODIPine  5 mg Oral At Bedtime    atorvastatin  10 mg Oral Daily    carvedilol  6.25 mg Oral BID w/meals    famotidine  10 mg Oral At Bedtime    heparin ANTICOAGULANT  5,000 Units Subcutaneous TID    insulin aspart  1-7 Units Subcutaneous TID AC    insulin aspart  1-5 Units Subcutaneous At Bedtime     [Held by provider] magnesium oxide  400 mg Oral Daily with lunch    mycophenolate  750 mg Oral BID IS    polyethylene glycol  17 g Oral Daily    senna-docusate  1 tablet Oral BID    sodium chloride (PF)  10 mL Intracatheter Q8H    sulfamethoxazole-trimethoprim  1 tablet Oral Once per day on     tacrolimus  4 mg Oral BID IS    valGANciclovir  450 mg Oral Once per day on        Physical Exam   Temp  Av  F (36.7  C)  Min: 97.6  F (36.4  C)  Max: 99.5  F (37.5  C)      Pulse  Av.5  Min: 65  Max: 88 Resp  Avg: 15.4  Min: 10  Max: 26  SpO2  Av.8 %  Min: 92 %  Max: 100 %    CVP (mmHg): 18 mmHgBP (!) 149/87 (BP Location: Right arm)   Pulse 77   Temp 97.9  F (36.6  C) (Oral)   Resp 18   Wt 81.6 kg (180 lb)   SpO2 99%   BMI 26.39 kg/m     Date 10/29/23 07 - 10/30/23 0659   Shift 7158-7797 3840-4945 9241-3947 24 Hour Total   INTAKE   P.O. 120   120   Shift Total(mL/kg) 120(1.48)   120(1.48)   OUTPUT   Urine 210   210   Shift Total(mL/kg) 210(2.6)   210(2.6)   Weight (kg) 80.92 80.92 80.92 80.92      Admit Weight: 79.4 kg (175 lb)     GENERAL APPEARANCE: alert and no distress  HENT: mouth without ulcers or lesions  RESP: lungs clear to auscultation - no rales, rhonchi or wheezes  CV: regular rhythm, normal rate, no rub, no murmur  EDEMA: no LE edema bilaterally  ABDOMEN: soft, nondistended, nontender, bowel sounds normal  MS: extremities normal - no gross deformities noted, no evidence of inflammation in joints, no muscle tenderness  SKIN: no rash  TX KIDNEY: minimal TTP  DIALYSIS ACCESS:  LUE AV fistula with good thrill    Data   All labs reviewed by me.  CMP  Recent Labs   Lab 23  1203 23  0738 23  0547 23  0311 10/31/23  0737 10/31/23  0531 10/30/23  1655 10/30/23  1310 10/30/23  0724 10/30/23  0538 10/29/23  0551 10/29/23  0433 10/28/23  1617 10/28/23  0406   NA  --   --  133*  --   --  137  --  138  --  135  --  132*   < > 140    POTASSIUM  --   --  4.5  --   --  4.9  --  4.6  --  5.1   < > 6.5*   < > 4.7   CHLORIDE  --   --  99  --   --  100  --  98  --  98  --  94*   < > 97*   CO2  --   --  19*  --   --  21*  --  24  --  23  --  15*   < > 26   ANIONGAP  --   --  15  --   --  16*  --  16*  --  14  --  23*   < > 17*   GLC 98 100* 93 100*   < > 115*   < > 112*   < > 147*   < > 170*   < > 91   BUN  --   --  82.9*  --   --  62.8*  --  50.3*  --  43.0*  --  64.1*   < > 56.8*   CR  --   --  7.96*  --   --  7.41*  --  6.84*  --  6.50*  --  9.65*   < > 8.34*   GFRESTIMATED  --   --  7*  --   --  7*  --  8*  --  9*  --  5*   < > 6*   ANNETTE  --   --  8.3*  --   --  8.4*  --  8.2*  --  8.2*  --  8.1*   < > 8.5*   MAG  --   --  1.8  --   --  1.8  --   --   --  1.8  --  2.0   < >  --    PHOS  --   --  4.6*  --   --  6.0*  --   --   --  5.8*  --  4.9*   < >  --    PROTTOTAL  --   --   --   --   --   --   --   --   --   --   --   --   --  7.2   ALBUMIN  --   --   --   --   --   --   --   --   --   --   --   --   --  4.5   BILITOTAL  --   --   --   --   --   --   --   --   --   --   --   --   --  0.3   ALKPHOS  --   --   --   --   --   --   --   --   --   --   --   --   --  57   AST  --   --   --   --   --   --   --   --   --   --   --   --   --  13   ALT  --   --   --   --   --   --   --   --   --   --   --   --   --  12    < > = values in this interval not displayed.     CBC  Recent Labs   Lab 11/01/23  0547 10/31/23  0531 10/30/23  0538 10/29/23  1717 10/29/23  0551 10/29/23  0433   HGB 8.8* 9.0* 8.9* 9.3*   < > 10.0*   WBC 3.0* 2.5* 5.3  --   --  12.2*   RBC 2.64* 2.76* 2.63*  --   --  3.02*   HCT 26.3* 27.0* 26.4*  --   --  29.9*    98 100  --   --  99   MCH 33.3* 32.6 33.8*  --   --  33.1*   MCHC 33.5 33.3 33.7  --   --  33.4   RDW 12.7 12.8 12.7  --   --  13.1   * 106* 117*  --   --  160    < > = values in this interval not displayed.     INR  Recent Labs   Lab 10/28/23  0406   INR 1.13   PTT 30     ABG  Recent Labs   Lab  10/28/23  1722 10/28/23  1656 10/28/23  1617   O2PER 30.0 32.0 35.0      Urine Studies  Recent Labs   Lab Test 10/28/23  0231 10/16/21  1056 02/01/21  0826 06/30/20  0739   COLOR Light Yellow Light Yellow Yellow Yellow   APPEARANCE Clear Clear Clear Clear   URINEGLC 100* Negative Negative Negative   URINEBILI Negative Negative Negative Negative   URINEKETONE Negative Negative Negative Negative   SG 1.009 1.015 1.012 1.020   UBLD Negative Moderate* Negative Trace*   URINEPH 8.5* 6.5 6.0 5.5   PROTEIN 100* 300* 100* >=300*   UROBILINOGEN  --   --   --  0.2   NITRITE Negative Negative Negative Negative   LEUKEST Negative Negative Negative Negative   RBCU <1 1 <1 O - 2   WBCU 1 1 0 0 - 5     Recent Labs   Lab Test 06/30/20  0739 05/10/16  0738   UTPG 1.55* 0.12     PTH  Recent Labs   Lab Test 10/19/21  0806 06/30/20  0738   PTHI 139* 123*     Iron Studies  Recent Labs   Lab Test 10/19/21  0640   IRON 49   *   IRONSAT 26   ROBB 1,735*       IMAGING:  All imaging studies reviewed by me.

## 2023-11-01 NOTE — PROGRESS NOTES
DISCHARGE:  Patient with orders to discharge to home. Discharge instructions, medications, & follow ups reviewed with pt. Copy of discharge summary given to pt.  PIV removed. All belongings packed & sent with patient. Medications filled & picked up at discharge pharmacy. Patient in stable condition. AVSS.Pt had no further questions regarding discharge instructions and medications. Patient left with family.

## 2023-11-01 NOTE — PROGRESS NOTES
BP (!) 149/87 (BP Location: Right arm)   Pulse 77   Temp 97.9  F (36.6  C) (Oral)   Resp 18   Wt 81.6 kg (180 lb)   SpO2 99%   BMI 26.39 kg/m      3067-1804  Hypertensive (scheduled BP med given), OVSS on RA. A+Ox4. ACHS, 100 and 98. Oxy x1 for incisional pain. PIV removed prior to discharge. L AV fistula. LBM 10/31. 2g K diet. Voiding WOD. Incision stapled KIRTI. Supplies given to clean at home. Up ad phan. Med and lab book up to date. Regular diet. QR code given for educational videos. Pt spoke with specialty pharmacy on 10/30. Necklace/bracelets in chart given back to pt. Report given to ATC nurse. Will continue to monitor and notify team with changes.

## 2023-11-01 NOTE — PROGRESS NOTES
CLINICAL NUTRITION SERVICES - DISCHARGE NOTE    Noted team requesting patient follow low K+ diet.  Pt reports having followed this in the past.  Pt/RN report having gone over low K+ diet multiple times.  Provided handout for reference.  Pt had no further questions.     Patient s discharge needs assessed and discharge planning has been conducted with the multidisciplinary transplant care team including physicians, pharmacy, social work and transplant coordinator.    Follow up/Monitoring:  Once discharged, place outpatient nutrition consult via the transplant team if nutrition concerns arise.    Marie Markham, MS, RD, LD, CCTD, CNSC  7A and 7B beds [phone], pager [phone]  Weekend pager [phone]

## 2023-11-02 NOTE — PATIENT INSTRUCTIONS
Dear Matteo Barnes    Thank you for choosing [facility] ([facility]) for your transplant cares.  The following information is a summary of our appointment as well as important reminders.      Plan:  1) Drink 2-3 L fluids every day ( oz)   2) Eat increased sodium in your diet. Continue to eat a low potassium diet.  3) Bring your medication bottles and BP cuff to [facility] tomorrow.   4) Transplant coordinator will call with any adjustments to your tacrolimus dose.     Contact Information:    Transplant Coordinator: Elsa Adrian  Transplant Office:  [phone]  [facility]:  [phone]  Ask for physician on call for kidney transplant.    If you are a transplant patient and require transplant education, please click on this link: [email or web address].    We look forward in seeing you on your next appointment here at [facility] ([facility]).  Please don t hesitate to call us at [phone] to reschedule any of your appointments or to speak with one of the [facility] registered nurses.  It was a pleasure taking care of you today.    Sincerely,    [facility]  [address] [place]  [address]  Phone:  [phone]

## 2023-11-02 NOTE — TELEPHONE ENCOUNTER
Kidney and Pancreas Transplant Coordinator Transition to Outpatient Discharge Note    Pt Name: Matteo Barnes  : 1955    Transplant Date: 10/28/23  Hospital Discharge Date: 10/31    Surgeon (updated in Transplant Information tab): Arthur  Nephrologist (updated in Transplant Information tab): Jose Armando Quiroga  Transplant Coordinator: Elsa Adrian RN    Matteo Barnes DDRT d/t FSGS, right side side   Stent YES placed.      Immunosuppression:   CNI: Tac  Antimetabolite: Mycophenolate    Prophylaxis:   CMV: D+/R- : Valcyte 6 months; renal dosing  EBV: D+/R+  Antibiotic: bactrim    High Risk DSA: No.      [facility]  Lab after discharge: [facility]   Lab letter faxed to outside lab, if needed: Yes.    Education:  Writer spoke with Matteo Barnes Girlfriend and Gf's kids.   We discussed immunosuppression medications, indications, side effects, dose adjustments, and lab monitoring.   Lab draw schedule was provided via [employer] / mail to the patient and fully explained.    DSA  kits needed:  Yes.    If so, request submitted to centralized team for send-out.      [facility]plant Place: RNCC encouraged on-going review, emphasis on   post-transplant content.   MyChart: RNCC encouraged regular self-review of lab values via [employer] and  educated about importance of utilization for awareness of required follow   up and communication with SOT team members.     Further education was provided on typical post transplant course, labs examined with thresholds, biopsy, infection prevention, office contact numbers, and when to call.       Discharge Transition Plan:   Pt will transition home, with assistance from Girlfriend and Gf's kids.. Pt will not have home care  Pt states having working BP monitor, scale, and thermometer at home.      Follow Up:  Orders for post-transplant follow-up appointments placed: Yes.  Patient Status Checklist Task updated: Yes.    Special/Additional needs:  No.    Tac level 4.5. current dose: 4 mg BID  Increase dose to 6 mg BID, repeat lab tomorrow  Pt voiced understanding of dose change. Will discuss HD tomorrow with Daylin at appt.     Elsa Adrian RN  Post-Kidney Transplant Coordinator  (ph) [phone]

## 2023-11-02 NOTE — PROGRESS NOTES
"Matteo Barnes came to [facility] today for a lab and assess following a kidney transplant on 10/23/23.      Discharge date: 11/1/23  Transplant coordinator: Elsa Adrian, RN   Phone number patient can be reached at: [phone]. Patient prefers phone call and not to use [employer].     Physical Assessment:  See physical assessment located under \"Document Flowsheets\".  Incision site: clean, dry, intact. Small healing skin tears in the left margin.   Lines: n/a  Duval: n/a  Urine clarity: clear, yellow  Hydration: around 2.5L  Nutrition: eating full meals    Last BM: small this morning, formed   Pain: 4/10   My transplant place videos watched: No  Labs drawn by [facility] staff No    Plan of care for today: Drink 2-3L of fluids, increase sodium intake, maintain low potassium diet, measure urine output.     Medication changes: None    Medications administered: None      Patient education:    The following teaching topics were addressed: Importance of drinking 2L of non-caffeinated fluids daily, Incisional care, Signs/symptoms of infection, Good handwashing, Medications (purposes, doses and times of administration), Phone numbers to call with concenrs (Transplant coordinator, [facility]), and Plan of care   Patient and family member  verbalized understanding and all questions answered.    Drug level:  Patient took 4mg of tacrolimus last evening at 2000.  Care coordinator to follow up with the result.    Face to face time: 60 minutes    Discharge Plan    Pt will follow up with [facility] appointment and labs tomorrow.   Discharge instructions reviewed with patient: YES  Patient/Representative verbalized understanding, all questions answered: YES    Discharged from unit at 0945 with whom: family to home.    Anneliese Mahoney, Nursing Student  "

## 2023-11-02 NOTE — LETTER
11/2/2023         RE: Matteo Barnes  [address]  [place] [address]        Dear Colleague,    Thank you for referring your patient, Matteo Barnes, to the [facility] [place]. Please see a copy of my visit note below.    TRANSPLANT NEPHROLOGY EARLY POST TRANSPLANT VISIT    Assessment & Plan  # DDKT: Trend down   - Baseline Creatinine: ~ TBD   - Proteinuria: Not checked post transplant   - Date DSA Last Checked: Oct/2023      Latest DSA: No DSA at time of transplant   - BK Viremia: No   - Kidney Tx Biopsy: No   - Transplant Ureteral Stent: Yes      # Immunosuppression: Tacrolimus immediate release (goal 8-10) and Mycophenolate mofetil (dose 750 mg every 12 hours)              - Induction with Recent Transplant:  High Intensity Protocol due to DGF              - Patient is in an immunosuppressed state and will continue to monitor for efficacy and toxicity of immunosuppression medications.              - Changes: Not at this time    # Infection Prophylaxis:   - PJP: Sulfa/TMP (Bactrim)  - CMV: Valganciclovir (Valcyte    # Hypertension: Borderline control;   Goal BP: < 150/90              - Volume status: Euvolemic                EDW ~ 178 lbs              - Changes: Not at this time    # Elevated Blood Glucose: Glucose generally running ~    - Management as per primary care.    # Anemia in Chronic Renal Disease: Hgb: Stable      JOSE: No   - Iron studies: Not checked recently    # Mineral Bone Disorder:    - Secondary renal hyperparathyroidism; PTH level: Not checked recently        On treatment: None  - Vitamin D; level: Unknown at this time, but checked with dialysis        On supplement: No  - Calcium; level: Low        On supplement: No  - Phosphorus; level: Normal        On supplement: No    # Electrolytes:   - Potassium; level: Normal        On supplement: No  - Magnesium; level: Normal        On supplement: No  - Bicarbonate; level: Low        On  supplement: No  - Sodium; level: Low    # HFpEF: Last cardiac echo () showed normal LVEF ~ 60-65% with grade I diastolic dysfunction.     # Heartburn Symptoms: Improved symptoms on H2 blocker.    # Medical Compliance: Yes    # Health Maintenance and Vaccination Review: Reviewed and up to date    # Transplant History:  Etiology of Kidney Failure: Focal segmental glomerulosclerosis (FSGS), likely secondary  Tx: DDKT  Transplant: 10/28/2023 (Kidney)  Donor Type: Donation after Brain Death        Donor Class:   Crossmatch at time of Tx: negative  DSA at time of Tx: No  Significant changes in immunosuppression: None  CMV IgG Ab High Risk Discordance (D+/R-): Yes  EBV IgG Ab High Risk Discordance (D+/R-): No  Significant transplant-related complications: DGF    Transplant Office Phone Number: [phone]    Assessment and plan was discussed with the patient and he voiced his understanding and agreement.    Return visit: No follow-ups on file.    Daylin Jerry NP    Chief Complaint  Mr. Barnes is a 68 year old here for kidney transplant.     History of Present Illness   Matteo Barnes is a 68 year old male Matteo Barnes is a 67-year-old gentleman with ESKD 2/2 FSGS who presents for potential kidney transplant with history of ESKD secondary to biopsy-proven FSGS (likely secondary) on hemodialysis since 2021, hypertension, hyperlipidemia and gout  now DDKT    No fevers or chills.  No nausea or vomiting.     No urinary symptoms.  UOP improving     Home BP: Not checked    Problem List  Patient Active Problem List   Diagnosis    Hyperlipidemia LDL goal <130    Hypertension goal BP (blood pressure) < 140/90    Proteinuria    Erectile dysfunction    Gout    Focal segmental glomerulosclerosis    Pneumonia due to 2019 novel coronavirus    ESRD (end stage renal disease) (H)    Anemia in chronic kidney disease    Metabolic acidosis    Dependence on renal dialysis (H24)    -donor kidney transplant     Delayed graft function of kidney    Hyperkalemia    Immunosuppressed status (H24)    Steroid-induced diabetes mellitus        Allergies  No Known Allergies    Medications  Current Outpatient Medications   Medication Sig    acetaminophen (TYLENOL) 500 MG tablet Take 500-1,000 mg by mouth every 6 hours as needed for mild pain    ALPRAZolam (XANAX) 0.25 MG tablet TAKE ONE TABLET BY MOUTH ONCE DAILY AS NEEDED FOR ANXIETY    amLODIPine (NORVASC) 5 MG tablet Take 1 tablet (5 mg) by mouth daily    atorvastatin (LIPITOR) 10 MG tablet Take 1 tablet (10 mg) by mouth daily    carvedilol (COREG) 6.25 MG tablet Take 1 tablet (6.25 mg) by mouth 2 times daily (with meals)    famotidine (PEPCID) 10 MG tablet Take 1 tablet (10 mg) by mouth at bedtime    multivitamin RENAL (NEPHROCAPS/TRIPHROCAPS) 1 MG capsule Take 1 capsule by mouth daily    mycophenolate (GENERIC EQUIVALENT) 250 MG capsule Take 3 capsules (750 mg) by mouth 2 times daily    oxyCODONE (ROXICODONE) 5 MG tablet Take 0.5-1 tablets (2.5-5 mg) by mouth every 4 hours as needed for moderate pain    senna-docusate (SENOKOT-S/PERICOLACE) 8.6-50 MG tablet Take 1 tablet by mouth 2 times daily    sulfamethoxazole-trimethoprim (BACTRIM) 400-80 MG tablet Take 1 tablet by mouth three times a week    tacrolimus (GENERIC) 0.5 MG capsule Take 1 capsule (0.5 mg) by mouth 2 times daily as needed (When directed by transplant team for dose titration)    tacrolimus (GENERIC) 1 MG capsule Take 4 capsules (4 mg) by mouth 2 times daily    valGANciclovir (VALCYTE) 450 MG tablet Take 1 tablet (450 mg) by mouth twice a week Increase dose to 900 mg by mouth daily when directed by transplant team (based on improving renal function)     No current facility-administered medications for this visit.     There are no discontinued medications.    Physical Exam  Vital Signs: There were no vitals taken for this visit.    GENERAL APPEARANCE: alert and no distress  HENT: mouth without ulcers or  lesions  RESP: lungs clear to auscultation - no rales, rhonchi or wheezes  CV: regular rhythm, normal rate, no rub, no murmur  EDEMA: no LE edema bilaterally  ABDOMEN: soft, nondistended, nontender, bowel sounds normal  MS: extremities normal - no gross deformities noted, no evidence of inflammation in joints, no muscle tenderness  SKIN: no rash    Data        Latest Ref Rng & Units 11/6/2023     8:08 AM 11/4/2023     9:18 AM 11/3/2023     7:32 AM   Renal   Sodium 135 - 145 mmol/L 131  134  134    K 3.4 - 5.3 mmol/L 4.9  4.8  4.8    Cl 98 - 107 mmol/L 98  101  100    Cl (external) 98 - 107 mmol/L 98  101  100    CO2 22 - 29 mmol/L 17  15  19    Urea Nitrogen 8.0 - 23.0 mg/dL 89.4  97.0  99.1    Creatinine 0.67 - 1.17 mg/dL 6.95  8.19  9.20    Glucose 70 - 99 mg/dL 103  115  96    Calcium 8.8 - 10.2 mg/dL 8.8  8.9  8.6    Magnesium 1.7 - 2.3 mg/dL  1.8  1.8          Latest Ref Rng & Units 11/4/2023     9:18 AM 11/3/2023     7:32 AM 11/2/2023     7:24 AM   Bone Health   Phosphorus 2.5 - 4.5 mg/dL 4.8  4.9  4.0    Parathyroid Hormone Intact 15 - 65 pg/mL   186    Vit D Def 20 - 50 ng/mL   45          Latest Ref Rng & Units 11/6/2023     8:08 AM 11/4/2023     9:18 AM 11/3/2023     7:32 AM   Heme   WBC 4.0 - 11.0 10e3/uL 7.2  5.6  5.5    Hgb 13.3 - 17.7 g/dL 7.9  8.4  8.2    Plt 150 - 450 10e3/uL 176  142  118          Latest Ref Rng & Units 10/28/2023     4:06 AM 6/7/2023    12:36 PM 1/26/2023     8:53 AM   Liver   AP 40 - 129 U/L 57   47    TBili <=1.2 mg/dL 0.3   0.3    ALT 0 - 70 U/L 12  11  16    AST 0 - 45 U/L 13   15    Tot Protein 6.4 - 8.3 g/dL 7.2   7.1    Albumin 3.5 - 5.2 g/dL 4.5   4.6          Latest Ref Rng & Units 10/28/2023     4:07 AM 10/16/2021     9:42 AM 3/20/2018     4:09 PM   Pancreas   A1C <5.7 % 5.0   4.9    Lipase 73 - 393 U/L  461           Latest Ref Rng & Units 11/2/2023     7:24 AM 10/19/2021     6:40 AM   Iron studies   Iron 61 - 157 ug/dL 123  49    Iron Saturation Index 15 - 46 %  26     Iron Sat Index 15 - 46 % 69     Ferritin 31 - 409 ng/mL 1,365  1,735          Latest Ref Rng & Units 10/28/2023     4:06 AM 2/1/2021     8:30 AM   [facility] Txp Virology   EBV CAPSID ANTIBODY IGG No detectable antibody. Positive  >8.0    Hep B Core NR^Nonreactive  Nonreactive        Recent Labs   Lab Test 11/02/23  0724 11/03/23  0732 11/04/23  0918   DOSTAC 11/1/2023 11/2/2023 11/3/2023   TACROL 4.5* 7.4 10.7     Recent Labs   Lab Test 11/03/23  0732   DOSMPA 11/2/2023   8:00 PM   MPACID 1.94   MPAG >200.0*            Daylin Jerry, NP

## 2023-11-03 NOTE — LETTER
11/3/2023         RE: Matteo Barnes  [address]  [place] [address]        Dear Colleague,    Thank you for referring your patient, Matteo Barnes, to the [facility]. Please see a copy of my visit note below.    TRANSPLANT NEPHROLOGY EARLY POST TRANSPLANT VISIT    Assessment & Plan  # DDKT: Trend down  DGF.  No dialysis today.  Labs tomorrow.  Track UOP   - Baseline Creatinine: ~ TBD   - Proteinuria: Not checked post transplant   - Date DSA Last Checked: Oct/2023      Latest DSA: No DSA at time of transplant   - BK Viremia: No   - Kidney Tx Biopsy: No   - Transplant Ureteral Stent: Yes      # Immunosuppression: Tacrolimus immediate release (goal 8-10) and Mycophenolate mofetil (dose 750 mg every 12 hours)              - Induction with Recent Transplant:  High Intensity Protocol due to DGF              - Patient is in an immunosuppressed state and will continue to monitor for efficacy and toxicity of immunosuppression medications.              - Changes: Not at this time    # Infection Prophylaxis:   - PJP: Sulfa/TMP (Bactrim)  - CMV: Valganciclovir (Valcyte    # Hypertension: Borderline control;   Goal BP: < 150/90              - Volume status: Euvolemic                EDW ~ 178 lbs              - Changes: Not at this time    # Elevated Blood Glucose: Glucose generally running ~    - Management as per primary care.    # Anemia in Chronic Renal Disease: Hgb: Stable      JOSE: No   - Iron studies: Not checked recently    # Mineral Bone Disorder:    - Secondary renal hyperparathyroidism; PTH level: Not checked recently        On treatment: None  - Vitamin D; level: Unknown at this time, but checked with dialysis        On supplement: No  - Calcium; level: Low        On supplement: No  - Phosphorus; level: High        On supplement: No    # Electrolytes:   - Potassium; level: Normal        On supplement: No  - Magnesium; level: Normal        On  supplement: No  - Bicarbonate; level: Low        On supplement: No. Trending  - Sodium; level: Low    # HFpEF: Last cardiac echo (Feb/2021) showed normal LVEF ~ 60-65% with grade I diastolic dysfunction.     # Heartburn Symptoms: Improved symptoms on H2 blocker.    # Medical Compliance: Yes    # Health Maintenance and Vaccination Review: Reviewed and up to date    # Transplant History:  Etiology of Kidney Failure: Focal segmental glomerulosclerosis (FSGS), likely secondary  Tx: DDKT  Transplant: 10/28/2023 (Kidney)  Donor Type: Donation after Brain Death        Donor Class:   Crossmatch at time of Tx: negative  DSA at time of Tx: No  Significant changes in immunosuppression: None  CMV IgG Ab High Risk Discordance (D+/R-): Yes  EBV IgG Ab High Risk Discordance (D+/R-): No  Significant transplant-related complications: DGF    Transplant Office Phone Number: [phone]    Assessment and plan was discussed with the patient and he voiced his understanding and agreement.    Return visit: No follow-ups on file.    Daylin Jerry NP    Chief Complaint  Mr. Barnes is a 68 year old here for kidney transplant.     History of Present Illness   Matteo Barnes is a 68 year old male Matteo Barnes is a 67-year-old gentleman with ESKD 2/2 FSGS who presents for potential kidney transplant with history of ESKD secondary to biopsy-proven FSGS (likely secondary) on hemodialysis since October 2021, hypertension, hyperlipidemia and gout  now DDKT    No fevers or chills.  No nausea or vomiting.     No urinary symptoms.  UOP improving.  Doing well overall.     Home BP: Not checked    Problem List  Patient Active Problem List   Diagnosis    Hyperlipidemia LDL goal <130    Hypertension goal BP (blood pressure) < 140/90    Proteinuria    Erectile dysfunction    Gout    Focal segmental glomerulosclerosis    Pneumonia due to 2019 novel coronavirus    ESRD (end stage renal disease) (H)    Anemia in chronic kidney disease    Metabolic  acidosis    Dependence on renal dialysis (H24)    -donor kidney transplant    Delayed graft function of kidney    Hyperkalemia    Immunosuppressed status (H24)    Steroid-induced diabetes mellitus        Allergies  No Known Allergies    Medications  Current Outpatient Medications   Medication Sig    acetaminophen (TYLENOL) 500 MG tablet Take 500-1,000 mg by mouth every 6 hours as needed for mild pain    ALPRAZolam (XANAX) 0.25 MG tablet TAKE ONE TABLET BY MOUTH ONCE DAILY AS NEEDED FOR ANXIETY    amLODIPine (NORVASC) 5 MG tablet Take 1 tablet (5 mg) by mouth daily    atorvastatin (LIPITOR) 10 MG tablet Take 1 tablet (10 mg) by mouth daily    carvedilol (COREG) 6.25 MG tablet Take 1 tablet (6.25 mg) by mouth 2 times daily (with meals)    famotidine (PEPCID) 10 MG tablet Take 1 tablet (10 mg) by mouth at bedtime    multivitamin RENAL (NEPHROCAPS/TRIPHROCAPS) 1 MG capsule Take 1 capsule by mouth daily    mycophenolate (GENERIC EQUIVALENT) 250 MG capsule Take 3 capsules (750 mg) by mouth 2 times daily    oxyCODONE (ROXICODONE) 5 MG tablet Take 0.5-1 tablets (2.5-5 mg) by mouth every 4 hours as needed for moderate pain    senna-docusate (SENOKOT-S/PERICOLACE) 8.6-50 MG tablet Take 1 tablet by mouth 2 times daily    sodium bicarbonate 650 MG tablet Take 1 tablet (650 mg) by mouth 3 times daily    sulfamethoxazole-trimethoprim (BACTRIM) 400-80 MG tablet Take 1 tablet by mouth three times a week    tacrolimus (GENERIC) 0.5 MG capsule Take 1 capsule (0.5 mg) by mouth 2 times daily as needed (When directed by transplant team for dose titration)    tacrolimus (GENERIC) 1 MG capsule Take 4 capsules (4 mg) by mouth 2 times daily    valGANciclovir (VALCYTE) 450 MG tablet Take 1 tablet (450 mg) by mouth twice a week Increase dose to 900 mg by mouth daily when directed by transplant team (based on improving renal function)     No current facility-administered medications for this visit.     There are no discontinued  medications.    Physical Exam  Vital Signs: There were no vitals taken for this visit.    GENERAL APPEARANCE: alert and no distress  HENT: mouth without ulcers or lesions  RESP: lungs clear to auscultation - no rales, rhonchi or wheezes  CV: regular rhythm, normal rate, no rub, no murmur  EDEMA: no LE edema bilaterally  ABDOMEN: soft, nondistended, nontender, bowel sounds normal  MS: extremities normal - no gross deformities noted, no evidence of inflammation in joints, no muscle tenderness  SKIN: no rash    Data        Latest Ref Rng & Units 11/6/2023     8:08 AM 11/4/2023     9:18 AM 11/3/2023     7:32 AM   Renal   Sodium 135 - 145 mmol/L 131  134  134    K 3.4 - 5.3 mmol/L 4.9  4.8  4.8    Cl 98 - 107 mmol/L 98  101  100    Cl (external) 98 - 107 mmol/L 98  101  100    CO2 22 - 29 mmol/L 17  15  19    Urea Nitrogen 8.0 - 23.0 mg/dL 89.4  97.0  99.1    Creatinine 0.67 - 1.17 mg/dL 6.95  8.19  9.20    Glucose 70 - 99 mg/dL 103  115  96    Calcium 8.8 - 10.2 mg/dL 8.8  8.9  8.6    Magnesium 1.7 - 2.3 mg/dL  1.8  1.8          Latest Ref Rng & Units 11/4/2023     9:18 AM 11/3/2023     7:32 AM 11/2/2023     7:24 AM   Bone Health   Phosphorus 2.5 - 4.5 mg/dL 4.8  4.9  4.0    Parathyroid Hormone Intact 15 - 65 pg/mL   186    Vit D Def 20 - 50 ng/mL   45          Latest Ref Rng & Units 11/6/2023     8:08 AM 11/4/2023     9:18 AM 11/3/2023     7:32 AM   Heme   WBC 4.0 - 11.0 10e3/uL 7.2  5.6  5.5    Hgb 13.3 - 17.7 g/dL 7.9  8.4  8.2    Plt 150 - 450 10e3/uL 176  142  118          Latest Ref Rng & Units 10/28/2023     4:06 AM 6/7/2023    12:36 PM 1/26/2023     8:53 AM   Liver   AP 40 - 129 U/L 57   47    TBili <=1.2 mg/dL 0.3   0.3    ALT 0 - 70 U/L 12  11  16    AST 0 - 45 U/L 13   15    Tot Protein 6.4 - 8.3 g/dL 7.2   7.1    Albumin 3.5 - 5.2 g/dL 4.5   4.6          Latest Ref Rng & Units 10/28/2023     4:07 AM 10/16/2021     9:42 AM 3/20/2018     4:09 PM   Pancreas   A1C <5.7 % 5.0   4.9    Lipase 73 - 393 U/L  461            Latest Ref Rng & Units 11/6/2023     8:08 AM 11/2/2023     7:24 AM 10/19/2021     6:40 AM   Iron studies   Iron 61 - 157 ug/dL  61 - 157 ug/dL 91     91  123  49    Iron Saturation Index 15 - 46 %   26    Iron Sat Index 15 - 46 % 43  69     Ferritin 31 - 409 ng/mL  1,365  1,735          Latest Ref Rng & Units 10/28/2023     4:06 AM 2/1/2021     8:30 AM   [facility] Txp Virology   EBV CAPSID ANTIBODY IGG No detectable antibody. Positive  >8.0    Hep B Core NR^Nonreactive  Nonreactive        Recent Labs   Lab Test 11/02/23  0724 11/03/23  0732 11/04/23  0918   DOSTAC 11/1/2023 11/2/2023 11/3/2023   TACROL 4.5* 7.4 10.7     Recent Labs   Lab Test 11/03/23  0732   DOSMPA 11/2/2023   8:00 PM   MPACID 1.94   MPAG >200.0*            Daylin Jerry, NP

## 2023-11-03 NOTE — PATIENT INSTRUCTIONS
Dear Matteo Barnes    Thank you for choosing [facility] ([facility]) for your transplant cares.  The following information is a summary of our appointment as well as important reminders.      If you are a transplant patient and require transplant education, please click on this link: [email or web address].    We look forward in seeing you on your next appointment here at [facility] ([facility]).  Please don t hesitate to call us at [phone] to reschedule any of your appointments or to speak with one of the [facility] registered nurses.  It was a pleasure taking care of you today.    Sincerely,    [facility]  [address] [place] [address]  [address]  Phone:  [phone]     When to contact the Transplant Center:    Fever > 100.5F    If BP drops (symptoms = dizziness, lightheadedness)    If your daily weight drops >5lbs    If you are unable to take your immunosuppression medications.     Medications:  Please review medications, update MAR if needed.  Ensure the patient has an up to date medication card at home and is knowledgeable in updating their med card (or has someone to assist in this).     ALWAYS BRING YOUR MED CARD TO APPOINTMENTS.     Confirm which pharmacy the patient will use to fill medications:                     ALWAYS CONTACT YOUR PHARMACY FIRST FOR REFILLS     Labs:  Please review how to record lab values OR how to [facility].  Labs are expected to be drawn M/W/F for the first month of transplant.  It is recommended that you take a copy of your lab letter to each lab draw.  If the patient does not have a copy of their lab letter, please send one now.  Confirm which lab patient will utilize post-home care:       Vitals:  Ensure the patient is recording the following:    BP - 2x daily     Temperature - daily    Weight - daily    IF DIABETIC / PANCREAS:  Blood sugar, 4x daily  Daily fasting blood sugar     Follow Up:  Review current scheduled follow up appointments with surgeon and nephrology.  Follow up with your PCP within 1 month post-transplant.    STENT REMOVAL - review date if already scheduled, if not,  will be in touch with you!

## 2023-11-06 NOTE — TELEPHONE ENCOUNTER
Alden Joseph MD Reilly, Megan, RN  Still off HD? Any weight changes?  Add iron studies to next labs    RNCC spoke with pt who reports last night /87, 145/83,  153/83  HR 70s  Weight is stable at 180.  Pt voiced understanding to start sodium bicarbonate 650 mg TID.

## 2023-11-06 NOTE — PROGRESS NOTES
TRANSPLANT NEPHROLOGY EARLY POST TRANSPLANT VISIT    Assessment & Plan   # DDKT: Trend down   - Baseline Creatinine: ~ TBD   - Proteinuria: Not checked post transplant   - Date DSA Last Checked: Oct/2023      Latest DSA: No DSA at time of transplant   - BK Viremia: No   - Kidney Tx Biopsy: No   - Transplant Ureteral Stent: Yes      # Immunosuppression: Tacrolimus immediate release (goal 8-10) and Mycophenolate mofetil (dose 750 mg every 12 hours)              - Induction with Recent Transplant:  High Intensity Protocol due to DGF              - Patient is in an immunosuppressed state and will continue to monitor for efficacy and toxicity of immunosuppression medications.              - Changes: Not at this time    # Infection Prophylaxis:   - PJP: Sulfa/TMP (Bactrim)  - CMV: Valganciclovir (Valcyte    # Hypertension: Borderline control;   Goal BP: < 150/90              - Volume status: Euvolemic                EDW ~ 178 lbs              - Changes: Not at this time    # Elevated Blood Glucose: Glucose generally running ~    - Management as per primary care.    # Anemia in Chronic Renal Disease: Hgb: Stable      JOSE: No   - Iron studies: Not checked recently    # Mineral Bone Disorder:    - Secondary renal hyperparathyroidism; PTH level: Not checked recently        On treatment: None  - Vitamin D; level: Unknown at this time, but checked with dialysis        On supplement: No  - Calcium; level: Low        On supplement: No  - Phosphorus; level: Normal        On supplement: No    # Electrolytes:   - Potassium; level: Normal        On supplement: No  - Magnesium; level: Normal        On supplement: No  - Bicarbonate; level: Low        On supplement: No  - Sodium; level: Low    # HFpEF: Last cardiac echo (Feb/2021) showed normal LVEF ~ 60-65% with grade I diastolic dysfunction.     # Heartburn Symptoms: Improved symptoms on H2 blocker.    # Medical Compliance: Yes    # Health Maintenance and Vaccination Review:  Reviewed and up to date    # Transplant History:  Etiology of Kidney Failure: Focal segmental glomerulosclerosis (FSGS), likely secondary  Tx: DDKT  Transplant: 10/28/2023 (Kidney)  Donor Type: Donation after Brain Death        Donor Class:   Crossmatch at time of Tx: negative  DSA at time of Tx: No  Significant changes in immunosuppression: None  CMV IgG Ab High Risk Discordance (D+/R-): Yes  EBV IgG Ab High Risk Discordance (D+/R-): No  Significant transplant-related complications: DGF    Transplant Office Phone Number: [phone]    Assessment and plan was discussed with the patient and he voiced his understanding and agreement.    Return visit: No follow-ups on file.    Daylin Jerry NP    Chief Complaint   Mr. Barnes is a 68 year old here for kidney transplant.     History of Present Illness    Matteo Barnes is a 68 year old male Matteo Barnes is a 67-year-old gentleman with ESKD 2/2 FSGS who presents for potential kidney transplant with history of ESKD secondary to biopsy-proven FSGS (likely secondary) on hemodialysis since 2021, hypertension, hyperlipidemia and gout  now DDKT    No fevers or chills.  No nausea or vomiting.     No urinary symptoms.  UOP improving     Home BP: Not checked    Problem List   Patient Active Problem List   Diagnosis    Hyperlipidemia LDL goal <130    Hypertension goal BP (blood pressure) < 140/90    Proteinuria    Erectile dysfunction    Gout    Focal segmental glomerulosclerosis    Pneumonia due to 2019 novel coronavirus    ESRD (end stage renal disease) (H)    Anemia in chronic kidney disease    Metabolic acidosis    Dependence on renal dialysis (H24)    -donor kidney transplant    Delayed graft function of kidney    Hyperkalemia    Immunosuppressed status (H24)    Steroid-induced diabetes mellitus        Allergies   No Known Allergies    Medications   Current Outpatient Medications   Medication Sig    acetaminophen (TYLENOL) 500 MG tablet Take  500-1,000 mg by mouth every 6 hours as needed for mild pain    ALPRAZolam (XANAX) 0.25 MG tablet TAKE ONE TABLET BY MOUTH ONCE DAILY AS NEEDED FOR ANXIETY    amLODIPine (NORVASC) 5 MG tablet Take 1 tablet (5 mg) by mouth daily    atorvastatin (LIPITOR) 10 MG tablet Take 1 tablet (10 mg) by mouth daily    carvedilol (COREG) 6.25 MG tablet Take 1 tablet (6.25 mg) by mouth 2 times daily (with meals)    famotidine (PEPCID) 10 MG tablet Take 1 tablet (10 mg) by mouth at bedtime    multivitamin RENAL (NEPHROCAPS/TRIPHROCAPS) 1 MG capsule Take 1 capsule by mouth daily    mycophenolate (GENERIC EQUIVALENT) 250 MG capsule Take 3 capsules (750 mg) by mouth 2 times daily    oxyCODONE (ROXICODONE) 5 MG tablet Take 0.5-1 tablets (2.5-5 mg) by mouth every 4 hours as needed for moderate pain    senna-docusate (SENOKOT-S/PERICOLACE) 8.6-50 MG tablet Take 1 tablet by mouth 2 times daily    sulfamethoxazole-trimethoprim (BACTRIM) 400-80 MG tablet Take 1 tablet by mouth three times a week    tacrolimus (GENERIC) 0.5 MG capsule Take 1 capsule (0.5 mg) by mouth 2 times daily as needed (When directed by transplant team for dose titration)    tacrolimus (GENERIC) 1 MG capsule Take 4 capsules (4 mg) by mouth 2 times daily    valGANciclovir (VALCYTE) 450 MG tablet Take 1 tablet (450 mg) by mouth twice a week Increase dose to 900 mg by mouth daily when directed by transplant team (based on improving renal function)     No current facility-administered medications for this visit.     There are no discontinued medications.    Physical Exam   Vital Signs: There were no vitals taken for this visit.    GENERAL APPEARANCE: alert and no distress  HENT: mouth without ulcers or lesions  RESP: lungs clear to auscultation - no rales, rhonchi or wheezes  CV: regular rhythm, normal rate, no rub, no murmur  EDEMA: no LE edema bilaterally  ABDOMEN: soft, nondistended, nontender, bowel sounds normal  MS: extremities normal - no gross deformities noted, no  evidence of inflammation in joints, no muscle tenderness  SKIN: no rash    Data         Latest Ref Rng & Units 11/6/2023     8:08 AM 11/4/2023     9:18 AM 11/3/2023     7:32 AM   Renal   Sodium 135 - 145 mmol/L 131  134  134    K 3.4 - 5.3 mmol/L 4.9  4.8  4.8    Cl 98 - 107 mmol/L 98  101  100    Cl (external) 98 - 107 mmol/L 98  101  100    CO2 22 - 29 mmol/L 17  15  19    Urea Nitrogen 8.0 - 23.0 mg/dL 89.4  97.0  99.1    Creatinine 0.67 - 1.17 mg/dL 6.95  8.19  9.20    Glucose 70 - 99 mg/dL 103  115  96    Calcium 8.8 - 10.2 mg/dL 8.8  8.9  8.6    Magnesium 1.7 - 2.3 mg/dL  1.8  1.8          Latest Ref Rng & Units 11/4/2023     9:18 AM 11/3/2023     7:32 AM 11/2/2023     7:24 AM   Bone Health   Phosphorus 2.5 - 4.5 mg/dL 4.8  4.9  4.0    Parathyroid Hormone Intact 15 - 65 pg/mL   186    Vit D Def 20 - 50 ng/mL   45          Latest Ref Rng & Units 11/6/2023     8:08 AM 11/4/2023     9:18 AM 11/3/2023     7:32 AM   Heme   WBC 4.0 - 11.0 10e3/uL 7.2  5.6  5.5    Hgb 13.3 - 17.7 g/dL 7.9  8.4  8.2    Plt 150 - 450 10e3/uL 176  142  118          Latest Ref Rng & Units 10/28/2023     4:06 AM 6/7/2023    12:36 PM 1/26/2023     8:53 AM   Liver   AP 40 - 129 U/L 57   47    TBili <=1.2 mg/dL 0.3   0.3    ALT 0 - 70 U/L 12  11  16    AST 0 - 45 U/L 13   15    Tot Protein 6.4 - 8.3 g/dL 7.2   7.1    Albumin 3.5 - 5.2 g/dL 4.5   4.6          Latest Ref Rng & Units 10/28/2023     4:07 AM 10/16/2021     9:42 AM 3/20/2018     4:09 PM   Pancreas   A1C <5.7 % 5.0   4.9    Lipase 73 - 393 U/L  461           Latest Ref Rng & Units 11/2/2023     7:24 AM 10/19/2021     6:40 AM   Iron studies   Iron 61 - 157 ug/dL 123  49    Iron Saturation Index 15 - 46 %  26    Iron Sat Index 15 - 46 % 69     Ferritin 31 - 409 ng/mL 1,365  1,735          Latest Ref Rng & Units 10/28/2023     4:06 AM 2/1/2021     8:30 AM   [facility] Txp Virology   EBV CAPSID ANTIBODY IGG No detectable antibody. Positive  >8.0    Hep B Core NR^Nonreactive  Nonreactive         Recent Labs   Lab Test 11/02/23  0724 11/03/23  0732 11/04/23  0918   DOSTAC 11/1/2023 11/2/2023 11/3/2023   TACROL 4.5* 7.4 10.7     Recent Labs   Lab Test 11/03/23  0732   DOSMPA 11/2/2023   8:00 PM   MPACID 1.94   MPAG >200.0*

## 2023-11-06 NOTE — PROGRESS NOTES
TRANSPLANT NEPHROLOGY EARLY POST TRANSPLANT VISIT    Assessment & Plan   # DDKT: Trend down  DGF.  No dialysis today.  Labs tomorrow.  Track UOP   - Baseline Creatinine: ~ TBD   - Proteinuria: Not checked post transplant   - Date DSA Last Checked: Oct/2023      Latest DSA: No DSA at time of transplant   - BK Viremia: No   - Kidney Tx Biopsy: No   - Transplant Ureteral Stent: Yes      # Immunosuppression: Tacrolimus immediate release (goal 8-10) and Mycophenolate mofetil (dose 750 mg every 12 hours)              - Induction with Recent Transplant:  High Intensity Protocol due to DGF              - Patient is in an immunosuppressed state and will continue to monitor for efficacy and toxicity of immunosuppression medications.              - Changes: Not at this time    # Infection Prophylaxis:   - PJP: Sulfa/TMP (Bactrim)  - CMV: Valganciclovir (Valcyte    # Hypertension: Borderline control;   Goal BP: < 150/90              - Volume status: Euvolemic                EDW ~ 178 lbs              - Changes: Not at this time    # Elevated Blood Glucose: Glucose generally running ~    - Management as per primary care.    # Anemia in Chronic Renal Disease: Hgb: Stable      JOSE: No   - Iron studies: Not checked recently    # Mineral Bone Disorder:    - Secondary renal hyperparathyroidism; PTH level: Not checked recently        On treatment: None  - Vitamin D; level: Unknown at this time, but checked with dialysis        On supplement: No  - Calcium; level: Low        On supplement: No  - Phosphorus; level: High        On supplement: No    # Electrolytes:   - Potassium; level: Normal        On supplement: No  - Magnesium; level: Normal        On supplement: No  - Bicarbonate; level: Low        On supplement: No. Trending  - Sodium; level: Low    # HFpEF: Last cardiac echo (Feb/2021) showed normal LVEF ~ 60-65% with grade I diastolic dysfunction.     # Heartburn Symptoms: Improved symptoms on H2 blocker.    # Medical  Compliance: Yes    # Health Maintenance and Vaccination Review: Reviewed and up to date    # Transplant History:  Etiology of Kidney Failure: Focal segmental glomerulosclerosis (FSGS), likely secondary  Tx: DDKT  Transplant: 10/28/2023 (Kidney)  Donor Type: Donation after Brain Death        Donor Class:   Crossmatch at time of Tx: negative  DSA at time of Tx: No  Significant changes in immunosuppression: None  CMV IgG Ab High Risk Discordance (D+/R-): Yes  EBV IgG Ab High Risk Discordance (D+/R-): No  Significant transplant-related complications: DGF    Transplant Office Phone Number: [phone]    Assessment and plan was discussed with the patient and he voiced his understanding and agreement.    Return visit: No follow-ups on file.    Daylin Jerry NP    Chief Complaint   Mr. Barnes is a 68 year old here for kidney transplant.     History of Present Illness    Matteo Barnes is a 68 year old male Matteo Barnes is a 67-year-old gentleman with ESKD 2/2 FSGS who presents for potential kidney transplant with history of ESKD secondary to biopsy-proven FSGS (likely secondary) on hemodialysis since 2021, hypertension, hyperlipidemia and gout  now DDKT    No fevers or chills.  No nausea or vomiting.     No urinary symptoms.  UOP improving.  Doing well overall.     Home BP: Not checked    Problem List   Patient Active Problem List   Diagnosis    Hyperlipidemia LDL goal <130    Hypertension goal BP (blood pressure) < 140/90    Proteinuria    Erectile dysfunction    Gout    Focal segmental glomerulosclerosis    Pneumonia due to 2019 novel coronavirus    ESRD (end stage renal disease) (H)    Anemia in chronic kidney disease    Metabolic acidosis    Dependence on renal dialysis (H24)    -donor kidney transplant    Delayed graft function of kidney    Hyperkalemia    Immunosuppressed status (H24)    Steroid-induced diabetes mellitus        Allergies   No Known Allergies    Medications   Current  Outpatient Medications   Medication Sig    acetaminophen (TYLENOL) 500 MG tablet Take 500-1,000 mg by mouth every 6 hours as needed for mild pain    ALPRAZolam (XANAX) 0.25 MG tablet TAKE ONE TABLET BY MOUTH ONCE DAILY AS NEEDED FOR ANXIETY    amLODIPine (NORVASC) 5 MG tablet Take 1 tablet (5 mg) by mouth daily    atorvastatin (LIPITOR) 10 MG tablet Take 1 tablet (10 mg) by mouth daily    carvedilol (COREG) 6.25 MG tablet Take 1 tablet (6.25 mg) by mouth 2 times daily (with meals)    famotidine (PEPCID) 10 MG tablet Take 1 tablet (10 mg) by mouth at bedtime    multivitamin RENAL (NEPHROCAPS/TRIPHROCAPS) 1 MG capsule Take 1 capsule by mouth daily    mycophenolate (GENERIC EQUIVALENT) 250 MG capsule Take 3 capsules (750 mg) by mouth 2 times daily    oxyCODONE (ROXICODONE) 5 MG tablet Take 0.5-1 tablets (2.5-5 mg) by mouth every 4 hours as needed for moderate pain    senna-docusate (SENOKOT-S/PERICOLACE) 8.6-50 MG tablet Take 1 tablet by mouth 2 times daily    sodium bicarbonate 650 MG tablet Take 1 tablet (650 mg) by mouth 3 times daily    sulfamethoxazole-trimethoprim (BACTRIM) 400-80 MG tablet Take 1 tablet by mouth three times a week    tacrolimus (GENERIC) 0.5 MG capsule Take 1 capsule (0.5 mg) by mouth 2 times daily as needed (When directed by transplant team for dose titration)    tacrolimus (GENERIC) 1 MG capsule Take 4 capsules (4 mg) by mouth 2 times daily    valGANciclovir (VALCYTE) 450 MG tablet Take 1 tablet (450 mg) by mouth twice a week Increase dose to 900 mg by mouth daily when directed by transplant team (based on improving renal function)     No current facility-administered medications for this visit.     There are no discontinued medications.    Physical Exam   Vital Signs: There were no vitals taken for this visit.    GENERAL APPEARANCE: alert and no distress  HENT: mouth without ulcers or lesions  RESP: lungs clear to auscultation - no rales, rhonchi or wheezes  CV: regular rhythm, normal rate,  no rub, no murmur  EDEMA: no LE edema bilaterally  ABDOMEN: soft, nondistended, nontender, bowel sounds normal  MS: extremities normal - no gross deformities noted, no evidence of inflammation in joints, no muscle tenderness  SKIN: no rash    Data         Latest Ref Rng & Units 11/6/2023     8:08 AM 11/4/2023     9:18 AM 11/3/2023     7:32 AM   Renal   Sodium 135 - 145 mmol/L 131  134  134    K 3.4 - 5.3 mmol/L 4.9  4.8  4.8    Cl 98 - 107 mmol/L 98  101  100    Cl (external) 98 - 107 mmol/L 98  101  100    CO2 22 - 29 mmol/L 17  15  19    Urea Nitrogen 8.0 - 23.0 mg/dL 89.4  97.0  99.1    Creatinine 0.67 - 1.17 mg/dL 6.95  8.19  9.20    Glucose 70 - 99 mg/dL 103  115  96    Calcium 8.8 - 10.2 mg/dL 8.8  8.9  8.6    Magnesium 1.7 - 2.3 mg/dL  1.8  1.8          Latest Ref Rng & Units 11/4/2023     9:18 AM 11/3/2023     7:32 AM 11/2/2023     7:24 AM   Bone Health   Phosphorus 2.5 - 4.5 mg/dL 4.8  4.9  4.0    Parathyroid Hormone Intact 15 - 65 pg/mL   186    Vit D Def 20 - 50 ng/mL   45          Latest Ref Rng & Units 11/6/2023     8:08 AM 11/4/2023     9:18 AM 11/3/2023     7:32 AM   Heme   WBC 4.0 - 11.0 10e3/uL 7.2  5.6  5.5    Hgb 13.3 - 17.7 g/dL 7.9  8.4  8.2    Plt 150 - 450 10e3/uL 176  142  118          Latest Ref Rng & Units 10/28/2023     4:06 AM 6/7/2023    12:36 PM 1/26/2023     8:53 AM   Liver   AP 40 - 129 U/L 57   47    TBili <=1.2 mg/dL 0.3   0.3    ALT 0 - 70 U/L 12  11  16    AST 0 - 45 U/L 13   15    Tot Protein 6.4 - 8.3 g/dL 7.2   7.1    Albumin 3.5 - 5.2 g/dL 4.5   4.6          Latest Ref Rng & Units 10/28/2023     4:07 AM 10/16/2021     9:42 AM 3/20/2018     4:09 PM   Pancreas   A1C <5.7 % 5.0   4.9    Lipase 73 - 393 U/L  461           Latest Ref Rng & Units 11/6/2023     8:08 AM 11/2/2023     7:24 AM 10/19/2021     6:40 AM   Iron studies   Iron 61 - 157 ug/dL  61 - 157 ug/dL 91     91  123  49    Iron Saturation Index 15 - 46 %   26    Iron Sat Index 15 - 46 % 43  69     Ferritin 31 - 409  ng/mL  1,365  1,735          Latest Ref Rng & Units 10/28/2023     4:06 AM 2/1/2021     8:30 AM   [facility] Txp Virology   EBV CAPSID ANTIBODY IGG No detectable antibody. Positive  >8.0    Hep B Core NR^Nonreactive  Nonreactive        Recent Labs   Lab Test 11/02/23  0724 11/03/23  0732 11/04/23  0918   DOSTAC 11/1/2023 11/2/2023 11/3/2023   TACROL 4.5* 7.4 10.7     Recent Labs   Lab Test 11/03/23  0732   DOSMPA 11/2/2023   8:00 PM   MPACID 1.94   MPAG >200.0*

## 2023-11-07 NOTE — PATIENT INSTRUCTIONS
"Recommendations from today's MTM visit:                                                      Use Senna-S as needed if your stools are getting to hard.   Change Tylenol to 2 tablets (1000mg) 3 TIMES DAILY.     Follow-up: 2 months    It was great speaking with you today.  I value your experience and would be very thankful for your time in providing feedback in our clinic survey. In the next few days, you may receive an email or text message from [employer] with a link to a survey related to your  clinical pharmacist.\"     To schedule another MTM appointment, please call the clinic directly or you may call the MTM scheduling line at [phone] or toll-free at [phone].     My Clinical Pharmacist's contact information:                                                      Please feel free to contact me with any questions or concerns you have.      Ismael Ly, PharmD  MTM Pharmacist    Phone: [phone]     "

## 2023-11-07 NOTE — PROGRESS NOTES
Medication Therapy Management (MTM) Encounter    ASSESSMENT:                            Medication Adherence/Access: No issues identified    Kidney Transplant:    Stable.    Supplements:   Stable.     Hypertension   Stable. <150/90.    Hyperlipidemia   Stable.     GERD:   Stable.    Loose Stools:  Reduce Senna to PRN as he is having loose stools.     Pain:   Patient's Acetaminophen dose is too high, recommended he reduce dose to 1000mg and increase frequency. Patient is requesting Oxycodone refill, will follow-up with TXP team about this.     PLAN:                            Use Senna-S as needed if your stools are getting to hard.   Change Tylenol to 2 tablets (1000mg) 3 TIMES DAILY.     Elsa Adrian, Daylin Jerry..  Patient is requesting a refill on oxycodone.     Follow-up: 2 months.    SUBJECTIVE/OBJECTIVE:                          Matteo Barnes is a 68 year old male called for a transitions of care visit. He was discharged from [facility] on last Wed for kidney transplant.      Reason for visit: initial post transplant.    Allergies/ADRs: Reviewed in chart  Past Medical History: Reviewed in chart  Tobacco: He reports that he has never smoked. He has never used smokeless tobacco.  Alcohol: not currently using  THC/CBD: none    Medication Adherence/Access: Patient uses pill box(es) and has assistance with medication administration: SO's daughter helping set up meds.   Patient takes medications 4 time(s) per day. 7am, 8am, 4pm, 8pm  Per patient, misses medication 0 times per week.   The patient fills medications at [facility]: Yes, [facility].     Kidney Transplant:    Tacrolimus 6 mg twice daily  Mycophenolate Mofetil 750 mg twice daily.   Pt reports loose stools  Transplant date: 10/28/23  CMV prophylaxis: CrCl 10 to 24 mL/minute: Valcyte 450 mg twice weekly Donor (+), Recipient (-), treat 6 months post tx   PJP prophylaxis: Bactrim SS three times per week  Tx Coordinator: Elsa Adrian, Tx MD: Dr. Jose Armando Quiroga, Using  Med Card: Yes  Immunizations: annual flu shot ; Pneumovax 23:  ; Prevnar 13: ; TDaP:  ; Shingrix: unknown, HBV: no immunity, COVID: Primary x 4 and Bivalent x 1    Estimated Creatinine Clearance: 11.8 mL/min (A) (based on SCr of 6.95 mg/dL (H)).    Supplements:   Sodium Bicarbonate 650mg 3 TIMES DAILY.  Lab Results   Component Value Date    MAG 1.8 2023    CO2 17 (L) 2023    CO2 15 (L) 2023    CO2 19 (L) 2023     Hypertension   Carvedilol 6.25mg twice daily  Amlodipine 5mg at bedtime.  Patient reports no current medication side effects  Patient self monitors blood pressure.  Home BP monitoring this mornin/72 .       BP Readings from Last 3 Encounters:   23 (!) 149/87   23 (!) 164/89   23 (!) 144/81     Pulse Readings from Last 3 Encounters:   23 77   23 65   23 61     Hyperlipidemia   Atorvastatin 10mg daily  Patient reports no significant myalgias or other side effects.  The 10-year ASCVD risk score (John VILLALTA, et al., 2019) is: 25.8%    Values used to calculate the score:      Age: 68 years      Sex: Male      Is Non- : No      Diabetic: No      Tobacco smoker: No      Systolic Blood Pressure: 149 mmHg      Is BP treated: Yes      HDL Cholesterol: 47 mg/dL      Total Cholesterol: 231 mg/dL   Recent Labs   Lab Test 10/28/23  0406 23  1236   CHOL 231* 251*   HDL 47 52   * 162*   TRIG 135 185*     GERD:   Famotidine 10mg at bedtime   Patient reports no current symptoms.   Patient feels that current regimen is effective.    Loose Stools:  Senna-S 1 tablet daily.   Miralax prn  Loose stools couple times daily.     Pain:   Oxycodone 5mg twice daily to 3 TIMES DAILY prn  Acetaminophen 1500mg twice daily.   Pain is in his side, states he will need a refill of oxycodone. Does not feel pain is controlled with out it.     Today's Vitals: There were no vitals taken for this visit.  ----------------  Post  Discharge Medication Reconciliation Status: discharge medications reconciled and changed, per note/orders.    I spent 17 minutes with this patient today. All changes were made via collaborative practice agreement with txp team. A copy of the visit note was provided to the patient's provider(s).    A summary of these recommendations was sent via [employer].    Ismael Ly, PharmD  [facility] Pharmacist    Phone: [phone]     Telemedicine Visit Details  Type of service:  Telephone visit  Start Time: 2:50 PM  End Time: 3:07 PM     Medication Therapy Recommendations  Loose stools    Current Medication: senna-docusate (SENOKOT-S/PERICOLACE) 8.6-50 MG tablet   Rationale: Undesirable effect - Adverse medication event - Safety   Recommendation: Decrease Frequency   Status: Accepted - no CPA Needed         Pain    Current Medication: acetaminophen (TYLENOL) 500 MG tablet   Rationale: Dose too high - Dosage too high - Safety   Recommendation: Decrease Dose   Status: Accepted - no CPA Needed

## 2023-11-08 NOTE — OP NOTE
Transplant Surgery  Operative Note     Procedure date:  10/28/23    Preoperative diagnosis:  End Stage renal failure due to focal segmental glomerulosclerosis (FSGS)    Postoperative diagnosis:  Same,     Procedure:  1. Left Kidney Donation after Brain Death Kidney Transplant, Right  iliac fossa, without vascular reconstruction. A J-J ureteral stent was placed.  2. Kidney allograft preparation on Back Table      Surgeon:  Arthur    Fellow/Assistant:  Nicolas    Anesthesia:  General    Specimen:  None    Drains:  Duval    Urine output:  yes    Estimated blood loss:  50    Fluids administered:       Intraoperative Events: none  Findings: Integrity of recipient artery: good   Indication: The patient has End Stage renal failure due to focal segmental glomerulosclerosis (FSGS) and received an organ offer for a Donation after Brain Death Kidney allograft. After discussing the risks and benefits of proceeding, the patient agreed to proceed with surgery and provided informed consent.    Final ABO/Crossmatch verification: Prior to incision, I verified the donor ABO and recipient ABO. After the donor organ arrived to the operating room and prior to anastomosis, I visually verified that the donor identification, blood type, and other vital data were compatible with the recipient. The crossmatch was done retrospectively; the T cell flow crossmatch result was negative and B cell Flow crossmatch result was negative.  The patient received Thymoglobulin, Cellcept, and Solumedrol on induction.    Donor Organ Information:   Donor UNOS ID:  [id number]    Donor ABO:  O    Donor arterial clamp on:  10/27/2023  1:06 PM    Total ischemic time:  1641    Cold ischemic time:  1,599    Warm ischemic time:  42     Back Table Details:   Procedure:  Bench preparation of the kidney allograft for transplantation without vascular reconstruction    Preoperative diagnosis:  End stage renal failure due to focal segmental glomerulosclerosis (FSGS)     Postoperative diagnosis:  Same,     Surgeon:  Arthur    Faculty Co-Surgeon:      Fellow/Assistant:  Nicolas    Anesthesia:  None    Donor arrival to recipient room:  10/28/2023  1:59 PM    Graft injury:  Yes, capsular tear    Graft biopsy:  no    Organ received on:  Ice    Pump resistance:      Pump flow:      Arterial anatomy:  single    Donor arterial quality:  @TXPORGITMVALFV*850:KI::::::1)@    Venous anatomy:  single    Ureteral anatomy:  single    Any reconstruction:  no    Artery:  no    Vein:  no     Back Table Preparation:  The donor kidney was received and inspected. It had been flushed with UW. The graft was prepared on the back table by removing perinephric fat and ligating venous tributaries and lymphatics. The ureter was also cleaned of excess tissue. If required, reconstruction was performed as detailed above. The kidney was stored in iced cold preservation solution until ready for transplantation. Faculty was present for the critical portions of the procedure.    Operative Procedure:   Arterial anastomosis start:  10/28/2023  3:45 PM    Arterial unclamp:  10/28/2023  4:27 PM    Extra vessels used:   no      The patient was brought to the operating room, placed in a supine position, and a time out was performed. Sequential compression devices were placed on both lower extremities and general endotracheal anesthesia was induced.  The patient was given IV antibiotics and a Duval catheter. A central line was placed by Anesthesia service. The abdomen was then shaved, prepped, and draped in the usual sterile fashion.  An incision was made in the Right and carried down through the subcutaneous tissue and the abdominal wall fascia. If encountered, the epigastric vessels were ligated in continuity, divided and secured with surgical clips. The right iliac artery and vein were exposed. The retractor system was placed and the lymphatics overlying the vessels were serially ligated and divided.     The patient  was heparinized. We applied atraumatic vascular clamps and the donor kidney was brought to the operative field. We made a venotomy and the renal vein was anastomosed to the recipient right iliac vein in an end-to-side fashion. An arteriotomy was made and the donor renal artery was anastomosed to the recipient right external iliac artery  in an end to side fashion. The patient was simultaneously loaded with IV mannitol, Lasix and volume. The renal artery was protected and the clamps were removed. After several cardiac cycles, we opened the renal artery and the kidney had  reperfusion and was firm and pink .    The transplant ureter was managed by creating a modified liche anastomosis with absorbable suture. A stent was placed across the anastomosis. The kidney made urine prior to implantation.    Hemostasis was obtained, the anastomoses inspected, and the kidney placed in the iliac fossa. After placement, the vessel lay was inspected and found to be acceptable. The kidney position was right retroperitoneal. The field was irrigated with antibiotic solution. No drain was placed. The retractor was removed and the abdominal wall fascia reapproximated. Subcutaneous tissues were irrigated and hemostasis obtained.  The skin was reapproximated with staples and a dry dressing was applied.   All needle, sponge and instrument counts were correct x 2. The patient was awakened, extubated, and transferred to PACU for post-op monitoring. Faculty was present for key portions of the procedure.     Physician Attestation   I was present for the key portions of the procedure and I was immediately available for the entire procedure between opening and closing.    Hannah Gibson MD, MD  Date of Service (when I saw the patient): 10/28/2023  The transplant fellow, River Valenzuela MD, was present and assisted with all aspects of the operation described above from opening to closing.  There was no suitable surgical resident available  to assist in this procedure.

## 2023-11-08 NOTE — TELEPHONE ENCOUNTER
RNCC spoke with Matteo who is requesting more oxycodone for incisional pain and to sleep at night. RNCC explained that this cannot be refilled without being seen in clinic. Pt was frustrated and voiced that he does not want to drive into clinic.   RNCC advised pt to use tylenol as directed along with ice and heat.     ADD:  Daylin Rosalino agreed to send 3 tabs of oxy to [facility] pharmacy along with a prescription for robaxin.     RNCC left VM for pt with this information.

## 2023-11-08 NOTE — TELEPHONE ENCOUNTER
[facility] Call Center    Phone Message    May a detailed message be left on voicemail: yes     Reason for Call: Other: Call was placed to patient to schedule an appointment with Rosalino for incisional pain. Patient stated he didn't want to drive all the way down here for an appointment. Patient states he is seeing provider on Tuesday and he is just afraid to run out of medication over the weekend and he is just requesting a partial or full fill of his medication before the weekend. Please call patient back to discuss.     Action Taken: Message routed to:  Other: RNCC    Travel Screening: Not Applicable

## 2023-11-08 NOTE — TELEPHONE ENCOUNTER
Post Kidney and Pancreas Transplant Team Conference  Date: 11/8/2023  Transplant Coordinator: Elsa Adrian     Attendees:  [x]  Dr. Dale [x] Jennifer Domínguez, RN [x] Ellen Dowell LPN     [x]  Dr. Marrufo [] Mere Ray, RN [] Deborah Molina LPN    [x] Dr. Garcia [x] Sara Alonso RN    [x] Dr. Flores [x] Elsa Adrian, RN [x] Abena Craven RN   [] Dr. Jennings [] Delma Rice, RN    [x] Dr. Gonzalez [] Beryl Sousa RN    []  Dr. Santoyo [] Anu Johnson RN    [x] Dr. Damian [] Alex Zhang RN    [x] Daylin Jerry, NP [] Magaly Shoemaker RN    [x] Pricilla Danielle NP [] Hue Dickson RN        Verbal Plan Read Back:   Schedule visit with Daylin Jerry in clinic this week      Routed to RN Coordinator   Ellen Dowell LPN

## 2023-11-10 NOTE — TELEPHONE ENCOUNTER
Patient Call: General  Route to LPN    Reason for call: Matteo stated he's been having Diarrhea for last 2 days, more than 4 a day.    Call back needed? Yes    Return Call Needed  Same as documented in contacts section  When to return call?: Same day: Route High Priority

## 2023-11-10 NOTE — TELEPHONE ENCOUNTER
RNCC returned call to Matteo,  reports he has been experiencing diarrhea x 2days.     RNCC instructed him to stop taking the Senna, start a metamucil fiber OTC.     Added stool studies and CMV/MPA to labs for tomorrow.

## 2023-11-14 NOTE — PROGRESS NOTES
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   Described as watery stool- X1 stool yesterday, x2 today. More rumbling in abdomen but no F/C.   Stopped senna on Friday which has lessened stool frequency and started taking metamucil BID.     Lost 16lbs over the last 2 weeks. Wasn't eating much initially but appetite is increasing. Drinking 2-3 liters per day.     Pain tolerable with meds - worse in evening and at night, wants refill of oxycodone for a couple more nights to use PRN.   Home BP range 130-140s systolic.     Urine output 175-250ml with each void every 2-3 hours.     Transplant History:    Transplant Type:  DDKT  Donor Type: Donation after Brain Death   Transplant Date:  10/28/2023 (Kidney)   Ureteral Stent:  Yes   Crossmatch:  negative   DSA at Tx:  No  Baseline Cr: TBD   DeNovo DSA: No    Acute Rejection Hx:  No    Present Maintenance Immunosuppression:  Tacrolimus and Mycophenolate mofetil    CMV IgG Ab Discordance:  No  EBV IgG Ab Discordance:  No    BK Viremia:  No  EBV Viremia:  No    Transplant Coordinator: Elsa Adrian     Transplant Office Phone Number: [phone]     Immunosuppressant Medications       Immunosuppressive Agents Disp Start End     mycophenolate (GENERIC EQUIVALENT) 250 MG capsule    180 capsule 11/1/2023     Sig - Route: Take 3 capsules (750 mg) by mouth 2 times daily - Oral    Class: E-Prescribe     tacrolimus (GENERIC) 0.5 MG capsule    60 capsule 11/1/2023     Sig - Route: Take 1 capsule (0.5 mg) by mouth 2 times daily as needed (When directed by transplant team for dose titration) - Oral    Patient not taking: Reported on 11/7/2023       Class: E-Prescribe    Notes to Pharmacy: When directed by transplant team for dose titration     tacrolimus (GENERIC) 1 MG capsule    240 capsule 11/1/2023     Sig - Route: Take 4 capsules (4 mg) by mouth 2 times daily - Oral    Patient  taking differently: Take 6 mg by mouth 2 times daily       Class: E-Prescribe            Possible Immunosuppression-related side effects:   []             headache  []             vivid dreams  []             irritability  []             cognitive difficuties  []             fine tremor  []             nausea  [x]             diarrhea  []             neuropathy      []             edema  []             renal calcineurin toxicity  []             hyperkalemia  []             post-transplant diabetes  []             decreased appetite  []             increased appetite  []             other:  []             none    Prescription Medications as of 11/14/2023         Rx Number Disp Refills Start End Last Dispensed Date Next Fill Date Owning Pharmacy    acetaminophen (TYLENOL) 500 MG tablet        [facility] [address] [facility]    Sig: Take 500-1,000 mg by mouth every 6 hours as needed for mild pain    Class: Historical    Route: Oral    amLODIPine (NORVASC) 5 MG tablet  30 tablet 11 11/1/2023    [address]    Sig: Take 1 tablet (5 mg) by mouth daily    Class: E-Prescribe    Route: Oral    atorvastatin (LIPITOR) 10 MG tablet  30 tablet 11 11/2/2023    [address]    Sig: Take 1 tablet (10 mg) by mouth daily    Class: E-Prescribe    Route: Oral    carvedilol (COREG) 6.25 MG tablet  60 tablet 11 11/1/2023    [address]    Sig: Take 1 tablet (6.25 mg) by mouth 2 times daily (with meals)    Class: E-Prescribe    Route: Oral    famotidine (PEPCID) 10 MG tablet  30 tablet 11 11/1/2023    [address]    Sig: Take 1 tablet (10 mg) by mouth at bedtime    Class: E-Prescribe    Route: Oral    magnesium oxide (MAG-OX) 400 MG tablet  60 tablet 11 11/13/2023     [address]    Sig: Take 1 tablet (400 mg) by mouth 2 times daily With lunch and supper    Class: E-Prescribe    Route: Oral    multivitamin RENAL (NEPHROCAPS/TRIPHROCAPS) 1 MG capsule  30 capsule 0 10/24/2021    [facility] [address]    Sig: Take 1 capsule by mouth daily    Class: E-Prescribe    Route: Oral    mycophenolate (GENERIC EQUIVALENT) 250 MG capsule  180 capsule 11 11/1/2023    [address]    Sig: Take 3 capsules (750 mg) by mouth 2 times daily    Class: E-Prescribe    Route: Oral    oxyCODONE (ROXICODONE) 5 MG tablet  3 tablet 0 11/8/2023    [address]    Sig: Take 0.5 tablets (2.5 mg) by mouth every 8 hours as needed for moderate pain    Class: E-Prescribe    Earliest Fill Date: 11/8/2023    Route: Oral    senna-docusate (SENOKOT-S/PERICOLACE) 8.6-50 MG tablet  60 tablet 11 11/1/2023    [address]    Sig: Take 1 tablet by mouth 2 times daily    Class: E-Prescribe    Route: Oral    sodium bicarbonate 650 MG tablet  280 tablet 3 11/6/2023    [address]    Sig: Take 1 tablet (650 mg) by mouth 3 times daily    Class: E-Prescribe    Route: Oral    sulfamethoxazole-trimethoprim (BACTRIM) 400-80 MG tablet  30 tablet 11 11/1/2023    [address]    Sig: Take 1 tablet by mouth three times a week    Class: E-Prescribe    Notes to Pharmacy: Increase dose to 1 tablet by mouth daily as directed by transplant team (based on improving kidney function)    Route: Oral    tacrolimus (GENERIC) 1 MG capsule  240 capsule 11 11/1/2023    [address]    Sig:  "Take 4 capsules (4 mg) by mouth 2 times daily    Class: E-Prescribe    Route: Oral    valGANciclovir (VALCYTE) 450 MG tablet  60 tablet 5 11/2/2023    [address]    Sig: Take 1 tablet (450 mg) by mouth twice a week Increase dose to 900 mg by mouth daily when directed by transplant team (based on improving renal function)    Class: E-Prescribe    Route: Oral    ALPRAZolam (XANAX) 0.25 MG tablet  20 tablet 0 10/5/2023    [facility] [address] [place] [address]    Sig: TAKE ONE TABLET BY MOUTH ONCE DAILY AS NEEDED FOR ANXIETY    Class: E-Prescribe    tacrolimus (GENERIC) 0.5 MG capsule  60 capsule 11 11/1/2023    [place], [place] - [address] [place] [address]    Sig: Take 1 capsule (0.5 mg) by mouth 2 times daily as needed (When directed by transplant team for dose titration)    Class: E-Prescribe    Notes to Pharmacy: When directed by transplant team for dose titration    Route: Oral            O:   Pulse:  [74] 74  BP: (132)/(75) 132/75  SpO2:  [99 %] 99 %  General Appearance: in no apparent distress.   Skin: Normal, no rashes or jaundice  Heart: regular rate and rhythm  Lungs: easy respirations, no audible wheezing.  Abdomen: rounded, The wound is dry and intact, without hernia. The abdomen is non-tender. The kidney graft is not tender.  There is no ascites.  3\" bulge on proximal incision - evaluated by Dr. Gibson.   Incision C/D/I - no erythema or drainage.   Extremities: Tremor absent.   Edema: absent.         Latest Ref Rng & Units 11/13/2023     8:32 AM 11/9/2023     8:47 AM 11/6/2023     8:08 AM 11/4/2023     9:18 AM 11/3/2023     7:32 AM   Transplant Immunosuppression Labs   Creat 0.67 - 1.17 mg/dL 2.85  4.27  6.95  8.19  9.20    Urea Nitrogen 8.0 - 23.0 mg/dL 34.7  56.2  89.4  97.0  99.1    WBC 4.0 - 11.0 10e3/uL 8.6  8.2  7.2  5.6  5.5    Neutrophil %     89        Chemistries:   Recent " Labs   Lab Test 11/13/23  0832   BUN 34.7*   CR 2.85*   GFRESTIMATED 23*   *     Lab Results   Component Value Date    A1C 5.0 10/28/2023    A1C 4.9 03/20/2018     Recent Labs   Lab Test 10/28/23  0406   ALBUMIN 4.5   BILITOTAL 0.3   ALKPHOS 57   AST 13   ALT 12     Urine Studies:  Recent Labs   Lab Test 10/28/23  0231   COLOR Light Yellow   APPEARANCE Clear   URINEGLC 100*   URINEBILI Negative   URINEKETONE Negative   SG 1.009   UBLD Negative   URINEPH 8.5*   PROTEIN 100*   NITRITE Negative   LEUKEST Negative   RBCU <1   WBCU 1     Recent Labs   Lab Test 06/30/20  0739 05/10/16  0738   UTPG 1.55* 0.12     Hematology:   Recent Labs   Lab Test 11/13/23  0832 11/09/23  0847 11/06/23  0808   HGB 7.9* 8.6* 7.9*    231 176   WBC 8.6 8.2 7.2     Coags:   Recent Labs   Lab Test 10/28/23  0406 10/18/21  0653   INR 1.13 1.05     HLA antibodies:   SA1 Hi Risk Rox   Date Value Ref Range Status   02/01/2021 A:32  Final     SA1 HI RISK ROX   Date Value Ref Range Status   10/28/2023 None  Final     SA1 Mod Risk Rox   Date Value Ref Range Status   02/01/2021 A:25 B:13 27 44 49 51 57 59 63 76  Final     SA1 MOD RISK ROX   Date Value Ref Range Status   10/28/2023 A:25 32B:13 44 49 51 59 63  Final     SA2 Hi Risk Rox   Date Value Ref Range Status   02/01/2021 None  Final     SA2 HI RISK ROX   Date Value Ref Range Status   10/28/2023 None  Final     SA2 Mod Risk Rox   Date Value Ref Range Status   02/01/2021 None  Final     SA2 MOD RISK ROX   Date Value Ref Range Status   10/28/2023 None  Final       Assessment: Matteo Barnes is doing fairly well s/p DDKT:  Issues we addressed during his visit include:    Plan:    1. Graft function: Cr down trending   2. Immunosuppression Management: No change    .  Complexity of management:Medium.  Contributing factors: diarrhea and kidney tx  3. Diarrhea: stool studies today. Continue metamucil and ensure hydration.   Discussed pain controlled and weaning off oxycodone after this  refill.   Case discussed with Dr. Gibson who also evaluated patient.   Followup:     Total Time: 30 min,   Counselling Time: 10 min.    ZEHRA Regan

## 2023-11-14 NOTE — LETTER
11/14/2023         RE: Matteo Barnes  [address]  [place] [address]        Dear Colleague,    Thank you for referring your patient, Matteo Barnes, to the [facility]. Please see a copy of my visit note below.    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   Described as watery stool- X1 stool yesterday, x2 today. More rumbling in abdomen but no F/C.   Stopped senna on Friday which has lessened stool frequency and started taking metamucil BID.     Lost 16lbs over the last 2 weeks. Wasn't eating much initially but appetite is increasing. Drinking 2-3 liters per day.     Pain tolerable with meds - worse in evening and at night, wants refill of oxycodone for a couple more nights to use PRN.   Home BP range 130-140s systolic.     Urine output 175-250ml with each void every 2-3 hours.     Transplant History:    Transplant Type:  DDKT  Donor Type: Donation after Brain Death   Transplant Date:  10/28/2023 (Kidney)   Ureteral Stent:  Yes   Crossmatch:  negative   DSA at Tx:  No  Baseline Cr: TBD   DeNovo DSA: No    Acute Rejection Hx:  No    Present Maintenance Immunosuppression:  Tacrolimus and Mycophenolate mofetil    CMV IgG Ab Discordance:  No  EBV IgG Ab Discordance:  No    BK Viremia:  No  EBV Viremia:  No    Transplant Coordinator: Elsa Adrian     Transplant Office Phone Number: [phone]     Immunosuppressant Medications       Immunosuppressive Agents Disp Start End     mycophenolate (GENERIC EQUIVALENT) 250 MG capsule    180 capsule 11/1/2023     Sig - Route: Take 3 capsules (750 mg) by mouth 2 times daily - Oral    Class: E-Prescribe     tacrolimus (GENERIC) 0.5 MG capsule    60 capsule 11/1/2023     Sig - Route: Take 1 capsule (0.5 mg) by mouth 2 times daily as needed (When directed by transplant team for dose titration) - Oral    Patient not taking: Reported  on 11/7/2023       Class: E-Prescribe    Notes to Pharmacy: When directed by transplant team for dose titration     tacrolimus (GENERIC) 1 MG capsule    240 capsule 11/1/2023     Sig - Route: Take 4 capsules (4 mg) by mouth 2 times daily - Oral    Patient taking differently: Take 6 mg by mouth 2 times daily       Class: E-Prescribe            Possible Immunosuppression-related side effects:   []             headache  []             vivid dreams  []             irritability  []             cognitive difficuties  []             fine tremor  []             nausea  [x]             diarrhea  []             neuropathy      []             edema  []             renal calcineurin toxicity  []             hyperkalemia  []             post-transplant diabetes  []             decreased appetite  []             increased appetite  []             other:  []             none    Prescription Medications as of 11/14/2023         Rx Number Disp Refills Start End Last Dispensed Date Next Fill Date Owning Pharmacy    acetaminophen (TYLENOL) 500 MG tablet        [place], [place] - [address] [facility]    Sig: Take 500-1,000 mg by mouth every 6 hours as needed for mild pain    Class: Historical    Route: Oral    amLODIPine (NORVASC) 5 MG tablet  30 tablet 11 11/1/2023    [address]    Sig: Take 1 tablet (5 mg) by mouth daily    Class: E-Prescribe    Route: Oral    atorvastatin (LIPITOR) 10 MG tablet  30 tablet 11 11/2/2023    [address]    Sig: Take 1 tablet (10 mg) by mouth daily    Class: E-Prescribe    Route: Oral    carvedilol (COREG) 6.25 MG tablet  60 tablet 11 11/1/2023    [address]    Sig: Take 1 tablet (6.25 mg) by mouth 2 times daily (with meals)    Class: E-Prescribe    Route: Oral    famotidine (PEPCID) 10 MG tablet   30 tablet 11 11/1/2023    [address]    Sig: Take 1 tablet (10 mg) by mouth at bedtime    Class: E-Prescribe    Route: Oral    magnesium oxide (MAG-OX) 400 MG tablet  60 tablet 11 11/13/2023    [address]    Sig: Take 1 tablet (400 mg) by mouth 2 times daily With lunch and supper    Class: E-Prescribe    Route: Oral    multivitamin RENAL (NEPHROCAPS/TRIPHROCAPS) 1 MG capsule  30 capsule 0 10/24/2021    [facility] [place] [facility] [address] [facility]    Sig: Take 1 capsule by mouth daily    Class: E-Prescribe    Route: Oral    mycophenolate (GENERIC EQUIVALENT) 250 MG capsule  180 capsule 11 11/1/2023    [address]    Sig: Take 3 capsules (750 mg) by mouth 2 times daily    Class: E-Prescribe    Route: Oral    oxyCODONE (ROXICODONE) 5 MG tablet  3 tablet 0 11/8/2023    [address]    Sig: Take 0.5 tablets (2.5 mg) by mouth every 8 hours as needed for moderate pain    Class: E-Prescribe    Earliest Fill Date: 11/8/2023    Route: Oral    senna-docusate (SENOKOT-S/PERICOLACE) 8.6-50 MG tablet  60 tablet 11 11/1/2023    [address]    Sig: Take 1 tablet by mouth 2 times daily    Class: E-Prescribe    Route: Oral    sodium bicarbonate 650 MG tablet  280 tablet 3 11/6/2023    [address]    Sig: Take 1 tablet (650 mg) by mouth 3 times daily    Class: E-Prescribe    Route: Oral    sulfamethoxazole-trimethoprim (BACTRIM) 400-80 MG tablet  30 tablet 11 11/1/2023    [address]    Sig: Take 1 tablet by mouth three times a week    Class: E-Prescribe    Notes to Pharmacy: Increase dose  "to 1 tablet by mouth daily as directed by transplant team (based on improving kidney function)    Route: Oral    tacrolimus (GENERIC) 1 MG capsule  240 capsule 11 11/1/2023    [address]    Sig: Take 4 capsules (4 mg) by mouth 2 times daily    Class: E-Prescribe    Route: Oral    valGANciclovir (VALCYTE) 450 MG tablet  60 tablet 5 11/2/2023    [address]    Sig: Take 1 tablet (450 mg) by mouth twice a week Increase dose to 900 mg by mouth daily when directed by transplant team (based on improving renal function)    Class: E-Prescribe    Route: Oral    ALPRAZolam (XANAX) 0.25 MG tablet  20 tablet 0 10/5/2023    [facility] - [address]    Sig: TAKE ONE TABLET BY MOUTH ONCE DAILY AS NEEDED FOR ANXIETY    Class: E-Prescribe    tacrolimus (GENERIC) 0.5 MG capsule  60 capsule 11 11/1/2023    [address]    Sig: Take 1 capsule (0.5 mg) by mouth 2 times daily as needed (When directed by transplant team for dose titration)    Class: E-Prescribe    Notes to Pharmacy: When directed by transplant team for dose titration    Route: Oral            O:   Pulse:  [74] 74  BP: (132)/(75) 132/75  SpO2:  [99 %] 99 %  General Appearance: in no apparent distress.   Skin: Normal, no rashes or jaundice  Heart: regular rate and rhythm  Lungs: easy respirations, no audible wheezing.  Abdomen: rounded, The wound is dry and intact, without hernia. The abdomen is non-tender. The kidney graft is not tender.  There is no ascites.  3\" bulge on proximal incision - evaluated by Dr. Gibson.   Incision C/D/I - no erythema or drainage.   Extremities: Tremor absent.   Edema: absent.         Latest Ref Rng & Units 11/13/2023     8:32 AM 11/9/2023     8:47 AM 11/6/2023     8:08 AM 11/4/2023     9:18 AM 11/3/2023     7:32 AM "   Transplant Immunosuppression Labs   Creat 0.67 - 1.17 mg/dL 2.85  4.27  6.95  8.19  9.20    Urea Nitrogen 8.0 - 23.0 mg/dL 34.7  56.2  89.4  97.0  99.1    WBC 4.0 - 11.0 10e3/uL 8.6  8.2  7.2  5.6  5.5    Neutrophil %     89        Chemistries:   Recent Labs   Lab Test 11/13/23  0832   BUN 34.7*   CR 2.85*   GFRESTIMATED 23*   *     Lab Results   Component Value Date    A1C 5.0 10/28/2023    A1C 4.9 03/20/2018     Recent Labs   Lab Test 10/28/23  0406   ALBUMIN 4.5   BILITOTAL 0.3   ALKPHOS 57   AST 13   ALT 12     Urine Studies:  Recent Labs   Lab Test 10/28/23  0231   COLOR Light Yellow   APPEARANCE Clear   URINEGLC 100*   URINEBILI Negative   URINEKETONE Negative   SG 1.009   UBLD Negative   URINEPH 8.5*   PROTEIN 100*   NITRITE Negative   LEUKEST Negative   RBCU <1   WBCU 1     Recent Labs   Lab Test 06/30/20  0739 05/10/16  0738   UTPG 1.55* 0.12     Hematology:   Recent Labs   Lab Test 11/13/23  0832 11/09/23  0847 11/06/23  0808   HGB 7.9* 8.6* 7.9*    231 176   WBC 8.6 8.2 7.2     Coags:   Recent Labs   Lab Test 10/28/23  0406 10/18/21  0653   INR 1.13 1.05     HLA antibodies:   SA1 Hi Risk Rox   Date Value Ref Range Status   02/01/2021 A:32  Final     SA1 HI RISK ROX   Date Value Ref Range Status   10/28/2023 None  Final     SA1 Mod Risk Rox   Date Value Ref Range Status   02/01/2021 A:25 B:13 27 44 49 51 57 59 63 76  Final     SA1 MOD RISK ROX   Date Value Ref Range Status   10/28/2023 A:25 32B:13 44 49 51 59 63  Final     SA2 Hi Risk Rox   Date Value Ref Range Status   02/01/2021 None  Final     SA2 HI RISK ROX   Date Value Ref Range Status   10/28/2023 None  Final     SA2 Mod Risk Rox   Date Value Ref Range Status   02/01/2021 None  Final     SA2 MOD RISK ROX   Date Value Ref Range Status   10/28/2023 None  Final       Assessment: Matteo Barnes is doing fairly well s/p DDKT:  Issues we addressed during his visit include:    Plan:    1. Graft function: Cr down trending   2.  Immunosuppression Management: No change    .  Complexity of management:Medium.  Contributing factors: diarrhea and kidney tx  3. Diarrhea: stool studies today. Continue metamucil and ensure hydration.   Discussed pain controlled and weaning off oxycodone after this refill.   Case discussed with Dr. Gibson who also evaluated patient.   Followup:     Total Time: 30 min,   Counselling Time: 10 min.    ZEHRA Regan

## 2023-11-15 NOTE — TELEPHONE ENCOUNTER
Post Kidney and Pancreas Transplant Team Conference  Date: 11/15/2023  Transplant Coordinator: Elsa Adrian     Attendees:  [x]  Dr. Dale [x] Jennifer Domínguez, RN [x] Ellen Dowell LPN     [x]  Dr. Marrufo [x] Mere Ray, RN [] Deborah Molina LPN    [x] Dr. Garcia [x] Sara Alonso RN    [x] Dr. Flores [] Elsa Adrian, JONATHAN [] Abena Craven RN   [x] Dr. Jennings [] Delma Rice, RN    [x] Dr. Gonzalez [] Beryl Sousa RN    []  Dr. Santoyo [] Anu Johnson RN    [] Dr. Damian [] Alex Zhang RN    [x] Daylin Jerry, NP [] Magaly Shoemaker RN    [x] Pricilla Danielle NP [] Hue Dickson RN        Verbal Plan Read Back:   Continue current treatment plan    Routed to RN Coordinator   Ellen Dowell LPN

## 2023-11-16 NOTE — TELEPHONE ENCOUNTER
Alden Joseph MD Reilly, Megan, RN  Increase sodium bicarb 1300 mg bid  Add phos and albumin to labs  Iron studies      MyC message sent to pt with dose increase

## 2023-11-16 NOTE — TELEPHONE ENCOUNTER
ISSUE:  Positive C. Diff    Alden Joseph MD Reilly, Megan, RN  Vanc 125 mg po qid x 10 days if this is his 1st episode    RNCC spoke with pt who agrees to abx plan.   Rx sent to [place] pharmacy.     Order placed for f/up with TERESA for staple removal.

## 2023-11-17 NOTE — PROGRESS NOTES
Anemia Management Note - Enrollment  SUBJECTIVE/OBJECTIVE:    Referred by Dr. Alden Marrufo on 2023  Primary Diagnosis: Anemia in Chronic Kidney Disease (N18.4, D63.1)     Secondary Diagnosis:  Organ or tissue replaced by transplant, kidney (Z94.0)  Kidney Transplant [id number]  Hgb goal range:  9-10  Epo/Darbo:   TBD. Needs to be 30+ days post transplant and TSAT >20  Iron regimen:  Venofer 200mg x 5 doses per referral  Labs : [id number]  Recent JOSE use, transfusion, IV iron: none. Hx retacrit and venofer with dialysis    RX/TX plans : TBD    No history of stroke, MI, and blood clots or cancers  Contact: Consent to communicate declined per pt, signed on 106        Latest Ref Rng & Units 2023     7:24 AM 11/3/2023     7:32 AM 2023     9:18 AM 2023     8:08 AM 2023     8:47 AM 2023     8:32 AM 2023     9:05 AM   Anemia   Hemoglobin 13.3 - 17.7 g/dL 8.7  8.2  8.4  7.9  8.6  7.9  7.1    Ferritin 31 - 409 ng/mL 1,365    1,300  1,368   3        BP Readings from Last 3 Encounters:   23 132/75   23 (!) 149/87   23 (!) 164/89     Wt Readings from Last 2 Encounters:   23 74.7 kg (164 lb 11.2 oz)   23 81.9 kg (180 lb 9.6 oz)     Current Outpatient Medications   Medication Sig Dispense Refill    acetaminophen (TYLENOL) 500 MG tablet Take 500-1,000 mg by mouth every 6 hours as needed for mild pain      ALPRAZolam (XANAX) 0.25 MG tablet TAKE ONE TABLET BY MOUTH ONCE DAILY AS NEEDED FOR ANXIETY (Patient not taking: Reported on 2023) 20 tablet 0    amLODIPine (NORVASC) 5 MG tablet Take 1 tablet (5 mg) by mouth daily 30 tablet 11    atorvastatin (LIPITOR) 10 MG tablet Take 1 tablet (10 mg) by mouth daily 30 tablet 11    carvedilol (COREG) 6.25 MG tablet Take 1 tablet (6.25 mg) by mouth 2 times daily (with meals) 60 tablet 11    famotidine (PEPCID) 10 MG tablet Take 1 tablet (10 mg) by mouth at bedtime 30 tablet 11    magnesium oxide (MAG-OX) 400 MG  tablet Take 1 tablet (400 mg) by mouth 2 times daily With lunch and supper 60 tablet 11    multivitamin RENAL (NEPHROCAPS/TRIPHROCAPS) 1 MG capsule Take 1 capsule by mouth daily 30 capsule 0    mycophenolate (GENERIC EQUIVALENT) 250 MG capsule Take 3 capsules (750 mg) by mouth 2 times daily 180 capsule 11    oxyCODONE (ROXICODONE) 5 MG tablet Take 0.5 tablets (2.5 mg) by mouth 2 times daily as needed for severe pain 4 tablet 0    senna-docusate (SENOKOT-S/PERICOLACE) 8.6-50 MG tablet Take 1 tablet by mouth 2 times daily 60 tablet 11    sodium bicarbonate 650 MG tablet Take 2 tablets (1,300 mg) by mouth 2 times daily 360 tablet 3    sulfamethoxazole-trimethoprim (BACTRIM) 400-80 MG tablet Take 1 tablet by mouth three times a week 30 tablet 11    tacrolimus (GENERIC) 0.5 MG capsule Take 1 capsule (0.5 mg) by mouth 2 times daily Total dose: 6.5 mg twice daily Profile Rx: patient will contact pharmacy when needed 60 capsule 11    tacrolimus (GENERIC) 1 MG capsule Take 6 capsules (6 mg) by mouth 2 times daily Total dose: 6.5 mg twice daily 240 capsule 11    valGANciclovir (VALCYTE) 450 MG tablet Take 1 tablet (450 mg) by mouth twice a week Increase dose to 900 mg by mouth daily when directed by transplant team (based on improving renal function) 60 tablet 5    vancomycin (VANCOCIN) 125 MG capsule Take 1 capsule (125 mg) by mouth 4 times daily 40 capsule 0     ASSESSMENT:  Hgb Not at goal/Intentional hold and Known   Ferritin: Not at goal of (>100ng/mL)  TSat: not at goal of >30%  Iron regimen recommended: IV iron, Venofer  Recommended JOSE regimen: TBD  Blood Pressure: Hx HTN, monitor closely    PLAN:  1. Patient called today for enrollment in Anemia Management Service.  2. Discussed:  anemia overview, monitoring service, and goal hemoglobin range and rationale and risks of JOSE blood clots, stroke, and increase in blood pressure  3. Dose location: in clinic TB  4. Labs: [place]  5. Pharmacy: [facility] and sent Patience  message to Matteo gonzalez: referral.    Next call date:  112123 new pt, needs IV Iron    Carrie Leopold, RN BSN  Anemia Services  [facility]  [email or web address]  Office: [phone]  Fax [phone]  **NOTE: Anemia Management Services staff will be out of the office on Thanksgiving, 11/23 and Friday, 11/24.   Please reach out to your nurse care coordinator for any questions or concerns during this time.

## 2023-11-17 NOTE — TELEPHONE ENCOUNTER
Pt having problems with getting a refill , states he needs to just talk with the coordinator  would not give name of med or any other information to the writer

## 2023-11-17 NOTE — TELEPHONE ENCOUNTER
Patient Contacted    Appointment type: K/P POST RTN SURG  Provider: AILEEN  Return date: 11/27/23  Specialty phone number: [phone]  Additional appointment(s) needed: NA  Additonal Notes: RESCHEDULED.

## 2023-11-20 NOTE — PROGRESS NOTES
Follow-up with anemia management service:    Matteo left  on anemia line after hours on Friday 11/17.  LVM for Matteo today.   Needs IV iron-Venofer x5. Therapy plan is entered. Does he prefer [facility] or [facility]?    Addendum: Matteo called back. He was driving so call was kept brief. Reviewed need for infusions, and details about the venofer. He would like infusions done at [facility]. RN will send inBuyosphere message to [facility] infusion scheduling to call him.  He voiced understanding.        Latest Ref Rng & Units 11/3/2023     7:32 AM 11/4/2023     9:18 AM 11/6/2023     8:08 AM 11/9/2023     8:47 AM 11/13/2023     8:32 AM 11/16/2023     9:05 AM 11/20/2023     8:50 AM   Anemia   Hemoglobin 13.3 - 17.7 g/dL 8.2  8.4  7.9  8.6  7.9  7.1  7.2    Ferritin 31 - 409 ng/mL   1,300  1,368   3         Follow-up call date: [id number] iron infusions scheduled?    Carrie Leopold, RN BSN  Anemia Services  [facility]  [email or web address]  Office: [phone]  Fax [phone]  **NOTE: Anemia Management Services staff will be out of the office on Thanksgiving, 11/23 and Friday, 11/24.   Please reach out to your nurse care coordinator for any questions or concerns during this time.

## 2023-11-21 NOTE — TELEPHONE ENCOUNTER
ISSUE: MPA level 0.36 on  mgBID.     Pt denies missed doses or diarrhea. Sent to provider for review.

## 2023-11-21 NOTE — PROGRESS NOTES
Venofer infusions scheduled  11/22 11/27 11/29 12/4 12/8    Carrie Leopold, RN BSN  Anemia Services  [facility]  [email or web address]  Office: [phone]  Fax [phone]  **NOTE: Anemia Management Services staff will be out of the office on Thanksgiving, 11/23 and Friday, 11/24.   Please reach out to your nurse care coordinator for any questions or concerns during this time.

## 2023-11-22 NOTE — TELEPHONE ENCOUNTER
Post Kidney and Pancreas Transplant Team Conference  Date: 11/22/2023  Transplant Coordinator: Elsa Adrian     Attendees:  [x]  Dr. Dale [] Jennifer Domínguez, RN [x] Ellen Dowell LPN     [x]  Dr. Marrufo [x] Mere Ray, RN [] Deborah Molnia LPN    [x] Dr. Garcia [] Sara Alonso RN    [x] Dr. Flores [x] Elsa Adrian, RN [x] Abena Craven RN   [] Dr. Jennings [] Delma Rice, RN    [] Dr. Gonzalez [] Beryl Sousa RN    []  Dr. Santoyo [] Anu Johnson RN    [x] Dr. Damian [] Alex Zhang RN    [x] Daylin Jerry, NP [] Magaly Shoemaker RN    [x] Pricilla Danielle NP [] Hue Dickson RN        Verbal Plan Read Back:   Recommend starting probiotic  Increase sodium bicarb TID  Recommend no further iron infusions after today    Routed to RN Coordinator   Ellen Dowell LPN

## 2023-11-22 NOTE — PROGRESS NOTES
Infusion Nursing Note:  Matteo Barnes presents today for Venofer # 1 of 5.    Patient seen by provider today: No   present during visit today: Not Applicable.    Note: N/A.      Intravenous Access:  Peripheral IV placed.    Treatment Conditions:  Not Applicable.      Post Infusion Assessment:  Patient tolerated infusion without incident.  Patient observed for 15 minutes post venofer 200 per protocol.  Blood return noted pre and post infusion.  Site patent and intact, free from redness, edema or discomfort.  No evidence of extravasations.  Access discontinued per protocol.       Discharge Plan:   Discharge instructions reviewed with: Patient.  Patient and/or family verbalized understanding of discharge instructions and all questions answered.  AVS to patient via [employer].  Patient will return 11/27/23 for next appointment.   Patient discharged in stable condition accompanied by: self.  Departure Mode: Ambulatory.      Kari Schoen, RN

## 2023-11-22 NOTE — TELEPHONE ENCOUNTER
RNCC spoke with pt regarding dose change, adding probiotic and cancelling iron infusions at this time.   Pt agrees.

## 2023-11-27 NOTE — PROGRESS NOTES
Transplant Surgery Progress Note    Transplants:  10/28/2023 (Kidney); Postoperative day:  30  S: Here today for staple removal.   Appetite is improving. Weight maintaining this week, expects to start putting on weight soon as he's eating more. Drinking fluids with no issues.    No issues with pain. Taking Tylenol PRN.   + Cdiff, on po Vanco until later this week. Stools are less frequent and have more consistency. About 1 BM daily.     BP at home 120-130s systolic.     1.5 weeks ago noticed he can't get full deep breath in. Intermittent, can be 0 to 3 times per day. Not related to activity. No associated CP, palpitations or cough. Sensation is more in  his throat related to mucous then his chest. Spits up mucous in the AM but denies illness and cough. Started mucinex yesterday and this is helping.     Transplant History:    Transplant Type:  DDKT  Donor Type: Donation after Brain Death   Transplant Date:  10/28/2023 (Kidney)   Ureteral Stent:  Yes - scheduled removal on Friday.    Crossmatch:  negative   DSA at Tx:  No  Baseline Cr: TBD   DeNovo DSA: No    Acute Rejection Hx:  No    Present Maintenance Immunosuppression:  Tacrolimus and Mycophenolate mofetil    CMV IgG Ab Discordance:  No  EBV IgG Ab Discordance:  No    BK Viremia:  No  EBV Viremia:  No    Transplant Coordinator: Elsa Adrian     Transplant Office Phone Number: [phone]     Immunosuppressant Medications       Immunosuppressive Agents Disp Start End     mycophenolate (GENERIC EQUIVALENT) 250 MG capsule    180 capsule 11/1/2023     Sig - Route: Take 3 capsules (750 mg) by mouth 2 times daily - Oral    Class: E-Prescribe     tacrolimus (GENERIC) 0.5 MG capsule    60 capsule 11/1/2023     Sig - Route: Take 1 capsule (0.5 mg) by mouth 2 times daily as needed (When directed by transplant team for dose titration) - Oral    Patient not taking: Reported on 11/7/2023       Class: E-Prescribe    Notes to Pharmacy: When directed by transplant team for dose  titration     tacrolimus (GENERIC) 1 MG capsule    240 capsule 11/1/2023     Sig - Route: Take 4 capsules (4 mg) by mouth 2 times daily - Oral    Patient taking differently: Take 6 mg by mouth 2 times daily       Class: E-Prescribe            Possible Immunosuppression-related side effects:   []             headache  []             vivid dreams  []             irritability  []             cognitive difficuties  []             fine tremor  []             nausea  []             diarrhea  []             neuropathy      []             edema  []             renal calcineurin toxicity  []             hyperkalemia  []             post-transplant diabetes  []             decreased appetite  []             increased appetite  []             other:  []             none    Prescription Medications as of 11/27/2023         Rx Number Disp Refills Start End Last Dispensed Date Next Fill Date Owning Pharmacy    acetaminophen (TYLENOL) 500 MG tablet        [facility] [place] [address] [facility]    Sig: Take 500-1,000 mg by mouth every 6 hours as needed for mild pain    Class: Historical    Route: Oral    ALPRAZolam (XANAX) 0.25 MG tablet  20 tablet 0 10/5/2023    [address]    Sig: TAKE ONE TABLET BY MOUTH ONCE DAILY AS NEEDED FOR ANXIETY    Class: E-Prescribe    amLODIPine (NORVASC) 5 MG tablet  30 tablet 11 11/1/2023    [address]    Sig: Take 1 tablet (5 mg) by mouth daily    Class: E-Prescribe    Route: Oral    atorvastatin (LIPITOR) 10 MG tablet  30 tablet 11 11/2/2023    [address]    Sig: Take 1 tablet (10 mg) by mouth daily    Class: E-Prescribe    Route: Oral    carvedilol (COREG) 6.25 MG tablet  60 tablet 11 11/1/2023    [address]    Sig: Take 1  tablet (6.25 mg) by mouth 2 times daily (with meals)    Class: E-Prescribe    Route: Oral    famotidine (PEPCID) 10 MG tablet  30 tablet 11 11/1/2023    [address]    Sig: Take 1 tablet (10 mg) by mouth at bedtime    Class: E-Prescribe    Route: Oral    magnesium oxide (MAG-OX) 400 MG tablet  120 tablet 11 11/20/2023    [facility] [address] [facility]    Sig: Take 2 tablets (800 mg) by mouth 2 times daily With lunch and supper    Class: E-Prescribe    Route: Oral    multivitamin RENAL (NEPHROCAPS/TRIPHROCAPS) 1 MG capsule  30 capsule 0 10/24/2021    [facility] [place] [address] [facility]    Sig: Take 1 capsule by mouth daily    Class: E-Prescribe    Route: Oral    mycophenolate (GENERIC EQUIVALENT) 250 MG capsule  180 capsule 11 11/1/2023    [address]    Sig: Take 3 capsules (750 mg) by mouth 2 times daily    Class: E-Prescribe    Route: Oral    oxyCODONE (ROXICODONE) 5 MG tablet  4 tablet 0 11/14/2023    [facility] [address] [facility] [address]    Sig: Take 0.5 tablets (2.5 mg) by mouth 2 times daily as needed for severe pain    Class: E-Prescribe    Earliest Fill Date: 11/14/2023    Route: Oral    senna-docusate (SENOKOT-S/PERICOLACE) 8.6-50 MG tablet  60 tablet 11 11/1/2023    [address]    Sig: Take 1 tablet by mouth 2 times daily    Class: E-Prescribe    Route: Oral    sodium bicarbonate 650 MG tablet    11/22/2023        Sig: Take 2 tablets (1,300 mg) by mouth 3 times daily    Class: Historical    Route: Oral    sulfamethoxazole-trimethoprim (BACTRIM) 400-80 MG tablet  30 tablet 11 11/1/2023    [address]    Sig: Take 1 tablet by mouth three times a week     "Class: E-Prescribe    Notes to Pharmacy: Increase dose to 1 tablet by mouth daily as directed by transplant team (based on improving kidney function)    Route: Oral    tacrolimus (GENERIC) 0.5 MG capsule  60 capsule 11 11/21/2023    [facility] [address]    Sig: HOLD FOR FUTURE DOSE CHANGES.  Profile Rx: patient will contact pharmacy when needed    Class: E-Prescribe    tacrolimus (GENERIC) 1 MG capsule  420 capsule 11 11/21/2023    [address]    Sig: Take 7 capsules (7 mg) by mouth 2 times daily    Class: E-Prescribe    Notes to Pharmacy: TXP DT 10/28/2023 (Kidney) TXP Dischg DT 11/1/2023 DX Kidney replaced by transplant Z94.0 TX Center [facility] ([place], [place])    Route: Oral    valGANciclovir (VALCYTE) 450 MG tablet  60 tablet 5 11/2/2023    [facility] [place], [place] - [address] [place] [address]    Sig: Take 1 tablet (450 mg) by mouth twice a week Increase dose to 900 mg by mouth daily when directed by transplant team (based on improving renal function)    Class: E-Prescribe    Route: Oral    vancomycin (VANCOCIN) 125 MG capsule  40 capsule 0 11/16/2023    [address]    Sig: Take 1 capsule (125 mg) by mouth 4 times daily    Class: E-Prescribe    Route: Oral            O:   Temp:  [97.9  F (36.6  C)] 97.9  F (36.6  C)  Pulse:  [63] 63  BP: (124)/(70) 124/70  SpO2:  [100 %] 100 %  General Appearance: in no apparent distress.   Skin: Normal, no rashes or jaundice  Heart: regular rate and rhythm  Lungs: easy respirations, no audible wheezing.CTA throughout.   Abdomen: rounded, The wound is dry and intact, without hernia. The abdomen is non-tender. The kidney graft is not tender.  There is no ascites.  3\" bulge on proximal incision - soft and non-tender.   Incision C/D/I - no erythema or " drainage. Staples removed with no issues. Steris applied.   Extremities: Tremor absent.   Edema: absent.         Latest Ref Rng & Units 11/24/2023     8:29 AM 11/20/2023     8:50 AM 11/16/2023     9:05 AM 11/13/2023     8:32 AM 11/9/2023     8:47 AM   Transplant Immunosuppression Labs   Creat 0.67 - 1.17 mg/dL 1.90  2.08  2.75  2.85  4.27    Urea Nitrogen 8.0 - 23.0 mg/dL 20.3  24.6  33.8  34.7  56.2    WBC 4.0 - 11.0 10e3/uL 3.5  3.5  4.6  8.6  8.2        Chemistries:   Recent Labs   Lab Test 11/24/23  0829   BUN 20.3   CR 1.90*   GFRESTIMATED 38*   *     Lab Results   Component Value Date    A1C 5.0 10/28/2023    A1C 4.9 03/20/2018     Recent Labs   Lab Test 11/16/23  0905 10/28/23  0406   ALBUMIN 3.9 4.5   BILITOTAL  --  0.3   ALKPHOS  --  57   AST  --  13   ALT  --  12     Urine Studies:  Recent Labs   Lab Test 10/28/23  0231   COLOR Light Yellow   APPEARANCE Clear   URINEGLC 100*   URINEBILI Negative   URINEKETONE Negative   SG 1.009   UBLD Negative   URINEPH 8.5*   PROTEIN 100*   NITRITE Negative   LEUKEST Negative   RBCU <1   WBCU 1     Recent Labs   Lab Test 06/30/20  0739 05/10/16  0738   UTPG 1.55* 0.12     Hematology:   Recent Labs   Lab Test 11/24/23  0829 11/20/23  0850 11/16/23  0905   HGB 7.5* 7.2* 7.1*    249 221   WBC 3.5* 3.5* 4.6     Coags:   Recent Labs   Lab Test 10/28/23  0406 10/18/21  0653   INR 1.13 1.05     HLA antibodies:   SA1 Hi Risk Rox   Date Value Ref Range Status   02/01/2021 A:32  Final     SA1 HI RISK ROX   Date Value Ref Range Status   10/28/2023 None  Final     SA1 Mod Risk Rox   Date Value Ref Range Status   02/01/2021 A:25 B:13 27 44 49 51 57 59 63 76  Final     SA1 MOD RISK ROX   Date Value Ref Range Status   10/28/2023 A:25 32B:13 44 49 51 59 63  Final     SA2 Hi Risk Rox   Date Value Ref Range Status   02/01/2021 None  Final     SA2 HI RISK ROX   Date Value Ref Range Status   10/28/2023 None  Final     SA2 Mod Risk Rox   Date Value Ref Range Status   02/01/2021  None  Final     SA2 MOD RISK ROX   Date Value Ref Range Status   10/28/2023 None  Final       Assessment: Matteo Barnes is doing fairly well s/p DDKT:  Issues we addressed during his visit include:    Plan:     1. Graft function: Cr continues to down trend.   2. Immunosuppression Management: No change today.  .  Complexity of management:Medium.  Contributing factors: post kidney tx.  3. Low bicarb - his dose was just increased from BID to TID on 11/22/23 - slight up trend noted.   4. C.diff diarrhea - improving on po Vanco. Ensure hydration.   5. SOB: stable exam today. Likely mucous related - continue mucinex and report back any worsening symptoms. Can get CXR which was offered today but he declined, feeling better on mucinex. Monitor and report back Friday.   Followup: Friday as scheduled with nephrologist and cystoscopy.     Total Time: 30 min,   Counselling Time: 10 min.    ZEHRA Regan

## 2023-11-27 NOTE — NURSING NOTE
Staples Removal    /70 (BP Location: Right arm, Patient Position: Chair, Cuff Size: Adult Regular)   Pulse 63   Temp 97.9  F (36.6  C) (Oral)   Wt 74.3 kg (163 lb 11.2 oz)   SpO2 100%   BMI 24.00 kg/m      Removed patients staples. Surgical incision site was Clean, dry, and intact.    Educated patient on continued care around incision site.     Sukumar Moffett RN on 11/27/2023 at 10:05 AM

## 2023-11-27 NOTE — NURSING NOTE
"Chief Complaint   Patient presents with    Follow Up     Kidney transplant follow-up     Vital signs:  Temp: 97.9  F (36.6  C) Temp src: Oral BP: 124/70 Pulse: 63     SpO2: 100 %       Weight: 74.3 kg (163 lb 11.2 oz)  Estimated body mass index is 24 kg/m  as calculated from the following:    Height as of 6/7/23: 1.759 m (5' 9.25\").    Weight as of this encounter: 74.3 kg (163 lb 11.2 oz).      Sukumar Moffett RN on 11/27/2023 at 10:04 AM    "

## 2023-11-27 NOTE — LETTER
11/27/2023         RE: Matteo Barnes  [address]  [place] [address]        Dear Colleague,    Thank you for referring your patient, Matteo Barnes, to the [facility]. Please see a copy of my visit note below.    Transplant Surgery Progress Note    Transplants:  10/28/2023 (Kidney); Postoperative day:  30  S: Here today for staple removal.   Appetite is improving. Weight maintaining this week, expects to start putting on weight soon as he's eating more. Drinking fluids with no issues.    No issues with pain. Taking Tylenol PRN.   + Cdiff, on po Vanco until later this week. Stools are less frequent and have more consistency. About 1 BM daily.     BP at home 120-130s systolic.     1.5 weeks ago noticed he can't get full deep breath in. Intermittent, can be 0 to 3 times per day. Not related to activity. No associated CP, palpitations or cough. Sensation is more in  his throat related to mucous then his chest. Spits up mucous in the AM but denies illness and cough. Started mucinex yesterday and this is helping.     Transplant History:    Transplant Type:  DDKT  Donor Type: Donation after Brain Death   Transplant Date:  10/28/2023 (Kidney)   Ureteral Stent:  Yes - scheduled removal on Friday.    Crossmatch:  negative   DSA at Tx:  No  Baseline Cr: TBD   DeNovo DSA: No    Acute Rejection Hx:  No    Present Maintenance Immunosuppression:  Tacrolimus and Mycophenolate mofetil    CMV IgG Ab Discordance:  No  EBV IgG Ab Discordance:  No    BK Viremia:  No  EBV Viremia:  No    Transplant Coordinator: Elsa Adrian     Transplant Office Phone Number: [phone]     Immunosuppressant Medications       Immunosuppressive Agents Disp Start End     mycophenolate (GENERIC EQUIVALENT) 250 MG capsule    180 capsule 11/1/2023     Sig - Route: Take 3 capsules (750 mg) by mouth 2 times daily - Oral    Class: E-Prescribe     tacrolimus (GENERIC) 0.5 MG capsule    60 capsule 11/1/2023     Sig -  Route: Take 1 capsule (0.5 mg) by mouth 2 times daily as needed (When directed by transplant team for dose titration) - Oral    Patient not taking: Reported on 11/7/2023       Class: E-Prescribe    Notes to Pharmacy: When directed by transplant team for dose titration     tacrolimus (GENERIC) 1 MG capsule    240 capsule 11/1/2023     Sig - Route: Take 4 capsules (4 mg) by mouth 2 times daily - Oral    Patient taking differently: Take 6 mg by mouth 2 times daily       Class: E-Prescribe            Possible Immunosuppression-related side effects:   []             headache  []             vivid dreams  []             irritability  []             cognitive difficuties  []             fine tremor  []             nausea  []             diarrhea  []             neuropathy      []             edema  []             renal calcineurin toxicity  []             hyperkalemia  []             post-transplant diabetes  []             decreased appetite  []             increased appetite  []             other:  []             none    Prescription Medications as of 11/27/2023         Rx Number Disp Refills Start End Last Dispensed Date Next Fill Date Owning Pharmacy    acetaminophen (TYLENOL) 500 MG tablet        [place], [place] - [address] [facility]    Sig: Take 500-1,000 mg by mouth every 6 hours as needed for mild pain    Class: Historical    Route: Oral    ALPRAZolam (XANAX) 0.25 MG tablet  20 tablet 0 10/5/2023    [facility] [place], [place] - [address] [place] [address]    Sig: TAKE ONE TABLET BY MOUTH ONCE DAILY AS NEEDED FOR ANXIETY    Class: E-Prescribe    amLODIPine (NORVASC) 5 MG tablet  30 tablet 11 11/1/2023    [place], [place] - [address] [facility]    Sig: Take 1 tablet (5 mg) by mouth daily    Class: E-Prescribe    Route: Oral    atorvastatin (LIPITOR) 10 MG tablet  30 tablet 11 11/2/2023    [facility]  [address]    Sig: Take 1 tablet (10 mg) by mouth daily    Class: E-Prescribe    Route: Oral    carvedilol (COREG) 6.25 MG tablet  60 tablet 11 11/1/2023    [address]    Sig: Take 1 tablet (6.25 mg) by mouth 2 times daily (with meals)    Class: E-Prescribe    Route: Oral    famotidine (PEPCID) 10 MG tablet  30 tablet 11 11/1/2023    [address]    Sig: Take 1 tablet (10 mg) by mouth at bedtime    Class: E-Prescribe    Route: Oral    magnesium oxide (MAG-OX) 400 MG tablet  120 tablet 11 11/20/2023    [facility] [place] [address] [facility]    Sig: Take 2 tablets (800 mg) by mouth 2 times daily With lunch and supper    Class: E-Prescribe    Route: Oral    multivitamin RENAL (NEPHROCAPS/TRIPHROCAPS) 1 MG capsule  30 capsule 0 10/24/2021    [facility] [place] [address] [facility]    Sig: Take 1 capsule by mouth daily    Class: E-Prescribe    Route: Oral    mycophenolate (GENERIC EQUIVALENT) 250 MG capsule  180 capsule 11 11/1/2023    [address]    Sig: Take 3 capsules (750 mg) by mouth 2 times daily    Class: E-Prescribe    Route: Oral    oxyCODONE (ROXICODONE) 5 MG tablet  4 tablet 0 11/14/2023    [facility] [address] [facility] [address]    Sig: Take 0.5 tablets (2.5 mg) by mouth 2 times daily as needed for severe pain    Class: E-Prescribe    Earliest Fill Date: 11/14/2023    Route: Oral    senna-docusate (SENOKOT-S/PERICOLACE) 8.6-50 MG tablet  60 tablet 11 11/1/2023    [address]    Sig: Take 1 tablet by mouth 2 times daily    Class: E-Prescribe    Route: Oral    sodium bicarbonate 650 MG tablet    11/22/2023        Sig: Take 2 tablets (1,300 mg) by mouth 3  times daily    Class: Historical    Route: Oral    sulfamethoxazole-trimethoprim (BACTRIM) 400-80 MG tablet  30 tablet 11 11/1/2023    [facility] [place], [place] - [address] [place] [address]    Sig: Take 1 tablet by mouth three times a week    Class: E-Prescribe    Notes to Pharmacy: Increase dose to 1 tablet by mouth daily as directed by transplant team (based on improving kidney function)    Route: Oral    tacrolimus (GENERIC) 0.5 MG capsule  60 capsule 11 11/21/2023    [facility] [address]    Sig: HOLD FOR FUTURE DOSE CHANGES.  Profile Rx: patient will contact pharmacy when needed    Class: E-Prescribe    tacrolimus (GENERIC) 1 MG capsule  420 capsule 11 11/21/2023    [address]    Sig: Take 7 capsules (7 mg) by mouth 2 times daily    Class: E-Prescribe    Notes to Pharmacy: TXP DT 10/28/2023 (Kidney) TXP Dischg DT 11/1/2023 DX Kidney replaced by transplant Z94.0 TX Center [facility] ([place], [place])    Route: Oral    valGANciclovir (VALCYTE) 450 MG tablet  60 tablet 5 11/2/2023    [place], [place] - [address] [place] [address]    Sig: Take 1 tablet (450 mg) by mouth twice a week Increase dose to 900 mg by mouth daily when directed by transplant team (based on improving renal function)    Class: E-Prescribe    Route: Oral    vancomycin (VANCOCIN) 125 MG capsule  40 capsule 0 11/16/2023    [facility] [address]    Sig: Take 1 capsule (125 mg) by mouth 4 times daily    Class: E-Prescribe    Route: Oral            O:   Temp:  [97.9  F (36.6  C)] 97.9  F (36.6  C)  Pulse:  [63] 63  BP: (124)/(70) 124/70  SpO2:  [100 %] 100 %  General Appearance: in no apparent distress.   Skin: Normal, no rashes or jaundice  Heart: regular rate and rhythm  Lungs: easy respirations, no audible  "wheezing.CTA throughout.   Abdomen: rounded, The wound is dry and intact, without hernia. The abdomen is non-tender. The kidney graft is not tender.  There is no ascites.  3\" bulge on proximal incision - soft and non-tender.   Incision C/D/I - no erythema or drainage. Staples removed with no issues. Steris applied.   Extremities: Tremor absent.   Edema: absent.         Latest Ref Rng & Units 11/24/2023     8:29 AM 11/20/2023     8:50 AM 11/16/2023     9:05 AM 11/13/2023     8:32 AM 11/9/2023     8:47 AM   Transplant Immunosuppression Labs   Creat 0.67 - 1.17 mg/dL 1.90  2.08  2.75  2.85  4.27    Urea Nitrogen 8.0 - 23.0 mg/dL 20.3  24.6  33.8  34.7  56.2    WBC 4.0 - 11.0 10e3/uL 3.5  3.5  4.6  8.6  8.2        Chemistries:   Recent Labs   Lab Test 11/24/23  0829   BUN 20.3   CR 1.90*   GFRESTIMATED 38*   *     Lab Results   Component Value Date    A1C 5.0 10/28/2023    A1C 4.9 03/20/2018     Recent Labs   Lab Test 11/16/23  0905 10/28/23  0406   ALBUMIN 3.9 4.5   BILITOTAL  --  0.3   ALKPHOS  --  57   AST  --  13   ALT  --  12     Urine Studies:  Recent Labs   Lab Test 10/28/23  0231   COLOR Light Yellow   APPEARANCE Clear   URINEGLC 100*   URINEBILI Negative   URINEKETONE Negative   SG 1.009   UBLD Negative   URINEPH 8.5*   PROTEIN 100*   NITRITE Negative   LEUKEST Negative   RBCU <1   WBCU 1     Recent Labs   Lab Test 06/30/20  0739 05/10/16  0738   UTPG 1.55* 0.12     Hematology:   Recent Labs   Lab Test 11/24/23  0829 11/20/23  0850 11/16/23  0905   HGB 7.5* 7.2* 7.1*    249 221   WBC 3.5* 3.5* 4.6     Coags:   Recent Labs   Lab Test 10/28/23  0406 10/18/21  0653   INR 1.13 1.05     HLA antibodies:   SA1 Hi Risk Rox   Date Value Ref Range Status   02/01/2021 A:32  Final     SA1 HI RISK ROX   Date Value Ref Range Status   10/28/2023 None  Final     SA1 Mod Risk Rox   Date Value Ref Range Status   02/01/2021 A:25 B:13 27 44 49 51 57 59 63 76  Final     SA1 MOD RISK ROX   Date Value Ref Range Status "   10/28/2023 A:25 32B:13 44 49 51 59 63  Final     SA2 Hi Risk Rox   Date Value Ref Range Status   02/01/2021 None  Final     SA2 HI RISK ROX   Date Value Ref Range Status   10/28/2023 None  Final     SA2 Mod Risk Rox   Date Value Ref Range Status   02/01/2021 None  Final     SA2 MOD RISK ROX   Date Value Ref Range Status   10/28/2023 None  Final       Assessment: Matteo Barnes is doing fairly well s/p DDKT:  Issues we addressed during his visit include:    Plan:     1. Graft function: Cr continues to down trend.   2. Immunosuppression Management: No change today.  .  Complexity of management:Medium.  Contributing factors: post kidney tx.  3. Low bicarb - his dose was just increased from BID to TID on 11/22/23 - slight up trend noted.   4. C.diff diarrhea - improving on po Vanco. Ensure hydration.   5. SOB: stable exam today. Likely mucous related - continue mucinex and report back any worsening symptoms. Can get CXR which was offered today but he declined, feeling better on mucinex. Monitor and report back Friday.   Followup: Friday as scheduled with nephrologist and cystoscopy.     Total Time: 30 min,   Counselling Time: 10 min.    ZEHRA Regan        Again, thank you for allowing me to participate in the care of your patient.        Sincerely,        ZEHRA Regan CNP

## 2023-11-28 NOTE — PROGRESS NOTES
Anemia Management Note  SUBJECTIVE/OBJECTIVE:  Referred by Dr. Alden Marrufo on 2023  Primary Diagnosis: Anemia in Chronic Kidney Disease (N18.4, D63.1)     Secondary Diagnosis:  Organ or tissue replaced by transplant, kidney (Z94.0)  Kidney Transplant [id number]  Hgb goal range:  9-10  Epo/Darbo:   TBD. Needs to be 30+ days post transplant and TSAT >20  Iron regimen:  Venofer 200mg x 5 doses per referral  Labs : [id number]  Recent JOSE use, transfusion, IV iron: none. Hx retacrit and venofer with dialysis     RX/TX plans : TBD     No history of stroke, MI, and blood clots or cancers  Contact: Consent to communicate declined per pt, signed on 106        Latest Ref Rng & Units 2023     8:47 AM 2023     8:32 AM 2023     9:05 AM 2023     8:50 AM 2023     1:06 PM 2023     8:29 AM 2023     8:38 AM   Anemia   Hemoglobin 13.3 - 17.7 g/dL 8.6  7.9  7.1  7.2   7.5  8.0    IV Iron Dose      200mg     Ferritin 31 - 409 ng/mL 1,368   3  1,146         BP Readings from Last 3 Encounters:   23 124/70   23 127/68   23 132/75     Wt Readings from Last 2 Encounters:   23 74.3 kg (163 lb 11.2 oz)   23 74.7 kg (164 lb 11.2 oz)         ASSESSMENT:  Hgb:Not at goal/Intentional hold  TSat: not at goal of >30% Ferritin: Elevated/Above goal (>1000ng/mL)    PLAN:  RTC for provider visit and labs on [id number]  Message sent to Dr. Jose Armando Quiroga re: JOSE start    Orders needed to be renewed (for next follow-up date) in EPIC: None    Iron labs due:  mid Dec 2023    Plan discussed with:  no call, chart reviewed      NEXT FOLLOW-UP DATE:  [id number] 1034 labs/OV    Carrie Leopold, RN BSN  Anemia Services  [facility]  [email or web address]  Office: [phone]  Fax [phone]

## 2023-11-28 NOTE — TELEPHONE ENCOUNTER
Patient Call: General  Route to LPN    Reason for call: Matteo wanting to speak with Coordinator, regarding a personal matter.  Patient didn't want to leave any details with this writer.    Call back needed? Yes    Return Call Needed  Same as documented in contacts section  When to return call?: Same day: Route High Priority

## 2023-11-28 NOTE — LETTER
November 28, 2023      Matteo Barnes  [address]  [place] [address]        To Whom It May Concern:    Matteo Barnes was seen in our clinic. He may return to work with the following: limited to light duty - lifting no greater than 10 pounds on or about 12/5/23.      Sincerely,        Elsa Adrian, KATHYN, RN   Post Kidney-Pancreas Transplant Coordinator   [facility]  Solid Organ Transplant Care  Office: [phone]  Fax: [phone]

## 2023-11-28 NOTE — TELEPHONE ENCOUNTER
RNCC spoke with pt who requests return to work letter and tac refill. Pt will return to work part time 12/5 and does not do any lifting; works as a  at a desk.

## 2023-12-01 PROBLEM — Z94.0 DECEASED-DONOR KIDNEY TRANSPLANT: Status: RESOLVED | Noted: 2023-01-01 | Resolved: 2023-01-01

## 2023-12-01 PROBLEM — Z99.2 DEPENDENCE ON RENAL DIALYSIS (H): Status: RESOLVED | Noted: 2023-01-26 | Resolved: 2023-01-01

## 2023-12-01 PROBLEM — Z48.298 AFTERCARE FOLLOWING ORGAN TRANSPLANT: Status: ACTIVE | Noted: 2023-01-01

## 2023-12-01 PROBLEM — T38.0X5A STEROID-INDUCED DIABETES MELLITUS (H): Status: RESOLVED | Noted: 2023-01-01 | Resolved: 2023-01-01

## 2023-12-01 PROBLEM — J12.82 PNEUMONIA DUE TO 2019 NOVEL CORONAVIRUS: Status: RESOLVED | Noted: 2021-10-16 | Resolved: 2023-01-01

## 2023-12-01 PROBLEM — N05.1 FOCAL SEGMENTAL GLOMERULOSCLEROSIS: Status: RESOLVED | Noted: 2020-08-21 | Resolved: 2023-01-01

## 2023-12-01 PROBLEM — N18.6 ESRD (END STAGE RENAL DISEASE) (H): Status: RESOLVED | Noted: 2022-05-31 | Resolved: 2023-01-01

## 2023-12-01 PROBLEM — U07.1 PNEUMONIA DUE TO 2019 NOVEL CORONAVIRUS: Status: RESOLVED | Noted: 2021-10-16 | Resolved: 2023-01-01

## 2023-12-01 PROBLEM — E09.9 STEROID-INDUCED DIABETES MELLITUS (H): Status: RESOLVED | Noted: 2023-01-01 | Resolved: 2023-01-01

## 2023-12-01 NOTE — LETTER
12/1/2023         RE: Matteo Barnes  [address]  [place] [address]        Dear Colleague,    Thank you for referring your patient, Matteo Barnes, to the [facility] TRANSPLANT [facility]. Please see a copy of my visit note below.    See procedure note       Again, thank you for allowing me to participate in the care of your patient.        Sincerely,        Daylin Jerry NP

## 2023-12-01 NOTE — NURSING NOTE
Chief Complaint   Patient presents with    RECHECK       /77   Pulse 67   Temp 97.7  F (36.5  C) (Oral)   Wt 72.7 kg (160 lb 3.2 oz)   SpO2 100%   BMI 23.49 kg/m      Brian Decker on 12/1/2023 at 7:45 AM    
How Severe Is This Condition?: mild

## 2023-12-01 NOTE — PROCEDURES
Transplant Surgery  Operative Note    Preop dx: Status post  Donor kidney transplant.  Post op dx: same   Procedure: Flexible cystoscopy and ureteral stent removal   Surgeon: kia zhao CNP  Assistant: elizabeth agosto RN   Anesthesia: local  EBL: 0   Specimens: none.  Findings: none abnormal. Stent inspected and noted to be intact.   Indication: The patient is status post kidney transplant and the ureteral stent is no longer needed.    Procedure: The patient was positioned supine on the table.  The groin was sterilely prepped and draped in the usual fashion. Time out was done. Urojet was applied to the urethra. A flexible cystoscope was inserted and advanced thru the urethra into the bladder. The stent was visualized and grasped. The cystoscope, grasper and stent were removed en-mass. The stent was visualized to be intact.  The bladder mucosa was normaly in appearance. Antibiotics were administered. The patient tolerated the procedure well and was asked to void before leaving the clinic.

## 2023-12-01 NOTE — PROGRESS NOTES
Anemia Management Note  SUBJECTIVE/OBJECTIVE:  Primary Diagnosis: Anemia in Chronic Kidney Disease (N18.4, D63.1)     Secondary Diagnosis:  Organ or tissue replaced by transplant, kidney (Z94.0)  Kidney Transplant [id number]  Hgb goal range:  9-10  Epo/Darbo:   TBD. Needs to be 30+ days post transplant and TSAT >20  Iron regimen:  TBD  Venofer 200mg x 5 doses per referral-one dose given then determined further doses were unnecessary d/t possible lab error  Labs : [id number]  Recent JOSE use, transfusion, IV iron: none. Hx retacrit and venofer with dialysis     RX/TX plans : TBD     No history of stroke, MI, and blood clots or cancers  Contact: Consent to communicate declined per pt, signed on 106        Latest Ref Rng & Units 2023     8:32 AM 2023     9:05 AM 2023     8:50 AM 2023     1:06 PM 2023     8:29 AM 2023     8:38 AM 2023     9:01 AM   Anemia   Hemoglobin 13.3 - 17.7 g/dL 7.9  7.1  7.2   7.5  8.0  8.7    IV Iron Dose     200mg      Ferritin 31 - 409 ng/mL  3  1,146          BP Readings from Last 3 Encounters:   23 134/77   23 134/77   23 124/70     Wt Readings from Last 2 Encounters:   23 72.7 kg (160 lb 4.4 oz)   23 72.7 kg (160 lb 3.2 oz)         ASSESSMENT:  Hgb:Not at goal but improving -continue to monitor  TSat: not at goal of >30% Ferritin: Elevated (>1000ng/mL)    Per progress notes from OV with Dr. Garcia today:  Anemia in Chronic Renal Disease: Hgb: Trend up      JOSE: No, recommend starting if Hb does not continue trending up              - Iron studies: Low iron saturation, but high ferritin    PLAN:  RTC for labs per transplant, 2x/week until 1/3/24    Orders needed to be renewed (for next follow-up date) in EPIC: None    Iron labs due:  every 4 weeks, due in later Dec 2023    Plan discussed with:  Matteo Morin      NEXT FOLLOW-UP DATE:  [id number] review labs    Carrie Leopold, RN BSN  Anemia Services   Health  [email or web address]  Office: [phone]  Fax [phone]

## 2023-12-01 NOTE — LETTER
12/1/2023         RE: Matteo Barnes  [address]  [place] [address]        Dear Colleague,    Thank you for referring your patient, Matteo Barnes, to the [facility] TRANSPLANT CLINIC. Please see a copy of my visit note below.    TRANSPLANT NEPHROLOGY EARLY POST TRANSPLANT VISIT    Assessment & Plan  # DDKT: Trend down   - Baseline Creatinine: ~ TBD. Post txp morena Scr 1.7 on 12/1/23   - Proteinuria: Normal (<0.2 grams), FSGS protocol   - Date DSA Last Checked: Oct/2023      Latest DSA: No DSA at time of transplant   - BK Viremia: No   - Kidney Tx Biopsy: No   - Transplant Ureteral Stent: Yes; Scheduled removal date Dec 01, 2023   -Will consider biopsy in the coming weeks if Scr does not decrease further    # Immunosuppression: Tacrolimus immediate release (goal 8-10) and Mycophenolate mofetil (dose 750 mg every 12 hours)   - Induction with Recent Transplant:  High Intensity Protocol due to DGF   - Continue with intensive monitoring of immunosuppression for efficacy and toxicity.   - Changes: Not at this time. MPA level has been low at 0.3 on 11/24 but was on PO vanc, repeat pending     # C. Diff:   -Diagnosed 11/16/23, started on PO vanc 125mg q6h x10d. Improved    # Weight loss:   -Stabilizing. EDW was 175lbs at time of transplant, then he developed diarrhea, weight down to 160lbs. His appetite is improving so should increase weight    # Infection Prophylaxis:   - PJP: Sulfa/TMP (Bactrim) MWF, increase to daily   - CMV: Valganciclovir (Valcyte) Patient is CMV IgG Ab discordant (D+/R-) and will continue on Valcyte x 6 months, then check CMV PCR monthly until 12 months post transplant. Estimated Creatinine Clearance: 42.8 mL/min (A) (based on SCr of 1.7 mg/dL (H)). Valcyte 450mg daily    # Hypertension: Controlled;  Goal BP: < 140/90   - Volume status: Euvolemic     - Changes: Not at this time. Continue amlodipine 5mg daily and coreg 6.25mg bid     # Anemia in Chronic Renal Disease: Hgb:  Trend up      JOSE: No, recommend starting if Hb does not continue trending up   - Iron studies: Low iron saturation, but high ferritin    # Mineral Bone Disorder:    - Secondary renal hyperparathyroidism; PTH level: Mildly elevated (151-300 pg/ml)        On treatment: None   - Vitamin D; level: Normal        On supplement: No   - Calcium; level: Low normal        On supplement: No   - Phosphorus; level: Low normal        On supplement: No     # Electrolytes:   - Potassium; level: High normal        On supplement: No  - Magnesium; level: Low normal        On supplement: No  - Bicarbonate; level: Low        On supplement: Yes, bicarb 1300mg tid    # HFpEF: Last cardiac echo (Feb/2021) showed normal LVEF ~ 60-65% with grade I diastolic dysfunction.    # Medical Compliance: Yes    # Health Maintenance and Vaccination Review: Not Reviewed    # Transplant History:  Etiology of Kidney Failure: Focal segmental glomerulosclerosis (FSGS), likely secondary  Tx: DDKT  Transplant: 10/28/2023 (Kidney)  Donor Type: Donation after Brain Death Donor Class:   Crossmatch at time of Tx: negative  DSA at time of Tx: No  Significant changes in immunosuppression: None  CMV IgG Ab High Risk Discordance (D+/R-): Yes  EBV IgG Ab High Risk Discordance (D+/R-): No  Significant transplant-related complications: DGF    Transplant Office Phone Number: [phone]    Assessment and plan was discussed with the patient and he voiced his understanding and agreement.    Return visit: Return in 1 month (on 1/2/2024) for previously scheduled visit, 2 month post transplant visit.    Mracus Garcia MD    Chief Complaint  Mr. Barnes is a 68 year old here for kidney transplant, immunosuppression management and hospital follow up after kidney transplant.     History of Present Illness   The patient overall feels well. He denies any recent hospitalizations. He denies nausea, vomiting, fever, chills, shortness of breath, chest pain, LE edema, unintentional  weight loss, nights sweats, dysuria, hematuria.     His appetite is improving since the diarrhea has improved. He had C.diff diagnosed  and was given 10d of PO vanc with improvement and now stools are improved. He was having some SOB but feels that it is improving now that pain at his incision has improved. He is walking the dog 2-3x per day.     Home BP:  130s systolic    Problem List  Patient Active Problem List   Diagnosis    Hyperlipidemia LDL goal <130    Hypertension goal BP (blood pressure) < 140/90    Proteinuria    Erectile dysfunction    Gout    Focal segmental glomerulosclerosis    Pneumonia due to 2019 novel coronavirus    ESRD (end stage renal disease) (H)    Anemia in chronic kidney disease    Metabolic acidosis    Dependence on renal dialysis (H24)    -donor kidney transplant    Delayed graft function of kidney    Hyperkalemia    Immunosuppressed status (H24)    Steroid-induced diabetes mellitus     Kidney replaced by transplant       Allergies  No Known Allergies    Medications  Current Outpatient Medications   Medication Sig    acetaminophen (TYLENOL) 500 MG tablet Take 500-1,000 mg by mouth every 6 hours as needed for mild pain    ALPRAZolam (XANAX) 0.25 MG tablet TAKE ONE TABLET BY MOUTH ONCE DAILY AS NEEDED FOR ANXIETY    amLODIPine (NORVASC) 5 MG tablet Take 1 tablet (5 mg) by mouth daily    atorvastatin (LIPITOR) 10 MG tablet Take 1 tablet (10 mg) by mouth daily    carvedilol (COREG) 6.25 MG tablet Take 1 tablet (6.25 mg) by mouth 2 times daily (with meals)    famotidine (PEPCID) 10 MG tablet Take 1 tablet (10 mg) by mouth at bedtime    magnesium oxide (MAG-OX) 400 MG tablet Take 2 tablets (800 mg) by mouth 2 times daily With lunch and supper    multivitamin RENAL (NEPHROCAPS/TRIPHROCAPS) 1 MG capsule Take 1 capsule by mouth daily    mycophenolate (GENERIC EQUIVALENT) 250 MG capsule Take 3 capsules (750 mg) by mouth 2 times daily    sodium bicarbonate 650 MG tablet Take 2 tablets  (1,300 mg) by mouth 3 times daily    sulfamethoxazole-trimethoprim (BACTRIM) 400-80 MG tablet Take 1 tablet by mouth three times a week    tacrolimus (GENERIC) 1 MG capsule Take 7 capsules (7 mg) by mouth 2 times daily    valGANciclovir (VALCYTE) 450 MG tablet Take 1 tablet (450 mg) by mouth three times a week Take Monday, Wednesday, and Friday    tacrolimus (GENERIC) 0.5 MG capsule HOLD FOR FUTURE DOSE CHANGES.  Profile Rx: patient will contact pharmacy when needed (Patient not taking: Reported on 11/27/2023)     No current facility-administered medications for this visit.     Medications Discontinued During This Encounter   Medication Reason    oxyCODONE (ROXICODONE) 5 MG tablet     senna-docusate (SENOKOT-S/PERICOLACE) 8.6-50 MG tablet     valGANciclovir (VALCYTE) 450 MG tablet Reorder (No AVS)       Physical Exam  Vital Signs: /77   Pulse 67   Temp 97.7  F (36.5  C) (Oral)   Wt 72.7 kg (160 lb 3.2 oz)   SpO2 100%   BMI 23.49 kg/m      GENERAL APPEARANCE: alert and no distress  HENT: mouth without ulcers or lesions  RESP: lungs clear to auscultation - no rales, rhonchi or wheezes  CV: regular rhythm, normal rate, no rub, no murmur  EDEMA: no LE edema bilaterally  ABDOMEN: soft, nondistended, nontender, bowel sounds normal  MS: extremities normal - no gross deformities noted, no evidence of inflammation in joints, no muscle tenderness  SKIN: no rash  NEURO: normal strength and tone, sensory exam grossly normal, mentation intact and speech normal  PSYCH: mentation appears normal and affect normal/bright  TX KIDNEY: normal  DIALYSIS ACCESS:  LUE AV fistula with good thrill    Data        Latest Ref Rng & Units 11/28/2023     8:38 AM 11/24/2023     8:29 AM 11/20/2023     8:50 AM   Renal   Sodium 135 - 145 mmol/L 138  138  138    K 3.4 - 5.3 mmol/L 5.0  5.0  5.1    Cl 98 - 107 mmol/L 109  110  109    Cl (external) 98 - 107 mmol/L 109  110  109    CO2 22 - 29 mmol/L 18  19  18    Urea Nitrogen 8.0 - 23.0  mg/dL 23.9  20.3  24.6    Creatinine 0.67 - 1.17 mg/dL 1.95  1.90  2.08    Glucose 70 - 99 mg/dL 113  117  129    Calcium 8.8 - 10.2 mg/dL 8.7  8.8  8.4    Magnesium 1.7 - 2.3 mg/dL 1.6   1.3          Latest Ref Rng & Units 11/28/2023     8:38 AM 11/20/2023     8:50 AM 11/16/2023     9:05 AM   Bone Health   Phosphorus 2.5 - 4.5 mg/dL 2.3  1.8  2.3          Latest Ref Rng & Units 11/28/2023     8:38 AM 11/24/2023     8:29 AM 11/20/2023     8:50 AM   Heme   WBC 4.0 - 11.0 10e3/uL 4.6  3.5  3.5    Hgb 13.3 - 17.7 g/dL 8.0  7.5  7.2    Plt 150 - 450 10e3/uL 256  273  249          Latest Ref Rng & Units 11/16/2023     9:05 AM 10/28/2023     4:06 AM 6/7/2023    12:36 PM   Liver   AP 40 - 129 U/L  57     TBili <=1.2 mg/dL  0.3     ALT 0 - 70 U/L  12  11    AST 0 - 45 U/L  13     Tot Protein 6.4 - 8.3 g/dL  7.2     Albumin 3.5 - 5.2 g/dL 3.9  4.5           Latest Ref Rng & Units 10/28/2023     4:07 AM 10/16/2021     9:42 AM 3/20/2018     4:09 PM   Pancreas   A1C <5.7 % 5.0   4.9    Lipase 73 - 393 U/L  461           Latest Ref Rng & Units 11/20/2023     8:50 AM 11/16/2023     9:05 AM 11/9/2023     8:47 AM   Iron studies   Iron 61 - 157 ug/dL 51  41  63    Iron Sat Index 15 - 46 % 21  18  26    Ferritin 31 - 409 ng/mL 1,146  3  1,368          Latest Ref Rng & Units 10/28/2023     4:06 AM 2/1/2021     8:30 AM   [facility] Txp Virology   EBV CAPSID ANTIBODY IGG No detectable antibody. Positive  >8.0    Hep B Core NR^Nonreactive  Nonreactive         Recent Labs   Lab Test 11/20/23  0850 11/24/23  0829 11/28/23  0838   DOSTAC 11/19/2023 11/23/2023 11/27/2023   TACROL 6.8 8.1 10.1     Recent Labs   Lab Test 11/13/23  0832 11/20/23  0850 11/24/23  0829   DOSMPA 11/12/2023   8:00 PM  --  11/23/2023   8:00 PM   MPACID 5.25* 0.36* 0.30*   MPAG 116.5* 35.8 20.3*         Marcus Garcia MD

## 2023-12-01 NOTE — NURSING NOTE
Cystoscopy - Ureteral stent removal    Prepped patient for procedure with no complications.   2% lidocaine gel injected into urethra via urojet.   Assisted Daylin Jerry with stent removal.  Administered 500mg Levaquin PO after procedure.  Patient was discharged in stable condition.     No Known Allergies      Sukumar Moffett RN on 12/1/2023 at 10:27 AM

## 2023-12-01 NOTE — PATIENT INSTRUCTIONS
Patient Recommendations:  - Increase valganciclovir to 450mg Monday, Wednesday, and Friday    Transplant Patient Information  Your Post Transplant Coordinator is: Elsa Adrian  For non urgent items, we encourage you to contact your coordinator/care team online via [employer]  You and your care team can also contact your transplant coordinator Monday - Friday, 8am - 5pm at [phone] (Option 2 to reach the coordinator or Option 4 to schedule an appointment).  After hours for urgent matters, please call [facility] at [phone].

## 2023-12-01 NOTE — TELEPHONE ENCOUNTER
Marcus Garcia MD Reilly, Megan, RN    His creatinine and GFR are improving so we have to change his valcyte dose. I had him increase it to 450mg MWF but it should be daily now. Also, increase bactrim to daily as well. Thanks

## 2023-12-01 NOTE — PROGRESS NOTES
TRANSPLANT NEPHROLOGY EARLY POST TRANSPLANT VISIT    Assessment & Plan   # DDKT: Trend down   - Baseline Creatinine: ~ TBD. Post txp morena Scr 1.7 on 12/1/23   - Proteinuria: Normal (<0.2 grams), FSGS protocol   - Date DSA Last Checked: Oct/2023      Latest DSA: No DSA at time of transplant   - BK Viremia: No   - Kidney Tx Biopsy: No   - Transplant Ureteral Stent: Yes; Scheduled removal date Dec 01, 2023   -Will consider biopsy in the coming weeks if Scr does not decrease further    # Immunosuppression: Tacrolimus immediate release (goal 8-10) and Mycophenolate mofetil (dose 750 mg every 12 hours)   - Induction with Recent Transplant:  High Intensity Protocol due to DGF   - Continue with intensive monitoring of immunosuppression for efficacy and toxicity.   - Changes: Not at this time. MPA level has been low at 0.3 on 11/24 but was on PO vanc, repeat pending     # C. Diff:   -Diagnosed 11/16/23, started on PO vanc 125mg q6h x10d. Improved    # Weight loss:   -Stabilizing. EDW was 175lbs at time of transplant, then he developed diarrhea, weight down to 160lbs. His appetite is improving so should increase weight    # Infection Prophylaxis:   - PJP: Sulfa/TMP (Bactrim) MWF, increase to daily   - CMV: Valganciclovir (Valcyte) Patient is CMV IgG Ab discordant (D+/R-) and will continue on Valcyte x 6 months, then check CMV PCR monthly until 12 months post transplant. Estimated Creatinine Clearance: 42.8 mL/min (A) (based on SCr of 1.7 mg/dL (H)). Valcyte 450mg daily    # Hypertension: Controlled;  Goal BP: < 140/90   - Volume status: Euvolemic     - Changes: Not at this time. Continue amlodipine 5mg daily and coreg 6.25mg bid     # Anemia in Chronic Renal Disease: Hgb: Trend up      JOSE: No, recommend starting if Hb does not continue trending up   - Iron studies: Low iron saturation, but high ferritin    # Mineral Bone Disorder:    - Secondary renal hyperparathyroidism; PTH level: Mildly elevated (151-300 pg/ml)        On  treatment: None   - Vitamin D; level: Normal        On supplement: No   - Calcium; level: Low normal        On supplement: No   - Phosphorus; level: Low normal        On supplement: No     # Electrolytes:   - Potassium; level: High normal        On supplement: No  - Magnesium; level: Low normal        On supplement: No  - Bicarbonate; level: Low        On supplement: Yes, bicarb 1300mg tid    # HFpEF: Last cardiac echo (Feb/2021) showed normal LVEF ~ 60-65% with grade I diastolic dysfunction.    # Medical Compliance: Yes    # Health Maintenance and Vaccination Review: Not Reviewed    # Transplant History:  Etiology of Kidney Failure: Focal segmental glomerulosclerosis (FSGS), likely secondary  Tx: DDKT  Transplant: 10/28/2023 (Kidney)  Donor Type: Donation after Brain Death Donor Class:   Crossmatch at time of Tx: negative  DSA at time of Tx: No  Significant changes in immunosuppression: None  CMV IgG Ab High Risk Discordance (D+/R-): Yes  EBV IgG Ab High Risk Discordance (D+/R-): No  Significant transplant-related complications: DGF    Transplant Office Phone Number: [phone]    Assessment and plan was discussed with the patient and he voiced his understanding and agreement.    Return visit: Return in 1 month (on 1/2/2024) for previously scheduled visit, 2 month post transplant visit.    Marcus Garcia MD    Chief Complaint   Mr. Barnes is a 68 year old here for kidney transplant, immunosuppression management and hospital follow up after kidney transplant.     History of Present Illness    The patient overall feels well. He denies any recent hospitalizations. He denies nausea, vomiting, fever, chills, shortness of breath, chest pain, LE edema, unintentional weight loss, nights sweats, dysuria, hematuria.     His appetite is improving since the diarrhea has improved. He had C.diff diagnosed 11/16 and was given 10d of PO vanc with improvement and now stools are improved. He was having some SOB but feels that it  is improving now that pain at his incision has improved. He is walking the dog 2-3x per day.     Home BP:  130s systolic    Problem List   Patient Active Problem List   Diagnosis    Hyperlipidemia LDL goal <130    Hypertension goal BP (blood pressure) < 140/90    Proteinuria    Erectile dysfunction    Gout    Focal segmental glomerulosclerosis    Pneumonia due to 2019 novel coronavirus    ESRD (end stage renal disease) (H)    Anemia in chronic kidney disease    Metabolic acidosis    Dependence on renal dialysis (H24)    -donor kidney transplant    Delayed graft function of kidney    Hyperkalemia    Immunosuppressed status (H24)    Steroid-induced diabetes mellitus     Kidney replaced by transplant       Allergies   No Known Allergies    Medications   Current Outpatient Medications   Medication Sig    acetaminophen (TYLENOL) 500 MG tablet Take 500-1,000 mg by mouth every 6 hours as needed for mild pain    ALPRAZolam (XANAX) 0.25 MG tablet TAKE ONE TABLET BY MOUTH ONCE DAILY AS NEEDED FOR ANXIETY    amLODIPine (NORVASC) 5 MG tablet Take 1 tablet (5 mg) by mouth daily    atorvastatin (LIPITOR) 10 MG tablet Take 1 tablet (10 mg) by mouth daily    carvedilol (COREG) 6.25 MG tablet Take 1 tablet (6.25 mg) by mouth 2 times daily (with meals)    famotidine (PEPCID) 10 MG tablet Take 1 tablet (10 mg) by mouth at bedtime    magnesium oxide (MAG-OX) 400 MG tablet Take 2 tablets (800 mg) by mouth 2 times daily With lunch and supper    multivitamin RENAL (NEPHROCAPS/TRIPHROCAPS) 1 MG capsule Take 1 capsule by mouth daily    mycophenolate (GENERIC EQUIVALENT) 250 MG capsule Take 3 capsules (750 mg) by mouth 2 times daily    sodium bicarbonate 650 MG tablet Take 2 tablets (1,300 mg) by mouth 3 times daily    sulfamethoxazole-trimethoprim (BACTRIM) 400-80 MG tablet Take 1 tablet by mouth three times a week    tacrolimus (GENERIC) 1 MG capsule Take 7 capsules (7 mg) by mouth 2 times daily    valGANciclovir (VALCYTE) 450 MG  tablet Take 1 tablet (450 mg) by mouth three times a week Take Monday, Wednesday, and Friday    tacrolimus (GENERIC) 0.5 MG capsule HOLD FOR FUTURE DOSE CHANGES.  Profile Rx: patient will contact pharmacy when needed (Patient not taking: Reported on 11/27/2023)     No current facility-administered medications for this visit.     Medications Discontinued During This Encounter   Medication Reason    oxyCODONE (ROXICODONE) 5 MG tablet     senna-docusate (SENOKOT-S/PERICOLACE) 8.6-50 MG tablet     valGANciclovir (VALCYTE) 450 MG tablet Reorder (No AVS)       Physical Exam   Vital Signs: /77   Pulse 67   Temp 97.7  F (36.5  C) (Oral)   Wt 72.7 kg (160 lb 3.2 oz)   SpO2 100%   BMI 23.49 kg/m      GENERAL APPEARANCE: alert and no distress  HENT: mouth without ulcers or lesions  RESP: lungs clear to auscultation - no rales, rhonchi or wheezes  CV: regular rhythm, normal rate, no rub, no murmur  EDEMA: no LE edema bilaterally  ABDOMEN: soft, nondistended, nontender, bowel sounds normal  MS: extremities normal - no gross deformities noted, no evidence of inflammation in joints, no muscle tenderness  SKIN: no rash  NEURO: normal strength and tone, sensory exam grossly normal, mentation intact and speech normal  PSYCH: mentation appears normal and affect normal/bright  TX KIDNEY: normal  DIALYSIS ACCESS:  LUE AV fistula with good thrill    Data         Latest Ref Rng & Units 11/28/2023     8:38 AM 11/24/2023     8:29 AM 11/20/2023     8:50 AM   Renal   Sodium 135 - 145 mmol/L 138  138  138    K 3.4 - 5.3 mmol/L 5.0  5.0  5.1    Cl 98 - 107 mmol/L 109  110  109    Cl (external) 98 - 107 mmol/L 109  110  109    CO2 22 - 29 mmol/L 18  19  18    Urea Nitrogen 8.0 - 23.0 mg/dL 23.9  20.3  24.6    Creatinine 0.67 - 1.17 mg/dL 1.95  1.90  2.08    Glucose 70 - 99 mg/dL 113  117  129    Calcium 8.8 - 10.2 mg/dL 8.7  8.8  8.4    Magnesium 1.7 - 2.3 mg/dL 1.6   1.3          Latest Ref Rng & Units 11/28/2023     8:38 AM  11/20/2023     8:50 AM 11/16/2023     9:05 AM   Bone Health   Phosphorus 2.5 - 4.5 mg/dL 2.3  1.8  2.3          Latest Ref Rng & Units 11/28/2023     8:38 AM 11/24/2023     8:29 AM 11/20/2023     8:50 AM   Heme   WBC 4.0 - 11.0 10e3/uL 4.6  3.5  3.5    Hgb 13.3 - 17.7 g/dL 8.0  7.5  7.2    Plt 150 - 450 10e3/uL 256  273  249          Latest Ref Rng & Units 11/16/2023     9:05 AM 10/28/2023     4:06 AM 6/7/2023    12:36 PM   Liver   AP 40 - 129 U/L  57     TBili <=1.2 mg/dL  0.3     ALT 0 - 70 U/L  12  11    AST 0 - 45 U/L  13     Tot Protein 6.4 - 8.3 g/dL  7.2     Albumin 3.5 - 5.2 g/dL 3.9  4.5           Latest Ref Rng & Units 10/28/2023     4:07 AM 10/16/2021     9:42 AM 3/20/2018     4:09 PM   Pancreas   A1C <5.7 % 5.0   4.9    Lipase 73 - 393 U/L  461           Latest Ref Rng & Units 11/20/2023     8:50 AM 11/16/2023     9:05 AM 11/9/2023     8:47 AM   Iron studies   Iron 61 - 157 ug/dL 51  41  63    Iron Sat Index 15 - 46 % 21  18  26    Ferritin 31 - 409 ng/mL 1,146  3  1,368          Latest Ref Rng & Units 10/28/2023     4:06 AM 2/1/2021     8:30 AM   [facility] T[facility] Virology   EBV CAPSID ANTIBODY IGG No detectable antibody. Positive  >8.0    Hep B Core NR^Nonreactive  Nonreactive         Recent Labs   Lab Test 11/20/23  0850 11/24/23  0829 11/28/23  0838   DOSTAC 11/19/2023 11/23/2023 11/27/2023   TACROL 6.8 8.1 10.1     Recent Labs   Lab Test 11/13/23  0832 11/20/23  0850 11/24/23  0829   DOSMPA 11/12/2023   8:00 PM  --  11/23/2023   8:00 PM   MPACID 5.25* 0.36* 0.30*   MPAG 116.5* 35.8 20.3*

## 2023-12-04 NOTE — TELEPHONE ENCOUNTER
Pt denies diarrhea, abx. Confirms current dose of 750 mg BID MMF.     Alden Joseph MD Reilly, Megan, RN  MMF 1g po bid and repeat level next week

## 2023-12-04 NOTE — TELEPHONE ENCOUNTER
ISSUE: MPA level <0.25    Alden Joseph MD Reilly, Megan, RN  Low MMF level, current dose, diarrhea or Abx?    MyC sent to pt to assess for diarrhea.

## 2023-12-05 NOTE — TELEPHONE ENCOUNTER
Pt has questions he needs to review with his coordinator  said was personable so did not want to go into details

## 2023-12-06 NOTE — TELEPHONE ENCOUNTER
Post Kidney and Pancreas Transplant Team Conference  Date: 12/6/2023  Transplant Coordinator: Elsa Adrian     Attendees:  []  Dr. Dale [x] Jennifer Domínguez, RN [x] Ellen Dowell LPN     [x]  Dr. Marrufo [x] Mere Ray, RN [] Deborah Molina LPN    [x] Dr. Garcia [x] Sara Alonso RN    [x] Dr. Flores [x] Elsa Adrian, RN [] Abena Craven RN   [] Dr. Jennings [] Delma Rice, RN    [] Dr. Gonzalez [] Beryl Sousa RN    []  Dr. Santoyo [] Anu Johnson RN    [x] Dr. Damian [] Alex Zhang RN    [x] Daylin Jerry, NP [] Magaly Shoemaker RN    [x] Pricilla Danielle NP [] Hue Dickson RN        Verbal Plan Read Back:   Continue current treatment plan    Routed to RN Coordinator   Ellen Dowell LPN

## 2023-12-11 NOTE — TELEPHONE ENCOUNTER
Patient Call: General  Route to LPN    Reason for call: pt stated his diarrhea and stomach pains came back last night  Elsa and prescribed an antibiotic and the symptoms went away   Needs Rx called to [facility]     Call back needed? Yes    Return Call Needed  Same as documented in contacts section  When to return call?: Same day: Route High Priority   4

## 2023-12-12 NOTE — TELEPHONE ENCOUNTER
"Pt is c/o diarrhea stating that he \"knows his body and knows it is c. Diff again\" Pt is refusing to complete stool studies at this time and is only requesting vanco be sent.   "

## 2023-12-12 NOTE — TELEPHONE ENCOUNTER
RNCC returned call to pt, left VM informing him of stool studies and labs ordered to r/o infection.

## 2023-12-12 NOTE — TELEPHONE ENCOUNTER
Same symptoms as last time he had diarrhea. Bad stomach pain.   Is asking for the same antibiotic he was on before.   Stated it cleared up the diarrhea.  (Matteo salcido not remember name  of the antibiotic).

## 2023-12-12 NOTE — PROGRESS NOTES
Labs will be drawn again on Thursday 12/14/23.     Elise Grady RN   Anemia Services  [facility]  [email or web address]   Office : [phone]  Fax: [phone]

## 2023-12-15 NOTE — TELEPHONE ENCOUNTER
DATE:  12/15/2023     TIME OF RECEIPT FROM LAB:  2:44 PM    ORDERING PROVIDER: Niru    LAB TEST:  Enteric stool PCR/C-diff.    LAB VALUE:  Positive for C-Diff, however this was an incidental finding due to stool being formed.      RESULTS GIVEN WITH READ-BACK TO (PROVIDER):  Sent encounter to Elsa Adrian's inbox.    TIME LAB VALUE REPORTED TO PROVIDER:   2:52 PM

## 2023-12-18 NOTE — PROGRESS NOTES
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 Needs to be 30+ days post transplant and TSAT >20  Iron regimen:  TBD  Venofer 200mg x 5 doses per referral-one dose given then determined further doses were unnecessary d/t possible lab error  Labs : [id number]  Recent JOSE use, transfusion, IV iron: none. Hx retacrit and venofer with dialysis     RX/TX plans : TBD     No history of stroke, MI, and blood clots or cancers  Contact: Consent to communicate declined per pt, signed on 106        Latest Ref Rng & Units 2023     8:29 AM 2023     8:38 AM 2023     9:01 AM 2023     8:31 AM 2023     8:37 AM 2023    10:18 AM 12/15/2023     9:30 AM   Anemia   Hemoglobin 13.3 - 17.7 g/dL 7.5  8.0  8.7  8.7  8.3  8.3  8.2      BP Readings from Last 3 Encounters:   23 134/77   23 134/77   23 124/70     Wt Readings from Last 2 Encounters:   23 72.7 kg (160 lb 4.4 oz)   23 72.7 kg (160 lb 3.2 oz)           ASSESSMENT:  Hgb:Not at goal/Infection  TSat: not at goal (>30%) but ferritin >1000ng/mL.  PO iron not indicated at this time per anemia protocol.   Ferritin: Elevated (>1000ng/mL)    PLAN:  RTC for Hgb in 1-2 week(s). Hgb is low in the 8s.  Has been fighting infection and was recently dx with C-Diff.  Started treatment.     Orders needed to be renewed (for next follow-up date) in EPIC: None    Iron labs due:  End of Dec 2023    Plan discussed with:  No call, chart review       NEXT FOLLOW-UP DATE:  23    Elise Grady RN   Anemia Services  [facility]  [email or web address]   Office : [phone]  Fax: [phone]

## 2023-12-19 PROBLEM — R79.89 LOW SERUM BICARBONATE: Status: ACTIVE | Noted: 2023-01-01

## 2023-12-19 NOTE — TELEPHONE ENCOUNTER
ISSUE: K 6.0   CO2 18    PLAN:  Follow low K diet  Hold bactrim  G6PD    Pt voiced understanding of plan.   IVF plan ordered to [facility]: sodium bicarb 150 meq in 1 L d5w  [facility] and [facility] infusion centers booked until next week.

## 2023-12-20 NOTE — TELEPHONE ENCOUNTER
Post Kidney and Pancreas Transplant Team Conference  Date: 12/20/2023  Transplant Coordinator: Elsa Adrian     Attendees:  []  Dr. Dale [] Jennifer Domínguez, RN [] Ellen Dowell LPN     []  Dr. Marrufo [] Mere Ray, RN [] Deborah Molina LPN    [] Dr. Garcia [] Sara Alonso RN    [] Dr. Flores [] Elsa Adrian, JONATHAN [] Abena Craven RN   [] Dr. Jennings [] Delma Rice, RN    [] Dr. Gonzalez [] Beryl Sousa RN    []  Dr. Santoyo [] Anu Johnson RN    [] Dr. Damian [] Alex Zhang RN    [] Daylin Jerry NP [] Magaly Shoemaker RN    [] Pricilla Danielle NP [] Hue Dickson RN        Verbal Plan Read Back:   Ok to use baking soda at home or increase bicarb dosing for the next few days    Routed to RN Coordinator   Ellen Dowell LPN

## 2023-12-26 NOTE — TELEPHONE ENCOUNTER
ISSUE: K elevated, G6PD WNL    PLAN:     Alden Joseph MD Reilly, Megan, RN  Yes start dapsone 50 mg po every day and monitor Hb closely    Medication sent to [facility]. MyC sent to pt. Will follow Hgb   OPERATIVE REPORT  PATIENT NAME: Jeremie Zhou    :  1939  MRN: [id number]  Pt Location: AN OR ROOM 01    SURGERY DATE: 2022    Surgeon(s) and Role:     * Scotty Morse MD - Primary    Preop Diagnosis:  Malignant neoplasm of overlapping sites of bladder ([facility] [address] ) [C67 8]    Post-Op Diagnosis Codes:     * Malignant neoplasm of overlapping sites of bladder ([facility] [address] ) [C67 8]    Procedure(s) (LRB):  TRANSURETHRAL RESECTION OF BLADDER (MULTIFOCAL) TUMOR (TURBT) (N/A)  CYSTOSCOPY WITH ATTEMPTED B/L RETROGRADE PYELOGRAM (Bilateral)    Specimen(s):  ID Type Source Tests Collected by Time Destination   1 : Resection of Multifocal Bladder Tumor Tissue Urinary Bladder TISSUE EXAM Scotty Morse MD 2022 1426        Estimated Blood Loss:   Minimal    Drains:  Urethral Catheter Three way 22 Fr  (Active)   Number of days: 144       Urethral Catheter Double-lumen; Latex 22 Fr  (Active)   Reasons to continue Urinary Catheter  Post-operative urological requirements 22 1442   Collection Container Standard drainage bag 22 1442   Securement Method Securing device (Describe) 22 1442   Number of days: 0       Anesthesia Type:   General    Operative Indications:  Malignant neoplasm of overlapping sites of bladder ([facility] [address] ) [C67 8]      Operative Findings:  Distorted trigonal anatomy due to previous resections  Unable to find ureteral orifices despite fishing for these with a wire  Most tumor recurrences along the right lateral wall the bladder, this area is completely resected  A Adams catheter was then placed after obtaining meticulous hemostasis    Complications:   None    Procedure and Technique:    PROCEDURE SUMMARY:    On an outpatient basis the patient's urological workup revealed a bladder tumor at the right side of the bladder and lateral wall and on the right anterior aspect of the bladder  The patient has a known history of muscle invasive bladder cancer opting for bladder sparing therapy        The management of bladder tumors including Transurethral resection of bladder tumor was discussed with the patient  All risks benefits and alternatives of this procedure were discussed with the patient as well  Witnessed informed consent for the proposed procedure was obtained and medical optimization was also obtained for this patient  The patient was brought to the operating room and placed in the supine position for the induction of general anesthesia  A full time-out confirmed the proper patient, position, and procedure  The patient was then placed in the lithotomy position and prepped and draped using the standard sterile technique  All pressure points were carefully padded there was no undue pressure on any bony prominences, muscle bellies, or nervous structures     Prior to the procedure antibiotics were administered as per the guidelines, and thromboembolism prophylaxis was administered in the form of sequential compression devices     A cystoscope was placed and we attempted to perform retrograde pyelography  The normal trigonal anatomy was distorted due to previous resections and I could not find the orifices    The urethra and bladder were then carefully inspected  Bimanual exam under anesthesia findings:    mobile pelvic structures with non-palpable disease  Cystoscopic findings:    Urethra:  Normal  Prostate:  Normal  Bladder:  Lesion identified, characteristics below  Ureteral orifices: Normal orthotopic position with clear effluent   Urachus:   Normal    Lesion characteristics:  -Appearance:    Flat and Nodular  -Number of foci:   1  -Aggregate tumor diameter:  5 cm  -Largest individual tumor:  5 cm  -Location:    Right lateral wall and right posterior wall and right anterior wall  -Clinical stage:   T2    Upper tract evaluation (retrograde pyelography and radiological findings):     We were unable to identify normal trigonal anatomy due to previous disease burden and subsequent resection    Bladder tumor resection:    Using a resectoscope, all abnormal lesions noted above were resected and sent for pathology  Resection craters and surrounding urothelium were electrofulgurated and hemostasis was achieved  All instrument counts and sponge counts were correct  DISPOSITION:  PACU to home  Plan:  Patient will be discharged home with a Adams catheter  Our office will arrange follow up with me in two weeks    Her Adams catheter can be removed in the office in a number of days           I was present for the entire procedure and A qualified resident physician was not available    Patient Disposition:  PACU       SIGNATURE: Kathy Celestin MD  DATE: April 4, 2022  TIME: 2:50 PM

## 2023-12-27 NOTE — PROGRESS NOTES
Medication Therapy Management (MTM) Encounter    ASSESSMENT:                            Medication Adherence/Access: No issues identified    Kidney Transplant:    CrCl much improved last labs, will likely need another lab to confirm the lower creatinine before adjusting medications.      Supplements:   Stable.     Hypertension   BP at goal <140/90    Hyperlipidemia   Stable.     PLAN:                            Follow-up: 2 months     SUBJECTIVE/OBJECTIVE:                          Matteo Barnes is a 68 year old male called for a follow-up visit from 11/7.       Reason for visit: 2 months post transplant med review.    Allergies/ADRs: Reviewed in chart  Past Medical History: Reviewed in chart  Tobacco: He reports that he has never smoked. He has never used smokeless tobacco.  Alcohol: not currently using    Medication Adherence/Access: no issues reported    Kidney Transplant:    Tacrolimus 7.5 mg twice daily  Mycophenolate Mofetil 1000 mg twice daily.   Pt reports no side effects.  Transplant date: 10/28/23  CMV prophylaxis: CrCl 10 to 24 mL/minute: Valcyte 450 mg dailyDonor (+), Recipient (-), treat 6 months post tx   PJP prophylaxis: Dapsone 50mg daily.   Tx Coordinator: Abdias Chan MD: Dr. Jose Armando Quiroga, Using Med Card: Yes  Infections: CDIF history, stopped Vancomycin last week.   Immunizations: annual flu shot 2023; Pneumovax 23:  2021; Prevnar 13: 2021; TDaP:  2015; Shingrix: unknown, HBV: no immunity, COVID: Primary x 4 and Bivalent x 1    Estimated Creatinine Clearance: 64.3 mL/min (based on SCr of 1.13 mg/dL).     Supplements:   Sodium Bicarbonate 1300mg 2-3 TIMES DAILY.  Phosphorus 250mg twice daily  Magnesium 800mg 3 TIMES DAILY  Lab Results   Component Value Date    CO2 20 (L) 12/25/2023    CO2 18 (L) 12/21/2023    PHOS 2.1 (L) 12/14/2023    PHOS 1.9 (L) 12/08/2023    PHOS 1.7 (L) 12/01/2023    MAG 1.5 (L) 12/14/2023     Hypertension   Carvedilol 6.25mg twice daily  Amlodipine 5mg at bedtime.  Patient  reports no current medication side effects  Patient self monitors blood pressure.  Home BP monitoring this mornin/73.     BP Readings from Last 3 Encounters:   23 134/77   23 134/77   23 124/70     Pulse Readings from Last 3 Encounters:   23 67   23 63   23 68     Hyperlipidemia   Atorvastatin 10mg daily  Patient reports no significant myalgias or other side effects.  The 10-year ASCVD risk score (John VILLALTA, et al., 2019) is: 25.8%    Values used to calculate the score:      Age: 68 years      Sex: Male      Is Non- : No      Diabetic: No      Tobacco smoker: No      Systolic Blood Pressure: 149 mmHg      Is BP treated: Yes      HDL Cholesterol: 47 mg/dL      Total Cholesterol: 231 mg/dL   Recent Labs   Lab Test 10/28/23  0406 23  1236   CHOL 231* 251*   HDL 47 52   * 162*   TRIG 135 185*     Today's Vitals: There were no vitals taken for this visit.  ----------------    I spent 7 minutes with this patient today. A copy of the visit note was provided to the patient's provider(s).    A summary of these recommendations was sent via studdex.    Ismael Ly PharmD  [facility] Pharmacist    Phone: [phone]     Telemedicine Visit Details  Type of service:  Telephone visit  Start Time: 12:34 PM  End Time: 12:41 PM     Medication Therapy Recommendations  No medication therapy recommendations to display

## 2023-12-27 NOTE — PATIENT INSTRUCTIONS
"Recommendations from today's MTM visit:                                                      No changes    Follow-up: 2 months    It was great speaking with you today.  I value your experience and would be very thankful for your time in providing feedback in our clinic survey. In the next few days, you may receive an email or text message from [facility] [employer] with a link to a survey related to your  clinical pharmacist.\"     To schedule another MTM appointment, please call the clinic directly or you may call the MTM scheduling line at [phone] or toll-free at [phone].     My Clinical Pharmacist's contact information:                                                      Please feel free to contact me with any questions or concerns you have.      Ismael Ly, PharmD  MTM Pharmacist    Phone: [phone]     "

## 2023-12-27 NOTE — Clinical Note
If creatinine stays low, bump Valcyte up to 900mg daily. I would want one more lab to confirm since it was a huge drop

## 2023-12-28 NOTE — PROGRESS NOTES
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 Iniate when Hgb drops <9.0.   Iron regimen:  TBD  Venofer 200mg x 5 doses per referral-one dose given then determined further doses were unnecessary d/t possible lab error  Labs : [id number]  Recent JOSE use, transfusion, IV iron: none. Hx retacrit and venofer with dialysis     RX/TX plans : TBD     No history of stroke, MI, and blood clots or cancers  Contact: Consent to communicate declined per pt, signed on 106        Latest Ref Rng & Units 2023     8:31 AM 2023     8:37 AM 2023    10:18 AM 12/15/2023     9:30 AM 2023     8:13 AM 2023     7:38 AM 2023     9:19 AM   Anemia   Hemoglobin 13.3 - 17.7 g/dL 8.7  8.3  8.3  8.2  8.7  9.1  9.4    Ferritin 31 - 409 ng/mL       919      BP Readings from Last 3 Encounters:   23 134/77   23 134/77   23 124/70     Wt Readings from Last 2 Encounters:   23 72.7 kg (160 lb 4.4 oz)   23 72.7 kg (160 lb 3.2 oz)           ASSESSMENT:  Hgb:at goal - continue to monitor  TSat: at goal >30% Ferritin: At goal (>100ng/mL)    PLAN:  RTC for Hgb  in 2 week(s)    Orders needed to be renewed (for next follow-up date) in EPIC: None    Iron labs due:  End of 2024    Plan discussed with:  No call, chart review       NEXT FOLLOW-UP DATE:  24    Elise Grady RN   Anemia Services  [facility]  [email or web address]   Office : [phone]  Fax: [phone]

## 2024-01-01 ENCOUNTER — INFUSION THERAPY VISIT (OUTPATIENT)
Dept: INFUSION THERAPY | Facility: CLINIC | Age: 69
End: 2024-01-01
Attending: INTERNAL MEDICINE
Payer: COMMERCIAL

## 2024-01-01 ENCOUNTER — TELEPHONE (OUTPATIENT)
Dept: TRANSPLANT | Facility: CLINIC | Age: 69
End: 2024-01-01

## 2024-01-01 ENCOUNTER — PATIENT OUTREACH (OUTPATIENT)
Dept: CARE COORDINATION | Facility: CLINIC | Age: 69
End: 2024-01-01
Payer: COMMERCIAL

## 2024-01-01 ENCOUNTER — MYC REFILL (OUTPATIENT)
Dept: FAMILY MEDICINE | Facility: CLINIC | Age: 69
End: 2024-01-01
Payer: COMMERCIAL

## 2024-01-01 ENCOUNTER — MYC REFILL (OUTPATIENT)
Dept: TRANSPLANT | Facility: CLINIC | Age: 69
End: 2024-01-01
Payer: COMMERCIAL

## 2024-01-01 ENCOUNTER — HOSPITAL ENCOUNTER (INPATIENT)
Facility: CLINIC | Age: 69
LOS: 2 days | Discharge: ANOTHER HOSPITAL | DRG: 393 | End: 2024-04-24
Attending: EMERGENCY MEDICINE | Admitting: INTERNAL MEDICINE
Payer: COMMERCIAL

## 2024-01-01 ENCOUNTER — HOSPITAL ENCOUNTER (OUTPATIENT)
Dept: CT IMAGING | Facility: CLINIC | Age: 69
Discharge: HOME | End: 2024-03-14
Attending: INTERNAL MEDICINE | Admitting: INTERNAL MEDICINE
Payer: COMMERCIAL

## 2024-01-01 ENCOUNTER — LAB (OUTPATIENT)
Dept: LAB | Facility: CLINIC | Age: 69
End: 2024-01-01
Payer: COMMERCIAL

## 2024-01-01 ENCOUNTER — TELEPHONE (OUTPATIENT)
Dept: TRANSPLANT | Facility: CLINIC | Age: 69
End: 2024-01-01
Payer: COMMERCIAL

## 2024-01-01 ENCOUNTER — APPOINTMENT (OUTPATIENT)
Dept: GENERAL RADIOLOGY | Facility: CLINIC | Age: 69
DRG: 393 | End: 2024-01-01
Attending: STUDENT IN AN ORGANIZED HEALTH CARE EDUCATION/TRAINING PROGRAM
Payer: COMMERCIAL

## 2024-01-01 ENCOUNTER — OFFICE VISIT (OUTPATIENT)
Dept: TRANSPLANT | Facility: CLINIC | Age: 69
End: 2024-01-01
Attending: INTERNAL MEDICINE
Payer: COMMERCIAL

## 2024-01-01 ENCOUNTER — APPOINTMENT (OUTPATIENT)
Dept: GENERAL RADIOLOGY | Facility: CLINIC | Age: 69
DRG: 393 | End: 2024-01-01
Attending: INTERNAL MEDICINE
Payer: COMMERCIAL

## 2024-01-01 ENCOUNTER — MYC REFILL (OUTPATIENT)
Dept: TRANSPLANT | Facility: CLINIC | Age: 69
End: 2024-01-01

## 2024-01-01 ENCOUNTER — LAB (OUTPATIENT)
Dept: LAB | Facility: CLINIC | Age: 69
End: 2024-01-01
Attending: INTERNAL MEDICINE
Payer: COMMERCIAL

## 2024-01-01 ENCOUNTER — HEALTH MAINTENANCE LETTER (OUTPATIENT)
Age: 69
End: 2024-01-01

## 2024-01-01 ENCOUNTER — MYC REFILL (OUTPATIENT)
Dept: FAMILY MEDICINE | Facility: CLINIC | Age: 69
End: 2024-01-01

## 2024-01-01 ENCOUNTER — APPOINTMENT (OUTPATIENT)
Dept: MEDSURG UNIT | Facility: CLINIC | Age: 69
End: 2024-01-01
Attending: INTERNAL MEDICINE
Payer: COMMERCIAL

## 2024-01-01 ENCOUNTER — MYC MEDICAL ADVICE (OUTPATIENT)
Dept: TRANSPLANT | Facility: CLINIC | Age: 69
End: 2024-01-01

## 2024-01-01 ENCOUNTER — ANESTHESIA (OUTPATIENT)
Dept: INTENSIVE CARE | Facility: CLINIC | Age: 69
DRG: 393 | End: 2024-01-01
Payer: COMMERCIAL

## 2024-01-01 ENCOUNTER — APPOINTMENT (OUTPATIENT)
Dept: CT IMAGING | Facility: CLINIC | Age: 69
DRG: 871 | End: 2024-01-01
Payer: COMMERCIAL

## 2024-01-01 ENCOUNTER — HOSPITAL ENCOUNTER (INPATIENT)
Facility: CLINIC | Age: 69
LOS: 1 days | DRG: 871 | End: 2024-04-24
Attending: STUDENT IN AN ORGANIZED HEALTH CARE EDUCATION/TRAINING PROGRAM | Admitting: STUDENT IN AN ORGANIZED HEALTH CARE EDUCATION/TRAINING PROGRAM
Payer: COMMERCIAL

## 2024-01-01 ENCOUNTER — LAB (OUTPATIENT)
Dept: LAB | Facility: CLINIC | Age: 69
End: 2024-01-01
Attending: RADIOLOGY
Payer: COMMERCIAL

## 2024-01-01 ENCOUNTER — HOSPITAL ENCOUNTER (INPATIENT)
Facility: CLINIC | Age: 69
LOS: 1 days | Discharge: HOME | DRG: 698 | End: 2024-01-26
Attending: EMERGENCY MEDICINE | Admitting: TRANSPLANT SURGERY
Payer: COMMERCIAL

## 2024-01-01 ENCOUNTER — HOSPITAL ENCOUNTER (OUTPATIENT)
Dept: RESPIRATORY THERAPY | Facility: CLINIC | Age: 69
Discharge: HOME | End: 2024-02-16
Attending: RADIOLOGY
Payer: COMMERCIAL

## 2024-01-01 ENCOUNTER — VIRTUAL VISIT (OUTPATIENT)
Dept: INFECTIOUS DISEASES | Facility: CLINIC | Age: 69
End: 2024-01-01
Attending: STUDENT IN AN ORGANIZED HEALTH CARE EDUCATION/TRAINING PROGRAM
Payer: COMMERCIAL

## 2024-01-01 ENCOUNTER — OFFICE VISIT (OUTPATIENT)
Dept: FAMILY MEDICINE | Facility: CLINIC | Age: 69
End: 2024-01-01
Payer: COMMERCIAL

## 2024-01-01 ENCOUNTER — MYC MEDICAL ADVICE (OUTPATIENT)
Dept: INTERVENTIONAL RADIOLOGY/VASCULAR | Facility: CLINIC | Age: 69
End: 2024-01-01
Payer: COMMERCIAL

## 2024-01-01 ENCOUNTER — APPOINTMENT (OUTPATIENT)
Dept: INTERVENTIONAL RADIOLOGY/VASCULAR | Facility: CLINIC | Age: 69
End: 2024-01-01
Attending: INTERNAL MEDICINE
Payer: COMMERCIAL

## 2024-01-01 ENCOUNTER — PATIENT OUTREACH (OUTPATIENT)
Dept: CARE COORDINATION | Facility: CLINIC | Age: 69
End: 2024-01-01

## 2024-01-01 ENCOUNTER — MYC MEDICAL ADVICE (OUTPATIENT)
Dept: TRANSPLANT | Facility: CLINIC | Age: 69
End: 2024-01-01
Payer: COMMERCIAL

## 2024-01-01 ENCOUNTER — HOSPITAL ENCOUNTER (OUTPATIENT)
Facility: CLINIC | Age: 69
Discharge: HOME | End: 2024-03-27
Attending: INTERNAL MEDICINE | Admitting: INTERNAL MEDICINE
Payer: COMMERCIAL

## 2024-01-01 ENCOUNTER — HOSPITAL ENCOUNTER (INPATIENT)
Facility: CLINIC | Age: 69
LOS: 3 days | Discharge: HOME | DRG: 698 | End: 2024-01-29
Attending: TRANSPLANT SURGERY | Admitting: TRANSPLANT SURGERY
Payer: COMMERCIAL

## 2024-01-01 ENCOUNTER — ANESTHESIA EVENT (OUTPATIENT)
Dept: INTENSIVE CARE | Facility: CLINIC | Age: 69
DRG: 393 | End: 2024-01-01
Payer: COMMERCIAL

## 2024-01-01 ENCOUNTER — PRE VISIT (OUTPATIENT)
Dept: INFECTIOUS DISEASES | Facility: CLINIC | Age: 69
End: 2024-01-01
Payer: COMMERCIAL

## 2024-01-01 ENCOUNTER — TELEPHONE (OUTPATIENT)
Dept: INFECTIOUS DISEASES | Facility: CLINIC | Age: 69
End: 2024-01-01
Payer: COMMERCIAL

## 2024-01-01 ENCOUNTER — APPOINTMENT (OUTPATIENT)
Dept: CT IMAGING | Facility: CLINIC | Age: 69
DRG: 393 | End: 2024-01-01
Attending: EMERGENCY MEDICINE
Payer: COMMERCIAL

## 2024-01-01 ENCOUNTER — APPOINTMENT (OUTPATIENT)
Dept: ULTRASOUND IMAGING | Facility: CLINIC | Age: 69
DRG: 698 | End: 2024-01-01
Attending: EMERGENCY MEDICINE
Payer: COMMERCIAL

## 2024-01-01 ENCOUNTER — APPOINTMENT (OUTPATIENT)
Dept: GENERAL RADIOLOGY | Facility: CLINIC | Age: 69
DRG: 871 | End: 2024-01-01
Attending: STUDENT IN AN ORGANIZED HEALTH CARE EDUCATION/TRAINING PROGRAM
Payer: COMMERCIAL

## 2024-01-01 ENCOUNTER — MYC REFILL (OUTPATIENT)
Dept: INFECTIOUS DISEASES | Facility: CLINIC | Age: 69
End: 2024-01-01
Payer: COMMERCIAL

## 2024-01-01 VITALS
WEIGHT: 164 LBS | RESPIRATION RATE: 16 BRPM | SYSTOLIC BLOOD PRESSURE: 124 MMHG | TEMPERATURE: 98.2 F | TEMPERATURE: 98.5 F | HEART RATE: 88 BPM | DIASTOLIC BLOOD PRESSURE: 75 MMHG | BODY MASS INDEX: 24.22 KG/M2 | OXYGEN SATURATION: 99 % | OXYGEN SATURATION: 98 % | SYSTOLIC BLOOD PRESSURE: 126 MMHG | HEART RATE: 79 BPM | DIASTOLIC BLOOD PRESSURE: 80 MMHG

## 2024-01-01 VITALS
OXYGEN SATURATION: 99 % | DIASTOLIC BLOOD PRESSURE: 75 MMHG | TEMPERATURE: 98.3 F | RESPIRATION RATE: 16 BRPM | SYSTOLIC BLOOD PRESSURE: 131 MMHG | HEART RATE: 75 BPM

## 2024-01-01 VITALS
SYSTOLIC BLOOD PRESSURE: 157 MMHG | HEART RATE: 67 BPM | DIASTOLIC BLOOD PRESSURE: 82 MMHG | OXYGEN SATURATION: 95 % | RESPIRATION RATE: 18 BRPM | TEMPERATURE: 97.2 F

## 2024-01-01 VITALS
WEIGHT: 159.39 LBS | HEIGHT: 69 IN | TEMPERATURE: 94.8 F | OXYGEN SATURATION: 46 % | SYSTOLIC BLOOD PRESSURE: 147 MMHG | BODY MASS INDEX: 23.61 KG/M2 | HEART RATE: 104 BPM | DIASTOLIC BLOOD PRESSURE: 97 MMHG | RESPIRATION RATE: 28 BRPM

## 2024-01-01 VITALS
TEMPERATURE: 97.7 F | DIASTOLIC BLOOD PRESSURE: 87 MMHG | OXYGEN SATURATION: 98 % | SYSTOLIC BLOOD PRESSURE: 165 MMHG | WEIGHT: 171.2 LBS | HEART RATE: 74 BPM | BODY MASS INDEX: 25.28 KG/M2

## 2024-01-01 VITALS
SYSTOLIC BLOOD PRESSURE: 130 MMHG | TEMPERATURE: 97.9 F | OXYGEN SATURATION: 98 % | HEART RATE: 68 BPM | DIASTOLIC BLOOD PRESSURE: 66 MMHG

## 2024-01-01 VITALS
SYSTOLIC BLOOD PRESSURE: 131 MMHG | DIASTOLIC BLOOD PRESSURE: 83 MMHG | HEART RATE: 80 BPM | BODY MASS INDEX: 22.42 KG/M2 | WEIGHT: 152.9 LBS | RESPIRATION RATE: 16 BRPM | TEMPERATURE: 98.1 F | OXYGEN SATURATION: 98 %

## 2024-01-01 VITALS
TEMPERATURE: 97.6 F | TEMPERATURE: 98.6 F | WEIGHT: 159.2 LBS | OXYGEN SATURATION: 98 % | HEART RATE: 78 BPM | RESPIRATION RATE: 16 BRPM | BODY MASS INDEX: 23.51 KG/M2 | BODY MASS INDEX: 24.07 KG/M2 | DIASTOLIC BLOOD PRESSURE: 64 MMHG | OXYGEN SATURATION: 99 % | HEART RATE: 84 BPM | SYSTOLIC BLOOD PRESSURE: 130 MMHG | SYSTOLIC BLOOD PRESSURE: 149 MMHG | WEIGHT: 163 LBS | DIASTOLIC BLOOD PRESSURE: 84 MMHG | RESPIRATION RATE: 16 BRPM

## 2024-01-01 VITALS
HEART RATE: 65 BPM | TEMPERATURE: 97.8 F | DIASTOLIC BLOOD PRESSURE: 79 MMHG | BODY MASS INDEX: 24.56 KG/M2 | OXYGEN SATURATION: 98 % | SYSTOLIC BLOOD PRESSURE: 148 MMHG | WEIGHT: 167.5 LBS

## 2024-01-01 VITALS
HEIGHT: 69 IN | WEIGHT: 159.61 LBS | OXYGEN SATURATION: 98 % | HEART RATE: 81 BPM | SYSTOLIC BLOOD PRESSURE: 140 MMHG | BODY MASS INDEX: 23.64 KG/M2 | DIASTOLIC BLOOD PRESSURE: 82 MMHG | TEMPERATURE: 98 F | RESPIRATION RATE: 16 BRPM

## 2024-01-01 VITALS
TEMPERATURE: 97.7 F | OXYGEN SATURATION: 99 % | DIASTOLIC BLOOD PRESSURE: 71 MMHG | HEART RATE: 74 BPM | SYSTOLIC BLOOD PRESSURE: 132 MMHG | RESPIRATION RATE: 16 BRPM

## 2024-01-01 VITALS
SYSTOLIC BLOOD PRESSURE: 154 MMHG | DIASTOLIC BLOOD PRESSURE: 76 MMHG | HEART RATE: 72 BPM | OXYGEN SATURATION: 98 % | TEMPERATURE: 98.3 F

## 2024-01-01 VITALS
BODY MASS INDEX: 25.48 KG/M2 | SYSTOLIC BLOOD PRESSURE: 134 MMHG | WEIGHT: 172 LBS | DIASTOLIC BLOOD PRESSURE: 75 MMHG | HEIGHT: 69 IN

## 2024-01-01 VITALS
SYSTOLIC BLOOD PRESSURE: 139 MMHG | OXYGEN SATURATION: 98 % | HEART RATE: 88 BPM | TEMPERATURE: 98 F | DIASTOLIC BLOOD PRESSURE: 79 MMHG

## 2024-01-01 VITALS
OXYGEN SATURATION: 96 % | RESPIRATION RATE: 18 BRPM | HEART RATE: 78 BPM | SYSTOLIC BLOOD PRESSURE: 165 MMHG | TEMPERATURE: 97.5 F | DIASTOLIC BLOOD PRESSURE: 85 MMHG

## 2024-01-01 VITALS
HEIGHT: 69 IN | HEART RATE: 85 BPM | DIASTOLIC BLOOD PRESSURE: 88 MMHG | TEMPERATURE: 97.8 F | OXYGEN SATURATION: 98 % | BODY MASS INDEX: 22.51 KG/M2 | RESPIRATION RATE: 16 BRPM | SYSTOLIC BLOOD PRESSURE: 148 MMHG | WEIGHT: 152 LBS

## 2024-01-01 VITALS
HEART RATE: 75 BPM | RESPIRATION RATE: 16 BRPM | OXYGEN SATURATION: 98 % | SYSTOLIC BLOOD PRESSURE: 121 MMHG | TEMPERATURE: 97.8 F | DIASTOLIC BLOOD PRESSURE: 72 MMHG

## 2024-01-01 VITALS
OXYGEN SATURATION: 97 % | HEART RATE: 72 BPM | RESPIRATION RATE: 18 BRPM | SYSTOLIC BLOOD PRESSURE: 157 MMHG | DIASTOLIC BLOOD PRESSURE: 78 MMHG | TEMPERATURE: 97.7 F

## 2024-01-01 VITALS
OXYGEN SATURATION: 99 % | DIASTOLIC BLOOD PRESSURE: 74 MMHG | RESPIRATION RATE: 16 BRPM | HEART RATE: 87 BPM | SYSTOLIC BLOOD PRESSURE: 144 MMHG | TEMPERATURE: 98.2 F

## 2024-01-01 DIAGNOSIS — Z98.890 OTHER SPECIFIED POSTPROCEDURAL STATES: ICD-10-CM

## 2024-01-01 DIAGNOSIS — N18.6 ESRD (END STAGE RENAL DISEASE) (H): ICD-10-CM

## 2024-01-01 DIAGNOSIS — N18.4 ANEMIA IN STAGE 4 CHRONIC KIDNEY DISEASE (H): ICD-10-CM

## 2024-01-01 DIAGNOSIS — Z48.298 AFTERCARE FOLLOWING ORGAN TRANSPLANT: ICD-10-CM

## 2024-01-01 DIAGNOSIS — D70.8 OTHER NEUTROPENIA (H): ICD-10-CM

## 2024-01-01 DIAGNOSIS — Z48.298 AFTERCARE FOLLOWING ORGAN TRANSPLANT: Primary | ICD-10-CM

## 2024-01-01 DIAGNOSIS — A49.8 CLOSTRIDIUM DIFFICILE INFECTION: Primary | ICD-10-CM

## 2024-01-01 DIAGNOSIS — Z94.0 KIDNEY REPLACED BY TRANSPLANT: ICD-10-CM

## 2024-01-01 DIAGNOSIS — D63.1 ANEMIA IN STAGE 4 CHRONIC KIDNEY DISEASE (H): ICD-10-CM

## 2024-01-01 DIAGNOSIS — Z79.899 ENCOUNTER FOR LONG-TERM CURRENT USE OF MEDICATION: ICD-10-CM

## 2024-01-01 DIAGNOSIS — N18.31 ANEMIA IN STAGE 3A CHRONIC KIDNEY DISEASE (H): ICD-10-CM

## 2024-01-01 DIAGNOSIS — N18.32 STAGE 3B CHRONIC KIDNEY DISEASE (H): ICD-10-CM

## 2024-01-01 DIAGNOSIS — D70.9 NEUTROPENIA, UNSPECIFIED TYPE (H): ICD-10-CM

## 2024-01-01 DIAGNOSIS — K57.92 DIVERTICULITIS: ICD-10-CM

## 2024-01-01 DIAGNOSIS — D70.9 NEUTROPENIA (H): Primary | ICD-10-CM

## 2024-01-01 DIAGNOSIS — Z23 NEED FOR VACCINATION: ICD-10-CM

## 2024-01-01 DIAGNOSIS — D84.9 IMMUNOSUPPRESSED STATUS (H): ICD-10-CM

## 2024-01-01 DIAGNOSIS — I15.1 HTN, KIDNEY TRANSPLANT RELATED: ICD-10-CM

## 2024-01-01 DIAGNOSIS — Z20.828 CONTACT WITH AND (SUSPECTED) EXPOSURE TO OTHER VIRAL COMMUNICABLE DISEASES: ICD-10-CM

## 2024-01-01 DIAGNOSIS — Z29.89 NEED FOR PNEUMOCYSTIS PROPHYLAXIS: ICD-10-CM

## 2024-01-01 DIAGNOSIS — D72.829 LEUKOCYTOSIS, UNSPECIFIED TYPE: Primary | ICD-10-CM

## 2024-01-01 DIAGNOSIS — F41.9 ANXIETY: ICD-10-CM

## 2024-01-01 DIAGNOSIS — Z94.0 KIDNEY REPLACED BY TRANSPLANT: Primary | ICD-10-CM

## 2024-01-01 DIAGNOSIS — E83.42 HYPOMAGNESEMIA: ICD-10-CM

## 2024-01-01 DIAGNOSIS — B27.00 EBV (EPSTEIN-BARR VIRUS) VIREMIA: ICD-10-CM

## 2024-01-01 DIAGNOSIS — D72.829 LEUKOCYTOSIS: ICD-10-CM

## 2024-01-01 DIAGNOSIS — E87.5 HYPERKALEMIA: ICD-10-CM

## 2024-01-01 DIAGNOSIS — A49.8 CLOSTRIDIUM DIFFICILE INFECTION: ICD-10-CM

## 2024-01-01 DIAGNOSIS — Z94.0 KIDNEY TRANSPLANTED: ICD-10-CM

## 2024-01-01 DIAGNOSIS — D63.1 ANEMIA IN STAGE 3B CHRONIC KIDNEY DISEASE (H): ICD-10-CM

## 2024-01-01 DIAGNOSIS — D70.2 OTHER DRUG-INDUCED NEUTROPENIA (H): Primary | ICD-10-CM

## 2024-01-01 DIAGNOSIS — E87.5 HYPERKALEMIA: Primary | ICD-10-CM

## 2024-01-01 DIAGNOSIS — E87.20 METABOLIC ACIDOSIS: ICD-10-CM

## 2024-01-01 DIAGNOSIS — D70.2 OTHER DRUG-INDUCED NEUTROPENIA (H): ICD-10-CM

## 2024-01-01 DIAGNOSIS — E86.0 DEHYDRATION: ICD-10-CM

## 2024-01-01 DIAGNOSIS — T86.11 KIDNEY TRANSPLANT REJECTION: ICD-10-CM

## 2024-01-01 DIAGNOSIS — D72.829 LEUKOCYTOSIS: Primary | ICD-10-CM

## 2024-01-01 DIAGNOSIS — Z94.0 HTN, KIDNEY TRANSPLANT RELATED: ICD-10-CM

## 2024-01-01 DIAGNOSIS — N18.32 ANEMIA IN STAGE 3B CHRONIC KIDNEY DISEASE (H): ICD-10-CM

## 2024-01-01 DIAGNOSIS — R19.7 DIARRHEA: ICD-10-CM

## 2024-01-01 DIAGNOSIS — K12.2 UVULITIS: Primary | ICD-10-CM

## 2024-01-01 DIAGNOSIS — Z23 ENCOUNTER FOR VACCINATION: ICD-10-CM

## 2024-01-01 DIAGNOSIS — D72.829 LEUKOCYTOSIS, UNSPECIFIED TYPE: ICD-10-CM

## 2024-01-01 DIAGNOSIS — D72.819 LEUKOPENIA, UNSPECIFIED TYPE: ICD-10-CM

## 2024-01-01 DIAGNOSIS — B27.00 EBV (EPSTEIN-BARR VIRUS) VIREMIA: Primary | ICD-10-CM

## 2024-01-01 DIAGNOSIS — E86.0 DEHYDRATION: Primary | ICD-10-CM

## 2024-01-01 DIAGNOSIS — N18.31 STAGE 3A CHRONIC KIDNEY DISEASE (H): ICD-10-CM

## 2024-01-01 DIAGNOSIS — N17.9 AKI (ACUTE KIDNEY INJURY) (H): ICD-10-CM

## 2024-01-01 DIAGNOSIS — I10 HYPERTENSION GOAL BP (BLOOD PRESSURE) < 140/90: ICD-10-CM

## 2024-01-01 DIAGNOSIS — Z76.82 ORGAN TRANSPLANT CANDIDATE: ICD-10-CM

## 2024-01-01 DIAGNOSIS — Z94.0 DECEASED-DONOR KIDNEY TRANSPLANT: ICD-10-CM

## 2024-01-01 DIAGNOSIS — N28.9 RENAL INSUFFICIENCY: ICD-10-CM

## 2024-01-01 DIAGNOSIS — K57.92 DIVERTICULITIS: Primary | ICD-10-CM

## 2024-01-01 DIAGNOSIS — K52.9 COLITIS: ICD-10-CM

## 2024-01-01 DIAGNOSIS — A04.72 C. DIFFICILE COLITIS: ICD-10-CM

## 2024-01-01 DIAGNOSIS — J02.9 SORE THROAT: ICD-10-CM

## 2024-01-01 DIAGNOSIS — D63.1 ANEMIA IN STAGE 3A CHRONIC KIDNEY DISEASE (H): ICD-10-CM

## 2024-01-01 LAB
ACANTHOCYTES BLD QL SMEAR: ABNORMAL
ACANTHOCYTES BLD QL SMEAR: NORMAL
ADV 40+41 DNA STL QL NAA+NON-PROBE: NEGATIVE
ADV 40+41 DNA STL QL NAA+NON-PROBE: NEGATIVE
ALBUMIN MFR UR ELPH: 10.7 MG/DL
ALBUMIN MFR UR ELPH: 13.1 MG/DL
ALBUMIN MFR UR ELPH: 20 MG/DL
ALBUMIN MFR UR ELPH: 35 MG/DL
ALBUMIN SERPL BCG-MCNC: 2 G/DL (ref 3.5–5.2)
ALBUMIN SERPL BCG-MCNC: 2.3 G/DL (ref 3.5–5.2)
ALBUMIN SERPL BCG-MCNC: 2.5 G/DL (ref 3.5–5.2)
ALBUMIN SERPL BCG-MCNC: 3.2 G/DL (ref 3.5–5.2)
ALBUMIN SERPL BCG-MCNC: 4.5 G/DL (ref 3.5–5.2)
ALBUMIN UR-MCNC: 30 MG/DL
ALBUMIN UR-MCNC: 50 MG/DL
ALBUMIN UR-MCNC: 50 MG/DL
ALBUMIN UR-MCNC: 70 MG/DL
ALBUMIN UR-MCNC: NEGATIVE MG/DL
ALLEN'S TEST: ABNORMAL
ALP SERPL-CCNC: 101 U/L (ref 40–150)
ALP SERPL-CCNC: 120 U/L (ref 40–150)
ALP SERPL-CCNC: 139 U/L (ref 40–150)
ALP SERPL-CCNC: 59 U/L (ref 40–150)
ALT SERPL W P-5'-P-CCNC: 31 U/L (ref 0–70)
ALT SERPL W P-5'-P-CCNC: 41 U/L (ref 0–70)
ALT SERPL W P-5'-P-CCNC: 457 U/L (ref 0–70)
ALT SERPL W P-5'-P-CCNC: 8 U/L (ref 0–70)
AMORPH CRY #/AREA URNS HPF: ABNORMAL /HPF
ANION GAP SERPL CALCULATED.3IONS-SCNC: 10 MMOL/L (ref 7–15)
ANION GAP SERPL CALCULATED.3IONS-SCNC: 11 MMOL/L (ref 7–15)
ANION GAP SERPL CALCULATED.3IONS-SCNC: 12 MMOL/L (ref 7–15)
ANION GAP SERPL CALCULATED.3IONS-SCNC: 13 MMOL/L (ref 7–15)
ANION GAP SERPL CALCULATED.3IONS-SCNC: 14 MMOL/L (ref 7–15)
ANION GAP SERPL CALCULATED.3IONS-SCNC: 15 MMOL/L (ref 7–15)
ANION GAP SERPL CALCULATED.3IONS-SCNC: 16 MMOL/L (ref 7–15)
ANION GAP SERPL CALCULATED.3IONS-SCNC: 17 MMOL/L (ref 7–15)
ANION GAP SERPL CALCULATED.3IONS-SCNC: 18 MMOL/L (ref 7–15)
ANION GAP SERPL CALCULATED.3IONS-SCNC: 20 MMOL/L (ref 7–15)
ANION GAP SERPL CALCULATED.3IONS-SCNC: 22 MMOL/L (ref 7–15)
ANION GAP SERPL CALCULATED.3IONS-SCNC: 8 MMOL/L (ref 7–15)
ANION GAP SERPL CALCULATED.3IONS-SCNC: 8 MMOL/L (ref 7–15)
ANION GAP SERPL CALCULATED.3IONS-SCNC: 9 MMOL/L (ref 7–15)
APPEARANCE UR: ABNORMAL
APPEARANCE UR: CLEAR
APPEARANCE UR: CLEAR
APTT PPP: 50 SECONDS (ref 22–38)
AST SERPL W P-5'-P-CCNC: 1004 U/L (ref 0–45)
AST SERPL W P-5'-P-CCNC: 28 U/L (ref 0–45)
AST SERPL W P-5'-P-CCNC: 35 U/L (ref 0–45)
AST SERPL W P-5'-P-CCNC: 83 U/L (ref 0–45)
ASTRO TYP 1-8 RNA STL QL NAA+NON-PROBE: NEGATIVE
ASTRO TYP 1-8 RNA STL QL NAA+NON-PROBE: NEGATIVE
ATRIAL RATE - MUSE: 99 BPM
AUER BODIES BLD QL SMEAR: ABNORMAL
AUER BODIES BLD QL SMEAR: NORMAL
B19V DNA SER QL NAA+PROBE: NOT DETECTED
BACTERIA #/AREA URNS HPF: ABNORMAL /HPF
BACTERIA BLD CULT: NO GROWTH
BACTERIA BLD CULT: NO GROWTH
BASE EXCESS BLDA CALC-SCNC: -10.5 MMOL/L (ref -3–3)
BASE EXCESS BLDA CALC-SCNC: -11.5 MMOL/L (ref -3–3)
BASE EXCESS BLDA CALC-SCNC: -11.7 MMOL/L (ref -3–3)
BASE EXCESS BLDA CALC-SCNC: -12.1 MMOL/L (ref -3–3)
BASE EXCESS BLDA CALC-SCNC: -13.3 MMOL/L (ref -3–3)
BASE EXCESS BLDA CALC-SCNC: -16.4 MMOL/L (ref -3–3)
BASE EXCESS BLDA CALC-SCNC: -17 MMOL/L (ref -3–3)
BASE EXCESS BLDA CALC-SCNC: -17.2 MMOL/L (ref -3–3)
BASE EXCESS BLDV CALC-SCNC: -4.4 MMOL/L (ref -3–3)
BASO STIPL BLD QL SMEAR: ABNORMAL
BASO STIPL BLD QL SMEAR: NORMAL
BASOPHILS # BLD AUTO: 0 10E3/UL (ref 0–0.2)
BASOPHILS # BLD AUTO: ABNORMAL 10*3/UL
BASOPHILS # BLD AUTO: NORMAL 10*3/UL
BASOPHILS # BLD MANUAL: 0 10E3/UL (ref 0–0.2)
BASOPHILS # BLD MANUAL: 0.1 10E3/UL (ref 0–0.2)
BASOPHILS # BLD MANUAL: 0.1 10E3/UL (ref 0–0.2)
BASOPHILS NFR BLD AUTO: 0 %
BASOPHILS NFR BLD AUTO: ABNORMAL %
BASOPHILS NFR BLD AUTO: NORMAL %
BASOPHILS NFR BLD MANUAL: 0 %
BASOPHILS NFR BLD MANUAL: 1 %
BASOPHILS NFR BLD MANUAL: 10 %
BASOPHILS NFR BLD MANUAL: 2 %
BASOPHILS NFR BLD MANUAL: 4 %
BASOPHILS NFR BLD MANUAL: 5 %
BILIRUB DIRECT SERPL-MCNC: <0.2 MG/DL (ref 0–0.3)
BILIRUB SERPL-MCNC: 0.4 MG/DL
BILIRUB SERPL-MCNC: 0.7 MG/DL
BILIRUB SERPL-MCNC: 1 MG/DL
BILIRUB SERPL-MCNC: 1.1 MG/DL
BILIRUB UR QL STRIP: ABNORMAL
BILIRUB UR QL STRIP: NEGATIVE
BITE CELLS BLD QL SMEAR: ABNORMAL
BITE CELLS BLD QL SMEAR: NORMAL
BK VIRUS SPECIMEN TYPE: NORMAL
BKV DNA # SPEC NAA+PROBE: NOT DETECTED COPIES/ML
BKV DNA # SPEC NAA+PROBE: NOT DETECTED IU/ML
BLISTER CELLS BLD QL SMEAR: ABNORMAL
BLISTER CELLS BLD QL SMEAR: NORMAL
BUN SERPL-MCNC: 21.1 MG/DL (ref 8–23)
BUN SERPL-MCNC: 23.2 MG/DL (ref 8–23)
BUN SERPL-MCNC: 23.3 MG/DL (ref 8–23)
BUN SERPL-MCNC: 23.4 MG/DL (ref 8–23)
BUN SERPL-MCNC: 24.3 MG/DL (ref 8–23)
BUN SERPL-MCNC: 26.1 MG/DL (ref 8–23)
BUN SERPL-MCNC: 27 MG/DL (ref 8–23)
BUN SERPL-MCNC: 27.3 MG/DL (ref 8–23)
BUN SERPL-MCNC: 27.4 MG/DL (ref 8–23)
BUN SERPL-MCNC: 27.9 MG/DL (ref 8–23)
BUN SERPL-MCNC: 28.2 MG/DL (ref 8–23)
BUN SERPL-MCNC: 28.5 MG/DL (ref 8–23)
BUN SERPL-MCNC: 29.2 MG/DL (ref 8–23)
BUN SERPL-MCNC: 29.7 MG/DL (ref 8–23)
BUN SERPL-MCNC: 30 MG/DL (ref 8–23)
BUN SERPL-MCNC: 30 MG/DL (ref 8–23)
BUN SERPL-MCNC: 30.5 MG/DL (ref 8–23)
BUN SERPL-MCNC: 30.5 MG/DL (ref 8–23)
BUN SERPL-MCNC: 30.8 MG/DL (ref 8–23)
BUN SERPL-MCNC: 31 MG/DL (ref 8–23)
BUN SERPL-MCNC: 32.2 MG/DL (ref 8–23)
BUN SERPL-MCNC: 32.4 MG/DL (ref 8–23)
BUN SERPL-MCNC: 32.4 MG/DL (ref 8–23)
BUN SERPL-MCNC: 34.1 MG/DL (ref 8–23)
BUN SERPL-MCNC: 35.9 MG/DL (ref 8–23)
BUN SERPL-MCNC: 36.2 MG/DL (ref 8–23)
BUN SERPL-MCNC: 37.2 MG/DL (ref 8–23)
BUN SERPL-MCNC: 37.4 MG/DL (ref 8–23)
BUN SERPL-MCNC: 39.2 MG/DL (ref 8–23)
BUN SERPL-MCNC: 39.6 MG/DL (ref 8–23)
BUN SERPL-MCNC: 40.9 MG/DL (ref 8–23)
BUN SERPL-MCNC: 45.4 MG/DL (ref 8–23)
BUN SERPL-MCNC: 45.6 MG/DL (ref 8–23)
BUN SERPL-MCNC: 49 MG/DL (ref 8–23)
BUN SERPL-MCNC: 55.5 MG/DL (ref 8–23)
BUN SERPL-MCNC: 64.4 MG/DL (ref 8–23)
BUN SERPL-MCNC: 70.4 MG/DL (ref 8–23)
BUN SERPL-MCNC: 79.2 MG/DL (ref 8–23)
BUN SERPL-MCNC: 88.4 MG/DL (ref 8–23)
BURR CELLS BLD QL SMEAR: ABNORMAL
BURR CELLS BLD QL SMEAR: NORMAL
BURR CELLS BLD QL SMEAR: SLIGHT
BURR CELLS BLD QL SMEAR: SLIGHT
C CAYETANENSIS DNA STL QL NAA+NON-PROBE: NEGATIVE
C CAYETANENSIS DNA STL QL NAA+NON-PROBE: NEGATIVE
C DIFF GDH STL QL IA: POSITIVE
C DIFF TOX A+B STL QL IA: NEGATIVE
C DIFF TOX B STL QL: NEGATIVE
C DIFF TOX B STL QL: NEGATIVE
C DIFF TOX B STL QL: POSITIVE
CA-I BLD-MCNC: 3.6 MG/DL (ref 4.4–5.2)
CA-I BLD-MCNC: 3.8 MG/DL (ref 4.4–5.2)
CA-I BLD-MCNC: 4.6 MG/DL (ref 4.4–5.2)
CALCIUM SERPL-MCNC: 6.7 MG/DL (ref 8.8–10.2)
CALCIUM SERPL-MCNC: 7.5 MG/DL (ref 8.8–10.2)
CALCIUM SERPL-MCNC: 7.6 MG/DL (ref 8.8–10.2)
CALCIUM SERPL-MCNC: 7.9 MG/DL (ref 8.8–10.2)
CALCIUM SERPL-MCNC: 8.2 MG/DL (ref 8.8–10.2)
CALCIUM SERPL-MCNC: 8.3 MG/DL (ref 8.8–10.2)
CALCIUM SERPL-MCNC: 8.4 MG/DL (ref 8.8–10.2)
CALCIUM SERPL-MCNC: 8.5 MG/DL (ref 8.8–10.2)
CALCIUM SERPL-MCNC: 8.6 MG/DL (ref 8.8–10.2)
CALCIUM SERPL-MCNC: 8.7 MG/DL (ref 8.8–10.2)
CALCIUM SERPL-MCNC: 8.8 MG/DL (ref 8.8–10.2)
CALCIUM SERPL-MCNC: 9 MG/DL (ref 8.8–10.2)
CALCIUM SERPL-MCNC: 9.1 MG/DL (ref 8.8–10.2)
CALCIUM SERPL-MCNC: 9.2 MG/DL (ref 8.8–10.2)
CALCIUM SERPL-MCNC: 9.2 MG/DL (ref 8.8–10.2)
CALCIUM SERPL-MCNC: 9.3 MG/DL (ref 8.8–10.2)
CALCIUM SERPL-MCNC: 9.4 MG/DL (ref 8.8–10.2)
CALCIUM SERPL-MCNC: 9.4 MG/DL (ref 8.8–10.2)
CALCIUM SERPL-MCNC: 9.5 MG/DL (ref 8.8–10.2)
CALCIUM SERPL-MCNC: 9.7 MG/DL (ref 8.8–10.2)
CALCIUM SERPL-MCNC: 9.9 MG/DL (ref 8.8–10.2)
CAMPYLOBACTER DNA SPEC NAA+PROBE: NEGATIVE
CAMPYLOBACTER DNA SPEC NAA+PROBE: NEGATIVE
CD3 CELLS # BLD: 26 CELLS/UL (ref 603–2990)
CD3 CELLS # BLD: 60 CELLS/UL (ref 603–2990)
CD3 CELLS NFR BLD: 20 % (ref 49–84)
CD3 CELLS NFR BLD: 58 % (ref 49–84)
CD3+CD4+ CELLS # BLD: 16 CELLS/UL (ref 441–2156)
CD3+CD4+ CELLS # BLD: 45 CELLS/UL (ref 441–2156)
CD3+CD4+ CELLS NFR BLD: 12 % (ref 28–63)
CD3+CD4+ CELLS NFR BLD: 43 % (ref 28–63)
CD3+CD4+ CELLS/CD3+CD8+ CLL BLD: 1.69 % (ref 1.4–2.6)
CD3+CD4+ CELLS/CD3+CD8+ CLL BLD: 3.2 % (ref 1.4–2.6)
CD3+CD8+ CELLS # BLD: 10 CELLS/UL (ref 125–1312)
CD3+CD8+ CELLS # BLD: 14 CELLS/UL (ref 125–1312)
CD3+CD8+ CELLS NFR BLD: 13 % (ref 10–40)
CD3+CD8+ CELLS NFR BLD: 7 % (ref 10–40)
CHLORIDE SERPL-SCNC: 101 MMOL/L (ref 98–107)
CHLORIDE SERPL-SCNC: 102 MMOL/L (ref 98–107)
CHLORIDE SERPL-SCNC: 103 MMOL/L (ref 98–107)
CHLORIDE SERPL-SCNC: 104 MMOL/L (ref 98–107)
CHLORIDE SERPL-SCNC: 105 MMOL/L (ref 98–107)
CHLORIDE SERPL-SCNC: 106 MMOL/L (ref 98–107)
CHLORIDE SERPL-SCNC: 107 MMOL/L (ref 98–107)
CHLORIDE SERPL-SCNC: 108 MMOL/L (ref 98–107)
CHLORIDE SERPL-SCNC: 109 MMOL/L (ref 98–107)
CHLORIDE SERPL-SCNC: 110 MMOL/L (ref 98–107)
CHLORIDE SERPL-SCNC: 110 MMOL/L (ref 98–107)
CHLORIDE SERPL-SCNC: 91 MMOL/L (ref 98–107)
CHLORIDE SERPL-SCNC: 93 MMOL/L (ref 98–107)
CHLORIDE SERPL-SCNC: 94 MMOL/L (ref 98–107)
CHLORIDE SERPL-SCNC: 97 MMOL/L (ref 98–107)
CHLORIDE SERPL-SCNC: 98 MMOL/L (ref 98–107)
CHLORIDE SERPL-SCNC: 98 MMOL/L (ref 98–107)
CHOLEST SERPL-MCNC: 97 MG/DL
CMV DNA SPEC NAA+PROBE-ACNC: NOT DETECTED IU/ML
COHGB MFR BLD: 93.5 % (ref 95–96)
COHGB MFR BLD: 94.5 % (ref 95–96)
COHGB MFR BLD: 96.8 % (ref 95–96)
COHGB MFR BLD: 97.3 % (ref 95–96)
COHGB MFR BLD: 98.7 % (ref 95–96)
COHGB MFR BLD: 99.4 % (ref 95–96)
COHGB MFR BLD: >100 % (ref 95–96)
COLOR UR AUTO: ABNORMAL
COLOR UR AUTO: ABNORMAL
COLOR UR AUTO: YELLOW
CPB POCT: NO
CREAT SERPL-MCNC: 0.96 MG/DL (ref 0.67–1.17)
CREAT SERPL-MCNC: 1.11 MG/DL (ref 0.67–1.17)
CREAT SERPL-MCNC: 1.14 MG/DL (ref 0.67–1.17)
CREAT SERPL-MCNC: 1.17 MG/DL (ref 0.67–1.17)
CREAT SERPL-MCNC: 1.18 MG/DL (ref 0.67–1.17)
CREAT SERPL-MCNC: 1.2 MG/DL (ref 0.67–1.17)
CREAT SERPL-MCNC: 1.21 MG/DL (ref 0.67–1.17)
CREAT SERPL-MCNC: 1.24 MG/DL (ref 0.67–1.17)
CREAT SERPL-MCNC: 1.24 MG/DL (ref 0.67–1.17)
CREAT SERPL-MCNC: 1.3 MG/DL (ref 0.67–1.17)
CREAT SERPL-MCNC: 1.3 MG/DL (ref 0.67–1.17)
CREAT SERPL-MCNC: 1.33 MG/DL (ref 0.67–1.17)
CREAT SERPL-MCNC: 1.34 MG/DL (ref 0.67–1.17)
CREAT SERPL-MCNC: 1.38 MG/DL (ref 0.67–1.17)
CREAT SERPL-MCNC: 1.39 MG/DL (ref 0.67–1.17)
CREAT SERPL-MCNC: 1.4 MG/DL (ref 0.67–1.17)
CREAT SERPL-MCNC: 1.42 MG/DL (ref 0.67–1.17)
CREAT SERPL-MCNC: 1.52 MG/DL (ref 0.67–1.17)
CREAT SERPL-MCNC: 1.73 MG/DL (ref 0.67–1.17)
CREAT SERPL-MCNC: 1.8 MG/DL (ref 0.67–1.17)
CREAT SERPL-MCNC: 1.81 MG/DL (ref 0.67–1.17)
CREAT SERPL-MCNC: 1.85 MG/DL (ref 0.67–1.17)
CREAT SERPL-MCNC: 1.9 MG/DL (ref 0.67–1.17)
CREAT SERPL-MCNC: 1.91 MG/DL (ref 0.67–1.17)
CREAT SERPL-MCNC: 1.95 MG/DL (ref 0.67–1.17)
CREAT SERPL-MCNC: 1.95 MG/DL (ref 0.67–1.17)
CREAT SERPL-MCNC: 1.99 MG/DL (ref 0.67–1.17)
CREAT SERPL-MCNC: 2.01 MG/DL (ref 0.67–1.17)
CREAT SERPL-MCNC: 2.04 MG/DL (ref 0.67–1.17)
CREAT SERPL-MCNC: 2.09 MG/DL (ref 0.67–1.17)
CREAT SERPL-MCNC: 2.11 MG/DL (ref 0.67–1.17)
CREAT SERPL-MCNC: 2.13 MG/DL (ref 0.67–1.17)
CREAT SERPL-MCNC: 2.21 MG/DL (ref 0.67–1.17)
CREAT SERPL-MCNC: 2.27 MG/DL (ref 0.67–1.17)
CREAT SERPL-MCNC: 2.36 MG/DL (ref 0.67–1.17)
CREAT SERPL-MCNC: 2.56 MG/DL (ref 0.67–1.17)
CREAT SERPL-MCNC: 3.13 MG/DL (ref 0.67–1.17)
CREAT SERPL-MCNC: 3.52 MG/DL (ref 0.67–1.17)
CREAT SERPL-MCNC: 4.07 MG/DL (ref 0.67–1.17)
CREAT UR-MCNC: 104 MG/DL
CREAT UR-MCNC: 121.2 MG/DL
CREAT UR-MCNC: 192 MG/DL
CREAT UR-MCNC: 270.9 MG/DL
CREAT UR-MCNC: 569 MG/DL
CRP SERPL-MCNC: 393.05 MG/L
CRP SERPL-MCNC: 406.67 MG/L
CRYPTOSP DNA STL QL NAA+NON-PROBE: NEGATIVE
CRYPTOSP DNA STL QL NAA+NON-PROBE: NEGATIVE
CYCLE THRESHOLD (CT): 23.7
CYCLE THRESHOLD (CT): 28.5
DACRYOCYTES BLD QL SMEAR: ABNORMAL
DACRYOCYTES BLD QL SMEAR: NORMAL
DACRYOCYTES BLD QL SMEAR: SLIGHT
DEPRECATED HCO3 PLAS-SCNC: 11 MMOL/L (ref 22–29)
DEPRECATED HCO3 PLAS-SCNC: 13 MMOL/L (ref 22–29)
DEPRECATED HCO3 PLAS-SCNC: 14 MMOL/L (ref 22–29)
DEPRECATED HCO3 PLAS-SCNC: 16 MMOL/L (ref 22–29)
DEPRECATED HCO3 PLAS-SCNC: 16 MMOL/L (ref 22–29)
DEPRECATED HCO3 PLAS-SCNC: 17 MMOL/L (ref 22–29)
DEPRECATED HCO3 PLAS-SCNC: 18 MMOL/L (ref 22–29)
DEPRECATED HCO3 PLAS-SCNC: 19 MMOL/L (ref 22–29)
DEPRECATED HCO3 PLAS-SCNC: 20 MMOL/L (ref 22–29)
DEPRECATED HCO3 PLAS-SCNC: 21 MMOL/L (ref 22–29)
DEPRECATED HCO3 PLAS-SCNC: 22 MMOL/L (ref 22–29)
DEPRECATED HCO3 PLAS-SCNC: 23 MMOL/L (ref 22–29)
DEPRECATED HCO3 PLAS-SCNC: 25 MMOL/L (ref 22–29)
DEPRECATED HCO3 PLAS-SCNC: 32 MMOL/L (ref 22–29)
DEPRECATED S PYO AG THROAT QL EIA: NEGATIVE
DIASTOLIC BLOOD PRESSURE - MUSE: NORMAL MMHG
DONOR IDENTIFICATION: NORMAL
DSA COMMENTS: NORMAL
DSA PRESENT: NO
DSA TEST METHOD: NORMAL
E COLI O157 DNA STL QL NAA+NON-PROBE: NORMAL
E COLI O157 DNA STL QL NAA+NON-PROBE: NORMAL
E HISTOLYT DNA STL QL NAA+NON-PROBE: NEGATIVE
E HISTOLYT DNA STL QL NAA+NON-PROBE: NEGATIVE
EAEC ASTA GENE ISLT QL NAA+PROBE: NEGATIVE
EAEC ASTA GENE ISLT QL NAA+PROBE: NEGATIVE
EBV DNA COPIES/ML, INSTRUMENT: ABNORMAL COPIES/ML
EBV DNA SPEC NAA+PROBE-LOG#: 4.1 {LOG_COPIES}/ML
EBV DNA SPEC NAA+PROBE-LOG#: 4.6 {LOG_COPIES}/ML
EBV DNA SPEC NAA+PROBE-LOG#: 4.7 {LOG_COPIES}/ML
EC STX1+STX2 GENES STL QL NAA+NON-PROBE: NEGATIVE
EC STX1+STX2 GENES STL QL NAA+NON-PROBE: NEGATIVE
EGFRCR SERPLBLD CKD-EPI 2021: 15 ML/MIN/1.73M2
EGFRCR SERPLBLD CKD-EPI 2021: 18 ML/MIN/1.73M2
EGFRCR SERPLBLD CKD-EPI 2021: 21 ML/MIN/1.73M2
EGFRCR SERPLBLD CKD-EPI 2021: 27 ML/MIN/1.73M2
EGFRCR SERPLBLD CKD-EPI 2021: 29 ML/MIN/1.73M2
EGFRCR SERPLBLD CKD-EPI 2021: 31 ML/MIN/1.73M2
EGFRCR SERPLBLD CKD-EPI 2021: 32 ML/MIN/1.73M2
EGFRCR SERPLBLD CKD-EPI 2021: 33 ML/MIN/1.73M2
EGFRCR SERPLBLD CKD-EPI 2021: 33 ML/MIN/1.73M2
EGFRCR SERPLBLD CKD-EPI 2021: 34 ML/MIN/1.73M2
EGFRCR SERPLBLD CKD-EPI 2021: 35 ML/MIN/1.73M2
EGFRCR SERPLBLD CKD-EPI 2021: 35 ML/MIN/1.73M2
EGFRCR SERPLBLD CKD-EPI 2021: 36 ML/MIN/1.73M2
EGFRCR SERPLBLD CKD-EPI 2021: 37 ML/MIN/1.73M2
EGFRCR SERPLBLD CKD-EPI 2021: 37 ML/MIN/1.73M2
EGFRCR SERPLBLD CKD-EPI 2021: 38 ML/MIN/1.73M2
EGFRCR SERPLBLD CKD-EPI 2021: 38 ML/MIN/1.73M2
EGFRCR SERPLBLD CKD-EPI 2021: 39 ML/MIN/1.73M2
EGFRCR SERPLBLD CKD-EPI 2021: 40 ML/MIN/1.73M2
EGFRCR SERPLBLD CKD-EPI 2021: 40 ML/MIN/1.73M2
EGFRCR SERPLBLD CKD-EPI 2021: 42 ML/MIN/1.73M2
EGFRCR SERPLBLD CKD-EPI 2021: 50 ML/MIN/1.73M2
EGFRCR SERPLBLD CKD-EPI 2021: 54 ML/MIN/1.73M2
EGFRCR SERPLBLD CKD-EPI 2021: 55 ML/MIN/1.73M2
EGFRCR SERPLBLD CKD-EPI 2021: 55 ML/MIN/1.73M2
EGFRCR SERPLBLD CKD-EPI 2021: 56 ML/MIN/1.73M2
EGFRCR SERPLBLD CKD-EPI 2021: 58 ML/MIN/1.73M2
EGFRCR SERPLBLD CKD-EPI 2021: 58 ML/MIN/1.73M2
EGFRCR SERPLBLD CKD-EPI 2021: 60 ML/MIN/1.73M2
EGFRCR SERPLBLD CKD-EPI 2021: 60 ML/MIN/1.73M2
EGFRCR SERPLBLD CKD-EPI 2021: 63 ML/MIN/1.73M2
EGFRCR SERPLBLD CKD-EPI 2021: 63 ML/MIN/1.73M2
EGFRCR SERPLBLD CKD-EPI 2021: 65 ML/MIN/1.73M2
EGFRCR SERPLBLD CKD-EPI 2021: 66 ML/MIN/1.73M2
EGFRCR SERPLBLD CKD-EPI 2021: 67 ML/MIN/1.73M2
EGFRCR SERPLBLD CKD-EPI 2021: 68 ML/MIN/1.73M2
EGFRCR SERPLBLD CKD-EPI 2021: 70 ML/MIN/1.73M2
EGFRCR SERPLBLD CKD-EPI 2021: 72 ML/MIN/1.73M2
EGFRCR SERPLBLD CKD-EPI 2021: 86 ML/MIN/1.73M2
ELLIPTOCYTES BLD QL SMEAR: ABNORMAL
ELLIPTOCYTES BLD QL SMEAR: NORMAL
ELLIPTOCYTES BLD QL SMEAR: SLIGHT
EOSINOPHIL # BLD AUTO: 0 10E3/UL (ref 0–0.7)
EOSINOPHIL # BLD AUTO: ABNORMAL 10*3/UL
EOSINOPHIL # BLD AUTO: NORMAL 10*3/UL
EOSINOPHIL # BLD MANUAL: 0 10E3/UL (ref 0–0.7)
EOSINOPHIL # BLD MANUAL: 0.1 10E3/UL (ref 0–0.7)
EOSINOPHIL NFR BLD AUTO: 1 %
EOSINOPHIL NFR BLD AUTO: ABNORMAL %
EOSINOPHIL NFR BLD AUTO: NORMAL %
EOSINOPHIL NFR BLD MANUAL: 0 %
EOSINOPHIL NFR BLD MANUAL: 1 %
EOSINOPHIL NFR BLD MANUAL: 12 %
EOSINOPHIL NFR BLD MANUAL: 2 %
EOSINOPHIL NFR BLD MANUAL: 3 %
EOSINOPHIL NFR BLD MANUAL: 5 %
EOSINOPHIL NFR BLD MANUAL: 6 %
EOSINOPHIL NFR BLD MANUAL: 6 %
EOSINOPHIL NFR BLD MANUAL: 7 %
EOSINOPHIL NFR BLD MANUAL: 7 %
EOSINOPHIL NFR BLD MANUAL: 8 %
EOSINOPHIL NFR BLD MANUAL: 8 %
EPEC EAE GENE STL QL NAA+NON-PROBE: NEGATIVE
EPEC EAE GENE STL QL NAA+NON-PROBE: NEGATIVE
ERYTHROCYTE [DISTWIDTH] IN BLOOD BY AUTOMATED COUNT: 12.2 % (ref 10–15)
ERYTHROCYTE [DISTWIDTH] IN BLOOD BY AUTOMATED COUNT: 12.3 % (ref 10–15)
ERYTHROCYTE [DISTWIDTH] IN BLOOD BY AUTOMATED COUNT: 12.3 % (ref 10–15)
ERYTHROCYTE [DISTWIDTH] IN BLOOD BY AUTOMATED COUNT: 12.5 % (ref 10–15)
ERYTHROCYTE [DISTWIDTH] IN BLOOD BY AUTOMATED COUNT: 12.6 % (ref 10–15)
ERYTHROCYTE [DISTWIDTH] IN BLOOD BY AUTOMATED COUNT: 12.7 % (ref 10–15)
ERYTHROCYTE [DISTWIDTH] IN BLOOD BY AUTOMATED COUNT: 12.8 % (ref 10–15)
ERYTHROCYTE [DISTWIDTH] IN BLOOD BY AUTOMATED COUNT: 12.8 % (ref 10–15)
ERYTHROCYTE [DISTWIDTH] IN BLOOD BY AUTOMATED COUNT: 12.9 % (ref 10–15)
ERYTHROCYTE [DISTWIDTH] IN BLOOD BY AUTOMATED COUNT: 12.9 % (ref 10–15)
ERYTHROCYTE [DISTWIDTH] IN BLOOD BY AUTOMATED COUNT: 13 % (ref 10–15)
ERYTHROCYTE [DISTWIDTH] IN BLOOD BY AUTOMATED COUNT: 13.1 % (ref 10–15)
ERYTHROCYTE [DISTWIDTH] IN BLOOD BY AUTOMATED COUNT: 13.2 % (ref 10–15)
ERYTHROCYTE [DISTWIDTH] IN BLOOD BY AUTOMATED COUNT: 13.4 % (ref 10–15)
ERYTHROCYTE [DISTWIDTH] IN BLOOD BY AUTOMATED COUNT: 13.5 % (ref 10–15)
ERYTHROCYTE [DISTWIDTH] IN BLOOD BY AUTOMATED COUNT: 13.8 % (ref 10–15)
ERYTHROCYTE [DISTWIDTH] IN BLOOD BY AUTOMATED COUNT: 14 % (ref 10–15)
ERYTHROCYTE [DISTWIDTH] IN BLOOD BY AUTOMATED COUNT: 14.1 % (ref 10–15)
ERYTHROCYTE [DISTWIDTH] IN BLOOD BY AUTOMATED COUNT: 14.2 % (ref 10–15)
ERYTHROCYTE [DISTWIDTH] IN BLOOD BY AUTOMATED COUNT: 14.4 % (ref 10–15)
ERYTHROCYTE [DISTWIDTH] IN BLOOD BY AUTOMATED COUNT: 14.8 % (ref 10–15)
ERYTHROCYTE [DISTWIDTH] IN BLOOD BY AUTOMATED COUNT: 14.9 % (ref 10–15)
ERYTHROCYTE [DISTWIDTH] IN BLOOD BY AUTOMATED COUNT: 14.9 % (ref 10–15)
ERYTHROCYTE [DISTWIDTH] IN BLOOD BY AUTOMATED COUNT: 15.5 % (ref 10–15)
ERYTHROCYTE [DISTWIDTH] IN BLOOD BY AUTOMATED COUNT: 15.6 % (ref 10–15)
ERYTHROCYTE [DISTWIDTH] IN BLOOD BY AUTOMATED COUNT: 15.7 % (ref 10–15)
ETEC LTA+ST1A+ST1B TOX ST NAA+NON-PROBE: NEGATIVE
ETEC LTA+ST1A+ST1B TOX ST NAA+NON-PROBE: NEGATIVE
EVEROLIMUS BLD-MCNC: 1.8 UG/L (ref 3–8)
EVEROLIMUS BLD-MCNC: 16.1 UG/L (ref 3–8)
EVEROLIMUS BLD-MCNC: 2.6 UG/L (ref 3–8)
EVEROLIMUS BLD-MCNC: 3 UG/L (ref 3–8)
EVEROLIMUS BLD-MCNC: 3.4 UG/L (ref 3–8)
EVEROLIMUS BLD-MCNC: 3.4 UG/L (ref 3–8)
EVEROLIMUS BLD-MCNC: 4.4 UG/L (ref 3–8)
EVEROLIMUS BLD-MCNC: 5.3 UG/L (ref 3–8)
EVEROLIMUS BLD-MCNC: 5.7 UG/L (ref 3–8)
EVEROLIMUS BLD-MCNC: 6.2 UG/L (ref 3–8)
EVEROLIMUS BLD-MCNC: 6.3 UG/L (ref 3–8)
EVEROLIMUS BLD-MCNC: 6.4 UG/L (ref 3–8)
EVEROLIMUS BLD-MCNC: 7.5 UG/L (ref 3–8)
EVEROLIMUS BLD-MCNC: 7.7 UG/L (ref 3–8)
EVEROLIMUS BLD-MCNC: 9.8 UG/L (ref 3–8)
EVEROLIMUS BLD-MCNC: <1 UG/L (ref 3–8)
FERRITIN SERPL-MCNC: 1392 NG/ML (ref 31–409)
FERRITIN SERPL-MCNC: 2283 NG/ML (ref 31–409)
FERRITIN SERPL-MCNC: 4023 NG/ML (ref 31–409)
FIBRINOGEN PPP-MCNC: 589 MG/DL (ref 170–490)
FLUAV AG SPEC QL IA: NEGATIVE
FLUBV AG SPEC QL IA: NEGATIVE
FOLATE SERPL-MCNC: 33.7 NG/ML (ref 4.6–34.8)
FRAGMENTS BLD QL SMEAR: ABNORMAL
FRAGMENTS BLD QL SMEAR: NORMAL
G LAMBLIA DNA STL QL NAA+NON-PROBE: NEGATIVE
G LAMBLIA DNA STL QL NAA+NON-PROBE: NEGATIVE
GLUCOSE BLD-MCNC: 409 MG/DL (ref 70–99)
GLUCOSE BLDC GLUCOMTR-MCNC: 102 MG/DL (ref 70–99)
GLUCOSE BLDC GLUCOMTR-MCNC: 107 MG/DL (ref 70–99)
GLUCOSE BLDC GLUCOMTR-MCNC: 126 MG/DL (ref 70–99)
GLUCOSE BLDC GLUCOMTR-MCNC: 129 MG/DL (ref 70–99)
GLUCOSE BLDC GLUCOMTR-MCNC: 140 MG/DL (ref 70–99)
GLUCOSE BLDC GLUCOMTR-MCNC: 148 MG/DL (ref 70–99)
GLUCOSE BLDC GLUCOMTR-MCNC: 171 MG/DL (ref 70–99)
GLUCOSE BLDC GLUCOMTR-MCNC: 192 MG/DL (ref 70–99)
GLUCOSE BLDC GLUCOMTR-MCNC: 61 MG/DL (ref 70–99)
GLUCOSE BLDC GLUCOMTR-MCNC: 96 MG/DL (ref 70–99)
GLUCOSE BLDC GLUCOMTR-MCNC: 96 MG/DL (ref 70–99)
GLUCOSE SERPL-MCNC: 100 MG/DL (ref 70–99)
GLUCOSE SERPL-MCNC: 101 MG/DL (ref 70–99)
GLUCOSE SERPL-MCNC: 103 MG/DL (ref 70–99)
GLUCOSE SERPL-MCNC: 104 MG/DL (ref 70–99)
GLUCOSE SERPL-MCNC: 107 MG/DL (ref 70–99)
GLUCOSE SERPL-MCNC: 109 MG/DL (ref 70–99)
GLUCOSE SERPL-MCNC: 109 MG/DL (ref 70–99)
GLUCOSE SERPL-MCNC: 111 MG/DL (ref 70–99)
GLUCOSE SERPL-MCNC: 120 MG/DL (ref 70–99)
GLUCOSE SERPL-MCNC: 120 MG/DL (ref 70–99)
GLUCOSE SERPL-MCNC: 124 MG/DL (ref 70–99)
GLUCOSE SERPL-MCNC: 126 MG/DL (ref 70–99)
GLUCOSE SERPL-MCNC: 127 MG/DL (ref 70–99)
GLUCOSE SERPL-MCNC: 128 MG/DL (ref 70–99)
GLUCOSE SERPL-MCNC: 131 MG/DL (ref 70–99)
GLUCOSE SERPL-MCNC: 134 MG/DL (ref 70–99)
GLUCOSE SERPL-MCNC: 141 MG/DL (ref 70–99)
GLUCOSE SERPL-MCNC: 152 MG/DL (ref 70–99)
GLUCOSE SERPL-MCNC: 185 MG/DL (ref 70–99)
GLUCOSE SERPL-MCNC: 188 MG/DL (ref 70–99)
GLUCOSE SERPL-MCNC: 227 MG/DL (ref 70–99)
GLUCOSE SERPL-MCNC: 479 MG/DL (ref 70–99)
GLUCOSE SERPL-MCNC: 66 MG/DL (ref 70–99)
GLUCOSE SERPL-MCNC: 91 MG/DL (ref 70–99)
GLUCOSE SERPL-MCNC: 91 MG/DL (ref 70–99)
GLUCOSE SERPL-MCNC: 92 MG/DL (ref 70–99)
GLUCOSE SERPL-MCNC: 93 MG/DL (ref 70–99)
GLUCOSE SERPL-MCNC: 94 MG/DL (ref 70–99)
GLUCOSE SERPL-MCNC: 94 MG/DL (ref 70–99)
GLUCOSE SERPL-MCNC: 95 MG/DL (ref 70–99)
GLUCOSE SERPL-MCNC: 95 MG/DL (ref 70–99)
GLUCOSE SERPL-MCNC: 96 MG/DL (ref 70–99)
GLUCOSE SERPL-MCNC: 97 MG/DL (ref 70–99)
GLUCOSE SERPL-MCNC: 98 MG/DL (ref 70–99)
GLUCOSE SERPL-MCNC: 99 MG/DL (ref 70–99)
GLUCOSE UR STRIP-MCNC: NEGATIVE MG/DL
GRANULAR CAST: 1 /LPF
GROUP A STREP BY PCR: NOT DETECTED
H CAPSUL AG UR QL IA: NOT DETECTED
H CAPSUL AG UR-MCNC: NOT DETECTED NG/ML
HADV DNA # SPEC NAA+PROBE: NOT DETECTED COPIES/ML
HCO3 BLD-SCNC: 11 MMOL/L (ref 21–28)
HCO3 BLD-SCNC: 12 MMOL/L (ref 21–28)
HCO3 BLD-SCNC: 13 MMOL/L (ref 21–28)
HCO3 BLD-SCNC: 14 MMOL/L (ref 21–28)
HCO3 BLD-SCNC: 15 MMOL/L (ref 21–28)
HCO3 BLD-SCNC: 15 MMOL/L (ref 21–28)
HCO3 BLD-SCNC: 16 MMOL/L (ref 21–28)
HCO3 BLDA-SCNC: 11 MMOL/L (ref 21–28)
HCO3 BLDV-SCNC: 17 MMOL/L (ref 21–28)
HCO3 BLDV-SCNC: 20 MMOL/L (ref 21–28)
HCT VFR BLD AUTO: 24.6 % (ref 40–53)
HCT VFR BLD AUTO: 26 % (ref 40–53)
HCT VFR BLD AUTO: 26.2 % (ref 40–53)
HCT VFR BLD AUTO: 26.5 % (ref 40–53)
HCT VFR BLD AUTO: 26.7 % (ref 40–53)
HCT VFR BLD AUTO: 27 % (ref 40–53)
HCT VFR BLD AUTO: 27.2 % (ref 40–53)
HCT VFR BLD AUTO: 27.7 % (ref 40–53)
HCT VFR BLD AUTO: 27.8 % (ref 40–53)
HCT VFR BLD AUTO: 28.3 % (ref 40–53)
HCT VFR BLD AUTO: 28.5 % (ref 40–53)
HCT VFR BLD AUTO: 28.6 % (ref 40–53)
HCT VFR BLD AUTO: 28.8 % (ref 40–53)
HCT VFR BLD AUTO: 28.8 % (ref 40–53)
HCT VFR BLD AUTO: 29.3 % (ref 40–53)
HCT VFR BLD AUTO: 29.3 % (ref 40–53)
HCT VFR BLD AUTO: 29.5 % (ref 40–53)
HCT VFR BLD AUTO: 29.9 % (ref 40–53)
HCT VFR BLD AUTO: 30.2 % (ref 40–53)
HCT VFR BLD AUTO: 30.9 % (ref 40–53)
HCT VFR BLD AUTO: 31.1 % (ref 40–53)
HCT VFR BLD AUTO: 31.5 % (ref 40–53)
HCT VFR BLD AUTO: 31.6 % (ref 40–53)
HCT VFR BLD AUTO: 31.7 % (ref 40–53)
HCT VFR BLD AUTO: 32.1 % (ref 40–53)
HCT VFR BLD AUTO: 32.2 % (ref 40–53)
HCT VFR BLD AUTO: 32.7 % (ref 40–53)
HCT VFR BLD AUTO: 32.8 % (ref 40–53)
HCT VFR BLD AUTO: 33.1 % (ref 40–53)
HCT VFR BLD AUTO: 33.2 % (ref 40–53)
HCT VFR BLD AUTO: 33.2 % (ref 40–53)
HCT VFR BLD AUTO: 33.5 % (ref 40–53)
HCT VFR BLD AUTO: 34.7 % (ref 40–53)
HCT VFR BLD AUTO: 35.5 % (ref 40–53)
HCT VFR BLD AUTO: 35.9 % (ref 40–53)
HCT VFR BLD CALC: 22 % (ref 40–53)
HDLC SERPL-MCNC: 34 MG/DL
HGB BLD-MCNC: 10 G/DL (ref 13.3–17.7)
HGB BLD-MCNC: 10.3 G/DL (ref 13.3–17.7)
HGB BLD-MCNC: 10.4 G/DL (ref 13.3–17.7)
HGB BLD-MCNC: 10.4 G/DL (ref 13.3–17.7)
HGB BLD-MCNC: 10.5 G/DL (ref 13.3–17.7)
HGB BLD-MCNC: 10.5 G/DL (ref 13.3–17.7)
HGB BLD-MCNC: 10.6 G/DL (ref 13.3–17.7)
HGB BLD-MCNC: 10.6 G/DL (ref 13.3–17.7)
HGB BLD-MCNC: 10.7 G/DL (ref 13.3–17.7)
HGB BLD-MCNC: 10.8 G/DL (ref 13.3–17.7)
HGB BLD-MCNC: 10.8 G/DL (ref 13.3–17.7)
HGB BLD-MCNC: 11.1 G/DL (ref 13.3–17.7)
HGB BLD-MCNC: 11.6 G/DL (ref 13.3–17.7)
HGB BLD-MCNC: 11.9 G/DL (ref 13.3–17.7)
HGB BLD-MCNC: 11.9 G/DL (ref 13.3–17.7)
HGB BLD-MCNC: 12.1 G/DL (ref 13.3–17.7)
HGB BLD-MCNC: 7.5 G/DL (ref 13.3–17.7)
HGB BLD-MCNC: 8.2 G/DL (ref 13.3–17.7)
HGB BLD-MCNC: 8.5 G/DL (ref 13.3–17.7)
HGB BLD-MCNC: 8.6 G/DL (ref 13.3–17.7)
HGB BLD-MCNC: 8.8 G/DL (ref 13.3–17.7)
HGB BLD-MCNC: 8.9 G/DL (ref 13.3–17.7)
HGB BLD-MCNC: 9 G/DL (ref 13.3–17.7)
HGB BLD-MCNC: 9.1 G/DL (ref 13.3–17.7)
HGB BLD-MCNC: 9.1 G/DL (ref 13.3–17.7)
HGB BLD-MCNC: 9.2 G/DL (ref 13.3–17.7)
HGB BLD-MCNC: 9.3 G/DL (ref 13.3–17.7)
HGB BLD-MCNC: 9.4 G/DL (ref 13.3–17.7)
HGB BLD-MCNC: 9.4 G/DL (ref 13.3–17.7)
HGB BLD-MCNC: 9.8 G/DL (ref 13.3–17.7)
HGB BLD-MCNC: 9.8 G/DL (ref 13.3–17.7)
HGB BLD-MCNC: 9.9 G/DL (ref 13.3–17.7)
HGB C CRYSTALS: ABNORMAL
HGB C CRYSTALS: NORMAL
HGB UR QL STRIP: ABNORMAL
HGB UR QL STRIP: ABNORMAL
HGB UR QL STRIP: NEGATIVE
HOWELL-JOLLY BOD BLD QL SMEAR: ABNORMAL
HOWELL-JOLLY BOD BLD QL SMEAR: NORMAL
HYALINE CASTS: 1 /LPF
HYALINE CASTS: 4 /LPF
HYALINE CASTS: 5 /LPF
HYALINE CASTS: 5 /LPF
IGG SERPL-MCNC: 651 MG/DL (ref 610–1616)
IGG SERPL-MCNC: 728 MG/DL (ref 610–1616)
IMM GRANULOCYTES # BLD: 0.1 10E3/UL
IMM GRANULOCYTES # BLD: ABNORMAL 10*3/UL
IMM GRANULOCYTES # BLD: NORMAL 10*3/UL
IMM GRANULOCYTES NFR BLD: 3 %
IMM GRANULOCYTES NFR BLD: ABNORMAL %
IMM GRANULOCYTES NFR BLD: NORMAL %
INR PPP: 1.2 (ref 0.85–1.15)
INR PPP: 3.14 (ref 0.85–1.15)
INTERPRETATION ECG - MUSE: NORMAL
IRON BINDING CAPACITY (ROCHE): 220 UG/DL (ref 240–430)
IRON BINDING CAPACITY (ROCHE): 223 UG/DL (ref 240–430)
IRON BINDING CAPACITY (ROCHE): 283 UG/DL (ref 240–430)
IRON SATN MFR SERPL: 21 % (ref 15–46)
IRON SATN MFR SERPL: 24 % (ref 15–46)
IRON SATN MFR SERPL: 8 % (ref 15–46)
IRON SERPL-MCNC: 17 UG/DL (ref 61–157)
IRON SERPL-MCNC: 52 UG/DL (ref 61–157)
IRON SERPL-MCNC: 60 UG/DL (ref 61–157)
KETONES UR STRIP-MCNC: NEGATIVE MG/DL
LACTATE BLD-SCNC: 0.9 MMOL/L
LACTATE SERPL-SCNC: 2 MMOL/L (ref 0.7–2)
LACTATE SERPL-SCNC: 5.5 MMOL/L (ref 0.7–2)
LACTATE SERPL-SCNC: 7 MMOL/L (ref 0.7–2)
LACTATE SERPL-SCNC: 7 MMOL/L (ref 0.7–2)
LACTATE SERPL-SCNC: 7.3 MMOL/L (ref 0.7–2)
LACTATE SERPL-SCNC: 7.3 MMOL/L (ref 0.7–2)
LACTATE SERPL-SCNC: 7.5 MMOL/L (ref 0.7–2)
LACTATE SERPL-SCNC: 8.4 MMOL/L (ref 0.7–2)
LACTATE SERPL-SCNC: 9.8 MMOL/L (ref 0.7–2)
LDLC SERPL CALC-MCNC: 39 MG/DL
LEUKOCYTE ESTERASE UR QL STRIP: NEGATIVE
LYMPHOCYTES # BLD AUTO: 0.2 10E3/UL (ref 0.8–5.3)
LYMPHOCYTES # BLD AUTO: ABNORMAL 10*3/UL
LYMPHOCYTES # BLD AUTO: NORMAL 10*3/UL
LYMPHOCYTES # BLD MANUAL: 0.1 10E3/UL (ref 0.8–5.3)
LYMPHOCYTES # BLD MANUAL: 0.2 10E3/UL (ref 0.8–5.3)
LYMPHOCYTES # BLD MANUAL: 0.3 10E3/UL (ref 0.8–5.3)
LYMPHOCYTES # BLD MANUAL: 0.4 10E3/UL (ref 0.8–5.3)
LYMPHOCYTES # BLD MANUAL: 0.5 10E3/UL (ref 0.8–5.3)
LYMPHOCYTES NFR BLD AUTO: 7 %
LYMPHOCYTES NFR BLD AUTO: ABNORMAL %
LYMPHOCYTES NFR BLD AUTO: NORMAL %
LYMPHOCYTES NFR BLD MANUAL: 10 %
LYMPHOCYTES NFR BLD MANUAL: 10 %
LYMPHOCYTES NFR BLD MANUAL: 12 %
LYMPHOCYTES NFR BLD MANUAL: 13 %
LYMPHOCYTES NFR BLD MANUAL: 13 %
LYMPHOCYTES NFR BLD MANUAL: 14 %
LYMPHOCYTES NFR BLD MANUAL: 14 %
LYMPHOCYTES NFR BLD MANUAL: 17 %
LYMPHOCYTES NFR BLD MANUAL: 2 %
LYMPHOCYTES NFR BLD MANUAL: 20 %
LYMPHOCYTES NFR BLD MANUAL: 21 %
LYMPHOCYTES NFR BLD MANUAL: 25 %
LYMPHOCYTES NFR BLD MANUAL: 26 %
LYMPHOCYTES NFR BLD MANUAL: 32 %
LYMPHOCYTES NFR BLD MANUAL: 34 %
LYMPHOCYTES NFR BLD MANUAL: 35 %
LYMPHOCYTES NFR BLD MANUAL: 40 %
LYMPHOCYTES NFR BLD MANUAL: 44 %
LYMPHOCYTES NFR BLD MANUAL: 6 %
LYMPHOCYTES NFR BLD MANUAL: 8 %
MAGNESIUM SERPL-MCNC: 1.4 MG/DL (ref 1.7–2.3)
MAGNESIUM SERPL-MCNC: 1.5 MG/DL (ref 1.7–2.3)
MAGNESIUM SERPL-MCNC: 1.6 MG/DL (ref 1.7–2.3)
MAGNESIUM SERPL-MCNC: 1.6 MG/DL (ref 1.7–2.3)
MAGNESIUM SERPL-MCNC: 1.7 MG/DL (ref 1.7–2.3)
MAGNESIUM SERPL-MCNC: 1.7 MG/DL (ref 1.7–2.3)
MAGNESIUM SERPL-MCNC: 1.8 MG/DL (ref 1.7–2.3)
MAGNESIUM SERPL-MCNC: 1.8 MG/DL (ref 1.7–2.3)
MAGNESIUM SERPL-MCNC: 1.9 MG/DL (ref 1.7–2.3)
MAGNESIUM SERPL-MCNC: 2 MG/DL (ref 1.7–2.3)
MAGNESIUM SERPL-MCNC: 2 MG/DL (ref 1.7–2.3)
MAGNESIUM SERPL-MCNC: 2.2 MG/DL (ref 1.7–2.3)
MAGNESIUM SERPL-MCNC: 2.2 MG/DL (ref 1.7–2.3)
MAGNESIUM SERPL-MCNC: 2.7 MG/DL (ref 1.7–2.3)
MCH RBC QN AUTO: 27 PG (ref 26.5–33)
MCH RBC QN AUTO: 27.2 PG (ref 26.5–33)
MCH RBC QN AUTO: 27.2 PG (ref 26.5–33)
MCH RBC QN AUTO: 27.3 PG (ref 26.5–33)
MCH RBC QN AUTO: 28.5 PG (ref 26.5–33)
MCH RBC QN AUTO: 29.4 PG (ref 26.5–33)
MCH RBC QN AUTO: 29.7 PG (ref 26.5–33)
MCH RBC QN AUTO: 30 PG (ref 26.5–33)
MCH RBC QN AUTO: 30 PG (ref 26.5–33)
MCH RBC QN AUTO: 30.2 PG (ref 26.5–33)
MCH RBC QN AUTO: 30.5 PG (ref 26.5–33)
MCH RBC QN AUTO: 30.7 PG (ref 26.5–33)
MCH RBC QN AUTO: 31.3 PG (ref 26.5–33)
MCH RBC QN AUTO: 31.7 PG (ref 26.5–33)
MCH RBC QN AUTO: 32.2 PG (ref 26.5–33)
MCH RBC QN AUTO: 32.4 PG (ref 26.5–33)
MCH RBC QN AUTO: 32.5 PG (ref 26.5–33)
MCH RBC QN AUTO: 32.6 PG (ref 26.5–33)
MCH RBC QN AUTO: 32.8 PG (ref 26.5–33)
MCH RBC QN AUTO: 32.9 PG (ref 26.5–33)
MCH RBC QN AUTO: 33 PG (ref 26.5–33)
MCH RBC QN AUTO: 33.1 PG (ref 26.5–33)
MCH RBC QN AUTO: 33.3 PG (ref 26.5–33)
MCH RBC QN AUTO: 33.5 PG (ref 26.5–33)
MCH RBC QN AUTO: 33.6 PG (ref 26.5–33)
MCH RBC QN AUTO: 33.7 PG (ref 26.5–33)
MCH RBC QN AUTO: 33.9 PG (ref 26.5–33)
MCH RBC QN AUTO: 33.9 PG (ref 26.5–33)
MCH RBC QN AUTO: 34.2 PG (ref 26.5–33)
MCHC RBC AUTO-ENTMCNC: 32.2 G/DL (ref 31.5–36.5)
MCHC RBC AUTO-ENTMCNC: 32.2 G/DL (ref 31.5–36.5)
MCHC RBC AUTO-ENTMCNC: 32.3 G/DL (ref 31.5–36.5)
MCHC RBC AUTO-ENTMCNC: 32.4 G/DL (ref 31.5–36.5)
MCHC RBC AUTO-ENTMCNC: 32.4 G/DL (ref 31.5–36.5)
MCHC RBC AUTO-ENTMCNC: 32.5 G/DL (ref 31.5–36.5)
MCHC RBC AUTO-ENTMCNC: 32.5 G/DL (ref 31.5–36.5)
MCHC RBC AUTO-ENTMCNC: 32.6 G/DL (ref 31.5–36.5)
MCHC RBC AUTO-ENTMCNC: 32.6 G/DL (ref 31.5–36.5)
MCHC RBC AUTO-ENTMCNC: 32.7 G/DL (ref 31.5–36.5)
MCHC RBC AUTO-ENTMCNC: 32.9 G/DL (ref 31.5–36.5)
MCHC RBC AUTO-ENTMCNC: 33 G/DL (ref 31.5–36.5)
MCHC RBC AUTO-ENTMCNC: 33.1 G/DL (ref 31.5–36.5)
MCHC RBC AUTO-ENTMCNC: 33.1 G/DL (ref 31.5–36.5)
MCHC RBC AUTO-ENTMCNC: 33.2 G/DL (ref 31.5–36.5)
MCHC RBC AUTO-ENTMCNC: 33.2 G/DL (ref 31.5–36.5)
MCHC RBC AUTO-ENTMCNC: 33.3 G/DL (ref 31.5–36.5)
MCHC RBC AUTO-ENTMCNC: 33.3 G/DL (ref 31.5–36.5)
MCHC RBC AUTO-ENTMCNC: 33.4 G/DL (ref 31.5–36.5)
MCHC RBC AUTO-ENTMCNC: 33.5 G/DL (ref 31.5–36.5)
MCHC RBC AUTO-ENTMCNC: 33.7 G/DL (ref 31.5–36.5)
MCHC RBC AUTO-ENTMCNC: 33.8 G/DL (ref 31.5–36.5)
MCHC RBC AUTO-ENTMCNC: 33.9 G/DL (ref 31.5–36.5)
MCHC RBC AUTO-ENTMCNC: 34 G/DL (ref 31.5–36.5)
MCHC RBC AUTO-ENTMCNC: 34 G/DL (ref 31.5–36.5)
MCHC RBC AUTO-ENTMCNC: 34.1 G/DL (ref 31.5–36.5)
MCHC RBC AUTO-ENTMCNC: 34.1 G/DL (ref 31.5–36.5)
MCHC RBC AUTO-ENTMCNC: 34.2 G/DL (ref 31.5–36.5)
MCHC RBC AUTO-ENTMCNC: 34.3 G/DL (ref 31.5–36.5)
MCHC RBC AUTO-ENTMCNC: 34.4 G/DL (ref 31.5–36.5)
MCHC RBC AUTO-ENTMCNC: 34.9 G/DL (ref 31.5–36.5)
MCHC RBC AUTO-ENTMCNC: 35 G/DL (ref 31.5–36.5)
MCV RBC AUTO: 100 FL (ref 78–100)
MCV RBC AUTO: 101 FL (ref 78–100)
MCV RBC AUTO: 101 FL (ref 78–100)
MCV RBC AUTO: 102 FL (ref 78–100)
MCV RBC AUTO: 79 FL (ref 78–100)
MCV RBC AUTO: 82 FL (ref 78–100)
MCV RBC AUTO: 84 FL (ref 78–100)
MCV RBC AUTO: 84 FL (ref 78–100)
MCV RBC AUTO: 88 FL (ref 78–100)
MCV RBC AUTO: 90 FL (ref 78–100)
MCV RBC AUTO: 91 FL (ref 78–100)
MCV RBC AUTO: 91 FL (ref 78–100)
MCV RBC AUTO: 92 FL (ref 78–100)
MCV RBC AUTO: 93 FL (ref 78–100)
MCV RBC AUTO: 94 FL (ref 78–100)
MCV RBC AUTO: 95 FL (ref 78–100)
MCV RBC AUTO: 96 FL (ref 78–100)
MCV RBC AUTO: 96 FL (ref 78–100)
MCV RBC AUTO: 97 FL (ref 78–100)
MCV RBC AUTO: 98 FL (ref 78–100)
MCV RBC AUTO: 98 FL (ref 78–100)
MCV RBC AUTO: 99 FL (ref 78–100)
METAMYELOCYTES # BLD MANUAL: 0 10E3/UL
METAMYELOCYTES # BLD MANUAL: 0.1 10E3/UL
METAMYELOCYTES # BLD MANUAL: 0.4 10E3/UL
METAMYELOCYTES NFR BLD MANUAL: 1 %
METAMYELOCYTES NFR BLD MANUAL: 18 %
METAMYELOCYTES NFR BLD MANUAL: 2 %
METAMYELOCYTES NFR BLD MANUAL: 3 %
MONOCYTES # BLD AUTO: 0.1 10E3/UL (ref 0–1.3)
MONOCYTES # BLD AUTO: ABNORMAL 10*3/UL
MONOCYTES # BLD AUTO: NORMAL 10*3/UL
MONOCYTES # BLD MANUAL: 0 10E3/UL (ref 0–1.3)
MONOCYTES # BLD MANUAL: 0.1 10E3/UL (ref 0–1.3)
MONOCYTES # BLD MANUAL: 0.2 10E3/UL (ref 0–1.3)
MONOCYTES # BLD MANUAL: 0.3 10E3/UL (ref 0–1.3)
MONOCYTES # BLD MANUAL: 0.4 10E3/UL (ref 0–1.3)
MONOCYTES # BLD MANUAL: 0.5 10E3/UL (ref 0–1.3)
MONOCYTES # BLD MANUAL: 0.6 10E3/UL (ref 0–1.3)
MONOCYTES # BLD MANUAL: 0.6 10E3/UL (ref 0–1.3)
MONOCYTES # BLD MANUAL: 0.7 10E3/UL (ref 0–1.3)
MONOCYTES # BLD MANUAL: 1.1 10E3/UL (ref 0–1.3)
MONOCYTES NFR BLD AUTO: 5 %
MONOCYTES NFR BLD AUTO: ABNORMAL %
MONOCYTES NFR BLD AUTO: NORMAL %
MONOCYTES NFR BLD MANUAL: 10 %
MONOCYTES NFR BLD MANUAL: 11 %
MONOCYTES NFR BLD MANUAL: 11 %
MONOCYTES NFR BLD MANUAL: 12 %
MONOCYTES NFR BLD MANUAL: 13 %
MONOCYTES NFR BLD MANUAL: 13 %
MONOCYTES NFR BLD MANUAL: 14 %
MONOCYTES NFR BLD MANUAL: 14 %
MONOCYTES NFR BLD MANUAL: 15 %
MONOCYTES NFR BLD MANUAL: 16 %
MONOCYTES NFR BLD MANUAL: 18 %
MONOCYTES NFR BLD MANUAL: 20 %
MONOCYTES NFR BLD MANUAL: 25 %
MONOCYTES NFR BLD MANUAL: 26 %
MONOCYTES NFR BLD MANUAL: 27 %
MONOCYTES NFR BLD MANUAL: 27 %
MONOCYTES NFR BLD MANUAL: 30 %
MONOCYTES NFR BLD MANUAL: 32 %
MONOCYTES NFR BLD MANUAL: 33 %
MONOCYTES NFR BLD MANUAL: 4 %
MONOCYTES NFR BLD MANUAL: 40 %
MONOCYTES NFR BLD MANUAL: 44 %
MONOCYTES NFR BLD MANUAL: 50 %
MONOCYTES NFR BLD MANUAL: 6 %
MONOCYTES NFR BLD MANUAL: 8 %
MONOCYTES NFR BLD MANUAL: 8 %
MONOCYTES NFR BLD MANUAL: 9 %
MRSA DNA SPEC QL NAA+PROBE: NEGATIVE
MUCOUS THREADS #/AREA URNS LPF: PRESENT /LPF
MYCOPHENOLATE SERPL LC/MS/MS-MCNC: 0.35 MG/L (ref 1–3.5)
MYCOPHENOLATE SERPL LC/MS/MS-MCNC: 0.38 MG/L (ref 1–3.5)
MYCOPHENOLATE SERPL LC/MS/MS-MCNC: 0.67 MG/L (ref 1–3.5)
MYCOPHENOLATE-G SERPL LC/MS/MS-MCNC: 15.7 MG/L (ref 30–95)
MYCOPHENOLATE-G SERPL LC/MS/MS-MCNC: 22 MG/L (ref 30–95)
MYCOPHENOLATE-G SERPL LC/MS/MS-MCNC: 43 MG/L (ref 30–95)
MYELOCYTES # BLD MANUAL: 0 10E3/UL
MYELOCYTES # BLD MANUAL: 0 10E3/UL
MYELOCYTES # BLD MANUAL: 0.1 10E3/UL
MYELOCYTES # BLD MANUAL: 0.2 10E3/UL
MYELOCYTES NFR BLD MANUAL: 1 %
MYELOCYTES NFR BLD MANUAL: 12 %
MYELOCYTES NFR BLD MANUAL: 2 %
MYELOCYTES NFR BLD MANUAL: 3 %
NEUTROPHILS # BLD AUTO: 2 10E3/UL (ref 1.6–8.3)
NEUTROPHILS # BLD AUTO: ABNORMAL 10*3/UL
NEUTROPHILS # BLD AUTO: NORMAL 10*3/UL
NEUTROPHILS # BLD MANUAL: 0.1 10E3/UL (ref 1.6–8.3)
NEUTROPHILS # BLD MANUAL: 0.2 10E3/UL (ref 1.6–8.3)
NEUTROPHILS # BLD MANUAL: 0.2 10E3/UL (ref 1.6–8.3)
NEUTROPHILS # BLD MANUAL: 0.3 10E3/UL (ref 1.6–8.3)
NEUTROPHILS # BLD MANUAL: 0.4 10E3/UL (ref 1.6–8.3)
NEUTROPHILS # BLD MANUAL: 0.5 10E3/UL (ref 1.6–8.3)
NEUTROPHILS # BLD MANUAL: 0.5 10E3/UL (ref 1.6–8.3)
NEUTROPHILS # BLD MANUAL: 0.6 10E3/UL (ref 1.6–8.3)
NEUTROPHILS # BLD MANUAL: 0.7 10E3/UL (ref 1.6–8.3)
NEUTROPHILS # BLD MANUAL: 0.7 10E3/UL (ref 1.6–8.3)
NEUTROPHILS # BLD MANUAL: 0.8 10E3/UL (ref 1.6–8.3)
NEUTROPHILS # BLD MANUAL: 0.8 10E3/UL (ref 1.6–8.3)
NEUTROPHILS # BLD MANUAL: 0.9 10E3/UL (ref 1.6–8.3)
NEUTROPHILS # BLD MANUAL: 1.1 10E3/UL (ref 1.6–8.3)
NEUTROPHILS # BLD MANUAL: 1.2 10E3/UL (ref 1.6–8.3)
NEUTROPHILS # BLD MANUAL: 1.3 10E3/UL (ref 1.6–8.3)
NEUTROPHILS # BLD MANUAL: 2 10E3/UL (ref 1.6–8.3)
NEUTROPHILS # BLD MANUAL: 2 10E3/UL (ref 1.6–8.3)
NEUTROPHILS # BLD MANUAL: 2.4 10E3/UL (ref 1.6–8.3)
NEUTROPHILS # BLD MANUAL: 2.5 10E3/UL (ref 1.6–8.3)
NEUTROPHILS # BLD MANUAL: 2.9 10E3/UL (ref 1.6–8.3)
NEUTROPHILS # BLD MANUAL: 3 10E3/UL (ref 1.6–8.3)
NEUTROPHILS NFR BLD AUTO: 84 %
NEUTROPHILS NFR BLD AUTO: ABNORMAL %
NEUTROPHILS NFR BLD AUTO: NORMAL %
NEUTROPHILS NFR BLD MANUAL: 15 %
NEUTROPHILS NFR BLD MANUAL: 16 %
NEUTROPHILS NFR BLD MANUAL: 18 %
NEUTROPHILS NFR BLD MANUAL: 19 %
NEUTROPHILS NFR BLD MANUAL: 26 %
NEUTROPHILS NFR BLD MANUAL: 30 %
NEUTROPHILS NFR BLD MANUAL: 38 %
NEUTROPHILS NFR BLD MANUAL: 45 %
NEUTROPHILS NFR BLD MANUAL: 47 %
NEUTROPHILS NFR BLD MANUAL: 48 %
NEUTROPHILS NFR BLD MANUAL: 50 %
NEUTROPHILS NFR BLD MANUAL: 50 %
NEUTROPHILS NFR BLD MANUAL: 52 %
NEUTROPHILS NFR BLD MANUAL: 53 %
NEUTROPHILS NFR BLD MANUAL: 54 %
NEUTROPHILS NFR BLD MANUAL: 54 %
NEUTROPHILS NFR BLD MANUAL: 60 %
NEUTROPHILS NFR BLD MANUAL: 63 %
NEUTROPHILS NFR BLD MANUAL: 65 %
NEUTROPHILS NFR BLD MANUAL: 70 %
NEUTROPHILS NFR BLD MANUAL: 71 %
NEUTROPHILS NFR BLD MANUAL: 73 %
NEUTROPHILS NFR BLD MANUAL: 76 %
NEUTROPHILS NFR BLD MANUAL: 78 %
NEUTROPHILS NFR BLD MANUAL: 80 %
NEUTROPHILS NFR BLD MANUAL: 81 %
NEUTROPHILS NFR BLD MANUAL: 89 %
NEUTS HYPERSEG BLD QL SMEAR: ABNORMAL
NEUTS HYPERSEG BLD QL SMEAR: NORMAL
NITRATE UR QL: NEGATIVE
NONHDLC SERPL-MCNC: 63 MG/DL
NOROVIRUS GI+II RNA STL QL NAA+NON-PROBE: NEGATIVE
NOROVIRUS GI+II RNA STL QL NAA+NON-PROBE: NEGATIVE
NRBC # BLD AUTO: 0 10E3/UL
NRBC # BLD AUTO: 0.1 10E3/UL
NRBC BLD AUTO-RTO: 0 /100
NRBC BLD AUTO-RTO: 1 /100
NRBC BLD AUTO-RTO: 4 /100
NRBC BLD MANUAL-RTO: 2 %
NRBC BLD MANUAL-RTO: 2 %
NRBC BLD MANUAL-RTO: 3 %
O2/TOTAL GAS SETTING VFR VENT: 100 %
O2/TOTAL GAS SETTING VFR VENT: 15 %
O2/TOTAL GAS SETTING VFR VENT: 70 %
O2/TOTAL GAS SETTING VFR VENT: 70 %
O2/TOTAL GAS SETTING VFR VENT: 80 %
ORGAN: NORMAL
OXYHGB MFR BLDV: 93 % (ref 70–75)
P AXIS - MUSE: 54 DEGREES
P SHIGELLOIDES DNA STL QL NAA+NON-PROBE: NEGATIVE
P SHIGELLOIDES DNA STL QL NAA+NON-PROBE: NEGATIVE
PATH REPORT.COMMENTS IMP SPEC: NORMAL
PATH REPORT.FINAL DX SPEC: NORMAL
PATH REPORT.GROSS SPEC: NORMAL
PATH REPORT.MICROSCOPIC SPEC OTHER STN: NORMAL
PATH REPORT.RELEVANT HX SPEC: NORMAL
PCO2 BLD: 29 MM HG (ref 35–45)
PCO2 BLD: 29 MM HG (ref 35–45)
PCO2 BLD: 34 MM HG (ref 35–45)
PCO2 BLD: 35 MM HG (ref 35–45)
PCO2 BLD: 37 MM HG (ref 35–45)
PCO2 BLD: 37 MM HG (ref 35–45)
PCO2 BLD: 38 MM HG (ref 35–45)
PCO2 BLDA: 34 MM HG (ref 35–45)
PCO2 BLDV: 21 MM HG (ref 40–50)
PCO2 BLDV: 34 MM HG (ref 40–50)
PEEP: 10 CM H2O
PEEP: 14 CM H2O
PH BLD: 7.1 [PH] (ref 7.35–7.45)
PH BLD: 7.11 [PH] (ref 7.35–7.45)
PH BLD: 7.21 [PH] (ref 7.35–7.45)
PH BLD: 7.24 [PH] (ref 7.35–7.45)
PH BLD: 7.25 [PH] (ref 7.35–7.45)
PH BLD: 7.27 [PH] (ref 7.35–7.45)
PH BLD: 7.28 [PH] (ref 7.35–7.45)
PH BLDA: 7.11 [PH] (ref 7.35–7.45)
PH BLDV: 7.37 [PH] (ref 7.32–7.43)
PH BLDV: 7.52 [PH] (ref 7.32–7.43)
PH UR STRIP: 5 [PH] (ref 5–7)
PH UR STRIP: 5 [PH] (ref 5–7)
PH UR STRIP: 5.5 [PH] (ref 5–7)
PH UR STRIP: 6.5 [PH] (ref 5–7)
PH UR STRIP: 7.5 [PH] (ref 5–7)
PHOSPHATE SERPL-MCNC: 2.1 MG/DL (ref 2.5–4.5)
PHOSPHATE SERPL-MCNC: 2.2 MG/DL (ref 2.5–4.5)
PHOSPHATE SERPL-MCNC: 2.8 MG/DL (ref 2.5–4.5)
PHOSPHATE SERPL-MCNC: 3.1 MG/DL (ref 2.5–4.5)
PHOSPHATE SERPL-MCNC: 3.2 MG/DL (ref 2.5–4.5)
PHOSPHATE SERPL-MCNC: 3.9 MG/DL (ref 2.5–4.5)
PHOSPHATE SERPL-MCNC: 6.9 MG/DL (ref 2.5–4.5)
PHOSPHATE SERPL-MCNC: 9.6 MG/DL (ref 2.5–4.5)
PHOTO IMAGE: NORMAL
PLAT MORPH BLD: ABNORMAL
PLAT MORPH BLD: NORMAL
PLATELET # BLD AUTO: 105 10E3/UL (ref 150–450)
PLATELET # BLD AUTO: 114 10E3/UL (ref 150–450)
PLATELET # BLD AUTO: 123 10E3/UL (ref 150–450)
PLATELET # BLD AUTO: 125 10E3/UL (ref 150–450)
PLATELET # BLD AUTO: 126 10E3/UL (ref 150–450)
PLATELET # BLD AUTO: 128 10E3/UL (ref 150–450)
PLATELET # BLD AUTO: 135 10E3/UL (ref 150–450)
PLATELET # BLD AUTO: 136 10E3/UL (ref 150–450)
PLATELET # BLD AUTO: 147 10E3/UL (ref 150–450)
PLATELET # BLD AUTO: 151 10E3/UL (ref 150–450)
PLATELET # BLD AUTO: 153 10E3/UL (ref 150–450)
PLATELET # BLD AUTO: 156 10E3/UL (ref 150–450)
PLATELET # BLD AUTO: 156 10E3/UL (ref 150–450)
PLATELET # BLD AUTO: 157 10E3/UL (ref 150–450)
PLATELET # BLD AUTO: 165 10E3/UL (ref 150–450)
PLATELET # BLD AUTO: 168 10E3/UL (ref 150–450)
PLATELET # BLD AUTO: 170 10E3/UL (ref 150–450)
PLATELET # BLD AUTO: 172 10E3/UL (ref 150–450)
PLATELET # BLD AUTO: 172 10E3/UL (ref 150–450)
PLATELET # BLD AUTO: 179 10E3/UL (ref 150–450)
PLATELET # BLD AUTO: 194 10E3/UL (ref 150–450)
PLATELET # BLD AUTO: 205 10E3/UL (ref 150–450)
PLATELET # BLD AUTO: 214 10E3/UL (ref 150–450)
PLATELET # BLD AUTO: 220 10E3/UL (ref 150–450)
PLATELET # BLD AUTO: 223 10E3/UL (ref 150–450)
PLATELET # BLD AUTO: 240 10E3/UL (ref 150–450)
PLATELET # BLD AUTO: 255 10E3/UL (ref 150–450)
PLATELET # BLD AUTO: 257 10E3/UL (ref 150–450)
PLATELET # BLD AUTO: 267 10E3/UL (ref 150–450)
PLATELET # BLD AUTO: 269 10E3/UL (ref 150–450)
PLATELET # BLD AUTO: 278 10E3/UL (ref 150–450)
PLATELET # BLD AUTO: 281 10E3/UL (ref 150–450)
PLATELET # BLD AUTO: 285 10E3/UL (ref 150–450)
PLATELET # BLD AUTO: 285 10E3/UL (ref 150–450)
PLATELET # BLD AUTO: 291 10E3/UL (ref 150–450)
PLATELET # BLD AUTO: 300 10E3/UL (ref 150–450)
PLATELET # BLD AUTO: 310 10E3/UL (ref 150–450)
PO2 BLD: 139 MM HG (ref 80–105)
PO2 BLD: 146 MM HG (ref 80–105)
PO2 BLD: 210 MM HG (ref 80–105)
PO2 BLD: 84 MM HG (ref 80–105)
PO2 BLD: 94 MM HG (ref 80–105)
PO2 BLD: 94 MM HG (ref 80–105)
PO2 BLD: 96 MM HG (ref 80–105)
PO2 BLDA: 233 MM HG (ref 80–105)
PO2 BLDV: 25 MM HG (ref 25–47)
PO2 BLDV: 62 MM HG (ref 25–47)
POLYCHROMASIA BLD QL SMEAR: ABNORMAL
POLYCHROMASIA BLD QL SMEAR: NORMAL
POLYCHROMASIA BLD QL SMEAR: SLIGHT
POLYCHROMASIA BLD QL SMEAR: SLIGHT
POTASSIUM BLD-SCNC: 6.5 MMOL/L (ref 3.4–5.3)
POTASSIUM BLD-SCNC: 6.6 MMOL/L (ref 3.4–5.3)
POTASSIUM SERPL-SCNC: 3.2 MMOL/L (ref 3.4–5.3)
POTASSIUM SERPL-SCNC: 3.4 MMOL/L (ref 3.4–5.3)
POTASSIUM SERPL-SCNC: 3.7 MMOL/L (ref 3.4–5.3)
POTASSIUM SERPL-SCNC: 3.7 MMOL/L (ref 3.4–5.3)
POTASSIUM SERPL-SCNC: 3.9 MMOL/L (ref 3.4–5.3)
POTASSIUM SERPL-SCNC: 4 MMOL/L (ref 3.4–5.3)
POTASSIUM SERPL-SCNC: 4 MMOL/L (ref 3.4–5.3)
POTASSIUM SERPL-SCNC: 4.1 MMOL/L (ref 3.4–5.3)
POTASSIUM SERPL-SCNC: 4.2 MMOL/L (ref 3.4–5.3)
POTASSIUM SERPL-SCNC: 4.5 MMOL/L (ref 3.4–5.3)
POTASSIUM SERPL-SCNC: 4.6 MMOL/L (ref 3.4–5.3)
POTASSIUM SERPL-SCNC: 4.7 MMOL/L (ref 3.4–5.3)
POTASSIUM SERPL-SCNC: 4.7 MMOL/L (ref 3.4–5.3)
POTASSIUM SERPL-SCNC: 4.8 MMOL/L (ref 3.4–5.3)
POTASSIUM SERPL-SCNC: 4.9 MMOL/L (ref 3.4–5.3)
POTASSIUM SERPL-SCNC: 5 MMOL/L (ref 3.4–5.3)
POTASSIUM SERPL-SCNC: 5.1 MMOL/L (ref 3.4–5.3)
POTASSIUM SERPL-SCNC: 5.2 MMOL/L (ref 3.4–5.3)
POTASSIUM SERPL-SCNC: 5.2 MMOL/L (ref 3.4–5.3)
POTASSIUM SERPL-SCNC: 5.3 MMOL/L (ref 3.4–5.3)
POTASSIUM SERPL-SCNC: 5.4 MMOL/L (ref 3.4–5.3)
POTASSIUM SERPL-SCNC: 5.5 MMOL/L (ref 3.4–5.3)
POTASSIUM SERPL-SCNC: 5.6 MMOL/L (ref 3.4–5.3)
POTASSIUM SERPL-SCNC: 5.7 MMOL/L (ref 3.4–5.3)
POTASSIUM SERPL-SCNC: 5.8 MMOL/L (ref 3.4–5.3)
POTASSIUM SERPL-SCNC: 5.9 MMOL/L (ref 3.4–5.3)
POTASSIUM SERPL-SCNC: 6 MMOL/L (ref 3.4–5.3)
POTASSIUM SERPL-SCNC: 7 MMOL/L (ref 3.4–5.3)
PR INTERVAL - MUSE: 148 MS
PROMYELOCYTES # BLD MANUAL: 0 10E3/UL
PROMYELOCYTES NFR BLD MANUAL: 2 %
PROT SERPL-MCNC: 4.9 G/DL (ref 6.4–8.3)
PROT SERPL-MCNC: 4.9 G/DL (ref 6.4–8.3)
PROT SERPL-MCNC: 6.2 G/DL (ref 6.4–8.3)
PROT SERPL-MCNC: 7.7 G/DL (ref 6.4–8.3)
PROT/CREAT 24H UR: 0.06 MG/MG CR (ref 0–0.2)
PROT/CREAT 24H UR: 0.13 MG/MG CR (ref 0–0.2)
PROT/CREAT 24H UR: 0.13 MG/MG CR (ref 0–0.2)
PROT/CREAT 24H UR: 0.17 MG/MG CR (ref 0–0.2)
QRS DURATION - MUSE: 88 MS
QT - MUSE: 316 MS
QTC - MUSE: 405 MS
R AXIS - MUSE: -15 DEGREES
RBC # BLD AUTO: 2.55 10E6/UL (ref 4.4–5.9)
RBC # BLD AUTO: 2.75 10E6/UL (ref 4.4–5.9)
RBC # BLD AUTO: 2.9 10E6/UL (ref 4.4–5.9)
RBC # BLD AUTO: 2.96 10E6/UL (ref 4.4–5.9)
RBC # BLD AUTO: 2.97 10E6/UL (ref 4.4–5.9)
RBC # BLD AUTO: 2.99 10E6/UL (ref 4.4–5.9)
RBC # BLD AUTO: 2.99 10E6/UL (ref 4.4–5.9)
RBC # BLD AUTO: 3.02 10E6/UL (ref 4.4–5.9)
RBC # BLD AUTO: 3.02 10E6/UL (ref 4.4–5.9)
RBC # BLD AUTO: 3.06 10E6/UL (ref 4.4–5.9)
RBC # BLD AUTO: 3.06 10E6/UL (ref 4.4–5.9)
RBC # BLD AUTO: 3.07 10E6/UL (ref 4.4–5.9)
RBC # BLD AUTO: 3.08 10E6/UL (ref 4.4–5.9)
RBC # BLD AUTO: 3.09 10E6/UL (ref 4.4–5.9)
RBC # BLD AUTO: 3.13 10E6/UL (ref 4.4–5.9)
RBC # BLD AUTO: 3.16 10E6/UL (ref 4.4–5.9)
RBC # BLD AUTO: 3.17 10E6/UL (ref 4.4–5.9)
RBC # BLD AUTO: 3.18 10E6/UL (ref 4.4–5.9)
RBC # BLD AUTO: 3.2 10E6/UL (ref 4.4–5.9)
RBC # BLD AUTO: 3.2 10E6/UL (ref 4.4–5.9)
RBC # BLD AUTO: 3.21 10E6/UL (ref 4.4–5.9)
RBC # BLD AUTO: 3.23 10E6/UL (ref 4.4–5.9)
RBC # BLD AUTO: 3.27 10E6/UL (ref 4.4–5.9)
RBC # BLD AUTO: 3.27 10E6/UL (ref 4.4–5.9)
RBC # BLD AUTO: 3.29 10E6/UL (ref 4.4–5.9)
RBC # BLD AUTO: 3.29 10E6/UL (ref 4.4–5.9)
RBC # BLD AUTO: 3.37 10E6/UL (ref 4.4–5.9)
RBC # BLD AUTO: 3.38 10E6/UL (ref 4.4–5.9)
RBC # BLD AUTO: 3.38 10E6/UL (ref 4.4–5.9)
RBC # BLD AUTO: 3.41 10E6/UL (ref 4.4–5.9)
RBC # BLD AUTO: 3.46 10E6/UL (ref 4.4–5.9)
RBC # BLD AUTO: 3.57 10E6/UL (ref 4.4–5.9)
RBC # BLD AUTO: 3.63 10E6/UL (ref 4.4–5.9)
RBC # BLD AUTO: 3.63 10E6/UL (ref 4.4–5.9)
RBC # BLD AUTO: 3.72 10E6/UL (ref 4.4–5.9)
RBC AGGLUT BLD QL: ABNORMAL
RBC AGGLUT BLD QL: NORMAL
RBC MORPH BLD: ABNORMAL
RBC MORPH BLD: NORMAL
RBC URINE: 1 /HPF
RBC URINE: 19 /HPF
RBC URINE: 3 /HPF
RBC URINE: <1 /HPF
RBC URINE: <1 /HPF
ROULEAUX BLD QL SMEAR: ABNORMAL
ROULEAUX BLD QL SMEAR: NORMAL
RVA RNA STL QL NAA+NON-PROBE: NEGATIVE
RVA RNA STL QL NAA+NON-PROBE: NEGATIVE
SA 1 CELL: NORMAL
SA 1 TEST METHOD: NORMAL
SA 2 CELL: NORMAL
SA 2 TEST METHOD: NORMAL
SA TARGET DNA: NEGATIVE
SA1 HI RISK ABY: NORMAL
SA1 MOD RISK ABY: NORMAL
SA2 HI RISK ABY: NORMAL
SA2 MOD RISK ABY: NORMAL
SALMONELLA SP RPOD STL QL NAA+PROBE: NEGATIVE
SALMONELLA SP RPOD STL QL NAA+PROBE: NEGATIVE
SAO2 % BLDA: 100 % (ref 92–100)
SAO2 % BLDA: 93 % (ref 92–100)
SAO2 % BLDA: 93 % (ref 92–100)
SAO2 % BLDA: 95 % (ref 92–100)
SAO2 % BLDA: 96 % (ref 92–100)
SAO2 % BLDA: 97 % (ref 92–100)
SAO2 % BLDA: 98 % (ref 92–100)
SAO2 % BLDA: 98 % (ref 92–100)
SAO2 % BLDV: 43 % (ref 70–75)
SAO2 % BLDV: 94.8 % (ref 70–75)
SAPO I+II+IV+V RNA STL QL NAA+NON-PROBE: NEGATIVE
SAPO I+II+IV+V RNA STL QL NAA+NON-PROBE: NEGATIVE
SARS-COV-2 RNA RESP QL NAA+PROBE: NEGATIVE
SARS-COV-2 RNA RESP QL NAA+PROBE: POSITIVE
SARS-COV-2 RNA RESP QL NAA+PROBE: POSITIVE
SCANNED LAB RESULT: NORMAL
SHIGELLA SP+EIEC IPAH ST NAA+NON-PROBE: NEGATIVE
SHIGELLA SP+EIEC IPAH ST NAA+NON-PROBE: NEGATIVE
SICKLE CELLS BLD QL SMEAR: ABNORMAL
SICKLE CELLS BLD QL SMEAR: NORMAL
SMUDGE CELLS BLD QL SMEAR: ABNORMAL
SMUDGE CELLS BLD QL SMEAR: NORMAL
SMUDGE CELLS BLD QL SMEAR: PRESENT
SODIUM BLD-SCNC: 126 MMOL/L (ref 135–145)
SODIUM SERPL-SCNC: 126 MMOL/L (ref 135–145)
SODIUM SERPL-SCNC: 128 MMOL/L (ref 135–145)
SODIUM SERPL-SCNC: 129 MMOL/L (ref 135–145)
SODIUM SERPL-SCNC: 131 MMOL/L (ref 135–145)
SODIUM SERPL-SCNC: 132 MMOL/L (ref 135–145)
SODIUM SERPL-SCNC: 132 MMOL/L (ref 135–145)
SODIUM SERPL-SCNC: 133 MMOL/L (ref 135–145)
SODIUM SERPL-SCNC: 135 MMOL/L (ref 135–145)
SODIUM SERPL-SCNC: 136 MMOL/L (ref 135–145)
SODIUM SERPL-SCNC: 137 MMOL/L (ref 135–145)
SODIUM SERPL-SCNC: 138 MMOL/L (ref 135–145)
SODIUM SERPL-SCNC: 139 MMOL/L (ref 135–145)
SODIUM SERPL-SCNC: 140 MMOL/L (ref 135–145)
SODIUM SERPL-SCNC: 141 MMOL/L (ref 135–145)
SODIUM UR-SCNC: 35 MMOL/L
SP GR UR STRIP: 1.01 (ref 1–1.03)
SP GR UR STRIP: 1.02 (ref 1–1.03)
SP GR UR STRIP: 1.02 (ref 1–1.03)
SP GR UR STRIP: 1.03 (ref 1–1.03)
SP GR UR STRIP: 1.03 (ref 1–1.03)
SPHEROCYTES BLD QL SMEAR: ABNORMAL
SPHEROCYTES BLD QL SMEAR: NORMAL
SPHEROCYTES BLD QL SMEAR: SLIGHT
SQUAMOUS EPITHELIAL: 1 /HPF
SQUAMOUS EPITHELIAL: <1 /HPF
SQUAMOUS EPITHELIAL: <1 /HPF
STOMATOCYTES BLD QL SMEAR: ABNORMAL
STOMATOCYTES BLD QL SMEAR: NORMAL
SYSTOLIC BLOOD PRESSURE - MUSE: NORMAL MMHG
T AXIS - MUSE: 28 DEGREES
T CELL COMMENT: ABNORMAL
T CELL COMMENT: ABNORMAL
TACROLIMUS BLD-MCNC: 10 UG/L (ref 5–15)
TACROLIMUS BLD-MCNC: 10.1 UG/L (ref 5–15)
TACROLIMUS BLD-MCNC: 10.4 UG/L (ref 5–15)
TACROLIMUS BLD-MCNC: 10.5 UG/L (ref 5–15)
TACROLIMUS BLD-MCNC: 10.7 UG/L (ref 5–15)
TACROLIMUS BLD-MCNC: 10.8 UG/L (ref 5–15)
TACROLIMUS BLD-MCNC: 11.2 UG/L (ref 5–15)
TACROLIMUS BLD-MCNC: 11.4 UG/L (ref 5–15)
TACROLIMUS BLD-MCNC: 11.9 UG/L (ref 5–15)
TACROLIMUS BLD-MCNC: 12.1 UG/L (ref 5–15)
TACROLIMUS BLD-MCNC: 15.1 UG/L (ref 5–15)
TACROLIMUS BLD-MCNC: 19.9 UG/L (ref 5–15)
TACROLIMUS BLD-MCNC: 4.8 UG/L (ref 5–15)
TACROLIMUS BLD-MCNC: 5.9 UG/L (ref 5–15)
TACROLIMUS BLD-MCNC: 6 UG/L (ref 5–15)
TACROLIMUS BLD-MCNC: 6.3 UG/L (ref 5–15)
TACROLIMUS BLD-MCNC: 7.1 UG/L (ref 5–15)
TACROLIMUS BLD-MCNC: 7.3 UG/L (ref 5–15)
TACROLIMUS BLD-MCNC: 7.6 UG/L (ref 5–15)
TACROLIMUS BLD-MCNC: 7.9 UG/L (ref 5–15)
TACROLIMUS BLD-MCNC: 8.1 UG/L (ref 5–15)
TACROLIMUS BLD-MCNC: 8.2 UG/L (ref 5–15)
TACROLIMUS BLD-MCNC: 8.3 UG/L (ref 5–15)
TACROLIMUS BLD-MCNC: 8.4 UG/L (ref 5–15)
TACROLIMUS BLD-MCNC: 8.7 UG/L (ref 5–15)
TACROLIMUS BLD-MCNC: 8.8 UG/L (ref 5–15)
TACROLIMUS BLD-MCNC: 9.4 UG/L (ref 5–15)
TACROLIMUS BLD-MCNC: 9.7 UG/L (ref 5–15)
TARGETS BLD QL SMEAR: ABNORMAL
TARGETS BLD QL SMEAR: NORMAL
TME LAST DOSE: ABNORMAL H
TME LAST DOSE: NORMAL H
TOXIC GRANULES BLD QL SMEAR: ABNORMAL
TOXIC GRANULES BLD QL SMEAR: NORMAL
TOXIC GRANULES BLD QL SMEAR: PRESENT
TRIGL SERPL-MCNC: 118 MG/DL
UNACCEPTABLE ANTIGENS: NORMAL
UNOS CPRA: 53
UROBILINOGEN UR STRIP-MCNC: 2 MG/DL
UROBILINOGEN UR STRIP-MCNC: NORMAL MG/DL
UROBILINOGEN UR STRIP-MCNC: NORMAL MG/DL
V CHOLERAE DNA SPEC QL NAA+PROBE: NEGATIVE
V CHOLERAE DNA SPEC QL NAA+PROBE: NEGATIVE
VARIANT LYMPHS BLD QL SMEAR: ABNORMAL
VARIANT LYMPHS BLD QL SMEAR: NORMAL
VARIANT LYMPHS BLD QL SMEAR: PRESENT
VENTRICULAR RATE- MUSE: 99 BPM
VIBRIO DNA SPEC NAA+PROBE: NEGATIVE
VIBRIO DNA SPEC NAA+PROBE: NEGATIVE
VIT B12 SERPL-MCNC: 1781 PG/ML (ref 232–1245)
WBC # BLD AUTO: 0.4 10E3/UL (ref 4–11)
WBC # BLD AUTO: 0.4 10E3/UL (ref 4–11)
WBC # BLD AUTO: 0.5 10E3/UL (ref 4–11)
WBC # BLD AUTO: 0.5 10E3/UL (ref 4–11)
WBC # BLD AUTO: 0.6 10E3/UL (ref 4–11)
WBC # BLD AUTO: 0.7 10E3/UL (ref 4–11)
WBC # BLD AUTO: 0.7 10E3/UL (ref 4–11)
WBC # BLD AUTO: 0.8 10E3/UL (ref 4–11)
WBC # BLD AUTO: 0.9 10E3/UL (ref 4–11)
WBC # BLD AUTO: 1 10E3/UL (ref 4–11)
WBC # BLD AUTO: 1 10E3/UL (ref 4–11)
WBC # BLD AUTO: 1.1 10E3/UL (ref 4–11)
WBC # BLD AUTO: 1.2 10E3/UL (ref 4–11)
WBC # BLD AUTO: 1.3 10E3/UL (ref 4–11)
WBC # BLD AUTO: 1.6 10E3/UL (ref 4–11)
WBC # BLD AUTO: 2 10E3/UL (ref 4–11)
WBC # BLD AUTO: 2.3 10E3/UL (ref 4–11)
WBC # BLD AUTO: 2.5 10E3/UL (ref 4–11)
WBC # BLD AUTO: 3 10E3/UL (ref 4–11)
WBC # BLD AUTO: 3.2 10E3/UL (ref 4–11)
WBC # BLD AUTO: 3.3 10E3/UL (ref 4–11)
WBC # BLD AUTO: 3.9 10E3/UL (ref 4–11)
WBC # BLD AUTO: 3.9 10E3/UL (ref 4–11)
WBC # BLD AUTO: 4.1 10E3/UL (ref 4–11)
WBC URINE: 1 /HPF
WBC URINE: 11 /HPF
WBC URINE: <1 /HPF
Y ENTEROCOL DNA STL QL NAA+PROBE: NEGATIVE
Y ENTEROCOL DNA STL QL NAA+PROBE: NEGATIVE
ZZZSA 1  COMMENTS: NORMAL
ZZZSA 2 COMMENTS: NORMAL

## 2024-01-01 PROCEDURE — 85041 AUTOMATED RBC COUNT: CPT

## 2024-01-01 PROCEDURE — 84450 TRANSFERASE (AST) (SGOT): CPT

## 2024-01-01 PROCEDURE — 80048 BASIC METABOLIC PNL TOTAL CA: CPT

## 2024-01-01 PROCEDURE — 36415 COLL VENOUS BLD VENIPUNCTURE: CPT | Performed by: NURSE PRACTITIONER

## 2024-01-01 PROCEDURE — 36415 COLL VENOUS BLD VENIPUNCTURE: CPT

## 2024-01-01 PROCEDURE — 250N000011 HC RX IP 250 OP 636: Performed by: INTERNAL MEDICINE

## 2024-01-01 PROCEDURE — 36415 COLL VENOUS BLD VENIPUNCTURE: CPT | Performed by: PATHOLOGY

## 2024-01-01 PROCEDURE — 250N000012 HC RX MED GY IP 250 OP 636 PS 637

## 2024-01-01 PROCEDURE — 96361 HYDRATE IV INFUSION ADD-ON: CPT

## 2024-01-01 PROCEDURE — 999N000157 HC STATISTIC RCP TIME EA 10 MIN

## 2024-01-01 PROCEDURE — 85014 HEMATOCRIT: CPT | Performed by: INTERNAL MEDICINE

## 2024-01-01 PROCEDURE — 258N000001 HC RX 258

## 2024-01-01 PROCEDURE — 85007 BL SMEAR W/DIFF WBC COUNT: CPT | Performed by: NURSE PRACTITIONER

## 2024-01-01 PROCEDURE — 80180 DRUG SCRN QUAN MYCOPHENOLATE: CPT

## 2024-01-01 PROCEDURE — 82607 VITAMIN B-12: CPT | Performed by: NURSE PRACTITIONER

## 2024-01-01 PROCEDURE — 82805 BLOOD GASES W/O2 SATURATION: CPT | Performed by: SURGERY

## 2024-01-01 PROCEDURE — 84100 ASSAY OF PHOSPHORUS: CPT | Performed by: EMERGENCY MEDICINE

## 2024-01-01 PROCEDURE — 99285 EMERGENCY DEPT VISIT HI MDM: CPT | Performed by: EMERGENCY MEDICINE

## 2024-01-01 PROCEDURE — 87635 SARS-COV-2 COVID-19 AMP PRB: CPT

## 2024-01-01 PROCEDURE — 80197 ASSAY OF TACROLIMUS: CPT

## 2024-01-01 PROCEDURE — 99233 SBSQ HOSP IP/OBS HIGH 50: CPT | Performed by: INTERNAL MEDICINE

## 2024-01-01 PROCEDURE — 80048 BASIC METABOLIC PNL TOTAL CA: CPT | Performed by: PATHOLOGY

## 2024-01-01 PROCEDURE — 36415 COLL VENOUS BLD VENIPUNCTURE: CPT | Performed by: INTERNAL MEDICINE

## 2024-01-01 PROCEDURE — 83735 ASSAY OF MAGNESIUM: CPT

## 2024-01-01 PROCEDURE — 80169 DRUG ASSAY EVEROLIMUS: CPT

## 2024-01-01 PROCEDURE — 80053 COMPREHEN METABOLIC PANEL: CPT | Performed by: INTERNAL MEDICINE

## 2024-01-01 PROCEDURE — 83735 ASSAY OF MAGNESIUM: CPT | Performed by: NURSE PRACTITIONER

## 2024-01-01 PROCEDURE — 99417 PROLNG OP E/M EACH 15 MIN: CPT | Performed by: INTERNAL MEDICINE

## 2024-01-01 PROCEDURE — 82570 ASSAY OF URINE CREATININE: CPT | Performed by: EMERGENCY MEDICINE

## 2024-01-01 PROCEDURE — 74176 CT ABD & PELVIS W/O CONTRAST: CPT

## 2024-01-01 PROCEDURE — 71045 X-RAY EXAM CHEST 1 VIEW: CPT | Mod: 26 | Performed by: RADIOLOGY

## 2024-01-01 PROCEDURE — 80197 ASSAY OF TACROLIMUS: CPT | Performed by: NURSE PRACTITIONER

## 2024-01-01 PROCEDURE — 85027 COMPLETE CBC AUTOMATED: CPT

## 2024-01-01 PROCEDURE — 85027 COMPLETE CBC AUTOMATED: CPT | Performed by: INTERNAL MEDICINE

## 2024-01-01 PROCEDURE — 99214 OFFICE O/P EST MOD 30 MIN: CPT | Mod: 95 | Performed by: INTERNAL MEDICINE

## 2024-01-01 PROCEDURE — 99222 1ST HOSP IP/OBS MODERATE 55: CPT | Performed by: INTERNAL MEDICINE

## 2024-01-01 PROCEDURE — 71250 CT THORAX DX C-: CPT

## 2024-01-01 PROCEDURE — 96374 THER/PROPH/DIAG INJ IV PUSH: CPT

## 2024-01-01 PROCEDURE — 85007 BL SMEAR W/DIFF WBC COUNT: CPT

## 2024-01-01 PROCEDURE — 250N000009 HC RX 250: Performed by: INTERNAL MEDICINE

## 2024-01-01 PROCEDURE — 250N000011 HC RX IP 250 OP 636: Mod: JZ | Performed by: INTERNAL MEDICINE

## 2024-01-01 PROCEDURE — 99214 OFFICE O/P EST MOD 30 MIN: CPT | Performed by: NURSE PRACTITIONER

## 2024-01-01 PROCEDURE — 250N000013 HC RX MED GY IP 250 OP 250 PS 637

## 2024-01-01 PROCEDURE — 82248 BILIRUBIN DIRECT: CPT | Performed by: NURSE PRACTITIONER

## 2024-01-01 PROCEDURE — 3E043XZ INTRODUCTION OF VASOPRESSOR INTO CENTRAL VEIN, PERCUTANEOUS APPROACH: ICD-10-PCS | Performed by: STUDENT IN AN ORGANIZED HEALTH CARE EDUCATION/TRAINING PROGRAM

## 2024-01-01 PROCEDURE — 76942 ECHO GUIDE FOR BIOPSY: CPT | Mod: 26 | Performed by: PHYSICIAN ASSISTANT

## 2024-01-01 PROCEDURE — 85007 BL SMEAR W/DIFF WBC COUNT: CPT | Performed by: EMERGENCY MEDICINE

## 2024-01-01 PROCEDURE — 99214 OFFICE O/P EST MOD 30 MIN: CPT | Performed by: INTERNAL MEDICINE

## 2024-01-01 PROCEDURE — 85007 BL SMEAR W/DIFF WBC COUNT: CPT | Performed by: INTERNAL MEDICINE

## 2024-01-01 PROCEDURE — 87385 HISTOPLASMA CAPSUL AG IA: CPT

## 2024-01-01 PROCEDURE — 82728 ASSAY OF FERRITIN: CPT | Performed by: PATHOLOGY

## 2024-01-01 PROCEDURE — 3E033XZ INTRODUCTION OF VASOPRESSOR INTO PERIPHERAL VEIN, PERCUTANEOUS APPROACH: ICD-10-PCS | Performed by: STUDENT IN AN ORGANIZED HEALTH CARE EDUCATION/TRAINING PROGRAM

## 2024-01-01 PROCEDURE — P9045 ALBUMIN (HUMAN), 5%, 250 ML: HCPCS | Mod: JZ | Performed by: SURGERY

## 2024-01-01 PROCEDURE — 84100 ASSAY OF PHOSPHORUS: CPT

## 2024-01-01 PROCEDURE — 99231 SBSQ HOSP IP/OBS SF/LOW 25: CPT | Performed by: INTERNAL MEDICINE

## 2024-01-01 PROCEDURE — 84156 ASSAY OF PROTEIN URINE: CPT | Performed by: TRANSPLANT SURGERY

## 2024-01-01 PROCEDURE — 85025 COMPLETE CBC W/AUTO DIFF WBC: CPT | Performed by: PATHOLOGY

## 2024-01-01 PROCEDURE — 84100 ASSAY OF PHOSPHORUS: CPT | Performed by: NURSE PRACTITIONER

## 2024-01-01 PROCEDURE — 250N000009 HC RX 250

## 2024-01-01 PROCEDURE — 87799 DETECT AGENT NOS DNA QUANT: CPT | Performed by: NURSE PRACTITIONER

## 2024-01-01 PROCEDURE — 81001 URINALYSIS AUTO W/SCOPE: CPT | Performed by: SURGERY

## 2024-01-01 PROCEDURE — 250N000013 HC RX MED GY IP 250 OP 250 PS 637: Performed by: NURSE PRACTITIONER

## 2024-01-01 PROCEDURE — 99000 SPECIMEN HANDLING OFFICE-LAB: CPT | Performed by: PATHOLOGY

## 2024-01-01 PROCEDURE — 87640 STAPH A DNA AMP PROBE: CPT

## 2024-01-01 PROCEDURE — 80048 BASIC METABOLIC PNL TOTAL CA: CPT | Performed by: EMERGENCY MEDICINE

## 2024-01-01 PROCEDURE — 82330 ASSAY OF CALCIUM: CPT

## 2024-01-01 PROCEDURE — 36620 INSERTION CATHETER ARTERY: CPT | Performed by: SURGERY

## 2024-01-01 PROCEDURE — 96360 HYDRATION IV INFUSION INIT: CPT

## 2024-01-01 PROCEDURE — 87651 STREP A DNA AMP PROBE: CPT | Performed by: NURSE PRACTITIONER

## 2024-01-01 PROCEDURE — 258N000003 HC RX IP 258 OP 636: Performed by: INTERNAL MEDICINE

## 2024-01-01 PROCEDURE — 36556 INSERT NON-TUNNEL CV CATH: CPT | Mod: GC | Performed by: STUDENT IN AN ORGANIZED HEALTH CARE EDUCATION/TRAINING PROGRAM

## 2024-01-01 PROCEDURE — 250N000011 HC RX IP 250 OP 636: Performed by: NURSE PRACTITIONER

## 2024-01-01 PROCEDURE — 80048 BASIC METABOLIC PNL TOTAL CA: CPT | Performed by: INTERNAL MEDICINE

## 2024-01-01 PROCEDURE — 82728 ASSAY OF FERRITIN: CPT

## 2024-01-01 PROCEDURE — 96360 HYDRATION IV INFUSION INIT: CPT | Performed by: EMERGENCY MEDICINE

## 2024-01-01 PROCEDURE — 250N000013 HC RX MED GY IP 250 OP 250 PS 637: Performed by: INTERNAL MEDICINE

## 2024-01-01 PROCEDURE — G2211 COMPLEX E/M VISIT ADD ON: HCPCS | Performed by: INTERNAL MEDICINE

## 2024-01-01 PROCEDURE — 85027 COMPLETE CBC AUTOMATED: CPT | Performed by: NURSE PRACTITIONER

## 2024-01-01 PROCEDURE — 99233 SBSQ HOSP IP/OBS HIGH 50: CPT | Mod: FS | Performed by: NURSE PRACTITIONER

## 2024-01-01 PROCEDURE — 85014 HEMATOCRIT: CPT

## 2024-01-01 PROCEDURE — 370N000003 HC ANESTHESIA WARD SERVICE: Performed by: NURSE ANESTHETIST, CERTIFIED REGISTERED

## 2024-01-01 PROCEDURE — 99215 OFFICE O/P EST HI 40 MIN: CPT | Performed by: INTERNAL MEDICINE

## 2024-01-01 PROCEDURE — 258N000002 HC RX IP 258 OP 250: Performed by: INTERNAL MEDICINE

## 2024-01-01 PROCEDURE — 99252 IP/OBS CONSLTJ NEW/EST SF 35: CPT | Mod: GC | Performed by: COLON & RECTAL SURGERY

## 2024-01-01 PROCEDURE — 258N000003 HC RX IP 258 OP 636: Performed by: STUDENT IN AN ORGANIZED HEALTH CARE EDUCATION/TRAINING PROGRAM

## 2024-01-01 PROCEDURE — 96372 THER/PROPH/DIAG INJ SC/IM: CPT | Performed by: INTERNAL MEDICINE

## 2024-01-01 PROCEDURE — 250N000011 HC RX IP 250 OP 636: Performed by: SURGERY

## 2024-01-01 PROCEDURE — 99213 OFFICE O/P EST LOW 20 MIN: CPT | Performed by: INTERNAL MEDICINE

## 2024-01-01 PROCEDURE — 94002 VENT MGMT INPAT INIT DAY: CPT

## 2024-01-01 PROCEDURE — 999N000142 HC STATISTIC PROCEDURE PREP ONLY

## 2024-01-01 PROCEDURE — 03HY32Z INSERTION OF MONITORING DEVICE INTO UPPER ARTERY, PERCUTANEOUS APPROACH: ICD-10-PCS | Performed by: SURGERY

## 2024-01-01 PROCEDURE — 94640 AIRWAY INHALATION TREATMENT: CPT

## 2024-01-01 PROCEDURE — 120N000011 HC R&B TRANSPLANT UMMC

## 2024-01-01 PROCEDURE — 258N000003 HC RX IP 258 OP 636: Performed by: NURSE PRACTITIONER

## 2024-01-01 PROCEDURE — 80053 COMPREHEN METABOLIC PANEL: CPT | Performed by: EMERGENCY MEDICINE

## 2024-01-01 PROCEDURE — 88348 ELECTRON MICROSCOPY DX: CPT | Mod: TC | Performed by: INTERNAL MEDICINE

## 2024-01-01 PROCEDURE — 80197 ASSAY OF TACROLIMUS: CPT | Performed by: INTERNAL MEDICINE

## 2024-01-01 PROCEDURE — 99254 IP/OBS CNSLTJ NEW/EST MOD 60: CPT | Performed by: INTERNAL MEDICINE

## 2024-01-01 PROCEDURE — 85610 PROTHROMBIN TIME: CPT | Performed by: NURSE PRACTITIONER

## 2024-01-01 PROCEDURE — 250N000011 HC RX IP 250 OP 636: Mod: JZ

## 2024-01-01 PROCEDURE — 250N000011 HC RX IP 250 OP 636

## 2024-01-01 PROCEDURE — 81001 URINALYSIS AUTO W/SCOPE: CPT | Performed by: EMERGENCY MEDICINE

## 2024-01-01 PROCEDURE — 85730 THROMBOPLASTIN TIME PARTIAL: CPT

## 2024-01-01 PROCEDURE — 83605 ASSAY OF LACTIC ACID: CPT | Performed by: INTERNAL MEDICINE

## 2024-01-01 PROCEDURE — 86359 T CELLS TOTAL COUNT: CPT | Performed by: INTERNAL MEDICINE

## 2024-01-01 PROCEDURE — 84100 ASSAY OF PHOSPHORUS: CPT | Performed by: PATHOLOGY

## 2024-01-01 PROCEDURE — 96372 THER/PROPH/DIAG INJ SC/IM: CPT | Mod: XS | Performed by: INTERNAL MEDICINE

## 2024-01-01 PROCEDURE — 99285 EMERGENCY DEPT VISIT HI MDM: CPT | Mod: 25 | Performed by: EMERGENCY MEDICINE

## 2024-01-01 PROCEDURE — 71045 X-RAY EXAM CHEST 1 VIEW: CPT

## 2024-01-01 PROCEDURE — 250N000012 HC RX MED GY IP 250 OP 636 PS 637: Performed by: NURSE PRACTITIONER

## 2024-01-01 PROCEDURE — 250N000009 HC RX 250: Performed by: SURGERY

## 2024-01-01 PROCEDURE — 86833 HLA CLASS II HIGH DEFIN QUAL: CPT | Performed by: INTERNAL MEDICINE

## 2024-01-01 PROCEDURE — 83550 IRON BINDING TEST: CPT

## 2024-01-01 PROCEDURE — 99238 HOSP IP/OBS DSCHRG MGMT 30/<: CPT | Performed by: INTERNAL MEDICINE

## 2024-01-01 PROCEDURE — 80169 DRUG ASSAY EVEROLIMUS: CPT | Performed by: INTERNAL MEDICINE

## 2024-01-01 PROCEDURE — 87507 IADNA-DNA/RNA PROBE TQ 12-25: CPT | Performed by: NURSE PRACTITIONER

## 2024-01-01 PROCEDURE — 80048 BASIC METABOLIC PNL TOTAL CA: CPT | Performed by: NURSE PRACTITIONER

## 2024-01-01 PROCEDURE — 84132 ASSAY OF SERUM POTASSIUM: CPT | Performed by: INTERNAL MEDICINE

## 2024-01-01 PROCEDURE — 99292 CRITICAL CARE ADDL 30 MIN: CPT | Mod: 25 | Performed by: STUDENT IN AN ORGANIZED HEALTH CARE EDUCATION/TRAINING PROGRAM

## 2024-01-01 PROCEDURE — 99292 CRITICAL CARE ADDL 30 MIN: CPT | Mod: 25 | Performed by: SURGERY

## 2024-01-01 PROCEDURE — 87324 CLOSTRIDIUM AG IA: CPT

## 2024-01-01 PROCEDURE — 258N000003 HC RX IP 258 OP 636

## 2024-01-01 PROCEDURE — 99223 1ST HOSP IP/OBS HIGH 75: CPT | Mod: AI | Performed by: INTERNAL MEDICINE

## 2024-01-01 PROCEDURE — 87799 DETECT AGENT NOS DNA QUANT: CPT | Performed by: INTERNAL MEDICINE

## 2024-01-01 PROCEDURE — 84295 ASSAY OF SERUM SODIUM: CPT | Performed by: INTERNAL MEDICINE

## 2024-01-01 PROCEDURE — 05H333Z INSERTION OF INFUSION DEVICE INTO RIGHT INNOMINATE VEIN, PERCUTANEOUS APPROACH: ICD-10-PCS | Performed by: STUDENT IN AN ORGANIZED HEALTH CARE EDUCATION/TRAINING PROGRAM

## 2024-01-01 PROCEDURE — 83735 ASSAY OF MAGNESIUM: CPT | Performed by: INTERNAL MEDICINE

## 2024-01-01 PROCEDURE — 99291 CRITICAL CARE FIRST HOUR: CPT | Mod: 25 | Performed by: SURGERY

## 2024-01-01 PROCEDURE — 87086 URINE CULTURE/COLONY COUNT: CPT | Performed by: SURGERY

## 2024-01-01 PROCEDURE — 81001 URINALYSIS AUTO W/SCOPE: CPT

## 2024-01-01 PROCEDURE — 258N000003 HC RX IP 258 OP 636: Performed by: EMERGENCY MEDICINE

## 2024-01-01 PROCEDURE — 36556 INSERT NON-TUNNEL CV CATH: CPT

## 2024-01-01 PROCEDURE — 99291 CRITICAL CARE FIRST HOUR: CPT | Mod: 25 | Performed by: STUDENT IN AN ORGANIZED HEALTH CARE EDUCATION/TRAINING PROGRAM

## 2024-01-01 PROCEDURE — 82805 BLOOD GASES W/O2 SATURATION: CPT | Performed by: INTERNAL MEDICINE

## 2024-01-01 PROCEDURE — 83735 ASSAY OF MAGNESIUM: CPT | Performed by: PATHOLOGY

## 2024-01-01 PROCEDURE — 82805 BLOOD GASES W/O2 SATURATION: CPT

## 2024-01-01 PROCEDURE — 87493 C DIFF AMPLIFIED PROBE: CPT

## 2024-01-01 PROCEDURE — 999N000133 HC STATISTIC PP CARE STAGE 2

## 2024-01-01 PROCEDURE — 86140 C-REACTIVE PROTEIN: CPT | Performed by: INTERNAL MEDICINE

## 2024-01-01 PROCEDURE — 85007 BL SMEAR W/DIFF WBC COUNT: CPT | Performed by: PHYSICIAN ASSISTANT

## 2024-01-01 PROCEDURE — 82784 ASSAY IGA/IGD/IGG/IGM EACH: CPT

## 2024-01-01 PROCEDURE — 82805 BLOOD GASES W/O2 SATURATION: CPT | Performed by: STUDENT IN AN ORGANIZED HEALTH CARE EDUCATION/TRAINING PROGRAM

## 2024-01-01 PROCEDURE — 87799 DETECT AGENT NOS DNA QUANT: CPT

## 2024-01-01 PROCEDURE — 94642 AEROSOL INHALATION TREATMENT: CPT

## 2024-01-01 PROCEDURE — 86833 HLA CLASS II HIGH DEFIN QUAL: CPT

## 2024-01-01 PROCEDURE — 82784 ASSAY IGA/IGD/IGG/IGM EACH: CPT | Performed by: INTERNAL MEDICINE

## 2024-01-01 PROCEDURE — 84300 ASSAY OF URINE SODIUM: CPT | Performed by: EMERGENCY MEDICINE

## 2024-01-01 PROCEDURE — 5A1D90Z PERFORMANCE OF URINARY FILTRATION, CONTINUOUS, GREATER THAN 18 HOURS PER DAY: ICD-10-PCS | Performed by: STUDENT IN AN ORGANIZED HEALTH CARE EDUCATION/TRAINING PROGRAM

## 2024-01-01 PROCEDURE — 999N000215 HC STATISTIC HFNC ADULT NON-CPAP

## 2024-01-01 PROCEDURE — C9113 INJ PANTOPRAZOLE SODIUM, VIA: HCPCS | Performed by: STUDENT IN AN ORGANIZED HEALTH CARE EDUCATION/TRAINING PROGRAM

## 2024-01-01 PROCEDURE — 36415 COLL VENOUS BLD VENIPUNCTURE: CPT | Performed by: STUDENT IN AN ORGANIZED HEALTH CARE EDUCATION/TRAINING PROGRAM

## 2024-01-01 PROCEDURE — 5A1935Z RESPIRATORY VENTILATION, LESS THAN 24 CONSECUTIVE HOURS: ICD-10-PCS | Performed by: STUDENT IN AN ORGANIZED HEALTH CARE EDUCATION/TRAINING PROGRAM

## 2024-01-01 PROCEDURE — 99232 SBSQ HOSP IP/OBS MODERATE 35: CPT | Performed by: INTERNAL MEDICINE

## 2024-01-01 PROCEDURE — 200N000002 HC R&B ICU UMMC

## 2024-01-01 PROCEDURE — 76942 ECHO GUIDE FOR BIOPSY: CPT

## 2024-01-01 PROCEDURE — 250N000011 HC RX IP 250 OP 636: Mod: JW | Performed by: INTERNAL MEDICINE

## 2024-01-01 PROCEDURE — 96365 THER/PROPH/DIAG IV INF INIT: CPT

## 2024-01-01 PROCEDURE — 85027 COMPLETE CBC AUTOMATED: CPT | Performed by: PATHOLOGY

## 2024-01-01 PROCEDURE — 74176 CT ABD & PELVIS W/O CONTRAST: CPT | Mod: 26 | Performed by: RADIOLOGY

## 2024-01-01 PROCEDURE — 87449 NOS EACH ORGANISM AG IA: CPT

## 2024-01-01 PROCEDURE — 91320 SARSCV2 VAC 30MCG TRS-SUC IM: CPT | Performed by: INTERNAL MEDICINE

## 2024-01-01 PROCEDURE — 87507 IADNA-DNA/RNA PROBE TQ 12-25: CPT

## 2024-01-01 PROCEDURE — 83605 ASSAY OF LACTIC ACID: CPT

## 2024-01-01 PROCEDURE — 258N000003 HC RX IP 258 OP 636: Performed by: TRANSPLANT SURGERY

## 2024-01-01 PROCEDURE — 83550 IRON BINDING TEST: CPT | Performed by: PATHOLOGY

## 2024-01-01 PROCEDURE — 250N000012 HC RX MED GY IP 250 OP 636 PS 637: Performed by: INTERNAL MEDICINE

## 2024-01-01 PROCEDURE — 85027 COMPLETE CBC AUTOMATED: CPT | Performed by: EMERGENCY MEDICINE

## 2024-01-01 PROCEDURE — 83540 ASSAY OF IRON: CPT | Performed by: PATHOLOGY

## 2024-01-01 PROCEDURE — 82803 BLOOD GASES ANY COMBINATION: CPT

## 2024-01-01 PROCEDURE — 86832 HLA CLASS I HIGH DEFIN QUAL: CPT

## 2024-01-01 PROCEDURE — 85049 AUTOMATED PLATELET COUNT: CPT

## 2024-01-01 PROCEDURE — 99203 OFFICE O/P NEW LOW 30 MIN: CPT | Mod: 95 | Performed by: STUDENT IN AN ORGANIZED HEALTH CARE EDUCATION/TRAINING PROGRAM

## 2024-01-01 PROCEDURE — 76776 US EXAM K TRANSPL W/DOPPLER: CPT

## 2024-01-01 PROCEDURE — 71250 CT THORAX DX C-: CPT | Mod: 26 | Performed by: RADIOLOGY

## 2024-01-01 PROCEDURE — 94640 AIRWAY INHALATION TREATMENT: CPT | Mod: 76

## 2024-01-01 PROCEDURE — 88305 TISSUE EXAM BY PATHOLOGIST: CPT | Mod: 26 | Performed by: PATHOLOGY

## 2024-01-01 PROCEDURE — 250N000012 HC RX MED GY IP 250 OP 636 PS 637: Performed by: TRANSPLANT SURGERY

## 2024-01-01 PROCEDURE — 82746 ASSAY OF FOLIC ACID SERUM: CPT | Performed by: NURSE PRACTITIONER

## 2024-01-01 PROCEDURE — 87040 BLOOD CULTURE FOR BACTERIA: CPT | Performed by: STUDENT IN AN ORGANIZED HEALTH CARE EDUCATION/TRAINING PROGRAM

## 2024-01-01 PROCEDURE — 87635 SARS-COV-2 COVID-19 AMP PRB: CPT | Performed by: NURSE PRACTITIONER

## 2024-01-01 PROCEDURE — 83735 ASSAY OF MAGNESIUM: CPT | Performed by: HOSPITALIST

## 2024-01-01 PROCEDURE — 120N000002 HC R&B MED SURG/OB UMMC

## 2024-01-01 PROCEDURE — 250N000009 HC RX 250: Performed by: PHYSICIAN ASSISTANT

## 2024-01-01 PROCEDURE — 84156 ASSAY OF PROTEIN URINE: CPT

## 2024-01-01 PROCEDURE — 250N000011 HC RX IP 250 OP 636: Performed by: EMERGENCY MEDICINE

## 2024-01-01 PROCEDURE — 87798 DETECT AGENT NOS DNA AMP: CPT | Performed by: INTERNAL MEDICINE

## 2024-01-01 PROCEDURE — 200N000001 HC R&B ICU

## 2024-01-01 PROCEDURE — 81001 URINALYSIS AUTO W/SCOPE: CPT | Performed by: INTERNAL MEDICINE

## 2024-01-01 PROCEDURE — 93005 ELECTROCARDIOGRAM TRACING: CPT

## 2024-01-01 PROCEDURE — 250N000011 HC RX IP 250 OP 636: Mod: JZ | Performed by: TRANSPLANT SURGERY

## 2024-01-01 PROCEDURE — G2211 COMPLEX E/M VISIT ADD ON: HCPCS | Mod: 95 | Performed by: INTERNAL MEDICINE

## 2024-01-01 PROCEDURE — 90480 ADMN SARSCOV2 VAC 1/ONLY CMP: CPT | Performed by: INTERNAL MEDICINE

## 2024-01-01 PROCEDURE — 99207 PR NO BILLABLE SERVICE THIS VISIT: CPT | Performed by: STUDENT IN AN ORGANIZED HEALTH CARE EDUCATION/TRAINING PROGRAM

## 2024-01-01 PROCEDURE — 82374 ASSAY BLOOD CARBON DIOXIDE: CPT

## 2024-01-01 PROCEDURE — 80180 DRUG SCRN QUAN MYCOPHENOLATE: CPT | Performed by: INTERNAL MEDICINE

## 2024-01-01 PROCEDURE — 99255 IP/OBS CONSLTJ NEW/EST HI 80: CPT | Mod: AI | Performed by: INTERNAL MEDICINE

## 2024-01-01 PROCEDURE — 85610 PROTHROMBIN TIME: CPT

## 2024-01-01 PROCEDURE — 88350 IMFLUOR EA ADDL 1ANTB STN PX: CPT | Mod: 26 | Performed by: PATHOLOGY

## 2024-01-01 PROCEDURE — 80061 LIPID PANEL: CPT | Performed by: TRANSPLANT SURGERY

## 2024-01-01 PROCEDURE — 99233 SBSQ HOSP IP/OBS HIGH 50: CPT

## 2024-01-01 PROCEDURE — 99215 OFFICE O/P EST HI 40 MIN: CPT | Mod: 24 | Performed by: INTERNAL MEDICINE

## 2024-01-01 PROCEDURE — 74018 RADEX ABDOMEN 1 VIEW: CPT

## 2024-01-01 PROCEDURE — 99223 1ST HOSP IP/OBS HIGH 75: CPT | Mod: 24 | Performed by: INTERNAL MEDICINE

## 2024-01-01 PROCEDURE — 250N000013 HC RX MED GY IP 250 OP 250 PS 637: Performed by: EMERGENCY MEDICINE

## 2024-01-01 PROCEDURE — 85027 COMPLETE CBC AUTOMATED: CPT | Performed by: PHYSICIAN ASSISTANT

## 2024-01-01 PROCEDURE — 250N000011 HC RX IP 250 OP 636: Performed by: STUDENT IN AN ORGANIZED HEALTH CARE EDUCATION/TRAINING PROGRAM

## 2024-01-01 PROCEDURE — 250N000013 HC RX MED GY IP 250 OP 250 PS 637: Performed by: STUDENT IN AN ORGANIZED HEALTH CARE EDUCATION/TRAINING PROGRAM

## 2024-01-01 PROCEDURE — 88348 ELECTRON MICROSCOPY DX: CPT | Mod: 26 | Performed by: PATHOLOGY

## 2024-01-01 PROCEDURE — 250N000009 HC RX 250: Performed by: STUDENT IN AN ORGANIZED HEALTH CARE EDUCATION/TRAINING PROGRAM

## 2024-01-01 PROCEDURE — 87040 BLOOD CULTURE FOR BACTERIA: CPT

## 2024-01-01 PROCEDURE — 87804 INFLUENZA ASSAY W/OPTIC: CPT | Performed by: NURSE PRACTITIONER

## 2024-01-01 PROCEDURE — 84155 ASSAY OF PROTEIN SERUM: CPT

## 2024-01-01 PROCEDURE — 96376 TX/PRO/DX INJ SAME DRUG ADON: CPT

## 2024-01-01 PROCEDURE — 85007 BL SMEAR W/DIFF WBC COUNT: CPT | Performed by: PATHOLOGY

## 2024-01-01 PROCEDURE — 999N000128 HC STATISTIC PERIPHERAL IV START W/O US GUIDANCE

## 2024-01-01 PROCEDURE — 99285 EMERGENCY DEPT VISIT HI MDM: CPT | Mod: 25

## 2024-01-01 PROCEDURE — 88313 SPECIAL STAINS GROUP 2: CPT | Mod: 26 | Performed by: PATHOLOGY

## 2024-01-01 PROCEDURE — 250N000013 HC RX MED GY IP 250 OP 250 PS 637: Performed by: TRANSPLANT SURGERY

## 2024-01-01 PROCEDURE — 50200 RENAL BIOPSY PERQ: CPT | Mod: RT | Performed by: PHYSICIAN ASSISTANT

## 2024-01-01 PROCEDURE — 999N000065 XR CHEST PORT 1 VIEW

## 2024-01-01 PROCEDURE — 76776 US EXAM K TRANSPL W/DOPPLER: CPT | Mod: 26 | Performed by: RADIOLOGY

## 2024-01-01 PROCEDURE — 83735 ASSAY OF MAGNESIUM: CPT | Performed by: EMERGENCY MEDICINE

## 2024-01-01 PROCEDURE — 86832 HLA CLASS I HIGH DEFIN QUAL: CPT | Performed by: INTERNAL MEDICINE

## 2024-01-01 PROCEDURE — 120N000001 HC R&B MED SURG/OB

## 2024-01-01 PROCEDURE — 85384 FIBRINOGEN ACTIVITY: CPT

## 2024-01-01 PROCEDURE — 84132 ASSAY OF SERUM POTASSIUM: CPT

## 2024-01-01 PROCEDURE — 36415 COLL VENOUS BLD VENIPUNCTURE: CPT | Performed by: EMERGENCY MEDICINE

## 2024-01-01 PROCEDURE — 90947 DIALYSIS REPEATED EVAL: CPT

## 2024-01-01 PROCEDURE — 82374 ASSAY BLOOD CARBON DIOXIDE: CPT | Performed by: INTERNAL MEDICINE

## 2024-01-01 PROCEDURE — 250N000011 HC RX IP 250 OP 636: Performed by: NURSE ANESTHETIST, CERTIFIED REGISTERED

## 2024-01-01 PROCEDURE — 258N000003 HC RX IP 258 OP 636: Performed by: NURSE ANESTHETIST, CERTIFIED REGISTERED

## 2024-01-01 PROCEDURE — 88346 IMFLUOR 1ST 1ANTB STAIN PX: CPT | Mod: 26 | Performed by: PATHOLOGY

## 2024-01-01 PROCEDURE — 87798 DETECT AGENT NOS DNA AMP: CPT

## 2024-01-01 PROCEDURE — 99254 IP/OBS CNSLTJ NEW/EST MOD 60: CPT | Mod: 24 | Performed by: INTERNAL MEDICINE

## 2024-01-01 PROCEDURE — 87205 SMEAR GRAM STAIN: CPT | Performed by: STUDENT IN AN ORGANIZED HEALTH CARE EDUCATION/TRAINING PROGRAM

## 2024-01-01 RX ORDER — ALBUTEROL SULFATE 90 UG/1
1-2 AEROSOL, METERED RESPIRATORY (INHALATION)
Status: CANCELLED
Start: 2024-05-09

## 2024-01-01 RX ORDER — VALGANCICLOVIR 450 MG/1
450 TABLET, FILM COATED ORAL EVERY OTHER DAY
Status: DISCONTINUED | OUTPATIENT
Start: 2024-01-01 | End: 2024-01-01

## 2024-01-01 RX ORDER — EPINEPHRINE 1 MG/ML
0.3 INJECTION, SOLUTION INTRAMUSCULAR; SUBCUTANEOUS EVERY 5 MIN PRN
Status: CANCELLED | OUTPATIENT
Start: 2024-01-01

## 2024-01-01 RX ORDER — MEPERIDINE HYDROCHLORIDE 25 MG/ML
25 INJECTION INTRAMUSCULAR; INTRAVENOUS; SUBCUTANEOUS EVERY 30 MIN PRN
Status: CANCELLED | OUTPATIENT
Start: 2024-01-01

## 2024-01-01 RX ORDER — HEPARIN SODIUM,PORCINE 10 UNIT/ML
5-20 VIAL (ML) INTRAVENOUS DAILY PRN
Status: CANCELLED | OUTPATIENT
Start: 2024-01-01

## 2024-01-01 RX ORDER — DIPHENHYDRAMINE HYDROCHLORIDE 50 MG/ML
50 INJECTION INTRAMUSCULAR; INTRAVENOUS
Status: CANCELLED
Start: 2024-01-01

## 2024-01-01 RX ORDER — TACROLIMUS 0.5 MG/1
0.5 CAPSULE ORAL 2 TIMES DAILY
Qty: 60 CAPSULE | Refills: 11 | Status: SHIPPED | OUTPATIENT
Start: 2024-01-01 | End: 2024-01-01

## 2024-01-01 RX ORDER — ALBUTEROL SULFATE 90 UG/1
1-2 AEROSOL, METERED RESPIRATORY (INHALATION)
Status: CANCELLED
Start: 2024-01-01

## 2024-01-01 RX ORDER — TACROLIMUS 1 MG/1
7 CAPSULE ORAL 2 TIMES DAILY
COMMUNITY
Start: 2024-01-01 | End: 2024-01-01

## 2024-01-01 RX ORDER — ALBUTEROL SULFATE 0.83 MG/ML
2.5 SOLUTION RESPIRATORY (INHALATION)
Status: CANCELLED | OUTPATIENT
Start: 2024-01-01

## 2024-01-01 RX ORDER — AMOXICILLIN 250 MG
2 CAPSULE ORAL 2 TIMES DAILY PRN
Status: DISCONTINUED | OUTPATIENT
Start: 2024-01-01 | End: 2024-04-25 | Stop reason: HOSPADM

## 2024-01-01 RX ORDER — MIDAZOLAM HCL IN 0.9 % NACL/PF 1 MG/ML
1-8 PLASTIC BAG, INJECTION (ML) INTRAVENOUS CONTINUOUS
Status: DISCONTINUED | OUTPATIENT
Start: 2024-01-01 | End: 2024-04-25 | Stop reason: HOSPADM

## 2024-01-01 RX ORDER — EPINEPHRINE 1 MG/ML
0.3 INJECTION, SOLUTION, CONCENTRATE INTRAVENOUS EVERY 5 MIN PRN
Status: CANCELLED | OUTPATIENT
Start: 2024-01-01

## 2024-01-01 RX ORDER — EVEROLIMUS 0.5 MG/1
TABLET ORAL
Qty: 120 TABLET | Refills: 11 | Status: SHIPPED | OUTPATIENT
Start: 2024-01-01 | End: 2024-01-01

## 2024-01-01 RX ORDER — CIPROFLOXACIN 500 MG/1
500 TABLET, FILM COATED ORAL 2 TIMES DAILY
Qty: 28 TABLET | Refills: 0 | Status: SHIPPED | OUTPATIENT
Start: 2024-01-01 | End: 2024-01-01

## 2024-01-01 RX ORDER — MAGNESIUM OXIDE 400 MG/1
400 TABLET ORAL DAILY
Qty: 120 TABLET | Refills: 11 | Status: SHIPPED | OUTPATIENT
Start: 2024-01-01 | End: 2024-01-01

## 2024-01-01 RX ORDER — HEPARIN SODIUM (PORCINE) LOCK FLUSH IV SOLN 100 UNIT/ML 100 UNIT/ML
5 SOLUTION INTRAVENOUS
Status: CANCELLED | OUTPATIENT
Start: 2024-01-01

## 2024-01-01 RX ORDER — EPINEPHRINE 1 MG/ML
0.3 INJECTION, SOLUTION INTRAMUSCULAR; SUBCUTANEOUS EVERY 5 MIN PRN
Status: DISCONTINUED | OUTPATIENT
Start: 2024-01-01 | End: 2024-01-01

## 2024-01-01 RX ORDER — PHENYLEPHRINE HCL IN 0.9% NACL 50MG/250ML
.1-6 PLASTIC BAG, INJECTION (ML) INTRAVENOUS CONTINUOUS
Status: DISCONTINUED | OUTPATIENT
Start: 2024-01-01 | End: 2024-01-01 | Stop reason: HOSPADM

## 2024-01-01 RX ORDER — AMLODIPINE BESYLATE 10 MG/1
10 TABLET ORAL DAILY
Status: DISCONTINUED | OUTPATIENT
Start: 2024-01-01 | End: 2024-01-01

## 2024-01-01 RX ORDER — METHYLPREDNISOLONE SODIUM SUCCINATE 125 MG/2ML
125 INJECTION, POWDER, LYOPHILIZED, FOR SOLUTION INTRAMUSCULAR; INTRAVENOUS
Status: CANCELLED
Start: 2024-01-01

## 2024-01-01 RX ORDER — PROCHLORPERAZINE 25 MG
12.5 SUPPOSITORY, RECTAL RECTAL EVERY 12 HOURS PRN
Status: DISCONTINUED | OUTPATIENT
Start: 2024-01-01 | End: 2024-01-01 | Stop reason: HOSPADM

## 2024-01-01 RX ORDER — EVEROLIMUS 0.5 MG/1
0.5 TABLET ORAL 2 TIMES DAILY
Qty: 60 TABLET | Refills: 11 | Status: SHIPPED | OUTPATIENT
Start: 2024-01-01 | End: 2024-01-01

## 2024-01-01 RX ORDER — CHLORHEXIDINE GLUCONATE ORAL RINSE 1.2 MG/ML
15 SOLUTION DENTAL EVERY 12 HOURS
Status: DISCONTINUED | OUTPATIENT
Start: 2024-01-01 | End: 2024-01-01 | Stop reason: HOSPADM

## 2024-01-01 RX ORDER — ALPRAZOLAM 0.25 MG
0.25 TABLET ORAL DAILY PRN
Status: DISCONTINUED | OUTPATIENT
Start: 2024-01-01 | End: 2024-01-01 | Stop reason: HOSPADM

## 2024-01-01 RX ORDER — NALOXONE HYDROCHLORIDE 0.4 MG/ML
0.2 INJECTION, SOLUTION INTRAMUSCULAR; INTRAVENOUS; SUBCUTANEOUS
Status: DISCONTINUED | OUTPATIENT
Start: 2024-01-01 | End: 2024-01-01 | Stop reason: HOSPADM

## 2024-01-01 RX ORDER — ASCORBIC ACID, THIAMINE, RIBOFLAVIN, NIACINAMIDE, PYRIDOXINE, FOLIC ACID, COBALAMIN, BIOTIN, PANTOTHENIC ACID 100; 1.5; 1.7; 20; 10; 1; 6; 300; 1 MG/1; MG/1; MG/1; MG/1; MG/1; MG/1; UG/1; UG/1; MG/1
1 TABLET, COATED ORAL DAILY
COMMUNITY
Start: 2024-01-01

## 2024-01-01 RX ORDER — TACROLIMUS 5 MG/1
5 CAPSULE ORAL 2 TIMES DAILY
Qty: 60 CAPSULE | Refills: 11 | Status: SHIPPED | OUTPATIENT
Start: 2024-01-01 | End: 2024-01-01

## 2024-01-01 RX ORDER — VANCOMYCIN HYDROCHLORIDE 125 MG/1
125 CAPSULE ORAL 4 TIMES DAILY
Status: DISCONTINUED | OUTPATIENT
Start: 2024-01-01 | End: 2024-01-01 | Stop reason: HOSPADM

## 2024-01-01 RX ORDER — PHENYLEPHRINE HCL IN 0.9% NACL 50MG/250ML
.1-6 PLASTIC BAG, INJECTION (ML) INTRAVENOUS CONTINUOUS
Status: DISCONTINUED | OUTPATIENT
Start: 2024-01-01 | End: 2024-01-01

## 2024-01-01 RX ORDER — MAGNESIUM OXIDE 400 MG/1
800 TABLET ORAL 2 TIMES DAILY
Status: DISCONTINUED | OUTPATIENT
Start: 2024-01-01 | End: 2024-01-01 | Stop reason: HOSPADM

## 2024-01-01 RX ORDER — VALGANCICLOVIR 450 MG/1
450 TABLET, FILM COATED ORAL DAILY
Status: DISCONTINUED | OUTPATIENT
Start: 2024-01-01 | End: 2024-01-01

## 2024-01-01 RX ORDER — PREDNISONE 20 MG/1
20 TABLET ORAL DAILY
Qty: 30 TABLET | Refills: 0 | Status: SHIPPED | OUTPATIENT
Start: 2024-01-01 | End: 2024-01-01

## 2024-01-01 RX ORDER — EVEROLIMUS 0.75 MG/1
1.5 TABLET ORAL 2 TIMES DAILY
Qty: 120 TABLET | Refills: 11 | Status: SHIPPED | OUTPATIENT
Start: 2024-01-01 | End: 2024-01-01

## 2024-01-01 RX ORDER — ALBUTEROL SULFATE 0.83 MG/ML
2.5 SOLUTION RESPIRATORY (INHALATION)
Qty: 3 ML | Refills: 0 | Status: COMPLETED | OUTPATIENT
Start: 2024-01-01 | End: 2024-01-01

## 2024-01-01 RX ORDER — FLUDROCORTISONE ACETATE 0.1 MG/1
0.1 TABLET ORAL DAILY
Status: DISCONTINUED | OUTPATIENT
Start: 2024-01-01 | End: 2024-04-25 | Stop reason: HOSPADM

## 2024-01-01 RX ORDER — MEPERIDINE HYDROCHLORIDE 25 MG/ML
25 INJECTION INTRAMUSCULAR; INTRAVENOUS; SUBCUTANEOUS EVERY 30 MIN PRN
Status: CANCELLED | OUTPATIENT
Start: 2024-05-09

## 2024-01-01 RX ORDER — EPINEPHRINE 1 MG/ML
0.3 INJECTION, SOLUTION INTRAMUSCULAR; SUBCUTANEOUS EVERY 5 MIN PRN
Status: CANCELLED | OUTPATIENT
Start: 2024-05-09

## 2024-01-01 RX ORDER — DEXTROSE MONOHYDRATE 50 MG/ML
10-20 INJECTION, SOLUTION INTRAVENOUS
Qty: 25 ML | Refills: 0 | Status: DISCONTINUED | OUTPATIENT
Start: 2024-01-01 | End: 2024-01-01

## 2024-01-01 RX ORDER — PROCHLORPERAZINE MALEATE 5 MG
5 TABLET ORAL EVERY 6 HOURS PRN
Status: DISCONTINUED | OUTPATIENT
Start: 2024-01-01 | End: 2024-04-25 | Stop reason: HOSPADM

## 2024-01-01 RX ORDER — TACROLIMUS 1 MG/1
1 CAPSULE ORAL 2 TIMES DAILY
Qty: 60 CAPSULE | Refills: 11 | Status: SHIPPED | OUTPATIENT
Start: 2024-01-01 | End: 2024-01-01

## 2024-01-01 RX ORDER — LIDOCAINE 40 MG/G
CREAM TOPICAL
Status: DISCONTINUED | OUTPATIENT
Start: 2024-01-01 | End: 2024-01-01

## 2024-01-01 RX ORDER — AMLODIPINE BESYLATE 5 MG/1
5 TABLET ORAL EVERY EVENING
Status: DISCONTINUED | OUTPATIENT
Start: 2024-01-01 | End: 2024-01-01 | Stop reason: HOSPADM

## 2024-01-01 RX ORDER — LIDOCAINE 40 MG/G
CREAM TOPICAL
Status: DISCONTINUED | OUTPATIENT
Start: 2024-01-01 | End: 2024-04-25 | Stop reason: HOSPADM

## 2024-01-01 RX ORDER — LIDOCAINE 4 G/G
2 PATCH TOPICAL EVERY 24 HOURS
Qty: 3 PATCH | Refills: 3 | Status: SHIPPED | OUTPATIENT
Start: 2024-01-01 | End: 2024-01-01

## 2024-01-01 RX ORDER — ACETAMINOPHEN 500 MG
500-1000 TABLET ORAL EVERY 6 HOURS PRN
Status: DISCONTINUED | OUTPATIENT
Start: 2024-01-01 | End: 2024-01-01 | Stop reason: HOSPADM

## 2024-01-01 RX ORDER — ACETAMINOPHEN 325 MG/1
975 TABLET ORAL 3 TIMES DAILY
Status: DISCONTINUED | OUTPATIENT
Start: 2024-01-01 | End: 2024-01-01 | Stop reason: HOSPADM

## 2024-01-01 RX ORDER — VANCOMYCIN HYDROCHLORIDE 125 MG/1
125 CAPSULE ORAL ONCE
Status: COMPLETED | OUTPATIENT
Start: 2024-01-01 | End: 2024-01-01

## 2024-01-01 RX ORDER — DIPHENHYDRAMINE HYDROCHLORIDE 50 MG/ML
50 INJECTION INTRAMUSCULAR; INTRAVENOUS
Status: DISCONTINUED | OUTPATIENT
Start: 2024-01-01 | End: 2024-01-01

## 2024-01-01 RX ORDER — FLUDROCORTISONE ACETATE 0.1 MG/1
0.1 TABLET ORAL DAILY
Status: DISCONTINUED | OUTPATIENT
Start: 2024-01-01 | End: 2024-01-01 | Stop reason: HOSPADM

## 2024-01-01 RX ORDER — ALBUTEROL SULFATE 0.83 MG/ML
2.5 SOLUTION RESPIRATORY (INHALATION) ONCE
Status: CANCELLED
Start: 2024-01-01 | End: 2024-01-01

## 2024-01-01 RX ORDER — DEXTROSE MONOHYDRATE, SODIUM CHLORIDE, AND POTASSIUM CHLORIDE 50; 1.49; 4.5 G/1000ML; G/1000ML; G/1000ML
INJECTION, SOLUTION INTRAVENOUS CONTINUOUS
Status: DISCONTINUED | OUTPATIENT
Start: 2024-01-01 | End: 2024-01-01 | Stop reason: ALTCHOICE

## 2024-01-01 RX ORDER — EVEROLIMUS 0.75 MG/1
0.75 TABLET ORAL 2 TIMES DAILY
Status: DISCONTINUED | OUTPATIENT
Start: 2024-01-01 | End: 2024-01-01 | Stop reason: HOSPADM

## 2024-01-01 RX ORDER — AMOXICILLIN 250 MG
2 CAPSULE ORAL 2 TIMES DAILY PRN
Status: DISCONTINUED | OUTPATIENT
Start: 2024-01-01 | End: 2024-01-01 | Stop reason: HOSPADM

## 2024-01-01 RX ORDER — ONDANSETRON 2 MG/ML
4 INJECTION INTRAMUSCULAR; INTRAVENOUS EVERY 6 HOURS PRN
Status: DISCONTINUED | OUTPATIENT
Start: 2024-01-01 | End: 2024-01-01

## 2024-01-01 RX ORDER — PROCHLORPERAZINE 25 MG
12.5 SUPPOSITORY, RECTAL RECTAL EVERY 12 HOURS PRN
Status: DISCONTINUED | OUTPATIENT
Start: 2024-01-01 | End: 2024-01-01

## 2024-01-01 RX ORDER — NALOXONE HYDROCHLORIDE 0.4 MG/ML
0.4 INJECTION, SOLUTION INTRAMUSCULAR; INTRAVENOUS; SUBCUTANEOUS
Status: DISCONTINUED | OUTPATIENT
Start: 2024-01-01 | End: 2024-01-01 | Stop reason: HOSPADM

## 2024-01-01 RX ORDER — TACROLIMUS 0.5 MG/1
0.5 CAPSULE ORAL 2 TIMES DAILY
COMMUNITY
Start: 2024-01-01 | End: 2024-01-01

## 2024-01-01 RX ORDER — MAGNESIUM SULFATE HEPTAHYDRATE 40 MG/ML
2 INJECTION, SOLUTION INTRAVENOUS EVERY 8 HOURS PRN
Status: DISCONTINUED | OUTPATIENT
Start: 2024-01-01 | End: 2024-04-25 | Stop reason: HOSPADM

## 2024-01-01 RX ORDER — SODIUM BICARBONATE 650 MG/1
1300 TABLET ORAL 3 TIMES DAILY
Qty: 540 TABLET | Refills: 3 | Status: SHIPPED | OUTPATIENT
Start: 2024-01-01

## 2024-01-01 RX ORDER — LORAZEPAM 2 MG/ML
0.5 INJECTION INTRAMUSCULAR ONCE
Status: DISCONTINUED | OUTPATIENT
Start: 2024-01-01 | End: 2024-01-01 | Stop reason: HOSPADM

## 2024-01-01 RX ORDER — VALGANCICLOVIR 450 MG/1
450 TABLET, FILM COATED ORAL EVERY OTHER DAY
Status: DISCONTINUED | OUTPATIENT
Start: 2024-01-01 | End: 2024-01-01 | Stop reason: HOSPADM

## 2024-01-01 RX ORDER — METHOCARBAMOL 500 MG/1
750 TABLET, FILM COATED ORAL 4 TIMES DAILY
Qty: 16 TABLET | Refills: 0 | Status: SHIPPED | OUTPATIENT
Start: 2024-01-01 | End: 2024-01-01

## 2024-01-01 RX ORDER — ALPRAZOLAM 0.25 MG
0.25 TABLET ORAL DAILY PRN
Qty: 20 TABLET | Refills: 0 | Status: SHIPPED | OUTPATIENT
Start: 2024-01-01

## 2024-01-01 RX ORDER — PENTAMIDINE ISETHIONATE 300 MG/300MG
300 INHALANT RESPIRATORY (INHALATION) ONCE
Status: CANCELLED
Start: 2024-01-01 | End: 2024-01-01

## 2024-01-01 RX ORDER — GLYCOPYRROLATE 0.2 MG/ML
0.2 INJECTION, SOLUTION INTRAMUSCULAR; INTRAVENOUS ONCE
Status: DISCONTINUED | OUTPATIENT
Start: 2024-01-01 | End: 2024-04-25 | Stop reason: HOSPADM

## 2024-01-01 RX ORDER — FILGRASTIM-AAFI 300 UG/.5ML
300 INJECTION, SOLUTION SUBCUTANEOUS DAILY
Qty: 1.5 ML | Refills: 0 | Status: SHIPPED | OUTPATIENT
Start: 2024-01-01 | End: 2024-01-01

## 2024-01-01 RX ORDER — MEPERIDINE HYDROCHLORIDE 25 MG/ML
25 INJECTION INTRAMUSCULAR; INTRAVENOUS; SUBCUTANEOUS EVERY 30 MIN PRN
Status: DISCONTINUED | OUTPATIENT
Start: 2024-01-01 | End: 2024-01-01

## 2024-01-01 RX ORDER — CALCIUM CARBONATE 500 MG/1
1000 TABLET, CHEWABLE ORAL 4 TIMES DAILY PRN
Status: DISCONTINUED | OUTPATIENT
Start: 2024-01-01 | End: 2024-01-01 | Stop reason: HOSPADM

## 2024-01-01 RX ORDER — AMLODIPINE BESYLATE 5 MG/1
5 TABLET ORAL DAILY
Status: DISCONTINUED | OUTPATIENT
Start: 2024-01-01 | End: 2024-01-01

## 2024-01-01 RX ORDER — PENTAMIDINE ISETHIONATE 300 MG/300MG
300 INHALANT RESPIRATORY (INHALATION)
COMMUNITY

## 2024-01-01 RX ORDER — HYDROMORPHONE HYDROCHLORIDE 1 MG/ML
0.5 INJECTION, SOLUTION INTRAMUSCULAR; INTRAVENOUS; SUBCUTANEOUS
Status: DISCONTINUED | OUTPATIENT
Start: 2024-01-01 | End: 2024-01-01

## 2024-01-01 RX ORDER — PENTAMIDINE ISETHIONATE 300 MG/300MG
300 INHALANT RESPIRATORY (INHALATION)
Qty: 300 MG | Refills: 0 | Status: COMPLETED | OUTPATIENT
Start: 2024-01-01 | End: 2024-01-01

## 2024-01-01 RX ORDER — ONDANSETRON 2 MG/ML
4 INJECTION INTRAMUSCULAR; INTRAVENOUS EVERY 6 HOURS PRN
Status: DISCONTINUED | OUTPATIENT
Start: 2024-01-01 | End: 2024-04-25 | Stop reason: HOSPADM

## 2024-01-01 RX ORDER — EVEROLIMUS 0.75 MG/1
1.5 TABLET ORAL 2 TIMES DAILY
COMMUNITY
Start: 2024-01-01 | End: 2024-01-01

## 2024-01-01 RX ORDER — NALOXONE HYDROCHLORIDE 0.4 MG/ML
0.4 INJECTION, SOLUTION INTRAMUSCULAR; INTRAVENOUS; SUBCUTANEOUS
Status: DISCONTINUED | OUTPATIENT
Start: 2024-01-01 | End: 2024-04-25 | Stop reason: HOSPADM

## 2024-01-01 RX ORDER — HYDROMORPHONE HYDROCHLORIDE 1 MG/ML
0.5 INJECTION, SOLUTION INTRAMUSCULAR; INTRAVENOUS; SUBCUTANEOUS ONCE
Status: COMPLETED | OUTPATIENT
Start: 2024-01-01 | End: 2024-01-01

## 2024-01-01 RX ORDER — CALCIUM GLUCONATE 20 MG/ML
2 INJECTION, SOLUTION INTRAVENOUS EVERY 8 HOURS PRN
Status: DISCONTINUED | OUTPATIENT
Start: 2024-01-01 | End: 2024-04-25 | Stop reason: HOSPADM

## 2024-01-01 RX ORDER — SODIUM BICARBONATE 650 MG/1
1300 TABLET ORAL 3 TIMES DAILY
Status: DISCONTINUED | OUTPATIENT
Start: 2024-01-01 | End: 2024-01-01 | Stop reason: HOSPADM

## 2024-01-01 RX ORDER — METHYLPREDNISOLONE SODIUM SUCCINATE 125 MG/2ML
125 INJECTION, POWDER, LYOPHILIZED, FOR SOLUTION INTRAMUSCULAR; INTRAVENOUS
Status: CANCELLED
Start: 2024-05-09

## 2024-01-01 RX ORDER — AMOXICILLIN 250 MG
1 CAPSULE ORAL 2 TIMES DAILY PRN
Status: DISCONTINUED | OUTPATIENT
Start: 2024-01-01 | End: 2024-01-01 | Stop reason: HOSPADM

## 2024-01-01 RX ORDER — NOREPINEPHRINE BITARTRATE 0.02 MG/ML
.01-.6 INJECTION, SOLUTION INTRAVENOUS CONTINUOUS
Status: DISCONTINUED | OUTPATIENT
Start: 2024-01-01 | End: 2024-01-01

## 2024-01-01 RX ORDER — AMLODIPINE BESYLATE 10 MG/1
10 TABLET ORAL DAILY
Qty: 90 TABLET | Refills: 3 | Status: ON HOLD | OUTPATIENT
Start: 2024-01-01 | End: 2024-01-01

## 2024-01-01 RX ORDER — MYCOPHENOLATE MOFETIL 250 MG/1
750 CAPSULE ORAL
Status: DISCONTINUED | OUTPATIENT
Start: 2024-01-01 | End: 2024-01-01 | Stop reason: HOSPADM

## 2024-01-01 RX ORDER — MAGNESIUM OXIDE 400 MG/1
400 TABLET ORAL 2 TIMES DAILY
COMMUNITY
Start: 2024-01-01 | End: 2024-01-01

## 2024-01-01 RX ORDER — FLUDROCORTISONE ACETATE 0.1 MG/1
0.1 TABLET ORAL DAILY
Qty: 90 TABLET | Refills: 1 | Status: SHIPPED | OUTPATIENT
Start: 2024-01-01

## 2024-01-01 RX ORDER — ERTAPENEM 1 G/1
1 INJECTION, POWDER, LYOPHILIZED, FOR SOLUTION INTRAMUSCULAR; INTRAVENOUS EVERY 24 HOURS
Status: DISCONTINUED | OUTPATIENT
Start: 2024-01-01 | End: 2024-01-01

## 2024-01-01 RX ORDER — EVEROLIMUS 0.5 MG/1
1 TABLET ORAL 2 TIMES DAILY
Qty: 120 TABLET | Refills: 11 | Status: SHIPPED | OUTPATIENT
Start: 2024-01-01 | End: 2024-01-01

## 2024-01-01 RX ORDER — HYDROMORPHONE HYDROCHLORIDE 1 MG/ML
0.5 INJECTION, SOLUTION INTRAMUSCULAR; INTRAVENOUS; SUBCUTANEOUS
Status: COMPLETED | OUTPATIENT
Start: 2024-01-01 | End: 2024-01-01

## 2024-01-01 RX ORDER — CARVEDILOL 6.25 MG/1
6.25 TABLET ORAL 2 TIMES DAILY WITH MEALS
Start: 2024-01-01

## 2024-01-01 RX ORDER — NOREPINEPHRINE BITARTRATE 0.06 MG/ML
.01-.6 INJECTION, SOLUTION INTRAVENOUS CONTINUOUS
Status: DISCONTINUED | OUTPATIENT
Start: 2024-01-01 | End: 2024-01-01

## 2024-01-01 RX ORDER — EVEROLIMUS 0.5 MG/1
1 TABLET ORAL 2 TIMES DAILY
COMMUNITY
Start: 2024-01-01 | End: 2024-01-01

## 2024-01-01 RX ORDER — LIDOCAINE 40 MG/G
CREAM TOPICAL
Status: DISCONTINUED | OUTPATIENT
Start: 2024-01-01 | End: 2024-01-01 | Stop reason: HOSPADM

## 2024-01-01 RX ORDER — EVEROLIMUS 0.75 MG/1
3.5 TABLET ORAL 2 TIMES DAILY
Qty: 300 TABLET | Refills: 11 | Status: SHIPPED | OUTPATIENT
Start: 2024-01-01 | End: 2024-01-01

## 2024-01-01 RX ORDER — CALCIUM CHLORIDE, MAGNESIUM CHLORIDE, DEXTROSE MONOHYDRATE, LACTIC ACID, SODIUM CHLORIDE, SODIUM BICARBONATE AND POTASSIUM CHLORIDE 5.15; 2.03; 22; 5.4; 6.46; 3.09; .157 G/L; G/L; G/L; G/L; G/L; G/L; G/L
20 INJECTION INTRAVENOUS CONTINUOUS
Status: DISCONTINUED | OUTPATIENT
Start: 2024-01-01 | End: 2024-04-25 | Stop reason: HOSPADM

## 2024-01-01 RX ORDER — PENTAMIDINE ISETHIONATE 300 MG/300MG
300 INHALANT RESPIRATORY (INHALATION) ONCE
Status: CANCELLED
Start: 2024-05-09 | End: 2024-05-09

## 2024-01-01 RX ORDER — CARVEDILOL 3.12 MG/1
6.25 TABLET ORAL 2 TIMES DAILY WITH MEALS
Status: DISCONTINUED | OUTPATIENT
Start: 2024-01-01 | End: 2024-01-01

## 2024-01-01 RX ORDER — POTASSIUM CHLORIDE 29.8 MG/ML
20 INJECTION INTRAVENOUS EVERY 8 HOURS PRN
Status: DISCONTINUED | OUTPATIENT
Start: 2024-01-01 | End: 2024-04-25 | Stop reason: HOSPADM

## 2024-01-01 RX ORDER — DIPHENHYDRAMINE HYDROCHLORIDE 50 MG/ML
50 INJECTION INTRAMUSCULAR; INTRAVENOUS
Status: CANCELLED
Start: 2024-05-09

## 2024-01-01 RX ORDER — METHYLPREDNISOLONE SODIUM SUCCINATE 125 MG/2ML
125 INJECTION, POWDER, LYOPHILIZED, FOR SOLUTION INTRAMUSCULAR; INTRAVENOUS
Status: DISCONTINUED | OUTPATIENT
Start: 2024-01-01 | End: 2024-01-01

## 2024-01-01 RX ORDER — GLYCOPYRROLATE 0.2 MG/ML
0.1 INJECTION, SOLUTION INTRAMUSCULAR; INTRAVENOUS EVERY 4 HOURS PRN
Status: DISCONTINUED | OUTPATIENT
Start: 2024-01-01 | End: 2024-04-25 | Stop reason: HOSPADM

## 2024-01-01 RX ORDER — ALPRAZOLAM 0.25 MG
0.25 TABLET ORAL DAILY PRN
Qty: 20 TABLET | Refills: 0 | Status: SHIPPED | OUTPATIENT
Start: 2024-01-01 | End: 2024-01-01

## 2024-01-01 RX ORDER — ONDANSETRON 4 MG/1
4 TABLET, ORALLY DISINTEGRATING ORAL EVERY 6 HOURS PRN
Status: DISCONTINUED | OUTPATIENT
Start: 2024-01-01 | End: 2024-01-01

## 2024-01-01 RX ORDER — EVEROLIMUS 0.5 MG/1
TABLET ORAL
Qty: 30 TABLET | Refills: 11 | Status: SHIPPED | OUTPATIENT
Start: 2024-01-01 | End: 2024-01-01

## 2024-01-01 RX ORDER — SODIUM CHLORIDE, SODIUM LACTATE, POTASSIUM CHLORIDE, CALCIUM CHLORIDE 600; 310; 30; 20 MG/100ML; MG/100ML; MG/100ML; MG/100ML
INJECTION, SOLUTION INTRAVENOUS CONTINUOUS
Status: DISCONTINUED | OUTPATIENT
Start: 2024-01-01 | End: 2024-01-01 | Stop reason: DRUGHIGH

## 2024-01-01 RX ORDER — SODIUM BICARBONATE 650 MG/1
1300 TABLET ORAL 2 TIMES DAILY
Status: DISCONTINUED | OUTPATIENT
Start: 2024-01-01 | End: 2024-01-01 | Stop reason: HOSPADM

## 2024-01-01 RX ORDER — ACETAMINOPHEN 325 MG/10.15ML
650 LIQUID ORAL EVERY 4 HOURS PRN
Status: DISCONTINUED | OUTPATIENT
Start: 2024-01-01 | End: 2024-04-25 | Stop reason: HOSPADM

## 2024-01-01 RX ORDER — PIPERACILLIN SODIUM, TAZOBACTAM SODIUM 4; .5 G/20ML; G/20ML
4.5 INJECTION, POWDER, LYOPHILIZED, FOR SOLUTION INTRAVENOUS EVERY 6 HOURS
Status: DISCONTINUED | OUTPATIENT
Start: 2024-01-01 | End: 2024-01-01

## 2024-01-01 RX ORDER — TACROLIMUS 5 MG/1
5 CAPSULE ORAL 2 TIMES DAILY
Qty: 60 CAPSULE | Refills: 11 | Status: SHIPPED | OUTPATIENT
Start: 2024-01-01

## 2024-01-01 RX ORDER — CARVEDILOL 6.25 MG/1
6.25 TABLET ORAL 2 TIMES DAILY WITH MEALS
Status: DISCONTINUED | OUTPATIENT
Start: 2024-01-01 | End: 2024-01-01

## 2024-01-01 RX ORDER — TACROLIMUS 0.5 MG/1
0.5 CAPSULE ORAL 2 TIMES DAILY
Qty: 180 CAPSULE | Refills: 4 | Status: ON HOLD | OUTPATIENT
Start: 2024-01-01 | End: 2024-01-01

## 2024-01-01 RX ORDER — DEXTROSE MONOHYDRATE 50 MG/ML
10-20 INJECTION, SOLUTION INTRAVENOUS ONCE
Status: COMPLETED | OUTPATIENT
Start: 2024-01-01 | End: 2024-01-01

## 2024-01-01 RX ORDER — ONDANSETRON 2 MG/ML
4 INJECTION INTRAMUSCULAR; INTRAVENOUS ONCE
Status: COMPLETED | OUTPATIENT
Start: 2024-01-01 | End: 2024-01-01

## 2024-01-01 RX ORDER — NALOXONE HYDROCHLORIDE 0.4 MG/ML
0.2 INJECTION, SOLUTION INTRAMUSCULAR; INTRAVENOUS; SUBCUTANEOUS
Status: DISCONTINUED | OUTPATIENT
Start: 2024-01-01 | End: 2024-04-25 | Stop reason: HOSPADM

## 2024-01-01 RX ORDER — ONDANSETRON 4 MG/1
4 TABLET, ORALLY DISINTEGRATING ORAL EVERY 6 HOURS PRN
Status: DISCONTINUED | OUTPATIENT
Start: 2024-01-01 | End: 2024-01-01 | Stop reason: HOSPADM

## 2024-01-01 RX ORDER — MEROPENEM 1 G/1
1 INJECTION, POWDER, FOR SOLUTION INTRAVENOUS EVERY 8 HOURS
Status: DISCONTINUED | OUTPATIENT
Start: 2024-01-01 | End: 2024-01-01

## 2024-01-01 RX ORDER — ATORVASTATIN CALCIUM 10 MG/1
10 TABLET, FILM COATED ORAL DAILY
Status: DISCONTINUED | OUTPATIENT
Start: 2024-01-01 | End: 2024-01-01 | Stop reason: HOSPADM

## 2024-01-01 RX ORDER — ROPIVACAINE IN 0.9% SOD CHL/PF 0.1 %
.03-.125 PLASTIC BAG, INJECTION (ML) EPIDURAL CONTINUOUS
Status: DISCONTINUED | OUTPATIENT
Start: 2024-01-01 | End: 2024-01-01 | Stop reason: DRUGHIGH

## 2024-01-01 RX ORDER — HYDROXYZINE HYDROCHLORIDE 25 MG/1
50 TABLET, FILM COATED ORAL EVERY 6 HOURS PRN
Status: DISCONTINUED | OUTPATIENT
Start: 2024-01-01 | End: 2024-01-01 | Stop reason: HOSPADM

## 2024-01-01 RX ORDER — ALBUTEROL SULFATE 90 UG/1
1-2 AEROSOL, METERED RESPIRATORY (INHALATION)
Status: DISCONTINUED | OUTPATIENT
Start: 2024-01-01 | End: 2024-01-01

## 2024-01-01 RX ORDER — ACETAMINOPHEN 325 MG/1
650 TABLET ORAL EVERY 4 HOURS PRN
Status: DISCONTINUED | OUTPATIENT
Start: 2024-01-01 | End: 2024-04-25 | Stop reason: HOSPADM

## 2024-01-01 RX ORDER — PROCHLORPERAZINE MALEATE 5 MG
5 TABLET ORAL EVERY 6 HOURS PRN
Status: DISCONTINUED | OUTPATIENT
Start: 2024-01-01 | End: 2024-01-01 | Stop reason: HOSPADM

## 2024-01-01 RX ORDER — CALCIUM CHLORIDE 100 MG/ML
1 INJECTION INTRAVENOUS; INTRAVENTRICULAR ONCE
Status: COMPLETED | OUTPATIENT
Start: 2024-01-01 | End: 2024-01-01

## 2024-01-01 RX ORDER — HYDROMORPHONE HYDROCHLORIDE 1 MG/ML
0.3 INJECTION, SOLUTION INTRAMUSCULAR; INTRAVENOUS; SUBCUTANEOUS
Status: DISCONTINUED | OUTPATIENT
Start: 2024-01-01 | End: 2024-01-01

## 2024-01-01 RX ORDER — AMLODIPINE BESYLATE 5 MG/1
5 TABLET ORAL DAILY
Status: ON HOLD | COMMUNITY
End: 2024-01-01

## 2024-01-01 RX ORDER — SODIUM CHLORIDE 9 MG/ML
INJECTION, SOLUTION INTRAVENOUS CONTINUOUS
Status: DISCONTINUED | OUTPATIENT
Start: 2024-01-01 | End: 2024-01-01 | Stop reason: HOSPADM

## 2024-01-01 RX ORDER — CALCIUM CARBONATE 500 MG/1
1000 TABLET, CHEWABLE ORAL 4 TIMES DAILY PRN
Status: DISCONTINUED | OUTPATIENT
Start: 2024-01-01 | End: 2024-01-01

## 2024-01-01 RX ORDER — ONDANSETRON 2 MG/ML
4 INJECTION INTRAMUSCULAR; INTRAVENOUS EVERY 6 HOURS PRN
Status: DISCONTINUED | OUTPATIENT
Start: 2024-01-01 | End: 2024-01-01 | Stop reason: HOSPADM

## 2024-01-01 RX ORDER — SODIUM CHLORIDE, SODIUM LACTATE, POTASSIUM CHLORIDE, CALCIUM CHLORIDE 600; 310; 30; 20 MG/100ML; MG/100ML; MG/100ML; MG/100ML
INJECTION, SOLUTION INTRAVENOUS CONTINUOUS
Status: DISCONTINUED | OUTPATIENT
Start: 2024-01-01 | End: 2024-01-01

## 2024-01-01 RX ORDER — PHENYLEPHRINE HCL IN 0.9% NACL 50MG/250ML
.1-6 PLASTIC BAG, INJECTION (ML) INTRAVENOUS CONTINUOUS
Status: CANCELLED | OUTPATIENT
Start: 2024-01-01

## 2024-01-01 RX ORDER — TACROLIMUS 1 MG/1
5 CAPSULE ORAL 2 TIMES DAILY
Status: DISCONTINUED | OUTPATIENT
Start: 2024-01-01 | End: 2024-01-01 | Stop reason: HOSPADM

## 2024-01-01 RX ORDER — EVEROLIMUS 0.5 MG/1
1 TABLET ORAL 2 TIMES DAILY
Qty: 120 TABLET | Refills: 11 | Status: SHIPPED | OUTPATIENT
Start: 2024-01-01

## 2024-01-01 RX ORDER — METHOCARBAMOL 500 MG/1
500 TABLET, FILM COATED ORAL 4 TIMES DAILY
Status: DISCONTINUED | OUTPATIENT
Start: 2024-01-01 | End: 2024-01-01 | Stop reason: HOSPADM

## 2024-01-01 RX ORDER — MYCOPHENOLATE MOFETIL 250 MG/1
750 CAPSULE ORAL 2 TIMES DAILY
Status: DISCONTINUED | OUTPATIENT
Start: 2024-01-01 | End: 2024-01-01

## 2024-01-01 RX ORDER — AMOXICILLIN 250 MG
1 CAPSULE ORAL 2 TIMES DAILY PRN
Status: DISCONTINUED | OUTPATIENT
Start: 2024-01-01 | End: 2024-04-25 | Stop reason: HOSPADM

## 2024-01-01 RX ORDER — PIPERACILLIN SODIUM, TAZOBACTAM SODIUM 3; .375 G/15ML; G/15ML
3.38 INJECTION, POWDER, LYOPHILIZED, FOR SOLUTION INTRAVENOUS EVERY 6 HOURS
Status: DISCONTINUED | OUTPATIENT
Start: 2024-01-01 | End: 2024-01-01

## 2024-01-01 RX ORDER — PIPERACILLIN SODIUM, TAZOBACTAM SODIUM 2; .25 G/10ML; G/10ML
2.25 INJECTION, POWDER, LYOPHILIZED, FOR SOLUTION INTRAVENOUS EVERY 6 HOURS
Status: DISCONTINUED | OUTPATIENT
Start: 2024-01-01 | End: 2024-01-01

## 2024-01-01 RX ORDER — DAPSONE 25 MG/1
50 TABLET ORAL DAILY
Status: DISCONTINUED | OUTPATIENT
Start: 2024-01-01 | End: 2024-01-01 | Stop reason: HOSPADM

## 2024-01-01 RX ORDER — DEXTROSE MONOHYDRATE 25 G/50ML
50 INJECTION, SOLUTION INTRAVENOUS ONCE
Status: COMPLETED | OUTPATIENT
Start: 2024-01-01 | End: 2024-01-01

## 2024-01-01 RX ORDER — CALCIUM CHLORIDE, MAGNESIUM CHLORIDE, DEXTROSE MONOHYDRATE, LACTIC ACID, SODIUM CHLORIDE, SODIUM BICARBONATE AND POTASSIUM CHLORIDE 5.15; 2.03; 22; 5.4; 6.46; 3.09; .157 G/L; G/L; G/L; G/L; G/L; G/L; G/L
20 INJECTION INTRAVENOUS CONTINUOUS
Status: DISCONTINUED | OUTPATIENT
Start: 2024-01-01 | End: 2024-01-01

## 2024-01-01 RX ORDER — LIDOCAINE 4 G/G
1 PATCH TOPICAL
Status: DISCONTINUED | OUTPATIENT
Start: 2024-01-01 | End: 2024-01-01

## 2024-01-01 RX ORDER — TACROLIMUS 5 MG/1
5 CAPSULE ORAL 2 TIMES DAILY
Status: DISCONTINUED | OUTPATIENT
Start: 2024-01-01 | End: 2024-04-25 | Stop reason: HOSPADM

## 2024-01-01 RX ORDER — AMLODIPINE BESYLATE 5 MG/1
5 TABLET ORAL DAILY
Qty: 90 TABLET | Refills: 3 | Status: SHIPPED | OUTPATIENT
Start: 2024-01-01 | End: 2024-05-13

## 2024-01-01 RX ORDER — TACROLIMUS 1 MG/1
CAPSULE ORAL
Qty: 60 CAPSULE | Refills: 11 | Status: SHIPPED | OUTPATIENT
Start: 2024-01-01

## 2024-01-01 RX ORDER — POLYETHYLENE GLYCOL 3350 17 G/17G
17 POWDER, FOR SOLUTION ORAL DAILY PRN
Status: DISCONTINUED | OUTPATIENT
Start: 2024-01-01 | End: 2024-04-25 | Stop reason: HOSPADM

## 2024-01-01 RX ORDER — ONDANSETRON 4 MG/1
8 TABLET, ORALLY DISINTEGRATING ORAL EVERY 6 HOURS PRN
Status: DISCONTINUED | OUTPATIENT
Start: 2024-01-01 | End: 2024-01-01 | Stop reason: HOSPADM

## 2024-01-01 RX ORDER — ATORVASTATIN CALCIUM 10 MG/1
10 TABLET, FILM COATED ORAL DAILY
Status: DISCONTINUED | OUTPATIENT
Start: 2024-01-01 | End: 2024-01-01

## 2024-01-01 RX ORDER — KETOROLAC TROMETHAMINE 15 MG/ML
15 INJECTION, SOLUTION INTRAMUSCULAR; INTRAVENOUS ONCE
Status: COMPLETED | OUTPATIENT
Start: 2024-01-01 | End: 2024-01-01

## 2024-01-01 RX ORDER — ALBUTEROL SULFATE 0.83 MG/ML
2.5 SOLUTION RESPIRATORY (INHALATION)
Status: CANCELLED | OUTPATIENT
Start: 2024-05-09

## 2024-01-01 RX ORDER — VALGANCICLOVIR 450 MG/1
450 TABLET, FILM COATED ORAL DAILY
Status: DISCONTINUED | OUTPATIENT
Start: 2024-01-01 | End: 2024-01-01 | Stop reason: HOSPADM

## 2024-01-01 RX ORDER — SODIUM BICARBONATE 650 MG/1
1300 TABLET ORAL 2 TIMES DAILY
Qty: 360 TABLET | Refills: 1 | Status: SHIPPED | OUTPATIENT
Start: 2024-01-01 | End: 2024-01-01

## 2024-01-01 RX ORDER — CARVEDILOL 6.25 MG/1
6.25 TABLET ORAL 2 TIMES DAILY WITH MEALS
Status: DISCONTINUED | OUTPATIENT
Start: 2024-01-01 | End: 2024-01-01 | Stop reason: HOSPADM

## 2024-01-01 RX ORDER — AMPICILLIN AND SULBACTAM 2; 1 G/1; G/1
3 INJECTION, POWDER, FOR SOLUTION INTRAMUSCULAR; INTRAVENOUS EVERY 6 HOURS
Status: DISCONTINUED | OUTPATIENT
Start: 2024-01-01 | End: 2024-01-01

## 2024-01-01 RX ORDER — TACROLIMUS 1 MG/1
7 CAPSULE ORAL 2 TIMES DAILY
Qty: 420 CAPSULE | Refills: 11 | Status: ON HOLD | OUTPATIENT
Start: 2024-01-01 | End: 2024-01-01

## 2024-01-01 RX ORDER — AMLODIPINE BESYLATE 5 MG/1
10 TABLET ORAL DAILY
Status: DISCONTINUED | OUTPATIENT
Start: 2024-01-01 | End: 2024-01-01 | Stop reason: HOSPADM

## 2024-01-01 RX ORDER — CARVEDILOL 6.25 MG/1
6.25 TABLET ORAL 2 TIMES DAILY
Status: DISCONTINUED | OUTPATIENT
Start: 2024-01-01 | End: 2024-01-01 | Stop reason: HOSPADM

## 2024-01-01 RX ORDER — NOREPINEPHRINE BITARTRATE 0.06 MG/ML
.01-.6 INJECTION, SOLUTION INTRAVENOUS CONTINUOUS
Status: DISCONTINUED | OUTPATIENT
Start: 2024-01-01 | End: 2024-01-01 | Stop reason: HOSPADM

## 2024-01-01 RX ORDER — METRONIDAZOLE 500 MG/1
500 TABLET ORAL
Qty: 42 TABLET | Refills: 0 | Status: SHIPPED | OUTPATIENT
Start: 2024-01-01 | End: 2024-01-01

## 2024-01-01 RX ORDER — EVEROLIMUS 0.75 MG/1
0.75 TABLET ORAL 2 TIMES DAILY
Qty: 60 TABLET | Refills: 11 | Status: SHIPPED | OUTPATIENT
Start: 2024-01-01 | End: 2024-01-01

## 2024-01-01 RX ORDER — NICOTINE POLACRILEX 4 MG
15-30 LOZENGE BUCCAL
Status: DISCONTINUED | OUTPATIENT
Start: 2024-01-01 | End: 2024-04-25 | Stop reason: HOSPADM

## 2024-01-01 RX ORDER — EVEROLIMUS 0.75 MG/1
0.75 TABLET ORAL 2 TIMES DAILY
Status: DISCONTINUED | OUTPATIENT
Start: 2024-01-01 | End: 2024-04-25 | Stop reason: HOSPADM

## 2024-01-01 RX ORDER — MAGNESIUM OXIDE 400 MG/1
400 TABLET ORAL 2 TIMES DAILY
Qty: 180 TABLET | Refills: 0 | OUTPATIENT
Start: 2024-01-01

## 2024-01-01 RX ORDER — CHLORPROMAZINE HYDROCHLORIDE 25 MG/1
25 TABLET, FILM COATED ORAL 3 TIMES DAILY PRN
Status: DISCONTINUED | OUTPATIENT
Start: 2024-01-01 | End: 2024-01-01 | Stop reason: HOSPADM

## 2024-01-01 RX ORDER — MAGNESIUM SULFATE HEPTAHYDRATE 40 MG/ML
2 INJECTION, SOLUTION INTRAVENOUS ONCE
Status: COMPLETED | OUTPATIENT
Start: 2024-01-01 | End: 2024-01-01

## 2024-01-01 RX ORDER — ONDANSETRON 4 MG/1
4 TABLET, ORALLY DISINTEGRATING ORAL EVERY 6 HOURS PRN
Status: DISCONTINUED | OUTPATIENT
Start: 2024-01-01 | End: 2024-04-25 | Stop reason: HOSPADM

## 2024-01-01 RX ORDER — VANCOMYCIN HYDROCHLORIDE 125 MG/1
CAPSULE ORAL
Qty: 84 CAPSULE | Refills: 0 | Status: ON HOLD | OUTPATIENT
Start: 2024-01-01 | End: 2024-01-01

## 2024-01-01 RX ORDER — MIDAZOLAM HCL IN 0.9 % NACL/PF 1 MG/ML
1-8 PLASTIC BAG, INJECTION (ML) INTRAVENOUS CONTINUOUS
Status: DISCONTINUED | OUTPATIENT
Start: 2024-01-01 | End: 2024-01-01 | Stop reason: HOSPADM

## 2024-01-01 RX ORDER — PRENATAL VIT/IRON FUM/FOLIC AC 27MG-0.8MG
1 TABLET ORAL DAILY
Status: DISCONTINUED | OUTPATIENT
Start: 2024-01-01 | End: 2024-01-01 | Stop reason: HOSPADM

## 2024-01-01 RX ORDER — PROPOFOL 10 MG/ML
INJECTION, EMULSION INTRAVENOUS
Status: COMPLETED | OUTPATIENT
Start: 2024-01-01 | End: 2024-01-01

## 2024-01-01 RX ORDER — DAPSONE 25 MG/1
50 TABLET ORAL DAILY
Status: DISCONTINUED | OUTPATIENT
Start: 2024-01-01 | End: 2024-01-01

## 2024-01-01 RX ORDER — PRENATAL VIT/IRON FUM/FOLIC AC 27MG-0.8MG
1 TABLET ORAL DAILY
Qty: 30 TABLET | Refills: 11 | Status: SHIPPED | OUTPATIENT
Start: 2024-01-01

## 2024-01-01 RX ORDER — HEPARIN SODIUM 5000 [USP'U]/.5ML
5000 INJECTION, SOLUTION INTRAVENOUS; SUBCUTANEOUS EVERY 8 HOURS
Status: DISCONTINUED | OUTPATIENT
Start: 2024-01-01 | End: 2024-01-01

## 2024-01-01 RX ORDER — MYCOPHENOLATE MOFETIL 250 MG/1
750 CAPSULE ORAL 2 TIMES DAILY
Qty: 180 CAPSULE | Refills: 11 | Status: ON HOLD | OUTPATIENT
Start: 2024-01-01 | End: 2024-01-01

## 2024-01-01 RX ORDER — VANCOMYCIN HYDROCHLORIDE 125 MG/1
125 CAPSULE ORAL 4 TIMES DAILY
Qty: 16 CAPSULE | Refills: 0 | Status: ON HOLD | OUTPATIENT
Start: 2024-01-01 | End: 2024-01-01

## 2024-01-01 RX ORDER — ALBUTEROL SULFATE 0.83 MG/ML
2.5 SOLUTION RESPIRATORY (INHALATION) ONCE
Status: CANCELLED
Start: 2024-05-09 | End: 2024-05-09

## 2024-01-01 RX ORDER — EVEROLIMUS 0.75 MG/1
TABLET ORAL
Qty: 120 TABLET | Refills: 11 | Status: SHIPPED | OUTPATIENT
Start: 2024-01-01 | End: 2024-01-01

## 2024-01-01 RX ORDER — CHLORHEXIDINE GLUCONATE ORAL RINSE 1.2 MG/ML
15 SOLUTION DENTAL EVERY 12 HOURS
Status: DISCONTINUED | OUTPATIENT
Start: 2024-01-01 | End: 2024-04-25 | Stop reason: HOSPADM

## 2024-01-01 RX ORDER — EVEROLIMUS 0.5 MG/1
1 TABLET ORAL 2 TIMES DAILY
Qty: 120 TABLET | Refills: 11 | Status: CANCELLED | OUTPATIENT
Start: 2024-01-01

## 2024-01-01 RX ORDER — ALBUTEROL SULFATE 0.83 MG/ML
2.5 SOLUTION RESPIRATORY (INHALATION)
Status: DISCONTINUED | OUTPATIENT
Start: 2024-01-01 | End: 2024-01-01

## 2024-01-01 RX ORDER — LIDOCAINE HYDROCHLORIDE 10 MG/ML
1-30 INJECTION, SOLUTION EPIDURAL; INFILTRATION; INTRACAUDAL; PERINEURAL
Status: COMPLETED | OUTPATIENT
Start: 2024-01-01 | End: 2024-01-01

## 2024-01-01 RX ORDER — LIDOCAINE 4 G/G
2 PATCH TOPICAL
Status: DISCONTINUED | OUTPATIENT
Start: 2024-01-01 | End: 2024-01-01 | Stop reason: HOSPADM

## 2024-01-01 RX ORDER — PIPERACILLIN SODIUM, TAZOBACTAM SODIUM 2; .25 G/10ML; G/10ML
2.25 INJECTION, POWDER, LYOPHILIZED, FOR SOLUTION INTRAVENOUS EVERY 6 HOURS
Status: DISCONTINUED | OUTPATIENT
Start: 2024-01-01 | End: 2024-01-01 | Stop reason: HOSPADM

## 2024-01-01 RX ORDER — MAGNESIUM OXIDE 400 MG/1
400 TABLET ORAL 2 TIMES DAILY
Qty: 180 TABLET | Refills: 0 | Status: SHIPPED | OUTPATIENT
Start: 2024-01-01

## 2024-01-01 RX ORDER — TACROLIMUS 5 MG/1
5 CAPSULE ORAL
Status: DISCONTINUED | OUTPATIENT
Start: 2024-01-01 | End: 2024-01-01

## 2024-01-01 RX ORDER — POTASSIUM CHLORIDE 1500 MG/1
40 TABLET, EXTENDED RELEASE ORAL ONCE
Status: COMPLETED | OUTPATIENT
Start: 2024-01-01 | End: 2024-01-01

## 2024-01-01 RX ORDER — HYDROXYZINE HYDROCHLORIDE 25 MG/1
25 TABLET, FILM COATED ORAL EVERY 6 HOURS PRN
Status: DISCONTINUED | OUTPATIENT
Start: 2024-01-01 | End: 2024-01-01 | Stop reason: HOSPADM

## 2024-01-01 RX ORDER — MENTHOL 5.8 MG
3 LOZENGE MUCOUS MEMBRANE DAILY
COMMUNITY
Start: 2024-01-01 | End: 2024-01-01

## 2024-01-01 RX ORDER — EVEROLIMUS 0.75 MG/1
1.5 TABLET ORAL 2 TIMES DAILY
Qty: 120 TABLET | Refills: 8 | Status: SHIPPED | OUTPATIENT
Start: 2024-01-01 | End: 2024-01-01

## 2024-01-01 RX ORDER — TACROLIMUS 0.5 MG/1
0.5 CAPSULE ORAL 2 TIMES DAILY
Status: DISCONTINUED | OUTPATIENT
Start: 2024-01-01 | End: 2024-01-01

## 2024-01-01 RX ORDER — DEXTROSE MONOHYDRATE 25 G/50ML
25-50 INJECTION, SOLUTION INTRAVENOUS
Status: DISCONTINUED | OUTPATIENT
Start: 2024-01-01 | End: 2024-04-25 | Stop reason: HOSPADM

## 2024-01-01 RX ORDER — TACROLIMUS 0.5 MG/1
CAPSULE ORAL
Start: 2024-01-01 | End: 2024-01-01

## 2024-01-01 RX ORDER — EVEROLIMUS 0.75 MG/1
TABLET ORAL
Qty: 120 TABLET | Refills: 11 | Status: SHIPPED | OUTPATIENT
Start: 2024-01-01

## 2024-01-01 RX ADMIN — CHLORHEXIDINE GLUCONATE 15 ML: 1.2 SOLUTION ORAL at 08:36

## 2024-01-01 RX ADMIN — PHENYLEPHRINE HYDROCHLORIDE 150 MCG: 10 INJECTION INTRAVENOUS at 03:30

## 2024-01-01 RX ADMIN — PREDNISONE 7.5 MG: 2.5 TABLET ORAL at 10:49

## 2024-01-01 RX ADMIN — PRENATAL VIT W/ FE FUMARATE-FA TAB 27-0.8 MG 1 TABLET: 27-0.8 TAB at 09:01

## 2024-01-01 RX ADMIN — CALCIUM CHLORIDE, MAGNESIUM CHLORIDE, DEXTROSE MONOHYDRATE, LACTIC ACID, SODIUM CHLORIDE, SODIUM BICARBONATE AND POTASSIUM CHLORIDE 20 ML/KG/HR: 5.15; 2.03; 22; 5.4; 6.46; 3.09; .157 INJECTION INTRAVENOUS at 21:06

## 2024-01-01 RX ADMIN — SODIUM CHLORIDE 1000 ML: 9 INJECTION, SOLUTION INTRAVENOUS at 22:35

## 2024-01-01 RX ADMIN — FLUDROCORTISONE ACETATE 0.1 MG: 0.1 TABLET ORAL at 11:49

## 2024-01-01 RX ADMIN — PHENYLEPHRINE HYDROCHLORIDE 0.5 MCG/KG/MIN: 10 INJECTION INTRAVENOUS at 05:23

## 2024-01-01 RX ADMIN — FILGRASTIM-SNDZ 300 MCG: 300 INJECTION, SOLUTION INTRAVENOUS; SUBCUTANEOUS at 08:40

## 2024-01-01 RX ADMIN — CALCIUM CHLORIDE, MAGNESIUM CHLORIDE, DEXTROSE MONOHYDRATE, LACTIC ACID, SODIUM CHLORIDE, SODIUM BICARBONATE AND POTASSIUM CHLORIDE 20 ML/KG/HR: 5.15; 2.03; 22; 5.4; 6.46; 3.09; .157 INJECTION INTRAVENOUS at 17:31

## 2024-01-01 RX ADMIN — NOREPINEPHRINE BITARTRATE 0.45 MCG/KG/MIN: 0.06 INJECTION, SOLUTION INTRAVENOUS at 09:24

## 2024-01-01 RX ADMIN — SODIUM BICARBONATE 1300 MG: 650 TABLET ORAL at 07:59

## 2024-01-01 RX ADMIN — NOREPINEPHRINE BITARTRATE 0.6 MCG/KG/MIN: 0.06 INJECTION, SOLUTION INTRAVENOUS at 14:54

## 2024-01-01 RX ADMIN — AMLODIPINE BESYLATE 10 MG: 5 TABLET ORAL at 08:12

## 2024-01-01 RX ADMIN — HYDROCORTISONE SODIUM SUCCINATE 50 MG: 100 INJECTION, POWDER, FOR SOLUTION INTRAMUSCULAR; INTRAVENOUS at 11:19

## 2024-01-01 RX ADMIN — DAPSONE 50 MG: 25 TABLET ORAL at 09:39

## 2024-01-01 RX ADMIN — VANCOMYCIN HYDROCHLORIDE 125 MG: 125 CAPSULE ORAL at 20:22

## 2024-01-01 RX ADMIN — SODIUM BICARBONATE 50 MEQ: 84 INJECTION, SOLUTION INTRAVENOUS at 15:06

## 2024-01-01 RX ADMIN — FILGRASTIM-AAFI 480 MCG: 480 INJECTION, SOLUTION INTRAVENOUS; SUBCUTANEOUS at 21:26

## 2024-01-01 RX ADMIN — PROCHLORPERAZINE EDISYLATE 5 MG: 5 INJECTION INTRAMUSCULAR; INTRAVENOUS at 12:27

## 2024-01-01 RX ADMIN — TACROLIMUS 5 MG: 5 CAPSULE ORAL at 18:08

## 2024-01-01 RX ADMIN — ACETAMINOPHEN 1000 MG: 500 TABLET ORAL at 10:54

## 2024-01-01 RX ADMIN — CARVEDILOL 6.25 MG: 6.25 TABLET, FILM COATED ORAL at 17:44

## 2024-01-01 RX ADMIN — Medication 50 MCG: at 05:20

## 2024-01-01 RX ADMIN — ALBUTEROL SULFATE 2.5 MG: 2.5 SOLUTION RESPIRATORY (INHALATION) at 13:29

## 2024-01-01 RX ADMIN — ALBUMIN HUMAN 25 G: 0.05 INJECTION, SOLUTION INTRAVENOUS at 13:00

## 2024-01-01 RX ADMIN — PROPOFOL 120 MG: 10 INJECTION, EMULSION INTRAVENOUS at 03:26

## 2024-01-01 RX ADMIN — SODIUM BICARBONATE: 84 INJECTION, SOLUTION INTRAVENOUS at 08:15

## 2024-01-01 RX ADMIN — METHOCARBAMOL 500 MG: 500 TABLET ORAL at 20:42

## 2024-01-01 RX ADMIN — ONDANSETRON 4 MG: 2 INJECTION INTRAMUSCULAR; INTRAVENOUS at 09:01

## 2024-01-01 RX ADMIN — SODIUM CHLORIDE, POTASSIUM CHLORIDE, SODIUM LACTATE AND CALCIUM CHLORIDE 1000 ML: 600; 310; 30; 20 INJECTION, SOLUTION INTRAVENOUS at 22:40

## 2024-01-01 RX ADMIN — ONDANSETRON 4 MG: 2 INJECTION INTRAMUSCULAR; INTRAVENOUS at 01:27

## 2024-01-01 RX ADMIN — SODIUM BICARBONATE: 84 INJECTION, SOLUTION INTRAVENOUS at 17:27

## 2024-01-01 RX ADMIN — TACROLIMUS 7.5 MG: 5 CAPSULE ORAL at 09:42

## 2024-01-01 RX ADMIN — PRENATAL VIT W/ FE FUMARATE-FA TAB 27-0.8 MG 1 TABLET: 27-0.8 TAB at 08:52

## 2024-01-01 RX ADMIN — LETERMOVIR 480 MG: 480 TABLET, FILM COATED ORAL at 16:15

## 2024-01-01 RX ADMIN — FIDAXOMICIN 200 MG: 200 TABLET, FILM COATED ORAL at 09:38

## 2024-01-01 RX ADMIN — MIDAZOLAM HYDROCHLORIDE 1 MG/HR: 1 INJECTION, SOLUTION INTRAVENOUS at 04:01

## 2024-01-01 RX ADMIN — FILGRASTIM-SNDZ 300 MCG: 300 INJECTION, SOLUTION INTRAVENOUS; SUBCUTANEOUS at 15:22

## 2024-01-01 RX ADMIN — MEROPENEM 1 G: 1 INJECTION, POWDER, FOR SOLUTION INTRAVENOUS at 20:21

## 2024-01-01 RX ADMIN — TACROLIMUS 5 MG: 5 CAPSULE ORAL at 17:44

## 2024-01-01 RX ADMIN — NOREPINEPHRINE BITARTRATE 4 MG/250 ML (16 MCG/ML) IN 0.9 % NACL IV 0.45 MCG/KG/MIN: at 08:34

## 2024-01-01 RX ADMIN — SODIUM BICARBONATE 1300 MG: 650 TABLET ORAL at 09:40

## 2024-01-01 RX ADMIN — SODIUM CHLORIDE 1000 ML: 9 INJECTION, SOLUTION INTRAVENOUS at 13:57

## 2024-01-01 RX ADMIN — CARVEDILOL 6.25 MG: 6.25 TABLET, FILM COATED ORAL at 11:47

## 2024-01-01 RX ADMIN — VANCOMYCIN HYDROCHLORIDE 125 MG: 125 CAPSULE ORAL at 11:47

## 2024-01-01 RX ADMIN — OXYCODONE HYDROCHLORIDE 5 MG: 5 TABLET ORAL at 03:06

## 2024-01-01 RX ADMIN — TOBRAMYCIN 500 MG: 40 INJECTION INTRAMUSCULAR; INTRAVENOUS at 20:13

## 2024-01-01 RX ADMIN — NOREPINEPHRINE BITARTRATE 4 MG/250 ML (16 MCG/ML) IN 0.9 % NACL IV 0.4 MCG/KG/MIN: at 04:41

## 2024-01-01 RX ADMIN — AMPICILLIN SODIUM AND SULBACTAM SODIUM 3 G: 2; 1 INJECTION, POWDER, FOR SOLUTION INTRAMUSCULAR; INTRAVENOUS at 16:13

## 2024-01-01 RX ADMIN — POTASSIUM CHLORIDE, DEXTROSE MONOHYDRATE AND SODIUM CHLORIDE: 150; 5; 450 INJECTION, SOLUTION INTRAVENOUS at 03:09

## 2024-01-01 RX ADMIN — NOREPINEPHRINE BITARTRATE 4 MG/250 ML (16 MCG/ML) IN 0.9 % NACL IV 0.4 MCG/KG/MIN: at 04:42

## 2024-01-01 RX ADMIN — VALGANCICLOVIR 450 MG: 450 TABLET, FILM COATED ORAL at 07:59

## 2024-01-01 RX ADMIN — HYDROMORPHONE HYDROCHLORIDE 0.5 MG: 1 INJECTION, SOLUTION INTRAMUSCULAR; INTRAVENOUS; SUBCUTANEOUS at 18:43

## 2024-01-01 RX ADMIN — HYDROMORPHONE HYDROCHLORIDE 0.5 MG: 1 INJECTION, SOLUTION INTRAMUSCULAR; INTRAVENOUS; SUBCUTANEOUS at 23:58

## 2024-01-01 RX ADMIN — HYDROMORPHONE HYDROCHLORIDE 0.5 MG: 1 INJECTION, SOLUTION INTRAMUSCULAR; INTRAVENOUS; SUBCUTANEOUS at 12:25

## 2024-01-01 RX ADMIN — METHOCARBAMOL 500 MG: 500 TABLET ORAL at 07:59

## 2024-01-01 RX ADMIN — SODIUM CHLORIDE, POTASSIUM CHLORIDE, SODIUM LACTATE AND CALCIUM CHLORIDE: 600; 310; 30; 20 INJECTION, SOLUTION INTRAVENOUS at 13:23

## 2024-01-01 RX ADMIN — HEPARIN SODIUM 5000 UNITS: 5000 INJECTION, SOLUTION INTRAVENOUS; SUBCUTANEOUS at 18:31

## 2024-01-01 RX ADMIN — VALGANCICLOVIR 450 MG: 450 TABLET, FILM COATED ORAL at 08:52

## 2024-01-01 RX ADMIN — ACETAMINOPHEN 975 MG: 325 TABLET, FILM COATED ORAL at 07:44

## 2024-01-01 RX ADMIN — ACETAMINOPHEN 975 MG: 325 TABLET, FILM COATED ORAL at 22:39

## 2024-01-01 RX ADMIN — HYDROMORPHONE HYDROCHLORIDE 0.5 MG: 1 INJECTION, SOLUTION INTRAMUSCULAR; INTRAVENOUS; SUBCUTANEOUS at 07:42

## 2024-01-01 RX ADMIN — SODIUM BICARBONATE 50 MEQ: 84 INJECTION INTRAVENOUS at 13:20

## 2024-01-01 RX ADMIN — DEXTROSE MONOHYDRATE 50 ML: 25 INJECTION, SOLUTION INTRAVENOUS at 16:11

## 2024-01-01 RX ADMIN — NOREPINEPHRINE BITARTRATE 0.03 MCG/KG/MIN: 0.02 INJECTION, SOLUTION INTRAVENOUS at 00:28

## 2024-01-01 RX ADMIN — SODIUM CHLORIDE, POTASSIUM CHLORIDE, SODIUM LACTATE AND CALCIUM CHLORIDE: 600; 310; 30; 20 INJECTION, SOLUTION INTRAVENOUS at 10:51

## 2024-01-01 RX ADMIN — HYDROMORPHONE HYDROCHLORIDE 0.5 MG: 1 INJECTION, SOLUTION INTRAMUSCULAR; INTRAVENOUS; SUBCUTANEOUS at 18:21

## 2024-01-01 RX ADMIN — MIDAZOLAM HYDROCHLORIDE 5 MG/HR: 1 INJECTION, SOLUTION INTRAVENOUS at 15:04

## 2024-01-01 RX ADMIN — METHOCARBAMOL 500 MG: 500 TABLET ORAL at 08:52

## 2024-01-01 RX ADMIN — TACROLIMUS 5 MG: 5 CAPSULE ORAL at 09:02

## 2024-01-01 RX ADMIN — FILGRASTIM-SNDZ 480 MCG: 480 INJECTION, SOLUTION INTRAVENOUS; SUBCUTANEOUS at 15:03

## 2024-01-01 RX ADMIN — DEXTROSE MONOHYDRATE 720 MCG: 50 INJECTION, SOLUTION INTRAVENOUS at 12:59

## 2024-01-01 RX ADMIN — Medication 80 MG: at 03:26

## 2024-01-01 RX ADMIN — DEXTROSE MONOHYDRATE 50 ML: 25 INJECTION, SOLUTION INTRAVENOUS at 15:07

## 2024-01-01 RX ADMIN — EVEROLIMUS 0.75 MG: 0.75 TABLET ORAL at 16:15

## 2024-01-01 RX ADMIN — SODIUM BICARBONATE: 84 INJECTION, SOLUTION INTRAVENOUS at 16:24

## 2024-01-01 RX ADMIN — PREDNISONE 7.5 MG: 2.5 TABLET ORAL at 07:59

## 2024-01-01 RX ADMIN — CALCIUM CHLORIDE, MAGNESIUM CHLORIDE, DEXTROSE MONOHYDRATE, LACTIC ACID, SODIUM CHLORIDE, SODIUM BICARBONATE AND POTASSIUM CHLORIDE 20 ML/KG/HR: 5.15; 2.03; 22; 5.4; 6.46; 3.09; .157 INJECTION INTRAVENOUS at 21:04

## 2024-01-01 RX ADMIN — VASOPRESSIN 2.4 UNITS/HR: 20 INJECTION INTRAVENOUS at 04:29

## 2024-01-01 RX ADMIN — TACROLIMUS 5 MG: 5 CAPSULE ORAL at 08:52

## 2024-01-01 RX ADMIN — NOREPINEPHRINE BITARTRATE 4 MG/250 ML (16 MCG/ML) IN 0.9 % NACL IV 0.15 MCG/KG/MIN: at 02:57

## 2024-01-01 RX ADMIN — COVID-19 VACCINE, MRNA 30 MCG: 0.05 INJECTION, SUSPENSION INTRAMUSCULAR at 12:04

## 2024-01-01 RX ADMIN — Medication 50 MCG: at 04:40

## 2024-01-01 RX ADMIN — ATORVASTATIN CALCIUM 10 MG: 10 TABLET, FILM COATED ORAL at 08:52

## 2024-01-01 RX ADMIN — Medication 3 MCG/KG/MIN: at 14:53

## 2024-01-01 RX ADMIN — SODIUM CHLORIDE, POTASSIUM CHLORIDE, SODIUM LACTATE AND CALCIUM CHLORIDE 500 ML: 600; 310; 30; 20 INJECTION, SOLUTION INTRAVENOUS at 23:19

## 2024-01-01 RX ADMIN — HYDROCORTISONE SODIUM SUCCINATE 50 MG: 100 INJECTION, POWDER, FOR SOLUTION INTRAMUSCULAR; INTRAVENOUS at 18:32

## 2024-01-01 RX ADMIN — METHOCARBAMOL 500 MG: 500 TABLET ORAL at 12:08

## 2024-01-01 RX ADMIN — VANCOMYCIN HYDROCHLORIDE 2000 MG: 1 INJECTION, POWDER, LYOPHILIZED, FOR SOLUTION INTRAVENOUS at 17:44

## 2024-01-01 RX ADMIN — PIPERACILLIN SODIUM AND TAZOBACTAM SODIUM 2.25 G: 2; .25 INJECTION, POWDER, LYOPHILIZED, FOR SOLUTION INTRAVENOUS at 15:01

## 2024-01-01 RX ADMIN — ATORVASTATIN CALCIUM 10 MG: 10 TABLET, FILM COATED ORAL at 07:59

## 2024-01-01 RX ADMIN — VALGANCICLOVIR 450 MG: 450 TABLET, FILM COATED ORAL at 09:01

## 2024-01-01 RX ADMIN — FILGRASTIM-SNDZ 300 MCG: 300 INJECTION, SOLUTION INTRAVENOUS; SUBCUTANEOUS at 15:41

## 2024-01-01 RX ADMIN — TACROLIMUS 5 MG: 5 CAPSULE ORAL at 18:27

## 2024-01-01 RX ADMIN — CARVEDILOL 6.25 MG: 6.25 TABLET, FILM COATED ORAL at 09:41

## 2024-01-01 RX ADMIN — ACETAMINOPHEN 1000 MG: 500 TABLET ORAL at 08:08

## 2024-01-01 RX ADMIN — LIDOCAINE 2 PATCH: 4 PATCH TOPICAL at 03:06

## 2024-01-01 RX ADMIN — HYDROMORPHONE HYDROCHLORIDE 0.5 MG: 1 INJECTION, SOLUTION INTRAMUSCULAR; INTRAVENOUS; SUBCUTANEOUS at 17:46

## 2024-01-01 RX ADMIN — CARVEDILOL 6.25 MG: 6.25 TABLET, FILM COATED ORAL at 07:59

## 2024-01-01 RX ADMIN — METHOCARBAMOL 500 MG: 500 TABLET ORAL at 16:00

## 2024-01-01 RX ADMIN — FILGRASTIM-AAFI 300 MCG: 480 INJECTION, SOLUTION SUBCUTANEOUS at 09:46

## 2024-01-01 RX ADMIN — CALCIUM CHLORIDE 1 G: 100 INJECTION INTRAVENOUS; INTRAVENTRICULAR at 18:33

## 2024-01-01 RX ADMIN — VASOPRESSIN 2.5 UNITS/HR: 20 INJECTION INTRAVENOUS at 11:18

## 2024-01-01 RX ADMIN — SODIUM CHLORIDE: 9 INJECTION, SOLUTION INTRAVENOUS at 09:24

## 2024-01-01 RX ADMIN — CARVEDILOL 6.25 MG: 6.25 TABLET, FILM COATED ORAL at 18:21

## 2024-01-01 RX ADMIN — SODIUM CHLORIDE 1000 ML: 9 INJECTION, SOLUTION INTRAVENOUS at 09:07

## 2024-01-01 RX ADMIN — ONDANSETRON 4 MG: 2 INJECTION INTRAMUSCULAR; INTRAVENOUS at 16:15

## 2024-01-01 RX ADMIN — PSYLLIUM HUSK 1 PACKET: 3.4 POWDER ORAL at 08:52

## 2024-01-01 RX ADMIN — DEXTROSE MONOHYDRATE 20 ML: 50 INJECTION, SOLUTION INTRAVENOUS at 12:59

## 2024-01-01 RX ADMIN — SODIUM BICARBONATE 650 MG TABLET 1300 MG: at 16:15

## 2024-01-01 RX ADMIN — FILGRASTIM-AAFI 480 MCG: 480 INJECTION, SOLUTION INTRAVENOUS; SUBCUTANEOUS at 21:03

## 2024-01-01 RX ADMIN — PROCHLORPERAZINE EDISYLATE 5 MG: 5 INJECTION INTRAMUSCULAR; INTRAVENOUS at 18:21

## 2024-01-01 RX ADMIN — SODIUM CHLORIDE 1000 ML: 9 INJECTION, SOLUTION INTRAVENOUS at 17:55

## 2024-01-01 RX ADMIN — SODIUM BICARBONATE 650 MG TABLET 1300 MG: at 11:50

## 2024-01-01 RX ADMIN — SODIUM CHLORIDE 1000 ML: 9 INJECTION, SOLUTION INTRAVENOUS at 10:24

## 2024-01-01 RX ADMIN — POTASSIUM CHLORIDE 40 MEQ: 1500 TABLET, EXTENDED RELEASE ORAL at 10:07

## 2024-01-01 RX ADMIN — FILGRASTIM-SNDZ 480 MCG: 480 INJECTION, SOLUTION INTRAVENOUS; SUBCUTANEOUS at 14:31

## 2024-01-01 RX ADMIN — SODIUM CHLORIDE 1000 ML: 9 INJECTION, SOLUTION INTRAVENOUS at 09:20

## 2024-01-01 RX ADMIN — ATORVASTATIN CALCIUM 10 MG: 10 TABLET, FILM COATED ORAL at 09:01

## 2024-01-01 RX ADMIN — FILGRASTIM-AAFI 300 MCG: 300 INJECTION, SOLUTION SUBCUTANEOUS at 10:54

## 2024-01-01 RX ADMIN — PANTOPRAZOLE SODIUM 40 MG: 40 INJECTION, POWDER, FOR SOLUTION INTRAVENOUS at 08:36

## 2024-01-01 RX ADMIN — SODIUM BICARBONATE 1300 MG: 650 TABLET ORAL at 20:42

## 2024-01-01 RX ADMIN — PIPERACILLIN AND TAZOBACTAM 2.25 G: 2; .25 INJECTION, POWDER, FOR SOLUTION INTRAVENOUS at 09:20

## 2024-01-01 RX ADMIN — CARVEDILOL 6.25 MG: 6.25 TABLET, FILM COATED ORAL at 08:52

## 2024-01-01 RX ADMIN — LIDOCAINE 1 PATCH: 4 PATCH TOPICAL at 20:42

## 2024-01-01 RX ADMIN — SODIUM CHLORIDE 8 UNITS: 9 INJECTION, SOLUTION INTRAVENOUS at 16:13

## 2024-01-01 RX ADMIN — Medication 2.4 UNITS/HR: at 16:26

## 2024-01-01 RX ADMIN — PENTAMIDINE ISETHIONATE 300 MG: 300 INHALANT RESPIRATORY (INHALATION) at 13:48

## 2024-01-01 RX ADMIN — SODIUM BICARBONATE: 84 INJECTION, SOLUTION INTRAVENOUS at 15:52

## 2024-01-01 RX ADMIN — HYDROXYZINE HYDROCHLORIDE 25 MG: 25 TABLET, FILM COATED ORAL at 01:28

## 2024-01-01 RX ADMIN — AMPICILLIN SODIUM AND SULBACTAM SODIUM 3 G: 2; 1 INJECTION, POWDER, FOR SOLUTION INTRAMUSCULAR; INTRAVENOUS at 22:42

## 2024-01-01 RX ADMIN — ACETAMINOPHEN 975 MG: 325 TABLET, FILM COATED ORAL at 08:12

## 2024-01-01 RX ADMIN — Medication 50 MCG/HR: at 15:04

## 2024-01-01 RX ADMIN — CARVEDILOL 6.25 MG: 6.25 TABLET, FILM COATED ORAL at 18:08

## 2024-01-01 RX ADMIN — LIDOCAINE HYDROCHLORIDE 30 ML: 10 INJECTION, SOLUTION EPIDURAL; INFILTRATION; INTRACAUDAL; PERINEURAL at 10:18

## 2024-01-01 RX ADMIN — ACETAMINOPHEN 975 MG: 325 TABLET, FILM COATED ORAL at 14:01

## 2024-01-01 RX ADMIN — HYDROXYZINE HYDROCHLORIDE 25 MG: 25 TABLET, FILM COATED ORAL at 00:40

## 2024-01-01 RX ADMIN — VANCOMYCIN HYDROCHLORIDE 125 MG: 125 CAPSULE ORAL at 07:44

## 2024-01-01 RX ADMIN — PIPERACILLIN AND TAZOBACTAM 3.38 G: 3; .375 INJECTION, POWDER, FOR SOLUTION INTRAVENOUS at 01:27

## 2024-01-01 RX ADMIN — PSYLLIUM HUSK 1 PACKET: 3.4 POWDER ORAL at 08:02

## 2024-01-01 RX ADMIN — NOREPINEPHRINE BITARTRATE 4 MG/250 ML (16 MCG/ML) IN 0.9 % NACL IV 0.45 MCG/KG/MIN: at 06:49

## 2024-01-01 RX ADMIN — FILGRASTIM-AAFI 480 MCG: 480 INJECTION, SOLUTION INTRAVENOUS; SUBCUTANEOUS at 20:42

## 2024-01-01 RX ADMIN — TACROLIMUS 5 MG: 5 CAPSULE ORAL at 07:59

## 2024-01-01 RX ADMIN — HYDROMORPHONE HYDROCHLORIDE 0.5 MG: 1 INJECTION, SOLUTION INTRAMUSCULAR; INTRAVENOUS; SUBCUTANEOUS at 20:43

## 2024-01-01 RX ADMIN — TACROLIMUS 5 MG: 1 CAPSULE ORAL at 00:08

## 2024-01-01 RX ADMIN — ONDANSETRON 4 MG: 2 INJECTION INTRAMUSCULAR; INTRAVENOUS at 19:36

## 2024-01-01 RX ADMIN — ANGIOTENSIN II 30 NG/KG/MIN: 2.5 INJECTION INTRAVENOUS at 15:09

## 2024-01-01 RX ADMIN — HYDROMORPHONE HYDROCHLORIDE 0.5 MG: 1 INJECTION, SOLUTION INTRAMUSCULAR; INTRAVENOUS; SUBCUTANEOUS at 03:09

## 2024-01-01 RX ADMIN — MAGNESIUM SULFATE IN WATER 2 G: 40 INJECTION, SOLUTION INTRAVENOUS at 12:05

## 2024-01-01 RX ADMIN — Medication 25 MCG/HR: at 03:45

## 2024-01-01 RX ADMIN — ATORVASTATIN CALCIUM 10 MG: 10 TABLET, FILM COATED ORAL at 09:41

## 2024-01-01 RX ADMIN — VANCOMYCIN HYDROCHLORIDE 125 MG: 125 CAPSULE ORAL at 16:15

## 2024-01-01 RX ADMIN — METHOCARBAMOL 500 MG: 500 TABLET ORAL at 21:30

## 2024-01-01 RX ADMIN — FILGRASTIM-SNDZ 480 MCG: 480 INJECTION, SOLUTION INTRAVENOUS; SUBCUTANEOUS at 16:50

## 2024-01-01 RX ADMIN — METHOCARBAMOL 500 MG: 500 TABLET ORAL at 12:59

## 2024-01-01 RX ADMIN — HYDROMORPHONE HYDROCHLORIDE 0.5 MG: 1 INJECTION, SOLUTION INTRAMUSCULAR; INTRAVENOUS; SUBCUTANEOUS at 19:21

## 2024-01-01 RX ADMIN — HYDROMORPHONE HYDROCHLORIDE 0.5 MG: 1 INJECTION, SOLUTION INTRAMUSCULAR; INTRAVENOUS; SUBCUTANEOUS at 09:06

## 2024-01-01 RX ADMIN — SODIUM CHLORIDE 1000 ML: 9 INJECTION, SOLUTION INTRAVENOUS at 08:36

## 2024-01-01 RX ADMIN — SODIUM CHLORIDE, POTASSIUM CHLORIDE, SODIUM LACTATE AND CALCIUM CHLORIDE 1000 ML: 600; 310; 30; 20 INJECTION, SOLUTION INTRAVENOUS at 15:05

## 2024-01-01 RX ADMIN — SODIUM CHLORIDE, POTASSIUM CHLORIDE, SODIUM LACTATE AND CALCIUM CHLORIDE 250 ML: 600; 310; 30; 20 INJECTION, SOLUTION INTRAVENOUS at 19:45

## 2024-01-01 RX ADMIN — LIDOCAINE 1 PATCH: 4 PATCH TOPICAL at 00:39

## 2024-01-01 RX ADMIN — HYDROMORPHONE HYDROCHLORIDE 0.5 MG: 1 INJECTION, SOLUTION INTRAMUSCULAR; INTRAVENOUS; SUBCUTANEOUS at 21:26

## 2024-01-01 RX ADMIN — PRENATAL VIT W/ FE FUMARATE-FA TAB 27-0.8 MG 1 TABLET: 27-0.8 TAB at 07:59

## 2024-01-01 RX ADMIN — ACETAMINOPHEN 500 MG: 500 TABLET ORAL at 14:34

## 2024-01-01 RX ADMIN — HYDROMORPHONE HYDROCHLORIDE 0.5 MG: 1 INJECTION, SOLUTION INTRAMUSCULAR; INTRAVENOUS; SUBCUTANEOUS at 16:15

## 2024-01-01 RX ADMIN — CALCIUM CHLORIDE 2 G: 100 INJECTION, SOLUTION INTRAVENOUS at 15:33

## 2024-01-01 RX ADMIN — PHENYLEPHRINE HYDROCHLORIDE 2 MCG/KG/MIN: 10 INJECTION INTRAVENOUS at 11:46

## 2024-01-01 RX ADMIN — SODIUM BICARBONATE 1300 MG: 650 TABLET ORAL at 21:26

## 2024-01-01 RX ADMIN — DEXTROSE MONOHYDRATE 20 ML: 50 INJECTION, SOLUTION INTRAVENOUS at 13:44

## 2024-01-01 ASSESSMENT — ACTIVITIES OF DAILY LIVING (ADL)
DRESSING/BATHING_DIFFICULTY: NO
CONCENTRATING,_REMEMBERING_OR_MAKING_DECISIONS_DIFFICULTY: NO
ADLS_ACUITY_SCORE: 29
ADLS_ACUITY_SCORE: 20
ADLS_ACUITY_SCORE: 39
ADLS_ACUITY_SCORE: 37
ADLS_ACUITY_SCORE: 21
ADLS_ACUITY_SCORE: 39
ADLS_ACUITY_SCORE: 20
DOING_ERRANDS_INDEPENDENTLY_DIFFICULTY: NO
ADLS_ACUITY_SCORE: 37
ADLS_ACUITY_SCORE: 20
ADLS_ACUITY_SCORE: 29
WEAR_GLASSES_OR_BLIND: NO
ADLS_ACUITY_SCORE: 21
EATING/SWALLOWING: EATING
ADLS_ACUITY_SCORE: 29
DOING_ERRANDS_INDEPENDENTLY_DIFFICULTY: NO
ADLS_ACUITY_SCORE: 20
FALL_HISTORY_WITHIN_LAST_SIX_MONTHS: NO
ADLS_ACUITY_SCORE: 38
ADLS_ACUITY_SCORE: 37
ADLS_ACUITY_SCORE: 37
ADLS_ACUITY_SCORE: 29
ADLS_ACUITY_SCORE: 29
ADLS_ACUITY_SCORE: 38
ADLS_ACUITY_SCORE: 21
ADLS_ACUITY_SCORE: 37
ADLS_ACUITY_SCORE: 29
ADLS_ACUITY_SCORE: 29
ADLS_ACUITY_SCORE: 20
ADLS_ACUITY_SCORE: 38
ADLS_ACUITY_SCORE: 20
ADLS_ACUITY_SCORE: 21
ADLS_ACUITY_SCORE: 21
ADLS_ACUITY_SCORE: 20
ADLS_ACUITY_SCORE: 21
ADLS_ACUITY_SCORE: 29
ADLS_ACUITY_SCORE: 35
FALL_HISTORY_WITHIN_LAST_SIX_MONTHS: NO
ADLS_ACUITY_SCORE: 38
ADLS_ACUITY_SCORE: 35
ADLS_ACUITY_SCORE: 35
ADLS_ACUITY_SCORE: 27
CHANGE_IN_FUNCTIONAL_STATUS_SINCE_ONSET_OF_CURRENT_ILLNESS/INJURY: YES
ADLS_ACUITY_SCORE: 29
ADLS_ACUITY_SCORE: 37
ADLS_ACUITY_SCORE: 29
ADLS_ACUITY_SCORE: 38
ADLS_ACUITY_SCORE: 29
ADLS_ACUITY_SCORE: 29
TOILETING_ISSUES: NO
ADLS_ACUITY_SCORE: 38
ADLS_ACUITY_SCORE: 39
WALKING_OR_CLIMBING_STAIRS: AMBULATION DIFFICULTY, ASSISTANCE 1 PERSON;STAIR CLIMBING DIFFICULTY, REQUIRES EQUIPMENT
CHANGE_IN_FUNCTIONAL_STATUS_SINCE_ONSET_OF_CURRENT_ILLNESS/INJURY: NO
ADLS_ACUITY_SCORE: 35
ADLS_ACUITY_SCORE: 35
ADLS_ACUITY_SCORE: 38
ADLS_ACUITY_SCORE: 21
ADLS_ACUITY_SCORE: 39
HEARING_DIFFICULTY_OR_DEAF: NO
ADLS_ACUITY_SCORE: 29
ADLS_ACUITY_SCORE: 35
ADLS_ACUITY_SCORE: 35
ADLS_ACUITY_SCORE: 20
ADLS_ACUITY_SCORE: 37
ADLS_ACUITY_SCORE: 38
ADLS_ACUITY_SCORE: 37
ADLS_ACUITY_SCORE: 35
ADLS_ACUITY_SCORE: 38
ADLS_ACUITY_SCORE: 20
ADLS_ACUITY_SCORE: 35
ADLS_ACUITY_SCORE: 38
ADLS_ACUITY_SCORE: 35
ADLS_ACUITY_SCORE: 29
TOILETING_ISSUES: NO
ADLS_ACUITY_SCORE: 37
ADLS_ACUITY_SCORE: 39
ADLS_ACUITY_SCORE: 38
ADLS_ACUITY_SCORE: 20
ADLS_ACUITY_SCORE: 39
ADLS_ACUITY_SCORE: 20
DIFFICULTY_COMMUNICATING: NO
ADLS_ACUITY_SCORE: 20
ADLS_ACUITY_SCORE: 38
WALKING_OR_CLIMBING_STAIRS_DIFFICULTY: YES
ADLS_ACUITY_SCORE: 20
CONCENTRATING,_REMEMBERING_OR_MAKING_DECISIONS_DIFFICULTY: NO
ADLS_ACUITY_SCORE: 29
DIFFICULTY_EATING/SWALLOWING: YES
ADLS_ACUITY_SCORE: 35
DEPENDENT_IADLS:: INDEPENDENT
ADLS_ACUITY_SCORE: 39
ADLS_ACUITY_SCORE: 21
DIFFICULTY_COMMUNICATING: NO
ADLS_ACUITY_SCORE: 38
ADLS_ACUITY_SCORE: 35
ADLS_ACUITY_SCORE: 29
HEARING_DIFFICULTY_OR_DEAF: NO
ADLS_ACUITY_SCORE: 21
ADLS_ACUITY_SCORE: 39
ADLS_ACUITY_SCORE: 29
ADLS_ACUITY_SCORE: 35
ADLS_ACUITY_SCORE: 35
ADLS_ACUITY_SCORE: 20
ADLS_ACUITY_SCORE: 35
ADLS_ACUITY_SCORE: 38
ADLS_ACUITY_SCORE: 21
ADLS_ACUITY_SCORE: 35
DIFFICULTY_EATING/SWALLOWING: NO
DRESSING/BATHING_DIFFICULTY: NO
ADLS_ACUITY_SCORE: 37
ADLS_ACUITY_SCORE: 38
WEAR_GLASSES_OR_BLIND: OTHER (SEE COMMENTS)
ADLS_ACUITY_SCORE: 35
ADLS_ACUITY_SCORE: 39
WALKING_OR_CLIMBING_STAIRS_DIFFICULTY: NO
ADLS_ACUITY_SCORE: 37
ADLS_ACUITY_SCORE: 20
ADLS_ACUITY_SCORE: 35
ADLS_ACUITY_SCORE: 21

## 2024-01-01 ASSESSMENT — PAIN SCALES - GENERAL
PAINLEVEL: NO PAIN (0)
PAINLEVEL: WORST PAIN (10)
PAINLEVEL: NO PAIN (0)
PAINLEVEL: MODERATE PAIN (5)
PAINLEVEL: NO PAIN (0)

## 2024-01-01 ASSESSMENT — COLUMBIA-SUICIDE SEVERITY RATING SCALE - C-SSRS
6. HAVE YOU EVER DONE ANYTHING, STARTED TO DO ANYTHING, OR PREPARED TO DO ANYTHING TO END YOUR LIFE?: NO
2. HAVE YOU ACTUALLY HAD ANY THOUGHTS OF KILLING YOURSELF IN THE PAST MONTH?: NO
1. IN THE PAST MONTH, HAVE YOU WISHED YOU WERE DEAD OR WISHED YOU COULD GO TO SLEEP AND NOT WAKE UP?: NO

## 2024-01-01 ASSESSMENT — ENCOUNTER SYMPTOMS
TROUBLE SWALLOWING: 1
SORE THROAT: 1
COUGH: 1
HEADACHES: 1

## 2024-01-02 PROBLEM — T86.19 DELAYED GRAFT FUNCTION OF KIDNEY: Status: RESOLVED | Noted: 2023-01-01 | Resolved: 2024-01-01

## 2024-01-02 NOTE — PATIENT INSTRUCTIONS
Patient Recommendations:  - Increase sodium bicarbonate to 1300mg twice daily   - Increase amlodipine to 10mg daily   - Decrease Gatorade intake. Drink at least 2.5L of fluid daily    Transplant Patient Information  Your Post Transplant Coordinator is: Elsa Adrian  For non urgent items, we encourage you to contact your coordinator/care team online via [employer]  You and your care team can also contact your transplant coordinator Monday - Friday, 8am - 5pm at [phone] (Option 2 to reach the coordinator or Option 4 to schedule an appointment).  After hours for urgent matters, please call [facility] at [phone].

## 2024-01-02 NOTE — NURSING NOTE
Chief Complaint   Patient presents with    RECHECK     BP (!) 148/79   Pulse 65   Temp 97.8  F (36.6  C) (Oral)   Wt 76 kg (167 lb 8 oz)   SpO2 98%   BMI 24.56 kg/m      Brian Decker on 1/2/2024 at 10:06 AM

## 2024-01-02 NOTE — PROGRESS NOTES
TRANSPLANT NEPHROLOGY EARLY POST TRANSPLANT VISIT    Assessment & Plan   # DDKT: Trend up - back up to 1.4 from recent morena of 1.1 on 12/25/23; previous to that 1.5-1.9.    - Baseline Creatinine: ~1.1-1.4. Post txp morena Scr 1.1 on 12/25/23   - Proteinuria: Normal (<0.2 grams), FSGS protocol   - Date DSA Last Checked: Dec/2023      Latest DSA: No   - BK Viremia: No   - Kidney Tx Biopsy: No   - Transplant Ureteral Stent: Removed    -Recommend 2.5L fluid intake daily    # Immunosuppression: Tacrolimus immediate release (goal 8-10) and Mycophenolate mofetil (dose 1000 mg every 12 hours)   - Induction with Recent Transplant:  High Intensity Protocol due to DGF   - Continue with intensive monitoring of immunosuppression for efficacy and toxicity.   - Changes: Not at this time     # Infection Prophylaxis:   - PJP: Dapsone - switched from bactrim 12/20/23 due to hyperkalemia   - CMV: Valganciclovir (Valcyte) Patient is CMV IgG Ab discordant (D+/R-) and will continue on Valcyte x 6 months, then check CMV PCR monthly until 12 months post transplant. Valcyte 450mg daily (still appropriate dose for current CrCl).    # Hypertension: Borderline control;  Goal BP: < 130/80   - Volume status: Euvolemic     - Changes: Yes - Increase amlodipine to 10 mg daily and continue coreg 6.25 mg BID .     # Anemia in Chronic Renal Disease: Hgb: Trend up - followed by anemia clinic, hgb continuing to trend up.      JOSE: No   - Iron studies: Replete    # Mineral Bone Disorder:    - Secondary renal hyperparathyroidism; PTH level: Mildly elevated (151-300 pg/ml)        On treatment: None   - Vitamin D; level: Normal        On supplement: No   - Calcium; level: Normal        On supplement: No   - Phosphorus; level: Low normal        On supplement: Yes, phos 250mg bid    # Electrolytes:   - Potassium; level: High - previously was drinking a lot of orange juice and eating bananas which he cut out. With recent C diff, however, has continued to drink  a lot of Gatorade which could be contributing to hyperkalemia. Instructed to stop and will continue weekly labs.        On supplement: No  - Magnesium; level: Normal        On supplement: Yes - continue  - Bicarbonate; level: Low normal        On supplement: Yes, increase bicarb to 1300 mg BID     # Leukopenia:   -WBC count downtrending, 2.5 today. No signs/symptoms of acute infectious process at this time. ANC nml   - Follow up MPA level     # C. Diff, resolved:   -Diagnosed 11/16/23, started on PO vanc 125mg q6h x10d. Had recurrence of diarrhea and abdominal pain and underwent second course of PO vanco with resolution of symptoms. Now with no diarrhea, abdominal pain, nausea, or vomiting. Taking metamucil gummies daily.     # Weight loss, improving:   -EDW was 175lbs at time of transplant, then he developed diarrhea, weight down to 160lbs. Weight now uptrending (167.5 lb on 1/2/24) with resolution of diarrhea/abdominal pain and improved PO intake.     # HFpEF: Last cardiac echo (Feb/2021) showed normal LVEF ~ 60-65% with grade I diastolic dysfunction. Stress echo 07/2023 commented on normal LV function and wall motion.    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    Return visit: Return in 2 months (on 3/4/2024) for previously  scheduled visit, 4 month post transplant visit.    Patient seen and examined with nephrology attending, Dr. Garcia.    Fawn Decker MD  Internal Medicine PGY3    Physician Attestation   I, Marcus Garcia MD, personally examined and evaluated this patient.  I discussed the patient with the resident/fellow and care team, and agree with the assessment and plan of care as documented in the note on 01/02/24 .      I personally reviewed vital signs, medications, labs, and imaging  Marcus Garcia MD  Date of Service (when I saw the patient): 01/02/24          Chief Complaint   Mr. Barnes is a 68 year old here for kidney transplant, immunosuppression management and hospital follow up after kidney transplant.     History of Present Illness   Matteo overall has been doing well over the past couple of weeks. Has no specific concerns today other than wanting to review his medications and ensure he still needs to be taking everything that he is on. Was last seen in clinic 12/1/2023. In the interval, developed recurrence of diarrhea and abdominal pain and was treated with a second course of PO vancomycin with resolution of his symptoms. Currently, no abdominal pain, diarrhea, or nausea/vomiting. Continues taking metamucil daily with good effect. Energy levels overall continue to improve. Appetite also improving and has gained some weight back. Stopped drinking orange juice as he thought this was probably contributing to the hyperkalemia noticed earlier in the month. Is still drinking Gatorade as he finishes recovering from his diarrhea and associated dehydration. Is taking all medications as prescribed with no missed doses with the exception of bicarb - only taking 1 tablet with each dose instead of 2; did increase from 3 times daily to 5 times daily after he was instructed to increase bicarb with the hyperkalemia. No pain at the transplanted kidney site, no hematuria or dysuria. Continues walking the dogs 2-3 times daily  without any shortness of breath or chest pain. No LE edema.     Home BP:  130s/70s systolic    Problem List   Patient Active Problem List   Diagnosis     Hyperlipidemia LDL goal <130     HTN, kidney transplant related     Erectile dysfunction     Anemia in stage 3a chronic kidney disease (H)     Metabolic acidosis     Delayed graft function of kidney     Hyperkalemia     Immunosuppressed status (H24)     Kidney replaced by transplant     Aftercare following organ transplant     Low serum bicarbonate       Allergies   No Known Allergies    Medications   Current Outpatient Medications   Medication Sig     amLODIPine (NORVASC) 10 MG tablet Take 1 tablet (10 mg) by mouth daily for 90 days     tacrolimus (GENERIC) 0.5 MG capsule Take 1 capsule (0.5 mg) by mouth 2 times daily Total dose 7.5mg twice daily     tacrolimus (GENERIC) 1 MG capsule Take 7 capsules (7 mg) by mouth 2 times daily Total dose 7.5mg twice daily     acetaminophen (TYLENOL) 500 MG tablet Take 500-1,000 mg by mouth every 6 hours as needed for mild pain     ALPRAZolam (XANAX) 0.25 MG tablet TAKE ONE TABLET BY MOUTH ONCE DAILY AS NEEDED FOR ANXIETY (Patient not taking: Reported on 12/27/2023)     atorvastatin (LIPITOR) 10 MG tablet Take 1 tablet (10 mg) by mouth daily     carvedilol (COREG) 6.25 MG tablet Take 1 tablet (6.25 mg) by mouth 2 times daily (with meals)     dapsone (ACZONE) 25 MG tablet Take 2 tablets (50 mg) by mouth daily     magnesium oxide (MAG-OX) 400 MG tablet Take 2 tablets (800 mg) by mouth 2 times daily With lunch and supper     multivitamin RENAL (NEPHROCAPS/TRIPHROCAPS) 1 MG capsule Take 1 capsule by mouth daily     mycophenolate (GENERIC EQUIVALENT) 250 MG capsule Take 4 capsules (1,000 mg) by mouth 2 times daily     phosphorus tablet 250 mg 250 MG per tablet Take 1 tablet (250 mg) by mouth 2 times daily     sodium bicarbonate 650 MG tablet Take 1,300 mg by mouth 2 times daily     valGANciclovir (VALCYTE) 450 MG tablet Take 1 tablet  (450 mg) by mouth daily     No current facility-administered medications for this visit.     Medications Discontinued During This Encounter   Medication Reason     tacrolimus (GENERIC) 0.5 MG capsule      tacrolimus (GENERIC) 1 MG capsule      amLODIPine (NORVASC) 5 MG tablet        Physical Exam   Vital Signs: BP (!) 148/79   Pulse 65   Temp 97.8  F (36.6  C) (Oral)   Wt 76 kg (167 lb 8 oz)   SpO2 98%   BMI 24.56 kg/m      GENERAL APPEARANCE: alert and no distress  HENT: mouth without ulcers or lesions  RESP: lungs clear to auscultation - no rales, rhonchi or wheezes  CV: regular rhythm, normal rate, no rub, no murmur  EDEMA: no LE edema bilaterally  MS: extremities normal - no gross deformities noted, no evidence of inflammation in joints, no muscle tenderness  SKIN: no rash  NEURO: normal strength and tone, sensory exam grossly normal, mentation intact and speech normal  PSYCH: mentation appears normal and affect normal/bright  DIALYSIS ACCESS:  LUE AV fistula      Data         Latest Ref Rng & Units 1/2/2024    10:00 AM 12/25/2023     9:19 AM 12/21/2023     7:38 AM   Renal   Sodium 135 - 145 mmol/L 138  140  138    K 3.4 - 5.3 mmol/L 5.5  4.9  5.9    Cl 98 - 107 mmol/L 110  111  109    Cl (external) 98 - 107 mmol/L 110  111  109    CO2 22 - 29 mmol/L 20  20  18    Urea Nitrogen 8.0 - 23.0 mg/dL 30.0  24.0  30.7    Creatinine 0.67 - 1.17 mg/dL 1.40  1.13  1.65    Glucose 70 - 99 mg/dL 120  121  100    Calcium 8.8 - 10.2 mg/dL 9.2  8.8  9.1    Magnesium 1.7 - 2.3 mg/dL 1.7            Latest Ref Rng & Units 1/2/2024    10:00 AM 12/14/2023    10:18 AM 12/8/2023     8:37 AM   Bone Health   Phosphorus 2.5 - 4.5 mg/dL 2.1  2.1  1.9          Latest Ref Rng & Units 1/2/2024    10:00 AM 12/25/2023     9:19 AM 12/21/2023     7:38 AM   Heme   WBC 4.0 - 11.0 10e3/uL 2.5  3.8  3.7    Hgb 13.3 - 17.7 g/dL 10.5  9.4  9.1    Plt 150 - 450 10e3/uL 179  225  219          Latest Ref Rng & Units 11/16/2023     9:05 AM  10/28/2023     4:06 AM 6/7/2023    12:36 PM   Liver   AP 40 - 129 U/L  57     TBili <=1.2 mg/dL  0.3     ALT 0 - 70 U/L  12  11    AST 0 - 45 U/L  13     Tot Protein 6.4 - 8.3 g/dL  7.2     Albumin 3.5 - 5.2 g/dL 3.9  4.5           Latest Ref Rng & Units 10/28/2023     4:07 AM 10/16/2021     9:42 AM 3/20/2018     4:09 PM   Pancreas   A1C <5.7 % 5.0   4.9    Lipase 73 - 393 U/L  461           Latest Ref Rng & Units 12/25/2023     9:19 AM 11/20/2023     8:50 AM 11/16/2023     9:05 AM   Iron studies   Iron 61 - 157 ug/dL 80  51  41    Iron Sat Index 15 - 46 % 35  21  18    Ferritin 31 - 409 ng/mL 919  1,146  3          Latest Ref Rng & Units 12/14/2023    10:54 AM 10/28/2023     4:06 AM 2/1/2021     8:30 AM   [facility] Txp Virology   LOG IU/ML OF CMVQNT  <1.5      EBV CAPSID ANTIBODY IGG No detectable antibody.  Positive  >8.0    Hep B Core NR^Nonreactive   Nonreactive        Recent Labs   Lab Test 12/19/23  0813 12/21/23  0738 12/25/23  0919   DOSTAC 12/18/2023 12/20/2023 12/24/2023   TACROL 7.8 8.5 6.9     Recent Labs   Lab Test 12/08/23  0837 12/14/23  1018 12/21/23  0738   DOSMPA 12/7/2023   8:00 PM 12/13/2023   8:00 PM  --    MPACID 2.53 1.09 1.04   MPAG 70.6 105.7* 50.6

## 2024-01-02 NOTE — PROGRESS NOTES
TRANSPLANT NEPHROLOGY EARLY POST TRANSPLANT VISIT    Assessment & Plan   # DDKT: Trend down    - Baseline Creatinine: ~ TBD. Post txp morena Scr 1.7 on 12/1/23   - Proteinuria: Normal (<0.2 grams), FSGS protocol   - Date DSA Last Checked: Oct/2023      Latest DSA: No DSA at time of transplant   - BK Viremia: No   - Kidney Tx Biopsy: No   - Transplant Ureteral Stent: Yes; Scheduled removal date Dec 01, 2023   -Will consider biopsy in the coming weeks if Scr does not decrease further    # Immunosuppression: Tacrolimus immediate release (goal 8-10) and Mycophenolate mofetil (dose 750 mg every 12 hours)   - Induction with Recent Transplant:  High Intensity Protocol due to DGF   - Continue with intensive monitoring of immunosuppression for efficacy and toxicity.   - Changes: Not at this time.     # C. Diff:   -Diagnosed 11/16/23, started on PO vanc 125mg q6h x10d. Improved    # Weight loss:   -Stabilizing. EDW was 175lbs at time of transplant, then he developed diarrhea, weight down to 160lbs. His appetite is improving so should increase weight    # Infection Prophylaxis:   - PJP: Sulfa/TMP (Bactrim) MWF, increase to daily   - CMV: Valganciclovir (Valcyte) Patient is CMV IgG Ab discordant (D+/R-) and will continue on Valcyte x 6 months, then check CMV PCR monthly until 12 months post transplant. Valcyte 450mg daily     # Hypertension: Controlled;  Goal BP: < 140/90   - Volume status: Euvolemic     - Changes: Not at this time. Continue amlodipine 5mg daily and coreg 6.25mg bid     # Anemia in Chronic Renal Disease: Hgb: Trend up      JOSE: No, recommend starting if Hb does not continue trending up   - Iron studies: Low iron saturation, but high ferritin    # Mineral Bone Disorder:    - Secondary renal hyperparathyroidism; PTH level: Mildly elevated (151-300 pg/ml)        On treatment: None   - Vitamin D; level: Normal        On supplement: No   - Calcium; level: Low normal        On supplement: No   - Phosphorus; level: Low  normal        On supplement: No     # Electrolytes:   - Potassium; level: High normal        On supplement: No  - Magnesium; level: Low normal        On supplement: No  - Bicarbonate; level: Low        On supplement: Yes, bicarb 1300mg tid    # HFpEF: Last cardiac echo (Feb/2021) showed normal LVEF ~ 60-65% with grade I diastolic dysfunction.    # Medical Compliance: Yes    # Health Maintenance and Vaccination Review: Not Reviewed    # Transplant History:  Etiology of Kidney Failure: Focal segmental glomerulosclerosis (FSGS), likely secondary  Tx: DDKT  Transplant: 10/28/2023 (Kidney)  Donor Type: Donation after Brain Death Donor Class:   Crossmatch at time of Tx: negative  DSA at time of Tx: No  Significant changes in immunosuppression: None  CMV IgG Ab High Risk Discordance (D+/R-): Yes  EBV IgG Ab High Risk Discordance (D+/R-): No  Significant transplant-related complications: DGF    Transplant Office Phone Number: [phone]    Assessment and plan was discussed with the patient and he voiced his understanding and agreement.    Return visit: No follow-ups on file.    Marcus Garcia MD    Chief Complaint   Mr. Barnes is a 68 year old here for kidney transplant, immunosuppression management and hospital follow up after kidney transplant.     History of Present Illness   ***    Home BP:  130s systolic    Problem List   Patient Active Problem List   Diagnosis    Hyperlipidemia LDL goal <130    HTN, kidney transplant related    Erectile dysfunction    Anemia in stage 3a chronic kidney disease (H)    Metabolic acidosis    Delayed graft function of kidney    Hyperkalemia    Immunosuppressed status (H24)    Kidney replaced by transplant    Aftercare following organ transplant    Low serum bicarbonate       Allergies   No Known Allergies    Medications   Current Outpatient Medications   Medication Sig    acetaminophen (TYLENOL) 500 MG tablet Take 500-1,000 mg by mouth every 6 hours as needed for mild pain    ALPRAZolam  (XANAX) 0.25 MG tablet TAKE ONE TABLET BY MOUTH ONCE DAILY AS NEEDED FOR ANXIETY (Patient not taking: Reported on 12/27/2023)    amLODIPine (NORVASC) 5 MG tablet Take 1 tablet (5 mg) by mouth daily    atorvastatin (LIPITOR) 10 MG tablet Take 1 tablet (10 mg) by mouth daily    carvedilol (COREG) 6.25 MG tablet Take 1 tablet (6.25 mg) by mouth 2 times daily (with meals)    dapsone (ACZONE) 25 MG tablet Take 2 tablets (50 mg) by mouth daily    magnesium oxide (MAG-OX) 400 MG tablet Take 2 tablets (800 mg) by mouth 2 times daily With lunch and supper    multivitamin RENAL (NEPHROCAPS/TRIPHROCAPS) 1 MG capsule Take 1 capsule by mouth daily    mycophenolate (GENERIC EQUIVALENT) 250 MG capsule Take 4 capsules (1,000 mg) by mouth 2 times daily    phosphorus tablet 250 mg 250 MG per tablet Take 1 tablet (250 mg) by mouth 2 times daily    sodium bicarbonate 650 MG tablet Take 2 tablets (1,300 mg) by mouth 3 times daily    tacrolimus (GENERIC) 0.5 MG capsule HOLD FOR FUTURE DOSE CHANGES.  Profile Rx: patient will contact pharmacy when needed    tacrolimus (GENERIC) 1 MG capsule Take 7 capsules (7 mg) by mouth 2 times daily (Patient taking differently: Take 7.5 mg by mouth 2 times daily)    valGANciclovir (VALCYTE) 450 MG tablet Take 1 tablet (450 mg) by mouth daily     No current facility-administered medications for this visit.     There are no discontinued medications.    Physical Exam   Vital Signs: There were no vitals taken for this visit.    GENERAL APPEARANCE: alert and no distress  HENT: mouth without ulcers or lesions  RESP: lungs clear to auscultation - no rales, rhonchi or wheezes  CV: regular rhythm, normal rate, no rub, no murmur  EDEMA: no LE edema bilaterally  ABDOMEN: soft, nondistended, nontender, bowel sounds normal  MS: extremities normal - no gross deformities noted, no evidence of inflammation in joints, no muscle tenderness  SKIN: no rash  NEURO: normal strength and tone, sensory exam grossly normal,  mentation intact and speech normal  PSYCH: mentation appears normal and affect normal/bright  TX KIDNEY: normal  DIALYSIS ACCESS:  LUE AV fistula with good thrill    Data         Latest Ref Rng & Units 12/25/2023     9:19 AM 12/21/2023     7:38 AM 12/19/2023     8:13 AM   Renal   Sodium 135 - 145 mmol/L 140  138  137    K 3.4 - 5.3 mmol/L 4.9  5.9  6.0    Cl 98 - 107 mmol/L 111  109  110    Cl (external) 98 - 107 mmol/L 111  109  110    CO2 22 - 29 mmol/L 20  18  18    Urea Nitrogen 8.0 - 23.0 mg/dL 24.0  30.7  29.3    Creatinine 0.67 - 1.17 mg/dL 1.13  1.65  1.49    Glucose 70 - 99 mg/dL 121  100  101    Calcium 8.8 - 10.2 mg/dL 8.8  9.1  8.9          Latest Ref Rng & Units 12/14/2023    10:18 AM 12/8/2023     8:37 AM 12/1/2023     9:01 AM   Bone Health   Phosphorus 2.5 - 4.5 mg/dL 2.1  1.9  1.7          Latest Ref Rng & Units 12/25/2023     9:19 AM 12/21/2023     7:38 AM 12/19/2023     8:13 AM   Heme   WBC 4.0 - 11.0 10e3/uL 3.8  3.7  4.4    Hgb 13.3 - 17.7 g/dL 9.4  9.1  8.7    Plt 150 - 450 10e3/uL 225  219  218          Latest Ref Rng & Units 11/16/2023     9:05 AM 10/28/2023     4:06 AM 6/7/2023    12:36 PM   Liver   AP 40 - 129 U/L  57     TBili <=1.2 mg/dL  0.3     ALT 0 - 70 U/L  12  11    AST 0 - 45 U/L  13     Tot Protein 6.4 - 8.3 g/dL  7.2     Albumin 3.5 - 5.2 g/dL 3.9  4.5           Latest Ref Rng & Units 10/28/2023     4:07 AM 10/16/2021     9:42 AM 3/20/2018     4:09 PM   Pancreas   A1C <5.7 % 5.0   4.9    Lipase 73 - 393 U/L  461           Latest Ref Rng & Units 12/25/2023     9:19 AM 11/20/2023     8:50 AM 11/16/2023     9:05 AM   Iron studies   Iron 61 - 157 ug/dL 80  51  41    Iron Sat Index 15 - 46 % 35  21  18    Ferritin 31 - 409 ng/mL 919  1,146  3          Latest Ref Rng & Units 12/14/2023    10:54 AM 10/28/2023     4:06 AM 2/1/2021     8:30 AM   [facility] Txp Virology   LOG IU/ML OF CMVQNT  <1.5      EBV CAPSID ANTIBODY IGG No detectable antibody.  Positive  >8.0    Hep B Core NR^Nonreactive    Nonreactive         Recent Labs   Lab Test 12/19/23  0813 12/21/23  0738 12/25/23  0919   DOSTAC 12/18/2023 12/20/2023 12/24/2023   TACROL 7.8 8.5 6.9     Recent Labs   Lab Test 12/08/23  0837 12/14/23  1018 12/21/23  0738   DOSMPA 12/7/2023   8:00 PM 12/13/2023   8:00 PM  --    MPACID 2.53 1.09 1.04   MPAG 70.6 105.7* 50.6

## 2024-01-02 NOTE — LETTER
1/2/2024         RE: Matteo Barnes  [address]  [place] [address]        Dear Colleague,    Thank you for referring your patient, Matteo Barnes, to the [facility]. Please see a copy of my visit note below.    TRANSPLANT NEPHROLOGY EARLY POST TRANSPLANT VISIT    Assessment & Plan   # DDKT: Trend up - back up to 1.4 from recent morena of 1.1 on 12/25/23; previous to that 1.5-1.9.    - Baseline Creatinine: ~1.1-1.4. Post txp morena Scr 1.1 on 12/25/23   - Proteinuria: Normal (<0.2 grams), FSGS protocol   - Date DSA Last Checked: Dec/2023      Latest DSA: No   - BK Viremia: No   - Kidney Tx Biopsy: No   - Transplant Ureteral Stent: Removed    -Recommend 2.5L fluid intake daily    # Immunosuppression: Tacrolimus immediate release (goal 8-10) and Mycophenolate mofetil (dose 1000 mg every 12 hours)   - Induction with Recent Transplant:  High Intensity Protocol due to DGF   - Continue with intensive monitoring of immunosuppression for efficacy and toxicity.   - Changes: Not at this time     # Infection Prophylaxis:   - PJP: Dapsone - switched from bactrim 12/20/23 due to hyperkalemia   - CMV: Valganciclovir (Valcyte) Patient is CMV IgG Ab discordant (D+/R-) and will continue on Valcyte x 6 months, then check CMV PCR monthly until 12 months post transplant. Valcyte 450mg daily (still appropriate dose for current CrCl).    # Hypertension: Borderline control;  Goal BP: < 130/80   - Volume status: Euvolemic     - Changes: Yes - Increase amlodipine to 10 mg daily and continue coreg 6.25 mg BID .     # Anemia in Chronic Renal Disease: Hgb: Trend up - followed by anemia clinic, hgb continuing to trend up.      JOSE: No   - Iron studies: Replete    # Mineral Bone Disorder:    - Secondary renal hyperparathyroidism; PTH level: Mildly elevated (151-300 pg/ml)        On treatment: None   - Vitamin D; level: Normal        On supplement: No   - Calcium; level: Normal        On supplement: No   -  Phosphorus; level: Low normal        On supplement: Yes, phos 250mg bid    # Electrolytes:   - Potassium; level: High - previously was drinking a lot of orange juice and eating bananas which he cut out. With recent C diff, however, has continued to drink a lot of Gatorade which could be contributing to hyperkalemia. Instructed to stop and will continue weekly labs.        On supplement: No  - Magnesium; level: Normal        On supplement: Yes - continue  - Bicarbonate; level: Low normal        On supplement: Yes, increase bicarb to 1300 mg BID     # Leukopenia:   -WBC count downtrending, 2.5 today. No signs/symptoms of acute infectious process at this time. ANC nml   - Follow up MPA level     # C. Diff, resolved:   -Diagnosed 11/16/23, started on PO vanc 125mg q6h x10d. Had recurrence of diarrhea and abdominal pain and underwent second course of PO vanco with resolution of symptoms. Now with no diarrhea, abdominal pain, nausea, or vomiting. Taking metamucil gummies daily.     # Weight loss, improving:   -EDW was 175lbs at time of transplant, then he developed diarrhea, weight down to 160lbs. Weight now uptrending (167.5 lb on 1/2/24) with resolution of diarrhea/abdominal pain and improved PO intake.     # HFpEF: Last cardiac echo (Feb/2021) showed normal LVEF ~ 60-65% with grade I diastolic dysfunction. Stress echo 07/2023 commented on normal LV function and wall motion.    # Medical Compliance: Yes    # Health Maintenance and Vaccination Review: Recommend:  flu and COVID vaccines at 3m post txp    # Transplant History:  Etiology of Kidney Failure: Focal segmental glomerulosclerosis (FSGS), likely secondary  Tx: DDKT  Transplant: 10/28/2023 (Kidney)  Donor Type: Donation after Brain Death Donor Class:   Crossmatch at time of Tx: negative  DSA at time of Tx: No  Significant changes in immunosuppression: None  CMV IgG Ab High Risk Discordance (D+/R-): Yes  EBV IgG Ab High Risk Discordance (D+/R-): No  Significant  transplant-related complications: F    Transplant Office Phone Number: [phone]    Assessment and plan was discussed with the patient and he voiced his understanding and agreement.    Return visit: Return in 2 months (on 3/4/2024) for previously scheduled visit, 4 month post transplant visit.    Patient seen and examined with nephrology attending, Dr. Garcia.    Fawn Decker MD  Internal Medicine PGY3    Physician Attestation   I, Marcus Garcia MD, personally examined and evaluated this patient.  I discussed the patient with the resident/fellow and care team, and agree with the assessment and plan of care as documented in the note on 01/02/24 .      I personally reviewed vital signs, medications, labs, and imaging  Marcus Garcia MD  Date of Service (when I saw the patient): 01/02/24          Chief Complaint   Mr. Barnes is a 68 year old here for kidney transplant, immunosuppression management and hospital follow up after kidney transplant.     History of Present Illness   Matteo cohen has been doing well over the past couple of weeks. Has no specific concerns today other than wanting to review his medications and ensure he still needs to be taking everything that he is on. Was last seen in clinic 12/1/2023. In the interval, developed recurrence of diarrhea and abdominal pain and was treated with a second course of PO vancomycin with resolution of his symptoms. Currently, no abdominal pain, diarrhea, or nausea/vomiting. Continues taking metamucil daily with good effect. Energy levels overall continue to improve. Appetite also improving and has gained some weight back. Stopped drinking orange juice as he thought this was probably contributing to the hyperkalemia noticed earlier in the month. Is still drinking Gatorade as he finishes recovering from his diarrhea and associated dehydration. Is taking all medications as prescribed with no missed doses with the exception of bicarb - only taking 1 tablet with  each dose instead of 2; did increase from 3 times daily to 5 times daily after he was instructed to increase bicarb with the hyperkalemia. No pain at the transplanted kidney site, no hematuria or dysuria. Continues walking the dogs 2-3 times daily without any shortness of breath or chest pain. No LE edema.     Home BP:  130s/70s systolic    Problem List   Patient Active Problem List   Diagnosis     Hyperlipidemia LDL goal <130     HTN, kidney transplant related     Erectile dysfunction     Anemia in stage 3a chronic kidney disease (H)     Metabolic acidosis     Delayed graft function of kidney     Hyperkalemia     Immunosuppressed status (H24)     Kidney replaced by transplant     Aftercare following organ transplant     Low serum bicarbonate       Allergies   No Known Allergies    Medications   Current Outpatient Medications   Medication Sig     amLODIPine (NORVASC) 10 MG tablet Take 1 tablet (10 mg) by mouth daily for 90 days     tacrolimus (GENERIC) 0.5 MG capsule Take 1 capsule (0.5 mg) by mouth 2 times daily Total dose 7.5mg twice daily     tacrolimus (GENERIC) 1 MG capsule Take 7 capsules (7 mg) by mouth 2 times daily Total dose 7.5mg twice daily     acetaminophen (TYLENOL) 500 MG tablet Take 500-1,000 mg by mouth every 6 hours as needed for mild pain     ALPRAZolam (XANAX) 0.25 MG tablet TAKE ONE TABLET BY MOUTH ONCE DAILY AS NEEDED FOR ANXIETY (Patient not taking: Reported on 12/27/2023)     atorvastatin (LIPITOR) 10 MG tablet Take 1 tablet (10 mg) by mouth daily     carvedilol (COREG) 6.25 MG tablet Take 1 tablet (6.25 mg) by mouth 2 times daily (with meals)     dapsone (ACZONE) 25 MG tablet Take 2 tablets (50 mg) by mouth daily     magnesium oxide (MAG-OX) 400 MG tablet Take 2 tablets (800 mg) by mouth 2 times daily With lunch and supper     multivitamin RENAL (NEPHROCAPS/TRIPHROCAPS) 1 MG capsule Take 1 capsule by mouth daily     mycophenolate (GENERIC EQUIVALENT) 250 MG capsule Take 4 capsules (1,000 mg)  by mouth 2 times daily     phosphorus tablet 250 mg 250 MG per tablet Take 1 tablet (250 mg) by mouth 2 times daily     sodium bicarbonate 650 MG tablet Take 1,300 mg by mouth 2 times daily     valGANciclovir (VALCYTE) 450 MG tablet Take 1 tablet (450 mg) by mouth daily     No current facility-administered medications for this visit.     Medications Discontinued During This Encounter   Medication Reason     tacrolimus (GENERIC) 0.5 MG capsule      tacrolimus (GENERIC) 1 MG capsule      amLODIPine (NORVASC) 5 MG tablet        Physical Exam   Vital Signs: BP (!) 148/79   Pulse 65   Temp 97.8  F (36.6  C) (Oral)   Wt 76 kg (167 lb 8 oz)   SpO2 98%   BMI 24.56 kg/m      GENERAL APPEARANCE: alert and no distress  HENT: mouth without ulcers or lesions  RESP: lungs clear to auscultation - no rales, rhonchi or wheezes  CV: regular rhythm, normal rate, no rub, no murmur  EDEMA: no LE edema bilaterally  MS: extremities normal - no gross deformities noted, no evidence of inflammation in joints, no muscle tenderness  SKIN: no rash  NEURO: normal strength and tone, sensory exam grossly normal, mentation intact and speech normal  PSYCH: mentation appears normal and affect normal/bright  DIALYSIS ACCESS:  LUE AV fistula      Data         Latest Ref Rng & Units 1/2/2024    10:00 AM 12/25/2023     9:19 AM 12/21/2023     7:38 AM   Renal   Sodium 135 - 145 mmol/L 138  140  138    K 3.4 - 5.3 mmol/L 5.5  4.9  5.9    Cl 98 - 107 mmol/L 110  111  109    Cl (external) 98 - 107 mmol/L 110  111  109    CO2 22 - 29 mmol/L 20  20  18    Urea Nitrogen 8.0 - 23.0 mg/dL 30.0  24.0  30.7    Creatinine 0.67 - 1.17 mg/dL 1.40  1.13  1.65    Glucose 70 - 99 mg/dL 120  121  100    Calcium 8.8 - 10.2 mg/dL 9.2  8.8  9.1    Magnesium 1.7 - 2.3 mg/dL 1.7            Latest Ref Rng & Units 1/2/2024    10:00 AM 12/14/2023    10:18 AM 12/8/2023     8:37 AM   Bone Health   Phosphorus 2.5 - 4.5 mg/dL 2.1  2.1  1.9          Latest Ref Rng & Units 1/2/2024     10:00 AM 12/25/2023     9:19 AM 12/21/2023     7:38 AM   Heme   WBC 4.0 - 11.0 10e3/uL 2.5  3.8  3.7    Hgb 13.3 - 17.7 g/dL 10.5  9.4  9.1    Plt 150 - 450 10e3/uL 179  225  219          Latest Ref Rng & Units 11/16/2023     9:05 AM 10/28/2023     4:06 AM 6/7/2023    12:36 PM   Liver   AP 40 - 129 U/L  57     TBili <=1.2 mg/dL  0.3     ALT 0 - 70 U/L  12  11    AST 0 - 45 U/L  13     Tot Protein 6.4 - 8.3 g/dL  7.2     Albumin 3.5 - 5.2 g/dL 3.9  4.5           Latest Ref Rng & Units 10/28/2023     4:07 AM 10/16/2021     9:42 AM 3/20/2018     4:09 PM   Pancreas   A1C <5.7 % 5.0   4.9    Lipase 73 - 393 U/L  461           Latest Ref Rng & Units 12/25/2023     9:19 AM 11/20/2023     8:50 AM 11/16/2023     9:05 AM   Iron studies   Iron 61 - 157 ug/dL 80  51  41    Iron Sat Index 15 - 46 % 35  21  18    Ferritin 31 - 409 ng/mL 919  1,146  3          Latest Ref Rng & Units 12/14/2023    10:54 AM 10/28/2023     4:06 AM 2/1/2021     8:30 AM   [facility] Txp Virology   LOG IU/ML OF CMVQNT  <1.5      EBV CAPSID ANTIBODY IGG No detectable antibody.  Positive  >8.0    Hep B Core NR^Nonreactive   Nonreactive        Recent Labs   Lab Test 12/19/23  0813 12/21/23  0738 12/25/23  0919   DOSTAC 12/18/2023 12/20/2023 12/24/2023   TACROL 7.8 8.5 6.9     Recent Labs   Lab Test 12/08/23  0837 12/14/23  1018 12/21/23  0738   DOSMPA 12/7/2023   8:00 PM 12/13/2023   8:00 PM  --    MPACID 2.53 1.09 1.04   MPAG 70.6 105.7* 50.6         Again, thank you for allowing me to participate in the care of your patient.        Sincerely,        Marcus Garcia MD

## 2024-01-05 NOTE — TELEPHONE ENCOUNTER
Patient Call: General  Route to LPN    Reason for call: Matteo wondering if Coordinator, could order him some more antibiotic his stomach infection came back.    Call back needed? Yes    Return Call Needed  Same as documented in contacts section  When to return call?: Same day: Route High Priority

## 2024-01-05 NOTE — TELEPHONE ENCOUNTER
Pt reports diarrhea returned Wednesday afternoon. He completed second course of vanco about 2 weeks ago. Reports he can hardly walk. Doesn't want to get lab testing done, is requesting that medication just be sent for him.

## 2024-01-05 NOTE — TELEPHONE ENCOUNTER
Alden Joseph MD Reilly, Megan, RN  Caller: Unspecified (Today,  9:03 AM)  Spoke to him  He can go for labs Mon- stool ALLISON, cmv pcr, cdiff, MPA level, IgG-unable to go today-I tried to  him  Look into coverage for fidaxomicin 200 mg p bid x 10d  Ok to send few day vancomycin prescription to get him through the weekend:      -if confirmed cdiff toxin + GDH + then would need either an extended vanc taper (125 qid x 14d, 125 bid x7d, 125 every day x 7d then 125 every other day x 14d,...) or fidaxomicin    OUTCOME:  Labs ordered, vanco x 4 days sent to pharmacy.     Per Pharmacy liaison, coverage for fidaxomicin is:  Dificid 200mg 20/ 10 days=$ 90

## 2024-01-09 NOTE — TELEPHONE ENCOUNTER
ISSUE: Alden Joseph MD Reilly, Megan, RN  +cdiff toxin B and GDH, recommend vancomycin taper or fidaxomicin if covered  Refer to ID    OUTCOME:   RNCC spoke with pt who is ok with $90 charge for fidaxomicin. ID referral placed.

## 2024-01-11 NOTE — PROGRESS NOTES
Anemia Management Note  SUBJECTIVE/OBJECTIVE:  Primary Diagnosis: Anemia in Chronic Kidney Disease (N18.4, D63.1)     Secondary Diagnosis:  Organ or tissue replaced by transplant, kidney (Z94.0)  Kidney Transplant [id number]  Hgb goal range:  9-10  Epo/Darbo:   TBD. Initiate when Hgb drops <9.0.   Iron regimen:  TBD  Venofer 200mg x 5 doses per referral-one dose given then determined further doses were unnecessary d/t possible lab error  Labs : [id number]  Recent JOSE use, transfusion, IV iron: none. Hx retacrit and venofer with dialysis     RX/TX plans : TBD     No history of stroke, MI, and blood clots or cancers  Contact: Consent to communicate declined per pt, signed on 106        Latest Ref Rng & Units 2023    10:18 AM 12/15/2023     9:30 AM 2023     8:13 AM 2023     7:38 AM 2023     9:19 AM 2024    10:00 AM 2024    11:04 AM   Anemia   Hemoglobin 13.3 - 17.7 g/dL  13.3 - 17.7 g/dL 8.3  8.2  8.7  9.1  9.4  10.5  10.6     10.6    Ferritin 31 - 409 ng/mL     919        BP Readings from Last 3 Encounters:   24 (!) 148/79   23 134/77   23 134/77     Wt Readings from Last 2 Encounters:   24 76 kg (167 lb 8 oz)   23 72.7 kg (160 lb 4.4 oz)           ASSESSMENT:  Hgb:at goal - continue to monitor  TSat: at goal >30% Ferritin: At goal (>100ng/mL)    PLAN:  Check iron studies in 2-4 week(s)    Orders needed to be renewed (for next follow-up date) in EPIC: None    Iron labs due:  End of 2024    Plan discussed with:  No call, chart review       NEXT FOLLOW-UP DATE:  24    Elise Grady RN   Anemia Services  [facility]  [email or web address]   Office : [phone]  Fax: [phone]

## 2024-01-14 NOTE — TELEPHONE ENCOUNTER
DIAGNOSIS: Clostridium difficile infection. Ref by Dr Jose Armando Quiroga. Sched per pt     DATE RECEIVED: 1.16.24   NOTES (Gather within 2 years) STATUS DETAILS   OFFICE NOTE from referring provider   Internal 1.9.24  Jose Armando Quiroga  SOT   OFFICE NOTE from other specialist Internal 1.2.24  Radha  SOT   LABS (any labs) Internal    MEDICATION LIST Internal

## 2024-01-17 NOTE — TELEPHONE ENCOUNTER
Patient called to touch base with the RNCC thinks I has the flu and would like to know what he can take. Caller did take a Covid test and it was Negative.

## 2024-01-18 NOTE — TELEPHONE ENCOUNTER
Pharmacy called regarding tacrolimus (GENERIC) 1 MG capsule looks like to script was not sent and pharmacy would like to get the medication filled ASAP.

## 2024-01-21 PROBLEM — D72.829 LEUKOCYTOSIS: Status: ACTIVE | Noted: 2024-01-01

## 2024-01-21 NOTE — TELEPHONE ENCOUNTER
WBC: 0.4,  differential added to todays specimen.  Currently being treated for Cdiff,  day 10/10 on fidaxomicin 200 mg bid,  to be extended to 14 day course per Dr Jose Armando Quiroga.  RX sent to local pharmacy,  as patient reports diarrhea continues.      Denies fever, BP: 130s/70s, HR 70s    Appetite slowly imrproving.      On valcyte 450 mg daily,  last CMV December 2023, to be repeated tomorrow 1/22/204. Patient aware, states he will attempt to get labs after work tomorrow afternoon.    Dr Jose Armando Quiroga would also like MPA level, patient states he will obtain 12 hour trough level on IS Tuesday 1/23      Estimated Creatinine Clearance: 58.5 mL/min (A) (based on SCr of 1.3 mg/dL (H)).    Neupogen daily X3 day per Dr Jose Armando Quiroga. Patient is unsure he will be able to accommodate this tomorrow.  Primary RNCC to reach out to discuss Monday morning.  [place] [facility] [place].    CBC with diff, BMP, and CMV lab orders placed for tomorrow.  Neupogen therapy plan placed.    Magaly Shoemaker RN   Transplant Coordinator  [phone]

## 2024-01-23 PROBLEM — D70.9 NEUTROPENIA (H): Status: ACTIVE | Noted: 2024-01-01

## 2024-01-23 NOTE — TELEPHONE ENCOUNTER
Patient Call: General  Route to LPN    Reason for call: patient needing to touch base about injection for white blood cell count. More details with call back.    Call back needed? Yes    Return Call Needed  Same as documented in contacts section  When to return call?: Same day: Route High Priority

## 2024-01-23 NOTE — PROGRESS NOTES
Infusion Nursing Note:  Matteo Barnes presents today for filgrastim.    Patient seen by provider today: No   present during visit today: Not Applicable.    Note: will be coming nest 2 days as well. MD had educated him on the drug, he states.    Intravenous Access:  No Intravenous access/labs at this visit.    Treatment Conditions:  Lab Results   Component Value Date    HGB 10.5 (L) 01/21/2024    WBC 0.4 (LL) 01/21/2024    WBC 0.4 (LL) 01/21/2024    ANEU 0.2 (LL) 01/21/2024    ANEUTAUTO 2.0 01/02/2024     01/21/2024        Results reviewed, labs MET treatment parameters, ok to proceed with treatment.  ANC 0.2    Post Infusion Assessment:  Patient tolerated injection without incident.       Discharge Plan:   Discharge instructions reviewed with: Patient.  Patient and/or family verbalized understanding of discharge instructions and all questions answered.  AVS to patient via YODILT.  Patient will return 1/24 for next appointment.   Patient discharged in stable condition accompanied by: self.  Departure Mode: Ambulatory.      Alejandra Ovalles RN

## 2024-01-24 NOTE — PROGRESS NOTES
Infusion Nursing Note:  Matteo Noblejosuehubert presents today for labs and Zarxio.   Patient seen by provider today: No   present during visit today: Not Applicable.    Note: Patient denies symptoms of infection.  Tolerated first dose of zarxio without bone pain.  Notified Dr. Alden Quiroga via secure chat and in basket message of critical results from today.      Intravenous Access:  Lab draw site right AC, Needle type butterfly, Gauge 23.  Labs drawn without difficulty.    Treatment Conditions:  Lab Results   Component Value Date    HGB 11.9 (L) 01/24/2024    WBC 0.5 (LL) 01/24/2024    ANEU 0.2 (LL) 01/24/2024    ANEUTAUTO 2.0 01/02/2024     01/24/2024        Results reviewed, labs MET treatment parameters, ok to proceed with treatment.      Post Infusion Assessment:  Patient tolerated injection without incident.       Discharge Plan:   AVS to patient via MYCHART.  Patient will return 1/25/24 for next appointment.   Patient discharged in stable condition accompanied by: self.  Departure Mode: Ambulatory.      Nubia Robles RN

## 2024-01-24 NOTE — TELEPHONE ENCOUNTER
Post Kidney and Pancreas Transplant Team Conference  Date: 1/24/2024  Transplant Coordinator: Elsa Adrian     Attendees:  [x]  Dr. Dale [] Jennifer Domínguez, RN [x] Ellen Dowell LPN     [x]  Dr. Marrufo [x] Mere Ray, RN [] Deborah Molina LPN    [x] Dr. Garcia [x] Sara Alonso RN    [x] Dr. Flores [x] Elsa Adrian, RN [x] Abena Craven RN   [] Dr. Jennings [] Delma Rice, RN    [] Dr. Gonzalez [] Beryl Sousa RN    []  Dr. Santoyo [] Anu Johnson RN    [] Dr. Damian [] Alex Zhang RN    [x] Daylin Jerry, NP [] Magaly Shoemaker RN    [x] Pricilla Danielle NP [] Hue Dickson RN        Verbal Plan Read Back:   Reschedule ID visit  Repeat labs next week    Routed to RN Coordinator   Ellen Dowell LPN

## 2024-01-25 NOTE — TELEPHONE ENCOUNTER
Alden Joseph MD Reilly, Megan, RN  Yes 3rd dose  Reduce MMF to 750 mg po bid (can switch to  mg po bid if ongoing diarrhea)  Switch dapsone to inhaled pentamidine  CMV pcr, T cell subset, IgG  Hold off on additional fidaxomicin for now if he already done with >10 days  Recommend fibers and if diarrhea worsens, can trial po vanc taper      OUTCOME:  Labs ordered and vanco taper ordered.     Pt agrees with plan. Voiced understanding of med changes and labs.

## 2024-01-26 PROBLEM — N17.9 AKI (ACUTE KIDNEY INJURY) (H): Status: ACTIVE | Noted: 2024-01-01

## 2024-01-26 PROBLEM — Z94.0 KIDNEY TRANSPLANTED: Status: ACTIVE | Noted: 2024-01-01
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
## 2024-01-26 NOTE — TELEPHONE ENCOUNTER
[facility] spoke with Matteo after leaving ED AMA.     Dr. Garcia is suspecting CMV. 7A will be directly admitting pt. Pt agrees.
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
## 2024-01-26 NOTE — PROGRESS NOTES
"At 0807 Transplant kidney surgery TERESA paged \"Mary Jo Durbin  H-J SHAHEEN Barnes wondering if his home vancomycin can be ordered due to c.diff? Thanks!\"    C.diff labs ordered.    At 0850 Transplant kidney surgery TERESA paged \"Mary Jo Durbin ED H-J Requesting to discharge and called when his bed is ready. It was explained that this is not how it generally works. Any chance of a direct admit or other options?\"    At 0932 Transplant kidney surgery TERESA paged \"Mary Jo Durbin  H-J SHAHEEN Barnes, please come see patient when you are able. Thanks!\"    Patient requested to discharge AMA and was not willing to wait. Discharged AMA. Was advised to call the transplant coordinator after leaving.   "

## 2024-01-26 NOTE — ED TRIAGE NOTES
Pt presents to the ED at the advice of MD for IV fluids due to elevated creatinine. Ongoing cdiff infection. Kidney tx patient.      Triage Assessment (Adult)       Row Name 01/25/24 6029          Triage Assessment    Airway WDL WDL        Respiratory WDL    Respiratory WDL WDL        Skin Circulation/Temperature WDL    Skin Circulation/Temperature WDL WDL        Cardiac WDL    Cardiac WDL WDL        Peripheral/Neurovascular WDL    Peripheral Neurovascular WDL WDL        Cognitive/Neuro/Behavioral WDL    Cognitive/Neuro/Behavioral WDL WDL

## 2024-01-26 NOTE — TELEPHONE ENCOUNTER
Patient Call: General  Route to LPN    Reason for call: Patient would like to touch base and follow after just being discharge from hospital. No further details were given to this writer.    Call back needed? Yes    Return Call Needed  Same as documented in contacts section  When to return call?: Same day: Route High Priority
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
## 2024-01-27 NOTE — PLAN OF CARE
/82 (BP Location: Right arm)   Pulse 76   Temp 97.5  F (36.4  C) (Oral)   Resp 16   SpO2 97%     Shift: 0101-4399  Isolation Status: Special prec, droplet, enteric   VS: stable on RA, afebrile  Neuro: Aox4  Behaviors: calm and cooperative   BG: None  Labs: AM labs pending ( urin sample sent)   Respiratory: WDL  Cardiac: WDL  Pain/Nausea: Denies   PRN: none  Diet: 3gm K  IV Access: L AVF, R PIV  Infusion(s): Sodium Bicarb @ 100ml/hr   Lines/Drains: None  GI/: voiding not saving. No BM over night   Skin: WDL  Mobility: UAL  Events/Education: none   Plan: con w/ POC

## 2024-01-27 NOTE — PLAN OF CARE
Goal Outcome Evaluation:      Plan of Care Reviewed With: patient    Overall Patient Progress: no changeOverall Patient Progress: no change    Outcome Evaluation: See RD note 1/27 for assessment/recs. Declines nutrition interventions at this time.

## 2024-01-27 NOTE — PLAN OF CARE
/83 (BP Location: Right arm)   Pulse 80   Temp 97.8  F (36.6  C) (Oral)   Resp 18   SpO2 98%     Shift: 3415-0508. Arrived to unit @ 1300  Isolation Status: Special precautions, Rule out C. Diff, Rule out Parvo.  VS: VSS on RA, afebrile  Neuro: Aox4  Behaviors: Calm, pleasant  BG: None  Labs: COVID+ Cr 1.95  Respiratory: WDL  Cardiac: WDL  Pain/Nausea: Denies nausea and pain  PRN: N/a  Diet: 3g K. Tolerating well  IV Access: L AVF. R PIV  Infusion(s): Sodium Bicarb @ 100ml/hr  Lines/Drains: None  GI/: Voiding without difficulty. Loose BM x1  Skin: WDI  Mobility: UAL  Events/Education: n/a  Plan: Continue with plan of care

## 2024-01-27 NOTE — PROGRESS NOTES
[facility]    Transplant Infectious Diseases Inpatient Progress Note      Matteo Barnes MRN# [id number]   YOB: 1955 Age: 68 year old   Date of Admission and time: 1/26/2024 12:28 PM             Recommendations:   Supportive care for diarrhea.   No indications for C diff therapy.    Continue G-CSF 5 mcg/kg/day until ANC >800.   No indications to treat COVID-19 infection.   Check if insurance covers letermovir; if it does, please discontinue VGCV and start letermovir 480 mg daily.   Monitor TAC when on letermovir.         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   Presented with recurrent diarrhea.         Active Problems and Infectious Diseases Issues:   Recurrent diarrhea.   Likely combination of neutropenia-induced diarrhea and/or MMF-induced diarrhea.   MMF is on hold.     Neutropenia  Likely multifactorial including MMF and VGCV in combination with viral illness with COVID-19 infection.   I would encourage a substitute for MMF.   We will check if letermovir as an alternative to VGCV is covered.   Continue supportive care with G-CSF.     COVID-19 URI  Spontaneously improving.   No therapy is indicated.          Old Problems and Infectious Diseases Issues:   Non of significance.     Other Infectious Disease issues include:  - QTc: 454 as of 10/2023.   - PJP prophylaxis: dapsone.   - Serostatus: CMV D+/R-, EBV D+/R+, HSV1?/2?, VZV +  - Immunization status: dur for shingrix vaccine and RSV vaccine. Will wait till the recovery of ANC.   - Gamma globulin status: 728.       Thank you very much Dr. Issa for involving me in the care of Mr. Matteo Barnes. Please do not hesitate to call me for any question.     Attestation:  Total duration of visit including chart review, reviewing labs and imaging, interviewing and  examining the patient, documentation, and sending communication to the primary treating team, all at the same day of this encounter, is: 40 minutes.     Malgorzata Vyas MD  [facility] [place]  Contact information available via [facility] Paging/Directory    01/27/2024              Interim History:   Diarrhea is improving.   No fever.   No N/V.          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     It looks like the patient had diarrhea shortly after KT and was tested for C diff which was negative for PCR and Ag but negative for toxin. He was given vancomycin PO with improvement. The symptoms recurred 12/2023 and was started on vancomycin again on 12/12/23 for 14 days. Couple of weeks later diarrhea recurred and was started on fidaxomicin for a 10-day course on 1/9/24 with some improvement (the patient thinks it did not work as well as the vancomycin PO). Due to persistent/recurrent symptoms vancomycin tapering dose was prescribed1/24/24.   The patient also started to become neutropenia around 1/21/24 and was given G-CSF.   The patient presented to ED due to persistent symptoms and SADI.     The patient stated that he started to develop cough and congestion around 1/15/24. The symptoms are improving. No dyspnea and no fever. Denied sick contact.     Exposure History  Lives in [place], [place] in a condo with his dog.   No significant outdoors activities.   Works as a salesman.   Did travel to [facility] in the past.   No international travel.               Review of Systems:      As mentioned in the HPI otherwise negative by reviewing constitutional symptoms, central and peripheral neurological systems, respiratory system, cardiac system, GI system,  system, musculoskeletal, skin,  allergy, and lymphatics.                Immunizations:     Immunization History   Administered Date(s) Administered    COVID-19 Bivalent 12+ (Pfizer) 10/31/2022    COVID-19 MONOVALENT 12+ (Pfizer) 03/04/2021, 03/24/2021, 01/04/2022    COVID-19 Monovalent 12+ (Pfizer 2022) 05/20/2022    HepB-Dialysis 12/24/2021, 01/19/2022, 02/21/2022, 05/16/2022    Influenza (High Dose) 3 valent vaccine 10/07/2022    Influenza Vaccine 18-64 (Flublok) 10/09/2020    Influenza Vaccine >6 months,quad, PF 10/09/2023    Mantoux Tuberculin Skin Test 11/10/2021, 12/08/2021, 03/07/2022, 03/13/2023    Pneumo Conj 13-V (2010&after) 12/10/2021    Pneumococcal 23 valent 03/21/2022    TDAP Vaccine (Boostrix) 10/08/2015            Allergies:   No Known Allergies          Medications:   Medications that Require Transfusion:    lactated ringers 75 mL/hr at 01/27/24 1051     Scheduled Medications:    [Held by provider] amLODIPine  10 mg Oral Daily    atorvastatin  10 mg Oral Daily    [Held by provider] carvedilol  6.25 mg Oral BID w/meals    [Held by provider] dapsone  50 mg Oral Daily    [Held by provider] fidaxomicin  200 mg Oral BID    filgrastim-aafi  480 mcg Subcutaneous Daily at 8 pm    [Held by provider] magnesium oxide  800 mg Oral BID    magnesium sulfate  2 g Intravenous Once    [Held by provider] mycophenolate  750 mg Oral BID    [Held by provider] phosphorus tablet 250 mg  250 mg Oral BID    prenatal multivitamin w/iron  1 tablet Oral Daily    [Held by provider] psyllium  1 packet Oral Daily    [Held by provider] sodium bicarbonate  1,300 mg Oral BID    sodium chloride (PF)  3 mL Intracatheter Q8H    tacrolimus  5 mg Oral BID IS    [START ON 1/28/2024] valGANciclovir  450 mg Oral Daily               Physical Exam:   Temp: 97.5  F (36.4  C) Temp src: Oral BP: 130/82 Pulse: 76   Resp: 16 SpO2: 97 % O2 Device: None (Room air)      Wt Readings from Last 4 Encounters:   01/27/24 69.8 kg (153 lb 12.8 oz)   01/25/24 68.9 kg (152 lb)   01/25/24  69.4 kg (152 lb 14.4 oz)   01/02/24 76 kg (167 lb 8 oz)     Constitutional: awake, alert, cooperative, no apparent distress and appears at stated age, well nourished.            Data:     Absolute CD4, [employer] T Cells   Date Value Ref Range Status   01/25/2024 45 (L) 441 - 2,156 cells/uL Final       Inflammatory Markers    Recent Labs   Lab Test 10/23/21  0619 10/22/21  0649 10/20/21  0553 10/19/21  0640 10/18/21  0653 10/17/21  0813   CRP 22.2* 42.0* 140.0* 141.0* 108.0* 143.0*       Immune Globulin Studies     Recent Labs   Lab Test 01/25/24  1434          Metabolic Studies       Recent Labs   Lab Test 01/27/24  0924 01/27/24  0638 01/26/24  1744 01/26/24  0852 01/25/24  2231 01/25/24  1434 01/21/24  1132 10/28/23  1850 10/28/23  1722 10/28/23  1656 10/28/23  1617 10/28/23  0407   NA  --  138 136 135 140 132* 133*   < > 137 137   < >  --    POTASSIUM  --  3.7 4.8 6.0* 5.1 5.3 4.7   < > 4.8 5.4*   < >  --    CHLORIDE  --  97* 108* 108* 108* 102 101   < >  --   --   --   --    CO2  --  32* 19* 13* 17* 16* 20*   < >  --   --   --   --    ANIONGAP  --  9 9 14 15 14 12   < >  --   --   --   --    BUN  --  36.2* 49.0* 45.4* 45.6* 39.6* 24.3*   < >  --   --   --   --    CR  --  1.38* 1.95* 1.99* 2.56* 2.36* 1.30*   < >  --   --   --   --    GFRESTIMATED  --  56* 37* 36* 27* 29* 60*   < >  --   --   --   --    GLC 96 479* 152* 99 103* 120* 131*   < > 174* 152*   < >  --    A1C  --   --   --   --   --   --   --   --   --   --   --  5.0   ANNETTE  --  7.6* 9.3 9.4 9.9 9.7 9.5   < >  --   --   --   --    PHOS  --  2.2*  --   --  3.9  --   --    < >  --   --   --   --    MAG  --  1.4*  --   --  1.9  --   --    < >  --   --   --   --    LACT  --   --   --   --   --   --   --   --  3.3* 2.6*   < >  --     < > = values in this interval not displayed.       Hepatic Studies    Recent Labs   Lab Test 01/25/24  2231 11/16/23  0905 10/28/23  0406 06/07/23  1236 01/26/23  0853 10/22/21  0649 10/20/21  0553 10/18/21  0653  "10/16/21  0942 08/25/21  1541 02/01/21  0830   BILITOTAL 0.4  --  0.3  --  0.3 0.3  --   --  0.4  --  0.4   ALKPHOS 139  --  57  --  47 67  --   --  68  --  101   ALBUMIN 4.5 3.9 4.5  --  4.6 2.5* 2.5*   < > 3.7   < > 4.1   AST 35  --  13  --  15 88*  --   --  25  --  12   ALT 41  --  12 11 16 96*  --   --  26  --  31    < > = values in this interval not displayed.       Pancreatitis testing    Recent Labs   Lab Test 10/28/23  0406 06/07/23  1236 01/26/23  0853 10/16/21  0942 07/18/19  0757 11/24/15  0822   LIPASE  --   --   --  461*  --   --    TRIG 135 185* 132  --  242* 215*       Hematology Studies      Recent Labs   Lab Test 01/27/24  0638 01/26/24  0852 01/25/24  2231 01/25/24  1434 01/24/24  1430 01/21/24  1132 01/08/24  1104   WBC 0.6* 0.6* 0.6* 0.5* 0.5* 0.4*  0.4* 1.0*  1.0*   ANEU 0.1* 0.1* 0.1*  --  0.2* 0.2* 0.7*   ALYM 0.1* 0.2* 0.2*  --  0.1* 0.1* 0.2*   TITO 0.3 0.3 0.2  --  0.1 0.1 0.0   AEOS 0.0 0.0 0.0  --  0.0 0.0 0.1   HGB 9.1* 11.6* 12.1* 11.9* 11.9* 10.5* 10.6*  10.6*   HCT 26.0* 33.2* 35.9* 34.7* 35.5* 31.1* 31.1*  31.1*    220 257 255 285 278 156  156       Clotting Studies    Recent Labs   Lab Test 10/28/23  0406 10/18/21  0653 02/01/21  0830 08/13/20  0730   INR 1.13 1.05 1.12 1.09   PTT 30  --  29  --        Arterial Blood Gas Testing    Recent Labs   Lab Test 10/28/23  1722 10/28/23  1656 10/28/23  1617   O2PER 30.0 32.0 35.0        Urine Studies     Recent Labs   Lab Test 01/25/24  2231 10/28/23  0231 10/16/21  1056 02/01/21  0826 06/30/20  0739   URINEPH 5.0 8.5* 6.5 6.0 5.5   NITRITE Negative Negative Negative Negative Negative   LEUKEST Negative Negative Negative Negative Negative   WBCU <1 1 1 0 0 - 5       Vancomycin Levels     No lab results found.    Invalid input(s): \"VANCO\"    Tobramycin levels     No lab results found.    Gentamicin levels    No lab results found.    Tacrolimus levels    Invalid input(s): \"TACROLIMUS\", \"TAC\", \"TACR\"      Latest Ref Rng & Units " "1/27/2024     6:38 AM 1/26/2024     5:44 PM 1/26/2024     8:52 AM 1/25/2024    10:31 PM 1/25/2024     2:34 PM   Transplant Immunosuppression Labs   Creat 0.67 - 1.17 mg/dL 1.38  1.95  1.99  2.56  2.36    Urea Nitrogen 8.0 - 23.0 mg/dL 36.2  49.0  45.4  45.6  39.6    WBC 4.0 - 11.0 10e3/uL 0.6   0.6  0.6  0.5    Neutrophil % 16   18  15     ANEU 1.6 - 8.3 10e3/uL 0.1   0.1  0.1         Cyclosporine levels    Invalid input(s): \"CYCLOSPORINE\", \"CYC\"    Mycophenolate levels    Invalid input(s): \"MYPA\", \"MYP\"    Sirolimus levels    Invalid input(s): \"SIROLIMUS\", \"SIR\", \"RAPA\"    CSF testing   No lab results found.      Microbiology:  Last check of C difficile  C Difficile Toxin B by PCR   Date Value Ref Range Status   01/26/2024 Negative Negative Final     Comment:     A negative result does not exclude actual disease due to C. difficile and may be due to improper collection, handling and storage of the specimen or the number of organisms in the specimen is below the detection limit of the assay.       Virology:  CMV viral loads  No results found for: \"CMVRESINST\", \"52068\", \"92218\", \"98270\", \"32686\", \"CMVQAL\"  CMV viral loads    Recent Labs   Lab Test 12/14/23  1054   CMVLOG <1.5       CMV viral loads    CMV DNA IU/mL   Date Value Ref Range Status   01/25/2024 Not Detected Not Detected IU/mL Final   12/14/2023 <35 (A) Not Detected IU/mL Final     Comment:     CMV DNA detected, less than 35 IU/mL   11/13/2023 Not Detected Not Detected IU/mL Final   10/28/2023 Not Detected Not Detected IU/mL Final     CMV log   Date Value Ref Range Status   12/14/2023 <1.5  Final       CMV resistance testing  No lab results found.  No results found for: \"CMVCID\", \"CMVFOS\", \"CMVGAN\", \"CMVDRUGRES\"     No results found for: \"H6RES\"    No results found for: \"EBVDN\", \"EBRES\", \"EBVSP\", \"EBVPC\", \"EBVPCR\"    CMV Antibody IgG   Date Value Ref Range Status   10/28/2023 No detectable antibody. No detectable antibody.  Final   02/01/2021 0.2 0.0 - 0.8 " "AI Final     Comment:     Negative  Antibody index (AI) values reflect qualitative changes in antibody   concentration that cannot be directly associated with clinical condition or   disease state.         No results found for: \"EBIG2\", \"EBIGM\", \"TOXG\"          Malgorzata Vyas MD  [facility]  Contact information available via [facility] Paging/Directory     01/27/2024    "

## 2024-01-27 NOTE — PHARMACY-ADMISSION MEDICATION HISTORY
Pharmacy Intern Admission Medication History    Admission medication history is complete. The information provided in this note is only as accurate as the sources available at the time of the update.    Information Source(s): Patient, Hospital records, and CareEverywhere/SureScripts via phone    Pertinent Information:   Patient reports taking 1500 mg of acetaminophen at a time when needed  Patient takes alprazolam about once every 2 weeks as needed  Amlodipine dose was decreased to 5 mg daily on 11/1/23  On 1/25/24, dapsone was switched to inhaled pentamidine. Fidaxomicin was also stopped at this time. Patient had not yet implemented these changes at home due to admission to ED on 1/25 evening. Last doses of fidaxomicin and dapsone were 1/25/24  Confirmed mycophenolate dose to be as appears on medication list (750 mg BID) despite last fill being 240 tabs for 30 days supply.  Sodium bicarbonate was discontinued following previous admission on 11/1/23. It was then restarted on 11/6/23. Patient is still taking.  Vancomycin taper order was entered 1/25, patient had not been able to start PTA    Changes made to PTA medication list:  Added: None  Deleted:   Dapsone 25 mg tablet - take 2 tablets by mouth daily  Fidaxomicin 200 mg tablet - take 1 tablet by mouth 2 times daily Additional 4 days to complete a 14 day course  Changed:   Amlodipine 10 mg tablet - take 1 tablet by mouth daily -> amlodipine 5 mg tablet - take 1 tablet by mouth daily    Medication Affordability:       Allergies reviewed with patient and updates made in EHR: yes    Medication History Completed By: Karen Scherer 1/26/2024 6:54 PM    PTA Med List   Medication Sig Last Dose    acetaminophen (TYLENOL) 500 MG tablet Take 500-1,000 mg by mouth every 6 hours as needed for mild pain 1/24/2024    ALPRAZolam (XANAX) 0.25 MG tablet TAKE ONE TABLET BY MOUTH ONCE DAILY AS NEEDED FOR ANXIETY Past Week    amLODIPine (NORVASC) 5 MG tablet Take 5 mg by mouth  daily 1/25/2024    atorvastatin (LIPITOR) 10 MG tablet Take 1 tablet (10 mg) by mouth daily 1/25/2024    carvedilol (COREG) 6.25 MG tablet Take 1 tablet (6.25 mg) by mouth 2 times daily (with meals) 1/25/2024    magnesium oxide (MAG-OX) 400 MG tablet Take 2 tablets (800 mg) by mouth 2 times daily With lunch and supper 1/25/2024    multivitamin RENAL (NEPHROCAPS/TRIPHROCAPS) 1 MG capsule Take 1 capsule by mouth daily 1/25/2024    mycophenolate (GENERIC EQUIVALENT) 250 MG capsule Take 3 capsules (750 mg) by mouth 2 times daily 1/25/2024    phosphorus tablet 250 mg 250 MG per tablet Take 1 tablet (250 mg) by mouth 2 times daily 1/25/2024    psyllium (METAMUCIL/KONSYL) 58.6 % powder Take 6 g (1 teaspoonful) by mouth daily 1/25/2024    sodium bicarbonate 650 MG tablet Take 1,300 mg by mouth 2 times daily 1/25/2024    tacrolimus (GENERIC) 0.5 MG capsule Take 1 capsule (0.5 mg) by mouth 2 times daily for 90 days Total dose 7.5mg twice daily 1/25/2024    tacrolimus (GENERIC) 1 MG capsule Take 7 capsules (7 mg) by mouth 2 times daily Total dose 7.5mg twice daily 1/25/2024    valGANciclovir (VALCYTE) 450 MG tablet Take 1 tablet (450 mg) by mouth daily 1/25/2024    vancomycin (VANCOCIN) 125 MG capsule Take 1 capsule (125 mg) by mouth 4 times daily 1/25/2024

## 2024-01-27 NOTE — PROGRESS NOTES
"CLINICAL NUTRITION SERVICES - ASSESSMENT NOTE     Nutrition Prescription    Malnutrition Status:    Moderate malnutrition in the context of acute illness     Recommendations already ordered by Registered Dietitian (RD):  - Encourage PO intake. Encourage at least 1 high protein food per meal tray to prevent muscle depletion. Encouraged meals TID and/or small frequent meals/snacks if appetite and intake low.     - Provided pt with oral nutrition supplement list and suggestions for renal/low potassium options. Pt not interested in receiving oral nutrition supplements or snacks at this time (reports appetite much improved) but accepted handout from writer.    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     NUTRITION HISTORY  Pt providing short answers with writer during visit. He acknowledges that PO has been low with some weight loss recently but states that he is not concerned about this at this time. He reports that his low PO intake was due to diarrhea which pt feels is improving/no longer a concern. He reports eating well since admission. He reports being aware of low K+ foods and renal oral nutrition supplement shakes if needed. Accepted supplement handout from writer but politely declines any nutrition interventions at this time.     CURRENT NUTRITION ORDERS  Diet: 3 g Potassium  Supplements: N/A    Intake/Tolerance: % intake so far since admit, per I/O documentation. Continue to monitor I/O trends, adequacy.     GI  BM x1 on 1/26/24     LABS:  K+ 3.7 (WNL) but K+ last " "elevated 1/26   BUN/Cr elevated  but trending down. Monitor.   Mg/PO4 both low; not currently on replacement protocol.   BG trends variable; range  mg/dL for past 4-measures. A1C 5% on 10/28/23 (WNL)     MEDICATIONS  Prenatal multivitamin with Fe (?)  <--- suggest changing to Thera-Vit-M, prenatal MVI not indicated for pt.   IVF - LR at 75 mL/hr     SKIN  No current deficits noted      ANTHROPOMETRICS  Height: 5'9\"   Most Recent Weight: 69.8 kg (153 lbs 12.8 oz)   IBW: 72.7 kg  96%IBW   BMI: Normal BMI    Weight History:   - Down 11.1 kg (14.6%) over past 1-month; severe loss   - Reports UBW as 180 lbs. He was not able to tell writer when he last weighed this, but per below, appears ~3 months ago.    Wt Readings from Last 15 Encounters:   01/25/24 68.9 kg (152 lb)   01/25/24 69.4 kg (152 lb 14.4 oz)   01/02/24 76 kg (167 lb 8 oz)   12/01/23 72.7 kg (160 lb 4.4 oz)   12/01/23 72.7 kg (160 lb 3.2 oz)   11/27/23 74.3 kg (163 lb 11.2 oz)   11/14/23 74.7 kg (164 lb 11.2 oz)   11/03/23 81.9 kg (180 lb 9.6 oz)   11/02/23 83.1 kg (183 lb 3.2 oz)   11/01/23 81.6 kg (180 lb)   08/03/23 81.7 kg (180 lb 1.9 oz)   06/07/23 77.5 kg (170 lb 14.4 oz)   06/07/23 77.2 kg (170 lb 4.8 oz)   05/10/23 81.2 kg (179 lb)   01/26/23 79.8 kg (176 lb)       Dosing Weight: 70 kg (admit wt, IBW Of 72.7 kg)     ASSESSED NUTRITION NEEDS  Estimated Energy Needs: 9217-4007 kcals/day (25 - 30 kcals/kg)  Justification: Maintenance (higher end of range preferred w/ wt loss)   Estimated Protein Needs:  grams protein/day (1.2 - 1.5 grams of pro/kg)  Justification: Increased needs, lean body mass preservation   Estimated Fluid Needs: (1 mL/kcal)   Justification: Maintenance    PHYSICAL FINDINGS  See malnutrition section below.  No abnormal nutrition-related physical findings observed.     MALNUTRITION  % Intake: < 75% for > 7 days (moderate)  % Weight Loss: > 5% in 1 month (severe)  Subcutaneous Fat Loss: None observed -Visually. Declined " NFPE.  Muscle Loss: None observed -Visually. Declined NFPE.  Fluid Accumulation/Edema: None noted  Malnutrition Diagnosis: Moderate malnutrition in the context of acute illness     NUTRITION DIAGNOSIS  Inadequate oral intake related to reduced appetite in setting of diarrhea as evidenced by patient self report, but declines nutrition intervention at this time.       INTERVENTIONS  Implementation  Nutrition Education: Provided education on RD role, reason for assessment, encouragement of PO intake, protein sources w/ each meal/snack, small frequent meals if needed, renal oral nutrition supplement options/snacks    Medical food supplement therapy - PRN order placed. Pt declined scheduled supplements or snacks at this time.    Goals  Patient to consume % of nutritionally adequate meal trays TID, or the equivalent with supplements/snacks.     Monitoring/Evaluation  Progress toward goals will be monitored and evaluated per protocol.  Cruz Tovar, MS, RDN, LD, CNSC  Weekend/Holiday RD pager [phone]

## 2024-01-27 NOTE — PROGRESS NOTES
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 He was evaluated outpatient by transplant nephrology who recommended patient present for admission for further evaluation due to persistent diarrhea, neutropenia and hyperkalemia.        Graft function:  DDKTx 10/23:  Ureteral stent removed. B/l Cr 1.1-1.4   SADI:likely due to dehydration, Cr 2.6 on presentation, improved with IVF, Cr 1.4 today  -Kidney US: no significant findings     Immunosuppression management:   Inducted with high induction due to DGF,  noDSA.  -MMF 1000 mg BID. Discontinued due to diarrhea.      -plan to start Everolimus once ANC > 1000. Start dose 1.25 mg BID. Goal 4-6.   Pred 7.5 mg daily.    Continue Tac 5mg BID, titrate to goal 8-10  Hematology:   Anemia in Chronic Renal Disease: Hgb ~12, Decreased to ~8.2 after IVFs. Monitor daily.  Leukopenia: Secondary to medications. MMF and Valcyte stopped (started letermovir). CMV not detected. WBC 1.2 and .   -Continue Neupogen, had received 300mcg OP  1/23-25, received 480mcg 1/26, repeat today  -consult Heme  Cardiorespiratory:    HTN: hypotensive on admission. Held norvasc and coreg. Improved, Restart coreg today.   GI/Nutrition: Regular diet  Diarrhea: improving.  -CMV/EP/Cdiff negative. Ok to restart fiber  Endocrine: Glucose this am 479, likely sampling error. POCT-96  Fluid/Electrolytes: MIVF:on bicarb gtt, stop. Transition to LR @ 75cc/hr.   Low Bicarb: increase to 1300mg BID.  Hyperkalemia: 5 on presentation, improved 3.7 today  Hypomagnsesemia: hold po supplement due to diarrhea. IV PRN  : Voiding.  Infectious disease: Afebrile  Covid: + 1/26/24, asymptomatic  Leukopenia:  WBC 0.6, continue Filgastrim  480mcg Today.   Prophylaxis: DVT, fall, GI, fungal  Disposition: 7A     Medical Decision Making: Medium  Subsequent visit 49826 (moderate level decision making)    TERESA/Fellow/Resident Provider: Arlin Ferro NP    Faculty: Fitz Issa M.D.  _________________________________________________________________  Transplant History: Admitted 1/26/2024 for Neutropenia.  10/28/2023 (Kidney), Postoperative day: 91     Interval History: History is obtained from the patient  Overnight events: NAEs. Reports diarrhea improving. No fevers. Feels improved. Continues to deny cough/uri symptoms.     ROS:   A 10-point review of systems was negative except as noted above.    Meds:   [Held by provider] amLODIPine  10 mg Oral Daily    atorvastatin  10 mg Oral Daily    [Held by provider] carvedilol  6.25 mg Oral BID w/meals    [Held by provider] dapsone  50 mg Oral Daily    [Held by provider] fidaxomicin  200 mg Oral BID    filgrastim-aafi  480 mcg Subcutaneous Daily at 8 pm    [Held by provider] magnesium oxide  800 mg Oral BID    magnesium sulfate  2 g Intravenous Once    [Held by provider] mycophenolate  750 mg Oral BID    [Held by provider] phosphorus tablet 250 mg  250 mg Oral BID    prenatal multivitamin w/iron  1 tablet Oral Daily    [Held by provider] psyllium  1 packet Oral Daily    [Held by provider] sodium bicarbonate  1,300 mg Oral BID    sodium chloride (PF)  3 mL Intracatheter Q8H    tacrolimus  5 mg Oral BID IS    [START ON 1/28/2024] valGANciclovir  450 mg Oral Daily       Physical Exam:     Admit Weight: 69.8 kg (153 lb 12.8 oz)    Current vitals:   /82 (BP Location: Right arm)   Pulse 76   Temp 97.5  F (36.4  C) (Oral)   Resp 16   Wt 69.8 kg (153 lb 12.8 oz)   SpO2 97%   BMI 22.71 kg/m           Vital sign ranges:    Temp:  [97.3  F (36.3  C)-98  F (36.7  C)] 97.5  F (36.4  C)  Pulse:  [76-89] 76  Resp:  [16-18] 16  BP: (106-134)/(64-83) 130/82  SpO2:  [96 %-98 %] 97 %  Patient Vitals for the past  "24 hrs:   BP Temp Temp src Pulse Resp SpO2 Weight   01/27/24 0936 -- -- -- -- -- -- 69.8 kg (153 lb 12.8 oz)   01/27/24 0231 130/82 97.5  F (36.4  C) Oral 76 16 97 % --   01/26/24 2116 114/76 98  F (36.7  C) Oral 89 16 97 % --   01/26/24 1757 134/83 97.8  F (36.6  C) Oral 80 18 98 % --   01/26/24 1232 106/64 97.3  F (36.3  C) Oral 82 16 96 % --     General Appearance: in no apparent distress.   Skin: normal, warm, dry  Heart: regular rate and rhythm  Lungs: clear to auscultation, without wheezes, without crackles  Abdomen: The abdomen is soft, NTTP, ND and he is not tender over the kidney graft. The wound is Healing well.  : tineo is not present.  voiding  Extremities: edema: absent.   Neurologic: awake, alert, and oriented. Tremor absent..     Data:   CMP  Recent Labs   Lab 01/27/24  0924 01/27/24  0638 01/26/24  1744 01/26/24  0852 01/25/24  2231   NA  --  138 136   < > 140   POTASSIUM  --  3.7 4.8   < > 5.1   CHLORIDE  --  97* 108*   < > 108*   CO2  --  32* 19*   < > 17*   GLC 96 479* 152*   < > 103*   BUN  --  36.2* 49.0*   < > 45.6*   CR  --  1.38* 1.95*   < > 2.56*   GFRESTIMATED  --  56* 37*   < > 27*   ANNETTE  --  7.6* 9.3   < > 9.9   MAG  --  1.4*  --   --  1.9   PHOS  --  2.2*  --   --  3.9   ALBUMIN  --   --   --   --  4.5   BILITOTAL  --   --   --   --  0.4   ALKPHOS  --   --   --   --  139   AST  --   --   --   --  35   ALT  --   --   --   --  41    < > = values in this interval not displayed.     CBC  Recent Labs   Lab 01/27/24  0638 01/26/24  0852   HGB 9.1* 11.6*   WBC 0.6* 0.6*    220     COAGSNo lab results found in last 7 days.    Invalid input(s): \"XA\"   Urinalysis  Recent Labs   Lab Test 01/25/24  2231 10/28/23  0231 02/01/21  0826 06/30/20  0739 05/10/16  0738   COLOR Yellow Light Yellow   < > Yellow  --    APPEARANCE Slightly Cloudy* Clear   < > Clear  --    URINEGLC Negative 100*   < > Negative  --    URINEBILI Negative Negative   < > Negative  --    URINEKETONE Negative Negative   < > " Negative  --    SG 1.031 1.009   < > 1.020  --    UBLD Negative Negative   < > Trace*  --    URINEPH 5.0 8.5*   < > 5.5  --    PROTEIN 50* 100*   < > >=300*  --    NITRITE Negative Negative   < > Negative  --    LEUKEST Negative Negative   < > Negative  --    RBCU 1 <1   < > O - 2  --    WBCU <1 1   < > 0 - 5  --    UTPG  --   --   --  1.55* 0.12    < > = values in this interval not displayed.     Virology:  Hepatitis C Antibody   Date Value Ref Range Status   10/28/2023 Nonreactive Nonreactive Final   02/01/2021 Nonreactive NR^Nonreactive Final     Comment:     Assay performance characteristics have not been established for newborns,   infants, and children       BK Virus DNA copies/mL   Date Value Ref Range Status   01/02/2024 Not Detected Not Detected copies/mL Final   12/25/2023 Not Detected Not Detected copies/mL Final   12/01/2023 Not Detected Not Detected copies/mL Final   11/04/2023 Not Detected Not Detected copies/mL Final

## 2024-01-27 NOTE — PROGRESS NOTES
[facility]   Transplant Nephrology Progress Note  Date of Admission:  1/26/2024  Today's Date: 01/27/2024    Recommendations:  - -would give daily Neupogen of 480 mcg subcutaneous daily until his neutrophil count >0.6  -if WBC and diarrhea with holding MMF, recommend switching to Everolimus with goal 4-6 and stopping prednisone once Everolimus at goal   -change IVF to LR  -would switch his home dapsone to inhaled pentamidine monthly     Assessment & Plan   # DDKT: SADI but trend down with IVF              -SADI likely 2/2 volume depletion with diarrhea and elevated tac level              - Baseline Creatinine: ~ 1.1-1.4. Post txp morena Scr 1.1 on 12/25/23               - Proteinuria: Normal (<0.2 grams) FSGS protocol               - Date DSA Last Checked: Jan/2024      Latest DSA: No              - BK Viremia: No              - Kidney Tx Biopsy: No              - Transplant Ureteral Stent: Removed     # Immunosuppression: Tacrolimus immediate release (goal 8-10) and Mycophenolate mofetil (dose 1000 mg every 12 hours)              - Patient is in an immunosuppressed state and will continue to monitor for efficacy and toxicity of immunosuppression medications.              - Induction with Recent Transplant:  High Intensity Protocol due to DGF               - Changes: Yes - Decrease Tac dose to 5mg BID as level is 12, hold MMF, and start prednisone 10 mg qday                --if WBC and diarrhea improve with holding MMF, recommend switching MMF to Everolimus with goal 4-6 and stopping prednisone once Everolimus at goal       # Infection Prophylaxis:               - PJP: Dapsone. Stop dapsone and start inhaled pentamidine due to neutropenia              - CMV: Valganciclovir (Valcyte) CMV IgG Ab discordant (D+/R-), will continue on Valcyte x 6 months, then check CMV PCR monthly until 12 months post transplant     # Hypertension: Controlled, but low at times;           Goal  BP: < 140/90 (Hospitalization goal)              - Volume status: Hypovolemic                        EDW ~ was 175lbs at time of transplant, then he developed diarrhea, weight down to 160lbs               - Changes: Yes - hold coreg and amlodipine for now    # Anemia in Chronic Renal Disease: Hgb: Trend down      JOSE: No   - Iron studies: Replete    # Mineral Bone Disorder:   - Secondary renal hyperparathyroidism; PTH level: Mildly elevated (151-300 pg/ml)        On treatment: None  - Vitamin D; level: Normal        On supplement: No  - Calcium; level: Low        On supplement: No  - Phosphorus; level: Low        On supplement: No    # Electrolytes:   - Potassium; level: Normal        On supplement: No  - Magnesium; level: Low        On supplement:  held  - Bicarbonate; level: High        On supplement: No  - Sodium; level: Normal    # Hyperkalemia:              -resolved    # Leukopenia:              -Multiple potential causes including CMV induced (can still cause BM suppression despite absence of viremia), MMF, and dapsone also have suppressive leukopenic effect. Other potential viral agents include adeno virus and parvo B19.              -Due to DGF he received anti thymoglobulin which is a potential cause as well                         -would give daily Neupogen of 480 mcg subcutaneous until his neutrophil count >0.6  -would switch his home dapsone to inhaled pentamidine   -hold MMF, and start prednisone 10 mg qday. Would consider switching MMF to everolimus with goal 4-6  -would send adeno virus and B19 PCR         #Diarrhea               could be 2/2 C diff infection vs CMV colitis vs MMF effect   -appreciate ID recs  -hold magnesium  -hold MMF as above  -if WBC and diarrhea improve with holding MMF, recommend switching MMF to Everolimus with goal 4-6         # Weight loss:              -EDW was 175lbs at time of transplant, then he developed diarrhea, weight down to 160lbs. Unclear if weight loss is 2/2  diarrhea.      # HFpEF: Last cardiac echo () showed normal LVEF ~ 60-65% with grade I diastolic dysfunction. Stress echo 2023 commented on normal LV function and wall motion.    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    Seen and discussed with Dr. Mark Jerry, NP   Pager: [phone]    Interval History   Mr. Barnes's creatinine is 1.38 (638); Stable.  good urine output.  Other significant labs/tests/vitals: VSS  no events overnight.  no chest pain or shortness of breath.  no leg swelling.  no nausea and vomiting.  Bowel movements are improving.  no fever, sweats or chills.      Review of Systems   4 point ROS was obtained and negative except as noted in the Interval History.    MEDICATIONS:   [Held by provider] amLODIPine  10 mg Oral Daily    atorvastatin  10 mg Oral Daily    [Held by provider] carvedilol  6.25 mg Oral BID w/meals    [Held by provider] dapsone  50 mg Oral Daily    [Held by provider] fidaxomicin  200 mg Oral BID    filgrastim-aafi  480 mcg Subcutaneous Daily at 8 pm    [Held by provider] magnesium oxide  800 mg Oral BID    [Held by provider] mycophenolate  750 mg Oral BID    [Held by provider] phosphorus tablet 250 mg  250 mg Oral BID    prenatal multivitamin w/iron  1 tablet Oral Daily    [Held by provider] psyllium  1 packet Oral Daily    [Held by provider] sodium bicarbonate  1,300 mg Oral BID    sodium chloride (PF)  3 mL Intracatheter Q8H    tacrolimus  5 mg Oral BID IS    valGANciclovir  450 mg Oral Every Other Day      lactated ringers         Physical Exam   Temp  Av.8  F  (36.6  C)  Min: 97.3  F (36.3  C)  Max: 98.1  F (36.7  C)      Pulse  Av.4  Min: 65  Max: 95 Resp  Av.8  Min: 16  Max: 20  SpO2  Av.9 %  Min: 96 %  Max: 100 %     /82 (BP Location: Right arm)   Pulse 76   Temp 97.5  F (36.4  C) (Oral)   Resp 16   Wt 69.8 kg (153 lb 12.8 oz)   SpO2 97%   BMI 22.71 kg/m      Admit Weight: 69.8 kg (153 lb 12.8 oz)     GENERAL APPEARANCE: alert and no distress  HENT: mouth without ulcers or lesions  RESP: lungs clear to auscultation - no rales, rhonchi or wheezes  CV: regular rhythm, normal rate, no rub, no murmur  EDEMA: no LE edema bilaterally  ABDOMEN: soft, nondistended, nontender, bowel sounds normal  MS: extremities normal - no gross deformities noted, no evidence of inflammation in joints, no muscle tenderness  SKIN: no rash    Data   All labs reviewed by me.  CMP  Recent Labs   Lab 24  0924 24  0638 24  1744 24  0852 24  2231   NA  --  138 136 135 140   POTASSIUM  --  3.7 4.8 6.0* 5.1   CHLORIDE  --  97* 108* 108* 108*   CO2  --  32* 19* 13* 17*   ANIONGAP  --  9 9 14 15   GLC 96 479* 152* 99 103*   BUN  --  36.2* 49.0* 45.4* 45.6*   CR  --  1.38* 1.95* 1.99* 2.56*   GFRESTIMATED  --  56* 37* 36* 27*   ANNETTE  --  7.6* 9.3 9.4 9.9   MAG  --  1.4*  --   --  1.9   PHOS  --  2.2*  --   --  3.9   PROTTOTAL  --   --   --   --  7.7   ALBUMIN  --   --   --   --  4.5   BILITOTAL  --   --   --   --  0.4   ALKPHOS  --   --   --   --  139   AST  --   --   --   --  35   ALT  --   --   --   --  41     CBC  Recent Labs   Lab 24  0638 24  0852 24  2231 24  1434   HGB 9.1* 11.6* 12.1* 11.9*   WBC 0.6* 0.6* 0.6* 0.5*   RBC 2.75* 3.46* 3.72* 3.63*   HCT 26.0* 33.2* 35.9* 34.7*   MCV 95 96 97 96   MCH 33.1* 33.5* 32.5 32.8   MCHC 35.0 34.9 33.7 34.3   RDW 12.6 12.5 12.5 12.3    220 257 255     INRNo lab results found in last 7 days.  ABGNo lab results found in last 7 days.   Urine Studies  Recent Labs   Lab Test  01/25/24  2231 10/28/23  0231 10/16/21  1056 02/01/21  0826 06/30/20  0739   COLOR Yellow Light Yellow Light Yellow Yellow Yellow   APPEARANCE Slightly Cloudy* Clear Clear Clear Clear   URINEGLC Negative 100* Negative Negative Negative   URINEBILI Negative Negative Negative Negative Negative   URINEKETONE Negative Negative Negative Negative Negative   SG 1.031 1.009 1.015 1.012 1.020   UBLD Negative Negative Moderate* Negative Trace*   URINEPH 5.0 8.5* 6.5 6.0 5.5   PROTEIN 50* 100* 300* 100* >=300*   UROBILINOGEN  --   --   --   --  0.2   NITRITE Negative Negative Negative Negative Negative   LEUKEST Negative Negative Negative Negative Negative   RBCU 1 <1 1 <1 O - 2   WBCU <1 1 1 0 0 - 5     Recent Labs   Lab Test 06/30/20  0739 05/10/16  0738   UTPG 1.55* 0.12     PTH  Recent Labs   Lab Test 11/09/23  0847 11/02/23  0724 10/19/21  0806 06/30/20  0738   PTHI 201* 186* 139* 123*     Iron Studies  Recent Labs   Lab Test 12/25/23  0919 11/20/23  0850 11/16/23  0905 11/09/23  0847 11/06/23  0808 11/02/23  0724 10/19/21  0640   IRON 80 51* 41* 63 91  91 123 49   * 247 232* 238* 213* 179* 188*   IRONSAT 35 21 18 26 43 69* 26   ROBB 919* 1,146* 3* 1,368* 1,300* 1,365* 1,735*       IMAGING:  All imaging studies reviewed by me.

## 2024-01-27 NOTE — PLAN OF CARE
Physical Therapy: Orders received. Chart reviewed and discussed with care team.? Physical Therapy not indicated due to patient being very independent. Reports at baseline he walks up and down 3 flights of stairs to walk his dog multiple times a day. Declines need for therapy stating he is moving around the room independent. RN reports he is also active within the room.? Recommend discharge home when medically ready.? Will complete orders.

## 2024-01-28 NOTE — PLAN OF CARE
BP (!) 144/75 (BP Location: Right arm)   Pulse 88   Temp 97.8  F (36.6  C) (Oral)   Resp 16   Wt 69.8 kg (153 lb 12.8 oz)   SpO2 97%   BMI 22.71 kg/m      Shift: [id number]  Isolation Status: Special, Droplet, and Enteric  VS: Mild HTN on RA, afebrile  Neuro: Aox4  Behaviors: Calm, pleasant  BG: None. One-time POCT 93.  Labs: Mg 1.4. replaced this AM. Enteric stool sample sent. Still need to collect Cryptosporidium sample.  Respiratory: WDL  Cardiac: WDL  Pain/Nausea: Denies nausea. C/o minor back pain  PRN: Tylenol x1  Diet: 3g K  IV Access: R PIV. L AVF  Infusion(s): R @ 75ml.hr  Lines/Drains: None  GI/: Voiding, not saving. Bmx1 today. Soft formed.  Skin: WDI  Mobility: UAL  Events/Education: n/a  Plan: Continue with plan of care.

## 2024-01-28 NOTE — PROGRESS NOTES
Transplant Surgery  Inpatient Daily Progress Note  01/28/2024    Assessment & Plan: Matteo Barnes is a 68 year old male admitted on 1/25/2024. 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 He was evaluated outpatient by transplant nephrology who recommended patient present for admission for further evaluation due to persistent diarrhea, neutropenia and hyperkalemia.        Graft function:  DDKTx 10/23:  Ureteral stent removed. B/l Cr 1.1-1.4   SADI:likely due to dehydration, Cr 2.6 on presentation, improved with IVF, Cr 1.3 today  -Kidney US: no significant findings     Immunosuppression management:   Inducted with high induction due to DGF,  noDSA.  -MMF 1000 mg BID. Discontinued due to diarrhea.      -plan to start Everolimus once ANC > 1000. Start dose 1.25 mg BID. Goal 4-6.   Pred 7.5 mg daily.    Continue Tac 5mg BID, titrate to goal 8-10    Hematology:   Anemia in Chronic Renal Disease: Hgb ~12, Decreased to ~8.2 after IVFs. Monitor daily.  Leukopenia: Secondary to medications. MMF and Valcyte stopped (started letermovir). CMV not detected. WBC 1.2 and .   -Continue Neupogen, had received 300mcg OP  1/23-25, continue with 480mcg 1/26-1/28  -Send EBV  Cardiorespiratory:   HFpEF: Last cardiac echo (Feb/2021) showed normal LVEF ~ 60-65% with grade I diastolic dysfunction. Stress echo 07/2023 commented on normal LV function and wall motion.   HTN: PTA amlodipine, carvedilol. Stop amlodipine. Continue carvedilol.  GI/Nutrition:     Moderate Protein-Calorie Malnutrition: RD consulted. Regular diet  Diarrhea, recurrent: Secondary to medications (MMF, possible Mg contributing). Diarrhea stopped after stopping MMF and Mg.   Endocrine: No issues    Fluid/Electrolytes:   Hyperkalemia: 5.1 today  Hypomagnsesemia: hold po supplement due to diarrhea.  IV PRN  : voiding without retention  Infectious disease:   COVID-19 URI:1/26 PCR positive, CT 28.5. ID consulted. No therapy is indicated. No respiratory symptoms, no imaging done.   -Hx of Cdiff on Fidaxomycin    Prophylaxis:   PJP ppx: stop dapsone, G6PD normal.    CMV ppx: CMV discordant. Continue CMV prophylaxis x 6 months. Valcyte stopped due to leukopenia.   Vaccines: Started on letermovir to complete 6 month course (EOT 4/28/224).  Due for shingrix vaccine and RSV vaccine once 6 months post transplant (4/28/24). If not neutropenic at that time.        Disposition: 7A        TERESA/Fellow/Resident Provider: Arlin Ferro NP    Faculty: Fitz Issa M.D.   _________________________________________________________________  Transplant History: Admitted 1/26/2024 for Neutropenia, diarrhea, SADI.  10/28/2023 (Kidney), Postoperative day: 92     Interval History: History is obtained from the patient  Overnight events: Feels well, stooling slowed/more solid. Denies abdominal pain. No fever/chills. Denies cough/cp.    ROS:   A 10-point review of systems was negative except as noted above.    Meds:   [Held by provider] amLODIPine  10 mg Oral Daily    atorvastatin  10 mg Oral Daily    carvedilol  6.25 mg Oral BID w/meals    [Held by provider] dapsone  50 mg Oral Daily    [Held by provider] fidaxomicin  200 mg Oral BID    filgrastim-aafi  480 mcg Subcutaneous Daily at 8 pm    lidocaine  1 patch Transdermal Q24h    [Held by provider] magnesium oxide  800 mg Oral BID    methocarbamol  500 mg Oral 4x Daily    [Held by provider] mycophenolate  750 mg Oral BID    [Held by provider] phosphorus tablet 250 mg  250 mg Oral BID    predniSONE  7.5 mg Oral Daily    prenatal multivitamin w/iron  1 tablet Oral Daily    psyllium  1 packet Oral Daily    sodium bicarbonate  1,300 mg Oral BID    sodium chloride (PF)  3 mL Intracatheter Q8H    tacrolimus  5 mg Oral BID IS    valGANciclovir  450 mg Oral Daily       Physical Exam:  "    Admit Weight: 69.8 kg (153 lb 12.8 oz)    Current vitals:   BP (!) 140/85 (BP Location: Right arm)   Pulse 75   Temp 97.7  F (36.5  C) (Oral)   Resp 16   Wt 69.8 kg (153 lb 12.8 oz)   SpO2 95%   BMI 22.71 kg/m           Vital sign ranges:    Temp:  [97.7  F (36.5  C)-97.9  F (36.6  C)] 97.7  F (36.5  C)  Pulse:  [75-79] 75  Resp:  [16] 16  BP: (133-151)/(80-85) 140/85  SpO2:  [95 %-98 %] 95 %  Patient Vitals for the past 24 hrs:   BP Temp Temp src Pulse Resp SpO2   01/28/24 0526 (!) 140/85 97.7  F (36.5  C) Oral 75 16 95 %   01/28/24 0215 133/80 97.8  F (36.6  C) Oral 78 16 97 %   01/27/24 2148 (!) 151/82 97.9  F (36.6  C) Oral 79 16 98 %     General Appearance: in no apparent distress.   Skin: normal, warm, dry  Heart: regular rate and rhythm  Lungs: NLB on RA  Abdomen: The abdomen is soft, NT, ND. Surgical scar well approximated.  : tineo is not present.     Extremities: edema: absent.   Neurologic: awake, alert, and oriented. Tremor absent..     Data:   CMP  Recent Labs   Lab 01/28/24  0729 01/27/24  0924 01/27/24  0638 01/26/24  0852 01/25/24  2231     --  138   < > 140   POTASSIUM 5.1  --  3.7   < > 5.1   CHLORIDE 108*  --  97*   < > 108*   CO2 22  --  32*   < > 17*   GLC 94 96 479*   < > 103*   BUN 30.5*  --  36.2*   < > 45.6*   CR 1.34*  --  1.38*   < > 2.56*   GFRESTIMATED 58*  --  56*   < > 27*   ANNETTE 8.7*  --  7.6*   < > 9.9   MAG 1.8  --  1.4*  --  1.9   PHOS 2.2*  --  2.2*  --  3.9   ALBUMIN  --   --   --   --  4.5   BILITOTAL  --   --   --   --  0.4   ALKPHOS  --   --   --   --  139   AST  --   --   --   --  35   ALT  --   --   --   --  41    < > = values in this interval not displayed.     CBC  Recent Labs   Lab 01/28/24  0729 01/27/24  0638   HGB 8.2* 9.1*   WBC 1.2* 0.6*    194     COAGSNo lab results found in last 7 days.    Invalid input(s): \"XA\"   Urinalysis  Recent Labs   Lab Test 01/25/24  2231 10/28/23  0231 02/01/21  0826 06/30/20  0739 05/10/16  0738   COLOR Yellow " Light Yellow   < > Yellow  --    APPEARANCE Slightly Cloudy* Clear   < > Clear  --    URINEGLC Negative 100*   < > Negative  --    URINEBILI Negative Negative   < > Negative  --    URINEKETONE Negative Negative   < > Negative  --    SG 1.031 1.009   < > 1.020  --    UBLD Negative Negative   < > Trace*  --    URINEPH 5.0 8.5*   < > 5.5  --    PROTEIN 50* 100*   < > >=300*  --    NITRITE Negative Negative   < > Negative  --    LEUKEST Negative Negative   < > Negative  --    RBCU 1 <1   < > O - 2  --    WBCU <1 1   < > 0 - 5  --    UTPG  --   --   --  1.55* 0.12    < > = values in this interval not displayed.     Virology:  Hepatitis C Antibody   Date Value Ref Range Status   10/28/2023 Nonreactive Nonreactive Final   02/01/2021 Nonreactive NR^Nonreactive Final     Comment:     Assay performance characteristics have not been established for newborns,   infants, and children       BK Virus DNA copies/mL   Date Value Ref Range Status   01/02/2024 Not Detected Not Detected copies/mL Final   12/25/2023 Not Detected Not Detected copies/mL Final   12/01/2023 Not Detected Not Detected copies/mL Final   11/04/2023 Not Detected Not Detected copies/mL Final

## 2024-01-28 NOTE — PROGRESS NOTES
[facility]   Transplant Nephrology Progress Note  Date of Admission:  1/26/2024  Today's Date: 01/28/2024    Recommendations:  - -would give daily Neupogen of 480 mcg subcutaneous daily until his neutrophil count >0.6  -if WBC and diarrhea with holding MMF, recommend switching to Everolimus with goal 4-6 and stopping prednisone once Everolimus at goal   -would stop LR  -would switch his home dapsone to inhaled pentamidine monthly     Assessment & Plan   # DDKT: SADI but trend down with IVF              -SADI likely 2/2 volume depletion with diarrhea and elevated tac level              - Baseline Creatinine: ~ 1.1-1.4. Post txp morena Scr 1.1 on 12/25/23               - Proteinuria: Normal (<0.2 grams) FSGS protocol               - Date DSA Last Checked: Jan/2024      Latest DSA: No              - BK Viremia: No              - Kidney Tx Biopsy: No              - Transplant Ureteral Stent: Removed     # Immunosuppression: Tacrolimus immediate release (goal 8-10) and Mycophenolate mofetil (dose 1000 mg every 12 hours)              - Patient is in an immunosuppressed state and will continue to monitor for efficacy and toxicity of immunosuppression medications.              - Induction with Recent Transplant:  High Intensity Protocol due to DGF               - Changes: Yes - Decrease Tac dose to 5mg BID as level is 12, hold MMF, and start prednisone 10 mg qday                --if WBC and diarrhea improve with holding MMF, recommend switching MMF to Everolimus with goal 4-6 and stopping prednisone once Everolimus at goal       # Infection Prophylaxis:               - PJP: Dapsone. Stop dapsone and start inhaled pentamidine due to neutropenia              - CMV: Valganciclovir (Valcyte) CMV IgG Ab discordant (D+/R-), will continue on Valcyte x 6 months, then check CMV PCR monthly until 12 months post transplant     # Hypertension: Controlled, but low at times;           Goal BP: <  140/90 (Hospitalization goal)              - Volume status: Hypovolemic                        EDW ~ was 175lbs at time of transplant, then he developed diarrhea, weight down to 160lbs               - Changes: Yes - hold coreg and amlodipine for now    # Anemia in Chronic Renal Disease: Hgb: Trend down      JOSE: No   - Iron studies: Replete    # Mineral Bone Disorder:   - Secondary renal hyperparathyroidism; PTH level: Mildly elevated (151-300 pg/ml)        On treatment: None  - Vitamin D; level: Normal        On supplement: No  - Calcium; level: Low        On supplement: No  - Phosphorus; level: Low        On supplement: No    # Electrolytes:   - Potassium; level: Normal        On supplement: No  - Magnesium; level: Low        On supplement:  held  - Bicarbonate; level: Normal        On supplement: No  - Sodium; level: Normal    # Hyperkalemia:              -resolved    # Leukopenia:              -Multiple potential causes including CMV induced (can still cause BM suppression despite absence of viremia), MMF, and dapsone also have suppressive leukopenic effect. Other potential viral agents include adeno virus and parvo B19.              -Due to DGF he received anti thymoglobulin which is a potential cause as well                         -would give daily Neupogen of 480 mcg subcutaneous until his neutrophil count >0.6  -would switch his home dapsone to inhaled pentamidine   -hold MMF, and start prednisone 10 mg qday. Would consider switching MMF to everolimus with goal 4-6  -would send adeno virus and B19 PCR         #Diarrhea               could be 2/2 C diff infection vs CMV colitis vs MMF effect   -appreciate ID recs  -hold magnesium  -hold MMF as above  -if WBC and diarrhea improve with holding MMF, recommend switching MMF to Everolimus with goal 4-6         # Weight loss:              -EDW was 175lbs at time of transplant, then he developed diarrhea, weight down to 160lbs. Unclear if weight loss is 2/2  diarrhea.      # HFpEF: Last cardiac echo (Feb/2021) showed normal LVEF ~ 60-65% with grade I diastolic dysfunction. Stress echo 07/2023 commented on normal LV function and wall motion.    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    Seen and discussed with Dr. Mark Jerry, NP   Pager: [phone]    Interval History   Mr. Barnes's creatinine is 1.34 (01/28 0729); Stable.  good urine output.  Other significant labs/tests/vitals: VSS  no events overnight.  no chest pain or shortness of breath.  no leg swelling.  no nausea and vomiting.  Bowel movements are improving.  no fever, sweats or chills.    Review of Systems   4 point ROS was obtained and negative except as noted in the Interval History.    MEDICATIONS:   [Held by provider] amLODIPine  10 mg Oral Daily    atorvastatin  10 mg Oral Daily    carvedilol  6.25 mg Oral BID w/meals    [Held by provider] dapsone  50 mg Oral Daily    [Held by provider] fidaxomicin  200 mg Oral BID    filgrastim-aafi  480 mcg Subcutaneous Daily at 8 pm    lidocaine  1 patch Transdermal Q24h    [Held by provider] magnesium oxide  800 mg Oral BID    methocarbamol  500 mg Oral 4x Daily    [Held by provider] mycophenolate  750 mg Oral BID    [Held by provider] phosphorus tablet 250 mg  250 mg Oral BID    predniSONE  7.5 mg Oral Daily    prenatal multivitamin w/iron  1 tablet Oral Daily    psyllium  1 packet Oral Daily    sodium bicarbonate  1,300 mg Oral BID    sodium chloride (PF)  3 mL Intracatheter Q8H    tacrolimus  5 mg Oral BID IS    valGANciclovir  450 mg Oral Daily      lactated ringers  75 mL/hr at 24 0800       Physical Exam   Temp  Av.8  F (36.6  C)  Min: 97.3  F (36.3  C)  Max: 98.1  F (36.7  C)      Pulse  Av.4  Min: 65  Max: 95 Resp  Av.8  Min: 16  Max: 20  SpO2  Av.9 %  Min: 96 %  Max: 100 %     BP (!) 140/85 (BP Location: Right arm)   Pulse 75   Temp 97.7  F (36.5  C) (Oral)   Resp 16   Wt 69.8 kg (153 lb 12.8 oz)   SpO2 95%   BMI 22.71 kg/m      Admit Weight: 69.8 kg (153 lb 12.8 oz)     GENERAL APPEARANCE: alert and no distress  HENT: mouth without ulcers or lesions  RESP: lungs clear to auscultation - no rales, rhonchi or wheezes  CV: regular rhythm, normal rate, no rub, no murmur  EDEMA: no LE edema bilaterally  ABDOMEN: soft, nondistended, nontender, bowel sounds normal  MS: extremities normal - no gross deformities noted, no evidence of inflammation in joints, no muscle tenderness  SKIN: no rash    Data   All labs reviewed by me.  CMP  Recent Labs   Lab 24  0729 24  0924 24  0638 24  1744 24  0852 24  2231     --  138 136 135 140   POTASSIUM 5.1  --  3.7 4.8 6.0* 5.1   CHLORIDE 108*  --  97* 108* 108* 108*   CO2 22  --  32* 19* 13* 17*   ANIONGAP 8  --  9 9 14 15   GLC 94 96 479* 152* 99 103*   BUN 30.5*  --  36.2* 49.0* 45.4* 45.6*   CR 1.34*  --  1.38* 1.95* 1.99* 2.56*   GFRESTIMATED 58*  --  56* 37* 36* 27*   ANNETTE 8.7*  --  7.6* 9.3 9.4 9.9   MAG 1.8  --  1.4*  --   --  1.9   PHOS 2.2*  --  2.2*  --   --  3.9   PROTTOTAL  --   --   --   --   --  7.7   ALBUMIN  --   --   --   --   --  4.5   BILITOTAL  --   --   --   --   --  0.4   ALKPHOS  --   --   --   --   --  139   AST  --   --   --   --   --  35   ALT  --   --   --   --   --  41     CBC  Recent Labs   Lab 24  0729 24  0638 24  0852 24  2231 24  1434   HGB 8.2* 9.1* 11.6* 12.1* 11.9*   WBC  --  0.6* 0.6* 0.6* 0.5*   RBC 2.55* 2.75* 3.46* 3.72* 3.63*   HCT 24.6* 26.0* 33.2* 35.9* 34.7*   MCV 97 95 96 97 96   MCH 32.2 33.1* 33.5*  32.5 32.8   MCHC 33.3 35.0 34.9 33.7 34.3   RDW 12.7 12.6 12.5 12.5 12.3    194 220 257 255     INRNo lab results found in last 7 days.  ABGNo lab results found in last 7 days.   Urine Studies  Recent Labs   Lab Test 01/25/24  2231 10/28/23  0231 10/16/21  1056 02/01/21  0826 06/30/20  0739   COLOR Yellow Light Yellow Light Yellow Yellow Yellow   APPEARANCE Slightly Cloudy* Clear Clear Clear Clear   URINEGLC Negative 100* Negative Negative Negative   URINEBILI Negative Negative Negative Negative Negative   URINEKETONE Negative Negative Negative Negative Negative   SG 1.031 1.009 1.015 1.012 1.020   UBLD Negative Negative Moderate* Negative Trace*   URINEPH 5.0 8.5* 6.5 6.0 5.5   PROTEIN 50* 100* 300* 100* >=300*   UROBILINOGEN  --   --   --   --  0.2   NITRITE Negative Negative Negative Negative Negative   LEUKEST Negative Negative Negative Negative Negative   RBCU 1 <1 1 <1 O - 2   WBCU <1 1 1 0 0 - 5     Recent Labs   Lab Test 06/30/20  0739 05/10/16  0738   UTPG 1.55* 0.12     PTH  Recent Labs   Lab Test 11/09/23  0847 11/02/23  0724 10/19/21  0806 06/30/20  0738   PTHI 201* 186* 139* 123*     Iron Studies  Recent Labs   Lab Test 12/25/23  0919 11/20/23  0850 11/16/23  0905 11/09/23  0847 11/06/23  0808 11/02/23  0724 10/19/21  0640   IRON 80 51* 41* 63 91  91 123 49   * 247 232* 238* 213* 179* 188*   IRONSAT 35 21 18 26 43 69* 26   ROBB 919* 1,146* 3* 1,368* 1,300* 1,365* 1,735*       IMAGING:  All imaging studies reviewed by me.

## 2024-01-28 NOTE — PLAN OF CARE
BP (!) 140/85 (BP Location: Right arm)   Pulse 75   Temp 97.7  F (36.5  C) (Oral)   Resp 16   Wt 69.8 kg (153 lb 12.8 oz)   SpO2 95%   BMI 22.71 kg/m      Shift: 2985-3928  Isolation Status: Special prec, droplet, enteric   VS: stable on RA, afebrile  Neuro: Aox4  Behaviors: calm and cooperative   BG: None  Labs: AM labs pending   Respiratory: WDL  Cardiac: WDL  Pain/Nausea: lower back pain Managed with PRN   PRN: lidocaine and robaxin   Diet: 3gm K  IV Access: L AVF, R PIV  Infusion(s): LR @ 75ml/hr   Lines/Drains: None  GI/: voiding not saving. No BM over night   Skin: WDL  Mobility: UAL  Events/Education: none   Plan: con w/ POC  Mobility: UAL  Events/Education: n/a  Plan: Continue with plan of care.

## 2024-01-28 NOTE — PLAN OF CARE
BP (!) 140/85 (BP Location: Right arm)   Pulse 75   Temp 97.7  F (36.5  C) (Oral)   Resp 16   Wt 69.8 kg (153 lb 12.8 oz)   SpO2 95%   BMI 22.71 kg/m      Shift: 8984-2475. Passed Pt to oncoming nurse at 1500.  Isolation Status: Enteric, Droplet, and Special  VS: VSS on RA, afebrile  Neuro: Aox4  Behaviors: Calm, pleasant  BG: None  Labs: Mg 1.8. WBC 1.2. ANC 0.3  Respiratory: WDL. Intermittent coughing.  Cardiac: WDL  Pain/Nausea: Denies nausea. C/o lower back pain.  PRN: Tylenol x1  Diet: 3g K. Tolerating well.  IV Access: L AVF. R PIV  Infusion(s): LR @ 75ml/hr  Lines/Drains: None  GI/: Voiding, not saving. BM x1 this shift. Loose.  Skin: WDI  Mobility: UAL  Events/Education: n/a  Plan: Continue with plan of care.

## 2024-01-28 NOTE — CONSULTS
Care Management Initial Consult    General Information  Assessment completed with: Patient,    Type of CM/SW Visit: Initial Assessment. Patient here in October of 2023 for kidney transplant. Last CMA/CM assessment done in October.    Primary Care Provider verified and updated as needed: Yes -listed correctly on the Face Sheet  Readmission within the last 30 days: no previous admission in last 30 days         Advance Care Planning: Advance Care Planning Reviewed: no concerns identified. Education and HCD documents have been provided in the past         Communication Assessment  Patient's communication style: spoken language (English or Bilingual)    Hearing Difficulty or Deaf: no   Wear Glasses or Blind: other (see comments) (reading)    Cognitive  Cognitive/Neuro/Behavioral: WDL                      Living Environment:   People in home: alone     Current living Arrangements: condominium      Able to return to prior arrangements: yes       Family/Social Support:  Care provided by: self  Provides care for: no one  Marital Status:   Significant Other       Vanita  Description of Support System: Supportive, Involved    Support Assessment: Adequate family and caregiver support    Current Resources:   Patient receiving home care services: No     Community Resources: None  Equipment currently used at home: None  Supplies currently used at home: None    Employment/Financial:  Employment Status: employed full-time        Financial Concerns:   None mentioned          Does the patient's insurance plan have a 3 day qualifying hospital stay waiver?  No    Lifestyle & Psychosocial Needs:  Social Determinants of Health     Food Insecurity: Not on file   Depression: Not at risk (1/26/2023)    PHQ-2     PHQ-2 Score: 0   Housing Stability: Not on file   Tobacco Use: Low Risk  (12/27/2023)    Patient History     Smoking Tobacco Use: Never     Smokeless Tobacco Use: Never     Passive Exposure: Not on file   Financial Resource  Strain: Not on file   Alcohol Use: Not on file   Transportation Needs: Not on file   Physical Activity: Not on file   Interpersonal Safety: Not on file   Stress: Not on file   Social Connections: Not on file       Functional Status:  Prior to admission patient needed assistance:   Dependent ADLs:: Independent  Dependent IADLs:: Independent  Assesssment of Functional Status: At functional baseline    Mental Health Status:  Mental Health Status: No Current Concerns       Chemical Dependency Status:  Chemical Dependency Status: No Current Concerns             Values/Beliefs:  Spiritual, Cultural Beliefs, [employer] Practices, Values that affect care:                 Additional Information:  Met with Matteo at the bedside for initial assessment. He confirmed that he was here last in October of last year for kidney transplant. Had support provided by Sig Other and her adult son after discharge from kidney transplant. States he is currently living alone independently in his Condo and able to drive and has returned back to work full-time. Reports he was admitted with a stomach infection and hoping that he will be able to discharge home in 1-2 days. Reports no DME at home, no home care or other in-home supports and does not anticipate the need for any discharge planning services. Reports he remains compliant with appointments, medications, etc... post-transplant and able to make his appointments.    No discharge planning needs anticipated for care management.    Dyia Turcios, [facility] [phone]  1/28/2024       Social Work and Care Management Department       SEARCHABLE in [employer] - search SOCIAL WORK       [place] ([phone]) Saturday and Sunday     Units: 4A, 4C, & 4E Pager: [phone]     Units: 5A & 5C Pager: [phone]     Units: 6A & 6B   Pager: [phone]     Units: 6C Pager: [phone]     Units: 7A & 7B  Pager: [phone]     Units: 7C Pager: [phone]     Unit: [place] ED Pager:  [phone]      [facility] ([phone]) Saturday and Sunday      Units: 5 Ortho, 5 Med/Surg & WB ED  Pager:[phone]     Units: 6 Med/Surg, 8A, & 10A ICU  Pager: [phone]        After hours (1630 - 0000) Saturday & Sunday; ([phone]) Mon-Fri; ([phone]) [facility] Recognized Holidays     Units: ALL  Pager: [phone]

## 2024-01-28 NOTE — PLAN OF CARE
Goal Outcome Evaluation:      Plan of Care Reviewed With: patient    Overall Patient Progress: no changeOverall Patient Progress: no change    Outcome Evaluation: No in-home supports in place. Patient anticipates no needs prior to discharge. Will go home, where he lives alone.

## 2024-01-29 NOTE — DISCHARGE SUMMARY
DISCHARGE:  Patient with orders to discharge to home    Discharge instructions, medications & follow ups reviewed with patient. Copy of discharge summary given to patient. PIV removed.    Patient received pentamadine neb and is in stable condition. AVSS. Patient had no further questions regarding discharge instructions and medications. Patient transferred out by self & drove himself home. Pharmacy conferred that pt was ok to drive home after taking Robaxin @1300 and patient felt alert and able to drive  Plan for close monitoring of labs and neupagen at local clinic.    CHRISTA MARTINEZ RN on 1/29/2024 at 3:34 PM

## 2024-01-29 NOTE — PROGRESS NOTES
[facility]   Transplant Nephrology Progress Note  Date of Admission:  1/26/2024  Today's Date: 01/29/2024    Recommendations:   - Pentamidine prior to discharge, then monthly.  - Continue Neupogen of 480 mcg subcutaneous daily until his neutrophil count >0.6  - Recommend switching to Everolimus with goal 4-6 and stopping prednisone once Everolimus at goal.  - Follow-up parvovirus PCR.    Assessment & Plan   # DDKT: SADI but trend down with IVF              -SADI likely 2/2 volume depletion with diarrhea and elevated tac level              - Baseline Creatinine: ~ 1.1-1.4. Post txp morena Scr 1.1 on 12/25/23               - Proteinuria: Normal (<0.2 grams) FSGS protocol               - Date DSA Last Checked: Jan/2024      Latest DSA: No              - BK Viremia: No              - Kidney Tx Biopsy: No              - Transplant Ureteral Stent: Removed     # Immunosuppression Prior to Admission: Tacrolimus immediate release (goal 8-10) and Mycophenolate mofetil (dose 1000 mg every 12 hours)   - Present Immunosuppression: Tacrolimus immediate release (goal 8-10) and Prednisone (dose 7.5 mg daily)   - Patient is in an immunosuppressed state and will continue to monitor for efficacy and toxicity of immunosuppression medications.   - 01/29/24  Tacrolimus level: 8.4 (~ 12.75 hour trough)   - Changes: Not at this time.  Recommend switching MMF to Everolimus with goal 4-6 and stopping prednisone once Everolimus at goal       # Infection Prophylaxis:               - PJP: Dapsone. Stop dapsone and start inhaled pentamidine due to neutropenia              - CMV: Valganciclovir (Valcyte) CMV IgG Ab discordant (D+/R-), will continue on Valcyte x 6 months, then check CMV PCR monthly until 12 months post transplant     # Hypertension: Borderline control;  Goal BP: < 150/90   - Volume status: Euvolemic  EDW ~ 160 lbs   - Changes: No.  Continue carvedilol  6.25 mg PO BID.    # Leukopenia:  Trend up in WBC after holding mycophenolate.              -Multiple potential causes including CMV induced (can still cause BM suppression despite absence of viremia), MMF, and dapsone also have suppressive leukopenic effect. Other potential viral agents include adeno virus and parvo B19.              -Due to DGF he received anti thymoglobulin which is a potential cause as well              - Give daily Neupogen of 480 mcg subcutaneous until his neutrophil count >0.6  - Pentamidine prior to discharge, then monthly.  - Follow-up parvovirus PCR.     01/26/24    Adenovirus DNA Result Not Detected       # Anemia in Chronic Renal Disease: Hgb: Trend down      JOSE: No   - Iron studies: Replete    # Mineral Bone Disorder:   - Secondary renal hyperparathyroidism; PTH level: Mildly elevated (151-300 pg/ml)        On treatment: None  - Vitamin D; level: Normal         On supplement: No  - Calcium; level: Normal          On supplement: No  - Phosphorus; level: Normal         On supplement: No    # Electrolytes:   - Potassium; level: Normal         On supplement: No  - Magnesium; level: Low          On supplement:  held    - Bicarbonate; level: Normal         On supplement: Yes, sodium bicarbonate 1300 mg PO BID  - Sodium; level: Normal       # COVID-19: Spontaneously improving. No therapy is indicated per Transplant Infectious Disease.       #Diarrhea               could be 2/2 C diff infection vs CMV colitis   vs MMF effect   -appreciate ID recs  -hold magnesium  -hold MMF as above  - Recommend switching to Everolimus with goal 4-6 and stopping prednisone once Everolimus at goal.        # Weight loss:              -EDW was 175lbs at time of transplant, then he developed diarrhea, weight down to 160lbs. Unclear if weight loss is 2/2 diarrhea.      # HFpEF: Last cardiac echo (Feb/2021) showed normal LVEF ~ 60-65% with grade I diastolic dysfunction. Stress echo 07/2023 commented on normal LV function and wall motion.    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    Seen and discussed with Dr. Flores.    ZEHRA Jimenez CNP   Pager: [phone]    Interval History   Mr. Barnes's creatinine is 1.24 (646); Trend down from 1.34 yesterday.   I/O last 3 completed shifts:  In: 1660 [P.O.:1660]  Out: 750 [Urine:750]   Other significant labs/tests/vitals: SBP 140s - 150s overnight. Afebrile  No acute events overnight.  No chest pain or shortness of breath.  No leg swelling.  No nausea or vomiting.  No diarrhea.  No fever, sweats or chills.      Review of Systems   4 point ROS was obtained and negative except as noted in the Interval History.    MEDICATIONS:   [Held by provider] amLODIPine  10 mg Oral Daily    atorvastatin  10 mg Oral Daily    carvedilol  6.25 mg Oral BID w/meals    [Held by provider] dapsone  50 mg Oral Daily    [Held by provider] fidaxomicin  200 mg Oral BID    lidocaine  2 patch Transdermal Q24h    [Held by provider] magnesium oxide  800 mg Oral BID    methocarbamol  500 mg Oral 4x Daily    [Held by provider] mycophenolate  750 mg Oral BID    [Held by provider] phosphorus tablet 250 mg  250 mg Oral BID    predniSONE  7.5 mg Oral Daily    prenatal multivitamin w/iron  1 tablet Oral Daily    psyllium  1 packet Oral Daily    sodium bicarbonate  1,300 mg Oral BID    sodium chloride (PF)  3 mL Intracatheter Q8H    tacrolimus  5 mg Oral BID IS    valGANciclovir  450 mg Oral Daily           Physical Exam   Temp  Av.8  F (36.6  C)  Min: 97.3  F (36.3  C)  Max: 98.1  F (36.7  C)      Pulse  Av.4  Min: 65  Max: 95 Resp  Av.8   Min: 16  Max: 20  SpO2  Av.9 %  Min: 96 %  Max: 100 %     BP (!) 149/84 (BP Location: Right arm)   Pulse 71   Temp 97.6  F (36.4  C) (Oral)   Resp 19   Wt 72.2 kg (159 lb 3.2 oz)   SpO2 95%   BMI 23.51 kg/m      Admit Weight: 69.8 kg (153 lb 12.8 oz)     GENERAL APPEARANCE: alert and no distress  RESP: lungs clear to auscultation - no rales, rhonchi or wheezes  CV: regular rhythm, normal rate, no rub, no murmur  EDEMA: no LE edema bilaterally  ABDOMEN: soft, nondistended, nontender, bowel sounds normal  MS: extremities normal - no gross deformities noted, no evidence of inflammation in joints, no muscle tenderness  SKIN: no rash    Data   All labs reviewed by me.  CMP  Recent Labs   Lab 24  0646 24  0729 24  0924 24  0638 24  1744 24  0852 24  2231    138  --  138 136   < > 140   POTASSIUM 4.8 5.1  --  3.7 4.8   < > 5.1   CHLORIDE 108* 108*  --  97* 108*   < > 108*   CO2 21* 22  --  32* 19*   < > 17*   ANIONGAP 9 8  --  9 9   < > 15   GLC 91 94 96 479* 152*   < > 103*   BUN 23.3* 30.5*  --  36.2* 49.0*   < > 45.6*   CR 1.24* 1.34*  --  1.38* 1.95*   < > 2.56*   GFRESTIMATED 63 58*  --  56* 37*   < > 27*   ANNETTE 9.0 8.7*  --  7.6* 9.3   < > 9.9   MAG 1.6* 1.8  --  1.4*  --   --  1.9   PHOS 2.8 2.2*  --  2.2*  --   --  3.9   PROTTOTAL  --   --   --   --   --   --  7.7   ALBUMIN  --   --   --   --   --   --  4.5   BILITOTAL  --   --   --   --   --   --  0.4   ALKPHOS  --   --   --   --   --   --  139   AST  --   --   --   --   --   --  35   ALT  --   --   --   --   --   --  41    < > = values in this interval not displayed.     CBC  Recent Labs   Lab 24  0646 24  0729 24  0638 24  0852   HGB 9.4* 8.2* 9.1* 11.6*   WBC 2.0* 1.2* 0.6* 0.6*   RBC 2.90* 2.55* 2.75* 3.46*   HCT 28.6* 24.6* 26.0* 33.2*   MCV 99 97 95 96   MCH 32.4 32.2 33.1* 33.5*   MCHC 32.9 33.3 35.0 34.9   RDW 12.5 12.7 12.6 12.5    165 194 220     INRNo lab results  found in last 7 days.  ABGNo lab results found in last 7 days.   Urine Studies  Recent Labs   Lab Test 01/25/24  2231 10/28/23  0231 10/16/21  1056 02/01/21  0826 06/30/20  0739   COLOR Yellow Light Yellow Light Yellow Yellow Yellow   APPEARANCE Slightly Cloudy* Clear Clear Clear Clear   URINEGLC Negative 100* Negative Negative Negative   URINEBILI Negative Negative Negative Negative Negative   URINEKETONE Negative Negative Negative Negative Negative   SG 1.031 1.009 1.015 1.012 1.020   UBLD Negative Negative Moderate* Negative Trace*   URINEPH 5.0 8.5* 6.5 6.0 5.5   PROTEIN 50* 100* 300* 100* >=300*   UROBILINOGEN  --   --   --   --  0.2   NITRITE Negative Negative Negative Negative Negative   LEUKEST Negative Negative Negative Negative Negative   RBCU 1 <1 1 <1 O - 2   WBCU <1 1 1 0 0 - 5     Recent Labs   Lab Test 06/30/20  0739 05/10/16  0738   UTPG 1.55* 0.12     PTH  Recent Labs   Lab Test 11/09/23  0847 11/02/23  0724 10/19/21  0806 06/30/20  0738   PTHI 201* 186* 139* 123*     Iron Studies  Recent Labs   Lab Test 12/25/23  0919 11/20/23  0850 11/16/23  0905 11/09/23  0847 11/06/23  0808 11/02/23  0724 10/19/21  0640   IRON 80 51* 41* 63 91  91 123 49   * 247 232* 238* 213* 179* 188*   IRONSAT 35 21 18 26 43 69* 26   ROBB 919* 1,146* 3* 1,368* 1,300* 1,365* 1,735*       IMAGING:  All imaging studies reviewed by me.

## 2024-01-29 NOTE — DISCHARGE SUMMARY
DISCHARGE:  Patient with orders to discharge to home.     Discharge instructions, medications & follow ups reviewed with pateint. Copy of discharge summary given to patient. PIV's removed.    Patient in stable condition. Of note, pt has not had a BM post surgery - team aware; enema given today w/ no result, pt discharged per pt preference. Patient was discharged with DEYA drain in place, after demonstrating ability to independently empty drain and change dressing. AVSS. Patient had no further questions regarding discharge instructions and medications. Patient transferred out by staff & left with mom.    CHRISTA MARTINEZ RN on 1/29/2024 at 3:31 PM

## 2024-01-29 NOTE — PLAN OF CARE
BP (!) 144/82 (BP Location: Right arm)   Pulse 80   Temp 97.9  F (36.6  C) (Oral)   Resp 19   Wt 69.8 kg (153 lb 12.8 oz)   SpO2 95%   BMI 22.71 kg/m      [id number]  VSS on RA, no s/s of distress. A/Ox4, pleasant and cooperative with cares. Endorsed lower back pain--lido patch on and on scheduled robaxin with good relief. Denied n/v--on 3gmK diet with great appetite. PIV SL. LAVF. UAL, voiding not always saving even though encouraged. 1 loose BM this shift. Increased dose of Filgrastim subcutaneous given this evening and pt tolerated well. Pt eager to go home; possible discharge tomorrow pending ANC/labs. Pt spent all of shift watching television and resting quietly now, chest rise visualized. Continue POC/notify team as needed.

## 2024-01-29 NOTE — PROGRESS NOTES
Care Management Discharge Note    Discharge Date: 01/29/2024       Discharge Disposition: Home    Discharge Services: None    Discharge DME: None    Discharge Transportation: car, drives self, family or friend will provide    Private pay costs discussed: Not applicable    Does the patient's insurance plan have a 3 day qualifying hospital stay waiver?  No    PAS Confirmation Code:    Patient/family educated on Medicare website which has current facility and service quality ratings:      Education Provided on the Discharge Plan:    Persons Notified of Discharge Plans: patient  Patient/Family in Agreement with the Plan:  yes    Handoff Referral Completed: Yes    Additional Information:  Notified by ERLIN Rushing patient medically cleared for discharge today.  Pt will need OP infusion appointment scheduled tomorrow for Neupogen. Pt will also need labs drawn TWTH this week.    OP infusion appointment confirmed at [facility] 1/20 at 9 a.m..  Met with pt.  Reviewed anticipated plan for discharge.  Pt notes he will have labs drawn at [facility] as he is able to just walk in to clinic for the lab draw.  Pt notes no concerns with OP infusion appointment.  Pt confirmed he has a ride available for discharge.    Nighat Collins RN BSN, PHN, ACM-RN  7A RN Care Coordinator  Phone: [phone]  Pager [phone]    To contact the weekend RNCC  [place] (0800 [phone]) Saturday and Sunday    Units: 5A,5B,5C [phone]    Units: 6A, [phone]    Units 6B, 6C, 6D [phone]    Units: 7A, 7B, 7C, 7D, [phone]    [facility] ([phone]) Saturday and Sunday  Units: 5 Ortho, 5MS & WB ED Pager: [phone]  Units: 6MS, 8A & 10 ICU  Pager [phone]    1/29/2024 1:35 PM

## 2024-01-29 NOTE — PROGRESS NOTES
[facility]    Transplant Infectious Diseases Inpatient Progress Note      Matteo Barnes MRN# [id number]   YOB: 1955 Age: 68 year old   Date of Admission and time: 1/26/2024 12:28 PM             Recommendations:   Continue G-CSF 5 mcg/kg/day until ANC >800.   No indications to treat COVID-19 infection.   Substitute letermovir 480 mg daily for VGCV for the total duration of the CMV universal ppx per protocol.    Monitor TAC when on letermovir.   After ANC recovery, and after 6 months from transplant; due for shingrix vaccine and RSV vaccine.     ID will sign off, please call for questions.         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   Presented with recurrent diarrhea.         Active Problems and Infectious Diseases Issues:   Recurrent diarrhea.   Likely combination of neutropenia-induced diarrhea and/or MMF-induced diarrhea.   MMF is on hold.     Neutropenia  Likely multifactorial including MMF and VGCV in combination with viral illness with COVID-19 infection.   I would encourage a substitute for MMF.   Since letermovir is covered, will switch from VGCV to letermovir for CMV universal ppx.   Continue supportive care with G-CSF.     COVID-19 URI  Spontaneously improving.   No therapy is indicated.          Old Problems and Infectious Diseases Issues:   Non of significance.     Other Infectious Disease issues include:  - QTc: 454 as of 10/2023.   - PJP prophylaxis: dapsone.   - Serostatus: CMV D+/R-, EBV D+/R+, HSV1?/2?, VZV +  - Immunization status: due for shingrix vaccine and RSV vaccine. Will wait till the recovery of ANC.   - Gamma globulin status: 728.         Attestation:  Total duration of visit including chart review, reviewing labs and imaging, interviewing and examining the patient, documentation,  and sending communication to the primary treating team, all at the same day of this encounter, is: 25 minutes.     Malgorzata Vyas MD  [facility]  Contact information available via [facility] Paging/Directory    01/29/2024              Interim History:   Diarrhea resolved.   No fever.   No N/V.          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     It looks like the patient had diarrhea shortly after KT and was tested for C diff which was negative for PCR and Ag but negative for toxin. He was given vancomycin PO with improvement. The symptoms recurred 12/2023 and was started on vancomycin again on 12/12/23 for 14 days. Couple of weeks later diarrhea recurred and was started on fidaxomicin for a 10-day course on 1/9/24 with some improvement (the patient thinks it did not work as well as the vancomycin PO). Due to persistent/recurrent symptoms vancomycin tapering dose was prescribed1/24/24.   The patient also started to become neutropenia around 1/21/24 and was given G-CSF.   The patient presented to ED due to persistent symptoms and SADI.     The patient stated that he started to develop cough and congestion around 1/15/24. The symptoms are improving. No dyspnea and no fever. Denied sick contact.     Exposure History  Lives in [place], [place] in a condo with his dog.   No significant outdoors activities.   Works as a salesman.   Did travel to [facility] in the past.   No international travel.               Review of Systems:      As mentioned in the HPI otherwise negative by reviewing constitutional symptoms, central and peripheral neurological systems, respiratory system, cardiac system, GI system,  system, musculoskeletal, skin, allergy, and lymphatics.                 Immunizations:     Immunization History   Administered Date(s) Administered    COVID-19 Bivalent 12+ (Pfizer) 10/31/2022    COVID-19 MONOVALENT 12+ (Pfizer) 03/04/2021, 03/24/2021, 01/04/2022    COVID-19 Monovalent 12+ (Pfizer 2022) 05/20/2022    HepB-Dialysis 12/24/2021, 01/19/2022, 02/21/2022, 05/16/2022    Influenza (High Dose) 3 valent vaccine 10/07/2022    Influenza Vaccine 18-64 (Flublok) 10/09/2020    Influenza Vaccine >6 months,quad, PF 10/09/2023    Mantoux Tuberculin Skin Test 11/10/2021, 12/08/2021, 03/07/2022, 03/13/2023    Pneumo Conj 13-V (2010&after) 12/10/2021    Pneumococcal 23 valent 03/21/2022    TDAP Vaccine (Boostrix) 10/08/2015            Allergies:   No Known Allergies          Medications:   Medications that Require Transfusion:       Scheduled Medications:    albuterol  2.5 mg Nebulization Once    And    pentamidine  300 mg Inhalation Once    atorvastatin  10 mg Oral Daily    carvedilol  6.25 mg Oral BID    dextrose 5% water  10-20 mL Intravenous Once    And    filgrastim-aafi  720 mcg Intravenous Once    And    dextrose 5% water  10-20 mL Intravenous Once    lidocaine  2 patch Transdermal Q24h    [Held by provider] magnesium oxide  800 mg Oral BID    methocarbamol  500 mg Oral 4x Daily    predniSONE  7.5 mg Oral Daily    prenatal multivitamin w/iron  1 tablet Oral Daily    psyllium  1 packet Oral Daily    sodium bicarbonate  1,300 mg Oral BID    sodium chloride (PF)  3 mL Intracatheter Q8H    tacrolimus  6 mg Oral BID IS    valGANciclovir  450 mg Oral Daily               Physical Exam:   Temp: 97.6  F (36.4  C) Temp src: Oral BP: (!) 149/84 Pulse: 71   Resp: 19 SpO2: 95 % O2 Device: None (Room air)      Wt Readings from Last 4 Encounters:   01/29/24 72.2 kg (159 lb 3.2 oz)   01/25/24 68.9 kg (152 lb)   01/25/24 69.4 kg (152 lb 14.4 oz)   01/02/24 76 kg (167 lb 8 oz)     Constitutional: awake, alert, cooperative, no apparent distress and appears at stated age, well nourished.             Data:     Absolute CD4, Joaquin T Cells   Date Value Ref Range Status   01/25/2024 45 (L) 441 - 2,156 cells/uL Final       Inflammatory Markers    Recent Labs   Lab Test 10/23/21  0619 10/22/21  0649 10/20/21  0553 10/19/21  0640 10/18/21  0653 10/17/21  0813   CRP 22.2* 42.0* 140.0* 141.0* 108.0* 143.0*       Immune Globulin Studies     Recent Labs   Lab Test 01/25/24  1434          Metabolic Studies       Recent Labs   Lab Test 01/29/24  0646 01/28/24  0729 01/27/24  0924 01/27/24  0638 01/26/24  1744 01/26/24  0852 01/25/24  2231 10/28/23  1850 10/28/23  1722 10/28/23  1656 10/28/23  1617 10/28/23  0407    138  --  138 136 135 140   < > 137 137   < >  --    POTASSIUM 4.8 5.1  --  3.7 4.8 6.0* 5.1   < > 4.8 5.4*   < >  --    CHLORIDE 108* 108*  --  97* 108* 108* 108*   < >  --   --   --   --    CO2 21* 22  --  32* 19* 13* 17*   < >  --   --   --   --    ANIONGAP 9 8  --  9 9 14 15   < >  --   --   --   --    BUN 23.3* 30.5*  --  36.2* 49.0* 45.4* 45.6*   < >  --   --   --   --    CR 1.24* 1.34*  --  1.38* 1.95* 1.99* 2.56*   < >  --   --   --   --    GFRESTIMATED 63 58*  --  56* 37* 36* 27*   < >  --   --   --   --    GLC 91 94 96 479* 152* 99 103*   < > 174* 152*   < >  --    A1C  --   --   --   --   --   --   --   --   --   --   --  5.0   ANNETTE 9.0 8.7*  --  7.6* 9.3 9.4 9.9   < >  --   --   --   --    PHOS 2.8 2.2*  --  2.2*  --   --  3.9   < >  --   --   --   --    MAG 1.6* 1.8  --  1.4*  --   --  1.9   < >  --   --   --   --    LACT  --   --   --   --   --   --   --   --  3.3* 2.6*   < >  --     < > = values in this interval not displayed.       Hepatic Studies    Recent Labs   Lab Test 01/25/24  2231 11/16/23  0905 10/28/23  0406 06/07/23  1236 01/26/23  0853 10/22/21  0649 10/20/21  0553 10/18/21  0653 10/16/21  0942 08/25/21  1541 02/01/21  0830   BILITOTAL 0.4  --  0.3  --  0.3 0.3  --   --  0.4  --  0.4   ALKPHOS 139  --  57  --  47 67  --   --  68  --  101   ALBUMIN 4.5 3.9 4.5  --  4.6 2.5*  "2.5*   < > 3.7   < > 4.1   AST 35  --  13  --  15 88*  --   --  25  --  12   ALT 41  --  12 11 16 96*  --   --  26  --  31    < > = values in this interval not displayed.       Pancreatitis testing    Recent Labs   Lab Test 01/29/24  0646 10/28/23  0406 06/07/23  1236 01/26/23  0853 10/16/21  0942 07/18/19  0757 11/24/15  0822   LIPASE  --   --   --   --  461*  --   --    TRIG 118 135 185* 132  --  242* 215*       Hematology Studies      Recent Labs   Lab Test 01/29/24  0646 01/28/24  0729 01/27/24  0638 01/26/24  0852 01/25/24  2231 01/25/24  1434 01/24/24  1430   WBC 2.0* 1.2* 0.6* 0.6* 0.6* 0.5* 0.5*   ANEU 0.4* 0.3* 0.1* 0.1* 0.1*  --  0.2*   ALYM 0.3* 0.5* 0.1* 0.2* 0.2*  --  0.1*   TITO 0.7 0.3 0.3 0.3 0.2  --  0.1   AEOS 0.0 0.0 0.0 0.0 0.0  --  0.0   HGB 9.4* 8.2* 9.1* 11.6* 12.1* 11.9* 11.9*   HCT 28.6* 24.6* 26.0* 33.2* 35.9* 34.7* 35.5*    165 194 220 257 255 285       Clotting Studies    Recent Labs   Lab Test 10/28/23  0406 10/18/21  0653 02/01/21  0830 08/13/20  0730   INR 1.13 1.05 1.12 1.09   PTT 30  --  29  --        Arterial Blood Gas Testing    Recent Labs   Lab Test 10/28/23  1722 10/28/23  1656 10/28/23  1617   O2PER 30.0 32.0 35.0        Urine Studies     Recent Labs   Lab Test 01/25/24  2231 10/28/23  0231 10/16/21  1056 02/01/21  0826 06/30/20  0739   URINEPH 5.0 8.5* 6.5 6.0 5.5   NITRITE Negative Negative Negative Negative Negative   LEUKEST Negative Negative Negative Negative Negative   WBCU <1 1 1 0 0 - 5       Vancomycin Levels     No lab results found.    Invalid input(s): \"VANCO\"    Tobramycin levels     No lab results found.    Gentamicin levels    No lab results found.    Tacrolimus levels    Invalid input(s): \"TACROLIMUS\", \"TAC\", \"TACR\"      Latest Ref Rng & Units 1/29/2024     6:46 AM 1/28/2024     7:29 AM 1/27/2024     6:38 AM 1/26/2024     5:44 PM 1/26/2024     8:52 AM   Transplant Immunosuppression Labs   Creat 0.67 - 1.17 mg/dL 1.24  1.34  1.38  1.95  1.99    Urea " "Nitrogen 8.0 - 23.0 mg/dL 23.3  30.5  36.2  49.0  45.4    WBC 4.0 - 11.0 10e3/uL 2.0  1.2  0.6   0.6    Neutrophil % 19  26  16   18    ANEU 1.6 - 8.3 10e3/uL 0.4  0.3  0.1   0.1        Cyclosporine levels    Invalid input(s): \"CYCLOSPORINE\", \"CYC\"    Mycophenolate levels    Invalid input(s): \"MYPA\", \"MYP\"    Sirolimus levels    Invalid input(s): \"SIROLIMUS\", \"SIR\", \"RAPA\"    CSF testing   No lab results found.      Microbiology:  Last check of C difficile  C Difficile Toxin B by PCR   Date Value Ref Range Status   01/26/2024 Negative Negative Final     Comment:     A negative result does not exclude actual disease due to C. difficile and may be due to improper collection, handling and storage of the specimen or the number of organisms in the specimen is below the detection limit of the assay.       Virology:  CMV viral loads  No results found for: \"CMVRESINST\", \"77814\", \"53251\", \"08690\", \"99710\", \"CMVQAL\"  CMV viral loads    Recent Labs   Lab Test 12/14/23  1054   CMVLOG <1.5       CMV viral loads    CMV DNA IU/mL   Date Value Ref Range Status   01/25/2024 Not Detected Not Detected IU/mL Final   12/14/2023 <35 (A) Not Detected IU/mL Final     Comment:     CMV DNA detected, less than 35 IU/mL   11/13/2023 Not Detected Not Detected IU/mL Final   10/28/2023 Not Detected Not Detected IU/mL Final     CMV log   Date Value Ref Range Status   12/14/2023 <1.5  Final       CMV resistance testing  No lab results found.  No results found for: \"CMVCID\", \"CMVFOS\", \"CMVGAN\", \"CMVDRUGRES\"     No results found for: \"H6RES\"    No results found for: \"EBVDN\", \"EBRES\", \"EBVSP\", \"EBVPC\", \"EBVPCR\"    CMV Antibody IgG   Date Value Ref Range Status   10/28/2023 No detectable antibody. No detectable antibody.  Final   02/01/2021 0.2 0.0 - 0.8 AI Final     Comment:     Negative  Antibody index (AI) values reflect qualitative changes in antibody   concentration that cannot be directly associated with clinical condition or   disease state.   " "      No results found for: \"EBIG2\", \"EBIGM\", \"TOXG\"          Malgorzata Vyas MD  [facility]  Contact information available via [facility] Paging/Directory     01/29/2024    "

## 2024-01-29 NOTE — DISCHARGE SUMMARY
[facility]    Discharge Summary  Transplant Surgery    Date of Admission:  1/26/2024  Date of Discharge:  1/29/2024  2:45 PM  Discharging Provider: Kristan Wong PA-C  Date of Service (when I saw the patient): 1/29/24    Discharge Diagnoses   Principal Problem:    Neutropenia (H24)  Diarrhea  SADI  Hyperkalemia: Resolved  Hypomagnesemia: Mg oxide 800 mg BID held. Received Mg sulfate 2g IV x 1 inpatient. Likely patient will not need high dose Mg as diarrhea resolved. Restarted Mg oxide 400 mg daily at discharge  Low bicarb: Continue oral sodium bicarb ordered  Hypophosphoremia      History of Present Illness   Matteo Barnes is a 68 year old male admitted on 1/25/2024. 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      Hospital Course   Matteo Barnes was admitted on 1/26/2024.  The following problems were addressed during his hospitalization:    Graft function: History of high intensity protocol due to DGF. No DSA at the time of transplant. Ureteral stent removed. B/l Cr 1.1-1.4  SADI: Cr improved with IVF (in ED). Cr 1.2 today  -Kidney US: no significant findings     Immunosuppression management:   MMF 1000 mg BID. Discontinued due to diarrhea.      Start Everolimus once ANC > 1000. Start dose 1.25 mg BID. Goal 4-6.   Pred 20 mg daily. Continue until Everolimus level > 4  Continue Tac with goal 8-10      Infectious prophylaxis:   PJP ppx: Received inhaled pentamidine on 1/30/24. G6PD normal. Start dapsone 50 mg daily on 2/26/24.   CMV ppx: CMV discordant. Continue CMV prophylaxis x 6 months. Valcyte stopped due to leukopenia. Vaccines: Started on letermovir to complete 6 month course (EOT 4/28/224).  Due for shingrix vaccine and RSV vaccine once 6 months post transplant (4/28/24). If not neutropenic  at that time.      Hematology:   Anemia in Chronic Renal Disease: Hgb ~11. Decreased to ~ 9 after IVFs given. Hgb ~ 9, stable. Monitor daily.  Leukopenia: Secondary to medications. MMF and Valcyte stopped (started letermovir). CMV not detected. Neupogen 480 mg x 3 days (1/28-1/30). Today WBC 2 and .     Cardiorespiratory:   HFpEF: Last cardiac echo (Feb/2021) showed normal LVEF ~ 60-65% with grade I diastolic dysfunction. Stress echo 07/2023 commented on normal LV function and wall motion.   HTN: PTA amlodipine, carvedilol. Stop amlodipine. Continue carvedilol.     GI/Nutrition:   Moderate Protein-Calorie Malnutrition: RD consulted. Regular diet  Diarrhea, recurrent: Secondary to medications (MMF, possible Mg contributing). Diarrhea stopped after holding MMF and Mg. No colonoscopy as diarrhea resolved.  MMF now discontinued. Restart Mg 400 mg daily (reduced dose). PTA psyllium continue as needed.       Endocrine: no issues     Fluid/Electrolytes:   Hyperkalemia: Resolved  Hypomagnesemia: Mg oxide 800 mg BID held. Received Mg sulfate 2g IV x 1 inpatient. Likely patient will not need high dose Mg as diarrhea resolved. Restarted Mg oxide 400 mg daily at discharge  Low bicarb: Continue oral sodium bicarb ordered  Hypophosphoremia: Resolved. Stop PTA oral phos     : no issues     Infectious disease:   COVID-19 URI:1/26 PCR positive, CT 28.5. ID consulted. No therapy is indicated. No respiratory symptoms, no imaging done.       Discharge Disposition   Discharged to home   Condition at discharge: Stable    Pending Results   These results will be followed up by Elsa Adrian   Unresulted Labs Ordered in the Past 30 Days of this Admission       Date and Time Order Name Status Description    1/28/2024  9:51 AM EBV DNA PCR Quantitative Whole Blood In process     1/26/2024  1:37 PM Parvovirus B19 DNA PCR (Blood or Bone Marrow) In process     1/26/2024  1:37 PM Blastomyces Agn Quant EIA Non Blood In process              Primary Care Physician   Dutch Duke    BP (!) 149/84 (BP Location: Right arm)   Pulse 78   Temp 97.6  F (36.4  C) (Oral)   Resp 16   Wt 72.2 kg (159 lb 3.2 oz)   SpO2 98%   BMI 23.51 kg/m    General Appearance: in no apparent distress.   Skin: normal, warm  Heart: well perfused  Lungs: BCTA no rales, rhonchi or wheezes   Abdomen: The abdomen is soft, nontender, incision healing well  : tineo is not present.   Extremities: edema: absent.   Neurologic: awake, alert and oriented.       Consultations This Hospital Stay   NEPHROLOGY KIDNEY/PANCREAS TRANSPLANT ADULT IP CONSULT  INFECTIOUS DISEASE TRANSPLANT SOT ADULT IP CONSULT  NURSING TO CONSULT FOR VASCULAR ACCESS CARE IP CONSULT  PHYSICAL THERAPY ADULT IP CONSULT  NUTRITION SERVICES ADULT IP CONSULT  PHARMACY LIAISON FOR MEDICATION COVERAGE CONSULT  CARE MANAGEMENT / SOCIAL WORK IP CONSULT  PHARMACY LIAISON FOR MEDICATION COVERAGE CONSULT    Time Spent on this Encounter   I have spent greater than 30 minutes on this discharge.    Discharge Orders   Discharge Medications   Discharge Medication List as of 1/29/2024  1:33 PM        START taking these medications    Details   !! everolimus (ZORTRESS) 0.5 MG tablet Take 1 tablet (0.5 mg) by mouth 2 times daily Total dose 1.25 mg twice a day, Disp-60 tablet, R-11, E-Prescribe      !! everolimus (ZORTRESS) 0.75 MG tablet Take 1 tablet (0.75 mg) by mouth 2 times daily Total dose 1.25 mg twice a day, Disp-60 tablet, R-11, E-Prescribe      letermovir (PREVYMIS) 480 MG TABS tablet Take 1 tablet (480 mg) by mouth daily, Disp-30 tablet, R-2, E-PrescribeContinue until 6 months post transplant      Lidocaine (LIDOCARE) 4 % Patch Place 2 patches onto the skin every 24 hours To prevent lidocaine toxicity, patient should be patch free for 12 hrs daily.Disp-3 patch, [id number]      methocarbamol (ROBAXIN) 500 MG tablet Take 1.5 tablets (750 mg) by mouth 4 times daily, Disp-16 tablet, R-0, E-Prescribe       predniSONE (DELTASONE) 20 MG tablet Take 1 tablet (20 mg) by mouth daily, Disp-30 tablet, R-0, E-Prescribe      Prenatal Vit-Fe Fumarate-FA (PRENATAL MULTIVITAMIN W/IRON) 27-0.8 MG tablet Take 1 tablet by mouth daily, Disp-30 tablet, R-11, E-Prescribe       !! - Potential duplicate medications found. Please discuss with provider.        CONTINUE these medications which have CHANGED    Details   carvedilol (COREG) 6.25 MG tablet Take 1 tablet (6.25 mg) by mouth 2 times daily (with meals) Hold for systolic blood pressure < 140., No Print Out      magnesium oxide (MAG-OX) 400 MG tablet Take 1 tablet (400 mg) by mouth daily With lunch and supper, Disp-120 tablet, R-11, E-Prescribe      !! tacrolimus (GENERIC) 1 MG capsule Take 1 capsule (1 mg) by mouth 2 times daily Total dose 6 mg twice daily, Disp-60 capsule, R-11, E-Prescribe      !! tacrolimus (GENERIC) 5 MG capsule Take 1 capsule (5 mg) by mouth 2 times daily Total dose 6 mg twice daily, Disp-60 capsule, R-11, E-Prescribe       !! - Potential duplicate medications found. Please discuss with provider.        CONTINUE these medications which have NOT CHANGED    Details   acetaminophen (TYLENOL) 500 MG tablet Take 500-1,000 mg by mouth every 6 hours as needed for mild pain, Historical      ALPRAZolam (XANAX) 0.25 MG tablet TAKE ONE TABLET BY MOUTH ONCE DAILY AS NEEDED FOR ANXIETY, Disp-20 tablet, R-0, E-Prescribe      atorvastatin (LIPITOR) 10 MG tablet Take 1 tablet (10 mg) by mouth daily, Disp-30 tablet, R-11, E-Prescribe      psyllium (METAMUCIL/KONSYL) 58.6 % powder Take 6 g (1 teaspoonful) by mouth daily, Disp-425 g, R-3, E-Prescribe      sodium bicarbonate 650 MG tablet Take 1,300 mg by mouth 2 times daily, Historical           STOP taking these medications       amLODIPine (NORVASC) 5 MG tablet Comments:   Reason for Stopping:         multivitamin RENAL (NEPHROCAPS/TRIPHROCAPS) 1 MG capsule Comments:   Reason for Stopping:         mycophenolate (GENERIC  EQUIVALENT) 250 MG capsule Comments:   Reason for Stopping:         phosphorus tablet 250 mg 250 MG per tablet Comments:   Reason for Stopping:         valGANciclovir (VALCYTE) 450 MG tablet Comments:   Reason for Stopping:         vancomycin (VANCOCIN) 125 MG capsule Comments:   Reason for Stopping:         vancomycin (VANCOCIN) 125 MG capsule Comments:   Reason for Stopping:                  Lipid Profile     Primary Care - Care Coordination Referral      Reason for your hospital stay    Acute kidney injury secondary to diarrhea, diarrhea, and neutropenia.     Activity    Your activity upon discharge: Resume previous activities, increase as tolerated. Exercise daily as tolerated.     [facility]ult [facility] Follow-up and recommended labs and tests    Appointments:   -Follow up in infusion center for 3rd dose of Neupogen on 1/30/24  -Follow up with Transplant nephrology in 2-3 weeks. Follow up immunosuppression (transition from MMF to everolimus), infectious prophylaxis (PJP, CMV), neutropenia, and graft monitoring.    Labs:   -Transplant labs: BMP, CBC with differential, Mg, Phos, tacrolimus level and everolimus level on Monday and Thursday. Check everolimus level on 2/1/24 only if drug has been started at that time).  -Check CBC with differential on 1/30 and 1/31.     Monitor and record    Monitor blood pressure and weight daily.     When to contact your care team    WHEN TO CONTACT YOUR  COORDINATOR:     Transplant Coordinator: Elsa Adrian, phone: [phone]     Notify your coordinator if you have pain over your kidney, increased redness or drainage from your incision, fever greater than 100F, decreased urine output or new or increased amount of blood in urine.     Notify your coordinator immediately if you are ever unable to take your immunosuppressive medications for any reason.     If you have URGENT concerns after office hours, please call the hospital switchboard at [phone] and ask to have the organ  "transplant nurse on-call paged. If you have a life-threatening emergency, go to the nearest emergency room.     Diet    Follow this diet upon discharge: Regular diet         Data   Most Recent 3 CBC's:  Recent Labs   Lab Test 01/30/24  0847 01/29/24  0646 01/28/24  0729   WBC 3.9*  3.9* 2.0* 1.2*   HGB 9.9*  9.9* 9.4* 8.2*   MCV 98  98 99 97     172 168 165      Most Recent 3 BMP's:  Recent Labs   Lab Test 01/30/24  0847 01/29/24  0646 01/28/24  0729    138 138   POTASSIUM 4.5 4.8 5.1   CHLORIDE 106 108* 108*   CO2 22 21* 22   BUN 23.2* 23.3* 30.5*   CR 1.24* 1.24* 1.34*   ANIONGAP 10 9 8   ANNETTE 9.1 9.0 8.7*   * 91 94     Most Recent 2 LFT's:  Recent Labs   Lab Test 01/25/24  2231 10/28/23  0406   AST 35 13   ALT 41 12   ALKPHOS 139 57   BILITOTAL 0.4 0.3     Most Recent INR's and Anticoagulation Dosing History:  Anticoagulation Dose History          Latest Ref Rng & Units 8/13/2020 2/1/2021 10/18/2021 10/28/2023   Recent Dosing and Labs   INR 0.85 - 1.15 1.09  1.12  1.05  1.13      Most Recent 3 Troponin's:No lab results found.  Most Recent Cholesterol Panel:  Recent Labs   Lab Test 01/29/24  0646   CHOL 97   LDL 39   HDL 34*   TRIG 118     Most Recent 6 Bacteria Isolates From Any Culture (See EPIC Reports for Culture Details):No lab results found.  Most Recent TSH, T4 and A1c Labs:  Recent Labs   Lab Test 10/28/23  0407 03/20/18  1609   TSH  --  2.54   A1C 5.0 4.9       Lab Results   Component Value Date    LIPASE 461 (H) 10/16/2021     No results found for: \"AMYLASE\"   I have reviewed history, examined patient and discussed plan with the fellow/resident/TERESA.    I concur with the findings in this note.    Time spent on discharge activities: 45 minutes.             "

## 2024-01-29 NOTE — CONSULTS
Discharge Pharmacy Test Claim    Prevymis is covered with a copay of $50 per month through patient's commercial [employer] plan.    Test Claim Copay Notes   prevymis 50.00 eligible for copay card     Copay Card  Patient is eligible to use a copay card to reduce copay to $15 per month Visit the link below to print a copay card.    [email or web address]    Hue Altman  [facility] Pharm[facility] Liaison  Ph: [phone]  Fax [phone]  Available on [employer] (learn more here)

## 2024-01-29 NOTE — DISCHARGE INSTRUCTIONS
-Transplant coordinator will communicate when you can start everolimus.  -Transplant coordinator will communicate when you can stop prednisone.  -Plan to start dapsone in 28 days versus continuing pentamidine. Decision dependent on lab (G6PD)

## 2024-01-29 NOTE — PROGRESS NOTES
BP (!) 149/84 (BP Location: Right arm)   Pulse 71   Temp 97.6  F (36.4  C) (Oral)   Resp 19   Wt 72.2 kg (159 lb 3.2 oz)   SpO2 95%   BMI 23.51 kg/m      AVSS on RA, elevated BP. C/o back pain managed w/ 2 lidocaine patches and PRN oxycodone 5 mg x1. Denied n/v. Eager to discharge, waiting for ANC/lab results this AM to see if he is able to d/c. PIV SL. 3g K+ diet.

## 2024-01-29 NOTE — PROGRESS NOTES
Follow-up with anemia management service:    Admitted 1/26/with SADI, COVID.         Latest Ref Rng & Units 1/24/2024     2:30 PM 1/25/2024     2:34 PM 1/25/2024    10:31 PM 1/26/2024     8:52 AM 1/27/2024     6:38 AM 1/28/2024     7:29 AM 1/29/2024     6:46 AM   Anemia   Hemoglobin 13.3 - 17.7 g/dL 11.9  11.9  12.1  11.6  9.1  8.2  9.4  P      P Preliminary result         Follow-up call date: 2/5/24    Elise Grady RN   Anemia Services  [facility]  [email or web address]   Office : [phone]  Fax: [phone]

## 2024-01-30 NOTE — PROGRESS NOTES
Clinic Care Coordination Contact    Situation: Patient chart reviewed by care coordinator.    Background: Patient was admitted to [facility] from 1/26 to 1/29/24 for neutropenia.     Assessment: RN CC contacted patient for TCM outreach. He is currently at the Infusion Center and requested return call after 1pm.     Plan/Recommendations: RN CC will call patient back after 1pm today.     Ute Moffett RN, BSN, CPHN, CCM  [facility] Ambulatory Care Management  [facility]  Phone: [phone]  Email: [email or web address]

## 2024-01-30 NOTE — LETTER
January 31, 2024      Matteo Barnes  [address] [address]        To Whom It May Concern:    Matteo Barnes was seen in our clinic. He may return to work without restrictions on .      Sincerely,        Elsa Adrian, KATHYN, RN   Post Kidney-Pancreas Transplant Coordinator   [facility]  Solid Organ Transplant Care  Office: [phone]  Fax: [phone]

## 2024-01-30 NOTE — UTILIZATION REVIEW
Admission Status; Secondary Review Determination    No action to be taken. Please contact me on my Email : [email or web address] if you have any questions.    As part of the [place] Utilization review plan, a self-audit is done on Medicare inpatient admission with less than 2 midnights stay. The 2014 IPPS Final Rule allows outpatient billing in the event that a hospital determines that an inpatient admission was not medically necessary under utilization review process.     (x) Outpatient status would be Appropriate- Short Stay- Post discharge review.    RATIONALE FOR DETERMINATION  Matteo Barnes is a 68 year old male admitted on 1/25/2024. 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     Cr improved with IVF (in ED). Cr 1.2 today  -Kidney US: no significant findings           Patient was admitted and discharge after one night stay. Record was sent by  for a PA review. Based on the  severity of illness, intensity of service provided, expected LOS and risk for adverse outcome make the care appropriate for further outpatient/observation; however, doesn't meet criteria for hospital inpatient admission.       The information on this document is developed by the utilization review team in order for the business office to ensure compliance.  This only denotes the appropriateness of proper admission status and does not reflect the quality of care rendered.       The definitions of Inpatient Status and Observation Status used in making the determination above are those provided in the CMS Coverage Manual, Chapter 1 and Chapter 6, section 70.4.     Please cont me if you want to discuss further about this admission episode.      To Garces MD, FACP, DENI  Medical Director - Utilization management  Staff Hospitalist  [facility]    Pager: [phone]

## 2024-01-30 NOTE — TELEPHONE ENCOUNTER
Marcus Garcia MD Reilly, Megan, RN  He needs an appointment in clinic with a transplant nephrologist in 1-2 weeks. He will get inhaled pentamidine prior to discharge today. I think RK wanted him to be on pred 20mg daily and in a few days we would start everolimus 1.25mg bid with goal 4-6. After everolimus >4 we would stop pred. I think he wanted for patient not to be neutropenic when we started it. We stopped MMF.    He should have coverage for letermovir so we are starting that for ppx and stopping valcyte. Plan to continue twice weekly labs.    He should continue daily neupogen until ANC>0.5    OUTCOME:    Pt's ANC result today is 3.0. No neupogen given.     Pentamidine and neupogen therapy plans placed/updated.    Labs ordered.    RNCC spoke with pt, instructed to start everolimus as ANC > 0.5    Pt reports that he took his  tac before am draw today. No dose change.

## 2024-01-31 NOTE — UTILIZATION REVIEW
Admission Status; Secondary Review Determination    No action to be taken. Please contact me on my Email : [email or web address] if you have any questions.    As part of the [place] Utilization review plan, a self-audit is done on Medicare inpatient admission with less than 2 midnights stay. The 2014 IPPS Final Rule allows outpatient billing in the event that a hospital determines that an inpatient admission was not medically necessary under utilization review process.     (x) Outpatient status would be Appropriate- Short Stay- Post discharge review.    RATIONALE FOR DETERMINATION  Matteo Barnes is a 68 year old male admitted on 1/25/2024. 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     Cr improved with IVF (in ED). Cr 1.2 today  -Kidney US: no significant findings           Patient was admitted and discharge after one night stay. Record was sent by  for a PA review. Based on the  severity of illness, intensity of service provided, expected LOS and risk for adverse outcome make the care appropriate for further outpatient/observation; however, doesn't meet criteria for hospital inpatient admission.       The information on this document is developed by the utilization review team in order for the business office to ensure compliance.  This only denotes the appropriateness of proper admission status and does not reflect the quality of care rendered.       The definitions of Inpatient Status and Observation Status used in making the determination above are those provided in the CMS Coverage Manual, Chapter 1 and Chapter 6, section 70.4.     Please cont me if you want to discuss further about this admission episode.      To Garces MD, FACP, DENI  Medical Director - Utilization management  Staff Hospitalist  [facility]    Pager: [phone]

## 2024-02-01 NOTE — TELEPHONE ENCOUNTER
Left message and sent Traveler | VIP message to patient regarding:  Tacrolimus IR level 6.3 on 1/31, goal 8-10, dose 6 mg BID.     PLAN:   Call Patientand confirm this was an accurate 12-hour trough.   Verify Tacrolimus IR dose 6 mg BID.   Confirm no new medications or illness.   Confirm no missed doses.   If accurate trough and accurate dose, increase Tacrolimus IR dose to 6.5 mg BID      Is this more than a 50% increase or decrease in current IS dose: No  If YES, justification: NA     Repeat labs Monday

## 2024-02-01 NOTE — TELEPHONE ENCOUNTER
ISSUE:   Tacrolimus IR level 6.3 on 1/31, goal 8-10, dose 6 mg BID.    PLAN:   Call Patientand confirm this was an accurate 12-hour trough.   Verify Tacrolimus IR dose 6 mg BID.   Confirm no new medications or illness.   Confirm no missed doses.   If accurate trough and accurate dose, increase Tacrolimus IR dose to 6.5 mg BID     Is this more than a 50% increase or decrease in current IS dose: No  If YES, justification: NA    Repeat labs Monday   *If > 50% change in immunosuppression dose, repeat labs in 1 week.     OUTCOME:  Patient confirms this was an accurate 12-hour trough.   Verified Tacrolimus IR dose 5 mg BID.   Confirmed no new medications or illness.   Confirmed no missed doses.   Patient confirms increase Tacrolimus IR dose to 5.5 mg BID and repeat labs Monday

## 2024-02-01 NOTE — PROGRESS NOTES
Clinic Care Coordination Contact  [facility]: Post-Discharge Note  SITUATION                                                      Admission:    Admission Date: 01/26/24   Reason for Admission: Neutropenia  Discharge:   Discharge Date: 01/29/24  Discharge Diagnosis: Neutropenia    BACKGROUND                                                      Per hospital discharge summary and inpatient provider notes:  Matteo Barnes was admitted on 1/26/2024.  The following problems were addressed during his hospitalization:     Graft function: History of high intensity protocol due to DGF. No DSA at the time of transplant. Ureteral stent removed. B/l Cr 1.1-1.4  SADI: Cr improved with IVF (in ED). Cr 1.2 today  -Kidney US: no significant findings     Immunosuppression management:   MMF 1000 mg BID. Discontinued due to diarrhea.      Start Everolimus once ANC > 1000. Start dose 1.25 mg BID. Goal 4-6.   Pred 20 mg daily. Continue until Everolimus level > 4  Continue Tac with goal 8-10        Infectious prophylaxis:   PJP ppx: Received inhaled pentamidine on 1/30/24. G6PD normal. Start dapsone 50 mg daily on 2/26/24.   CMV ppx: CMV discordant. Continue CMV prophylaxis x 6 months. Valcyte stopped due to leukopenia. Vaccines: Started on letermovir to complete 6 month course (EOT 4/28/224).  Due for shingrix vaccine and RSV vaccine once 6 months post transplant (4/28/24). If not neutropenic at that time.      Hematology:   Anemia in Chronic Renal Disease: Hgb ~11. Decreased to ~ 9 after IVFs given. Hgb ~ 9, stable. Monitor daily.  Leukopenia: Secondary to medications. MMF and Valcyte stopped (started letermovir). CMV not detected. Neupogen 480 mg x 3 days (1/28-1/30). Today WBC 2 and .     Cardiorespiratory:   HFpEF: Last cardiac echo (Feb/2021) showed normal LVEF ~ 60-65% with grade I diastolic dysfunction. Stress echo 07/2023 commented on normal LV function and wall motion.   HTN: PTA amlodipine, carvedilol. Stop  amlodipine. Continue carvedilol.     GI/Nutrition:   Diet: Regular diet  Diarrhea, recurrent: Secondary to medications (MMF, possible Mg contributing). Diarrhea stopped after holding MMF and Mg. No colonoscopy as diarrhea resolved.  MMF now discontinued. Restart Mg 400 mg daily (reduced dose). PTA psyllium continue as needed.       Endocrine: no issues     Fluid/Electrolytes:   Hyperkalemia: Resolved  Hypomagnesemia: Mg oxide 800 mg BID held. Received Mg sulfate 2g IV x 1 inpatient. Likely patient will not need high dose Mg as diarrhea resolved. Restarted Mg oxide 400 mg daily at discharge  Low bicarb: Continue oral sodium bicarb ordered  Hypophosphoremia: Resolved. Stop PTA oral phos     : no issues     Infectious disease:   COVID-19 URI:1/26 PCR positive, CT 28.5. ID consulted. No therapy is indicated. No respiratory symptoms, no imaging done.      ASSESSMENT       Discharge Assessment  How are you doing now that you are home?: Better.  How are your symptoms? (Red Flag symptoms escalate to triage hotline per guidelines): Improved  Do you feel your condition is stable enough to be safe at home until your provider visit?: Yes  Does the patient have their discharge instructions? : Yes  Does the patient have questions regarding their discharge instructions? : No  Were you started on any new medications or were there changes to any of your previous medications? : Yes  Does the patient have all of their medications?: Yes  Do you have questions regarding any of your medications? : No  Do you have all of your needed medical supplies or equipment (DME)?  (i.e. oxygen tank, CPAP, cane, etc.): Yes  Discharge follow-up appointment scheduled within 14 calendar days? : Yes  Discharge Follow Up Appointment Date: 02/13/24  Discharge Follow Up Appointment Scheduled with?: Specialty Care Provider (Nephrology)    Post-op ([facility] Only)  If the patient had a surgery or procedure, do they have any questions for a nurse?:  No    Post-op (Clinicians Only)  Did the patient have surgery or a procedure: No  Fever: No  Chills: No  Eating & Drinking: eating and drinking without complaints/concerns  PO Intake: regular diet  Bowel Function: normal  Date of last BM: 02/01/24  Urinary Status: voiding without complaint/concerns    PLAN                                                      Outpatient Plan:  Follow up with Transplant nephrology in 2-3 weeks. Follow up immunosuppression (transition from MMF to everolimus), infectious prophylaxis (PJP, CMV), neutropenia, and graft monitoring.     Labs:   -Transplant labs: BMP, CBC with differential, Mg, Phos, tacrolimus level and everolimus level on Monday and Thursday. Check everolimus level on 2/1/24 only if drug has been started at that time).  -Check CBC with differential on 1/30 and 1/31.    RN CC contacted patient for post-hospital follow up and to introduce Care Coordination. Full assessment not indicated as patient declined to have Care Coordination support at this time. He was not agreeable to receiving an introduction letter as he specifically stated he did not want Care Coordination.      Future Appointments   Date Time Provider Department Center   2/13/2024 12:00 PM Marcus Garcia MD [facility]   3/4/2024  9:00 AM  LAB [facility]   3/4/2024  9:30 AM Alden Joseph MD [facility]   4/24/2024  9:00 AM [facility]   4/24/2024  9:30 AM Alden Joseph MD [facility]     For any urgent concerns, please contact our 24 hour nurse triage line: [phone] ([phone])       Ute Moffett RN, BSN, CPHN, [facility] Ambulatory Care Management  [facility]  Phone: [phone]  Email: [email or web address]

## 2024-02-05 NOTE — PROGRESS NOTES
Anemia Management Note - Follow Up      SUBJECTIVE/OBJECTIVE:    Referred by Dr. 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 Initiate when Hgb drops <9.0.   Iron regimen:  TBD  Venofer 200mg x 5 doses per referral-one dose given then determined further doses were unnecessary d/t possible lab error  Labs : [id number]  Recent JOSE use, transfusion, IV iron: none. Hx retacrit and venofer with dialysis     RX/TX plans : TBD     No history of stroke, MI, and blood clots or cancers  Contact: Consent to communicate declined per pt, signed on 106        Latest Ref Rng & Units 2024   Anemia   Hemoglobin 13.3 - 17.7 g/dL 9.1  8.2  9.4  9.9     9.9  9.9  10.7  10.7      BP Readings from Last 3 Encounters:   24 (!) 157/82   24 (!) 149/84   24 (!) 148/88     Wt Readings from Last 2 Encounters:   24 72.2 kg (159 lb 3.2 oz)   24 68.9 kg (152 lb)           ASSESSMENT:    Hgb:at goal - continue to monitor  TSat: Due for labs Ferritin: Due for labs    PLAN:  RTC in 4 weeks for Anemia Labs     Orders needed to be renewed (for next follow-up date) in EPIC: None    Iron labs due:  Due     Plan discussed with:  No call, chart review       NEXT FOLLOW-UP DATE:  3/4/24    Elise Grady RN   Anemia Services  [facility]  [email or web address]   Office : [phone]  Fax: [phone]

## 2024-02-06 NOTE — TELEPHONE ENCOUNTER
EP called 2/6 to sched a new transplant ID visit with any TID provider. Referral from Dr. Jose Armando Quiroga.

## 2024-02-06 NOTE — TELEPHONE ENCOUNTER
"Call placed to patent. Patient confirm current everolimus dose at 1 mg bid and accurate trough level. Note that he was recently hospitalized because his \"immune was down to zero\". Patient v\u to increase everolimus dose to 1.25 mg bid and once he's at goal of 4 or greater he can stop the prednisone. Patient v\u to repeat level in one week.   "

## 2024-02-06 NOTE — TELEPHONE ENCOUNTER
ISSUE:   Everolimus level was 2.6 on 2/5, goal 4-6, dose 1.25 mg BID.    PLAN:   Call Patientand confirm this was an accurate 12-hour trough.   Verify Everolimus dose 1.25 mg BID.   Confirm no new medications or illness.   Confirm no missed doses.   If accurate trough and accurate dose, increase Everolimus dose to 1.5 mg BID     Is this more than a 50% increase or decrease in current IS dose: No  If YES, justification: NA    Repeat labs Thursday and Monday  *If > 50% change in immunosuppression dose, repeat labs in 1 week.     OUTCOME:

## 2024-02-13 PROBLEM — D70.8 OTHER NEUTROPENIA (H): Status: ACTIVE | Noted: 2024-01-01

## 2024-02-13 PROBLEM — Z94.0 KIDNEY TRANSPLANTED: Status: RESOLVED | Noted: 2024-01-01 | Resolved: 2024-01-01

## 2024-02-13 PROBLEM — R79.89 LOW SERUM BICARBONATE: Status: RESOLVED | Noted: 2023-01-01 | Resolved: 2024-01-01

## 2024-02-13 NOTE — LETTER
2/13/2024         RE: Matteo Barnes  [address]  [place] [address]        Dear Colleague,    Thank you for referring your patient, Matteo Barnes, to the [facility]. Please see a copy of my visit note below.    TRANSPLANT NEPHROLOGY EARLY POST TRANSPLANT VISIT    Assessment & Plan   # DDKT: Trend up with SADI. Per patient hasn't been drinking enough water. Possibly 2/2 high everolimus. Patient was taking 1.25mg bid before call on 2/7 and then increased to 2.5mg bid.    - Baseline Creatinine: ~1-1.3   - Proteinuria: Normal (<0.2 grams), FSGS protocol   - Date DSA Last Checked: Jan/2024      Latest DSA: No   - BK Viremia: No   - Kidney Tx Biopsy: No   - Transplant Ureteral Stent: Removed    - Repeat labs on 2/17    # Immunosuppression: Tacrolimus immediate release (goal 8-10) and Everolimus (goal 4-6), prednisone 20mg daily until everolimus >4   - Induction with Recent Transplant:  High Intensity Protocol due to DGF   - Continue with intensive monitoring of immunosuppression for efficacy and toxicity.   - Changes: Yes - decrease everolimus to 1.5mg bid .Stop prednisone    # Infection Prophylaxis:   - PJP: Inhaled pentamidine, received 1/30/24. Recommend continuing  - CMV:  Letermovir .  Patient is CMV IgG Ab discordant (D+/R-) and will continue on Letermovir x 6 months, then check CMV PCR monthly until 12 months post transplant. Valcyte switched to Letermovir on 1/29/24 due to neutropenia.     # Hypertension: Controlled;  Goal BP: < 130/80   - Volume status: Euvolemic     - Changes: Not at this time. Continue coreg 6.25mg bid    # Anemia in Chronic Renal Disease: Hgb: Trend up    JOSE: No   - Iron studies: Replete    # Neutropenia:   -Improved after stopping MMF and switching valcyte to letermovir but ANC decreasing now possible due to everolimus toxicity as patient was taking double the dose     # Mineral Bone Disorder:    - Secondary renal hyperparathyroidism; PTH level:  Mildly elevated (151-300 pg/ml)        On treatment: None   - Vitamin D; level: Normal        On supplement: No   - Calcium; level: Low normal        On supplement: No   - Phosphorus; level: Low normal        On supplement: Yes, phos 250mg bid    # Electrolytes:   - Potassium; level: High   On supplement: No. Increase PO intake. Likely high 2/2 everolimus level  - Magnesium; level: Low normal        On supplement: Yes, mag ox 400mg BID   - Bicarbonate; level: Low        On supplement: Yes, bicarb 1300 mg BID. Hold off on increasing    # C. Diff, resolved:   -Diagnosed 11/16/23, started on PO vanc 125mg q6h x10d. Had recurrence of diarrhea and abdominal pain and underwent second course of PO vanco with resolution of symptoms.    -Diarrhea recurred but stopped after switching off MMF   -Continue fiber gummies    # Weight loss, improving:   -EDW was 175lbs at time of transplant, then he developed diarrhea, weight down to 159lbs. Weight now uptrending (171lbs on 2/13/24) with resolution of diarrhea/abdominal pain and improved PO intake.     # HFpEF: Last cardiac echo (Feb/2021) showed normal LVEF ~ 60-65% with grade I diastolic dysfunction. Stress echo 07/2023 commented on normal LV function and wall motion.    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    Return  visit: Return in 20 days (on 3/4/2024) for previously scheduled visit, 4 month post transplant visit.      Marcus Garcia MD    The longitudinal plan of care for kidney transplant was addressed during this visit. Due to the added complexity in care, I will continue to support Matteo Barnes in the subsequent management of this condition(s) and with the ongoing continuity of care of this condition(s).       Chief Complaint   Mr. Barnes is a 68 year old here for kidney transplant, immunosuppression management and hospital follow up after kidney transplant.     History of Present Illness   Mr. Barnes was admitted 1/26-1/29/24 due to neutropenia, diarrhea, SADI. MMF was stopped 2/2 diarrhea and low WBC. He was put on prednisone 20mg daily until everolimus level was >4.    The patient overall feels well.He denies nausea, vomiting, diarrhea, fever, chills, shortness of breath, chest pain, LE edema, unintentional weight loss, nights sweats, dysuria, hematuria.       Home BP:  130s/70s systolic    Problem List   Patient Active Problem List   Diagnosis     Hyperlipidemia LDL goal <130     HTN, kidney transplant related     Erectile dysfunction     Anemia in stage 3a chronic kidney disease (H)     Metabolic acidosis     Hyperkalemia     Immunosuppressed status (H24)     Kidney replaced by transplant     Aftercare following organ transplant     Low serum bicarbonate     Leukocytosis     Neutropenia (H24)     Kidney transplanted     SADI (acute kidney injury) (H24)       Allergies   No Known Allergies    Medications   Current Outpatient Medications   Medication Sig     amLODIPine (NORVASC) 5 MG tablet Take 1 tablet (5 mg) by mouth daily for 90 days     everolimus (ZORTRESS) 0.5 MG tablet Take 1 mg by mouth 2 times daily Don't take unless needed. Total dose 1.5mg twice a day     everolimus (ZORTRESS) 0.75 MG tablet Take 1.5 mg by mouth 2 times daily Total dose 1.5mg twice a day     Fiber-Vitamins-Minerals ([employer] FIBER GUMMIES)  CHEW Take 3 tablets by mouth daily     magnesium oxide (MAG-OX) 400 MG tablet Take 1 tablet (400 mg) by mouth 2 times daily With lunch and supper     methocarbamol (ROBAXIN) 500 MG tablet Take 1.5 tablets (750 mg) by mouth 4 times daily     acetaminophen (TYLENOL) 500 MG tablet Take 500-1,000 mg by mouth every 6 hours as needed for mild pain     ALPRAZolam (XANAX) 0.25 MG tablet TAKE ONE TABLET BY MOUTH ONCE DAILY AS NEEDED FOR ANXIETY     atorvastatin (LIPITOR) 10 MG tablet Take 1 tablet (10 mg) by mouth daily     carvedilol (COREG) 6.25 MG tablet Take 1 tablet (6.25 mg) by mouth 2 times daily (with meals) Hold for systolic blood pressure < 140.     letermovir (PREVYMIS) 480 MG TABS tablet Take 1 tablet (480 mg) by mouth daily     Prenatal Vit-Fe Fumarate-FA (PRENATAL MULTIVITAMIN W/IRON) 27-0.8 MG tablet Take 1 tablet by mouth daily     sodium bicarbonate 650 MG tablet Take 2 tablets (1,300 mg) by mouth 2 times daily     tacrolimus (GENERIC) 0.5 MG capsule Take 1 capsule (0.5 mg) by mouth 2 times daily Total dose = 6.5 mg twice per day     tacrolimus (GENERIC) 1 MG capsule Take 1 capsule (1 mg) by mouth 2 times daily Total dose = 6.5 mg twice daily     tacrolimus (GENERIC) 5 MG capsule Take 1 capsule (5 mg) by mouth 2 times daily Total dose = 6.5 mg twice daily     No current facility-administered medications for this visit.     There are no discontinued medications.    Physical Exam   Vital Signs: BP (!) 165/87   Pulse 74   Temp 97.7  F (36.5  C) (Oral)   Wt 77.7 kg (171 lb 3.2 oz)   SpO2 98%   BMI 25.28 kg/m      GENERAL APPEARANCE: alert and no distress  HENT: mouth without ulcers or lesions  RESP: lungs clear to auscultation - no rales, rhonchi or wheezes  CV: regular rhythm, normal rate, no rub, no murmur  EDEMA: no LE edema bilaterally  MS: extremities normal - no gross deformities noted, no evidence of inflammation in joints, no muscle tenderness  SKIN: no rash  NEURO: normal strength and tone, sensory  exam grossly normal, mentation intact and speech normal  PSYCH: mentation appears normal and affect normal/bright  DIALYSIS ACCESS:  LUE AV fistula      Data         Latest Ref Rng & Units 2/9/2024     9:15 AM 2/4/2024     9:43 AM 2/2/2024     9:37 AM   Renal   Sodium 135 - 145 mmol/L 137  138  136    K 3.4 - 5.3 mmol/L 5.3  4.6  4.9    Cl 98 - 107 mmol/L 105  105  103    Cl (external) 98 - 107 mmol/L 105  105  103    CO2 22 - 29 mmol/L 22  23  21    Urea Nitrogen 8.0 - 23.0 mg/dL 35.9  34.1  30.0    Creatinine 0.67 - 1.17 mg/dL 0.96  1.21  1.18    Glucose 70 - 99 mg/dL 94  91  188    Calcium 8.8 - 10.2 mg/dL 8.7  9.0  9.1    Magnesium 1.7 - 2.3 mg/dL   1.5          Latest Ref Rng & Units 2/2/2024     9:37 AM 1/29/2024     6:46 AM 1/28/2024     7:29 AM   Bone Health   Phosphorus 2.5 - 4.5 mg/dL 2.2  2.8  2.2          Latest Ref Rng & Units 2/9/2024     9:15 AM 2/4/2024     9:43 AM 2/2/2024     9:37 AM   Heme   WBC 4.0 - 11.0 10e3/uL 3.3  3.0  3.2    Hgb 13.3 - 17.7 g/dL 10.8  10.7  10.7    Plt 150 - 450 10e3/uL 170  123  135    ABSOLUTE NEUTROPHIL 1.6 - 8.3 10e3/uL 2.9  2.0  2.4    ABSOLUTE LYMPHOCYTES 0.8 - 5.3 10e3/uL 0.1  0.3  0.3    ABSOLUTE MONOCYTES 0.0 - 1.3 10e3/uL 0.3  0.6  0.4    ABSOLUTE EOSINOPHILS 0.0 - 0.7 10e3/uL 0.0  0.0  0.0          Latest Ref Rng & Units 1/25/2024    10:31 PM 11/16/2023     9:05 AM 10/28/2023     4:06 AM   Liver   AP 40 - 150 U/L 139   57    TBili <=1.2 mg/dL 0.4   0.3    Bilirubin Direct 0.00 - 0.30 mg/dL <0.20      ALT 0 - 70 U/L 41   12    AST 0 - 45 U/L 35   13    Tot Protein 6.4 - 8.3 g/dL 7.7   7.2    Albumin 3.5 - 5.2 g/dL 4.5  3.9  4.5          Latest Ref Rng & Units 10/28/2023     4:07 AM 10/16/2021     9:42 AM 3/20/2018     4:09 PM   Pancreas   A1C <5.7 % 5.0   4.9    Lipase 73 - 393 U/L  461           Latest Ref Rng & Units 12/25/2023     9:19 AM 11/20/2023     8:50 AM 11/16/2023     9:05 AM   Iron studies   Iron 61 - 157 ug/dL 80  51  41    Iron Sat Index 15 - 46 % 35   21  18    Ferritin 31 - 409 ng/mL 919  1,146  3          Latest Ref Rng & Units 1/28/2024     7:29 AM 12/14/2023    10:54 AM 10/28/2023     4:06 AM   [facility] Txp Virology   LOG IU/ML OF CMVQNT   <1.5     EBV CAPSID ANTIBODY IGG No detectable antibody.   Positive    EBV DNA LOG OF COPIES  4.1           Recent Labs   Lab Test 02/02/24  0937 02/04/24  0943 02/09/24  0915   DOSTAC 2/1/2024 2/3/2024 2/8/2024   TACROL 10.7 9.4 8.8     Recent Labs   Lab Test 12/14/23  1018 12/21/23  0738 01/02/24  1000 01/30/24  0847 01/31/24  0922   DOSMPA 12/13/2023   8:00 PM  --   --   --  1/30/2024   8:00 PM   MPACID 1.09   < > 0.67* 0.38* 0.35*   MPAG 105.7*   < > 43.0 22.0* 15.7*    < > = values in this interval not displayed.       Again, thank you for allowing me to participate in the care of your patient.        Sincerely,        Marcus Garcia MD

## 2024-02-13 NOTE — PATIENT INSTRUCTIONS
Patient Recommendations:  - Decrease everolimus to 1.5mg twice daily (2 of the 0.75mg tablets twice daily)  -Stop prednisone  -Restart amlodipine 5mg daily     Transplant Patient Information  Your Post Transplant Coordinator is: Elsa Adrian  For non urgent items, we encourage you to contact your coordinator/care team online via [employer]  You and your care team can also contact your transplant coordinator Monday - Friday, 8am - 5pm at [phone] (Option 2 to reach the coordinator or Option 4 to schedule an appointment).  After hours for urgent matters, please call [facility] at [phone].

## 2024-02-13 NOTE — PROGRESS NOTES
TRANSPLANT NEPHROLOGY EARLY POST TRANSPLANT VISIT    Assessment & Plan   # DDKT: Trend up with SADI. Per patient hasn't been drinking enough water. Possibly 2/2 high everolimus. Patient was taking 1.25mg bid before call on 2/7 and then increased to 2.5mg bid.    - Baseline Creatinine: ~1-1.3   - Proteinuria: Normal (<0.2 grams), FSGS protocol   - Date DSA Last Checked: Jan/2024      Latest DSA: No   - BK Viremia: No   - Kidney Tx Biopsy: No   - Transplant Ureteral Stent: Removed    - Repeat labs on 2/17    # Immunosuppression: Tacrolimus immediate release (goal 8-10) and Everolimus (goal 4-6), prednisone 20mg daily until everolimus >4   - Induction with Recent Transplant:  High Intensity Protocol due to DGF   - Continue with intensive monitoring of immunosuppression for efficacy and toxicity.   - Changes: Yes - decrease everolimus to 1.5mg bid .Stop prednisone    # Infection Prophylaxis:   - PJP: Inhaled pentamidine, received 1/30/24. Recommend continuing  - CMV:  Letermovir .  Patient is CMV IgG Ab discordant (D+/R-) and will continue on Letermovir x 6 months, then check CMV PCR monthly until 12 months post transplant. Valcyte switched to Letermovir on 1/29/24 due to neutropenia.     # Hypertension: Controlled;  Goal BP: < 130/80   - Volume status: Euvolemic     - Changes: Not at this time. Continue coreg 6.25mg bid    # Anemia in Chronic Renal Disease: Hgb: Trend up    JOSE: No   - Iron studies: Replete    # Neutropenia:   -Improved after stopping MMF and switching valcyte to letermovir but ANC decreasing now possible due to everolimus toxicity as patient was taking double the dose     # Mineral Bone Disorder:    - Secondary renal hyperparathyroidism; PTH level: Mildly elevated (151-300 pg/ml)        On treatment: None   - Vitamin D; level: Normal        On supplement: No   - Calcium; level: Low normal        On supplement: No   - Phosphorus; level: Low normal        On supplement: Yes, phos 250mg bid    #  Electrolytes:   - Potassium; level: High   On supplement: No. Increase PO intake. Likely high 2/2 everolimus level  - Magnesium; level: Low normal        On supplement: Yes, mag ox 400mg BID   - Bicarbonate; level: Low        On supplement: Yes, bicarb 1300 mg BID. Hold off on increasing    # C. Diff, resolved:   -Diagnosed 11/16/23, started on PO vanc 125mg q6h x10d. Had recurrence of diarrhea and abdominal pain and underwent second course of PO vanco with resolution of symptoms.    -Diarrhea recurred but stopped after switching off MMF   -Continue fiber gummies    # Weight loss, improving:   -EDW was 175lbs at time of transplant, then he developed diarrhea, weight down to 159lbs. Weight now uptrending (171lbs on 2/13/24) with resolution of diarrhea/abdominal pain and improved PO intake.     # HFpEF: Last cardiac echo (Feb/2021) showed normal LVEF ~ 60-65% with grade I diastolic dysfunction. Stress echo 07/2023 commented on normal LV function and wall motion.    # Medical Compliance: Yes    # Health Maintenance and Vaccination Review: Recommend:  COVID vaccine    # Transplant History:  Etiology of Kidney Failure: Focal segmental glomerulosclerosis (FSGS), likely secondary  Tx: DDKT  Transplant: 10/28/2023 (Kidney)  Donor Type: Donation after Brain Death Donor Class:   Crossmatch at time of Tx: negative  DSA at time of Tx: No  Significant changes in immunosuppression: None  CMV IgG Ab High Risk Discordance (D+/R-): Yes  EBV IgG Ab High Risk Discordance (D+/R-): No  Significant transplant-related complications: DGF    Transplant Office Phone Number: [phone]    Assessment and plan was discussed with the patient and he voiced his understanding and agreement.    Return visit: Return in 20 days (on 3/4/2024) for previously scheduled visit, 4 month post transplant visit.      Marcus Garcia MD    The longitudinal plan of care for kidney transplant was addressed during this visit. Due to the added complexity in care, I  will continue to support Matteo Barnes in the subsequent management of this condition(s) and with the ongoing continuity of care of this condition(s).       Chief Complaint   Mr. Barnes is a 68 year old here for kidney transplant, immunosuppression management and hospital follow up after kidney transplant.     History of Present Illness   Mr. Barnes was admitted 1/26-1/29/24 due to neutropenia, diarrhea, SADI. MMF was stopped 2/2 diarrhea and low WBC. He was put on prednisone 20mg daily until everolimus level was >4.    The patient overall feels well.He denies nausea, vomiting, diarrhea, fever, chills, shortness of breath, chest pain, LE edema, unintentional weight loss, nights sweats, dysuria, hematuria.       Home BP:  130s/70s systolic    Problem List   Patient Active Problem List   Diagnosis     Hyperlipidemia LDL goal <130     HTN, kidney transplant related     Erectile dysfunction     Anemia in stage 3a chronic kidney disease (H)     Metabolic acidosis     Hyperkalemia     Immunosuppressed status (H24)     Kidney replaced by transplant     Aftercare following organ transplant     Low serum bicarbonate     Leukocytosis     Neutropenia (H24)     Kidney transplanted     SADI (acute kidney injury) (H24)       Allergies   No Known Allergies    Medications   Current Outpatient Medications   Medication Sig     amLODIPine (NORVASC) 5 MG tablet Take 1 tablet (5 mg) by mouth daily for 90 days     everolimus (ZORTRESS) 0.5 MG tablet Take 1 mg by mouth 2 times daily Don't take unless needed. Total dose 1.5mg twice a day     everolimus (ZORTRESS) 0.75 MG tablet Take 1.5 mg by mouth 2 times daily Total dose 1.5mg twice a day     Fiber-Vitamins-Minerals ([employer] F[employer] GUMMIES) CHEW Take 3 tablets by mouth daily     magnesium oxide (MAG-OX) 400 MG tablet Take 1 tablet (400 mg) by mouth 2 times daily With lunch and supper     methocarbamol (ROBAXIN) 500 MG tablet Take 1.5 tablets (750 mg) by mouth 4 times daily      acetaminophen (TYLENOL) 500 MG tablet Take 500-1,000 mg by mouth every 6 hours as needed for mild pain     ALPRAZolam (XANAX) 0.25 MG tablet TAKE ONE TABLET BY MOUTH ONCE DAILY AS NEEDED FOR ANXIETY     atorvastatin (LIPITOR) 10 MG tablet Take 1 tablet (10 mg) by mouth daily     carvedilol (COREG) 6.25 MG tablet Take 1 tablet (6.25 mg) by mouth 2 times daily (with meals) Hold for systolic blood pressure < 140.     letermovir (PREVYMIS) 480 MG TABS tablet Take 1 tablet (480 mg) by mouth daily     Prenatal Vit-Fe Fumarate-FA (PRENATAL MULTIVITAMIN W/IRON) 27-0.8 MG tablet Take 1 tablet by mouth daily     sodium bicarbonate 650 MG tablet Take 2 tablets (1,300 mg) by mouth 2 times daily     tacrolimus (GENERIC) 0.5 MG capsule Take 1 capsule (0.5 mg) by mouth 2 times daily Total dose = 6.5 mg twice per day     tacrolimus (GENERIC) 1 MG capsule Take 1 capsule (1 mg) by mouth 2 times daily Total dose = 6.5 mg twice daily     tacrolimus (GENERIC) 5 MG capsule Take 1 capsule (5 mg) by mouth 2 times daily Total dose = 6.5 mg twice daily     No current facility-administered medications for this visit.     There are no discontinued medications.    Physical Exam   Vital Signs: BP (!) 165/87   Pulse 74   Temp 97.7  F (36.5  C) (Oral)   Wt 77.7 kg (171 lb 3.2 oz)   SpO2 98%   BMI 25.28 kg/m      GENERAL APPEARANCE: alert and no distress  HENT: mouth without ulcers or lesions  RESP: lungs clear to auscultation - no rales, rhonchi or wheezes  CV: regular rhythm, normal rate, no rub, no murmur  EDEMA: no LE edema bilaterally  MS: extremities normal - no gross deformities noted, no evidence of inflammation in joints, no muscle tenderness  SKIN: no rash  NEURO: normal strength and tone, sensory exam grossly normal, mentation intact and speech normal  PSYCH: mentation appears normal and affect normal/bright  DIALYSIS ACCESS:  LUE AV fistula      Data         Latest Ref Rng & Units 2/9/2024     9:15 AM 2/4/2024     9:43 AM 2/2/2024      9:37 AM   Renal   Sodium 135 - 145 mmol/L 137  138  136    K 3.4 - 5.3 mmol/L 5.3  4.6  4.9    Cl 98 - 107 mmol/L 105  105  103    Cl (external) 98 - 107 mmol/L 105  105  103    CO2 22 - 29 mmol/L 22  23  21    Urea Nitrogen 8.0 - 23.0 mg/dL 35.9  34.1  30.0    Creatinine 0.67 - 1.17 mg/dL 0.96  1.21  1.18    Glucose 70 - 99 mg/dL 94  91  188    Calcium 8.8 - 10.2 mg/dL 8.7  9.0  9.1    Magnesium 1.7 - 2.3 mg/dL   1.5          Latest Ref Rng & Units 2/2/2024     9:37 AM 1/29/2024     6:46 AM 1/28/2024     7:29 AM   Bone Health   Phosphorus 2.5 - 4.5 mg/dL 2.2  2.8  2.2          Latest Ref Rng & Units 2/9/2024     9:15 AM 2/4/2024     9:43 AM 2/2/2024     9:37 AM   Heme   WBC 4.0 - 11.0 10e3/uL 3.3  3.0  3.2    Hgb 13.3 - 17.7 g/dL 10.8  10.7  10.7    Plt 150 - 450 10e3/uL 170  123  135    ABSOLUTE NEUTROPHIL 1.6 - 8.3 10e3/uL 2.9  2.0  2.4    ABSOLUTE LYMPHOCYTES 0.8 - 5.3 10e3/uL 0.1  0.3  0.3    ABSOLUTE MONOCYTES 0.0 - 1.3 10e3/uL 0.3  0.6  0.4    ABSOLUTE EOSINOPHILS 0.0 - 0.7 10e3/uL 0.0  0.0  0.0          Latest Ref Rng & Units 1/25/2024    10:31 PM 11/16/2023     9:05 AM 10/28/2023     4:06 AM   Liver   AP 40 - 150 U/L 139   57    TBili <=1.2 mg/dL 0.4   0.3    Bilirubin Direct 0.00 - 0.30 mg/dL <0.20      ALT 0 - 70 U/L 41   12    AST 0 - 45 U/L 35   13    Tot Protein 6.4 - 8.3 g/dL 7.7   7.2    Albumin 3.5 - 5.2 g/dL 4.5  3.9  4.5          Latest Ref Rng & Units 10/28/2023     4:07 AM 10/16/2021     9:42 AM 3/20/2018     4:09 PM   Pancreas   A1C <5.7 % 5.0   4.9    Lipase 73 - 393 U/L  461           Latest Ref Rng & Units 12/25/2023     9:19 AM 11/20/2023     8:50 AM 11/16/2023     9:05 AM   Iron studies   Iron 61 - 157 ug/dL 80  51  41    Iron Sat Index 15 - 46 % 35  21  18    Ferritin 31 - 409 ng/mL 919  1,146  3          Latest Ref Rng & Units 1/28/2024     7:29 AM 12/14/2023    10:54 AM 10/28/2023     4:06 AM   [facility] Txp Virology   LOG IU/ML OF CMVQNT   <1.5     EBV CAPSID ANTIBODY IGG No detectable  antibody.   Positive    EBV DNA LOG OF COPIES  4.1           Recent Labs   Lab Test 02/02/24  0937 02/04/24  0943 02/09/24  0915   DOSTAC 2/1/2024 2/3/2024 2/8/2024   TACROL 10.7 9.4 8.8     Recent Labs   Lab Test 12/14/23  1018 12/21/23  0738 01/02/24  1000 01/30/24  0847 01/31/24  0922   DOSMPA 12/13/2023   8:00 PM  --   --   --  1/30/2024   8:00 PM   MPACID 1.09   < > 0.67* 0.38* 0.35*   MPAG 105.7*   < > 43.0 22.0* 15.7*    < > = values in this interval not displayed.

## 2024-02-13 NOTE — NURSING NOTE
Chief Complaint   Patient presents with    RECHECK       BP (!) 165/87   Pulse 74   Temp 97.7  F (36.5  C) (Oral)   Wt 77.7 kg (171 lb 3.2 oz)   SpO2 98%   BMI 25.28 kg/m      Brian Decker on 2/13/2024 at 11:29 AM

## 2024-02-14 NOTE — TELEPHONE ENCOUNTER
Spoke with Matteo regarding tacro level of 7.1.  Confirmed current dose and accurate level.  Prior levels have been at goal.  Patient was taking double the everolimus by mistake.  Repeating labs 2/17.  Will continue current dose of tacrolimus for now and will adjust if needed based on 2/17 labs.   Discussed that Dr. Garcia would like him to continue to do the pentamidine.  Patient is wondering if he can have it done in [facility].  Told him I would look into it and let him know.     Patient would also like to stress that he does not want anything to do with Dr. Marrufo going forward.  States there are too many hands in the pot and there are miscommunications.  He only wants to see Dr. Garcia.  He is currently scheduled on 3/4 with Dr. Marrufo.    Message sent to primary coordinator to update her on patients wishes to only see Dr. Garcia.      Marcus Garcia MD Reilly, Megan, RN  Pt was taking everolimus 2.5mg bid since last week. He was actually taking 1.25mg bid when he was called and didn't understand the instructions. I think Scr is up, WBC is low, and K is up 2/2 everolmus toxicity. I decreased everolimus to 1.5mg bid. I stopped pred.    Please continue inhaled pentamidine (last treatment 1/30) due to low WBC.    Has repeat labs on 2/17    Per patient he actually feels great. He is gaining weight.

## 2024-02-14 NOTE — TELEPHONE ENCOUNTER
General  Route to LPN    Reason for call: Matteo called in just asking to speak with coordinator. No details goven on reason for call.    Call back needed? Yes    Return Call Needed  Same as documented in contacts section  When to return call?: Greater than one day: Route standard priority

## 2024-02-15 NOTE — TELEPHONE ENCOUNTER
Patient just saw Dr. Garcia for this medication. Will route to him.    Dutch Duke DO  2/15/2024 1:44 PM

## 2024-02-15 NOTE — TELEPHONE ENCOUNTER
ISSUE:  Matteo is seeking MD care from a singular transplant nephrologist, specifically, Dr. Garcia.  Currently, Matteo is <6 months from transplant. Per model, may see any available transplant nephrologist through 6 mo post transplant visit. Then plan to see a single provider, if able.  Currently assigned (randomly at time of transplant) to Dr. Marrufo's panel.    PLAN:  Inform Matteo about need for flexibility to see any MD up to 6 months post transplant. Can look to assign to another MD, but Dr. Garcia will not be with [facility] after June, so will need to work with Dr. Flores, Dr. Dale, or Dr. Lehman in the future.     OUTCOME:  [facility] was able to explain care model to Matteo. He voiced understanding and agreeable to see any provider (Dr. Flores, Dr. Garcia, Dr. Dale) up through 6 months post-transplant. He has not yet determined who to follow with after 6 months.  He felt that Dr. Marrufo was prescribing more medications and interventions than he was comfortable with, so asked to reschedule April follow up with an alternative provider.    PCS able to accommodate, follow up Vollee message sent to see if Matteo is open to a virtual visit.  Will reschedule after response.     Matteo was also notified that Dr. Garcia will no longer be with [facility] after June.

## 2024-02-16 NOTE — TELEPHONE ENCOUNTER
"Mateto calls asking if Dr. Garcia would be willing to place  PO vanco prescription.   He had been on a course which finished Wednesday AM (does not recall who the prescribing provider was) - since then his WBC went down, he's had lower abd pain, loose stool - when he's on the PO vanco these sxs resolve & he feels better.  Pt tells me he has a GI appt March 6.     Message to Dr. Garcia to review:  Marcus Garcia MD Lynch, Moni LANDAVERDE RN  He has tested negative for c diff. I think we should retest for it if he is having more pain and diarrhea rather than just prescribing it and increasing resistance.    Phone call to pt to review, no answer.  Message left with information above.  Will also send Gazzang message.    Orders placed for c.diff sample.    ======================================    Pt returns my phone call.  He tells me that he's left samples for c.diff in the past, \"what I need is the vanco, it relives the pain & then I can get through the weekend.  I can't wait until Monday.\"  Reiterated response from Dr. Garcia.  Pt asks for a direct call from Dr. Garcia - \"I want to talk to him.  This is my body that I know best for 68 years.  I am sick of everyone's hands being in the pot, no one communicates.  Maybe I should take my care to [place] where they will listen to me.\"  Reviewed I can send a message to Dr. Garcia with this request but I can not guarantee that Dr. Garcia would contact him.    Follow-up Gazzang message to pt to review recommendation to seek care of pain is limiting ADLS & inform him c.diff order is in place if he opts to leave a sample.        "

## 2024-02-16 NOTE — TELEPHONE ENCOUNTER
DATE:  2/16/2024     TIME OF RECEIPT FROM LAB:  11:19 AM    ORDERING PROVIDER: Niru    LAB TEST:  WBC & ANC    LAB VALUE:  0.9 & 0.5    RESULTS GIVEN WITH READ-BACK TO (PROVIDER):  Moni Kwok RN    TIME LAB VALUE REPORTED TO PROVIDER:   11:24 AM    ================================    See note for plan regarding abnormal lab value.

## 2024-02-16 NOTE — TELEPHONE ENCOUNTER
Patient returning call to the RNCC patient also asked if the RNCC can call back if the provider can call.

## 2024-02-16 NOTE — TELEPHONE ENCOUNTER
ISSUE:  WBC 0.9, ANC 0.5    Result sent to Dr. Garcia for review:  Marcus Garcia MD Lynch, Rita A, RN  Needs neupogen x3 days, obtain CMV PCR. Daily CBC w/ diff while on neupogen    Phone call to pt to review plan.  Therapy plan placed.  Pt would like to have medication administered at [facility], he was provided with the phone # to schedule appt ([phone] option 4).

## 2024-02-19 NOTE — PROGRESS NOTES
Infusion Nursing Note:  Matteo Barnes presents today for Zarxio #1 of 3.    Patient seen by provider today: No   present during visit today: Not Applicable.    Note: Patient has had in the past. Denies bone pain in the past and any current symptoms of infection.      Intravenous Access:  No Intravenous access/labs at this visit.    Treatment Conditions:   Latest Reference Range & Units 02/18/24 09:37   WBC 4.0 - 11.0 10e3/uL 0.8 (LL)   Absolute Neutrophil 1.6 - 8.3 10e3/uL 0.5 (L)   (LL): Data is critically low  (L): Data is abnormally low  Results reviewed, labs MET treatment parameters, ok to proceed with treatment.    Per Jason Cardoza PharmD CBC does not need to be drawn today. Okay to use lab results from 2/18. Jason sent a message to RNCC to see if we need labs the upcoming days or if we can just proceed with the injections. He will confirm with RN tomorrow on the decision.       Post Infusion Assessment:  Patient tolerated injection without incident.       Discharge Plan:   Discharge instructions reviewed with: Patient.  Patient and/or family verbalized understanding of discharge instructions and all questions answered.  AVS to patient via [employer].  Patient will return 2/20 for next appointment.   Patient discharged in stable condition accompanied by: self.  Departure Mode: Ambulatory.      Carin Sandy RN

## 2024-02-19 NOTE — TELEPHONE ENCOUNTER
Marcus Garcia MD Reilly, Megan, RN   I spoke to Matteo on Saturday 2/17. He is complaining of pain in LLQ and continues to have low WBC. Kidney is on the R. I recommend obtaining CT A/P non con please. He requests that it is done in [place]. He is going to start neupogen on Monday 2/19. Please recheck CMV PCR this week.

## 2024-02-20 NOTE — PROGRESS NOTES
Infusion Nursing Note:  Matteo Barnes presents today for cbc, zarxio.     present during visit today: Not Applicable.    Note: N/A.    Intravenous Access:  Labs drawn without difficulty.    Treatment Conditions:  ANC 0.7  Zarxio given  Post Lab Assessment:     Patient tolerated injection   Patient tolerated blood collection       Discharge Plan:   Patient and/or family verbalized understanding of  instructions and all questions answered.  Patient  to lobby in stable condition accompanied by: self.  Patient to see provider today: No  Departure Mode: Ambulatory.  Alejandra Ovalles RN

## 2024-02-21 NOTE — TELEPHONE ENCOUNTER
ISSUE:   Everolimus level 3.0 on 2/18, goal 4-6, dose 1.5 mg BID.    PLAN:   Call Patientand confirm this was an accurate 12-hour trough.   Verify Everolimus dose 1.5 mg BID.   Confirm no new medications or illness.   Confirm no missed doses.   If accurate trough and accurate dose, increase Everolimus dose to 3.5 mg BID     Is this more than a 50% increase or decrease in current IS dose: No  If YES, justification: NA    Repeat labs Thursday and Monday .  *If > 50% change in immunosuppression dose, repeat labs in 1 week.     OUTCOME: Patient confirmed this was an accurate 12-hour trough.   Verified Everolimus dose 1.5 mg BID.   Confirmed no new medications or illness.   Confirmed no missed doses.   Patient confirms increase Everolimus dose to 3.5 mg BID and repeat labs Thursday and Monday .

## 2024-02-22 NOTE — PROGRESS NOTES
Infusion Nursing Note:  Matteo Barnes presents today for zarxio.    Patient seen by provider today: No   present during visit today: Not Applicable.    Note: No new fevers or other symptoms. Takes his temp regularly. BP was elevated, pt states he will recheck it at home.       Intravenous Access:  No Intravenous access/labs at this visit.    Treatment Conditions:   02/22/24 08:15   Absolute Neutrophil 0.3 (LL)     Results reviewed, labs MET treatment parameters, ok to proceed with treatment.      Post Infusion Assessment:  Patient tolerated injection without incident.       Discharge Plan:   Discharge instructions reviewed with: Patient.  Patient and/or family verbalized understanding of discharge instructions and all questions answered.  AVS to patient via [employer].  Patient will return prn for next appointment.   Patient discharged in stable condition accompanied by: self.  Departure Mode: Ambulatory.      Naya Hwang RN

## 2024-02-26 NOTE — TELEPHONE ENCOUNTER
ISSUE:   Tacrolimus IR level 11.2 on 2/23, goal 8-10, dose 6.5 mg BID.    PLAN:   Call Patientand confirm this was an accurate 12-hour trough.   Verify Tacrolimus IR dose 6.5 mg BID.   Confirm no new medications or illness.   Confirm no missed doses.   If accurate trough and accurate dose, decrease Tacrolimus IR dose to 6 mg BID     Is this more than a 50% increase or decrease in current IS dose: No  If YES, justification: NA    Repeat labs in 3 days.  *If > 50% change in immunosuppression dose, repeat labs in 1 week.     OUTCOME:

## 2024-02-26 NOTE — TELEPHONE ENCOUNTER
Call returned to patient. Patient confirms current dose and accurate trough level. Denies any recent illness, diarrhea or medication changes. Patient v\u to decrease tacrolimus dose to 6 mg bid and repeat level in 3 days. Rx sent

## 2024-02-27 NOTE — TELEPHONE ENCOUNTER
DATE:  2/27/2024     TIME OF RECEIPT FROM LAB:  1243    ORDERING PROVIDER: Radha    LAB TEST:  Everolimus    LAB VALUE:  16.1        ISSUE:   Everolimus level 16.1 on 2/25, goal 4-6, dose 1.5 mg BID.    PLAN:   Call Patientand confirm this was an accurate 12-hour trough.   Verify everolimus dose 1.5 mg BID.   Confirm no new medications or illness.   Confirm no missed doses.   If accurate trough and accurate dose, decrease Everolimus dose to 1 mg BID     Is this more than a 50% increase or decrease in current IS dose: Yes  If YES, justification: Out of goal range    Repeat labs in 3 days.  *If > 50% change in immunosuppression dose, repeat labs in 1 week.     OUTCOME:   Spoke with Patient, they confirm accurate trough level and current dose 3.5 mg BID.   Patientconfirmed dose change to 2.5 mg BID.  Patientagreed to repeat labs in 3 days.   Orders sent to preferred pharmacy for dose change and lab for repeat labs.   Patientvoiced understanding of plan.

## 2024-02-28 NOTE — TELEPHONE ENCOUNTER
Post Kidney and Pancreas Transplant Team Conference  Date: 2/28/2024  Transplant Coordinator: Elsa Adrian     Attendees:  [x]  Dr. Dale [x] Jennifer Domínguez, RN [x] Ellen Dowell LPN     [x]  Dr. Marrufo [x] Mere Ray, RN [] Deborah Molina LPN    [x] Dr. Garcia [x] Sara Alonso RN    [x] Dr. Flores [x] Elsa Adrian, RN [] Abena Craven RN   [] Dr. Jennings [] Delma Rice, RN    [] Dr. Gonzalez [] Beryl Sousa RN    [x]  Dr. Santoyo [] Anu Johnson RN    [] Dr. Damian [] Alex Zhang RN    [x] Daylin Jerry, NP [] Magaly Shoemaker RN    [x] Pricilla Danielle NP [] Hue Dickson RN        Verbal Plan Read Back:   Continue current treatment plan    Routed to RN Coordinator   Ellen Dowell LPN

## 2024-03-04 NOTE — PROGRESS NOTES
Anemia Management Note - Follow Up      SUBJECTIVE/OBJECTIVE:    Referred by Dr. Alden Marrufo on 2023   Primary Diagnosis: Anemia in Chronic Kidney Disease (N18.4, D63.1)     Secondary Diagnosis:  Organ or tissue replaced by transplant, kidney (Z94.0)  Kidney Transplant [id number]  Hgb goal range:  9-10  Epo/Darbo:   TBD. Initiate when Hgb drops <9.0.   Iron regimen:  TBD  Venofer 200mg x 5 doses per referral-one dose given then determined further doses were unnecessary d/t possible lab error  Labs : [id number]  Recent JOSE use, transfusion, IV iron: none. Hx retacrit and venofer with dialysis     RX/TX plans : TBD     No history of stroke, MI, and blood clots or cancers  Contact: Consent to communicate declined per pt, signed on 106        Latest Ref Rng & Units 2024 2024 2024 2024 2024 2024 3/1/2024   Anemia   Hemoglobin 13.3 - 17.7 g/dL 10.4  10.7  10.4  10.8  10.3  11.1  10.7    TSAT 15 - 46 %   21        Ferritin 31 - 409 ng/mL   4,023          BP Readings from Last 3 Encounters:   24 (!) 165/85   24 (!) 157/78   24 (!) 154/76     Wt Readings from Last 2 Encounters:   24 77.7 kg (171 lb 3.2 oz)   24 72.2 kg (159 lb 3.2 oz)           ASSESSMENT:    Hgb:at goal - continue to monitor  TSat: not at goal (>30%) but ferritin >1000ng/mL.  PO iron not indicated at this time per anemia protocol.   Ferritin: At goal (>100ng/mL)        PLAN:  RTC for Anemia Labs in 4 week(s).    Orders needed to be renewed (for next follow-up date) in EPIC: None    Iron labs due:  Mid 2024    Plan discussed with:  No call, chart review      NEXT FOLLOW-UP DATE:  24    Elise Grady RN   Anemia Services  [facility]  [email or web address]   Office : [phone]  Fax: [phone]

## 2024-03-04 NOTE — TELEPHONE ENCOUNTER
Alden Joseph MD Reilly, Megan, RN  Elevated CR, Fk at goal. Check if needs IVF, urinary symptoms, UA Ucx, US kidney if unclear etiology for SADI    RNCC spoke with pt. Pt reports not drinking enough water. Had one episode of diarrhea last week on Wednesday, but has been feeling ok since. Reports no new abd pain, fever or UTI s/s.     Will hydrate well today and repeat labs tomorrow am. UA added.       
Satisfactory

## 2024-03-05 NOTE — TELEPHONE ENCOUNTER
ISSUE:   Everolimus level 9.8 on 3/4, goal 4-6, dose 1.5  BID.    PLAN:   Call Patientand confirm this was an accurate 12-hour trough.   Verify Everolimus dose 1.5 mg BID.   Confirm no new medications or illness.   Confirm no missed doses.   If accurate trough and accurate dose, decrease Everolimus dose to 1 mg BID     Is this more than a 50% increase or decrease in current IS dose: Yes  If YES, justification: out of goal range    Repeat labs in 1 week.  *If > 50% change in immunosuppression dose, repeat labs in 1 week.     OUTCOME:

## 2024-03-06 NOTE — TELEPHONE ENCOUNTER
Call placed to patient. Patient frustrated note that his results are arriving to SOT late. State that he is returning to lab on Friday. Patient refuses to make dose change at this time.

## 2024-03-06 NOTE — NURSING NOTE
Patient denies any changes since echeck-in regarding medication and allergies and states all information entered during echeck-in remains accurate.    Is the patient currently in the state of [place]? YES    Visit mode:VIDEO    If the visit is dropped, the patient can be reconnected by: VIDEO VISIT: Text to cell phone:   Telephone Information:   Mobile [phone]       Will anyone else be joining the visit? NO  (If patient encounters technical issues they should call [phone])    How would you like to obtain your AVS? MyChart    Are changes needed to the allergy or medication list? No, Pt stated no changes to allergies, and Pt stated no med changes    Reason for visit: Consult (Consult to discuss pain in intestines since surgery. Pt states he is having severe pain and is requesting pain medication, pt states it's not steady but comes and goes. Pt states he has an upcoming CT scan on 3/14. Medications/allergies reviewed by pt via [employer], no changes per pt.)    Francie Moffett, Visit Facilitator/MA.

## 2024-03-06 NOTE — PROGRESS NOTES
Virtual Visit Details    Type of service:  Video Visit     Originating Location (pt. Location): Home    Distant Location (provider location):  Off-site  Platform used for Video Visit: [employer]     Video time - 1.35 pm to 1.57 pm  Total time including chart review, care-coordination and documentation time on the date of encounter - 40 mins            [facility]   [address] [address]  Phone: [phone]  Fax: [phone]        Transplant Infectious Disease Consultation note  Today's Date: 03/06/2024    Recommendations:  - I have prescribed augmentin 875 mg po bid for most likely diagnosis of diverticulitis   - I have recommended Bowel rest from solid foods but continue hydration and liquid foods  - If pain continues patient was advised to go to the ER as it can be life threatening and may need surgery   - I will Follow up on the  CT scan scheduled for 3/14/24  - recommended to get shingrix and RSV vaccine 6 months post transplant     Thank you for involving me in the care of this patient. Please do not hesitate to contact me with any questions.     Assessment:  Matteo Barnes is a 68 year old with PMH of ESRD s/p kidney transplant (placed in the RLQ)  Oct 2023 on tacrolimus and everolimus     LLQ pain: intermittent but very significant since transplant. This is likely diverticulitis especially with h/o prior diverticulitis 15 years ago and extensive diverticulosis noted in CT abdomen from 2/2021    - Serostatus: CMV D+/R-, EBV D+/R+  - Immunization status: due for shingrix and RSV, 6 months post transplant   - Prophylaxis: letermovir and inhaled pentamidine     Joanna William  , Division of Infectious Disease  [facility]    -------------------------------------------------------------------------------------------------------------------   Reason for consult / Chief complaint:   Consulted by Dr. Adrian for  C.diff    History of presenting illness:  Matteo Barnes is a 68 year old with PMH of ESRD s/p kidney transplant (placed in the RLQ)  Oct 2023 on tacrolimus and everolimus     He reports Diverticulitis 15 years ago or longer     He has had LLQ that comes and goes (about once per month) since the transplant in October 2023. It is associated with diarrhea. C.diff was pos (not toxin though) 11/16/23 and 1/8/24 and Enteric pcr panel was neg. He rcvd Vancomycin po for 4 days followed by fidaxomicin for 10 days in Jan 2024. Pain was better for 2 weeks but then returned. Follow up C.diff stool PCRs 1/26 and 2/18 have been negative.     Feb 2024 creat shot up and wbc decreased - therefore was hospitalized. It was deemed to be sec to dehydration.due to leukopenia and neutropenia, valcyte prophylaxis was stopped and changed to letermovir. Bactrim prophylaxis was stopped and changed to inhaled pentamidine.     The pain started back up last Tuesday a week ago. Since it was similar to his prior diverticulitis pain and a friend of his has diverticulitis, he took one tablet of amox that he had at that time. The pain seemed better but he vomited on Sunday and the pain is pretty bad since yesterday. The Pain is 5/10, worse with movement and after eating, relived with defecation. He has not gone to work today as a result of the pain. He works part time as a  in a car [employer]     He has a CT abdomen/pelvis scheduled for March 14. That was the soonest available       Patient Active Problem List   Diagnosis     Mixed hyperlipidemia     HTN, kidney transplant related     Erectile dysfunction     Anemia in stage 3a chronic kidney disease (H)     Metabolic acidosis     Hyperkalemia     Immunosuppressed status (H24)     Kidney replaced by transplant     Aftercare following organ transplant     Other neutropenia (H24)     Neutropenia (H24)     SADI (acute kidney injury) (H24)         Allergies:   No Known  "Allergies      Medications:  Current Outpatient Medications   Medication     acetaminophen (TYLENOL) 500 MG tablet     ALPRAZolam (XANAX) 0.25 MG tablet     amLODIPine (NORVASC) 5 MG tablet     atorvastatin (LIPITOR) 10 MG tablet     carvedilol (COREG) 6.25 MG tablet     everolimus (ZORTRESS) 0.5 MG tablet     everolimus (ZORTRESS) 0.75 MG tablet     Fiber-Vitamins-Minerals ([employer] F[employer]ER GUMMIES) CHEW     letermovir (PREVYMIS) 480 MG TABS tablet     magnesium oxide (MAG-OX) 400 MG tablet     methocarbamol (ROBAXIN) 500 MG tablet     Prenatal Vit-Fe Fumarate-FA (PRENATAL MULTIVITAMIN W/IRON) 27-0.8 MG tablet     sodium bicarbonate 650 MG tablet     tacrolimus (GENERIC) 0.5 MG capsule     tacrolimus (GENERIC) 1 MG capsule     tacrolimus (GENERIC) 5 MG capsule     No current facility-administered medications for this visit.         Immunizations:  Immunization History   Administered Date(s) Administered     COVID-19 12+ (2023-24) (Pfizer) 02/13/2024     COVID-19 Bivalent 12+ (Pfizer) 10/31/2022     COVID-19 MONOVALENT 12+ (Pfizer) 03/04/2021, 03/24/2021, 01/04/2022     COVID-19 Monovalent 12+ (Pfizer 2022) 05/20/2022     HepB-Dialysis 12/24/2021, 01/19/2022, 02/21/2022, 05/16/2022     Influenza (High Dose) 3 valent vaccine 10/07/2022, 10/09/2023     Influenza Vaccine 18-64 (Flublok) 10/09/2020     Influenza Vaccine >6 months,quad, PF 10/09/2023     Mantoux Tuberculin Skin Test 11/10/2021, 12/08/2021, 03/07/2022, 03/13/2023     Pneumo Conj 13-V (2010&after) 12/10/2021     Pneumococcal 23 valent 03/21/2022     TDAP Vaccine (Boostrix) 10/08/2015         Examination:  Vital signs:   /75   Ht 1.753 m (5' 9\")   Wt 78 kg (172 lb)   BMI 25.40 kg/m    Unable to examine due to virtual visit     Laboratory:  Hematology:  Recent Labs   Lab Test 03/01/24  0944 02/25/24  0941 02/22/24  0815   WBC 2.5* 1.1* 1.1*   RBC 3.38* 3.41* 3.17*   HGB 10.7* 11.1* 10.3*   HCT 32.8* 33.2* 31.7*   MCV 97 97 100   MCH 31.7 32.6 32.5   MCHC " 32.6 33.4 32.5   RDW 13.4 13.5 13.8   * 136* 128*       Chemistry:  Recent Labs   Lab Test 03/01/24  0945 02/25/24  0941 02/22/24  0815    136 137   POTASSIUM 5.1 5.3 4.9   CHLORIDE 106 104 106   CO2 21* 19* 20*   ANIONGAP 9 13 11   * 227* 95   BUN 40.9* 27.9* 32.2*   CR 1.52* 1.33* 1.17   ANNETTE 9.1 9.0 8.8       Liver Function Studies:     Recent Labs   Lab Test 01/25/24  2231 11/16/23  0905 10/28/23  0406 06/07/23  1236 01/26/23  0853   PROTTOTAL 7.7  --  7.2  --  7.1   ALBUMIN 4.5 3.9 4.5  --  4.6   BILITOTAL 0.4  --  0.3  --  0.3   ALKPHOS 139  --  57  --  47   AST 35  --  13  --  15   ALT 41  --  12 11 16       Immune Globulin Studies:  Recent Labs   Lab Test 01/31/24  0922 01/25/24  1434    728       CT abdomen 2/2021  1. Minimal atherosclerotic calcifications in the abdominal aorta and  proximal common iliac arteries.  2. Extensive diverticulosis without diverticulitis.

## 2024-03-06 NOTE — LETTER
3/6/2024       RE: Matteo Barnes  [address]  [place] [address]     Dear Colleague,    Thank you for referring your patient, Matteo Barnes, to the [facility] [place] [facility]. Please see a copy of my visit note below.    Virtual Visit Details    Type of service:  Video Visit     Originating Location (pt. Location): Home    Distant Location (provider location):  Off-site  Platform used for Video Visit: [employer]     Video time - 1.35 pm to 1.57 pm  Total time including chart review, care-coordination and documentation time on the date of encounter - 40 mins            [facility]   [address] [address]  Phone: [phone]  Fax: [phone]        Transplant Infectious Disease Consultation note  Today's Date: 03/06/2024    Recommendations:  - I have prescribed augmentin 875 mg po bid for most likely diagnosis of diverticulitis   - I have recommended Bowel rest from solid foods but continue hydration and liquid foods  - If pain continues patient was advised to go to the ER as it can be life threatening and may need surgery   - I will Follow up on the  CT scan scheduled for 3/14/24  - recommended to get shingrix and RSV vaccine 6 months post transplant     Thank you for involving me in the care of this patient. Please do not hesitate to contact me with any questions.     Assessment:  Matteo Barnes is a 68 year old with PMH of ESRD s/p kidney transplant (placed in the RLQ)  Oct 2023 on tacrolimus and everolimus     LLQ pain: intermittent but very significant since transplant. This is likely diverticulitis especially with h/o prior diverticulitis 15 years ago and extensive diverticulosis noted in CT abdomen from 2/2021    - Serostatus: CMV D+/R-, EBV D+/R+  - Immunization status: due for shingrix and RSV, 6 months post transplant   - Prophylaxis:  letermovir and inhaled pentamidine     Joanna William  , Division of Infectious Disease  [facility]    -------------------------------------------------------------------------------------------------------------------   Reason for consult / Chief complaint:   Consulted by Dr. Adrian for C.diff    History of presenting illness:  Matteo Barnes is a 68 year old with PMH of ESRD s/p kidney transplant (placed in the RLQ)  Oct 2023 on tacrolimus and everolimus     He reports Diverticulitis 15 years ago or longer     He has had LLQ that comes and goes (about once per month) since the transplant in October 2023. It is associated with diarrhea. C.diff was pos (not toxin though) 11/16/23 and 1/8/24 and Enteric pcr panel was neg. He rcvd Vancomycin po for 4 days followed by fidaxomicin for 10 days in Jan 2024. Pain was better for 2 weeks but then returned. Follow up C.diff stool PCRs 1/26 and 2/18 have been negative.     Feb 2024 creat shot up and wbc decreased - therefore was hospitalized. It was deemed to be sec to dehydration.due to leukopenia and neutropenia, valcyte prophylaxis was stopped and changed to letermovir. Bactrim prophylaxis was stopped and changed to inhaled pentamidine.     The pain started back up last Tuesday a week ago. Since it was similar to his prior diverticulitis pain and a friend of his has diverticulitis, he took one tablet of amox that he had at that time. The pain seemed better but he vomited on Sunday and the pain is pretty bad since yesterday. The Pain is 5/10, worse with movement and after eating, relived with defecation. He has not gone to work today as a result of the pain. He works part time as a  in a car [employer]     He has a CT abdomen/pelvis scheduled for March 14. That was the soonest available       Patient Active Problem List   Diagnosis    Mixed hyperlipidemia    HTN, kidney transplant related    Erectile dysfunction    Anemia in  "stage 3a chronic kidney disease (H)    Metabolic acidosis    Hyperkalemia    Immunosuppressed status (H24)    Kidney replaced by transplant    Aftercare following organ transplant    Other neutropenia (H24)    Neutropenia (H24)    SADI (acute kidney injury) (H24)         Allergies:   No Known Allergies      Medications:  Current Outpatient Medications   Medication    acetaminophen (TYLENOL) 500 MG tablet    ALPRAZolam (XANAX) 0.25 MG tablet    amLODIPine (NORVASC) 5 MG tablet    atorvastatin (LIPITOR) 10 MG tablet    carvedilol (COREG) 6.25 MG tablet    everolimus (ZORTRESS) 0.5 MG tablet    everolimus (ZORTRESS) 0.75 MG tablet    Fiber-Vitamins-Minerals ([employer] FIBER GUMMIES) CHEW    letermovir (PREVYMIS) 480 MG TABS tablet    magnesium oxide (MAG-OX) 400 MG tablet    methocarbamol (ROBAXIN) 500 MG tablet    Prenatal Vit-Fe Fumarate-FA (PRENATAL MULTIVITAMIN W/IRON) 27-0.8 MG tablet    sodium bicarbonate 650 MG tablet    tacrolimus (GENERIC) 0.5 MG capsule    tacrolimus (GENERIC) 1 MG capsule    tacrolimus (GENERIC) 5 MG capsule     No current facility-administered medications for this visit.         Immunizations:  Immunization History   Administered Date(s) Administered    COVID-19 12+ (2023-24) (Pfizer) 02/13/2024    COVID-19 Bivalent 12+ (Pfizer) 10/31/2022    COVID-19 MONOVALENT 12+ (Pfizer) 03/04/2021, 03/24/2021, 01/04/2022    COVID-19 Monovalent 12+ (Pfizer 2022) 05/20/2022    HepB-Dialysis 12/24/2021, 01/19/2022, 02/21/2022, 05/16/2022    Influenza (High Dose) 3 valent vaccine 10/07/2022, 10/09/2023    Influenza Vaccine 18-64 (Flublok) 10/09/2020    Influenza Vaccine >6 months,quad, PF 10/09/2023    Mantoux Tuberculin Skin Test 11/10/2021, 12/08/2021, 03/07/2022, 03/13/2023    Pneumo Conj 13-V (2010&after) 12/10/2021    Pneumococcal 23 valent 03/21/2022    TDAP Vaccine (Boostrix) 10/08/2015         Examination:  Vital signs:   /75   Ht 1.753 m (5' 9\")   Wt 78 kg (172 lb)   BMI 25.40 kg/m    Unable to " examine due to virtual visit     Laboratory:  Hematology:  Recent Labs   Lab Test 03/01/24  0944 02/25/24  0941 02/22/24  0815   WBC 2.5* 1.1* 1.1*   RBC 3.38* 3.41* 3.17*   HGB 10.7* 11.1* 10.3*   HCT 32.8* 33.2* 31.7*   MCV 97 97 100   MCH 31.7 32.6 32.5   MCHC 32.6 33.4 32.5   RDW 13.4 13.5 13.8   * 136* 128*       Chemistry:  Recent Labs   Lab Test 03/01/24  0945 02/25/24  0941 02/22/24  0815    136 137   POTASSIUM 5.1 5.3 4.9   CHLORIDE 106 104 106   CO2 21* 19* 20*   ANIONGAP 9 13 11   * 227* 95   BUN 40.9* 27.9* 32.2*   CR 1.52* 1.33* 1.17   ANNETTE 9.1 9.0 8.8       Liver Function Studies:     Recent Labs   Lab Test 01/25/24  2231 11/16/23  0905 10/28/23  0406 06/07/23  1236 01/26/23  0853   PROTTOTAL 7.7  --  7.2  --  7.1   ALBUMIN 4.5 3.9 4.5  --  4.6   BILITOTAL 0.4  --  0.3  --  0.3   ALKPHOS 139  --  57  --  47   AST 35  --  13  --  15   ALT 41  --  12 11 16       Immune Globulin Studies:  Recent Labs   Lab Test 01/31/24  0922 01/25/24  1434    728       CT abdomen 2/2021  1. Minimal atherosclerotic calcifications in the abdominal aorta and  proximal common iliac arteries.  2. Extensive diverticulosis without diverticulitis.    Joanna William MD

## 2024-03-12 NOTE — TELEPHONE ENCOUNTER
Left message and sent VoAPPst message to patient regarding:  Everolimus level 7.5 on 03/11/24, goal 5, dose 2.5 mg BID.     PLAN:   Call Patient and confirm this was an accurate 12-hour trough.   Verify Everolimus dose 2.5 mg BID.   Confirm no new medications or illness.   Confirm no missed doses.      If accurate trough and accurate dose, decrease Everolimus dose to 1.5 mg BID     Repeat labs in tomorrow at appt.

## 2024-03-12 NOTE — TELEPHONE ENCOUNTER
ISSUE:   Everolimus level 7.5 on 03/11/24, goal 5, dose 2.5 mg BID.    PLAN:   Call Patient and confirm this was an accurate 12-hour trough.   Verify Everolimus dose 2.5 mg BID.   Confirm no new medications or illness.   Confirm no missed doses.     If accurate trough and accurate dose, decrease Everolimus dose to 1.5 mg BID    Repeat labs in tomorrow at appt.         OUTCOME:Patient confirmed this was an accurate 12-hour trough.   Verified Everolimus dose 2 mg BID.   Confirmed no new medications or illness.   Confirmed no missed doses.     Patient confirms decrease Everolimus dose to 1 mg BID and repeat labs in tomorrow at appt.

## 2024-03-13 PROBLEM — B27.00 EBV (EPSTEIN-BARR VIRUS) VIREMIA: Status: ACTIVE | Noted: 2024-01-01

## 2024-03-13 PROBLEM — I50.30 (HFPEF) HEART FAILURE WITH PRESERVED EJECTION FRACTION (H): Status: ACTIVE | Noted: 2024-01-01

## 2024-03-13 PROBLEM — N18.31 STAGE 3A CHRONIC KIDNEY DISEASE (H): Status: ACTIVE | Noted: 2022-05-31

## 2024-03-13 PROBLEM — D70.8 OTHER NEUTROPENIA (H): Status: RESOLVED | Noted: 2024-01-01 | Resolved: 2024-01-01

## 2024-03-13 PROBLEM — E83.42 HYPOMAGNESEMIA: Status: ACTIVE | Noted: 2024-01-01

## 2024-03-13 PROBLEM — Z29.89 NEED FOR PNEUMOCYSTIS PROPHYLAXIS: Status: ACTIVE | Noted: 2024-01-01

## 2024-03-13 PROBLEM — N18.9 ANEMIA IN CHRONIC RENAL DISEASE: Status: ACTIVE | Noted: 2024-01-01

## 2024-03-13 PROBLEM — D63.1 ANEMIA IN CHRONIC RENAL DISEASE: Status: ACTIVE | Noted: 2024-01-01

## 2024-03-13 PROBLEM — E87.5 HYPERKALEMIA: Status: RESOLVED | Noted: 2023-01-01 | Resolved: 2024-01-01

## 2024-03-13 NOTE — NURSING NOTE
Chief Complaint   Patient presents with    RECHECK     K/P POST ACUTE TXP NEPH - Post Kidney Transplant follow up, rescheduled per patient from 3/4,       /80 (BP Location: Right arm, Patient Position: Sitting, Cuff Size: Adult Regular)   Pulse 79   Temp 98.5  F (36.9  C) (Oral)   Wt 74.4 kg (164 lb)   SpO2 99%   BMI 24.22 kg/m      Bradford Box CMA on 3/13/2024 at 9:03 AM

## 2024-03-13 NOTE — PATIENT INSTRUCTIONS
Patient Recommendations:  - Restart letermovir 480 mg daily.    Transplant Patient Information  Your Post Transplant Coordinator is: Esla Adrian  For non urgent items, we encourage you to contact your coordinator/care team online via [employer]  You and your care team can also contact your transplant coordinator Monday - Friday, 8am - 5pm at [phone] (Option 2 to reach the coordinator or Option 4 to schedule an appointment).  After hours for urgent matters, please call [facility] at [phone].

## 2024-03-13 NOTE — LETTER
3/13/2024      RE: Matteo Nobleana cristina  [address]  [place] [address]       TRANSPLANT NEPHROLOGY EARLY POST TRANSPLANT VISIT    Assessment & Plan  # DDKT: Stable kidney function with no evidence of FSGS recurrence.   - Baseline Creatinine: ~1.2-1.4   - Proteinuria: Normal (<0.2 grams)   - Date DSA Last Checked: Jan/2024      Latest DSA: No cPRA: 53%   - BK Viremia: No   - Kidney Tx Biopsy: No   - Transplant Ureteral Stent: Removed    # Immunosuppression: Tacrolimus immediate release (goal 8-10) and Everolimus (goal 4-6)   - Induction with Recent Transplant:  High Intensity Protocol due to DGF   - Continue with intensive monitoring of immunosuppression for efficacy and toxicity.   - Changed from mycophenolate to everolimus due to GI symptoms and leukopenia.   - Changes: Not at this time    # Infection Prophylaxis:   - PJP: Inhaled pentamidine, received 1/30/24. Recommend continuing  - CMV:  Letermovir .  Patient is CMV IgG Ab discordant (D+/R-) and will continue on Letermovir x 6 months, then check CMV PCR monthly until 12 months post transplant. Valcyte switched to Letermovir on 1/29/24 due to neutropenia.     # Hypertension: Borderline control;  Goal BP: < 130/80   - Volume status: Euvolemic     - Changes: Not at this time    # Anemia in Chronic Renal Disease: Hgb: Stable    JOSE: No   - Iron studies: Low iron saturation, but high ferritin    # Leukopenia: Stable, moderately low WBC in the ~ 0.8-1.6 range over the last month or so.  Likely medication related.  Patient was changed off mycophenolate and Valcyte due to leukopenia.    # Mineral Bone Disorder:    - Secondary renal hyperparathyroidism; PTH level: Mildly elevated (151-300 pg/ml)        On treatment: None   - Vitamin D; level: Normal        On supplement: No   - Calcium; level: Normal        On supplement: No    # Electrolytes:   - Potassium; level: Normal   On supplement: No  - Magnesium; level: Normal        On supplement: Yes  - Bicarbonate;  level: Low        On supplement: Yes    # EBV Viremia: Trend up, moderately elevated EBV PCR at ~ 40K with last check Mar/2024.  IgG level is normal.   - Will continue to follow EBV PCR every 2 weeks for now with increasing level.    # HFpEF: Last cardiac echo (Feb/2021) showed normal LVEF ~ 60-65% with grade I diastolic dysfunction. Stress echo 07/2023 commented on normal LV function and wall motion.    # Diarrhea/Abdominal Pain: Patient has a h/o C. diff previously, but that was treated and resolved with testing negative recently.  Also taken off mycophenolate.  Now with ongoing symptoms and abdominal pain, felt likely secondary to diverticulitis.  Patient seen by Transplant ID and started on Augmentin.  Plan for CT scan to further evaluate.    # Weight Loss: Patient had considerable weight loss with previous diarrhea due to C. diff, but weight has been stable lately.    # Night Sweats: Only occasional episodes and not new.    # Medical Compliance: Yes    # Health Maintenance and Vaccination Review: Not Reviewed    # Transplant History:  Etiology of Kidney Failure: Focal segmental glomerulosclerosis (FSGS), likely secondary  Tx: DDKT  Transplant: 10/28/2023 (Kidney)  Donor Type: Donation after Brain Death Donor Class:   Crossmatch at time of Tx: negative  DSA at time of Tx: No  Significant changes in immunosuppression: None  CMV IgG Ab High Risk Discordance (D+/R-): Yes  EBV IgG Ab High Risk Discordance (D+/R-): No  Significant transplant-related complications: DGF    Transplant Office Phone Number: [phone]    Assessment and plan was discussed with the patient and he voiced his understanding and agreement.    Return visit: Return for previously scheduled visit.    Marcel Dale MD    I spent a total of 60 minutes on the date of the encounter doing chart review, performing a history and physical exam, completing documentation and any further activities as noted above.    The longitudinal plan of care  for kidney transplant was addressed during this visit. Due to the added complexity in care, I will continue to support Matteo Barnes in the subsequent management of this condition(s) and with the ongoing continuity of care of this condition(s).    Chief Complaint  Mr. Barnes is a 68 year old here for kidney transplant and immunosuppression management.    History of Present Illness   Mr. Barnes reports feeling good overall with some medical complaints.  Since last clinic visit, patient reports no hospitalizations or new medical complaints, although has had some ongoing medical issues.  Patient continues to report left lower abdominal pain since his transplant.  This is on the opposite side from his kidney transplant incision.  The pain was initially pretty bad per reports, associated with diarrhea, but he was then diagnosed with Clostridium difficile colitis.  Following treatment with oral vancomycin, patient's pain improved, as did the diarrhea, but never completely went away.  More recently, over the last couple of weeks, his abdominal pain increased again.  Patient describes the pain as a sharp pain that waxes and wanes.  The pain at this time is ~ 5/10.  Generally worse at night.  No change in the pain with eating or drinking.  Some improvement in the pain following a bowel movement.  Patient was seen by Transplant ID last week and felt he likely had diverticulitis, which he had a previous flare in the past.  He was started on amoxicillin-clavulanate for 14 days.  Starting about 4 days ago, the diarrhea came back with watery stools about 3x per day.  He does take added fiber with Metamucil and also takes probiotics.  Today, however, patient says he had a normal, formed bowel movement.  No fever, sweats or chills.  No bloody or black, tarry stools.  Patient is scheduled for a CT abd/pelvis for tomorrow.    His energy level has been low, although was slowly improving, but decreased again with the recent  diarrhea and ongoing abdominal pain.  He is active and does get some exercise, mostly with walking.  Denies any chest pain or shortness of breath with exertion.  No leg swelling.    Appetite is good, but his weight is down ~ 8 lbs with the recent diarrhea.  Patient's dry weight at time of transplant was ~ 175 lbs.  He then lost ~ 20 lbs with diarrhea and C. diff., although was gaining it back near his baseline, until losing weight again with recent diarrhea.  No nausea or vomiting.  No heartburn symptoms.  Occasional, mild night sweats, which has been stable since prior to the transplant.    Home BP:  130/70s    Problem List  Patient Active Problem List   Diagnosis     Dyslipidemia     HTN, kidney transplant related     Erectile dysfunction     Stage 3a chronic kidney disease (H)     Metabolic acidosis     Immunosuppressed status (H24)     Kidney replaced by transplant     Aftercare following organ transplant     Neutropenia (H24)     SADI (acute kidney injury) (H24)     Need for pneumocystis prophylaxis     Anemia in chronic renal disease     Hypomagnesemia     (HFpEF) heart failure with preserved ejection fraction (H)     EBV (Yaritza-Barr virus) viremia       Allergies  No Known Allergies    Medications  Current Outpatient Medications   Medication Sig     acetaminophen (TYLENOL) 500 MG tablet Take 500-1,000 mg by mouth every 6 hours as needed for mild pain     ALPRAZolam (XANAX) 0.25 MG tablet Take 1 tablet (0.25 mg) by mouth daily as needed for anxiety     amLODIPine (NORVASC) 5 MG tablet Take 1 tablet (5 mg) by mouth daily for 90 days     amoxicillin-clavulanate (AUGMENTIN) 875-125 MG tablet Take 1 tablet by mouth 2 times daily     atorvastatin (LIPITOR) 10 MG tablet Take 1 tablet (10 mg) by mouth daily     carvedilol (COREG) 6.25 MG tablet Take 1 tablet (6.25 mg) by mouth 2 times daily (with meals) Hold for systolic blood pressure < 140.     everolimus (ZORTRESS) 0.5 MG tablet Take 2 tablets (1 mg) by mouth 2  times daily     everolimus (ZORTRESS) 0.75 MG tablet HOLD FOR FUTURE DOSE CHANGES.  Profile Rx: patient will contact pharmacy when needed     Fiber-Vitamins-Minerals ([employer] FIB[employer] GUMMIES) CHEW Take 3 tablets by mouth daily     letermovir (PREVYMIS) 480 MG TABS tablet Take 1 tablet (480 mg) by mouth daily     magnesium oxide (MAG-OX) 400 MG tablet Take 1 tablet (400 mg) by mouth 2 times daily With lunch and supper     Prenatal Vit-Fe Fumarate-FA (PRENATAL MULTIVITAMIN W/IRON) 27-0.8 MG tablet Take 1 tablet by mouth daily     sodium bicarbonate 650 MG tablet Take 2 tablets (1,300 mg) by mouth 2 times daily     tacrolimus (GENERIC) 0.5 MG capsule HOLD     tacrolimus (GENERIC) 1 MG capsule Take 1 capsule (1 mg) by mouth 2 times daily Total dose = 6 mg every 12 hours     tacrolimus (GENERIC) 5 MG capsule Take 1 capsule (5 mg) by mouth 2 times daily Total dose = 6 mg every 12 hours     No current facility-administered medications for this visit.     Medications Discontinued During This Encounter   Medication Reason     methocarbamol (ROBAXIN) 500 MG tablet      letermovir (PREVYMIS) 480 MG TABS tablet        Physical Exam  Vital Signs: /80 (BP Location: Right arm, Patient Position: Sitting, Cuff Size: Adult Regular)   Pulse 79   Temp 98.5  F (36.9  C) (Oral)   Wt 74.4 kg (164 lb)   SpO2 99%   BMI 24.22 kg/m      GENERAL APPEARANCE: alert and no distress  HENT: mouth without ulcers or lesions  RESP: lungs clear to auscultation - no rales, rhonchi or wheezes  CV: regular rhythm, normal rate, no rub, no murmur  EDEMA: no LE edema bilaterally  ABDOMEN: soft, nondistended, mild TTP in LLQ, bowel sounds normal  MS: extremities normal - no gross deformities noted, no evidence of inflammation in joints, no muscle tenderness  SKIN: no rash  TX KIDNEY: normal  DIALYSIS ACCESS:  LUE AV fistula with good thrill    Data        Latest Ref Rng & Units 3/13/2024     8:50 AM 3/8/2024    10:13 AM 3/1/2024     9:45 AM   Renal    Sodium 135 - 145 mmol/L 138  137  136    K 3.4 - 5.3 mmol/L 4.7  4.8  5.1    Cl 98 - 107 mmol/L 106  104  106    Cl (external) 98 - 107 mmol/L 106  104  106    CO2 22 - 29 mmol/L 22  23  21    Urea Nitrogen 8.0 - 23.0 mg/dL 27.0  21.1  40.9    Creatinine 0.67 - 1.17 mg/dL 1.85  1.42  1.52    Glucose 70 - 99 mg/dL 109  92  111    Calcium 8.8 - 10.2 mg/dL 9.3  9.2  9.1    Magnesium 1.7 - 2.3 mg/dL   2.0          Latest Ref Rng & Units 3/1/2024     9:45 AM 2/2/2024     9:37 AM 1/29/2024     6:46 AM   Bone Health   Phosphorus 2.5 - 4.5 mg/dL 3.1  2.2  2.8          Latest Ref Rng & Units 3/13/2024     8:50 AM 3/8/2024    10:13 AM 3/1/2024     9:44 AM   Heme   WBC 4.0 - 11.0 10e3/uL 1.6  1.6  2.5    Hgb 13.3 - 17.7 g/dL 9.8  10.0  10.7    Plt 150 - 450 10e3/uL 267  151  126    ABSOLUTE NEUTROPHIL 1.6 - 8.3 10e3/uL 0.8  1.1  2.0    ABSOLUTE LYMPHOCYTES 0.8 - 5.3 10e3/uL 0.2  0.2  0.3    ABSOLUTE MONOCYTES 0.0 - 1.3 10e3/uL 0.5  0.2  0.2    ABSOLUTE EOSINOPHILS 0.0 - 0.7 10e3/uL 0.1  0.0  0.1          Latest Ref Rng & Units 1/25/2024    10:31 PM 11/16/2023     9:05 AM 10/28/2023     4:06 AM   Liver   AP 40 - 150 U/L 139   57    TBili <=1.2 mg/dL 0.4   0.3    Bilirubin Direct 0.00 - 0.30 mg/dL <0.20      ALT 0 - 70 U/L 41   12    AST 0 - 45 U/L 35   13    Tot Protein 6.4 - 8.3 g/dL 7.7   7.2    Albumin 3.5 - 5.2 g/dL 4.5  3.9  4.5          Latest Ref Rng & Units 10/28/2023     4:07 AM 10/16/2021     9:42 AM 3/20/2018     4:09 PM   Pancreas   A1C <5.7 % 5.0   4.9    Lipase 73 - 393 U/L  461           Latest Ref Rng & Units 3/13/2024     8:50 AM 2/18/2024     9:37 AM 12/25/2023     9:19 AM   Iron studies   Iron 61 - 157 ug/dL 17  60  80    Iron Sat Index 15 - 46 % 8  21  35    Ferritin 31 - 409 ng/mL 2,283  4,023  919          Latest Ref Rng & Units 1/28/2024     7:29 AM 12/14/2023    10:54 AM 10/28/2023     4:06 AM   [facility] Txp Virology   LOG IU/ML OF CMVQNT   <1.5     EBV CAPSID ANTIBODY IGG No detectable antibody.   Positive     EBV DNA LOG OF COPIES  4.1        Failed to redirect to the Timeline version of the REVFS SmartLink.  Recent Labs   Lab Test 02/25/24  0941 03/01/24  0945 03/08/24  1013   DOSTAC 2/24/2024 2/29/2024 3/7/2024   TACROL 11.2 8.3 10.1     Recent Labs   Lab Test 12/14/23  1018 12/21/23  0738 01/02/24  1000 01/30/24  0847 01/31/24  0922   DOSMPA 12/13/2023   8:00 PM  --   --   --  1/30/2024   8:00 PM   MPACID 1.09   < > 0.67* 0.38* 0.35*   MPAG 105.7*   < > 43.0 22.0* 15.7*    < > = values in this interval not displayed.       Marcel Dale MD

## 2024-03-13 NOTE — PROGRESS NOTES
TRANSPLANT NEPHROLOGY EARLY POST TRANSPLANT VISIT    Assessment & Plan   # DDKT: Stable kidney function with no evidence of FSGS recurrence.   - Baseline Creatinine: ~1.2-1.4   - Proteinuria: Normal (<0.2 grams)   - Date DSA Last Checked: Jan/2024      Latest DSA: No cPRA: 53%   - BK Viremia: No   - Kidney Tx Biopsy: No   - Transplant Ureteral Stent: Removed    # Immunosuppression: Tacrolimus immediate release (goal 8-10) and Everolimus (goal 4-6)   - Induction with Recent Transplant:  High Intensity Protocol due to DGF   - Continue with intensive monitoring of immunosuppression for efficacy and toxicity.   - Changed from mycophenolate to everolimus due to GI symptoms and leukopenia.   - Changes: Not at this time    # Infection Prophylaxis:   - PJP: Inhaled pentamidine, received 1/30/24. Recommend continuing  - CMV:  Letermovir .  Patient is CMV IgG Ab discordant (D+/R-) and will continue on Letermovir x 6 months, then check CMV PCR monthly until 12 months post transplant. Valcyte switched to Letermovir on 1/29/24 due to neutropenia.     # Hypertension: Borderline control;  Goal BP: < 130/80   - Volume status: Euvolemic     - Changes: Not at this time    # Anemia in Chronic Renal Disease: Hgb: Stable    JOSE: No   - Iron studies: Low iron saturation, but high ferritin    # Leukopenia: Stable, moderately low WBC in the ~ 0.8-1.6 range over the last month or so.  Likely medication related.  Patient was changed off mycophenolate and Valcyte due to leukopenia.    # Mineral Bone Disorder:    - Secondary renal hyperparathyroidism; PTH level: Mildly elevated (151-300 pg/ml)        On treatment: None   - Vitamin D; level: Normal        On supplement: No   - Calcium; level: Normal        On supplement: No    # Electrolytes:   - Potassium; level: Normal   On supplement: No  - Magnesium; level: Normal        On supplement: Yes  - Bicarbonate; level: Low        On supplement: Yes    # EBV Viremia: Trend up, moderately elevated  EBV PCR at ~ 40K with last check Mar/2024.  IgG level is normal.   - Will continue to follow EBV PCR every 2 weeks for now with increasing level.    # HFpEF: Last cardiac echo (Feb/2021) showed normal LVEF ~ 60-65% with grade I diastolic dysfunction. Stress echo 07/2023 commented on normal LV function and wall motion.    # Diarrhea/Abdominal Pain: Patient has a h/o C. diff previously, but that was treated and resolved with testing negative recently.  Also taken off mycophenolate.  Now with ongoing symptoms and abdominal pain, felt likely secondary to diverticulitis.  Patient seen by Transplant ID and started on Augmentin.  Plan for CT scan to further evaluate.    # Weight Loss: Patient had considerable weight loss with previous diarrhea due to C. diff, but weight has been stable lately.    # Night Sweats: Only occasional episodes and not new.    # Medical Compliance: Yes    # Health Maintenance and Vaccination Review: Not Reviewed    # Transplant History:  Etiology of Kidney Failure: Focal segmental glomerulosclerosis (FSGS), likely secondary  Tx: DDKT  Transplant: 10/28/2023 (Kidney)  Donor Type: Donation after Brain Death Donor Class:   Crossmatch at time of Tx: negative  DSA at time of Tx: No  Significant changes in immunosuppression: None  CMV IgG Ab High Risk Discordance (D+/R-): Yes  EBV IgG Ab High Risk Discordance (D+/R-): No  Significant transplant-related complications: DGF    Transplant Office Phone Number: [phone]    Assessment and plan was discussed with the patient and he voiced his understanding and agreement.    Return visit: Return for previously scheduled visit.    Marcel Dale MD    I spent a total of 60 minutes on the date of the encounter doing chart review, performing a history and physical exam, completing documentation and any further activities as noted above.    The longitudinal plan of care for kidney transplant was addressed during this visit. Due to the added complexity in  care, I will continue to support Matteo Barnes in the subsequent management of this condition(s) and with the ongoing continuity of care of this condition(s).    Chief Complaint   Mr. Barnes is a 68 year old here for kidney transplant and immunosuppression management.    History of Present Illness    Mr. Barnes reports feeling good overall with some medical complaints.  Since last clinic visit, patient reports no hospitalizations or new medical complaints, although has had some ongoing medical issues.  Patient continues to report left lower abdominal pain since his transplant.  This is on the opposite side from his kidney transplant incision.  The pain was initially pretty bad per reports, associated with diarrhea, but he was then diagnosed with Clostridium difficile colitis.  Following treatment with oral vancomycin, patient's pain improved, as did the diarrhea, but never completely went away.  More recently, over the last couple of weeks, his abdominal pain increased again.  Patient describes the pain as a sharp pain that waxes and wanes.  The pain at this time is ~ 5/10.  Generally worse at night.  No change in the pain with eating or drinking.  Some improvement in the pain following a bowel movement.  Patient was seen by Transplant ID last week and felt he likely had diverticulitis, which he had a previous flare in the past.  He was started on amoxicillin-clavulanate for 14 days.  Starting about 4 days ago, the diarrhea came back with watery stools about 3x per day.  He does take added fiber with Metamucil and also takes probiotics.  Today, however, patient says he had a normal, formed bowel movement.  No fever, sweats or chills.  No bloody or black, tarry stools.  Patient is scheduled for a CT abd/pelvis for tomorrow.    His energy level has been low, although was slowly improving, but decreased again with the recent diarrhea and ongoing abdominal pain.  He is active and does get some exercise, mostly  with walking.  Denies any chest pain or shortness of breath with exertion.  No leg swelling.    Appetite is good, but his weight is down ~ 8 lbs with the recent diarrhea.  Patient's dry weight at time of transplant was ~ 175 lbs.  He then lost ~ 20 lbs with diarrhea and C. diff., although was gaining it back near his baseline, until losing weight again with recent diarrhea.  No nausea or vomiting.  No heartburn symptoms.  Occasional, mild night sweats, which has been stable since prior to the transplant.    Home BP:  130/70s    Problem List   Patient Active Problem List   Diagnosis    Dyslipidemia    HTN, kidney transplant related    Erectile dysfunction    Stage 3a chronic kidney disease (H)    Metabolic acidosis    Immunosuppressed status (H24)    Kidney replaced by transplant    Aftercare following organ transplant    Neutropenia (H24)    SADI (acute kidney injury) (H24)    Need for pneumocystis prophylaxis    Anemia in chronic renal disease    Hypomagnesemia    (HFpEF) heart failure with preserved ejection fraction (H)    EBV (Yaritza-Barr virus) viremia       Allergies   No Known Allergies    Medications   Current Outpatient Medications   Medication Sig    acetaminophen (TYLENOL) 500 MG tablet Take 500-1,000 mg by mouth every 6 hours as needed for mild pain    ALPRAZolam (XANAX) 0.25 MG tablet Take 1 tablet (0.25 mg) by mouth daily as needed for anxiety    amLODIPine (NORVASC) 5 MG tablet Take 1 tablet (5 mg) by mouth daily for 90 days    amoxicillin-clavulanate (AUGMENTIN) 875-125 MG tablet Take 1 tablet by mouth 2 times daily    atorvastatin (LIPITOR) 10 MG tablet Take 1 tablet (10 mg) by mouth daily    carvedilol (COREG) 6.25 MG tablet Take 1 tablet (6.25 mg) by mouth 2 times daily (with meals) Hold for systolic blood pressure < 140.    everolimus (ZORTRESS) 0.5 MG tablet Take 2 tablets (1 mg) by mouth 2 times daily    everolimus (ZORTRESS) 0.75 MG tablet HOLD FOR FUTURE DOSE CHANGES.  Profile Rx: patient  will contact pharmacy when needed    Fiber-Vitamins-Minerals ([employer] FIBER GUMMIES) CHEW Take 3 tablets by mouth daily    letermovir (PREVYMIS) 480 MG TABS tablet Take 1 tablet (480 mg) by mouth daily    magnesium oxide (MAG-OX) 400 MG tablet Take 1 tablet (400 mg) by mouth 2 times daily With lunch and supper    Prenatal Vit-Fe Fumarate-FA (PRENATAL MULTIVITAMIN W/IRON) 27-0.8 MG tablet Take 1 tablet by mouth daily    sodium bicarbonate 650 MG tablet Take 2 tablets (1,300 mg) by mouth 2 times daily    tacrolimus (GENERIC) 0.5 MG capsule HOLD    tacrolimus (GENERIC) 1 MG capsule Take 1 capsule (1 mg) by mouth 2 times daily Total dose = 6 mg every 12 hours    tacrolimus (GENERIC) 5 MG capsule Take 1 capsule (5 mg) by mouth 2 times daily Total dose = 6 mg every 12 hours     No current facility-administered medications for this visit.     Medications Discontinued During This Encounter   Medication Reason    methocarbamol (ROBAXIN) 500 MG tablet     letermovir (PREVYMIS) 480 MG TABS tablet        Physical Exam   Vital Signs: /80 (BP Location: Right arm, Patient Position: Sitting, Cuff Size: Adult Regular)   Pulse 79   Temp 98.5  F (36.9  C) (Oral)   Wt 74.4 kg (164 lb)   SpO2 99%   BMI 24.22 kg/m      GENERAL APPEARANCE: alert and no distress  HENT: mouth without ulcers or lesions  RESP: lungs clear to auscultation - no rales, rhonchi or wheezes  CV: regular rhythm, normal rate, no rub, no murmur  EDEMA: no LE edema bilaterally  ABDOMEN: soft, nondistended, mild TTP in LLQ, bowel sounds normal  MS: extremities normal - no gross deformities noted, no evidence of inflammation in joints, no muscle tenderness  SKIN: no rash  TX KIDNEY: normal  DIALYSIS ACCESS:  LUE AV fistula with good thrill    Data         Latest Ref Rng & Units 3/13/2024     8:50 AM 3/8/2024    10:13 AM 3/1/2024     9:45 AM   Renal   Sodium 135 - 145 mmol/L 138  137  136    K 3.4 - 5.3 mmol/L 4.7  4.8  5.1    Cl 98 - 107 mmol/L 106  104  106    Cl  (external) 98 - 107 mmol/L 106  104  106    CO2 22 - 29 mmol/L 22  23  21    Urea Nitrogen 8.0 - 23.0 mg/dL 27.0  21.1  40.9    Creatinine 0.67 - 1.17 mg/dL 1.85  1.42  1.52    Glucose 70 - 99 mg/dL 109  92  111    Calcium 8.8 - 10.2 mg/dL 9.3  9.2  9.1    Magnesium 1.7 - 2.3 mg/dL   2.0          Latest Ref Rng & Units 3/1/2024     9:45 AM 2/2/2024     9:37 AM 1/29/2024     6:46 AM   Bone Health   Phosphorus 2.5 - 4.5 mg/dL 3.1  2.2  2.8          Latest Ref Rng & Units 3/13/2024     8:50 AM 3/8/2024    10:13 AM 3/1/2024     9:44 AM   Heme   WBC 4.0 - 11.0 10e3/uL 1.6  1.6  2.5    Hgb 13.3 - 17.7 g/dL 9.8  10.0  10.7    Plt 150 - 450 10e3/uL 267  151  126    ABSOLUTE NEUTROPHIL 1.6 - 8.3 10e3/uL 0.8  1.1  2.0    ABSOLUTE LYMPHOCYTES 0.8 - 5.3 10e3/uL 0.2  0.2  0.3    ABSOLUTE MONOCYTES 0.0 - 1.3 10e3/uL 0.5  0.2  0.2    ABSOLUTE EOSINOPHILS 0.0 - 0.7 10e3/uL 0.1  0.0  0.1          Latest Ref Rng & Units 1/25/2024    10:31 PM 11/16/2023     9:05 AM 10/28/2023     4:06 AM   Liver   AP 40 - 150 U/L 139   57    TBili <=1.2 mg/dL 0.4   0.3    Bilirubin Direct 0.00 - 0.30 mg/dL <0.20      ALT 0 - 70 U/L 41   12    AST 0 - 45 U/L 35   13    Tot Protein 6.4 - 8.3 g/dL 7.7   7.2    Albumin 3.5 - 5.2 g/dL 4.5  3.9  4.5          Latest Ref Rng & Units 10/28/2023     4:07 AM 10/16/2021     9:42 AM 3/20/2018     4:09 PM   Pancreas   A1C <5.7 % 5.0   4.9    Lipase 73 - 393 U/L  461           Latest Ref Rng & Units 3/13/2024     8:50 AM 2/18/2024     9:37 AM 12/25/2023     9:19 AM   Iron studies   Iron 61 - 157 ug/dL 17  60  80    Iron Sat Index 15 - 46 % 8  21  35    Ferritin 31 - 409 ng/mL 2,283  4,023  919          Latest Ref Rng & Units 1/28/2024     7:29 AM 12/14/2023    10:54 AM 10/28/2023     4:06 AM   [facility]   LOG IU/ML OF CMVQNT   <1.5     EBV CAPSID ANTIBODY IGG No detectable antibody.   Positive    EBV DNA LOG OF COPIES  4.1        Failed to redirect to the Timeline version of the [employer] SmartLink.  Recent Labs    Lab Test 02/25/24  0941 03/01/24  0945 03/08/24  1013   DOSTAC 2/24/2024 2/29/2024 3/7/2024   TACROL 11.2 8.3 10.1     Recent Labs   Lab Test 12/14/23  1018 12/21/23  0738 01/02/24  1000 01/30/24  0847 01/31/24  0922   DOSMPA 12/13/2023   8:00 PM  --   --   --  1/30/2024   8:00 PM   MPACID 1.09   < > 0.67* 0.38* 0.35*   MPAG 105.7*   < > 43.0 22.0* 15.7*    < > = values in this interval not displayed.

## 2024-03-13 NOTE — LETTER
3/13/2024         RE: Matteo Barnes  [address]  [place] [address]        Dear Colleague,    Thank you for referring your patient, Matteo Barnes, to the [facility]. Please see a copy of my visit note below.    TRANSPLANT NEPHROLOGY EARLY POST TRANSPLANT VISIT    Assessment & Plan  # DDKT: Stable kidney function with no evidence of FSGS recurrence.   - Baseline Creatinine: ~1.2-1.4   - Proteinuria: Normal (<0.2 grams)   - Date DSA Last Checked: Jan/2024      Latest DSA: No cPRA: 53%   - BK Viremia: No   - Kidney Tx Biopsy: No   - Transplant Ureteral Stent: Removed    # Immunosuppression: Tacrolimus immediate release (goal 8-10) and Everolimus (goal 4-6)   - Induction with Recent Transplant:  High Intensity Protocol due to DGF   - Continue with intensive monitoring of immunosuppression for efficacy and toxicity.   - Changed from mycophenolate to everolimus due to GI symptoms and leukopenia.   - Changes: Not at this time    # Infection Prophylaxis:   - PJP: Inhaled pentamidine, received 1/30/24. Recommend continuing  - CMV:  Letermovir .  Patient is CMV IgG Ab discordant (D+/R-) and will continue on Letermovir x 6 months, then check CMV PCR monthly until 12 months post transplant. Valcyte switched to Letermovir on 1/29/24 due to neutropenia.     # Hypertension: Borderline control;  Goal BP: < 130/80   - Volume status: Euvolemic     - Changes: Not at this time    # Anemia in Chronic Renal Disease: Hgb: Stable    JOSE: No   - Iron studies: Low iron saturation, but high ferritin    # Leukopenia: Stable, moderately low WBC in the ~ 0.8-1.6 range over the last month or so.  Likely medication related.  Patient was changed off mycophenolate and Valcyte due to leukopenia.    # Mineral Bone Disorder:    - Secondary renal hyperparathyroidism; PTH level: Mildly elevated (151-300 pg/ml)        On treatment: None   - Vitamin D; level: Normal        On supplement: No   - Calcium; level:  Normal        On supplement: No    # Electrolytes:   - Potassium; level: Normal   On supplement: No  - Magnesium; level: Normal        On supplement: Yes  - Bicarbonate; level: Low        On supplement: Yes    # EBV Viremia: Trend up, moderately elevated EBV PCR at ~ 40K with last check Mar/2024.  IgG level is normal.   - Will continue to follow EBV PCR every 2 weeks for now with increasing level.    # HFpEF: Last cardiac echo (Feb/2021) showed normal LVEF ~ 60-65% with grade I diastolic dysfunction. Stress echo 07/2023 commented on normal LV function and wall motion.    # Diarrhea/Abdominal Pain: Patient has a h/o C. diff previously, but that was treated and resolved with testing negative recently.  Also taken off mycophenolate.  Now with ongoing symptoms and abdominal pain, felt likely secondary to diverticulitis.  Patient seen by Transplant ID and started on Augmentin.  Plan for CT scan to further evaluate.    # Weight Loss: Patient had considerable weight loss with previous diarrhea due to C. diff, but weight has been stable lately.    # Night Sweats: Only occasional episodes and not new.    # Medical Compliance: Yes    # Health Maintenance and Vaccination Review: Not Reviewed    # Transplant History:  Etiology of Kidney Failure: Focal segmental glomerulosclerosis (FSGS), likely secondary  Tx: DDKT  Transplant: 10/28/2023 (Kidney)  Donor Type: Donation after Brain Death Donor Class:   Crossmatch at time of Tx: negative  DSA at time of Tx: No  Significant changes in immunosuppression: None  CMV IgG Ab High Risk Discordance (D+/R-): Yes  EBV IgG Ab High Risk Discordance (D+/R-): No  Significant transplant-related complications: DGF    Transplant Office Phone Number: [phone]    Assessment and plan was discussed with the patient and he voiced his understanding and agreement.    Return visit: Return for previously scheduled visit.    Marcel Dale MD    I spent a total of 60 minutes on the date of the  encounter doing chart review, performing a history and physical exam, completing documentation and any further activities as noted above.    The longitudinal plan of care for kidney transplant was addressed during this visit. Due to the added complexity in care, I will continue to support Matteo Barnes in the subsequent management of this condition(s) and with the ongoing continuity of care of this condition(s).    Chief Complaint  Mr. Barnes is a 68 year old here for kidney transplant and immunosuppression management.    History of Present Illness   Mr. Branes reports feeling good overall with some medical complaints.  Since last clinic visit, patient reports no hospitalizations or new medical complaints, although has had some ongoing medical issues.  Patient continues to report left lower abdominal pain since his transplant.  This is on the opposite side from his kidney transplant incision.  The pain was initially pretty bad per reports, associated with diarrhea, but he was then diagnosed with Clostridium difficile colitis.  Following treatment with oral vancomycin, patient's pain improved, as did the diarrhea, but never completely went away.  More recently, over the last couple of weeks, his abdominal pain increased again.  Patient describes the pain as a sharp pain that waxes and wanes.  The pain at this time is ~ 5/10.  Generally worse at night.  No change in the pain with eating or drinking.  Some improvement in the pain following a bowel movement.  Patient was seen by Transplant ID last week and felt he likely had diverticulitis, which he had a previous flare in the past.  He was started on amoxicillin-clavulanate for 14 days.  Starting about 4 days ago, the diarrhea came back with watery stools about 3x per day.  He does take added fiber with Metamucil and also takes probiotics.  Today, however, patient says he had a normal, formed bowel movement.  No fever, sweats or chills.  No bloody or black, tarry  stools.  Patient is scheduled for a CT abd/pelvis for tomorrow.    His energy level has been low, although was slowly improving, but decreased again with the recent diarrhea and ongoing abdominal pain.  He is active and does get some exercise, mostly with walking.  Denies any chest pain or shortness of breath with exertion.  No leg swelling.    Appetite is good, but his weight is down ~ 8 lbs with the recent diarrhea.  Patient's dry weight at time of transplant was ~ 175 lbs.  He then lost ~ 20 lbs with diarrhea and C. diff., although was gaining it back near his baseline, until losing weight again with recent diarrhea.  No nausea or vomiting.  No heartburn symptoms.  Occasional, mild night sweats, which has been stable since prior to the transplant.    Home BP:  130/70s    Problem List  Patient Active Problem List   Diagnosis     Dyslipidemia     HTN, kidney transplant related     Erectile dysfunction     Stage 3a chronic kidney disease (H)     Metabolic acidosis     Immunosuppressed status (H24)     Kidney replaced by transplant     Aftercare following organ transplant     Neutropenia (H24)     SADI (acute kidney injury) (H24)     Need for pneumocystis prophylaxis     Anemia in chronic renal disease     Hypomagnesemia     (HFpEF) heart failure with preserved ejection fraction (H)     EBV (Yaritza-Barr virus) viremia       Allergies  No Known Allergies    Medications  Current Outpatient Medications   Medication Sig     acetaminophen (TYLENOL) 500 MG tablet Take 500-1,000 mg by mouth every 6 hours as needed for mild pain     ALPRAZolam (XANAX) 0.25 MG tablet Take 1 tablet (0.25 mg) by mouth daily as needed for anxiety     amLODIPine (NORVASC) 5 MG tablet Take 1 tablet (5 mg) by mouth daily for 90 days     amoxicillin-clavulanate (AUGMENTIN) 875-125 MG tablet Take 1 tablet by mouth 2 times daily     atorvastatin (LIPITOR) 10 MG tablet Take 1 tablet (10 mg) by mouth daily     carvedilol (COREG) 6.25 MG tablet Take 1  tablet (6.25 mg) by mouth 2 times daily (with meals) Hold for systolic blood pressure < 140.     everolimus (ZORTRESS) 0.5 MG tablet Take 2 tablets (1 mg) by mouth 2 times daily     everolimus (ZORTRESS) 0.75 MG tablet HOLD FOR FUTURE DOSE CHANGES.  Profile Rx: patient will contact pharmacy when needed     Fiber-Vitamins-Minerals ([employer] FIBER GUMMIES) CHEW Take 3 tablets by mouth daily     letermovir (PREVYMIS) 480 MG TABS tablet Take 1 tablet (480 mg) by mouth daily     magnesium oxide (MAG-OX) 400 MG tablet Take 1 tablet (400 mg) by mouth 2 times daily With lunch and supper     Prenatal Vit-Fe Fumarate-FA (PRENATAL MULTIVITAMIN W/IRON) 27-0.8 MG tablet Take 1 tablet by mouth daily     sodium bicarbonate 650 MG tablet Take 2 tablets (1,300 mg) by mouth 2 times daily     tacrolimus (GENERIC) 0.5 MG capsule HOLD     tacrolimus (GENERIC) 1 MG capsule Take 1 capsule (1 mg) by mouth 2 times daily Total dose = 6 mg every 12 hours     tacrolimus (GENERIC) 5 MG capsule Take 1 capsule (5 mg) by mouth 2 times daily Total dose = 6 mg every 12 hours     No current facility-administered medications for this visit.     Medications Discontinued During This Encounter   Medication Reason     methocarbamol (ROBAXIN) 500 MG tablet      letermovir (PREVYMIS) 480 MG TABS tablet        Physical Exam  Vital Signs: /80 (BP Location: Right arm, Patient Position: Sitting, Cuff Size: Adult Regular)   Pulse 79   Temp 98.5  F (36.9  C) (Oral)   Wt 74.4 kg (164 lb)   SpO2 99%   BMI 24.22 kg/m      GENERAL APPEARANCE: alert and no distress  HENT: mouth without ulcers or lesions  RESP: lungs clear to auscultation - no rales, rhonchi or wheezes  CV: regular rhythm, normal rate, no rub, no murmur  EDEMA: no LE edema bilaterally  ABDOMEN: soft, nondistended, mild TTP in LLQ, bowel sounds normal  MS: extremities normal - no gross deformities noted, no evidence of inflammation in joints, no muscle tenderness  SKIN: no rash  TX KIDNEY:  normal  DIALYSIS ACCESS:  LUE AV fistula with good thrill    Data        Latest Ref Rng & Units 3/13/2024     8:50 AM 3/8/2024    10:13 AM 3/1/2024     9:45 AM   Renal   Sodium 135 - 145 mmol/L 138  137  136    K 3.4 - 5.3 mmol/L 4.7  4.8  5.1    Cl 98 - 107 mmol/L 106  104  106    Cl (external) 98 - 107 mmol/L 106  104  106    CO2 22 - 29 mmol/L 22  23  21    Urea Nitrogen 8.0 - 23.0 mg/dL 27.0  21.1  40.9    Creatinine 0.67 - 1.17 mg/dL 1.85  1.42  1.52    Glucose 70 - 99 mg/dL 109  92  111    Calcium 8.8 - 10.2 mg/dL 9.3  9.2  9.1    Magnesium 1.7 - 2.3 mg/dL   2.0          Latest Ref Rng & Units 3/1/2024     9:45 AM 2/2/2024     9:37 AM 1/29/2024     6:46 AM   Bone Health   Phosphorus 2.5 - 4.5 mg/dL 3.1  2.2  2.8          Latest Ref Rng & Units 3/13/2024     8:50 AM 3/8/2024    10:13 AM 3/1/2024     9:44 AM   Heme   WBC 4.0 - 11.0 10e3/uL 1.6  1.6  2.5    Hgb 13.3 - 17.7 g/dL 9.8  10.0  10.7    Plt 150 - 450 10e3/uL 267  151  126    ABSOLUTE NEUTROPHIL 1.6 - 8.3 10e3/uL 0.8  1.1  2.0    ABSOLUTE LYMPHOCYTES 0.8 - 5.3 10e3/uL 0.2  0.2  0.3    ABSOLUTE MONOCYTES 0.0 - 1.3 10e3/uL 0.5  0.2  0.2    ABSOLUTE EOSINOPHILS 0.0 - 0.7 10e3/uL 0.1  0.0  0.1          Latest Ref Rng & Units 1/25/2024    10:31 PM 11/16/2023     9:05 AM 10/28/2023     4:06 AM   Liver   AP 40 - 150 U/L 139   57    TBili <=1.2 mg/dL 0.4   0.3    Bilirubin Direct 0.00 - 0.30 mg/dL <0.20      ALT 0 - 70 U/L 41   12    AST 0 - 45 U/L 35   13    Tot Protein 6.4 - 8.3 g/dL 7.7   7.2    Albumin 3.5 - 5.2 g/dL 4.5  3.9  4.5          Latest Ref Rng & Units 10/28/2023     4:07 AM 10/16/2021     9:42 AM 3/20/2018     4:09 PM   Pancreas   A1C <5.7 % 5.0   4.9    Lipase 73 - 393 U/L  461           Latest Ref Rng & Units 3/13/2024     8:50 AM 2/18/2024     9:37 AM 12/25/2023     9:19 AM   Iron studies   Iron 61 - 157 ug/dL 17  60  80    Iron Sat Index 15 - 46 % 8  21  35    Ferritin 31 - 409 ng/mL 2,283  4,023  919          Latest Ref Rng & Units 1/28/2024      7:29 AM 12/14/2023    10:54 AM 10/28/2023     4:06 AM   [facility] Txp Virology   LOG IU/ML OF CMVQNT   <1.5     EBV CAPSID ANTIBODY IGG No detectable antibody.   Positive    EBV DNA LOG OF COPIES  4.1        Failed to redirect to the Timeline version of the REVFS SmartLink.  Recent Labs   Lab Test 02/25/24  0941 03/01/24  0945 03/08/24  1013   DOSTAC 2/24/2024 2/29/2024 3/7/2024   TACROL 11.2 8.3 10.1     Recent Labs   Lab Test 12/14/23  1018 12/21/23  0738 01/02/24  1000 01/30/24  0847 01/31/24  0922   DOSMPA 12/13/2023   8:00 PM  --   --   --  1/30/2024   8:00 PM   MPACID 1.09   < > 0.67* 0.38* 0.35*   MPAG 105.7*   < > 43.0 22.0* 15.7*    < > = values in this interval not displayed.         Again, thank you for allowing me to participate in the care of your patient.        Sincerely,        Marcel Dale MD

## 2024-03-14 NOTE — TELEPHONE ENCOUNTER
Patient Call: General  Route to LPN    Reason for call:  Gisel from [facility]  called in regards of patient medication and needing clarification. More details with call back.     Call back needed? Yes    Return Call Needed  Same as documented in contacts section  When to return call?: Same day: Route High Priority

## 2024-03-14 NOTE — TELEPHONE ENCOUNTER
Returned call to Gisel at [facility] pharmacy.  Clarified the dose of everolimus should be 1.5 mg BID.  Rx updated.  Spoke with Matteo to confirm he understood and was taking the correct dose, patient confirmed 1.5 mg twice daily, and repeating labs Friday.

## 2024-03-15 NOTE — TELEPHONE ENCOUNTER
"RNCC spoke with pt who is requesting refill on Augmentin or \"something stronger\" for his abd pain d/t diverticulitis. RNCC provided pt with txp ID clinic number as they are originating provider.     Re: CT results. Pt instructed to proceed to ED if his symptoms worsen per Dr. Dale's recommendation  "

## 2024-03-15 NOTE — TELEPHONE ENCOUNTER
Patient Call: General  Route to LPN    Reason for call: Matteo has questions for Coordinator, regarding CT he had yesterday, no other details was provided to this writer.    Call back needed? Yes    Return Call Needed  Same as documented in contacts section  When to return call?: Same day: Route High Priority

## 2024-03-17 NOTE — PROGRESS NOTES
Infusion Nursing Note:  Matteo Barnes presents today for Labs + Filgrastim.    Patient seen by provider today: No   present during visit today: Not Applicable.    Note: Matteo reports he is feeling slightly better today.  He reports that he continues to have diarrhea, approximately 5-6 watery stools per day.  He reports that he experiences significant abdominal pain and diarrhea after eating anything.  Despite this, he reports he has been able to keep up on his fluid intake, drinking approximately 2L of water per day.  Weight loss noted, down to 69.4 kg from 76 kg on 1/2/24.    Labs drawn per DEMETRIUS Hunter's request: CBC w/diff, BMP, CMV PCR, IgG, and TCell Subset.    Patient was sent home with stool collection containers for C.diff PCR and Enteric Bacteria and Virus panel.  Brief education provided, but patient reports he is familiar with the collection process as he has done this many times before.    In-basket sent to Dr. Jose Armando Quiroga and DEMETRIUS Hunter regarding critical WBC 0.5, consistent with previous results.  Also notified of patient's elevated Creatinine, ongoing diarrhea, abdominal pain, poor PO intake, and weight loss.    Intravenous Access:  Lab draw site right AC, Needle type butterfly, Gauge 23.  Labs drawn without difficulty.    Treatment Conditions:   01/25/24 14:34   WBC 0.5 (LL)   **Per lab, unable to complete ANC via manual differential due to critical low WBC.     Results reviewed, labs MET treatment parameters, ok to proceed with treatment.    Post Infusion Assessment:  Patient tolerated injection without incident.     Discharge Plan:   Discharge instructions reviewed with: Patient.  Patient and/or family verbalized understanding of discharge instructions and all questions answered.  AVS to patient via Ex[employer] Apps.  Patient will follow-up with ordering provider.   Patient discharged in stable condition accompanied by: self.  Departure Mode: Ambulatory.      Blaine Cruz RN  
stated

## 2024-03-18 NOTE — TELEPHONE ENCOUNTER
As of 3/15/24 change the augmentin to cipro 500 mg po bid (1 hour apart from magnesium, vitamin, dairy, calcium)  + flagyl 500 mg po tid with food for 2 weeks.   Pls schedule follow up with me in 3 weeks, virtual visit ok.   Thanks   Joanna Alvarado the refill of Augmentin requested by pharmacy.     Alton Navarrete RN  Infectious Disease on 3/18/2024 at 9:04 AM

## 2024-03-18 NOTE — TELEPHONE ENCOUNTER
Called patient back just wanted to know if the medications sent were antibiotics.   Answered questions and patient verbalized understanding.     Alton Navarrete RN  Infectious Disease on 3/18/2024 at 9:50 AM

## 2024-03-18 NOTE — TELEPHONE ENCOUNTER
Patient called regarding his labs he did yesterday looks like all did not get done. Patient had labs done at [facility].

## 2024-03-19 NOTE — PROGRESS NOTES
"  Assessment & Plan     Uvulitis  Treat as below given low immunity and risk factors. Verified Cr clearance dosing.   - amoxicillin-clavulanate (AUGMENTIN) 875-125 MG tablet  Dispense: 20 tablet; Refill: 0    Sore throat  - Influenza A/B antigen  - Streptococcus A Rapid Screen w/Reflex to PCR - Clinic Collect  - Symptomatic COVID-19 Virus (Coronavirus) by PCR Nose  - Group A Streptococcus PCR Throat Swab              BMI  Estimated body mass index is 24.07 kg/m  as calculated from the following:    Height as of 3/6/24: 1.753 m (5' 9\").    Weight as of this encounter: 73.9 kg (163 lb).             Maximino Felipe is a 68 year old, presenting for the following health issues:  Pharyngitis      3/19/2024     1:54 PM   Additional Questions   Roomed by alex cook     Pharyngitis   This is a new problem. The current episode started more than 2 days ago. The problem has not changed since onset.There has been no fever. Associated symptoms include congestion, ear pain, headaches, trouble swallowing and cough.   History of Present Illness       Reason for visit:  Sorethrot    He eats 2-3 servings of fruits and vegetables daily.He consumes 1 sweetened beverage(s) daily.He exercises with enough effort to increase his heart rate 10 to 19 minutes per day.  He exercises with enough effort to increase his heart rate 3 or less days per week.   He is taking medications regularly.       Constitutional, HEENT, cardiovascular, pulmonary, GI, , musculoskeletal, neuro, skin, endocrine and psych systems are negative, except as otherwise noted.        Objective    /64   Pulse 84   Temp 98.6  F (37  C)   Resp 16   Wt 73.9 kg (163 lb)   SpO2 99%   BMI 24.07 kg/m    Body mass index is 24.07 kg/m .  Physical Exam   GENERAL: alert and no distress  HENT: normal cephalic/atraumatic, ear canals and TM's normal, nose and mouth without ulcers or lesions, uvula elongated, and erythematous swollen uvula  NECK: no adenopathy, no asymmetry, " masses, or scars  RESP: lungs clear to auscultation - no rales, rhonchi or wheezes  CV: regular rate and rhythm, normal S1 S2, no S3 or S4, no murmur, click or rub, no peripheral edema  ABDOMEN: soft, nontender, no hepatosplenomegaly, no masses and bowel sounds normal  MS: no gross musculoskeletal defects noted, no edema    Results for orders placed or performed in visit on 03/19/24   Symptomatic COVID-19 Virus (Coronavirus) by PCR Nose     Status: Normal    Specimen: Nose; Swab   Result Value Ref Range    SARS CoV2 PCR Negative Negative    Narrative    Testing was performed using the bharat SARS-CoV-2 assay on the bharat  10Six0 System. This test should be ordered for the detection of  SARS-CoV-2 in individuals who meet SARS-CoV-2 clinical and/or  epidemiological criteria. Test performance is unknown in asymptomatic  patients. This test is for in vitro diagnostic use under the FDA EUA  for laboratories certified under CLIA to perform high and/or moderate  complexity testing. This test has not been FDA cleared or approved. A  negative result does not rule out the presence of PCR inhibitors in  the specimen or target RNA in concentration below the limit of  detection for the assay. The possibility of a false negative should  be considered if the patient's recent exposure or clinical  presentation suggests COVID-19. This test was validated by the [facility]. This  laboratory is certified under the Clinical Laboratory Improvement  Amendments of 1988 (CLIA-88) as qualified to perform high and/or  moderate complexity laboratory testing.   Influenza A/B antigen     Status: Normal    Specimen: Nasopharyngeal; Swab   Result Value Ref Range    Influenza A antigen Negative Negative    Influenza B antigen Negative Negative    Narrative    Test results must be correlated with clinical data. If necessary, results should be confirmed by a molecular assay or viral culture.    Streptococcus A Rapid Screen w/Reflex to PCR - Clinic Collect     Status: Normal    Specimen: Throat; Swab   Result Value Ref Range    Group A Strep antigen Negative Negative   Group A Streptococcus PCR Throat Swab     Status: Normal    Specimen: Throat; Swab   Result Value Ref Range    Group A strep by PCR Not Detected Not Detected    Narrative    The Xpert Xpress Strep A test, performed on the August  Instrument Systems, is a rapid, qualitative in vitro diagnostic test for the detection of Streptococcus pyogenes (Group A ß-hemolytic Streptococcus, Strep A) in throat swab specimens from patients with signs and symptoms of pharyngitis. The Xpert Xpress Strep A test can be used as an aid in the diagnosis of Group A Streptococcal pharyngitis. The assay is not intended to monitor treatment for Group A Streptococcus infections. The Xpert Xpress Strep A test utilizes an automated real-time polymerase chain reaction (PCR) to detect Streptococcus pyogenes DNA.           Signed Electronically by: Gisel Ocampo CNP

## 2024-03-20 PROBLEM — E86.0 DEHYDRATION: Status: ACTIVE | Noted: 2024-01-01

## 2024-03-20 NOTE — TELEPHONE ENCOUNTER
Spoke with Matteo who is unable to get into local infusion center. Will call to [facility].

## 2024-03-20 NOTE — TELEPHONE ENCOUNTER
Patient Call: General  Route to LPN    Reason for call: Matteo wanting to give update to Coordinator, that he's called several places to make appointment, to come in for IV Fluids, and no openings until next week. Patient is wondering if he should be going to the ER to have IV Fluids,and would like a call back today.    Call back needed? Yes    Return Call Needed  Same as documented in contacts section  When to return call?: Same day: Route High Priority

## 2024-03-20 NOTE — TELEPHONE ENCOUNTER
RNCC returned call to Matteo.       Pt reports he was started on amoxicillin by urgent care yesterday d/t sore throat/cold like symptoms.     Matteo has had emesis x2 since beginning amoxicillin. Pt has not taken abx with food. RNCC instructed pt to take with small amount of food next time, and repeat labs tomorrow. IVF ordered preemptively as pt feels very dehydrated.     Matteo will update if he is unable to keep IS meds down.

## 2024-03-21 NOTE — PROGRESS NOTES
Infusion Nursing Note:  Matteo Barnes presents today for IVF, labs.    Patient seen by provider today: No   present during visit today: Not Applicable.    Intravenous Access:  Peripheral IV placed  PIV site prepped with chlorhexidine - BC X 1 and routine labs drawn from PIV to rt AC X 1 attempt  2nd BC site prepped with chlorhexidine and drawn from rt lower forearm X 1 attempt (fistula to left arm)  2nd BMP drawn post infusion per orders    Treatment Conditions:  Not Applicable.    Post Infusion Assessment:  Patient tolerated infusion without incident.  Blood return noted pre and post infusion.  Site patent and intact, free from redness, edema or discomfort.  No evidence of extravasations.  Access discontinued per protocol.     Discharge Plan:   Discharge instructions reviewed with: Patient.  Patient and/or family verbalized understanding of discharge instructions and all questions answered.  AVS to patient via [employer].    Patient discharged in stable condition accompanied by: self.  Departure Mode: Ambulatory.    Administrations This Visit       sodium chloride 0.9% BOLUS 1,000 mL       Admin Date  03/21/2024 Action  $New Bag Dose  1,000 mL Route  Intravenous Documented By  Alejandra Bravo, RN                    Alejandra Bravo, RN

## 2024-03-21 NOTE — PATIENT INSTRUCTIONS
Dear Matteo Barnes    Thank you for choosing [facility] ([facility]) for your infusion.  The following information is a summary of our appointment as well as important reminders.      If you are a transplant patient and require transplant education, please click on this link: [email or web address].    We look forward in seeing you on your next appointment here at [facility] ([facility]).  Please don t hesitate to call us at [phone] to reschedule any of your appointments or to speak with one of the [facility] registered nurses.  It was a pleasure taking care of you today.    Sincerely,    [facility]  [facility]  [address] [place] [address]  [id number]  Phone:  [phone]

## 2024-03-22 NOTE — TELEPHONE ENCOUNTER
ISSUE: creatinine remains elevated after IVF  1.80    PLAN:    Marcus Garcia MD Reilly, Megan, RN  I agree with Dr. Dale. Would set up biopsy, repeat labs Monday with DSA and if Scr still elevated after fluids would biopsy    Bx and lab orders placed.

## 2024-03-25 NOTE — PROGRESS NOTES
Infusion Nursing Note:  Mattoe Barnes presents today for 1L IVF + Labs.    Patient seen by provider today: No   present during visit today: Not Applicable.    Note: N/A.      Intravenous Access:  Labs drawn without difficulty.  Peripheral IV placed.    Treatment Conditions:  Not Applicable.      Post Infusion Assessment:  Patient tolerated infusion without incident.  Blood return noted pre and post infusion.  Site patent and intact, free from redness, edema or discomfort.  No evidence of extravasations.  Access discontinued per protocol.       Discharge Plan:   Discharge instructions reviewed with: Patient.  Patient and/or family verbalized understanding of discharge instructions and all questions answered.  AVS to patient via [employer].    Patient discharged in stable condition accompanied by: self.  Departure Mode: Ambulatory.      Carin Sandy RN

## 2024-03-26 NOTE — TELEPHONE ENCOUNTER
ISSUE:   Everolimus level 7.7 on 03/25/24, goal 4-6, dose 1.5 mg BID.    PLAN:   Call Patient and confirm this was an accurate 12-hour trough.   Verify Everolimus dose 1.5 mg BID.   Confirm no new medications or illness.   Confirm no missed doses.     If accurate trough and accurate dose, decrease Everolimus dose to 1 mg BID  *Recommended dose rounded from calculated dose 0.97 mg  BID.      Repeat labs Thursday or Friday   For any dose change <50%, recheck labs per guideline or within 1 month.  For any dose change of more than 50%, recheck drug level based on timing to reach steady state:   Immediate release tacrolimus: 2-3 days  Extended-release tacrolimus: 7 days  Cyclosporine: 2 days  Sirolimus: 12 days  Everolimus: 8 days      OUTCOME:     Spoke with Patient, they confirm accurate trough level and current dose 1.5 mg BID.   Patientconfirmed dose change to 1 mg BID.  Patientagreed to repeat labs in 1 weeks.   Orders sent to preferred pharmacy for dose change and lab for repeat labs.   Patientvoiced understanding of plan.

## 2024-03-27 NOTE — PROGRESS NOTES
Post renal biopsy. RLQ abdomen site with primapore, CDI. Patient tolerating PO intake, voiding spontaneously, urine pale yellow clear. PIV removed. Discharge paperwork reviewed with patient, educated patient on activity restrictions and when to seek medical attention. pt stated understanding. Patient will transport self home.

## 2024-03-27 NOTE — IR NOTE
Patient Name: Matteo Barnes  Medical Record Number: [id number]  Today's Date: 3/27/2024    Procedure: Right Transplanted Kidney Bx  Proceduralist: Philomena Stone PA-C  Pathology present: yes    Procedure Start: 1024  Procedure end: 1040  Sedation medications administered: Local     Bedrest 2 hours from 2936-2990    Report given to: 2A, RN    Other Notes: Pt arrived to IR room 6 from . Consent reviewed. Pt denies any questions or concerns regarding procedure. Pt positioned supine and monitored per protocol. Pt tolerated procedure without any noted complications. Pt transferred back to .

## 2024-03-27 NOTE — PROGRESS NOTES
Pt arrived to 2A from home for renal biopsy. VSS. Denies pain. Consent obtained. Lab resulted ex INR pending. H&P current. Allergies reviewed with pt. Appropriately NPO.  Prep completed. Patient be transporting self home.

## 2024-03-27 NOTE — PROCEDURES
[facility]    Procedure: IR Procedure Note    Date/Time: 3/27/2024 10:42 AM    Performed by: Philomena Stone PA-C  Authorized by: Philomena Stone PA-C      UNIVERSAL PROTOCOL   Site Marked: NA  Prior Images Obtained and Reviewed:  Yes  Required items: Required blood products, implants, devices and special equipment available    Patient identity confirmed:  Verbally with patient, arm band, provided demographic data and hospital-assigned identification number  Patient was reevaluated immediately before administering moderate or deep sedation or anesthesia  Confirmation Checklist:  Patient's identity using two indicators, relevant allergies, procedure was appropriate and matched the consent or emergent situation and correct equipment/implants were available  Time out: Immediately prior to the procedure a time out was called    Universal Protocol: the Joint Commission Universal Protocol was followed    Preparation: Patient was prepped and draped in usual sterile fashion       ANESTHESIA    Anesthesia:  Local infiltration  Local Anesthetic:  Lidocaine 1% without epinephrine      SEDATION    Patient Sedated: No    See dictated procedure note for full details.  Findings: RLQ transplanted kidney     Specimens: core needle biopsy specimens sent for pathological analysis    Complications: None    Condition: Stable    Plan: Transport to  for bedrest and recovery       PROCEDURE  Describe Procedure: Completed ultrasound-guided RLQ transplant kidney biopsy. 3 passes yielded 3 cores sent to pathology in preservative. Pathology on site to determine adequacy of specimen.  Gelfoam in tract.    Patient Tolerance:  Patient tolerated the procedure well with no immediate complications  Length of time physician/provider present for 1:1 monitoring during sedation: 0

## 2024-03-27 NOTE — DISCHARGE INSTRUCTIONS
[facility]    Interventional Radiology  Patient Instructions Following Kidney Biopsy    AFTER YOU GO HOME  If you were given sedation, for the first 24 hours: we recommend that an adult stay with you, DO NOT drive or operate machinery at home or at work, DO NOT smoke, DO NOT make any important or legal decisions.  DO NOT drink alcoholic beverages the day of your procedure  Drink plenty of fluids   Resume your regular diet, unless otherwise instructed by your Primary Physician  Relax and take it easy for 48 hours  DO NOT do any strenuous exercise or lifting (> 10 lbs) for at least 7 days following your procedure  Keep the dressing dry and in place for 24 hours. Replace with Band aid for 2 days.  Never leave a wet dressing in place.  Do not take a shower for at least 12 hours following your procedure. No tub bath, hot tub, or swimming for 5 days.  There should be minimum drainage from the biopsy site    CALL THE PHYSICIAN IF:  You start bleeding from the procedure site.  If you do start to bleed from that site, lie down flat and hold pressure on the site for a minimum of 10 minutes.  Your physician will tell you if you need to return to the hospital  You develop nausea or vomiting  You have excessive swelling, redness, or tenderness at the site  You have drainage that looks like it is infected.  You experience severe pain  You develop hives or a rash or unexplained itching  You develop shortness of breath  You develop a temperature of 101 degrees F or greater  You develop bloody clots or red urine after you are discharged      [facility] INTERVENTIONAL RADIOLOGY DEPARTMENT  Procedure Physician:      Philomena Stone PA-C    Date of procedure: March 27, 2024  Telephone Numbers: [phone]      Monday-Friday 7:30 am to 4:00 pm  [phone] After 4:00 pm Monday-Friday, Weekends & Holidays.   Ask for the Interventional Radiologist on call.  Someone is on call 24 hrs/day  [facility] toll free  number: [phone] Monday-Friday 8:00 am to 4:30 pm  [facility] Emergency Dept: [phone]

## 2024-03-28 PROBLEM — N17.9 AKI (ACUTE KIDNEY INJURY) (H): Status: RESOLVED | Noted: 2024-01-01 | Resolved: 2024-01-01

## 2024-04-01 NOTE — TELEPHONE ENCOUNTER
ISSUE: cr remains elevated at 2.13    PLAN:  Marcus Garcia MD Reilly, Megan, RN  Give fluids again, obtain U/S and U/A, bring up for discussion on Weds

## 2024-04-01 NOTE — ADDENDUM NOTE
Addended by: MARGARITA SHUKLA on: 4/1/2024 12:30 PM     Modules accepted: Orders     F20 0 for 2023  [facility] 75  coding opportunities          Chart Reviewed number of suggestions sent to Provider: 1     Patients Insurance     Medicare Insurance: [employer]

## 2024-04-02 NOTE — PROGRESS NOTES
Infusion Nursing Note:  Matteo Barnes presents today for IVF+labs.    Patient seen by provider today: No   present during visit today: Not Applicable.    Note: UA collected per orders. BMP drawn after completion of fluids. Per Matteo, no other labs are needed today, he will have his other labs drawn on Friday.       Intravenous Access:  Labs drawn without difficulty-after infusion per orders  Peripheral IV placed.    Treatment Conditions:  Not Applicable.      Post Infusion Assessment:  Patient tolerated infusion without incident.  Blood return noted pre and post infusion.  Site patent and intact, free from redness, edema or discomfort.  No evidence of extravasations.  Access discontinued per protocol.       Discharge Plan:   Discharge instructions reviewed with: Patient.  Patient and/or family verbalized understanding of discharge instructions and all questions answered.  AVS to patient via [employer].  Patient will have labs drawn 4/5/24.   Patient discharged in stable condition accompanied by: self.  Departure Mode: Ambulatory.      Cira Joseph RN

## 2024-04-02 NOTE — TELEPHONE ENCOUNTER
ISSUE: cr improved, but remains elevated after IVF. Negative bx on 3/27      PLAN:        Marcus Garcia MD Reilly, Megan, JONATHAN   Recommend fluids 2x/wk for the next 2 weeks please. Repeat U/A in 2 weeks

## 2024-04-03 NOTE — TELEPHONE ENCOUNTER
Post Kidney and Pancreas Transplant Team Conference  Date: 4/3/2024  Transplant Coordinator: Elsa Adrian     Attendees:  [x]  Dr. Dale [x] Jennifer Domínguez, RN [x] Ellen Dowell LPN     []  Dr. Marrufo [x] Mere Ray, RN [] Deborah Molina LPN    [x] Dr. Garcia [x] Sara Alonso RN    [] Dr. Flores [x] Elsa Adrian, RN [] Abena Craven RN   [] Dr. Jennings [] Delma Rice, RN    [] Dr. Gonzalez [] Beryl Sousa RN    []  Dr. Santoyo [] Anu Johnson RN    [] Dr. Damian [] Alex Zhang RN    [x] Daylin Jerry NP [] Magaly Shoemaker RN    [x] Pricilla Danielle NP [] Hue Dickson RN        Verbal Plan Read Back:   Schedule with Dr. Garcia for next Monday    Routed to RN Coordinator   Ellen Dowell LPN

## 2024-04-03 NOTE — TELEPHONE ENCOUNTER
[facility] Call Center    Phone Message    May a detailed message be left on voicemail: yes     Reason for Call: Other: patient was contacted to schedule an AKT with . first opening is 4/11 patient is not available that day as he is going in for fluids that morning. Patient is currently scheduled to see  on 4/15 would that cover this visit or is patient needing to have a visit before that one.     Action Taken: Message routed to:  Other: RNCC    Travel Screening: Not Applicable

## 2024-04-03 NOTE — TELEPHONE ENCOUNTER
RNCC spoke with Matteo. Matteo refuses twice weekly fluids at this time. He received IVF yesterday and has scheduled next week on 4/11. Pt states he is too busy working and knows that the pain from his intestines/diverticulitis is causing his creatinine to increase.     Pt agrees to see Dr. Garcia in clinic next week to discuss.

## 2024-04-05 NOTE — TELEPHONE ENCOUNTER
Neupogen ordered to pt's local pharmacy.     Therapy plan placed in case of likely need for PA.     MyC sent to pt.

## 2024-04-05 NOTE — TELEPHONE ENCOUNTER
DATE:  4/5/2024     TIME OF RECEIPT FROM LAB:  11:05 AM    ORDERING PROVIDER: Niru    LAB TEST:  WBC    LAB VALUE:  0.9    RESULTS GIVEN WITH READ-BACK TO (PROVIDER):  Teams message sent to Elsa Adrian RN    TIME LAB VALUE REPORTED TO PROVIDER:   11:10 AM

## 2024-04-08 NOTE — PROGRESS NOTES
Anemia Management Note - Follow Up      SUBJECTIVE/OBJECTIVE:    Referred by Dr. 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 Initiate when Hgb drops <9.0.   Iron regimen:  TBD  Venofer 200mg x 5 doses per referral-one dose given then determined further doses were unnecessary d/t possible lab error  Labs : [id number]  Recent JOSE use, transfusion, IV iron: none. Hx retacrit and venofer with dialysis     RX/TX plans : TBD     No history of stroke, MI, and blood clots or cancers  Contact: Consent to communicate declined per pt, signed on 106        Latest Ref Rng & Units 3/13/2024 3/17/2024 3/21/2024 3/25/2024 3/31/2024 2024 2024   Anemia   Hemoglobin 13.3 - 17.7 g/dL 9.8  9.4  9.3  9.8  8.8  8.9  9.0    TSAT 15 - 46 % 8      24     Ferritin 31 - 409 ng/mL 2,283      1,392       BP Readings from Last 3 Encounters:   24 132/71   24 (!) 140/82   24 139/79     Wt Readings from Last 2 Encounters:   24 72.4 kg (159 lb 9.8 oz)   24 73.9 kg (163 lb)           ASSESSMENT:    Hgb:at goal - continue to monitor  TSat: not at goal (>30%) but ferritin >1000ng/mL.  PO iron not indicated at this time per anemia protocol.   Ferritin: Elevated (>1000ng/mL)        PLAN:  RTC for Hgb in 2 week(s).    Orders needed to be renewed (for next follow-up date) in EPIC: None    Iron labs due:  Early May 2024    Plan discussed with:  No call, chart review      NEXT FOLLOW-UP DATE:  24    Elise Grady RN   Anemia Services  [facility]  [email or web address]   Office : [phone]  Fax: [phone]

## 2024-04-09 NOTE — TELEPHONE ENCOUNTER
Orders are up to date.     RNCC spoke with pt who has not received neupogen yet. Will call infusion center to schedule.   CBC with diff to be drawn tomorrow.     RNCC spoke with infusion center at [facility] to confirm orders are visible.

## 2024-04-11 NOTE — TELEPHONE ENCOUNTER
Marcus Garcia MD Reilly, Megan, RN  Plan for hyperK: please arrange for infusion of 1L 1/2NS w/ 75meq of sodium bicarbonate. Please increase bicarb to 1300mg tid. Please prescribe lokelma 10g daily or veltassa 8.4g daily.    Infusion orders updated. Pt aware.   Rx sent to [facility].

## 2024-04-11 NOTE — PROGRESS NOTES
Infusion Nursing Note:  Matteo Barnes presents today for IVF/BMP after, Nivestym injection  Patient seen by provider today: No   present during visit today: Not Applicable.    Note: Matteo has no concerns today, is feeling like he is hydrating well.      Intravenous Access:  Labs drawn without difficulty-after fluids.  Peripheral IV placed.    Treatment Conditions:  Lab Results   Component Value Date    HGB 9.0 (L) 04/07/2024    WBC 1.1 (L) 04/07/2024    ANEU 0.3 (LL) 04/05/2024    ANEUTAUTO 2.0 01/02/2024     04/07/2024        Lab Results   Component Value Date     04/07/2024    POTASSIUM 5.8 (H) 04/07/2024    MAG 1.6 (L) 04/05/2024    CR 2.27 (H) 04/07/2024    ANNETTE 9.1 04/07/2024    BILITOTAL 0.4 01/25/2024    ALBUMIN 4.5 01/25/2024    ALT 41 01/25/2024    AST 35 01/25/2024         Post Infusion Assessment:  Patient tolerated infusion without incident.  Blood return noted pre and post infusion.  Site patent and intact, free from redness, edema or discomfort.  No evidence of extravasations.  Access discontinued per protocol.       Discharge Plan:   Discharge instructions reviewed with: Patient.  Patient and/or family verbalized understanding of discharge instructions and all questions answered.  AVS to patient via [employer].  Patient will return 4/15/24 for next appointment.   Patient discharged in stable condition accompanied by: self.  Departure Mode: Ambulatory.      Cira Joseph RN,h

## 2024-04-15 NOTE — LETTER
4/15/2024      RE: Matteo Barnes  [address]  [place] [place] [facility] [address]       Virtual Visit Details    Type of service:  Video Visit     Originating Location (pt. Location): Home  Distant Location (provider location):  Off-site  Platform used for Video Visit: Fabi      TRANSPLANT NEPHROLOGY EARLY POST TRANSPLANT VISIT    Assessment & Plan  # DDKT: Stable kidney function at ~ 1.8-2.1 lately, which is significantly higher than initial baseline closer to ~ 1.2-1.4.  Kidney transplant biopsy 3/27/24 showed no acute rejection, but had acute tubular injury with mild to moderate vascular changes.  Concern that patient has had ongoing prerenal issues with abdominal pain (diverticulitis) with poor oral intake.  He has been receiving IV fluids lately.   - Baseline Creatinine: ~1.2-1.4   - Proteinuria: Normal (<0.2 grams)   - Date DSA Last Checked: Mar/2024      Latest DSA: No cPRA: 53%   - BK Viremia: No   - Kidney Tx Biopsy: Mar 27, 2024; Result: No diagnostic evidence of acute rejection.   Acute tubular injury.  Mild arterial sclerosis and moderate arteriolar hyalinosis.   - Transplant Ureteral Stent: Removed    # Immunosuppression: Tacrolimus immediate release (goal 8-10) and Everolimus (goal 4-6)   - Induction with Recent Transplant:  High Intensity Protocol due to DGF   - Continue with intensive monitoring of immunosuppression for efficacy and toxicity.   - Changed from mycophenolate to everolimus due to GI symptoms and leukopenia.   - Changes: Not at this time; If creatinine doesn't improve with hydration over time, may need to consider changing tacrolimus to belatacept (although will have to manage EBV viremia first.    # Infection Prophylaxis:   - PJP: Inhaled pentamidine  - CMV:  Letermovir .  Patient is CMV IgG Ab discordant (D+/R-) and will continue on Letermovir x 6 months, then check CMV PCR monthly until 12 months post transplant. Valcyte switched to Letermovir on 1/29/24 due to neutropenia.     #  Hypertension: Borderline control;  Goal BP: < 130/80   - Volume status: Euvolemic     - Changes: Not at this time and best to keep BP good with prerenal issues.    # Anemia in Chronic Renal Disease: Hgb: Trend down, low    JOSE: No   - Iron studies: Low iron saturation, but high ferritin   - Will work up further with CMV, EBV and parvovirus PCR.    # Leukopenia: Stable, moderately low WBC in the ~ 0.8-1.6 range over the couple of months or so.  Likely medication related.  Patient was changed off mycophenolate and Valcyte due to leukopenia.  He has received filgrastim at times for ANC <0.5.   - With both leukopenia and now trend down in hemoglobin, may consider Hematology referral if no improvement.    # Mineral Bone Disorder:    - Secondary renal hyperparathyroidism; PTH level: Mildly elevated (151-300 pg/ml)        On treatment: None   - Vitamin D; level: Normal        On supplement: No   - Calcium; level: Normal        On supplement: No    # Electrolytes:   - Potassium; level: Normal   On supplement: No  - Magnesium; level: Stable low        On supplement: Yes  - Bicarbonate; level: Stable low        On supplement: Yes    # EBV Viremia: Trend up, moderately elevated EBV PCR at ~ 54K with last check Mar/2024.  IgG level is normal.   - Will continue to follow EBV PCR every 2 weeks for now with increasing level.    # HFpEF: Last cardiac echo (Feb/2021) showed normal LVEF ~ 60-65% with grade I diastolic dysfunction. Stress echo 07/2023 commented on normal LV function and wall motion.    # Diarrhea/Abdominal Pain: Patient reports symptoms are only mild now and overall improved.  He has a h/o C. diff previously, but that was treated and resolved with testing negative recently.  Also taken off mycophenolate.  Previous  abdominal pain felt likely secondary to diverticulitis.  Patient was seen by Transplant ID and started on Augmentin, which he completed.  CT abd/pelvis Mar/2024 showed left-sided diverticulitis involving  the distal transverse colon.  Edema involving left abdominal bowel loops with a few distended small bowel loops that may represent an ileus. No abscess identified.  Patient reports a distant history of diverticulitis ~ 20 years ago.    # Weight Loss: Patient had considerable weight loss with previous diarrhea due to C. diff, but weight stabilized and now up ~ 5 lbs lately.    # Night Sweats: Only occasional, mild episodes and not new.    # Medical Compliance: Yes    # Health Maintenance and Vaccination Review: Not Reviewed    # Transplant History:  Etiology of Kidney Failure: Focal segmental glomerulosclerosis (FSGS), likely secondary  Tx: DDKT  Transplant: 10/28/2023 (Kidney)  Donor Type: Donation after Brain Death Donor Class:   Crossmatch at time of Tx: negative  DSA at time of Tx: No  Significant changes in immunosuppression: None  CMV IgG Ab High Risk Discordance (D+/R-): Yes  EBV IgG Ab High Risk Discordance (D+/R-): No  Significant transplant-related complications: DGF    Transplant Office Phone Number: [phone]    Assessment and plan was discussed with the patient and he voiced his understanding and agreement.    Return visit: Return in about 3 months (around 7/15/2024).    Marcel Dale MD    The longitudinal plan of care for kidney transplant was addressed during this visit. Due to the added complexity in care, I will continue to support Matteo Barnes in the subsequent management of this condition(s) and with the ongoing continuity of care of this condition(s).    Chief Complaint  Mr. Barnes is a 68 year old here for kidney transplant and immunosuppression management.    History of Present Illness   Mr. Barnes reports feeling okay overall with some medical complaints.  Since last clinic visit, patient reports no hospitalizations or new medical complaints and has been doing well overall.  Patient's creatinine has been higher lately and he underwent a kidney transplant biopsy 3/27/24, which  "showed no acute rejection, but had acute tubular injury and mild to moderate vascular changes.    His energy level is only \"so-so,\" with no recent change.  He did have a bad headache last week, which affected how he felt, but that has since resolved.  He is active and getting some exercise now and reports starting to get a little stronger.  Denies any chest pain or shortness of breath with exertion.  No leg swelling.    Appetite is also \"not the best,\" although it is starting to come back, but still up and down.  His weight is up ~ 5 lbs.  Patient has been receiving IV fluids twice a week as he still isn't drinking a lot of fluids, maybe ~ 1 liter a day.  No nausea or vomiting.  Occasional slightly loose stools.  In addition, some ongoing, mild abdominal pain, although overall much better.  No heartburn symptoms.  No fever, sweats or chills.  Occasional, mild night sweats.    Home BP:  130/60s    Problem List  Patient Active Problem List   Diagnosis     Dyslipidemia     HTN, kidney transplant related     Erectile dysfunction     Stage 3b chronic kidney disease (H)     Metabolic acidosis     Hyperkalemia     Immunosuppressed status (H24)     Kidney replaced by transplant     Aftercare following organ transplant     Neutropenia (H24)     Need for pneumocystis prophylaxis     Anemia in chronic renal disease     Hypomagnesemia     (HFpEF) heart failure with preserved ejection fraction (H)     EBV (Yaritza-Barr virus) viremia     Dehydration       Allergies  No Known Allergies    Medications  Current Outpatient Medications   Medication Sig Dispense Refill     acetaminophen (TYLENOL) 500 MG tablet Take 500-1,000 mg by mouth every 6 hours as needed for mild pain       ALPRAZolam (XANAX) 0.25 MG tablet TAKE 1 TABLET (0.25 MG) BY MOUTH DAILY AS NEEDED FOR ANXIETY 20 tablet 0     amLODIPine (NORVASC) 5 MG tablet Take 1 tablet (5 mg) by mouth daily for 90 days 90 tablet 3     atorvastatin (LIPITOR) 10 MG tablet Take 1 tablet " (10 mg) by mouth daily 30 tablet 11     carvedilol (COREG) 6.25 MG tablet Take 1 tablet (6.25 mg) by mouth 2 times daily (with meals) Hold for systolic blood pressure < 140.       ciprofloxacin (CIPRO) 500 MG tablet Take 1 tablet (500 mg) by mouth 2 times daily 1 hour apart from magnesium, vitamin, dairy, calcium 28 tablet 0     everolimus (ZORTRESS) 0.5 MG tablet Take 2 tablets (1 mg) by mouth 2 times daily Profile Rx: patient will contact pharmacy when needed 120 tablet 11     everolimus (ZORTRESS) 0.75 MG tablet Profile Rx: patient will contact pharmacy when needed 120 tablet 11     Fiber-Vitamins-Minerals ([employer] FIBER GUMMIES) CHEW Take 3 tablets by mouth daily       fludrocortisone (FLORINEF) 0.1 MG tablet Take 1 tablet (0.1 mg) by mouth daily 90 tablet 1     letermovir (PREVYMIS) 480 MG TABS tablet Take 1 tablet (480 mg) by mouth daily 30 tablet 1     magnesium oxide (MAG-OX) 400 MG tablet Take 1 tablet (400 mg) by mouth 2 times daily With lunch and supper 180 tablet 0     metroNIDAZOLE (FLAGYL) 500 MG tablet Take 1 tablet (500 mg) by mouth 3 times daily (with meals) 42 tablet 0     Prenatal Vit-Fe Fumarate-FA (PRENATAL MULTIVITAMIN W/IRON) 27-0.8 MG tablet Take 1 tablet by mouth daily 30 tablet 11     sodium bicarbonate 650 MG tablet Take 2 tablets (1,300 mg) by mouth 3 times daily 540 tablet 3     sodium zirconium cyclosilicate (LOKELMA) 10 g PACK packet Take 1 packet (10 g) by mouth daily 30 packet 1     tacrolimus (GENERIC EQUIVALENT) 1 MG capsule Profile Rx: patient will contact pharmacy when needed 60 capsule 11     tacrolimus (GENERIC EQUIVALENT) 5 MG capsule Take 1 capsule (5 mg) by mouth 2 times daily 60 capsule 11     tacrolimus (GENERIC) 0.5 MG capsule HOLD       No current facility-administered medications for this visit.     There are no discontinued medications.      Physical Exam  Vital Signs: There were no vitals taken for this visit.    GENERAL APPEARANCE: alert and no distress  HENT: no obvious  abnormalities on appearance  RESP: breathing appears unremarkable with normal rate, no audible wheezing or cough and no apparent shortness of breath with conversation  MS: extremities normal - no gross deformities noted  SKIN: no apparent rash and normal skin tone  NEURO: speech is clear with no obvious neurological deficits  PSYCH: mentation appears normal and affect normal    Data        Latest Ref Rng & Units 4/15/2024     9:30 AM 4/14/2024     9:58 AM 4/11/2024     9:38 AM   Renal   Sodium 135 - 145 mmol/L 140  140  138    K 3.4 - 5.3 mmol/L 4.1  4.6  5.9    Cl 98 - 107 mmol/L 104  106  110    Cl (external) 98 - 107 mmol/L 104  106  110    CO2 22 - 29 mmol/L 25  20  18    Urea Nitrogen 8.0 - 23.0 mg/dL 32.4  37.4  37.2    Creatinine 0.67 - 1.17 mg/dL 1.73  2.11  1.81    Glucose 70 - 99 mg/dL 95  111  101    Calcium 8.8 - 10.2 mg/dL 8.7  9.0  8.3          Latest Ref Rng & Units 4/5/2024    10:22 AM 3/1/2024     9:45 AM 2/2/2024     9:37 AM   Bone Health   Phosphorus 2.5 - 4.5 mg/dL 3.2  3.1  2.2          Latest Ref Rng & Units 4/14/2024     9:58 AM 4/7/2024     9:55 AM 4/5/2024    10:22 AM   Heme   WBC 4.0 - 11.0 10e3/uL 0.7  1.1  0.9    Hgb 13.3 - 17.7 g/dL 8.6  9.0  8.9    Plt 150 - 450 10e3/uL 147  214  223    ABSOLUTE NEUTROPHIL 1.6 - 8.3 10e3/uL   0.3    ABSOLUTE LYMPHOCYTES 0.8 - 5.3 10e3/uL   0.4    ABSOLUTE MONOCYTES 0.0 - 1.3 10e3/uL   0.1    ABSOLUTE EOSINOPHILS 0.0 - 0.7 10e3/uL   0.1          Latest Ref Rng & Units 1/25/2024    10:31 PM 11/16/2023     9:05 AM 10/28/2023     4:06 AM   Liver   AP 40 - 150 U/L 139   57    TBili <=1.2 mg/dL 0.4   0.3    Bilirubin Direct 0.00 - 0.30 mg/dL <0.20      ALT 0 - 70 U/L 41   12    AST 0 - 45 U/L 35   13    Tot Protein 6.4 - 8.3 g/dL 7.7   7.2    Albumin 3.5 - 5.2 g/dL 4.5  3.9  4.5          Latest Ref Rng & Units 10/28/2023     4:07 AM 10/16/2021     9:42 AM 3/20/2018     4:09 PM   Pancreas   A1C <5.7 % 5.0   4.9    Lipase 73 - 393 U/L  461           Latest Ref  Rng & Units 4/5/2024    10:22 AM 3/13/2024     8:50 AM 2/18/2024     9:37 AM   Iron studies   Iron 61 - 157 ug/dL 52  17  60    Iron Sat Index 15 - 46 % 24  8  21    Ferritin 31 - 409 ng/mL 1,392  2,283  4,023          3/25/2024    11:11 AM 3/13/2024     8:50 AM 1/28/2024     7:29 AM   [facility] Vi[facility]   EBV DNA LOG OF COPIES 4.7  4.6  4.1      Failed to redirect to the Timeline version of the REVFS SmartLink.  Recent Labs   Lab Test 04/05/24  1022 04/07/24  0955 04/14/24  0958   DOSTAC 4/4/2024 4/6/2024 4/13/2024   TACROL 10.8 11.9 5.9     Recent Labs   Lab Test 12/14/23  1018 12/21/23  0738 01/02/24  1000 01/30/24  0847 01/31/24  0922   DOSMPA 12/13/2023   8:00 PM  --   --   --  1/30/2024   8:00 PM   MPACID 1.09   < > 0.67* 0.38* 0.35*   MPAG 105.7*   < > 43.0 22.0* 15.7*    < > = values in this interval not displayed.       Marcel Dale MD

## 2024-04-15 NOTE — PROGRESS NOTES
Infusion Nursing Note:  Matteo Barnes presents today for 1L IVF, Filgrastim, BMP following IVF.    Patient seen by provider today: No   present during visit today: Not Applicable.    Note: No parameters for Filgramstim, however per Jason Cardoza PharmD provider wants 3 total doses given. Today is dose 2.      Intravenous Access:  Labs drawn without difficulty.  Peripheral IV placed.    Treatment Conditions:  Not Applicable.      Post Infusion Assessment:  Patient tolerated infusion without incident.  Blood return noted pre and post infusion.  Site patent and intact, free from redness, edema or discomfort.  No evidence of extravasations.  Access discontinued per protocol.       Discharge Plan:   Discharge instructions reviewed with: Patient.  Patient and/or family verbalized understanding of discharge instructions and all questions answered.  AVS to patient via [employer].  Patient will return 4/18 for next appointment.   Patient discharged in stable condition accompanied by: self.  Departure Mode: Ambulatory.      Carin Sandy RN

## 2024-04-15 NOTE — NURSING NOTE
Is the patient currently in the state of [place]? YES    Visit mode:VIDEO    If the visit is dropped, the patient can be reconnected by: VIDEO VISIT: Text to cell phone:   Telephone Information:   Mobile [phone]       Will anyone else be joining the visit? NO  (If patient encounters technical issues they should call [phone]    How would you like to obtain your AVS? MyChart    Are changes needed to the allergy or medication list? No    Are refills needed on medications prescribed by this physician? NO    Reason for visit: RECHECK    Ana Rosa CANO

## 2024-04-15 NOTE — PROGRESS NOTES
Virtual Visit Details    Type of service:  Video Visit     Originating Location (pt. Location): Home  Distant Location (provider location):  Off-site  Platform used for Video Visit: Fabi      TRANSPLANT NEPHROLOGY EARLY POST TRANSPLANT VISIT    Assessment & Plan   # DDKT: Stable kidney function at ~ 1.8-2.1 lately, which is significantly higher than initial baseline closer to ~ 1.2-1.4.  Kidney transplant biopsy 3/27/24 showed no acute rejection, but had acute tubular injury with mild to moderate vascular changes.  Concern that patient has had ongoing prerenal issues with abdominal pain (diverticulitis) with poor oral intake.  He has been receiving IV fluids lately.   - Baseline Creatinine: ~1.2-1.4   - Proteinuria: Normal (<0.2 grams)   - Date DSA Last Checked: Mar/2024      Latest DSA: No cPRA: 53%   - BK Viremia: No   - Kidney Tx Biopsy: Mar 27, 2024; Result: No diagnostic evidence of acute rejection.   Acute tubular injury.  Mild arterial sclerosis and moderate arteriolar hyalinosis.   - Transplant Ureteral Stent: Removed    # Immunosuppression: Tacrolimus immediate release (goal 8-10) and Everolimus (goal 4-6)   - Induction with Recent Transplant:  High Intensity Protocol due to DGF   - Continue with intensive monitoring of immunosuppression for efficacy and toxicity.   - Changed from mycophenolate to everolimus due to GI symptoms and leukopenia.   - Changes: Not at this time; If creatinine doesn't improve with hydration over time, may need to consider changing tacrolimus to belatacept (although will have to manage EBV viremia first.    # Infection Prophylaxis:   - PJP: Inhaled pentamidine  - CMV:  Letermovir .  Patient is CMV IgG Ab discordant (D+/R-) and will continue on Letermovir x 6 months, then check CMV PCR monthly until 12 months post transplant. Valcyte switched to Letermovir on 1/29/24 due to neutropenia.     # Hypertension: Borderline control;  Goal BP: < 130/80   - Volume status: Euvolemic     -  Changes: Not at this time and best to keep BP good with prerenal issues.    # Anemia in Chronic Renal Disease: Hgb: Trend down, low    JOSE: No   - Iron studies: Low iron saturation, but high ferritin   - Will work up further with CMV, EBV and parvovirus PCR.    # Leukopenia: Stable, moderately low WBC in the ~ 0.8-1.6 range over the couple of months or so.  Likely medication related.  Patient was changed off mycophenolate and Valcyte due to leukopenia.  He has received filgrastim at times for ANC <0.5.   - With both leukopenia and now trend down in hemoglobin, may consider Hematology referral if no improvement.    # Mineral Bone Disorder:    - Secondary renal hyperparathyroidism; PTH level: Mildly elevated (151-300 pg/ml)        On treatment: None   - Vitamin D; level: Normal        On supplement: No   - Calcium; level: Normal        On supplement: No    # Electrolytes:   - Potassium; level: Normal   On supplement: No  - Magnesium; level: Stable low        On supplement: Yes  - Bicarbonate; level: Stable low        On supplement: Yes    # EBV Viremia: Trend up, moderately elevated EBV PCR at ~ 54K with last check Mar/2024.  IgG level is normal.   - Will continue to follow EBV PCR every 2 weeks for now with increasing level.    # HFpEF: Last cardiac echo (Feb/2021) showed normal LVEF ~ 60-65% with grade I diastolic dysfunction. Stress echo 07/2023 commented on normal LV function and wall motion.    # Diarrhea/Abdominal Pain: Patient reports symptoms are only mild now and overall improved.  He has a h/o C. diff previously, but that was treated and resolved with testing negative recently.  Also taken off mycophenolate.  Previous  abdominal pain felt likely secondary to diverticulitis.  Patient was seen by Transplant ID and started on Augmentin, which he completed.  CT abd/pelvis Mar/2024 showed left-sided diverticulitis involving the distal transverse colon.  Edema involving left abdominal bowel loops with a few  distended small bowel loops that may represent an ileus. No abscess identified.  Patient reports a distant history of diverticulitis ~ 20 years ago.    # Weight Loss: Patient had considerable weight loss with previous diarrhea due to C. diff, but weight stabilized and now up ~ 5 lbs lately.    # Night Sweats: Only occasional, mild episodes and not new.    # Medical Compliance: Yes    # Health Maintenance and Vaccination Review: Not Reviewed    # Transplant History:  Etiology of Kidney Failure: Focal segmental glomerulosclerosis (FSGS), likely secondary  Tx: DDKT  Transplant: 10/28/2023 (Kidney)  Donor Type: Donation after Brain Death Donor Class:   Crossmatch at time of Tx: negative  DSA at time of Tx: No  Significant changes in immunosuppression: None  CMV IgG Ab High Risk Discordance (D+/R-): Yes  EBV IgG Ab High Risk Discordance (D+/R-): No  Significant transplant-related complications: DGF    Transplant Office Phone Number: [phone]    Assessment and plan was discussed with the patient and he voiced his understanding and agreement.    Return visit: Return in about 3 months (around 7/15/2024).    Marcel Dale MD    The longitudinal plan of care for kidney transplant was addressed during this visit. Due to the added complexity in care, I will continue to support Matteo Barnes in the subsequent management of this condition(s) and with the ongoing continuity of care of this condition(s).    Chief Complaint   Mr. Barnes is a 68 year old here for kidney transplant and immunosuppression management.    History of Present Illness    Mr. Barnes reports feeling okay overall with some medical complaints.  Since last clinic visit, patient reports no hospitalizations or new medical complaints and has been doing well overall.  Patient's creatinine has been higher lately and he underwent a kidney transplant biopsy 3/27/24, which showed no acute rejection, but had acute tubular injury and mild to moderate  "vascular changes.    His energy level is only \"so-so,\" with no recent change.  He did have a bad headache last week, which affected how he felt, but that has since resolved.  He is active and getting some exercise now and reports starting to get a little stronger.  Denies any chest pain or shortness of breath with exertion.  No leg swelling.    Appetite is also \"not the best,\" although it is starting to come back, but still up and down.  His weight is up ~ 5 lbs.  Patient has been receiving IV fluids twice a week as he still isn't drinking a lot of fluids, maybe ~ 1 liter a day.  No nausea or vomiting.  Occasional slightly loose stools.  In addition, some ongoing, mild abdominal pain, although overall much better.  No heartburn symptoms.  No fever, sweats or chills.  Occasional, mild night sweats.    Home BP:  130/60s    Problem List   Patient Active Problem List   Diagnosis    Dyslipidemia    HTN, kidney transplant related    Erectile dysfunction    Stage 3b chronic kidney disease (H)    Metabolic acidosis    Hyperkalemia    Immunosuppressed status (H24)    Kidney replaced by transplant    Aftercare following organ transplant    Neutropenia (H24)    Need for pneumocystis prophylaxis    Anemia in chronic renal disease    Hypomagnesemia    (HFpEF) heart failure with preserved ejection fraction (H)    EBV (Yaritza-Barr virus) viremia    Dehydration       Allergies   No Known Allergies    Medications   Current Outpatient Medications   Medication Sig Dispense Refill    acetaminophen (TYLENOL) 500 MG tablet Take 500-1,000 mg by mouth every 6 hours as needed for mild pain      ALPRAZolam (XANAX) 0.25 MG tablet TAKE 1 TABLET (0.25 MG) BY MOUTH DAILY AS NEEDED FOR ANXIETY 20 tablet 0    amLODIPine (NORVASC) 5 MG tablet Take 1 tablet (5 mg) by mouth daily for 90 days 90 tablet 3    atorvastatin (LIPITOR) 10 MG tablet Take 1 tablet (10 mg) by mouth daily 30 tablet 11    carvedilol (COREG) 6.25 MG tablet Take 1 tablet (6.25 " mg) by mouth 2 times daily (with meals) Hold for systolic blood pressure < 140.      ciprofloxacin (CIPRO) 500 MG tablet Take 1 tablet (500 mg) by mouth 2 times daily 1 hour apart from magnesium, vitamin, dairy, calcium 28 tablet 0    everolimus (ZORTRESS) 0.5 MG tablet Take 2 tablets (1 mg) by mouth 2 times daily Profile Rx: patient will contact pharmacy when needed 120 tablet 11    everolimus (ZORTRESS) 0.75 MG tablet Profile Rx: patient will contact pharmacy when needed 120 tablet 11    Fiber-Vitamins-Minerals ([employer] FIBER GUMMIES) CHEW Take 3 tablets by mouth daily      fludrocortisone (FLORINEF) 0.1 MG tablet Take 1 tablet (0.1 mg) by mouth daily 90 tablet 1    letermovir (PREVYMIS) 480 MG TABS tablet Take 1 tablet (480 mg) by mouth daily 30 tablet 1    magnesium oxide (MAG-OX) 400 MG tablet Take 1 tablet (400 mg) by mouth 2 times daily With lunch and supper 180 tablet 0    metroNIDAZOLE (FLAGYL) 500 MG tablet Take 1 tablet (500 mg) by mouth 3 times daily (with meals) 42 tablet 0    Prenatal Vit-Fe Fumarate-FA (PRENATAL MULTIVITAMIN W/IRON) 27-0.8 MG tablet Take 1 tablet by mouth daily 30 tablet 11    sodium bicarbonate 650 MG tablet Take 2 tablets (1,300 mg) by mouth 3 times daily 540 tablet 3    sodium zirconium cyclosilicate (LOKELMA) 10 g PACK packet Take 1 packet (10 g) by mouth daily 30 packet 1    tacrolimus (GENERIC EQUIVALENT) 1 MG capsule Profile Rx: patient will contact pharmacy when needed 60 capsule 11    tacrolimus (GENERIC EQUIVALENT) 5 MG capsule Take 1 capsule (5 mg) by mouth 2 times daily 60 capsule 11    tacrolimus (GENERIC) 0.5 MG capsule HOLD       No current facility-administered medications for this visit.     There are no discontinued medications.      Physical Exam   Vital Signs: There were no vitals taken for this visit.    GENERAL APPEARANCE: alert and no distress  HENT: no obvious abnormalities on appearance  RESP: breathing appears unremarkable with normal rate, no audible wheezing or  cough and no apparent shortness of breath with conversation  MS: extremities normal - no gross deformities noted  SKIN: no apparent rash and normal skin tone  NEURO: speech is clear with no obvious neurological deficits  PSYCH: mentation appears normal and affect normal    Data         Latest Ref Rng & Units 4/15/2024     9:30 AM 4/14/2024     9:58 AM 4/11/2024     9:38 AM   Renal   Sodium 135 - 145 mmol/L 140  140  138    K 3.4 - 5.3 mmol/L 4.1  4.6  5.9    Cl 98 - 107 mmol/L 104  106  110    Cl (external) 98 - 107 mmol/L 104  106  110    CO2 22 - 29 mmol/L 25  20  18    Urea Nitrogen 8.0 - 23.0 mg/dL 32.4  37.4  37.2    Creatinine 0.67 - 1.17 mg/dL 1.73  2.11  1.81    Glucose 70 - 99 mg/dL 95  111  101    Calcium 8.8 - 10.2 mg/dL 8.7  9.0  8.3          Latest Ref Rng & Units 4/5/2024    10:22 AM 3/1/2024     9:45 AM 2/2/2024     9:37 AM   Bone Health   Phosphorus 2.5 - 4.5 mg/dL 3.2  3.1  2.2          Latest Ref Rng & Units 4/14/2024     9:58 AM 4/7/2024     9:55 AM 4/5/2024    10:22 AM   Heme   WBC 4.0 - 11.0 10e3/uL 0.7  1.1  0.9    Hgb 13.3 - 17.7 g/dL 8.6  9.0  8.9    Plt 150 - 450 10e3/uL 147  214  223    ABSOLUTE NEUTROPHIL 1.6 - 8.3 10e3/uL   0.3    ABSOLUTE LYMPHOCYTES 0.8 - 5.3 10e3/uL   0.4    ABSOLUTE MONOCYTES 0.0 - 1.3 10e3/uL   0.1    ABSOLUTE EOSINOPHILS 0.0 - 0.7 10e3/uL   0.1          Latest Ref Rng & Units 1/25/2024    10:31 PM 11/16/2023     9:05 AM 10/28/2023     4:06 AM   Liver   AP 40 - 150 U/L 139   57    TBili <=1.2 mg/dL 0.4   0.3    Bilirubin Direct 0.00 - 0.30 mg/dL <0.20      ALT 0 - 70 U/L 41   12    AST 0 - 45 U/L 35   13    Tot Protein 6.4 - 8.3 g/dL 7.7   7.2    Albumin 3.5 - 5.2 g/dL 4.5  3.9  4.5          Latest Ref Rng & Units 10/28/2023     4:07 AM 10/16/2021     9:42 AM 3/20/2018     4:09 PM   Pancreas   A1C <5.7 % 5.0   4.9    Lipase 73 - 393 U/L  461           Latest Ref Rng & Units 4/5/2024    10:22 AM 3/13/2024     8:50 AM 2/18/2024     9:37 AM   Iron studies   Iron 61 -  157 ug/dL 52  17  60    Iron Sat Index 15 - 46 % 24  8  21    Ferritin 31 - 409 ng/mL 1,392  2,283  4,023          3/25/2024    11:11 AM 3/13/2024     8:50 AM 1/28/2024     7:29 AM   [facility]   EBV DNA LOG OF COPIES 4.7  4.6  4.1      Failed to redirect to the Timeline version of the [employer] SmartLink.  Recent Labs   Lab Test 04/05/24  1022 04/07/24  0955 04/14/24  0958   DOSTAC 4/4/2024 4/6/2024 4/13/2024   TACROL 10.8 11.9 5.9     Recent Labs   Lab Test 12/14/23  1018 12/21/23  0738 01/02/24  1000 01/30/24  0847 01/31/24  0922   DOSMPA 12/13/2023   8:00 PM  --   --   --  1/30/2024   8:00 PM   MPACID 1.09   < > 0.67* 0.38* 0.35*   MPAG 105.7*   < > 43.0 22.0* 15.7*    < > = values in this interval not displayed.

## 2024-04-15 NOTE — LETTER
4/15/2024         RE: Matteo Barnes  [id number]  [place] [address]        Dear Colleague,    Thank you for referring your patient, Matteo Barnes, to the [facility]. Please see a copy of my visit note below.    Virtual Visit Details    Type of service:  Video Visit     Originating Location (pt. Location): Home  Distant Location (provider location):  Off-site  Platform used for Video Visit: [facility]      TRANSPLANT NEPHROLOGY EARLY POST TRANSPLANT VISIT    Assessment & Plan  # DDKT: Stable kidney function at ~ 1.8-2.1 lately, which is significantly higher than initial baseline closer to ~ 1.2-1.4.  Kidney transplant biopsy 3/27/24 showed no acute rejection, but had acute tubular injury with mild to moderate vascular changes.  Concern that patient has had ongoing prerenal issues with abdominal pain (diverticulitis) with poor oral intake.  He has been receiving IV fluids lately.   - Baseline Creatinine: ~1.2-1.4   - Proteinuria: Normal (<0.2 grams)   - Date DSA Last Checked: Mar/2024      Latest DSA: No cPRA: 53%   - BK Viremia: No   - Kidney Tx Biopsy: Mar 27, 2024; Result: No diagnostic evidence of acute rejection.   Acute tubular injury.  Mild arterial sclerosis and moderate arteriolar hyalinosis.   - Transplant Ureteral Stent: Removed    # Immunosuppression: Tacrolimus immediate release (goal 8-10) and Everolimus (goal 4-6)   - Induction with Recent Transplant:  High Intensity Protocol due to DGF   - Continue with intensive monitoring of immunosuppression for efficacy and toxicity.   - Changed from mycophenolate to everolimus due to GI symptoms and leukopenia.   - Changes: Not at this time; If creatinine doesn't improve with hydration over time, may need to consider changing tacrolimus to belatacept (although will have to manage EBV viremia first.    # Infection Prophylaxis:   - PJP: Inhaled pentamidine  - CMV:  Letermovir .  Patient is CMV IgG Ab discordant (D+/R-) and  will continue on Letermovir x 6 months, then check CMV PCR monthly until 12 months post transplant. Valcyte switched to Letermovir on 1/29/24 due to neutropenia.     # Hypertension: Borderline control;  Goal BP: < 130/80   - Volume status: Euvolemic     - Changes: Not at this time and best to keep BP good with prerenal issues.    # Anemia in Chronic Renal Disease: Hgb: Trend down, low    JOSE: No   - Iron studies: Low iron saturation, but high ferritin   - Will work up further with CMV, EBV and parvovirus PCR.    # Leukopenia: Stable, moderately low WBC in the ~ 0.8-1.6 range over the couple of months or so.  Likely medication related.  Patient was changed off mycophenolate and Valcyte due to leukopenia.  He has received filgrastim at times for ANC <0.5.   - With both leukopenia and now trend down in hemoglobin, may consider Hematology referral if no improvement.    # Mineral Bone Disorder:    - Secondary renal hyperparathyroidism; PTH level: Mildly elevated (151-300 pg/ml)        On treatment: None   - Vitamin D; level: Normal        On supplement: No   - Calcium; level: Normal        On supplement: No    # Electrolytes:   - Potassium; level: Normal   On supplement: No  - Magnesium; level: Stable low        On supplement: Yes  - Bicarbonate; level: Stable low        On supplement: Yes    # EBV Viremia: Trend up, moderately elevated EBV PCR at ~ 54K with last check Mar/2024.  IgG level is normal.   - Will continue to follow EBV PCR every 2 weeks for now with increasing level.    # HFpEF: Last cardiac echo (Feb/2021) showed normal LVEF ~ 60-65% with grade I diastolic dysfunction. Stress echo 07/2023 commented on normal LV function and wall motion.    # Diarrhea/Abdominal Pain: Patient reports symptoms are only mild now and overall improved.  He has a h/o C. diff previously, but that was treated and resolved with testing negative recently.  Also taken off mycophenolate.  Previous  abdominal pain felt likely secondary  to diverticulitis.  Patient was seen by Transplant ID and started on Augmentin, which he completed.  CT abd/pelvis Mar/2024 showed left-sided diverticulitis involving the distal transverse colon.  Edema involving left abdominal bowel loops with a few distended small bowel loops that may represent an ileus. No abscess identified.  Patient reports a distant history of diverticulitis ~ 20 years ago.    # Weight Loss: Patient had considerable weight loss with previous diarrhea due to C. diff, but weight stabilized and now up ~ 5 lbs lately.    # Night Sweats: Only occasional, mild episodes and not new.    # Medical Compliance: Yes    # Health Maintenance and Vaccination Review: Not Reviewed    # Transplant History:  Etiology of Kidney Failure: Focal segmental glomerulosclerosis (FSGS), likely secondary  Tx: DDKT  Transplant: 10/28/2023 (Kidney)  Donor Type: Donation after Brain Death Donor Class:   Crossmatch at time of Tx: negative  DSA at time of Tx: No  Significant changes in immunosuppression: None  CMV IgG Ab High Risk Discordance (D+/R-): Yes  EBV IgG Ab High Risk Discordance (D+/R-): No  Significant transplant-related complications: DGF    Transplant Office Phone Number: [phone]    Assessment and plan was discussed with the patient and he voiced his understanding and agreement.    Return visit: Return in about 3 months (around 7/15/2024).    Marcel Dale MD    The longitudinal plan of care for kidney transplant was addressed during this visit. Due to the added complexity in care, I will continue to support Matteo Barnes in the subsequent management of this condition(s) and with the ongoing continuity of care of this condition(s).    Chief Complaint  Mr. Barnes is a 68 year old here for kidney transplant and immunosuppression management.    History of Present Illness   Mr. Barnes reports feeling okay overall with some medical complaints.  Since last clinic visit, patient reports no  "hospitalizations or new medical complaints and has been doing well overall.  Patient's creatinine has been higher lately and he underwent a kidney transplant biopsy 3/27/24, which showed no acute rejection, but had acute tubular injury and mild to moderate vascular changes.    His energy level is only \"so-so,\" with no recent change.  He did have a bad headache last week, which affected how he felt, but that has since resolved.  He is active and getting some exercise now and reports starting to get a little stronger.  Denies any chest pain or shortness of breath with exertion.  No leg swelling.    Appetite is also \"not the best,\" although it is starting to come back, but still up and down.  His weight is up ~ 5 lbs.  Patient has been receiving IV fluids twice a week as he still isn't drinking a lot of fluids, maybe ~ 1 liter a day.  No nausea or vomiting.  Occasional slightly loose stools.  In addition, some ongoing, mild abdominal pain, although overall much better.  No heartburn symptoms.  No fever, sweats or chills.  Occasional, mild night sweats.    Home BP:  130/60s    Problem List  Patient Active Problem List   Diagnosis     Dyslipidemia     HTN, kidney transplant related     Erectile dysfunction     Stage 3b chronic kidney disease (H)     Metabolic acidosis     Hyperkalemia     Immunosuppressed status (H24)     Kidney replaced by transplant     Aftercare following organ transplant     Neutropenia (H24)     Need for pneumocystis prophylaxis     Anemia in chronic renal disease     Hypomagnesemia     (HFpEF) heart failure with preserved ejection fraction (H)     EBV (Yaritza-Barr virus) viremia     Dehydration       Allergies  No Known Allergies    Medications  Current Outpatient Medications   Medication Sig Dispense Refill     acetaminophen (TYLENOL) 500 MG tablet Take 500-1,000 mg by mouth every 6 hours as needed for mild pain       ALPRAZolam (XANAX) 0.25 MG tablet TAKE 1 TABLET (0.25 MG) BY MOUTH DAILY AS " NEEDED FOR ANXIETY 20 tablet 0     amLODIPine (NORVASC) 5 MG tablet Take 1 tablet (5 mg) by mouth daily for 90 days 90 tablet 3     atorvastatin (LIPITOR) 10 MG tablet Take 1 tablet (10 mg) by mouth daily 30 tablet 11     carvedilol (COREG) 6.25 MG tablet Take 1 tablet (6.25 mg) by mouth 2 times daily (with meals) Hold for systolic blood pressure < 140.       ciprofloxacin (CIPRO) 500 MG tablet Take 1 tablet (500 mg) by mouth 2 times daily 1 hour apart from magnesium, vitamin, dairy, calcium 28 tablet 0     everolimus (ZORTRESS) 0.5 MG tablet Take 2 tablets (1 mg) by mouth 2 times daily Profile Rx: patient will contact pharmacy when needed 120 tablet 11     everolimus (ZORTRESS) 0.75 MG tablet Profile Rx: patient will contact pharmacy when needed 120 tablet 11     Fiber-Vitamins-Minerals ([employer] FIBER GUMMIES) CHEW Take 3 tablets by mouth daily       fludrocortisone (FLORINEF) 0.1 MG tablet Take 1 tablet (0.1 mg) by mouth daily 90 tablet 1     letermovir (PREVYMIS) 480 MG TABS tablet Take 1 tablet (480 mg) by mouth daily 30 tablet 1     magnesium oxide (MAG-OX) 400 MG tablet Take 1 tablet (400 mg) by mouth 2 times daily With lunch and supper 180 tablet 0     metroNIDAZOLE (FLAGYL) 500 MG tablet Take 1 tablet (500 mg) by mouth 3 times daily (with meals) 42 tablet 0     Prenatal Vit-Fe Fumarate-FA (PRENATAL MULTIVITAMIN W/IRON) 27-0.8 MG tablet Take 1 tablet by mouth daily 30 tablet 11     sodium bicarbonate 650 MG tablet Take 2 tablets (1,300 mg) by mouth 3 times daily 540 tablet 3     sodium zirconium cyclosilicate (LOKELMA) 10 g PACK packet Take 1 packet (10 g) by mouth daily 30 packet 1     tacrolimus (GENERIC EQUIVALENT) 1 MG capsule Profile Rx: patient will contact pharmacy when needed 60 capsule 11     tacrolimus (GENERIC EQUIVALENT) 5 MG capsule Take 1 capsule (5 mg) by mouth 2 times daily 60 capsule 11     tacrolimus (GENERIC) 0.5 MG capsule HOLD       No current facility-administered medications for this visit.      There are no discontinued medications.      Physical Exam  Vital Signs: There were no vitals taken for this visit.    GENERAL APPEARANCE: alert and no distress  HENT: no obvious abnormalities on appearance  RESP: breathing appears unremarkable with normal rate, no audible wheezing or cough and no apparent shortness of breath with conversation  MS: extremities normal - no gross deformities noted  SKIN: no apparent rash and normal skin tone  NEURO: speech is clear with no obvious neurological deficits  PSYCH: mentation appears normal and affect normal    Data        Latest Ref Rng & Units 4/15/2024     9:30 AM 4/14/2024     9:58 AM 4/11/2024     9:38 AM   Renal   Sodium 135 - 145 mmol/L 140  140  138    K 3.4 - 5.3 mmol/L 4.1  4.6  5.9    Cl 98 - 107 mmol/L 104  106  110    Cl (external) 98 - 107 mmol/L 104  106  110    CO2 22 - 29 mmol/L 25  20  18    Urea Nitrogen 8.0 - 23.0 mg/dL 32.4  37.4  37.2    Creatinine 0.67 - 1.17 mg/dL 1.73  2.11  1.81    Glucose 70 - 99 mg/dL 95  111  101    Calcium 8.8 - 10.2 mg/dL 8.7  9.0  8.3          Latest Ref Rng & Units 4/5/2024    10:22 AM 3/1/2024     9:45 AM 2/2/2024     9:37 AM   Bone Health   Phosphorus 2.5 - 4.5 mg/dL 3.2  3.1  2.2          Latest Ref Rng & Units 4/14/2024     9:58 AM 4/7/2024     9:55 AM 4/5/2024    10:22 AM   Heme   WBC 4.0 - 11.0 10e3/uL 0.7  1.1  0.9    Hgb 13.3 - 17.7 g/dL 8.6  9.0  8.9    Plt 150 - 450 10e3/uL 147  214  223    ABSOLUTE NEUTROPHIL 1.6 - 8.3 10e3/uL   0.3    ABSOLUTE LYMPHOCYTES 0.8 - 5.3 10e3/uL   0.4    ABSOLUTE MONOCYTES 0.0 - 1.3 10e3/uL   0.1    ABSOLUTE EOSINOPHILS 0.0 - 0.7 10e3/uL   0.1          Latest Ref Rng & Units 1/25/2024    10:31 PM 11/16/2023     9:05 AM 10/28/2023     4:06 AM   Liver   AP 40 - 150 U/L 139   57    TBili <=1.2 mg/dL 0.4   0.3    Bilirubin Direct 0.00 - 0.30 mg/dL <0.20      ALT 0 - 70 U/L 41   12    AST 0 - 45 U/L 35   13    Tot Protein 6.4 - 8.3 g/dL 7.7   7.2    Albumin 3.5 - 5.2 g/dL 4.5  3.9  4.5           Latest Ref Rng & Units 10/28/2023     4:07 AM 10/16/2021     9:42 AM 3/20/2018     4:09 PM   Pancreas   A1C <5.7 % 5.0   4.9    Lipase 73 - 393 U/L  461           Latest Ref Rng & Units 4/5/2024    10:22 AM 3/13/2024     8:50 AM 2/18/2024     9:37 AM   Iron studies   Iron 61 - 157 ug/dL 52  17  60    Iron Sat Index 15 - 46 % 24  8  21    Ferritin 31 - 409 ng/mL 1,392  2,283  4,023          3/25/2024    11:11 AM 3/13/2024     8:50 AM 1/28/2024     7:29 AM   [facility] Virology   EBV DNA LOG OF COPIES 4.7  4.6  4.1      Failed to redirect to the Timeline version of the REVFS SmartLink.  Recent Labs   Lab Test 04/05/24  1022 04/07/24  0955 04/14/24  0958   DOSTAC 4/4/2024 4/6/2024 4/13/2024   TACROL 10.8 11.9 5.9     Recent Labs   Lab Test 12/14/23  1018 12/21/23  0738 01/02/24  1000 01/30/24  0847 01/31/24  0922   DOSMPA 12/13/2023   8:00 PM  --   --   --  1/30/2024   8:00 PM   MPACID 1.09   < > 0.67* 0.38* 0.35*   MPAG 105.7*   < > 43.0 22.0* 15.7*    < > = values in this interval not displayed.         Again, thank you for allowing me to participate in the care of your patient.        Sincerely,        Marcel Dale MD

## 2024-04-22 PROBLEM — K52.9 COLITIS: Status: ACTIVE | Noted: 2024-01-01

## 2024-04-22 PROBLEM — N18.32 STAGE 3B CHRONIC KIDNEY DISEASE (H): Status: ACTIVE | Noted: 2022-05-31

## 2024-04-22 PROBLEM — Z94.0 KIDNEY REPLACED BY TRANSPLANT: Status: ACTIVE | Noted: 2023-01-01

## 2024-04-22 PROBLEM — E87.5 HYPERKALEMIA: Status: ACTIVE | Noted: 2023-01-01

## 2024-04-22 PROBLEM — N28.9 RENAL INSUFFICIENCY: Status: ACTIVE | Noted: 2024-01-01

## 2024-04-22 NOTE — PATIENT INSTRUCTIONS
Patient Recommendations:  - Would continue to push fluids, close to 2 liters.    Transplant Patient Information  Your Post Transplant Coordinator is: Elsa Adrian  For non urgent items, we encourage you to contact your coordinator/care team online via [employer]  You and your care team can also contact your transplant coordinator Monday - Friday, 8am - 5pm at [phone] (Option 2 to reach the coordinator or Option 4 to schedule an appointment).  After hours for urgent matters, please call [facility] at [phone].

## 2024-04-22 NOTE — ED TRIAGE NOTES
Pt is complaining of LLQ pain that has been ongoing for 4 days. Pt has a history of diverticulitis, and states it feels like that. Pt endorses weakness, N/V, 3 days of diarrhea, decreased appetite.     Pt denies fever/chills, urinary involvement.     A/O x 4

## 2024-04-23 NOTE — CONSULTS
GASTROENTEROLOGY CONSULTATION      Matteo Barnes  [address] [address]  68 year old male     Admission Date/Time: 4/22/2024  Primary Care Provider: Dutch Duke     We were asked to see the patient in consultation by Dr. Starr for evaluation of colitis.    CC: Left lower quadrant pain     HPI:  Matteo Barnes is a 68 year old male with a past medical history of gout, hyperlipidemia, hypertension, diabetes, and chronic anemia, chronic kidney disease, status post kidney transplant, on immunosuppression with mycophenolate, and tacrolimus, prophylaxis with letermovir, and pentamidine, inhalation who presented to ED for evaluation of chronic left lower quadrant pain.  He was noted to have colitis.      Admission labs notable for sodium 129, potassium 3.2, creatinine 2.09, blood urea nitrogen 55.5, albumin 3.2, total protein 6.2.  Pancytopenia (WBC 2.3, hemoglobin 9.9, RBC 3.63), platelets within normal limits at 240.    Cross-sectional imaging notable for colitis extending from splenic flexure through the rectum. Air-fluid levels in the proximal half of the colon which can be associated with changes of diarrhea. Significant atrophy of the native kidneys. Stable stranding of the fat surrounding the right hemipelvis transplant kidney urinary tract. New minute pericardial effusion and left pleural effusion.     Screening colonoscopy completed in May 2023 was notable for three 5 to 7 mm polyp in the transverse colon, a 3 mm polyp in the rectum, diverticulosis of the entire colon.  There was angulation in the sigmoid colon with diverticulosis associated colitis.  Biopsy showed 1 hyperplastic polyp and 3 tubular adenoma without high-grade dysplasia.    Patient is uncomfortable today. He is nauseated, and dry heaving. He reports that the diarrhea started four days ago. He had 4 brown, loose stools yesterday. He took imodium 1 tablet ever 4 hours. Denies chronic upper GI symptoms such as GERD,  dysphagia nausea or vomiting. No black or bloody stools.        PAST MEDICAL HISTORY:  Patient Active Problem List    Diagnosis Date Noted    Colitis 04/22/2024     Priority: Medium    Renal insufficiency 04/22/2024     Priority: Medium    Dehydration 03/20/2024     Priority: Medium    Need for pneumocystis prophylaxis 03/13/2024     Priority: Medium    Anemia in chronic renal disease 03/13/2024     Priority: Medium    Hypomagnesemia 03/13/2024     Priority: Medium    (HFpEF) heart failure with preserved ejection fraction (H) 03/13/2024     Priority: Medium    EBV (Yaritza-Barr virus) viremia 03/13/2024     Priority: Medium    Neutropenia (H24) 01/23/2024     Priority: Medium    Aftercare following organ transplant 12/01/2023     Priority: Medium    Kidney replaced by transplant 11/17/2023     Priority: Medium    Hyperkalemia 10/31/2023     Priority: Medium    Immunosuppressed status (H24) 10/31/2023     Priority: Medium    Stage 3b chronic kidney disease (H) 05/31/2022     Priority: Medium    Metabolic acidosis 05/31/2022     Priority: Medium    Erectile dysfunction 06/25/2019     Priority: Medium    HTN, kidney transplant related 12/01/2015     Priority: Medium    Dyslipidemia 10/08/2015     Priority: Medium          ROS: A comprehensive ten point review of systems was negative aside from those in mentioned in the HPI.       MEDICATIONS:   Prior to Admission medications    Medication Sig Start Date End Date Taking? Authorizing Provider   everolimus (ZORTRESS) 0.75 MG tablet Profile Rx: patient will contact pharmacy when needed  Patient taking differently: Take 0.75 mg by mouth 2 times daily Profile Rx: patient will contact pharmacy when needed 3/28/24  Yes Marcus Garcia MD   tacrolimus (GENERIC EQUIVALENT) 5 MG capsule Take 1 capsule (5 mg) by mouth 2 times daily 4/8/24  Yes Marcus Garcia MD   acetaminophen (TYLENOL) 500 MG tablet Take 500-1,000 mg by mouth every 6 hours as needed for mild pain    Unknown,  Entered By History   ALPRAZolam (XANAX) 0.25 MG tablet TAKE 1 TABLET (0.25 MG) BY MOUTH DAILY AS NEEDED FOR ANXIETY 4/1/24   Dutch Duke DO   amLODIPine (NORVASC) 5 MG tablet Take 1 tablet (5 mg) by mouth daily for 90 days 2/13/24 5/13/24  Marcus Garcia MD   atorvastatin (LIPITOR) 10 MG tablet Take 1 tablet (10 mg) by mouth daily 11/2/23   Amparo Decker PA-C   carvedilol (COREG) 6.25 MG tablet Take 1 tablet (6.25 mg) by mouth 2 times daily (with meals) Hold for systolic blood pressure < 140. 1/29/24   Kristan Wong PA-C   ciprofloxacin (CIPRO) 500 MG tablet Take 1 tablet (500 mg) by mouth 2 times daily 1 hour apart from magnesium, vitamin, dairy, calcium 3/18/24   Joanna William MD   everolimus (ZORTRESS) 0.5 MG tablet Take 2 tablets (1 mg) by mouth 2 times daily Profile Rx: patient will contact pharmacy when needed  Patient not taking: Reported on 4/23/2024 3/28/24   Marcus Garcia MD   Fiber-Vitamins-Minerals ([employer] FIBER GUMMIES) CHEW Take 3 tablets by mouth daily 2/13/24   Marcus Garcia MD   fludrocortisone (FLORINEF) 0.1 MG tablet Take 1 tablet (0.1 mg) by mouth daily 4/8/24   Marcus Garcia MD   letermovir (PREVYMIS) 480 MG TABS tablet Take 1 tablet (480 mg) by mouth daily 3/13/24   Marcel Dale MD   magnesium oxide (MAG-OX) 400 MG tablet Take 1 tablet (400 mg) by mouth 2 times daily With lunch and supper 3/18/24   Dutch Duke DO   metroNIDAZOLE (FLAGYL) 500 MG tablet Take 1 tablet (500 mg) by mouth 3 times daily (with meals) 3/18/24   Joanna William MD   Prenatal Vit-Fe Fumarate-FA (PRENATAL MULTIVITAMIN W/IRON) 27-0.8 MG tablet Take 1 tablet by mouth daily 1/30/24   Kristan Wong PA-C   sodium bicarbonate 650 MG tablet Take 2 tablets (1,300 mg) by mouth 3 times daily 4/11/24   Marcus Garcia MD   sodium zirconium cyclosilicate (LOKELMA) 10 g PACK packet Take 1 packet (10 g) by mouth daily 4/11/24   Marcus Garcia MD   tacrolimus (GENERIC EQUIVALENT) 1 MG  "capsule Profile Rx: patient will contact pharmacy when needed  Patient not taking: Reported on 4/23/2024 4/8/24   Marcus Garcia MD        ALLERGIES: No Known Allergies     SOCIAL HISTORY:  Social History     Tobacco Use    Smoking status: Never     Passive exposure: Never    Smokeless tobacco: Never   Vaping Use    Vaping status: Never Used   Substance Use Topics    Alcohol use: Not Currently     Alcohol/week: 0.0 standard drinks of alcohol    Drug use: No        FAMILY HISTORY:  Family History   Problem Relation Age of Onset    Hypertension Mother     Hyperlipidemia Mother     Myocardial Infarction Mother 72    Diabetes No family hx of     Coronary Artery Disease No family hx of     Cerebrovascular Disease No family hx of     Colon Cancer No family hx of     Prostate Cancer No family hx of     Breast Cancer No family hx of     Kidney Disease No family hx of         PHYSICAL EXAM:   /84   Pulse 103   Temp 97.4  F (36.3  C) (Oral)   Resp 22   Ht 1.753 m (5' 9\")   Wt 74.8 kg (165 lb)   SpO2 97%   BMI 24.37 kg/m       General: alert, oriented, NAD,Chronically ill appearing   SKIN: no suspicious lesions, rashes, jaundice, or spider angiomas  EYES: No scleral icterus  RESPIRATORY: Non labored breathing, Lungs clear  CARDIOVASCULAR:  RRR. No murmurs, clicks gallops or rub  GASTROINTESTINAL: hypoactive bowel sounds,  abdomen firm, left lower abdominal tenderness on palpation.  JOINT/EXTREMITIES: extremities normal- no gross deformities noted.  edema .   NEURO: Grossly WNL.  PSYCH: no abnormal anxiety/depression       LABS:  I reviewed the patient's new clinical lab test results.   Recent Labs   Lab Test 04/22/24  1745 04/14/24  0958 04/07/24  0955 03/31/24  0951 03/27/24  0914 10/28/23  0407 10/28/23  0406 10/19/21  0640 10/18/21  0653   WBC 2.3* 0.7* 1.1*   < >  --    < >  --    < > 7.5   HGB 9.9* 8.6* 9.0*   < >  --    < >  --    < > 9.2*   MCV 79 88 91   < >  --    < >  --    < > 92    147* 214   < " >  --    < >  --    < > 168   INR  --   --   --   --  1.20*  --  1.13  --  1.05    < > = values in this interval not displayed.     Recent Labs   Lab Test 04/22/24  1745 04/15/24  0930 04/14/24  0958   * 140 140   POTASSIUM 3.2* 4.1 4.6   CHLORIDE 91* 104 106   CO2 21* 25 20*   BUN 55.5* 32.4* 37.4*   ANIONGAP 17* 11 14   ANNETTE 8.6* 8.7* 9.0     Recent Labs   Lab Test 04/23/24  0512 04/22/24  1745 04/02/24  1008 03/08/24  1018 01/25/24  2231 11/16/23  0905 10/28/23  0406 10/16/21  1056 10/16/21  0942   ALBUMIN  --  3.2*  --   --  4.5 3.9 4.5   < > 3.7   BILITOTAL  --  0.7  --   --  0.4  --  0.3   < > 0.4   ALT  --  8  --   --  41  --  12   < > 26   AST  --  28  --   --  35  --  13   < > 25   ALKPHOS  --  59  --   --  139  --  57   < > 68   PROTEIN 70*  --  Negative 30* 50*  --   --    < >  --    LIPASE  --   --   --   --   --   --   --   --  461*    < > = values in this interval not displayed.        IMAGING/PROCEDURES:  CT AP 4/23  IMPRESSION:   1.  Significant findings of colitis extending from the splenic flexure through the rectum. This is nonspecific, however, this distribution can be associated changes of DION ischemia.     2.  Air-fluid levels in the proximal half of the colon which can be associated with changes of diarrhea.     3.  No free air or abscess.     4.  Significant atrophy of the native kidneys.     5.  Stable stranding of the fat surrounding the right hemipelvis transplant kidney urinary tract.     6.  New minute pericardial effusion and left pleural effusion.    Colonoscopy 5/2023  Findings:       Three sessile polyps were found in the transverse colon. The polyps were        5 to 7 mm in size. These polyps were removed with a cold snare.        Resection and retrieval were complete.        A 3 mm polyp was found in the rectum. The polyp was sessile. The polyp        was removed with a cold biopsy forceps. Resection and retrieval were        complete.        Multiple diverticula were found in  the entire colon.                                                                                    Impression:        - Three 5 to 7 mm polyps in the transverse colon,                             removed with a cold snare. Resected and retrieved.                             - One 3 mm polyp in the rectum, removed with a cold                             biopsy forceps. Resected and retrieved.                             - Diverticulosis in the entire examined colon.                             There was angulation in the sigmoid with                             diverticulosis associated colitis.     I personally reviewed the patient's new imaging results.    Problem list pertaining to GI:  Chronic LLQ pain  Colitis  Immunosuppression  Leukopenia  Anemia    Assessment: This is a 68 y-o male with chronic kidney disease, s/p renal transplant on immunosuppression presented to ED for an evaluation of chronic LLQ pain, diarrhea. Blood counts notable for worsening kidney function, electrolyte abnormality pancytopenia. CT findings concerning for colitis. Differentials are infectious or inflammatory or ischemic colitis.      Anemia could be multifactorial. No signs of over GI bleed. Iron 52, low IBC. But Ferritin at 1,392.     Plan:  - Continue prophylactic antiviral and antibiotics   - Collect tool studies  - Continue oral vancomycin   - IVF and IV antiemetics  - Nephrology following  - Consider colonoscopy in 6 to 8 weeks    I will discuss with Dr. Burciaga    Thank you for allowing me to participate in the care of this patient.  Please contact me with any questions or concerns.    Total time spent:  Approximately, 45 minutes was spent providing patient care, including patient evaluation, reviewing documentation/test results, and . Thank you for asking us to participate in the care of this patient.    Deborah Kilgore CNP   [facility] ([facility])  Mobile # [phone]

## 2024-04-23 NOTE — PLAN OF CARE
"  Problem: Adult Inpatient Plan of Care  Goal: Plan of Care Review  Description: The Plan of Care Review/Shift note should be completed every shift.  The Outcome Evaluation is a brief statement about your assessment that the patient is improving, declining, or no change.  This information will be displayed automatically on your shift  note.  Outcome: Progressing  Flowsheets (Taken 4/23/2024 0341)  Outcome Evaluation: pt c/o 8/10 left lower abd pain. IV PRN dilaudid given. Reports of diarrhea. clear liquid diet.  Plan of Care Reviewed With: patient  Overall Patient Progress: no change  Goal: Patient-Specific Goal (Individualized)  Description: You can add care plan individualizations to a care plan. Examples of Individualization might be:  \"Parent requests to be called daily at 9am for status\", \"I have a hard time hearing out of my right ear\", or \"Do not touch me to wake me up as it startles  me\".  Outcome: Progressing  Goal: Absence of Hospital-Acquired Illness or Injury  Outcome: Progressing  Intervention: Identify and Manage Fall Risk  Recent Flowsheet Documentation  Taken 4/22/2024 2126 by Tae Saenz RN  Safety Promotion/Fall Prevention:   assistive device/personal items within reach   safety round/check completed  Intervention: Prevent Skin Injury  Recent Flowsheet Documentation  Taken 4/22/2024 2126 by Tae Saenz RN  Body Position: position changed independently  Goal: Optimal Comfort and Wellbeing  Outcome: Progressing  Intervention: Monitor Pain and Promote Comfort  Recent Flowsheet Documentation  Taken 4/23/2024 0309 by Tae Saenz RN  Pain Management Interventions: medication (see MAR)  Taken 4/22/2024 2126 by Tae Saenz RN  Pain Management Interventions: medication (see MAR)  Intervention: Provide Person-Centered Care  Recent Flowsheet Documentation  Taken 4/22/2024 2126 by Tae Saenz RN  Trust Relationship/Rapport: care explained  Goal: Readiness for Transition of Care  Outcome: Progressing   Goal Outcome " Evaluation:      Plan of Care Reviewed With: patient    Overall Patient Progress: no changeOverall Patient Progress: no change    Outcome Evaluation: pt c/o 8/10 left lower abd pain. IV PRN dilaudid given. Reports of diarrhea. clear liquid diet.

## 2024-04-23 NOTE — PROGRESS NOTES
Marcel Dale MD Reilly, Megan, RN  Please check the following labs: CMV PCR, EBV PCR, Parvovirus PCR, and CD4 level.
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
## 2024-04-23 NOTE — PLAN OF CARE
Goal Outcome Evaluation:  [facility]    ED Boarding Nurse Handoff Addendum Report:    Date/time: 4/23/2024, 2:33 PM    Activity Level: assist of 1    Fall Risk: Yes:  nonskid shoes/slippers when out of bed and patient and family education    Active Infusions: Yes - LR    Current Meds Due: yes    Current care needs: Pain management, nausea, stool sample    Oxygen requirements (liters/min and/or FiO2): no    Respiratory status: Room air    Vital signs (within last 30 minutes):    Vitals:    04/23/24 0731 04/23/24 1009 04/23/24 1402 04/23/24 1427   BP: 137/84 118/81 133/87 132/77   BP Location:  Right arm     Patient Position:  Supine     Pulse: 103   106   Resp: 22   28   Temp: 97.4  F (36.3  C)   97.3  F (36.3  C)   TempSrc: Oral   Oral   SpO2:  95% 95% 97%   Weight:       Height:           Focused assessment within last 30 minutes:    Pt a&O x4. Clear liquid diet but not able to eat. Pt needing frequent PRN dilaudud 2/2 pain. Pt unable to keep down much water/ oral medications - dry heaves with vomit. Pt given zofran and compazine with slight relief. Needs stool sample still    ED Boarding Nurse name: Janette Cameron RN          *Meds sent with transport*

## 2024-04-23 NOTE — PLAN OF CARE
[facility]    ED Boarding Nurse Handoff Addendum Report:    Date/time: 4/23/2024, 6:50 AM    Activity Level: in bed    Fall Risk: No    Active Infusions: D5% 0.45% + Kcl 20 mEq    Current Meds Due: see MAR    Current care needs: pain control, c-diff rule out (needs stool sample), mag+k protocol, gastro & nephro consults    Oxygen requirements (liters/min and/or FiO2): no    Respiratory status: Room air    Vital signs (within last 30 minutes):    Vitals:    04/22/24 2047 04/22/24 2056 04/22/24 2100 04/22/24 2319   BP:  (!) 140/84 139/89 119/71   BP Location:    Right arm   Patient Position:    Semi-Ha's   Cuff Size:    Adult Regular   Pulse:  100 102 99   Resp:    16   Temp:    97.9  F (36.6  C)   TempSrc:    Oral   SpO2: 96% 100% 99% 97%   Weight:       Height:           Focused assessment within last 30 minutes:    A&Ox4. VSS. Pt reports 8/10 lower abd pain. PRN IV dilaudid given. Make needs known.     ED Boarding Nurse name: Tae Saenz RN  RECEIVING UNIT ED HANDOFF REVIEW    Above ED Nurse Handoff Report was reviewed: Yes  Reviewed by: Kaylin Bal RN on April 23, 2024 at 1:40 PM   VAL Camargo called the ED to inform them the note was read: no
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
## 2024-04-23 NOTE — CONSULTS
Nephrology Initial Consult  April 23, 2024      Matteo Barnes MRN:[id number] YOB: 1955  Date of Admission:4/22/2024  Primary care provider: Dutch Duke  Requesting physician: No att. providers found    ASSESSMENT AND RECOMMENDATIONS:   # DDKT:   Etiology of Kidney Failure: Focal segmental glomerulosclerosis (FSGS), likely secondary  Tx: DDKT  Transplant: 10/28/2023 (Kidney)  Initial baseline posttransplant was 1.2-1.4.  Recently has been running high around 1.8-2.1.    Kidney transplant biopsy 3/27/24 showed no acute rejection, but had acute tubular injury with mild to moderate vascular changes, and attributed to ongoing prerenal issues with abdominal pain/poor oral intake    -Creatinine 2.1-2.2 here.     # Immunosuppression: Tacrolimus immediate release (goal 8-10) and Everolimus (goal 4-6)                          - Changed from mycophenolate to everolimus due to GI symptoms and leukopenia.               # Infection Prophylaxis:   - PJP: Inhaled pentamidine ?  Not taking.  Will confirm with transplant team.  - CMV:  Letermovir .       # Hypertension: Controlled.     # Anemia in Chronic Renal Disease: Hgb: Better at 9.9              - Iron studies: Low iron saturation, but high ferritin     # Leukopenia: WBC count better than on recent check on 4/1422 at 0.7.  Attributed to medications.   Patient was changed off mycophenolate and Valcyte due to leukopenia.  He has received filgrastim at times for ANC <0.5.              # EBV Viremia: Trend up, moderately elevated EBV PCR at ~ 54K with last check .plan to do EBV PCR every 2 weeks for now with increasing level.        # Diarrhea/Abdominal Pain:   -Chronic-attributed to left-sided diverticulitis is seen in March CT scan  No acutely worse with left-sided colitis.  Being evaluated.  GI on board    # FEN -  Hyponatremia with a sodium of 126.  Corrects to about 128 for high glucose. metabolic acidosis with slightly raised anion gap in setting of  diarrhea and renal failure.      Discussion/recommendation  -Continue immunosuppression at current dose.  Patient confirms tacrolimus dose of 5/5 and everolimus dose of 0.75/0.75  -Currently getting hypotonic fluid which will bring sodium further down.  Switch to  cc an hour.  Can replace potassium separately as needed  -GI workup per primary team/GI  -He received 1 dose of ketorolac in ER.  Please avoid further doses of NSAIDs or IV contrast.    Patient should ideally be treated at [facility] under care of primary transplant team given his transplant is just over 6 months old and has had recently worsening renal function.    Thank you for the consult. Will continue to follow along with you .        Meeta Pickering MD  [facility] - Nephrology   [phone]        REASON FOR CONSULT: Renal transplant    HISTORY OF PRESENT ILLNESS:  Matteo Barnes is a 68 year old male with past medical history of hypertension, ESRD secondary to FSGS, status post DDKT in October 2023   Who presented with complaints of severe left lower quadrant abdominal pain for the last 4-5 days, associated with diarrhea.  His pain is severe enough to prevent him from ambulating and much worse than occasional chronic low left lower quadrant pain that he has been getting since transplant.  He has a chronic abdominal pain for last several months, ever since his transplant.  Previous evaluation have some left-sided diverticulitis.  History of C. difficile treated in the past.  He also describes persistent hiccups for the last several days.  Reports poor oral intake.  Recently he has been getting IV fluid twice a week .    On evaluation, stable blood pressure.  Tachycardic.  Afebrile.  He is visibly uncomfortable with abdominal pain.  Mild retching.  Labs-sodium 129, potassium 3.2, bicarb 21 with anion gap of 17, creatinine of 2.1 with a BUN of 55  CBC-WBC count 2.3, hemoglobin 9.9  CT abdomen/pelvis without  contrast-significant colitis extending from splenic flexure through rectum.  Significant air-fluid level seen in proximal half of colon, possibly related to diarrhea.  New minute pericardial and left pleural effusion.    PAST MEDICAL HISTORY:  Reviewed with patient on 04/23/2024  and is as listed in HPI.       MEDICATIONS:  PTA Meds  Prior to Admission medications    Medication Sig Last Dose Taking? Auth Provider Long Term End Date   acetaminophen (TYLENOL) 500 MG tablet Take 500-1,000 mg by mouth every 6 hours as needed for mild pain prn at prn Yes Unknown, Entered By History     ALPRAZolam (XANAX) 0.25 MG tablet TAKE 1 TABLET (0.25 MG) BY MOUTH DAILY AS NEEDED FOR ANXIETY prn at prn Yes Dutch Duke, DO     amLODIPine (NORVASC) 5 MG tablet Take 1 tablet (5 mg) by mouth daily for 90 days Past Week at - Yes Marcus Garcia MD Yes 5/13/24   atorvastatin (LIPITOR) 10 MG tablet Take 1 tablet (10 mg) by mouth daily Past Week at - Yes Amparo Decker PA-C Yes    B Complex-C-Folic Acid (DIALYVITE) TABS Take 1 tablet by mouth daily Past Week at - Yes Unknown, Entered By History No    carvedilol (COREG) 6.25 MG tablet Take 1 tablet (6.25 mg) by mouth 2 times daily (with meals) Hold for systolic blood pressure < 140. Past Week at - Yes Kristan Wong PA-C Yes    everolimus (ZORTRESS) 0.75 MG tablet Profile Rx: patient will contact pharmacy when needed  Patient taking differently: Take 0.75 mg by mouth 2 times daily Profile Rx: patient will contact pharmacy when needed Past Week at - Yes Marcus Garcia MD Yes    fludrocortisone (FLORINEF) 0.1 MG tablet Take 1 tablet (0.1 mg) by mouth daily Past Week at - Yes Marcus Garcia MD Yes    letermovir (PREVYMIS) 480 MG TABS tablet Take 1 tablet (480 mg) by mouth daily Past Week at - Yes Marcel Dale MD     magnesium oxide (MAG-OX) 400 MG tablet Take 1 tablet (400 mg) by mouth 2 times daily With lunch and supper Past Week at - Yes Dutch Duke DO      pentamidine (NEBUPENT) 300 MG neb solution Inhale 300 mg into the lungs every 28 days Gets in clinic Unknown at - Yes Unknown, Entered By History     sodium bicarbonate 650 MG tablet Take 2 tablets (1,300 mg) by mouth 3 times daily  Patient taking differently: Take 1,300 mg by mouth 2 times daily Past Week at - Yes Marcus Garcia MD     sodium zirconium cyclosilicate (LOKELMA) 10 g PACK packet Take 1 packet (10 g) by mouth daily Past Week at - Yes Marcus Garcia MD     tacrolimus (GENERIC EQUIVALENT) 5 MG capsule Take 1 capsule (5 mg) by mouth 2 times daily Past Week at - Yes Marcus Garcia MD     everolimus (ZORTRESS) 0.5 MG tablet Take 2 tablets (1 mg) by mouth 2 times daily Profile Rx: patient will contact pharmacy when needed  Patient not taking: Reported on 4/23/2024 Not Taking  Marcus Garcia MD Yes    Prenatal Vit-Fe Fumarate-FA (PRENATAL MULTIVITAMIN W/IRON) 27-0.8 MG tablet Take 1 tablet by mouth daily  Patient not taking: Reported on 4/23/2024 Not Taking  Kristan Wong PA-C     tacrolimus (GENERIC EQUIVALENT) 1 MG capsule Profile Rx: patient will contact pharmacy when needed  Patient not taking: Reported on 4/23/2024 Not Taking  Marcus Garcia MD        Current Meds  Current Facility-Administered Medications   Medication Dose Route Frequency Provider Last Rate Last Admin    acetaminophen (TYLENOL) tablet 975 mg  975 mg Oral TID Wayne Starr MD   975 mg at 04/23/24 0744    amLODIPine (NORVASC) tablet 5 mg  5 mg Oral QPM Danny Monk DO        carvedilol (COREG) tablet 6.25 mg  6.25 mg Oral BID w/meals Wayne Starr MD        everolimus (ZORTRESS) tablet 0.75 mg  0.75 mg Oral BID Wayne Starr MD        fludrocortisone (FLORINEF) tablet 0.1 mg  0.1 mg Oral Daily Wayne Starr MD        letermovir (PREVYMIS) tablet 480 mg  480 mg Oral Daily Wayne Starr MD        sodium bicarbonate tablet 1,300 mg  1,300 mg Oral TID Wayne Starr MD        sodium chloride (PF) 0.9% PF flush 3 mL  3 mL  "Intracatheter Q8H Wayne Starr MD   3 mL at 24 0009    sodium zirconium cyclosilicate (LOKELMA) packet 10 g  10 g Oral Daily Wayne Starr MD        tacrolimus (GENERIC EQUIVALENT) capsule 5 mg  5 mg Oral BID Wayne Starr MD   5 mg at 24 0008    vancomycin (VANCOCIN) capsule 125 mg  125 mg Oral 4x Daily Wayne Starr MD   125 mg at 2444     Infusion Meds  Current Facility-Administered Medications   Medication Dose Route Frequency Provider Last Rate Last Admin    dextrose 5% and 0.45% NaCl + KCl 20 mEq/L infusion   Intravenous Continuous Wayne Starr  mL/hr at 2446 Rate Verify at 24       ALLERGIES:    No Known Allergies    REVIEW OF SYSTEMS:  A comprehensive of systems was negative except as noted above.    SOCIAL HISTORY:   Reviewed with patient on 2024     FAMILY MEDICAL HISTORY:   Family History   Problem Relation Age of Onset    Hypertension Mother     Hyperlipidemia Mother     Myocardial Infarction Mother 72    Diabetes No family hx of     Coronary Artery Disease No family hx of     Cerebrovascular Disease No family hx of     Colon Cancer No family hx of     Prostate Cancer No family hx of     Breast Cancer No family hx of     Kidney Disease No family hx of      Reviewed with patient on 2024     PHYSICAL EXAM:   Temp  Av.9  F (36.6  C)  Min: 97.4  F (36.3  C)  Max: 98.4  F (36.9  C)      Pulse  Av.3  Min: 94  Max: 103 Resp  Av.7  Min: 16  Max: 22  SpO2  Av.3 %  Min: 94 %  Max: 100 %       /81 (BP Location: Right arm, Patient Position: Supine)   Pulse 103   Temp 97.4  F (36.3  C) (Oral)   Resp 22   Ht 1.753 m (5' 9\")   Wt 74.8 kg (165 lb)   SpO2 95%   BMI 24.37 kg/m     Date 24 0700 - 2459   Shift 6946-4361 4943-9240 3562-3331 24 Hour Total   INTAKE   Shift Total(mL/kg)       OUTPUT   Urine 50   50   Emesis/NG output 1   1   Shift Total(mL/kg) 51(0.68)   51(0.68)   Weight (kg) 74.84 74.84 74.84 " 74.84      Admit Weight: 74.8 kg (165 lb)     GENERAL APPEARANCE: mild distress,  awake  EYES: no scleral icterus, pupils equal  HENT: NC/AT,  mouth  without ulcers or lesions  Lymphatics: no cervical or supraclavicular LAD  Endo: no moon facies, no goiter  Pulmonary: lungs clear to auscultation with equal breath sounds bilaterally, no clubbing  CV: regular rhythm, normal rate, no rub   - JVP -   - Edema-  GI: soft, tenderness over left lower quadrant.  No allograft tenderness  MS: no evidence of inflammation in joints  : no tineo  SKIN: no rash, warm, dry, no cyanosis  NEURO: face symmetric, no asterixis     LABS:   CMP  Recent Labs   Lab 04/23/24  0944 04/22/24  1745   * 129*   POTASSIUM 3.4 3.2*   CHLORIDE 93* 91*   CO2 17* 21*   ANIONGAP 16* 17*   * 141*   BUN 64.4* 55.5*   CR 2.21* 2.09*   GFRESTIMATED 32* 34*   ANNETTE 7.9* 8.6*   MAG  --  2.0   PROTTOTAL  --  6.2*   ALBUMIN  --  3.2*   BILITOTAL  --  0.7   ALKPHOS  --  59   AST  --  28   ALT  --  8     CBC  Recent Labs   Lab 04/22/24  1745   HGB 9.9*   WBC 2.3*   RBC 3.63*   HCT 28.8*   MCV 79   MCH 27.3   MCHC 34.4   RDW 14.9        INRNo lab results found in last 7 days.  ABGNo lab results found in last 7 days.   URINE STUDIES  Recent Labs   Lab Test 04/23/24  0512 04/02/24  1008 03/08/24  1018 01/25/24  2231 02/01/21  0826 06/30/20  0739   COLOR Yellow Light Yellow Yellow Yellow   < > Yellow   APPEARANCE Slightly Cloudy* Clear Clear Slightly Cloudy*   < > Clear   URINEGLC Negative Negative Negative Negative   < > Negative   URINEBILI Negative Negative Negative Negative   < > Negative   URINEKETONE Negative Negative Negative Negative   < > Negative   SG 1.022 1.014 1.027 1.031   < > 1.020   UBLD Negative Trace* Negative Negative   < > Trace*   URINEPH 5.5 7.5* 6.5 5.0   < > 5.5   PROTEIN 70* Negative 30* 50*   < > >=300*   UROBILINOGEN  --   --   --   --   --  0.2   NITRITE Negative Negative Negative Negative   < > Negative   LEUKEST  Negative Negative Negative Negative   < > Negative   RBCU <1 19* <1 1   < > O - 2   WBCU 1 1 1 <1   < > 0 - 5    < > = values in this interval not displayed.     Recent Labs   Lab Test 06/30/20  0739 05/10/16  0738   UTPG 1.55* 0.12     PTH  Recent Labs   Lab Test 11/09/23  0847 11/02/23  0724 10/19/21  0806 06/30/20  0738   PTHI 201* 186* 139* 123*     IRON STUDIES  Recent Labs   Lab Test 04/05/24  1022 03/13/24  0850 02/18/24  0937 12/25/23  0919 11/20/23  0850 11/16/23  0905 11/09/23  0847 11/06/23  0808 11/02/23  0724 10/19/21  0640   IRON 52* 17* 60* 80 51* 41* 63 91  91 123 49   * 223* 283 230* 247 232* 238* 213* 179* 188*   IRONSAT 24 8* 21 35 21 18 26 43 69* 26   ROBB 1,392* 2,283* 4,023* 919* 1,146* 3* 1,368* 1,300* 1,365* 1,735*       IMAGING:  Personally reviewed the images and findings are as listed in HPI     Meeta Pickering MD

## 2024-04-23 NOTE — PHARMACY-ADMISSION MEDICATION HISTORY
Pharmacist Admission Medication History    Admission medication history is complete. The information provided in this note is only as accurate as the sources available at the time of the update.    Information Source(s): Patient and CareEverywhere/SureScripts via in-person  Multiple attempts were made to speak with pt for medhx but he asked me to come back later x 2 due to feeling ill. Was finally able to review medications on 3rd attempt, albeit was still a shortened med hx.     Pertinent Information:     Everolimus: pt states he takes 0.75mg BID, most recent dosing per chart review was 1mg BID which was changed on 3/26 d/t elevated levels. He was supposed to have follow up blood work in ~1 week but I dont see any documentation of this, so possibly dose was further decreased.     Tacrolimus: pt reports 5mg BID. I see this change documented in EMR.     Pentamidine nebs: pt was unfamiliar with this medication, reports he does not take. I did not see any refills for this via surescripts.   Based on chart review, looks like is supposed to be using once monthly and gets in clinic (which would explain it not showing up in surescripts) Last documentation indicated he was trying to get done at [facility]. I do see an order for pentaminidine 300mg once that is signed and held on 4/16/24, unclear if he received this. I did add to PTA med list as appears pt is taking/should be taking but unclear last dose (gets once monthly).     Changes made to PTA medication list:  Added: pentamidine, dailyivte  Deleted: ciprofloxacin, flagyl, fiber  Changed: sodium bicarb (TID --> BID per pt report)    Medication History Completed By:   Anayeli Schultz, PharmD, [facility]   Emergency Medicine Pharmacist  [phone] or Mary Jo  April 23, 2024    PTA Med List   Medication Sig Last Dose    acetaminophen (TYLENOL) 500 MG tablet Take 500-1,000 mg by mouth every 6 hours as needed for mild pain prn at prn    ALPRAZolam (XANAX) 0.25 MG tablet  TAKE 1 TABLET (0.25 MG) BY MOUTH DAILY AS NEEDED FOR ANXIETY prn at prn    amLODIPine (NORVASC) 5 MG tablet Take 1 tablet (5 mg) by mouth daily for 90 days Past Week at -    atorvastatin (LIPITOR) 10 MG tablet Take 1 tablet (10 mg) by mouth daily Past Week at -    B Complex-C-Folic Acid (DIALYVITE) TABS Take 1 tablet by mouth daily Past Week at -    carvedilol (COREG) 6.25 MG tablet Take 1 tablet (6.25 mg) by mouth 2 times daily (with meals) Hold for systolic blood pressure < 140. Past Week at -    everolimus (ZORTRESS) 0.75 MG tablet Profile Rx: patient will contact pharmacy when needed (Patient taking differently: Take 0.75 mg by mouth 2 times daily Profile Rx: patient will contact pharmacy when needed) Past Week at -    fludrocortisone (FLORINEF) 0.1 MG tablet Take 1 tablet (0.1 mg) by mouth daily Past Week at -    letermovir (PREVYMIS) 480 MG TABS tablet Take 1 tablet (480 mg) by mouth daily Past Week at -    magnesium oxide (MAG-OX) 400 MG tablet Take 1 tablet (400 mg) by mouth 2 times daily With lunch and supper Past Week at -    pentamidine (NEBUPENT) 300 MG neb solution Inhale 300 mg into the lungs every 28 days Gets in clinic Unknown at -    sodium bicarbonate 650 MG tablet Take 2 tablets (1,300 mg) by mouth 3 times daily (Patient taking differently: Take 1,300 mg by mouth 2 times daily) Past Week at -    sodium zirconium cyclosilicate (LOKELMA) 10 g PACK packet Take 1 packet (10 g) by mouth daily Past Week at -    tacrolimus (GENERIC EQUIVALENT) 5 MG capsule Take 1 capsule (5 mg) by mouth 2 times daily Past Week at -

## 2024-04-23 NOTE — PROGRESS NOTES
[facility]    Medicine Progress Note - Hospitalist Service    Date of Admission:  4/22/2024    Assessment & Plan   68 year old man who underwent DDKT in October 2023 and came ER 4/22/24 due to severe, unrelenting abd pain that began about 4 days prior to admission.  He has had some chronic ABD discomfort since October transplant but this pain is different/more severe.  Imaging shows colitis.  Of note though are some abdominal complaints dating back into the winter with possible C. Diff colitis (had one positive test) Jan/Feb.    ABD Pain with left sided colitis by imaging  Intractable nausea  Renal Transplant October 2023  History of C. Diff positive test:     -  Appreciate renal service consult.  Will defer transplant medications/renal management to them.  They are also in contact with Dr. Dale/Transplant team at [facility].  Providers had discussed with patient consideration to transfer to [place] but he would prefer to stay here for now.  If renal function worsening recommend reconsideration of transfer.  -  Anti-emetics PRN, limit diet to clears until ABD complaints better  -  No definitive evidence this is C. Diff.   Imaging/history concerning for another etiology.  Also no definitive ischemia though remains in differential.  ABD exam with pain on left but no major acute ABD signs.  Stool studies requested but no BM since admission.  Continue empiric oral vanco.  Spoke with nephrology and given immunosuppression with severe colitis transplant team was also concerned about need for empiric antibiotics.  Will have on some IV unasyn for now.  Will ask ID to see today for their input.     -  Continue IVF given poor intake, renal failure  -  AVOID NSAIDS  -  If more nausea and zofran/compazine not adequate consider trial of Benzodiazepine  -  Pain control   -  Check CRP on prior labs and recheck in AM    EBV Viremia:  -  Patient on monitoring, see nephrology note for details    Mild SADI  superimposed on CKD in setting of renal transplant  Hyponatremia  Metabolic acidosis:  -  Neph following, appreciate assistance.  Continue IVF.   If more nausea/cannot tolerate oral sodium bicarb can change to having more bicarb in IVF.  Monitor renal labs daily.    Leukopenia:  -  Attributed to medications.   Patient was changed off mycophenolate and Valcyte due to leukopenia prior.  He has received filgrastim at times for ANC <0.5.   Check CBC again tomorrow.    Chronic anemia:  -  Monitor, no overt bleeding    PPE Used:  Mask, Gown/Gloves          Diet: Clear Liquid Diet    DVT Prophylaxis: Pneumatic Compression Devices  Duval Catheter: Not present  Lines: None     Cardiac Monitoring: None  Code Status: Full Code      Clinically Significant Risk Factors Present on Admission        # Hypokalemia: Lowest K = 3.2 mmol/L in last 2 days, will replace as needed  # Hyponatremia: Lowest Na = 126 mmol/L in last 2 days, will monitor as appropriate  # Hypocalcemia: Lowest Ca = 7.9 mg/dL in last 2 days, will monitor and replace as appropriate     # Hypoalbuminemia: Lowest albumin = 3.2 g/dL at 4/22/2024  5:45 PM, will monitor as appropriate     # Hypertension: Noted on problem list          # Financial/Environmental Concerns:           Disposition Plan     Medically Ready for Discharge: Anticipated in 2-4 Days             Danny Monk, DO  Hospitalist Service  [facility]  Securely message with [employer] (more info)  Text page via [employer] Paging/Directory   ______________________________________________________________________    Interval History   Assumed care, history reviewed.  Mr. Barnes reports pain a little better now after meds/IVF.  No BM during day today.  Reports ongoing nausea and had some dry heaving today.       Physical Exam   Vital Signs: Temp: 97.3  F (36.3  C) Temp src: Oral BP: 132/77 Pulse: 106   Resp: 28 SpO2: 97 % O2 Device: None (Room air)    Weight: 165 lbs 0 oz    GEN:  Alert,  oriented x 3, appears ill but comfortable.  HEENT:  Normocephalic/atraumatic, no scleral icterus, no nasal discharge, mouth moist.  CV:  Regular rate and rhythm, distant, no loud murmur/rub.  LUNGS:  Clear to auscultation bilaterally without rales/rhonchi/wheezing/retractions.  Symmetric chest rise on inhalation noted.  ABD:  Active bowel sounds, soft, tender on left but fairly non-tender on right.  Mildly distended.  No guarding/rigidity.  EXT:  Trace edema.  No cyanosis.  No new joint synovitis noted.  SKIN:  Dry to touch, no new exanthems noted in the visualized areas.    Medical Decision Making       Over 50 MINUTES SPENT BY ME on the date of service doing chart review, history, exam, documentation & further activities per the note.  Also discussing case with nephrology.      Data   Medications   Current Facility-Administered Medications   Medication Dose Route Frequency Provider Last Rate Last Admin    lactated ringers infusion   Intravenous Continuous Meeta Pickering  mL/hr at 04/23/24 1323 New Bag at 04/23/24 1323     Current Facility-Administered Medications   Medication Dose Route Frequency Provider Last Rate Last Admin    acetaminophen (TYLENOL) tablet 975 mg  975 mg Oral TID Wayne Starr MD   975 mg at 04/23/24 1401    amLODIPine (NORVASC) tablet 5 mg  5 mg Oral QPM Danny Monk, DO        ampicillin-sulbactam (UNASYN) 3 g vial to attach to  mL bag  3 g Intravenous Q6H Danny Monk, DO        carvedilol (COREG) tablet 6.25 mg  6.25 mg Oral BID w/meals Wayne Starr MD   6.25 mg at 04/23/24 1147    everolimus (ZORTRESS) tablet 0.75 mg  0.75 mg Oral BID Wayne Starr MD        fludrocortisone (FLORINEF) tablet 0.1 mg  0.1 mg Oral Daily Wayne Starr MD   0.1 mg at 04/23/24 1149    letermovir (PREVYMIS) tablet 480 mg  480 mg Oral Daily Wayne Starr MD        sodium bicarbonate tablet 1,300 mg  1,300 mg Oral TID Wayne Starr MD   1,300 mg at 04/23/24 1150    sodium chloride (PF)  0.9% PF flush 3 mL  3 mL Intracatheter Q8H Wayne Starr MD   3 mL at 04/23/24 1230    tacrolimus (GENERIC EQUIVALENT) capsule 5 mg  5 mg Oral BID Wayne Starr MD   5 mg at 04/23/24 0008    vancomycin (VANCOCIN) capsule 125 mg  125 mg Oral 4x Daily Wayne Starr MD   125 mg at 04/23/24 1147     Labs and Imaging results below reviewed today.  Recent Labs   Lab 04/22/24  1745   WBC 2.3*   HGB 9.9*   HCT 28.8*   MCV 79        7-Day Micro Results       No results found for the last 168 hours.          Recent Labs   Lab 04/23/24  1248 04/23/24  0944 04/22/24  1745   NA  --  126* 129*   POTASSIUM 3.7 3.4 3.2*   CHLORIDE  --  93* 91*   CO2  --  17* 21*   ANIONGAP  --  16* 17*   GLC  --  185* 141*   BUN  --  64.4* 55.5*   CR  --  2.21* 2.09*   GFRESTIMATED  --  32* 34*   ANNETTE  --  7.9* 8.6*   MAG  --   --  2.0   PROTTOTAL  --   --  6.2*   ALBUMIN  --   --  3.2*   BILITOTAL  --   --  0.7   ALKPHOS  --   --  59   AST  --   --  28   ALT  --   --  8     Recent Labs   Lab 04/23/24  0512   COLOR Yellow   APPEARANCE Slightly Cloudy*   URINEGLC Negative   URINEBILI Negative   URINEKETONE Negative   SG 1.022   UBLD Negative   URINEPH 5.5   PROTEIN 70*   NITRITE Negative   LEUKEST Negative   RBCU <1   WBCU 1     Recent Results (from the past 24 hour(s))   CT Abdomen Pelvis w/o Contrast    Narrative    EXAM: CT ABDOMEN PELVIS W/O CONTRAST  LOCATION: [facility]  DATE: 4/22/2024    INDICATION: LLQ pain.  COMPARISON: 3/14/2024  TECHNIQUE: CT scan of the abdomen and pelvis was performed without IV contrast. Multiplanar reformats were obtained. Dose reduction techniques were used.  CONTRAST: None.    FINDINGS:   LOWER CHEST: Since 3/14/2024 a minute pericardial effusion (greatest radial dimension 6.1 mm) and a minute left pleural effusion have developed.    HEPATOBILIARY: Stable since 3/14/2024 with again seen benign cyst in the dome of the liver. The gallbladder is prominent in size, but otherwise  normal with no monica CT evidence for cholecystitis. The bile ducts are normal in caliber.    PANCREAS: Normal.    SPLEEN: Normal.    ADRENAL GLANDS: Normal.    KIDNEYS/BLADDER: Significant atrophy of the native kidneys. There is a transplant kidney seen in the right hemipelvis with stable stranding of the fat adjacent to the transplant kidney urinary collecting system. The bladder is normal. No evidence for   obstruction.    BOWEL: Since 3/14/2024 there are new findings seen worrisome for significant colitis extending from the splenic flexure through the rectum. This is nonspecific, however, this distribution can be associated with changes of DION ischemia. Significant   air-fluid level seen in the proximal half of the colon, this can be seen with changes of diarrhea. The bowel is otherwise normal with no changes of obstruction, abscess, or free air identified.    LYMPH NODES: Normal.    VASCULATURE: Mild calcification with no aneurysm.    PELVIC ORGANS: Normal.    MUSCULOSKELETAL: Mild abdominal/pelvic ascites. Minute fatty umbilical hernia with no associated bowel or inflammation. Moderate multilevel degenerative changes in the spine, most marked in the facet joints.      Impression    IMPRESSION:   1.  Significant findings of colitis extending from the splenic flexure through the rectum. This is nonspecific, however, this distribution can be associated changes of DION ischemia.    2.  Air-fluid levels in the proximal half of the colon which can be associated with changes of diarrhea.    3.  No free air or abscess.    4.  Significant atrophy of the native kidneys.    5.  Stable stranding of the fat surrounding the right hemipelvis transplant kidney urinary tract.    6.  New minute pericardial effusion and left pleural effusion.

## 2024-04-23 NOTE — ED NOTES
"[facility]  ED Nurse Handoff Report    ED Chief complaint: Abdominal Pain  . ED Diagnosis:   Final diagnoses:   Colitis   Renal insufficiency   Kidney replaced by transplant       Allergies: No Known Allergies    Code Status: Full Code    Activity level - Baseline/Home:  independent.  Activity Level - Current:   independent.   Lift room needed: No.   Bariatric: No   Needed: No   Isolation: Yes.   Infection: Not Applicable  C-Diff Pending.     Respiratory status: Room air    Vital Signs (within 30 minutes):   Vitals:    04/22/24 1917 04/22/24 1920 04/22/24 1940 04/22/24 2000   BP: (!) 143/76 132/73 128/76 134/81   Pulse: 100 100 95 95   Resp:       Temp:       TempSrc:       SpO2: 100% 98% 94% 97%   Weight:       Height:           Cardiac Rhythm:  ,      Pain level:    Patient confused: Yes.   Patient Falls Risk: assistive device/personal items within reach.   Elimination Status: Unable to void yet    Patient Report - Initial Complaint: Abdominal pain.   Focused Assessment: Per RN note: Pt is complaining of LLQ pain that has been ongoing for 4 days. Pt has a history of diverticulitis, and states it feels like that. Pt endorses weakness, N/V, 3 days of diarrhea, decreased appetite.      Pt denies fever/chills, urinary involvement.      A/O x 4 \"        Pt is alert and oriented, able to make needs known. Endorses pain in abdomen, dilaudid effective for pain. Fistula on left side, previous kidney transplant.    Abnormal Results:   Labs Ordered and Resulted from Time of ED Arrival to Time of ED Departure   COMPREHENSIVE METABOLIC PANEL - Abnormal       Result Value    Sodium 129 (*)     Potassium 3.2 (*)     Carbon Dioxide (CO2) 21 (*)     Anion Gap 17 (*)     Urea Nitrogen 55.5 (*)     Creatinine 2.09 (*)     GFR Estimate 34 (*)     Calcium 8.6 (*)     Chloride 91 (*)     Glucose 141 (*)     Alkaline Phosphatase 59      AST 28      ALT 8      Protein Total 6.2 (*)     Albumin 3.2 (*)  "    Bilirubin Total 0.7     CBC WITH PLATELETS AND DIFFERENTIAL - Abnormal    WBC Count 2.3 (*)     RBC Count 3.63 (*)     Hemoglobin 9.9 (*)     Hematocrit 28.8 (*)     MCV 79      MCH 27.3      MCHC 34.4      RDW 14.9      Platelet Count 240      % Neutrophils        % Lymphocytes        % Monocytes        % Eosinophils        % Basophils        % Immature Granulocytes        NRBCs per 100 WBC 0      Absolute Neutrophils        Absolute Lymphocytes        Absolute Monocytes        Absolute Eosinophils        Absolute Basophils        Absolute Immature Granulocytes        Absolute NRBCs 0.0     DIFFERENTIAL - Abnormal    % Neutrophils 53      % Lymphocytes 14      % Monocytes 25      % Eosinophils 2      % Basophils 0      % Metamyelocytes 3      % Myelocytes 3      Absolute Neutrophils 1.2 (*)     Absolute Lymphocytes 0.3 (*)     Absolute Monocytes 0.6      Absolute Eosinophils 0.0      Absolute Basophils 0.0      Absolute Metamyelocytes 0.1 (*)     Absolute Myelocytes 0.1 (*)     RBC Morphology Confirmed RBC Indices      Platelet Assessment        Value: Automated Count Confirmed. Platelet morphology is normal.   ISTAT GASES LACTATE VENOUS POCT - Abnormal    Lactic Acid POCT 0.9      Bicarbonate Venous POCT 20 (*)     O2 Sat, Venous POCT 43 (*)     pCO2 Venous POCT 34 (*)     pH Venous POCT 7.37      pO2 Venous POCT 25     ROUTINE UA WITH MICROSCOPIC REFLEX TO CULTURE   ENTERIC BACTERIA AND VIRUS PANEL BY PCR   C. DIFFICILE TOXIN B PCR WITH REFLEX TO C. DIFFICILE ANTIGEN AND TOXINS A/B EIA        CT Abdomen Pelvis w/o Contrast   Final Result   IMPRESSION:    1.  Significant findings of colitis extending from the splenic flexure through the rectum. This is nonspecific, however, this distribution can be associated changes of DION ischemia.      2.  Air-fluid levels in the proximal half of the colon which can be associated with changes of diarrhea.      3.  No free air or abscess.      4.  Significant atrophy of the  native kidneys.      5.  Stable stranding of the fat surrounding the right hemipelvis transplant kidney urinary tract.      6.  New minute pericardial effusion and left pleural effusion.             Treatments provided: See MAR  Family Comments: NA  OBS brochure/video discussed/provided to patient:  No  ED Medications:   Medications   HYDROmorphone (PF) (DILAUDID) injection 0.5 mg (0.5 mg Intravenous $Given 4/22/24 1921)   ketorolac (TORADOL) injection 15 mg (15 mg Intravenous Not Given 4/22/24 1730)   HYDROmorphone (PF) (DILAUDID) injection 0.5 mg (0.5 mg Intravenous $Given 4/22/24 1746)   sodium chloride 0.9% BOLUS 1,000 mL (0 mLs Intravenous Stopped 4/22/24 1957)   vancomycin (VANCOCIN) capsule 125 mg (125 mg Oral $Given 4/22/24 2022)       Drips infusing:  No  For the majority of the shift this patient was Green.   Interventions performed were NA.    Sepsis treatment initiated: No    Cares/treatment/interventions/medications to be completed following ED care: UA, stool sample    ED Nurse Name: Erin Rivera RN  9:03 PM

## 2024-04-24 PROBLEM — R57.9 SHOCK (H): Status: ACTIVE | Noted: 2024-01-01

## 2024-04-24 NOTE — PROGRESS NOTES
Tele ICU Note    Patient still hypotensive upon arrival to ICU after 750cc bolus received on the floor. Currently on 80% HFNC. Will stop continuous fluids given respiratory status and start peripheral levophed. Does not currently have central access. Will monitor levophed dosing overnight. Antibiotics had been broadened to zosyn by hospitalist prior to transfer for presumed aspiration pneumonia.     Elise Castrejon MD    Addendum: Patient is just achieving MAP goal of 65 with peripheral norepinephrine 0.12 mcg/kg/min. BP cuff checked in another location and BP now significantly better. Will hold on central line placement and see how much levophed can be titrated down.

## 2024-04-24 NOTE — PROGRESS NOTES
"GASTROENTEROLOGY PROGRESS NOTE        SUBJECTIVE:  Intubated and sedated in ICU after aspiration event on the medical floor. Requiring 3 vasopressors. Transfer to higher level of care.      OBJECTIVE:    BP (!) 147/97   Pulse 107   Temp (!) 94.8  F (34.9  C)   Resp (!) 108   Ht 1.753 m (5' 9\")   Wt 72.3 kg (159 lb 6.3 oz)   SpO2 95%   BMI 23.54 kg/m    Temp (24hrs), Av.9  F (33.8  C), Min:67.8  F (19.9  C), Max:101.9  F (38.8  C)    Patient Vitals for the past 72 hrs:   Weight   24 2355 72.3 kg (159 lb 6.3 oz)   24 1757 74.8 kg (165 lb)       Intake/Output Summary (Last 24 hours) at 2024 1339  Last data filed at 2024 1200  Gross per 24 hour   Intake 1284.21 ml   Output 986 ml   Net 298.21 ml        PHYSICAL EXAM     Intubated and sedated    Abdomen: soft, non distended         Additional Comments:  ROS, FH, SH: See initial GI consult for details.     I have reviewed the patient's new clinical lab results:     Recent Labs   Lab Test 24  0529 24  1745 24  0958 24  0951 24  0914 10/28/23  0407 10/28/23  0406 10/19/21  0640 10/18/21  0653   WBC 0.7* 2.3* 0.7*   < >  --    < >  --    < > 7.5   HGB 9.2* 9.9* 8.6*   < >  --    < >  --    < > 9.2*   MCV 82 79 88   < >  --    < >  --    < > 92    240 147*   < >  --    < >  --    < > 168   INR  --   --   --   --  1.20*  --  1.13  --  1.05    < > = values in this interval not displayed.     Recent Labs   Lab Test 24  0529 24  1248 24  0944 24  1745   POTASSIUM 4.0 3.7 3.4 3.2*   CHLORIDE 94*  --  93* 91*   CO2 16*  --  17* 21*   BUN 79.2*  --  64.4* 55.5*   ANIONGAP 18*  --  16* 17*     Recent Labs   Lab Test 24  1035 24  0529 24  0512 24  1745 24  1008 24  1018 24  2231 10/16/21  1056 10/16/21  0942   ALBUMIN  --  2.3*  --  3.2*  --   --  4.5   < > 3.7   BILITOTAL  --  1.1  --  0.7  --   --  0.4   < > 0.4   ALT  --  31  --  8  --   --  41   < " > 26   AST  --  83*  --  28  --   --  35   < > 25   PROTEIN 50*  --  70*  --  Negative   < > 50*   < >  --    LIPASE  --   --   --   --   --   --   --   --  461*    < > = values in this interval not displayed.       ASSESSMENT/ PLAN  Matteo Barnes is a 67 yo male with medical hx of kidney transplant in October 2023 on immunosuppression ( on immunosuppression with mycophenolate and tacrolimus and prophylaxis with letermovir and pentamidine), HTN, and anemia of chronic disease who presented to the ED on 4/22 with severe abdominal pain found to have CT evidence of colitis. Per medical record there was an aspiration event overnight that led to respiratory failure and shock. Patient did require transfer to the ICU for shock and is currently intubated on 3 vasopressors.     1. Colitis: CT with inflammation extending from the splenic flexure to the rectum.  Would favor ischemic colitis versus infectious colitis.  Unlikely IBD given recent colonoscopy.  He is on CMV prophylaxis.  No bowel movement since admission.  Colonoscopy in May 2023 showed several polyps along with diverticulosis.  There was diverticulosis associated colitis in the sigmoid.  He was started empirically on oral vancomycin and has reported history of C. difficile.    -- Colorectal surgery was consulted and they did recommend transfer to higher level of care.  -- Unclear if colitis is a driving factor of his current condition.  Agree with management at hospital with transplant care.  -- Can consider flex sig at [place] if medically stable.      Total time: 35 minutes of total time was spent providing patient care, including patient evaluation, reviewing documentation/test results, and .     Discussed with Dr. Gualberto Lam PA-C  [facility])

## 2024-04-24 NOTE — PROGRESS NOTES
Patient had earlier wanted to remove his high flow nasal cannula oxygen following which his blood pressure decreased to 67/50 and his respiratory rate increased to 48  Patient was convinced to keep high flow oxygen on  Bolus of 500 mL of Ringer lactate was given  Patient will be transferred to ICU  Chest x-ray done to evaluate  Discussed the case with telemetry ICU physician on-call
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
## 2024-04-24 NOTE — PROCEDURES
Operative note    Preoperative diagnosis: Shock  Postoperative diagnosis: Same  Procedure: Arterial line placement  Surgeon: Trevon  Anesthesia: IV sedation  EBL: 3 mL  Drains: None  Complications: None apparent    Findings: Successful placement left femoral arterial line    Indication for procedure:   - Hemodynamic monitoring  - Multiple pressors    Description of procedure:   This 67 y/o man was admitted to the ICU overnight with septic/distributive shock following aspiration event on the floor. He has required multiple pressors. An arterial line was placed earlier this morning and has subsequently failed. Cuff pressures are not reliable. New arterial line is indicated. Consent was obtained earlier and I re-affirmed it verbally with the patient's step-daughter at bedside.    After sterile prep/drape, I disconnected the right radial arterial line. There was no flow. Despite line manipulation, I could not re-establish flow through it. I removed the line and identified the right radial artery by ultrasound. I was able to cannulate the vessel under ultrasound guidance but there was no blood flow. I abandoned the attempt and held pressure for hemostasis.    The patient had an AV fistula in the left arm, and a renal transplant based off the right iliac artery. I selected the left femoral artery as the next best option. After sterile prep/drape I easily identified the artery. I cannulated it under ultrasound guidance, though initial blood return appeared to be venous and was not pulsatile. I removed the needle and held hemostasis. On the second pass I got pulsatile blood flow. I passed the wire without difficulty and placed the catheter using Seldinger technique. There was good return of blood and a good arterial waveform. I secured the line with a single 3-0 silk stitch and placed a sterile dressing.     No apparent complications. The line is functional.     Disposition: ICU    Davidson Lester MD, PhD  Surgical  critical care  4/24/2024, 12:10 PM

## 2024-04-24 NOTE — PROGRESS NOTES
Responded to RRT after pt vomited and became hypoxic. Obvious congested breathing/cough, possible aspiration. Due to ongoing hypoxia, nausea, and vomiting pt was placed on HFNC at 80% and 40LPM. SpO2 recovered from mid 80's to now currently 94%.     Continue to monitor pt's respiratory status    Aleshia Ori, RT

## 2024-04-24 NOTE — PROGRESS NOTES
Admitted/transferred from: [facility] 14:45  Reason for admission/transfer: Higher level of care, pt on 4 pressors, intubated on 100%.  Patient status upon admission/transfer: Intubated, 3 pressors, 1 amp bicarb pushed as well as a few of epi en route from OSH.  Interventions: 2 amps Bicarb, 1 amp D50%  Plan: Place HD line, CRRT  2 RN skin assessment: completed by Amelia KC and Martha , nothing of note, L fistula - thrill and bruit appreciated, RLQ healed abdominal scar.  Sexual Orientation and Gender Identity (SOGI) smartfom completed: Not Done,   Result of skin assessment and interventions/actions: Feet cracked/peeling otherwise skin intact.   Height, weight, drug calc weight: Done  Patient belongings (see Flowsheet - Adult Profile for details): No pt belongs - here from OSH  MDRO education (if applicable): Unable to assess

## 2024-04-24 NOTE — PROGRESS NOTES
ICU staff  DOS 4/24/2024    Mr Barnes was admitted overnight after an aspiration event on the floor that led to respiratory failure and shock. He is a 67 y/o man who had a renal transplant 10/28/2023. He has had some persistent abdominal pain, diarrhea, and persistent/recurring C difficile that has been treated with fidaxomicin. He came back to the hospital yesterday with abdominal pain, nausea, and vomiting. He became hypoxemic on the floor overnight and required transfer to the ICU for intubation. The suspected etiology was aspiration. He had worsening shock after intubation requiring multiple vasopressors.    This morning, he is intubated/sedated and on three pressors. Have given 1 liter of LR with improvement in tachycardia, and am giving additional fluids now. I also started stress steroids.     Vitals:   Temp:  [67.8  F (19.9  C)-101.9  F (38.8  C)] 94.8  F (34.9  C)  Pulse:  [] 107  Resp:  [] 108  BP: ()/() 147/97  MAP:  [0 mmHg-284 mmHg] 76 mmHg  Arterial Line BP: (0-297)/(0-280) 122/61  FiO2 (%):  [80 %-100 %] 100 %  SpO2:  [38 %-100 %] 95 %     Vent Mode: CMV/AC  (Continuous Mandatory Ventilation/ Assist Control)  FiO2 (%): 100 %  Resp Rate (Set): 20 breaths/min  Tidal Volume (Set, mL): 420 mL  PEEP (cm H2O): 1 cmH2O  Resp: (!) 108    I/O last 3 completed shifts:  In: 414.89 [I.V.:414.89]  Out: 252 [Urine:250; Emesis/NG output:2]    NE 0.4-0.5   2.4-4  Negro 3.5-5  Fentanyl 50  Midazolam 3    Exam:  Gen: Intubated/sedated  HEENT: NC/AT, anicteric, pupils constricted  Pulm: Clear  Cor: Regular tachycardia with PACs  Abdomen/GI: Soft, nondistended; curvilinear scar right flank  : Duval placed at rounds  Extremities: Cool digits  Skin: Cool, no mottling but fingertips dusky  Neuro: Sedated/unresponsive  Psych: Unable to assess    Data:   Labs reviewed  - Na 126->128, CO2 16, Cr 2.09 -> 3.52  -  and rising  - Lactate 2 this morning  - WBCs 0.7 (0.7-2.6 lately), Hb 9.2  - UA  negative LE, 11 wbc  - Repeat ABG 7.11/37/94/12  Imaging reviewed  - CXR with right base infiltrates, tube OK  - CT A/P 4/22 showed thickening of the left colon from the splenic flexure through the rectum  Nothing on micro so far    Assessment/plan:  67 y/o man with renal transplant, admitted with abdominal pain, nausea, vomiting. Also has had worsening renal function in the last month or so. Now in the ICU with respiratory failure and septic shock, likely secondary to aspiration. Has possible colitis with workup in progress.   CNS: Intubated/sedated  # Toxic/metabolic encephalopathy  # Pain/sedation  - Fentanyl, midazolam  Pulm: Intubated this morning. SpO2 has been difficult to .   # Acute hypoxemic respiratory failure  - Increased PEEP to 7 and then to 10, airway pressures were OK  - FiO2 has been 0.9-1  - ABG pending now that arterial line is functioning  CV: Pressor demands significant but stable  # Distributive shock  # Essential hypertension  # Dyslipidemia  - Tachycardia improved after fluid earlier and I suspect he is a bit dry with recent nausea/vomiting, poor intake, and large-volume NG outputs  - Giving additional IVF now  - Added stress steroids this morning  - He is being packaged for transfer now, so will defer adding angiotensin II to the receiving site  - Hold antihypertensives, statin  GI: Abdomen soft  # ?Colitis  # Nausea/vomiting  - NPO  - NG decompression  - Colorectal surgery consulted; per report recommended transfer to higher level of care  : UOP scant with 60 mL after tineo placed at rounds  # SADI in the setting of renal transplant  # CKD, baseline Cr 1.8-2 and rising lately  # ESRD s/p renal transplant 10/28/23  # Anion gap metabolic acidosis  # Lactic acidosis  - Additional fluid as he appears dry  - Nephrology following and recommend transfer to [facility] for higher level of care, transplant team availability  - Immunosuppression per transplant, per nephrologist here just using  stress steroids for now  Heme: Hb 9.2 (9.9), ANC 1.2/ALC 0.3  # Anemia of chronic disease  # Leukopenia, likely secondary to immunosuppression  - No transfusions indicated  Msk: Extremities cool c/w shock  Endo: Glucose 128-185  # Hyperglycemia, stress/steroid  - ICU glycemic management  ID: Febrile to 101.9  # Sepsis  # Aspiration pneumonia/pneumonitis  # Colitis, possibly C difficile  - Pip/tazo empirically  - PO vancomycin empirically  - ID following  - Follow cultures  ICU: SCDs, PPI  - The patient requires transfer to [facility] for ongoing cares; he is being packaged for transportation now (1323) by EMS and phone handoff has been done.    This patient is critically ill to my assessment and requires ICU monitoring and cares. A total of 60 additional minutes critical care time spent on 4/24/2024, exclusive of procedures.     Davidson Lester MD, PhD  Surgical critical care  4/24/2024, 1:24 PM    Clinically Significant Risk Factors        # Hypokalemia: Lowest K = 3.2 mmol/L in last 2 days, will replace as needed  # Hyponatremia: Lowest Na = 126 mmol/L in last 2 days, will monitor as appropriate      # Hypoalbuminemia: Lowest albumin = 2.3 g/dL at 4/24/2024  5:29 AM, will monitor as appropriate     # Hypertension: Noted on problem list            # Financial/Environmental Concerns:

## 2024-04-24 NOTE — PROGRESS NOTES
RT assisted with intubation, patient intubated with 8.0 ETT secured 22 at teeth. Placement confirmed with equal bilateral breathsounds and ETCO2 color change.

## 2024-04-24 NOTE — ANESTHESIA PROCEDURE NOTES
Airway       Patient location during procedure: ICU       Procedure Start/Stop Times: 4/24/2024 3:26 AM  Staff -        CRNA: Severson, Dean Dennis, APRN CRNA       Performed By: CRNA  Consent for Airway        Urgency: emergent       Consent: The procedure was performed in an emergent situation.  Report Obtained from Primary Care Team       History regarding most recent potassium obtained: Yes       History regarding presence/absence of renal failure obtained:Yes       History regarding stroke/CVA obtained:Yes       History regarding presence/absence of NM disorder: YesIndications and Patient Condition       Indications for airway management: airway protection and respiratory insufficiency       Mallampati: II     Induction type:RSI       Mask difficulty assessment: 0 - not attempted    Final Airway Details       Final airway type: endotracheal airway       Successful airway: ETT - single (Colorimetric indicator for CO2 positive after intubation. OG placed after per orders of Dr Castrejon.)  Endotracheal Airway Details        ETT size (mm): 8.0       Cuffed: yes       Successful intubation technique: video laryngoscopy       VL Blade Size: Glidescope 4       Grade View of Cords: 1       Adjucts: stylet       Position: Center       Measured from: lips       Secured at (cm): 24       Bite block used: Soft    Post intubation assessment        ETT secured, Vent settings by primary/ICU team, Primary/ICU team to review CXR, Sedation to be ordered by primary/ICU team and No apparent complications       Placement verified by: capnometry, equal breath sounds and chest rise        Number of attempts at approach: 1       Number of other approaches attempted: 0       Secured with: commercial tube martins       Ease of procedure: easy       Dentition: Intact and Unchanged    Medication(s) Administered   propofol (DIPRIVAN) injection 10 mg/mL vial - Intravenous   120 mg - 4/24/2024 3:26:00 AM  succinylcholine (ANECTINE) 20 mg/mL  injection - Intravenous   80 mg - 4/24/2024 3:26:00 AM  Medication Administration Time: 4/24/2024 3:26 AM    Additional Comments       Dr Castrejon attending via video screen.    ASA 3    Diagnosis Respiratory insufficiency

## 2024-04-24 NOTE — PLAN OF CARE
"End of Shift Summary  For vital signs and complete assessments, please see documentation flowsheets.     Pertinent assessments: Patient has been in significant pain. Gave IV dilaudid. Patient is heaving and vomiting bile. IV Zofran and compazine given. Patient unsteady on feet; recommended assistive 1 with gait belt and walker. Lost IV access. Got 2 new PIV placed. Paged provider d/t n/v ativan ordered; Not given. RRT called at end of shift.     Major Shift Events: Rapid    Treatment Plan: IV and PO antibiotics, Pain management    Bedside Nurse: Kaylin Bal RN                       Goal Outcome Evaluation:      Plan of Care Reviewed With: patient    Overall Patient Progress: no changeOverall Patient Progress: no change    Outcome Evaluation: Pt in pain and had n/v, RRT called on shift      Problem: Adult Inpatient Plan of Care  Goal: Plan of Care Review  Description: The Plan of Care Review/Shift note should be completed every shift.  The Outcome Evaluation is a brief statement about your assessment that the patient is improving, declining, or no change.  This information will be displayed automatically on your shift  note.  Outcome: Progressing  Flowsheets (Taken 4/23/2024 2007)  Outcome Evaluation: Pt in pain and had n/v, RRT called on shift  Plan of Care Reviewed With: patient  Overall Patient Progress: no change  Goal: Patient-Specific Goal (Individualized)  Description: You can add care plan individualizations to a care plan. Examples of Individualization might be:  \"Parent requests to be called daily at 9am for status\", \"I have a hard time hearing out of my right ear\", or \"Do not touch me to wake me up as it startles  me\".  Outcome: Progressing  Goal: Absence of Hospital-Acquired Illness or Injury  Outcome: Progressing  Intervention: Identify and Manage Fall Risk  Recent Flowsheet Documentation  Taken 4/23/2024 1425 by Kaylin Bal, RN  Safety Promotion/Fall Prevention:   activity supervised   " nonskid shoes/slippers when out of bed   room near nurse's station   safety round/check completed   supervised activity  Intervention: Prevent Skin Injury  Recent Flowsheet Documentation  Taken 4/23/2024 1425 by Kaylin Bal RN  Body Position: position changed independently  Intervention: Prevent and Manage VTE (Venous Thromboembolism) Risk  Recent Flowsheet Documentation  Taken 4/23/2024 1425 by Kaylin Bal RN  VTE Prevention/Management: compression stockings off  Intervention: Prevent Infection  Recent Flowsheet Documentation  Taken 4/23/2024 1425 by Kaylin Bal RN  Infection Prevention:   hand hygiene promoted   rest/sleep promoted   single patient room provided   personal protective equipment utilized     Problem: Diarrhea  Goal: Effective Diarrhea Management  Outcome: Progressing  Intervention: Manage Diarrhea  Recent Flowsheet Documentation  Taken 4/23/2024 1425 by Kaylin Bal RN  Isolation Precautions: enteric precautions initiated  Medication Review/Management: medications reviewed     Problem: Adult Inpatient Plan of Care  Goal: Optimal Comfort and Wellbeing  Outcome: Not Progressing  Goal: Readiness for Transition of Care  Outcome: Not Progressing  Intervention: Mutually Develop Transition Plan  Recent Flowsheet Documentation  Taken 4/23/2024 1423 by Kaylin Bal RN  Equipment Currently Used at Home: none     Problem: Nausea and Vomiting  Goal: Nausea and Vomiting Relief  Outcome: Not Progressing     Problem: Pain Acute  Goal: Optimal Pain Control and Function  Outcome: Not Progressing  Intervention: Prevent or Manage Pain  Recent Flowsheet Documentation  Taken 4/23/2024 1425 by Kaylin Bal RN  Medication Review/Management: medications reviewed

## 2024-04-24 NOTE — DISCHARGE SUMMARY
"[facility]  Hospitalist Discharge Summary      Date of Admission:  4/22/2024  Date of Discharge:  4/24/2024  2:33 PM  Discharging Provider: Dutch Montaño MD  Discharge Service: Hospitalist Service    Patient was transferred to [facility] on 4/24/2024 for further care and assistance from the transplant service in the setting of his renal transplant within the past year    Discharge Diagnoses   Shock, presumed septic shock    Acute hypoxic respiratory failure in the setting of nausea and vomiting, aspiration suspected    Immunosuppression, due to chronic immunosuppressive medications    Acute renal failure in the setting of previous renal transplant    Leukopenia, persistent since 1/24.  Likely medication induced    History of EBV viremia  - most recent EBV level near 54K in 3/24    Abdominal pain    Colitis, etiology unclear.  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 difficile test        Clinically Significant Risk Factors     # Overweight: Estimated body mass index is 23.54 kg/m  as calculated from the following:    Height as of this encounter: 1.753 m (5' 9\").    Weight as of this encounter: 72.3 kg (159 lb 6.3 oz).       Follow-ups Needed After Discharge       Unresulted Labs Ordered in the Past 30 Days of this Admission       Date and Time Order Name Status Description    4/24/2024 10:54 AM Urine Culture In process     4/24/2024  6:20 AM Blood Culture Foot, Right In process     4/24/2024  6:20 AM Blood Culture Line, arterial In process     4/24/2024  5:00 AM Everolimus by Tandem Mass Spectrometry In process     4/24/2024  5:00 AM Parvovirus B19 DNA PCR In process     4/24/2024  5:00 AM " Yaritza Barr Virus Quantitative PCR, Plasma In process     4/24/2024  3:45 AM Respiratory Aerobic Bacterial Culture with Gram Stain Preliminary             Discharge Disposition   Transferred to [facility]  Condition at discharge: Stable    Hospital Course    68 year old man who underwent DDKT in October 2023 and came to attention today due to severe, unrelenting abd pain that began about 4 days prior to admission.     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  He has associated anemia of chronic kidney disease, hypertension, immunosuppression.    On presentation, abdominopelvic CT scan showed diffuse colitis extending from the splenic flexure through the rectum.  Given his history of immunosuppression, infectious colitis was considered.  Additionally ischemic colitis was a consideration.    Patient was admitted to the general medical floor.  Unfortunately, the evening of 4/23 the patient had nausea and emesis with suspicion for aspiration.  The patient subsequently developed acute hypoxic respiratory failure and was placed on high flow nasal cannula.  He was started on IV Unasyn.  He then developed hypotension requiring transfer to the ICU.  Ultimately, the patient continued to clinically worsen.  He was intubated the morning of 4/24.  Hypotension worsened and patient was started on multiple vasopressors.    The morning of 4/24, in light of his worsening renal failure and clinical status decision was made to transfer the patient to [facility] to obtain assistance from transplant team given his recent transplant in October 2023.      Consultations This Hospital Stay   GASTROENTEROLOGY IP CONSULT  NEPHROLOGY IP CONSULT  INFECTIOUS DISEASES IP CONSULT  INFECTIOUS DISEASES IP CONSULT  COLORECTAL SURGERY IP CONSULT  PHARMACY TO DOSE VANCO  NEPHROLOGY IP CONSULT  INFECTIOUS DISEASES IP CONSULT    Code Status   Full Code    Time Spent on this Encounter   I, Dutch Montaño MD, personally saw the patient today and spent less  than or equal to 30 minutes discharging this patient.       Dutch Montaño MD  [facility] ICU  [address]  [facility] [address]  Phone: [phone]  Fax: [phone]  ______________________________________________________________________    Physical Exam   Vital Signs: Temp: (!) 94.8  F (34.9  C) Temp src: Esophageal BP: (!) 147/97 Pulse: 104   Resp: 28 SpO2: (!) 46 % O2 Device: Mechanical Ventilator Oxygen Delivery: 40 LPM  Weight: 159 lbs 6.28 oz  Intubated, sedated  Cardiovascular exam: Regular rate and rhythm  Lung exam: Clear to auscultation bilaterally on ventilator  Abdomen: Nontender/nondistended.  Soft       Primary Care Physician   Dutch Duke    Discharge Orders       Significant Results and Procedures   Results for orders placed or performed during the hospital encounter of 04/24/24   XR Chest Port 1 View    Narrative    Portable chest 4/24/2024 at 1516 hours    INDICATION: Endotracheal tube positioning    Comparison: 0316 hours earlier today    FINDINGS: Heart size normal. Increased patchy densities right  infrahilar area may indicate slight developing edema rounded  atelectasis. Recommend follow-up to clearing. Low inspiratory volumes  are also present. Endotracheal tube tip is located approximately 3.3  cm above the carloz. NG/OG tube beyond the inferior margin of the  image    IMPRESSION Increasing edema her atelectasis rather than infection in  the right lower lung zone. Follow-up to clearing.    SELENA ONTIVEROS MD         SYSTEM ID:  [id number]   CT Chest Abdomen Pelvis w/o Contrast    Impression    RESIDENT PRELIMINARY INTERPRETATION  IMPRESSION:   1. Progressive enlargement and wall thickening of essentially the  entire colon suggestive of colitis. Enhancement cannot be assessed on  this noncontrast exam, however there is no pneumatosis.  2. Diffuse small bowel enlargement without a focal transition point,  likely representing ileus.   3. Increased  consolidated lower lobe opacities, likely a combination  of infection and atelectasis. Trace left pleural effusion.       Discharge Medications   Discharge Medication List as of 4/24/2024  2:33 PM        CONTINUE these medications which have NOT CHANGED    Details   acetaminophen (TYLENOL) 500 MG tablet Take 500-1,000 mg by mouth every 6 hours as needed for mild pain, Historical      ALPRAZolam (XANAX) 0.25 MG tablet TAKE 1 TABLET (0.25 MG) BY MOUTH DAILY AS NEEDED FOR ANXIETY, Disp-20 tablet, R-0, E-Prescribe      amLODIPine (NORVASC) 5 MG tablet Take 1 tablet (5 mg) by mouth daily for 90 days, Disp-90 tablet, R-3, E-Prescribe      atorvastatin (LIPITOR) 10 MG tablet Take 1 tablet (10 mg) by mouth daily, Disp-30 tablet, R-11, E-Prescribe      B Complex-C-Folic Acid (DIALYVITE) TABS Take 1 tablet by mouth daily, Historical      carvedilol (COREG) 6.25 MG tablet Take 1 tablet (6.25 mg) by mouth 2 times daily (with meals) Hold for systolic blood pressure < 140., No Print Out      !! everolimus (ZORTRESS) 0.75 MG tablet Profile Rx: patient will contact pharmacy when needed, Disp-120 tablet, R-11, E-PrescribeTXP DT 10/28/2023 (Kidney) TXP Dischg DT 11/1/2023 DX Kidney replaced by transplant Z94.0 TX [facility] [place] ([place])      fludrocortisone (FLORINEF) 0.1 MG tablet Take 1 tablet (0.1 mg) by mouth daily, Disp-90 tablet, R-1, E-Prescribe      letermovir (PREVYMIS) 480 MG TABS tablet Take 1 tablet (480 mg) by mouth daily, Disp-30 tablet, R-1, E-Prescribe      magnesium oxide (MAG-OX) 400 MG tablet Take 1 tablet (400 mg) by mouth 2 times daily With lunch and supper, Disp-180 tablet, R-0, E-Prescribe      pentamidine (NEBUPENT) 300 MG neb solution Inhale 300 mg into the lungs every 28 days Gets in clinic, Historical      sodium bicarbonate 650 MG tablet Take 2 tablets (1,300 mg) by mouth 3 times daily, Disp-540 tablet, R-3, E-Prescribe      sodium zirconium cyclosilicate  (LOKELMA) 10 g PACK packet Take 1 packet (10 g) by mouth daily, Disp-30 packet, R-1, E-Prescribe      !! tacrolimus (GENERIC EQUIVALENT) 5 MG capsule Take 1 capsule (5 mg) by mouth 2 times daily, Disp-60 capsule, R-11, E-PrescribeTXP DT 10/28/2023 (Kidney) TXP Dischg DT 11/1/2023 DX Kidney replaced by transplant Z94.0 TX [facility] [place] ([place], [place])      !! everolimus (ZORTRESS) 0.5 MG tablet Take 2 tablets (1 mg) by mouth 2 times daily Profile Rx: patient will contact pharmacy when needed, Disp-120 tablet, R-11, E-PrescribeTXP DT 10/28/2023 (Kidney) TXP Dischg DT 11/1/2023 DX Kidney replaced by transplant Z94.0 TX [facility] [place] ([place], [place])      Prenatal Vit-Fe Fumarate-FA (PRENATAL MULTIVITAMIN W/IRON) 27-0.8 MG tablet Take 1 tablet by mouth daily, Disp-30 tablet, R-11, E-Prescribe      !! tacrolimus (GENERIC EQUIVALENT) 1 MG capsule Profile Rx: patient will contact pharmacy when needed, Disp-60 capsule, R-11, E-PrescribeTXP DT 10/28/2023 (Kidney) TXP Dischg DT 11/1/2023 DX Kidney replaced by transplant Z94.0 TX [facility] [place] ([place])       !! - Potential duplicate medications found. Please discuss with provider.        Allergies   No Known Allergies

## 2024-04-24 NOTE — PROVIDER NOTIFICATION
Pt very anxious, not tolerating HFNC & wanting to take it off. Unable to maintain sats on NC or oxymask. BP 76/53, RR in the 40's. MD notified.     Orders for 500mL LR bolus, plan to transfer to the ICU.

## 2024-04-24 NOTE — PHARMACY-VANCOMYCIN DOSING SERVICE
"Pharmacy Vancomycin Initial Note  Date of Service 2024  Patient's  1955  68 year old, male    Indication: Sepsis    Current estimated CrCl = Estimated Creatinine Clearance: 21.9 mL/min (A) (based on SCr of 3.52 mg/dL (H)).    Creatinine for last 3 days  2024:  5:45 PM Creatinine 2.09 mg/dL  2024:  9:44 AM Creatinine 2.21 mg/dL  2024:  5:29 AM Creatinine 3.52 mg/dL    Recent Vancomycin Level(s) for last 3 days  No results found for requested labs within last 3 days.      Vancomycin IV Administrations (past 72 hours)        No vancomycin orders with administrations in past 72 hours.                    Nephrotoxins and other renal medications (From now, onward)      Start     Dose/Rate Route Frequency Ordered Stop    24  [Held by provider]  tacrolimus (GENERIC EQUIVALENT) capsule 5 mg        (On hold since today at 1458 until manually unheld; held by Pina Parks DOHold Reason: Change in Vitals)   Note to Pharmacy: PTA Sig:Take 1 capsule (5 mg) by mouth 2 times daily      5 mg Oral 2 TIMES DAILY 24 1435      24 1530  vancomycin (VANCOCIN) 2,000 mg in sodium chloride 0.9 % 500 mL intermittent infusion         2,000 mg  over 120 Minutes Intravenous ONCE 24 1528      24 1528  vancomycin place martins - receiving intermittent dosing         1 each Intravenous SEE ADMIN INSTRUCTIONS 24 1528      24 1500  norepinephrine (LEVOPHED) 16 mg in  mL infusion MAX CONC CENTRAL LINE         0.01-0.6 mcg/kg/min × 72.3 kg  0.7-40.7 mL/hr  Intravenous CONTINUOUS 24 1435      24 1500  vasopressin 1 unit/mL MAX Conc (PITRESSIN) infusion         2.4 Units/hr  2.4 mL/hr  Intravenous CONTINUOUS 24 1435      24 1500  piperacillin-tazobactam (ZOSYN) 2.25 g vial to attach to  ml bag        Note to Pharmacy: For [facility], [facility] and [facility]: For Zosyn-naive patients, use the \"Zosyn initial dose + extended infusion\" order panel.    2.25 " g  over 30 Minutes Intravenous EVERY 6 HOURS 04/24/24 1435              Contrast Orders - past 72 hours (72h ago, onward)      None            Plan:  Start vancomycin 2000 mg IV once followed by intermittent dosing. Likely can start scheduled dosing if CRRT is initiated  Vancomycin monitoring method: Trough (Method 2 = manual dose calculation)  Vancomycin therapeutic monitoring goal: 15-20 mg/L  Pharmacy will check vancomycin levels as appropriate in 1-3 Days.    Serum creatinine levels will be ordered daily for the first week of therapy and at least twice weekly for subsequent weeks.      Trice Sandoval RP

## 2024-04-24 NOTE — ED PROVIDER NOTES
3:04 AM    I was called to place a central line on this patient for the ICU team given patient maxing out peripheral pressor support.  Patient admitted to hospital, on contact precautions, being managed for colitis, with concern for septic shock. He is currently on high flow nasal cannula support. Patient was consented for central line placement.  Initially attempted right IJ though difficulty threading wire.  Procedure was aborted and right femoral central line was placed without complication.        Central Line Placement with Ultrasound Guidance     PERFORMED BY:  Dr. Knutson    INDICATIONS: Vascular access and Sepsis (monitoring of mixed venous oxygen saturations)    CONSENT: Risks (including but not limited to: bleeding, infection or artery puncture), benefits and alternatives were discussed with  patient and consent for procedure was obtained.    TIMEOUT: Universal protocol was followed. TIME OUT conducted just prior to starting procedure confirmed patient identity, site/side, procedure, patient position, and availability of correct equipment, and implants.?  Yes    PROCEDURAL NOTE: Right Internal Jugular approach was selected and the right anterior neck was prepped, cleansed and draped in a sterile fashion.  Mask, gown and gloves were used per sterile protocol.  Local anesthesia was used, lidocaine 1% with epi.  Sterile ultrasound used, vein identified and is compressible.  Procedure performed with real time imaging.  Introducer needle was then used to gain access to the central venous circulation.  Using Seldinger technique, attempted to pass guidewire though significant resistance.  Procedure aborted, sterile dressing applied to puncture site. Decision was made to place R. Femoral line. Introducer needle was then used to gain access to the central venous circulation.  Using Seldinger technique a triple lumen, 7F, 20 cm catheter is placed.  Catheter port(s) were aspirated and flushed.  Central line was  sutured in place, biopatch and sterile dressing applied.     No complications during the procedure.       Diagnosis:   Septic shock  Colitis  History kidney transplant         Conchita Knutson, DO  05/01/24 2628

## 2024-04-24 NOTE — PROGRESS NOTES
[facility]  Infectious Disease Progress Note          Assessment and Plan:   Date of Admission:  4/22/2024  Date of Consult (When I saw the patient): 04/23/24        Assessment & Plan  Matteo Barnes is a 68 year old who was admitted on 4/22/2024.      Impression: 1 68-year-old male, increasing left abdominal pain with colitis changes, while worsening acute symptoms seems likely related to #2 below, stool studies pending  2 since renal transplant late last year has had ongoing and intermittent left abdominal pain, intermittent diarrhea, at one point C. difficile positive but not clear that is actually the diagnosis  3 C. 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 major decompensation, encephalopathy, respiratory failure, major blood pressure drop on multiple pressors intubated in the ICU blood cultures and other studies pending, on Zosyn as though colitis related sepsis.  2 of note not having major stool output so unlikely this is C. difficile and not entirely clear the colitis because of this decompensation  3 continue CMV prophylaxis CMV viremia study done, seems unlikely as the cause of the syndrome  4.  Obviously should be at [facility] with transplant care              Interval History:     Intubated and sedated decompensation yesterday on the ICU intubated and sedated and on pressors.  Significant fever, blood cultures done are pending, now on Zosyn, not really having major stool output probably not C. difficile              Medications:     Current Facility-Administered Medications   Medication Dose Route Frequency Provider Last Rate Last Admin    acetaminophen (TYLENOL)  tablet 975 mg  975 mg Oral TID Danny Monk DO   975 mg at 04/23/24 1401    albumin human 5 % injection 25 g  25 g Intravenous Once Davidson Lester MD        [Held by provider] amLODIPine (NORVASC) tablet 5 mg  5 mg Oral QPM Danny Monk DO        [Held by provider] carvedilol (COREG) tablet 6.25 mg  6.25 mg Oral BID w/meals Wayne Starr MD   6.25 mg at 04/23/24 1821    chlorhexidine (PERIDEX) 0.12 % solution 15 mL  15 mL Mouth/Throat Q12H Elise Castrejon MD   15 mL at 04/24/24 0836    [Held by provider] everolimus (ZORTRESS) tablet 0.75 mg  0.75 mg Oral BID Wayne Starr MD   0.75 mg at 04/23/24 1615    fludrocortisone (FLORINEF) tablet 0.1 mg  0.1 mg Oral Daily Wayne Starr MD   0.1 mg at 04/23/24 1149    hydrocortisone sodium succinate PF (solu-CORTEF) injection 50 mg  50 mg Intravenous Q6H Davidson Lester MD   50 mg at 04/24/24 1119    letermovir (PREVYMIS) tablet 480 mg  480 mg Oral Daily Wayne Starr MD   480 mg at 04/23/24 1615    LORazepam (ATIVAN) injection 0.5 mg  0.5 mg Intravenous Once Marco Antonio Sandoval MD        pantoprazole (PROTONIX) 2 mg/mL suspension 40 mg  40 mg Per Feeding Tube [facility] Elise Castrejon MD        Or    pantoprazole (PROTONIX) IV push injection 40 mg  40 mg Intravenous [facility] Elise Torres MD   40 mg at 04/24/24 0836    piperacillin-tazobactam (ZOSYN) 2.25 g vial to attach to  ml bag  2.25 g Intravenous Q6H Dutch Montaño  mL/hr at 04/24/24 0920 2.25 g at 04/24/24 0920    sodium bicarbonate tablet 1,300 mg  1,300 mg Oral TID Wayne Starr MD   1,300 mg at 04/23/24 1615    sodium chloride (PF) 0.9% PF flush 3 mL  3 mL Intracatheter Q8H Wayne Starr MD   3 mL at 04/23/24 1230    sodium chloride 0.9% BOLUS 1,000 mL  1,000 mL Intravenous Once Dutch Montaño  mL/hr at 04/24/24 1024 1,000 mL at 04/24/24 1024    [Held by provider] tacrolimus (GENERIC EQUIVALENT) capsule 5 mg  5 mg Oral BID Wayne Starr MD   5 mg at  "04/23/24 0008    vancomycin (VANCOCIN) capsule 125 mg  125 mg Oral 4x Daily Wayne Starr MD   125 mg at 04/23/24 1615                  Physical Exam:   Blood pressure (!) 147/97, pulse 107, temperature 97.7  F (36.5  C), resp. rate (!) 31, height 1.753 m (5' 9\"), weight 72.3 kg (159 lb 6.3 oz), SpO2 (!) 38%.  Wt Readings from Last 2 Encounters:   04/23/24 72.3 kg (159 lb 6.3 oz)   03/27/24 72.4 kg (159 lb 9.8 oz)     Vital Signs with Ranges  Temp:  [67.8  F (19.9  C)-101.9  F (38.8  C)] 97.7  F (36.5  C)  Pulse:  [] 107  Resp:  [17-50] 31  BP: ()/() 147/97  MAP:  [0 mmHg-284 mmHg] 76 mmHg  Arterial Line BP: (0-297)/(0-280) 122/61  FiO2 (%):  [80 %-100 %] 90 %  SpO2:  [38 %-100 %] 38 %    Constitutional: Intubated, ICU on multiple pressors toxic ill-appearing     Lungs: Clear to auscultation bilaterally, no crackles or wheezing   Cardiovascular: Regular rate and rhythm, normal S1 and S2, and no murmur noted   Abdomen: Normal bowel sounds, soft, non-distended, non-tender   Skin: No rashes, no cyanosis, no edema   Other:           Data:   All microbiology laboratory data reviewed.  Recent Labs   Lab Test 04/24/24  0529 04/22/24  1745 04/14/24  0958   WBC 0.7* 2.3* 0.7*   HGB 9.2* 9.9* 8.6*   HCT 27.7* 28.8* 26.5*   MCV 82 79 88    240 147*     Recent Labs   Lab Test 04/24/24  0529 04/23/24  0944 04/22/24  1745   CR 3.52* 2.21* 2.09*     No lab results found.  No lab results found.    Invalid input(s): \"UC\"     "

## 2024-04-24 NOTE — PROGRESS NOTES
TELE ICU Progress Note          Assessment and Plan:     Matteo Barnes is a 68 year old patient being cared for in the ICU for acute hypoxic respiratory failure. Pertinent assessment and recommendations include:  Neurology: Sedation with fentanyl gtt. Versed gtt available if needed. Avoiding propofol for hypotension   Cardiovascular / Hemodynamics: Septic Shock, newly hypotensive after aspiration event. On levophed. If second pressor needed will also add stress dose steroids.    Pulmonary: Acute hypoxic respiratory failure, presumed aspiration pneumonia. Intubated 4/24 for tachypnea and hypoxia. Will adjust vent based on post intubation ABG.    GI and Nutrition: NPO, GI and ID following for colitis   Renal, Fluid and Electrolytes: History of renal transplant: Continue immunosuppression   Infectious Disease: Septic Shock: Antibiotics broadened to zosyn, sputum culture ordered.   Hematology and Oncology: Medication induced leukopenia, continue to monitor   Endocrinology: Monitor glucoses   Intensive Care: Central line: Central line present, needed and to be continued  Arterial line: No arterial line present  Restraint:Bilateral soft restraints  Duval catheter: None  DVT prophylaxis: pneumatic compression devices  GI prophylaxis: PPI   Primary Team Communication: N/A   Billing: Total critical care time spent by me, excluding procedures, was 35 minutes.      Elise Castrejon MD  4/24/2024      Addendum: Patient now requiring 3 pressors and is tachypneic on the ventilator. Due to concurrent prolonged CODE BLUE occurring at another hospital, I have asked the abhishek VÁSQUEZ to assess this patient at bedside for arterial line and any adjustments needed for ventilator/sedation.          Hospital Course and Key events    69 yo with history of kidney transplant 10/2023, presented with abdominal pain and found to have left sided colitis. The evening of 4/23 he started to have nausea and vomiting and increasing oxygen requirements. He  was transferred to the ICU where he continued to be hypoxic despite 100% on HFNC. Continued to have vomiting and deemed unsafe for trial of BiPAP. Decision made to intubate patient for hypoxia and tachypnea with RR in the 40s.     The patient remains critically ill with Hypoxic respiratory failure.              Medications:     I have reviewed this patient's current medications  Current Facility-Administered Medications   Medication Dose Route Frequency Provider Last Rate Last Admin    fentaNYL (SUBLIMAZE) infusion   mcg/hr Intravenous Continuous Elise Castrejon MD        midazolam (VERSED) 100 mg/100 mL NS infusion - ADULT  1-8 mg/hr Intravenous Continuous Elise Castrejon MD        norepinephrine (LEVOPHED) 4 mg in  mL infusion PREMIX  0.01-0.6 mcg/kg/min Intravenous Continuous Elise Castrejon MD 50.5 mL/hr at 24 0300 0.18 mcg/kg/min at 24 0300          Vitals and Exam:       Vital Sign Ranges  Temperature Temp  Av.9  F (36.6  C)  Min: 97.3  F (36.3  C)  Max: 99.1  F (37.3  C)   Blood pressure Systolic (24hrs), Av , Min:65 , Max:137        Diastolic (24hrs), Av, Min:41, Max:87      Pulse Pulse  Av.2  Min: 99  Max: 115   Respirations Resp  Av.8  Min: 17  Max: 50   Pulse oximetry SpO2  Av.5 %  Min: 80 %  Max: 98 %       I/O    Intake/Output Summary (Last 24 hours) at 2024 0337  Last data filed at 2024 1403  Gross per 24 hour   Intake --   Output 252 ml   Net -252 ml       FiO2 (%): 90 %  Resp: 17      Physical Examination:   I examined this patient remotely using the telemedicine camera in the [facility]. The patient is located at [facility] ICU    General Appearance:   Laying in bed   HEENT:   HFNC present     Respiratory:   Tachypneic     Neuro:   Alert and answering questions            Pertinent Data:     All pertinent labs and diagnostic studies were reviewed    Labs:  CMP  Recent Labs   Lab 24  0003 24  1068  04/23/24  0944 04/22/24  1745   NA  --   --  126* 129*   POTASSIUM  --  3.7 3.4 3.2*   CHLORIDE  --   --  93* 91*   CO2  --   --  17* 21*   ANIONGAP  --   --  16* 17*   *  --  185* 141*   BUN  --   --  64.4* 55.5*   CR  --   --  2.21* 2.09*   GFRESTIMATED  --   --  32* 34*   ANNETTE  --   --  7.9* 8.6*   MAG  --   --   --  2.0   PROTTOTAL  --   --   --  6.2*   ALBUMIN  --   --   --  3.2*   BILITOTAL  --   --   --  0.7   ALKPHOS  --   --   --  59   AST  --   --   --  28   ALT  --   --   --  8     CBC  Recent Labs   Lab 04/22/24  1745   WBC 2.3*   RBC 3.63*   HGB 9.9*   HCT 28.8*   MCV 79   MCH 27.3   MCHC 34.4   RDW 14.9        INRNo lab results found in last 7 days.  Arterial Blood Gas  Recent Labs   Lab 04/23/24  1943   O2PER 15     Lactic Acid   Date Value Ref Range Status   10/20/2021 1.5 0.7 - 2.0 mmol/L Final     Lactic Acid POCT   Date Value Ref Range Status   04/22/2024 0.9 <=2.0 mmol/L Final       Imaging:  XR Chest Port 1 View  Narrative: EXAM: XR CHEST PORT 1 VIEW  LOCATION: [facility]  DATE: 4/23/2024    INDICATION: Hypoxia, hypotension  COMPARISON: 10/20/2023  Impression: IMPRESSION: Low lung volumes but no evidence for CHF or pneumonia. No pleural effusion or pneumothorax. Normal heart size.

## 2024-04-24 NOTE — PROGRESS NOTES
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  Recently has been running high around 1.8-2.1.    Kidney transplant biopsy 3/27/24 showed no acute rejection, but had acute tubular injury with mild to moderate vascular changes, and attributed to ongoing prerenal issues with abdominal pain/poor oral intake     -Creatinine 2.1-2.2 here on presentation.  Now with severe SADI, likely with ischemic injury with septic shock       # Immunosuppression: Tacrolimus immediate release (goal 8-10) and Everolimus (goal 4-6)                          - Changed from mycophenolate to everolimus due to GI symptoms and leukopenia.    # Septic shock-presented with colitis.  Possible aspiration event overnight with vomiting.  New bibasilar infiltrates on chest x-ray.  Cultures negative so far.  -On Zosyn                 # Infection Prophylaxis:   - CMV:  Letermovir .       # Hypertension: Controlled.     # Anemia in Chronic Renal Disease: Hgb: 9.2       - Iron studies: Low iron saturation, but high ferritin  -Parvo/CMV/EBV studies pending     # Leukopenia: WBC count has been low recently and attributed to medications.   Patient was changed off mycophenolate and Valcyte due to leukopenia.  He has received filgrastim at times for ANC <0.5.  -WC count back down to 0.7 in setting of sepsis.  Check ANC .consider Neupogen              # EBV Viremia: Trend up, moderately elevated EBV PCR at ~ 54K with last check .plan to do EBV PCR every 2 weeks for now with increasing level.      # Diarrhea/Abdominal Pain:   -Chronic-attributed to left-sided diverticulitis is seen in March CT scan.  History of C. difficile in the past  Now acutely worse with left-sided colitis.  Being evaluated.  GI on board  -On oral vancomycin     # FEN  "-  Hyponatremia with a sodium of 128.  Improving.  metabolic acidosis with slightly raised anion gap in setting of diarrhea and renal failure.  -Lactate okay        Recommendation-  -monitor urine output.  Daily renal panel.  -Hold all blood pressure medications  -Hold tacrolimus and everolimus.  Continue with stress dose IV hydrocortisone.  Discussed with Dr. Dale  at [facility] transplant team  -Strongly recommend transfer to [facility] for higher level of care and close monitoring by transplant team.  Dr. Dale was agreeable.  -Volume challenge with LR 1 L or 5% albumin 500 cc now  -Trend BMP/blood gas.  Start maintenance IV with half saline +5 mg of bicarb at 125 cc an hour    Critically ill patient with high risk of decompensation.  Discussed with primary team in person.  Discussed with [facility] transplant team.      Meeta Pickering MD  [facility] - Nephrology   [phone]      Interval History :   Seen / examined.   Overnight events noted.  Taken a turn for worse with sudden decompensation, vomiting with likely aspiration event, septic shock requiring 3 pressors in ICU despite volume resuscitation.  Now intubated and ventilated.  Creatinine up to 3.5.  Bicarb was 16.  Lactate is okay.  Febrile-102.  Cultures negative so far.  Chest x-ray shows new bibasilar infiltrates.  No suspicion of pulm edema.      Physical Exam:   I/O last 3 completed shifts:  In: 414.89 [I.V.:414.89]  Out: 252 [Urine:250; Emesis/NG output:2]    BP (!) 160/75   Pulse 112   Temp (!) 101.9  F (38.8  C) (Axillary)   Resp (!) 34   Ht 1.753 m (5' 9\")   Wt 72.3 kg (159 lb 6.3 oz)   SpO2 96%   BMI 23.54 kg/m      GENERAL APPEARANCE: intubated/ sedated   EYES:  no scleral icterus, pupils equal  PULM: Diminished at bases  CV: regular rhythm, normal rate, no rub     -JVP -     -edema -   GI: soft, BS +   NEURO: Intubated/sedated    Labs:   All labs reviewed by me  Electrolytes/Renal - "   Recent Labs   Lab Test 04/24/24 0529 04/24/24  0420 04/24/24  0003 04/23/24  1248 04/23/24  0944 04/22/24  1745 04/07/24  0955 04/05/24  1022 03/08/24  1013 03/01/24  0945 02/04/24  0943 02/02/24  0937   *  --   --   --  126* 129*   < > 137   < > 136   < > 136   POTASSIUM 4.0  --   --  3.7 3.4 3.2*   < > 5.6*   < > 5.1   < > 4.9   CHLORIDE 94*  --   --   --  93* 91*   < > 106   < > 106   < > 103   CO2 16*  --   --   --  17* 21*   < > 22   < > 21*   < > 21*   BUN 79.2*  --   --   --  64.4* 55.5*   < > 27.3*   < > 40.9*   < > 30.0*   CR 3.52*  --   --   --  2.21* 2.09*   < > 1.95*   < > 1.52*   < > 1.18*   * 129* 140*  --  185* 141*   < > 98   < > 111*   < > 188*   ANNETTE 7.5*  --   --   --  7.9* 8.6*   < > 9.3   < > 9.1   < > 9.1   MAG 2.2  --   --   --   --  2.0  --  1.6*   < > 2.0   < > 1.5*   PHOS  --   --   --   --   --   --   --  3.2  --  3.1  --  2.2*    < > = values in this interval not displayed.       CBC -   Recent Labs   Lab Test 04/24/24 0529 04/22/24 1745 04/14/24  0958   WBC 0.7* 2.3* 0.7*   HGB 9.2* 9.9* 8.6*    240 147*       LFTs -   Recent Labs   Lab Test 04/24/24 0529 04/22/24 1745 01/25/24  2231   ALKPHOS 120 59 139   BILITOTAL 1.1 0.7 0.4   ALT 31 8 41   AST 83* 28 35   PROTTOTAL 4.9* 6.2* 7.7   ALBUMIN 2.3* 3.2* 4.5       Iron Panel -   Recent Labs   Lab Test 04/05/24  1022 03/13/24  0850 02/18/24  0937   IRON 52* 17* 60*   IRONSAT 24 8* 21   ROBB 1,392* 2,283* 4,023*         Current Medications:  Current Facility-Administered Medications   Medication Dose Route Frequency Provider Last Rate Last Admin    acetaminophen (TYLENOL) tablet 975 mg  975 mg Oral TID Danny Monk DO   975 mg at 04/23/24 1401    [Held by provider] amLODIPine (NORVASC) tablet 5 mg  5 mg Oral QPM Danny Monk DO        [Held by provider] carvedilol (COREG) tablet 6.25 mg  6.25 mg Oral BID w/meals Wayne Starr MD   6.25 mg at 04/23/24 1821    chlorhexidine (PERIDEX) 0.12 % solution 15 mL  15  mL Mouth/Throat Q12H Elise Castrejon MD   15 mL at 04/24/24 0836    everolimus (ZORTRESS) tablet 0.75 mg  0.75 mg Oral BID Wayne Starr MD   0.75 mg at 04/23/24 1615    fludrocortisone (FLORINEF) tablet 0.1 mg  0.1 mg Oral Daily Wayne Starr MD   0.1 mg at 04/23/24 1149    hydrocortisone sodium succinate PF (solu-CORTEF) injection 50 mg  50 mg Intravenous Q6H Davidson Lester MD   50 mg at 04/24/24 1119    letermovir (PREVYMIS) tablet 480 mg  480 mg Oral Daily Wayne Starr MD   480 mg at 04/23/24 1615    LORazepam (ATIVAN) injection 0.5 mg  0.5 mg Intravenous Once Marco Antonio Sandoval MD        pantoprazole (PROTONIX) 2 mg/mL suspension 40 mg  40 mg Per Feeding Tube [facility] Elise Torres MD        Or    pantoprazole (PROTONIX) IV push injection 40 mg  40 mg Intravenous [facility] Elise Torres MD   40 mg at 04/24/24 0836    piperacillin-tazobactam (ZOSYN) 2.25 g vial to attach to  ml bag  2.25 g Intravenous Q6H Dutch Montaño  mL/hr at 04/24/24 0920 2.25 g at 04/24/24 0920    sodium bicarbonate tablet 1,300 mg  1,300 mg Oral TID Wayne Starr MD   1,300 mg at 04/23/24 1615    sodium chloride (PF) 0.9% PF flush 3 mL  3 mL Intracatheter Q8H Wayne Starr MD   3 mL at 04/23/24 1230    sodium chloride 0.9% BOLUS 1,000 mL  1,000 mL Intravenous Once Dutch Montaño  mL/hr at 04/24/24 1024 1,000 mL at 04/24/24 1024    tacrolimus (GENERIC EQUIVALENT) capsule 5 mg  5 mg Oral BID Wayne Starr MD   5 mg at 04/23/24 0008    vancomycin (VANCOCIN) capsule 125 mg  125 mg Oral 4x Daily Wayne Starr MD   125 mg at 04/23/24 1615     Current Facility-Administered Medications   Medication Dose Route Frequency Provider Last Rate Last Admin    fentaNYL (SUBLIMAZE) infusion   mcg/hr Intravenous Continuous Elise Castrejon MD 1 mL/hr at 04/24/24 1000 50 mcg/hr at 04/24/24 1000    midazolam (VERSED) 100 mg/100 mL NS infusion - ADULT  1-8 mg/hr Intravenous Continuous Elise Castrejon MD  3 mL/hr at 04/24/24 1000 3 mg/hr at 04/24/24 1000    norepinephrine (LEVOPHED) 16 mg in  mL infusion MAX CONC CENTRAL LINE  0.01-0.6 mcg/kg/min Intravenous Continuous Dutch Montaño MD 14 mL/hr at 04/24/24 1051 0.2 mcg/kg/min at 04/24/24 1051    phenylephrine (CHAR-SYNEPHRINE) 50 mg in NaCl 0.9 % 250 mL infusion  0.1-6 mcg/kg/min Intravenous Continuous Elise Castrejon MD 44.9 mL/hr at 04/24/24 1051 2 mcg/kg/min at 04/24/24 1051    vasopressin 0.2 units/mL in NS (PITRESSIN) standard conc infusion  0.5-4 Units/hr Intravenous Continuous Elise Castrejon MD 12.5 mL/hr at 04/24/24 1118 2.5 Units/hr at 04/24/24 1118     Meeta Pickering MD

## 2024-04-24 NOTE — ANESTHESIA CARE TRANSFER NOTE
Patient: Matteo Barnes    Procedure: * No procedures listed *       Diagnosis: * No pre-op diagnosis entered *  Diagnosis Additional Information: No value filed.    Anesthesia Type:   No value filed.     Note:    Oropharynx: endotracheal tube in place, ventilatory support, dental guard in place and oral gastic tube in place  Level of Consciousness: iatrogenic sedation      Independent Airway: airway patency not satisfactory and stable  Dentition: dentition unchanged  Vital Signs Stable: post-procedure vital signs reviewed and stable  Report to RN Given: handoff report given  Patient transferred to: ICU    ICU Handoff: Call for PAUSE to initiate/utilize ICU HANDOFF, Identified Patient, Identified Responsible Provider, Reviewed the Pertinent Medical History, Discussed Surgical Course, Reviewed Intra-OP Anesthesia Management and Issues during Anesthesia, Set Expectations for Post Procedure Period and Allowed Opportunity for Questions and Acknowledgement of Understanding      Vitals:  Vitals Value Taken Time   /28 04/24/24 0351   Temp     Pulse 107 04/24/24 0353   Resp 39 04/24/24 0353   SpO2 88 % 04/24/24 0353   Vitals shown include unfiled device data.    Electronically Signed By: Dean Dennis Severson, APRN CRNA  April 24, 2024  3:53 AM

## 2024-04-24 NOTE — PROGRESS NOTES
Right wrist and Left wrist restraints discontinued at 08:01 AM on 4/24/2024.    Restraint discontinue criteria met, patient is calm, cooperative and safe. Restraints removed.     Patient's Response: No evidence of learning  Family Notification: Patient declined notification  Attending Provider Notified: No (comment), Attending Provider's Name: Cash Vasquez RN

## 2024-04-24 NOTE — PROGRESS NOTES
Went into room to replace PIV. Pt was gurgly and checked sats, 70% RA. Paged MD for possible aspiration from vomiting. RRT called. Oxymask applied 15L, 84%. High flow ordered, 80% 40L. Gave extra dose of Zofran. R 36. 250mL LR bolus.

## 2024-04-24 NOTE — PROGRESS NOTES
"Pt transported to ICU on 15L non-rebreather mask. SpO2 maintained mid 90's during transport. Placed back on HFNC @ 80% and 40LPM upon arrival to ICU.    BP (!) 80/52   Pulse 107   Temp 97.3  F (36.3  C) (Oral)   Resp (!) 32   Ht 1.753 m (5' 9\")   Wt 74.8 kg (165 lb)   SpO2 95%   BMI 24.37 kg/m      Aleshia Ori, RT    "

## 2024-04-24 NOTE — PLAN OF CARE
ICU End of Shift Summary. For vital signs, labs, and complete assessment please see Documentation Flowsheet    Pertinent Assessment:  Rass -2/-3  Tele: ST  Substantial NG/OG output, ~1.6L  Difficult O2 sat, used portable monitor for more accurate reading  Mild temp, ice packs and cool washcloth in place  Active BS, no BM  Minimal UOP  Major Shift Events:  Tineo placed, UA sent  Labs drawn  Vent setting adjusted per MD at beside  Art line lost on wrist, placed in groin by MD  1L NS bolus given  Albumin given  1 amp Hco3 given for LA 5.5 and pH 7.1  Discharge/Transfer needs: Report called to [facility] and [address] [place] [address]. Pt [facility] at 1348    Bedside shift Report Completed: Y  Bedside Safety Check Completed: Y  ------------------------------------------  Notified provider about indwelling tineo catheter discussed removal or continued need.    Did provider choose to remove indwelling tineo catheter? NO    Provider's tineo indication for keeping indwelling tineo catheter: Indication for continued use: Deep Sedation/Paralysis    Is there an order for indwelling tineo catheter? YES    *If there is a plan to keep tineo catheter in place at discharge daily notification with provider is not necessary, but please add a notation in the treatment team sticky note that the patient will be discharging with the catheter.     Problem: Adult Inpatient Plan of Care  Goal: Plan of Care Review  Description: The Plan of Care Review/Shift note should be completed every shift.  The Outcome Evaluation is a brief statement about your assessment that the patient is improving, declining, or no change.  This information will be displayed automatically on your shift  note.  Outcome: Not Progressing  Flowsheets (Taken 4/24/2024 1042)  Outcome Evaluation: See note  Plan of Care Reviewed With: patient  Overall Patient Progress: declining  Goal: Patient-Specific Goal (Individualized)  Description: You can add care plan individualizations to a care  "plan. Examples of Individualization might be:  \"Parent requests to be called daily at 9am for status\", \"I have a hard time hearing out of my right ear\", or \"Do not touch me to wake me up as it startles  me\".  Outcome: Not Progressing  Goal: Absence of Hospital-Acquired Illness or Injury  Outcome: Not Progressing  Intervention: Identify and Manage Fall Risk  Recent Flowsheet Documentation  Taken 4/24/2024 0800 by Marie Vasquez RN  Safety Promotion/Fall Prevention:   room door open   safety round/check completed   activity supervised   treat underlying cause   treat reversible contributory factors  Intervention: Prevent Skin Injury  Recent Flowsheet Documentation  Taken 4/24/2024 1000 by Marie Vasquez RN  Body Position:   turned   left   side-lying  Taken 4/24/2024 0800 by Marie Vasquez RN  Body Position:   turned   right   side-lying  Skin Protection:   transparent dressing maintained   incontinence pads utilized  Device Skin Pressure Protection: tubing/devices free from skin contact  Intervention: Prevent and Manage VTE (Venous Thromboembolism) Risk  Recent Flowsheet Documentation  Taken 4/24/2024 0800 by Marie Vasquez RN  VTE Prevention/Management: SCDs (sequential compression devices) off  Intervention: Prevent Infection  Recent Flowsheet Documentation  Taken 4/24/2024 0800 by Marie Vasquez RN  Infection Prevention: visitors restricted/screened  Goal: Optimal Comfort and Wellbeing  Outcome: Not Progressing  Intervention: Provide Person-Centered Care  Recent Flowsheet Documentation  Taken 4/24/2024 0800 by Marie Vasquez RN  Trust Relationship/Rapport: care explained  Goal: Readiness for Transition of Care  Outcome: Not Progressing     Problem: Diarrhea  Goal: Effective Diarrhea Management  Outcome: Not Progressing  Intervention: Manage Diarrhea  Recent Flowsheet Documentation  Taken 4/24/2024 0800 by Marie Vasquez RN  Isolation Precautions:   droplet " precautions maintained   enteric precautions maintained  Medication Review/Management: medications reviewed     Problem: Nausea and Vomiting  Goal: Nausea and Vomiting Relief  Outcome: Not Progressing  Intervention: Prevent and Manage Nausea and Vomiting  Recent Flowsheet Documentation  Taken 4/24/2024 1000 by Marie Vasquez RN  Oral Care:   swabbed with antiseptic solution   suction provided  Taken 4/24/2024 0800 by Marie Vasquez RN  Oral Care:   swabbed with antiseptic solution   suction provided  Environmental Support: rest periods encouraged     Problem: Pain Acute  Goal: Optimal Pain Control and Function  Outcome: Not Progressing  Intervention: Prevent or Manage Pain  Recent Flowsheet Documentation  Taken 4/24/2024 0800 by Marie Vasquez RN  Sensory Stimulation Regulation:   care clustered   music on  Bowel Elimination Promotion: privacy promoted  Complementary Therapy: music therapy provided  Medication Review/Management: medications reviewed  Intervention: Optimize Psychosocial Wellbeing  Recent Flowsheet Documentation  Taken 4/24/2024 0800 by Marie Vasquez RN  Supportive Measures: positive reinforcement provided     Problem: Restraint, Nonviolent  Goal: Absence of Harm or Injury  Outcome: Met  Intervention: Implement Least Restrictive Safety Strategies  Recent Flowsheet Documentation  Taken 4/24/2024 0800 by Marie Vasquez RN  Medical Device Protection: IV pole/bag removed from visual field  Intervention: Protect Dignity, Rights and Personal Wellbeing  Recent Flowsheet Documentation  Taken 4/24/2024 0800 by Marie Vasquez RN  Trust Relationship/Rapport: care explained  Intervention: Protect Skin and Joint Integrity  Recent Flowsheet Documentation  Taken 4/24/2024 1000 by Marie Vasquez RN  Body Position:   turned   left   side-lying  Taken 4/24/2024 0800 by Marie Vasquez RN  Body Position:   turned   right   side-lying  Skin Protection:   transparent  dressing maintained   incontinence pads utilized  Range of Motion: ROM (range of motion) performed     Problem: Comorbidity Management  Goal: Blood Pressure in Desired Range  Outcome: Not Progressing  Intervention: Maintain Blood Pressure Management  Recent Flowsheet Documentation  Taken 4/24/2024 0800 by Marie Vasquez RN  Medication Review/Management: medications reviewed

## 2024-04-24 NOTE — PROGRESS NOTES
I saw and examined this patient today    Case was discussed with the intensivist.    Patient to be transferred to [facility] today.  Requires higher level of care in the setting of recent renal transplant, and worsening clinical status including shock now requiring 3 vasopressor medications.  Dr. Lester (intensivist) has arranged transfer

## 2024-04-24 NOTE — PLAN OF CARE
ICU End of Shift Summary.  For vital signs and complete assessments, please see documentation flowsheets.      Pertinent assessments:   Neuro: Anxious and struggling, oriented and alert. Ultimately intubated and sedated.   Cardiac: ST and hypotension requiring significant vasopressor support.  Resp: Initially on HFNC 80%/40L and tachypneic with RR in the 40's. Resp status deteriorated and ultimately intubated and ventilated.  GI: Nausea and frequent small emesis of bilious nature unrelieved by ondansetron.   : no UO and bladder scan showed scant volumes  Skin: WDL  Lines: R Fem 3lume CVC, R rad Art line, 2 PIVs  Drips: Levo, neosynephrine, vasopressin, versed, fentanyl    Major Shift Events: Admitted from floor with hypotension on HF NC. BP and Resp status both deteriorated to the point pt requiring multiple vasopressors and intubation.    Plan (Upcoming Events): TBD  Discharge/Transfer Needs: TBD     Bedside Shift Report Completed : y  Bedside Safety Check Completed: y       Problem: Nausea and Vomiting  Goal: Nausea and Vomiting Relief  Intervention: Prevent and Manage Nausea and Vomiting  Recent Flowsheet Documentation  Taken 4/24/2024 0400 by Darrell Stone, RN  Fluid/Electrolyte Management: intravenous fluids adjusted  Oral Care:   mouth wash rinse   oral rinse provided   teeth brushed   tongue brushed  Environmental Support: calm environment promoted  Taken 4/24/2024 0100 by Darrell Stone, RN  Nausea/Vomiting Interventions: antiemetic  Taken 4/23/2024 2355 by Darrell Stone, RN  Fluid/Electrolyte Management: intravenous fluids adjusted  Oral Care:   mouth wash rinse   oral rinse provided   teeth brushed   tongue brushed  Environmental Support: calm environment promoted     Problem: Adult Inpatient Plan of Care  Goal: Plan of Care Review  Description: The Plan of Care Review/Shift note should be completed every shift.  The Outcome Evaluation is a brief statement about your assessment that the patient is  improving, declining, or no change.  This information will be displayed automatically on your shift  note.  Flowsheets (Taken 4/24/2024 0735)  Outcome Evaluation: Pt transferred from floor 2nd hypotension and declining state  Plan of Care Reviewed With: patient  Overall Patient Progress: declining  Goal: Absence of Hospital-Acquired Illness or Injury  Intervention: Prevent Skin Injury  Recent Flowsheet Documentation  Taken 4/24/2024 0400 by Darrell Stone RN  Body Position: position changed independently  Device Skin Pressure Protection: absorbent pad utilized/changed  Taken 4/23/2024 2355 by Darrell Stone RN  Body Position: position changed independently  Intervention: Prevent Infection  Recent Flowsheet Documentation  Taken 4/24/2024 0400 by Darrell Stone RN  Infection Prevention:   hand hygiene promoted   personal protective equipment utilized  Taken 4/23/2024 2355 by Darrell Stone RN  Infection Prevention:   hand hygiene promoted   personal protective equipment utilized  Goal: Optimal Comfort and Wellbeing  Intervention: Monitor Pain and Promote Comfort  Recent Flowsheet Documentation  Taken 4/24/2024 0400 by Darrell Stone RN  Pain Management Interventions:   medication (see MAR)   cold applied  Taken 4/23/2024 2355 by Darrell Stone RN  Pain Management Interventions:   medication (see MAR)   cold applied  Intervention: Provide Person-Centered Care  Recent Flowsheet Documentation  Taken 4/24/2024 0400 by Darrell Stone RN  Trust Relationship/Rapport:   care explained   choices provided   emotional support provided   empathic listening provided   questions answered   questions encouraged   reassurance provided   thoughts/feelings acknowledged  Taken 4/23/2024 2355 by Darrell Stone RN  Trust Relationship/Rapport:   care explained   choices provided   emotional support provided   empathic listening provided   questions answered   questions encouraged   reassurance provided   thoughts/feelings acknowledged    Goal Outcome Evaluation:      Plan of Care Reviewed With: patient    Overall Patient Progress: decliningOverall Patient Progress: declining    Outcome Evaluation: Pt transferred from floor 2nd hypotension and declining state
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
## 2024-04-24 NOTE — PROCEDURES
"ICU Staff:    Called urgently by Dr. Elise Castrejon in the TELE ICU and alerted of the need for an arterial cathter to monitor this critically ill patient who is on three pressor agents.       ARTERIAL LINE INSERTION PROCEDURE NOTE  (NON-OR)     Procedure Date:  4/24/2024     Performing Physician:  Dr. Shukri Garzon     Pre-Procedure Diagnosis: shock    Post-Procedure Diagnosis:  shock     Procedure:  Arterial line insertion right radial artery     Indications:  Shock due to pressors and acute respiratory failure    Estimated Blood Loss: 5 ml    Complications: none     Clinical History:     The patient was hypotensive on three pressor agents and required sedation and intubation.  Full informed consent via telephone was not possible as the patient's family could not be reached despite multiple attempts; taped messages were left for them on their recoding device.  The procedure was declaired an \"emergency\" procedure.       Procedure:       In sterile fashion, the line site was prepped with Chlorhexidine.  Strict sterile conditions were maintained, cap, mask, and sterile gloves were worn.  The patient right wrist was prepped and draped.  5 ml of Lidocaine 1% anesthetic were infiltrated into the skin.  The arterial line was placed in the right radial artery percutaneously using US guidance.  Seldinger technique. Good arterial tracing.  The arterial line was sutured in place using 3-0 silk sutures. An occlusive sterile dressing was applied.  No complications noted.      Patient Condition: critical     Shukri Garzon Jr, MD, PhD     Addendum:    I was now able to reach the patient's contact, Vanita Cabrera, who is listed in the EMR, by cell phone at 7:20 AM today. Vanita Cabrera received the telephone message left for her overnight; however, she was not able to reach back by phone over the night.  Vanita Cabrera was updated regarding the patient's condition.  The arterial line placement was reviewed with the patient's " contract and information regarded the patient's location in the hospital and the hospital's visiting hours was provided.      MARY ANN
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
## 2024-04-24 NOTE — PROGRESS NOTES
Patient had continued nausea for which Zofran dose was increased to 8 mg   Patient had nausea and vomiting and probably aspirated following which his oxygen saturations decreased and he was put on high flow nasal cannula O2  Patient refused NG tube  He did not wanted any intubation unless it was absolutely needed  Will monitor the patient's oxygen saturations and blood pressure on the floor

## 2024-04-25 ENCOUNTER — POST MORTEM DOCUMENTATION (OUTPATIENT)
Dept: TRANSPLANT | Facility: CLINIC | Age: 69
End: 2024-04-25
Payer: COMMERCIAL

## 2024-04-25 VITALS
BODY MASS INDEX: 25.07 KG/M2 | TEMPERATURE: 99 F | WEIGHT: 169.75 LBS | OXYGEN SATURATION: 59 % | RESPIRATION RATE: 21 BRPM

## 2024-04-25 LAB
BACTERIA UR CULT: NO GROWTH
EBV DNA SERPL NAA+PROBE-ACNC: 5460 IU/ML
EBV DNA SERPL NAA+PROBE-LOG#: 3.7 {LOG_COPIES}/ML
IGG SERPL-MCNC: 187 MG/DL (ref 610–1616)

## 2024-04-25 ASSESSMENT — ACTIVITIES OF DAILY LIVING (ADL): ADLS_ACUITY_SCORE: 43

## 2024-04-25 NOTE — PLAN OF CARE
ICU End of Shift Summary. See flowsheets for vital signs and detailed assessment.    Changes this shift: Patient arrived from OSH on 3 pressors.  Sedation increased 5 of versed, 75 fentanyl. Escalated to 4 pressors nearly all maxed. Patient had CT and x-ray.  RIJ HD line placed, CRRT started. Labs drawn and replaced at direction of MICU.  2 amps bicarb prior to bicarb gtt start and bicarb CRRT post.  K was shifted x1 and decreased slightly.  Renal Transplant surgery and Colorectal surgery were all consulted to weigh in on treatment options.  Family arrived at bedside.     Plan: Goals of care discussion underway with family and treatment teams.  Continue CRRT and supportive ICU measures.  MICU team also remains at bedside.  Await next steps after discussions conclude.    Goal Outcome Evaluation:

## 2024-04-25 NOTE — PROGRESS NOTES
Patient  at  on full support (vent, CRRT and pressors). Pt DNR. No autopsy, no organ donation. No patient belongings, family at bedside.  paged, but arrived after family left. Patient extubated after expiring. Family does not know  arrangement but nurse informed to call security upon plans.

## 2024-04-25 NOTE — DISCHARGE SUMMARY
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  # Anion gap metabolic acidosis  # Lactic acidosis  # Hyperkalemia   # Hyponatremia  # Hypocalcemia  # Hyperphosphatemia  # Hyperglycemia, stress and/or steroid-induced   # Sepsis  # Colitis, possibly C difficile  # Leukopenia, likely secondary to immunosuppression   # Chronic normocytic anemia   # Coagulopathy  # Poor skin turgor  # Cyanotic digits         History of Present Illness   Arthur Barnes is a 68 year old male with PMH of ESRD due to FSGS S/P DDKT 10/2023 on tacrolimus and Everolimus, anemia of CKD, HTN, diverticulitis who was initially admitted 04/22/2024 to [facility] with severe abdominal pain, nausea and diarrhea attributed to infectious vs ischemic colitis from the splenic flexure to the rectum, now transferred to [facility] ICU on 4/24/2024 with undifferentiated shock requiring escalating pressors, anuric SADI and acute hypoxic respiratory failure 2/2 suspected aspiration event.     Hospital Course   Matteo Barnes was admitted on 4/24/2024.  The following problems were addressed during his hospitalization:    # Acute hypoxic respiratory failure  # C/f aspiration event  Intubated at OSH 4/24 for tachypnea and hypoxia given the concern for aspiration PNA. Per chart review the patient was transferred to ICU on 100% HFNC, hypoxia and tachypnea; and then intubated  ( BiPaP was considered unsafe 2/2 vomiting). Post arrival  to the U ICU, vent settings remained stable with no concern for new rises in PIP and plateau pressures or breathing over the vent. Pt was adequately sedated with midazolam and fentanyl. CXR showed increased patchy densities in right infrahilar area indicating developing edema. CT imaging concerning for increased consolidated lower lobe opacities likely in setting of infection vs atelectasis process. Respiratory cultures were drawn at bedside and broad spectrum abx coverage was started given the concern for aspiration PNA.   - Pt was kept sedated while on the ventilator  - Empiric Abx Coverage: Cont Van + Meropenem and Tobramycin   - MRSA negative  - Family decided to keep the patient intubated while he was transitioned to the comfort care.       # Undifferentiated shock like in the setting of sepsis   Presented from OSH with increasing pressor needs. Initiated on 4 pressors amid concern for rising lactate. S/p 1 L fluid resuscitation in the ICU at arrival and s/p 3 bicarb amps for low pH ( <7.2). Bedside POCUS did not show concern for IVC dilation or overt wall motion abnormalities. The hypotension refractory to fluid resuscitation seemed most likely in the setting of underlying infectious process with most likely source being worsening abdominal colitis seen on the CT imaging vs the aspiration PNA. Pressor support for BP was stopped after the family decided to transition to comfort care.  - Prior to transition to comfort care pressors levophed, vasopressin, phenylephrine, angiotensin II  - Stress dose steroids ordered prior to comfort care     # HTN  # Dyslipidemia  - Held antihypertensives, statin during the acute care     # Colitis, ischemic vs infectious  # Vomiting  # Diarrhea (resolved)  Since renal transplant late last year has had ongoing and intermittent left abdominal pain attributed to diverticulitis, intermittent diarrhea, at one point C. difficile positive. Presented with large amounts of diarrhea and  vomiting, vomiting persisted at OSH, without BM since admission c/f ischemic colitis picture. Repeat imaging obtained at the time of admission, post transfer indicative of progressive enlargement and wall thickening of entire colon suggestive of colitis. Diffuse small bowel enlargement without a focal transition point, likely representing ileus.  Remained on 4 pressors with rising lactate raising concern for worsening colitis most likely 2/2 infection vs ischemia. The case was discussed with the CRS and they do not think that he is a candidate for emergent surgery given his co morbidities and ongoing acidosis as well as coagulopathy.   - NPO with NG decompression during the admission  - Pressors as above, Stress dose steroids  and bicarb drip were continued with MAP goals of above 65   - Broad Abx to Van + Meropenem and Tobramycin during the admission.     # Abnormal LFTs, hepatocellular pattern  Most likely in the setting hypoperfusion from the low BP raising concern for shock liver.   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 Recently has been running high around 1.8-2.1. Kidney transplant biopsy 3/27/24 showed no acute rejection, but had acute tubular injury with mild to moderate vascular changes, and attributed to ongoing prerenal issues with abdominal pain/poor oral intake. Now with severe SADI, likely with ischemic injury with septic shock and anuria. Presented with lactate of 5.5 that felipe to 7.5 despite IVF and pressor support. K at time of presentation was 7 that was shifted at the bedside. Transplant nephrology was consulted and recommended CRRT.  Tacrolimus, everolimus and letermovir were held during the admission .  - CRRT with dose of 40ml / kg / hr, post bicarb replacement, 2K bath and fluid goal of net even done on 4/24  - IVF with bicarb replacement done presentation to the ICU  - Calcium chloride 2g and 1g given during the admission  - K shifting done with insulin  - CTM BMP     # Hyperglycemia, stress and/or steroid-induced  - Q4H BG with LDSSI     # Sepsis  # Aspiration pneumonia/pneumonitis  # Colitis, possibly C difficile  Presented hemodynamically unstable with rising pressors requirement. Shock most likely in the setting of underlying infectious etiology with likely source being the abdomen. There was also concern for new aspiration at OSH that required intubation due to AHRF. Per chart review, since renal transplant late last year has had ongoing and intermittent left abdominal pain attributed to diverticulitis, intermittent diarrhea, at one point C. difficile positive. Presented with large amounts of diarrhea and vomiting, vomiting persisted at OSH, without BM since admission c/f ischemic colitis picture. Repeat imaging obtained today post transfer indicative of progressive enlargement and wall thickening of entire colon suggestive of colitis. There was also diffuse small bowel enlargement without a focal transition point,likely representing ileus.  He has remained on 4 pressors with rising lactate raising concern for worsening colitis in the setting of underlying ischemic vs infectious process. Broad spectrum abx were initiated for covering possible infective agents: Vanc, Meropenem and Tobramycin  and cultures were drawn for further evaluation. Discussion was done with family to address the goals of care give the critical nature of the patient and per discussion with the patient's ex wife who was the decision maker, she reported that he would not want to live with life support and decision was made to pursue comfort care measures.    #  Leukopenia, likely secondary to immunosuppression   # Chronic normocytic anemia    Most likely in the setting of ongoing immunosuppression 2/2 kidney transplant.  - s/p 1 dose of - Filgastrim      # Elevated INR  Likely in the setting of acute critical sickness.   - CTM     # Poor skin turgor  # Cyanotic digits   Most likely in the setting of underlying shock in the setting of ongoing infectious process.       Cause of death: Shock likely in the setting of underlying sepsis     The patient was seen during the current encounter with ROSELYN Land  Internal medicine PGY2  [facility]     Significant Results and Procedures      Results for orders placed or performed during the hospital encounter of 04/24/24 (from the past 24 hour(s))   Glucose by meter   Result Value Ref Range    GLUCOSE BY METER POCT 61 (L) 70 - 99 mg/dL   CBC with platelets   Result Value Ref Range    WBC Count 0.9 (LL) 4.0 - 11.0 10e3/uL    RBC Count 3.13 (L) 4.40 - 5.90 10e6/uL    Hemoglobin 8.5 (L) 13.3 - 17.7 g/dL    Hematocrit 26.2 (L) 40.0 - 53.0 %    MCV 84 78 - 100 fL    MCH 27.2 26.5 - 33.0 pg    MCHC 32.4 31.5 - 36.5 g/dL    RDW 15.7 (H) 10.0 - 15.0 %    Platelet Count 281 150 - 450 10e3/uL   Blood gas arterial   Result Value Ref Range    pH Arterial 7.10 (LL) 7.35 - 7.45    pCO2 Arterial 37 35 - 45 mm Hg    pO2 Arterial 139 (H) 80 - 105 mm Hg    FIO2 100     Bicarbonate Arterial 11 (L) 21 - 28 mmol/L    Base Excess/Deficit Arterial -17.2 (L) -3.0 - 3.0 mmol/L    Stewart's Test Artline     Oxyhemoglobin Arterial 97 92 - 100 %    O2 Sat, Arterial 98.7 (H) 95.0 - 96.0 %    Potassium Whole Blood 6.6 (HH) 3.4 - 5.3 mmol/L    Lactic Acid 7.3 (HH) 0.7 - 2.0 mmol/L    Narrative    In healthy individuals, oxyhemoglobin (O2Hb) and oxygen saturation (SO2) are approximately equal. In the presence of dyshemoglobins, oxyhemoglobin can be considerably lower than oxygen saturation.   Comprehensive  metabolic panel   Result Value Ref Range    Sodium 131 (L) 135 - 145 mmol/L    Potassium 7.0 (HH) 3.4 - 5.3 mmol/L    Carbon Dioxide (CO2) 11 (L) 22 - 29 mmol/L    Anion Gap 22 (H) 7 - 15 mmol/L    Urea Nitrogen 88.4 (H) 8.0 - 23.0 mg/dL    Creatinine 4.07 (H) 0.67 - 1.17 mg/dL    GFR Estimate 15 (L) >60 mL/min/1.73m2    Calcium 6.7 (L) 8.8 - 10.2 mg/dL    Chloride 98 98 - 107 mmol/L    Glucose 66 (L) 70 - 99 mg/dL    Alkaline Phosphatase 101 40 - 150 U/L    AST 1,004 (HH) 0 - 45 U/L     (H) 0 - 70 U/L    Protein Total 4.9 (L) 6.4 - 8.3 g/dL    Albumin 2.5 (L) 3.5 - 5.2 g/dL    Bilirubin Total 1.0 <=1.2 mg/dL   Magnesium   Result Value Ref Range    Magnesium 2.7 (H) 1.7 - 2.3 mg/dL   Phosphorus   Result Value Ref Range    Phosphorus 9.6 (H) 2.5 - 4.5 mg/dL   Ionized Calcium   Result Value Ref Range    Calcium Ionized Whole Blood 3.8 (L) 4.4 - 5.2 mg/dL   Lactic acid whole blood   Result Value Ref Range    Lactic Acid 7.3 (HH) 0.7 - 2.0 mmol/L   INR   Result Value Ref Range    INR 3.14 (H) 0.85 - 1.15   Partial thromboplastin time   Result Value Ref Range    aPTT 50 (H) 22 - 38 Seconds   Fibrinogen activity   Result Value Ref Range    Fibrinogen Activity 589 (H) 170 - 490 mg/dL   Methicillin Resist/Sens S. aureus PCR    Specimen: Nares, Bilateral; Swab   Result Value Ref Range    MRSA Target DNA Negative Negative    SA Target DNA Negative     Narrative    The [employer]  Xpert SA Nasal Complete assay performed in the [employer] is a qualitative in vitro diagnostic test designed for rapid detection of Staphylococcus aureus (SA) and methicillin-resistant Staphylococcus aureus (MRSA) from nasal swabs in patients at risk for nasal colonization. The test utilizes automated real-time polymerase chain reaction (PCR) to detect MRSA/SA DNA. The Xpert SA Nasal Complete assay is intended to aid in the prevention and control of MRSA/SA infections in healthcare settings. The assay is not intended to diagnose, guide  or monitor treatment for MRSA/SA infections, or provide results of susceptibility to methicillin. A negative result does not preclude MRSA/SA nasal colonization.    iStat Gases Electrolytes ICA Glucose Arterial, POCT   Result Value Ref Range    CPB Applied No     Hematocrit POCT 22 (L) 40 - 53 %    Calcium, Ionized Whole Blood POCT 3.6 (L) 4.4 - 5.2 mg/dL    Glucose Whole Blood POCT 409 (H) 70 - 99 mg/dL    Bicarbonate Arterial POCT 11 (L) 21 - 28 mmol/L    Hemoglobin POCT 7.5 (L) 13.3 - 17.7 g/dL    Potassium POCT 6.5 (HH) 3.4 - 5.3 mmol/L    Sodium POCT 126 (L) 135 - 145 mmol/L    pCO2 Arterial POCT 34 (L) 35 - 45 mm Hg    pH Arterial POCT 7.11 (LL) 7.35 - 7.45    pO2 Arterial POCT 233 (H) 80 - 105 mm Hg    O2 Sat, Arterial POCT 100 92 - 100 %    Base Excess/Deficit (+/-) POCT -17.0 (L) -3.0 - 3.0 mmol/L   Glucose by meter   Result Value Ref Range    GLUCOSE BY METER POCT 102 (H) 70 - 99 mg/dL   Glucose by meter   Result Value Ref Range    GLUCOSE BY METER POCT 171 (H) 70 - 99 mg/dL   Blood gas arterial   Result Value Ref Range    pH Arterial 7.27 (L) 7.35 - 7.45    pCO2 Arterial 34 (L) 35 - 45 mm Hg    pO2 Arterial 146 (H) 80 - 105 mm Hg    FIO2 80     Bicarbonate Arterial 16 (L) 21 - 28 mmol/L    Base Excess/Deficit Arterial -10.5 (L) -3.0 - 3.0 mmol/L    Stewart's Test Artline     Oxyhemoglobin Arterial 98 92 - 100 %    O2 Sat, Arterial 99.4 (H) 95.0 - 96.0 %    Lactic Acid 7.0 (HH) 0.7 - 2.0 mmol/L    Narrative    In healthy individuals, oxyhemoglobin (O2Hb) and oxygen saturation (SO2) are approximately equal. In the presence of dyshemoglobins, oxyhemoglobin can be considerably lower than oxygen saturation.   XR Chest Port 1 View    Narrative    Portable chest 4/24/2024 at 1516 hours    INDICATION: Endotracheal tube positioning    Comparison: 0316 hours earlier today    FINDINGS: Heart size normal. Increased patchy densities right  infrahilar area may indicate slight developing edema rounded  atelectasis.  Recommend follow-up to clearing. Low inspiratory volumes  are also present. Endotracheal tube tip is located approximately 3.3  cm above the carloz. NG/OG tube beyond the inferior margin of the  image    IMPRESSION Increasing edema her atelectasis rather than infection in  the right lower lung zone. Follow-up to clearing.    SELENA ONTIVEROS MD         SYSTEM ID:  [id number]   Blood gas arterial   Result Value Ref Range    pH Arterial 7.28 (L) 7.35 - 7.45    pCO2 Arterial 29 (L) 35 - 45 mm Hg    pO2 Arterial 210 (H) 80 - 105 mm Hg    FIO2 100     Bicarbonate Arterial 14 (L) 21 - 28 mmol/L    Base Excess/Deficit Arterial -12.1 (L) -3.0 - 3.0 mmol/L    Stewart's Test Artline     Oxyhemoglobin Arterial 98 92 - 100 %    O2 Sat, Arterial >100.0 (H) 95.0 - 96.0 %    Lactic Acid 7.0 (HH) 0.7 - 2.0 mmol/L    Narrative    In healthy individuals, oxyhemoglobin (O2Hb) and oxygen saturation (SO2) are approximately equal. In the presence of dyshemoglobins, oxyhemoglobin can be considerably lower than oxygen saturation.   CT Chest Abdomen Pelvis w/o Contrast    Narrative    EXAMINATION: CT CHEST ABDOMEN PELVIS W/O CONTRAST, 4/24/2024 5:07 PM    INDICATION: hx kidney transplant, refractory shock suspected  aspiration pneumonia/colitis    COMPARISON STUDY: CT abdomen and pelvis 4/20/2024    TECHNIQUE: CT scan of the chest, abdomen and pelvis was performed on  multidetector CT scanner using volumetric acquisition technique and  images were reconstructed in multiple planes with variable thickness  and reviewed on dedicated workstations.     CONTRAST: None    CT scan radiation dose is optimized to minimum requisite dose using  automated dose modulation techniques.    FINDINGS:    Chest:   Mediastinum: Right IJ CVC tip is in the right innominate vein. Heart  is normal. Small pericardial effusion. Normal caliber of the ascending  thoracic aorta and main pulmonary artery. Mildly prominent partially  calcified subcarinal lymph node,  unchanged in overall size from  10/19/2021 chest CT but the calcifications are new.. Endotracheal tube  tip approximately 1.6 cm above the carloz.                                                                                                                                           Pleura: Trace pleural effusions. No pneumothorax.    Lungs: The central tracheobronchial tree is clear. Increased patchy  consolidative opacities in the lower lobes.    Abdomen and Pelvis:  Device: Enteric tube tip is in the stomach near the gastric pylorus.    Liver: No mass. No intrahepatic biliary ductal dilation.    Biliary System: Normal gallbladder. No extrahepatic biliary ductal  dilation.    Pancreas: No mass or pancreatic ductal dilation.    Adrenal glands: No mass or nodules    Spleen: Normal.    Kidneys: Atrophic native kidneys. The transplant right lower quadrant  has an unremarkable noncontrast appearance.    Gastrointestinal tract: Enlargement and wall thickening of essentially  the entire colon, progressed from prior. There are also numerous  dilated fluid-filled small bowel. No pneumatosis or portal venous gas.  No focal transition point.    Mesentery/peritoneum/retroperitoneum: No mass. No free air. Small  volume ascites.    Lymph nodes: No significant lymphadenopathy.    Vasculature: The abdominal is not aneurysmal. Atherosclerotic  calcifications of the aorta and its branches.     Pelvis: Urinary bladder is decompressed with Duval catheter in place.    Osseous structures: No aggressive or acute osseous lesion.      Soft tissues: Mild anasarca.      Impression    IMPRESSION:   1. Progressive enlargement and wall thickening of essentially the  entire colon suggestive of colitis. Enhancement cannot be assessed on  this noncontrast exam, however there is no pneumatosis.  2. Diffuse small bowel enlargement without a focal transition point,  likely representing ileus.   3. Increased consolidated lower lobe opacities, likely  a combination  of infection and atelectasis. Trace bilateral pleural effusions.   4. Right lower quadrant transplant kidney.    I have personally reviewed the examination and initial interpretation  and I agree with the findings.    SELENA ONTIVEROS MD         SYSTEM ID:  [id number]   Potassium   Result Value Ref Range    Potassium 5.4 (H) 3.4 - 5.3 mmol/L   Blood gas arterial   Result Value Ref Range    pH Arterial 7.25 (L) 7.35 - 7.45    pCO2 Arterial 29 (L) 35 - 45 mm Hg    pO2 Arterial 96 80 - 105 mm Hg    FIO2 70     Bicarbonate Arterial 13 (L) 21 - 28 mmol/L    Base Excess/Deficit Arterial -13.3 (L) -3.0 - 3.0 mmol/L    Stewart's Test Artline     Oxyhemoglobin Arterial 96 92 - 100 %    O2 Sat, Arterial 97.3 (H) 95.0 - 96.0 %    Peep 10 cm H2O    Lactic Acid 7.5 (HH) 0.7 - 2.0 mmol/L    Narrative    In healthy individuals, oxyhemoglobin (O2Hb) and oxygen saturation (SO2) are approximately equal. In the presence of dyshemoglobins, oxyhemoglobin can be considerably lower than oxygen saturation.   Glucose by meter   Result Value Ref Range    GLUCOSE BY METER POCT 192 (H) 70 - 99 mg/dL   Glucose by meter   Result Value Ref Range    GLUCOSE BY METER POCT 148 (H) 70 - 99 mg/dL   Blood gas arterial   Result Value Ref Range    pH Arterial 7.24 (L) 7.35 - 7.45    pCO2 Arterial 35 35 - 45 mm Hg    pO2 Arterial 94 80 - 105 mm Hg    FIO2 70     Bicarbonate Arterial 15 (L) 21 - 28 mmol/L    Base Excess/Deficit Arterial -11.5 (L) -3.0 - 3.0 mmol/L    Stewart's Test Artline     Oxyhemoglobin Arterial 95 92 - 100 %    O2 Sat, Arterial 96.8 (H) 95.0 - 96.0 %    Lactic Acid 8.4 (HH) 0.7 - 2.0 mmol/L    Narrative    In healthy individuals, oxyhemoglobin (O2Hb) and oxygen saturation (SO2) are approximately equal. In the presence of dyshemoglobins, oxyhemoglobin can be considerably lower than oxygen saturation.   Calcium Ionized CRRT   Result Value Ref Range    Calcium Ionized Whole Blood 4.6 4.4 - 5.2 mg/dL    Lactic Acid 9.8 (HH)  0.7 - 2.0 mmol/L   Magnesium CRRT   Result Value Ref Range    Magnesium 2.2 1.7 - 2.3 mg/dL   Renal panel CRRT   Result Value Ref Range    Sodium 132 (L) 135 - 145 mmol/L    Potassium 5.2 3.4 - 5.3 mmol/L    Chloride 98 98 - 107 mmol/L    Carbon Dioxide (CO2) 14 (L) 22 - 29 mmol/L    Anion Gap 20 (H) 7 - 15 mmol/L    Glucose 134 (H) 70 - 99 mg/dL    Urea Nitrogen 70.4 (H) 8.0 - 23.0 mg/dL    Creatinine 3.13 (H) 0.67 - 1.17 mg/dL    GFR Estimate 21 (L) >60 mL/min/1.73m2    Calcium 8.2 (L) 8.8 - 10.2 mg/dL    Albumin 2.0 (L) 3.5 - 5.2 g/dL    Phosphorus 6.9 (H) 2.5 - 4.5 mg/dL   Glucose by meter   Result Value Ref Range    GLUCOSE BY METER POCT 126 (H) 70 - 99 mg/dL        Pending Results   Unresulted Labs Ordered in the Past 30 Days of this Admission       Date and Time Order Name Status Description    4/24/2024  2:36 PM IgG In process     4/24/2024 10:54 AM Urine Culture In process     4/24/2024  6:20 AM Blood Culture Foot, Right In process     4/24/2024  6:20 AM Blood Culture Line, arterial In process     4/24/2024  5:00 AM Everolimus by Tandem Mass Spectrometry In process     4/24/2024  5:00 AM Parvovirus B19 DNA PCR In process     4/24/2024  5:00 AM Yaritza Barr Virus Quantitative PCR, Plasma In process     4/24/2024  3:45 AM Respiratory Aerobic Bacterial Culture with Gram Stain Preliminary             Primary Care Physician   Dutch Duke    Consultations This Hospital Stay   PHYSICAL THERAPY ADULT IP CONSULT  OCCUPATIONAL THERAPY ADULT IP CONSULT  NUTRITION SERVICES ADULT IP CONSULT  PHARMACY TO DOSE VANCO  PHARMACY CRRT IP CONSULT

## 2024-04-25 NOTE — PROGRESS NOTES
Received notification on 24 of patient's death from Noemy Rod RN  Place of death was reported as [facility].  Graft status at the time of death was reported as Functioning.  TIS verification is: Pending  The Transplant Office has been notified that patient is . The Post Mortem Encounter has been completed. Notifications have been sent to the Care team.   Instructions have been sent to cancel pending appointments, discontinue pending orders, eliminate paper chart, and send a sympathy card to the family.    NANDO HEREDIA  Received notification of patient's death from [facility].  Place of death was reported as [facility].  Graft status at the time of death was reported as Functioning.  TIS verification is: Complete
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
## 2024-04-25 NOTE — PROGRESS NOTES
CRRT STATUS NOTE    DATA:  Time:  1900  Pressures WNL:  YES  Filter Status:  WDL    Problems Reported/Alarms Noted:  none    Supplies Present:  YES    ASSESSMENT:    Patient Net Fluid Balance:  +1,321 L net since midnight       Intake/Output Summary (Last 24 hours) at 4/24/2024 1901  Last data filed at 4/24/2024 1900  Gross per 24 hour   Intake 2981.73 ml   Output 1660 ml   Net 1321.73 ml       Vital Signs: Temp:  [67.8  F (19.9  C)-101.9  F (38.8  C)] 95.5  F (35.3  C)  Pulse:  [] 116  Resp:  [] 28  BP: ()/() 147/97  MAP:  [0 mmHg-284 mmHg] 65 mmHg  Arterial Line BP: (0-297)/(0-280) 100/55  FiO2 (%):  [80 %-100 %] 100 %  SpO2:  [38 %-100 %] 58 %       Most Recent BMP's:  Recent Labs   Lab Test 04/24/24  1716 04/24/24  1452 04/24/24  1447 04/24/24  0529 04/23/24  1248 04/23/24  0944 04/22/24  1745 04/15/24  0930 04/14/24  0958   NA  --  126* 131* 128*  --  126* 129* 140 140   POTASSIUM 5.4* 6.5* 6.6*  7.0* 4.0 3.7 3.4 3.2* 4.1 4.6   CHLORIDE  --   --  98 94*  --  93* 91* 104 106   CO2  --   --  11* 16*  --  17* 21* 25 20*   BUN  --   --  88.4* 79.2*  --  64.4* 55.5* 32.4* 37.4*   CR  --   --  4.07* 3.52*  --  2.21* 2.09* 1.73* 2.11*   ANIONGAP  --   --  22* 18*  --  16* 17* 11 14   ANNETTE  --   --  6.7* 7.5*  --  7.9* 8.6* 8.7* 9.0     Most Recent CBC's:  Recent Labs   Lab Test 04/24/24  1452 04/24/24  1447 04/24/24  1241 04/24/24  0529 04/22/24  1745   WBC  --  0.9* 0.6* 0.7* 2.3*   HGB 7.5* 8.5* 9.1* 9.2* 9.9*   MCV  --  84 84 82 79   PLT  --  281 310 300 240     Most Recent ABG's:  Recent Labs   Lab Test 04/24/24  1716 04/24/24  1601 04/24/24  1516   PH 7.25* 7.28* 7.27*   PO2 96 210* 146*   PCO2 29* 29* 34*   HCO3 13* 14* 16*   ALL -13.3* -12.1* -10.5*       Goals of Therapy:  I=O    INTERVENTIONS:   Pt initiated on CRRT at 1731 via [facility].     Charting reviewed. Rounding completed. Treatment plan discussed with bedside nurse.     PLAN:  Continue with current plan of care please contact CRRT  resource with any questions or concerns via [employer].

## 2024-04-25 NOTE — PROGRESS NOTES
Intensivist    68 year old man presenting from [facility] in shock of unclear etiology. He was admitted to [facility] on 4/22 with N/V/abd pain and found to have colitis in a pattern suggestive of ischemia. He was resuscitated and started on empiric treatment for C diff, although he never actually had a stool test because he did not have a BM during admission. Overnight he had an aspiration event and acutely worsened developing shock requiring 3-4 pressors, respiratory failure requiring intubation, shock liver, and acute renal failure, ultimately prompting transfer to the [facility] for CRRT and transplant evaluation.    He arrived earlier this evening and I received him in sign out, about an hour after his arrival. He was on four pressors (3 maxed out), the day team had placed a line to start CRRT, and a bicarbonate infusion. A non-contrast CT scan was also performed and demonstrated pan-colitis without free air or pneumatosis.    I reviewed the case with colorectal surgery and transplant surgery. The cause for this acute and aparicio decompensation remains unclear. It could be infectious but he's had no positive cultures. There was also consideration of PTLD given his moderately elevated EBV titers, versus MMF-induced colitis (although he has now been off MMF for some time), versus non-occlusive mesenteric ischemia. Given the patient's severe coagulopathy, acidosis, hypothermia, and pressor requirements, a surgical intervention was deemed extremely high risk and, in the absence of a clear diagnosis, potentially futile.    The family arrived to our institution and I updated them along with the transplant surgeon. We conveyed that the patient is worsening despite maximal medical therapy. We re-iterated the recommendations from the colorectal surgeon that an operation is not likely to help and, in fact, could very well hasten the patient's death. We expressed our opinion that the patient is likely to die even if we continue our  maximal medical therapy, and that if his heart were to stop it would be futile to perform chest compressions or shocks. Ultimately the patient's ex-wife (his primary caregiver) and his step-daughter (to whom he is very close) have decided that the patient would want to be made comfortable in a situation like this. We will make the necessary arrangements to transition to comfort-focused care and have changed the patient's code status to DNR.    I personally managed the following critical care issues:   Acute respiratory failure with hypoxia-- vent management  Undifferentiated shock-- vasopressor management  Acute colitis-- antibiotic management; I also personally reviewed the relevant imaging and discussed with consultants as above  End-of-life care-- pre-medication for compassionate extubation and pain/sedation needs for comfort measures    I spent 75 minutes of critical care time exclusive of teaching and procedures.     Mt Bender MD   Critical Care and Acute Care Surgery

## 2024-04-25 NOTE — DEATH PRONOUNCEMENT
MD DEATH PRONOUNCEMENT    Called to pronounce Matteo Barnes dead.    Physical Exam: Unresponsive to noxious stimuli, Spontaneous respirations absent, Breath sounds absent, Carotid pulse absent, Heart sounds absent, Pupillary light reflex absent, and Corneal blink reflex absent    Patient was pronounced dead at 9:35 PM, 2024.    Preliminary Cause of Death: Vasoplegic shock    Active Problems:    Shock (H)       Infectious disease present?: NO    Communicable disease present? (examples: HIV, chicken pox, TB, Ebola, CJD) :  NO    Multi-drug resistant organism present? (example: MRSA): NO    Please consider an autopsy if any of the following exist:  NO Unexpected or unexplained death during or following any dental, medical, or surgical diagnostic treatment procedures.   NO Death of mother at or up to seven days after delivery.     NO All  and pediatric deaths.     NO Death where the cause is sufficiently obscure to delay completion of the death certificate.   NO Deaths in which autopsy would confirm a suspected illness/condition that would affect surviving family members or recipients of transplanted organs.     The following deaths must be reported to the 's Office:  NO A death that may be due entirely or in part to any factors other than natural disease (recent surgery, recent trauma, suspected abuse/neglect).   NO A death that may be an accident, suicide, or homicide.     NO Any sudden, unexpected death in which there is no prior history of significant heart disease or any other condition associated with sudden death.   NO A death under suspicious, unusual, or unexpected circumstances.    NO Any death which is apparently due to natural causes but in which the  does not have a personal physician familiar with the patient s medical history, social, or environmental situation or the circumstances of the terminal event.   NO Any death apparently due to Sudden Infant Death Syndrome.      NO Deaths that occur during, in association with, or as consequences of a diagnostic, therapeutic, or anesthetic procedure.   NO Any death in which a fracture of a major bone has occurred within the past (6) six months.   NO A death of persons note seen by their physician within 120 days of demise.     NO Any death in which the  was an inmate of a public institution or was in the custody of Law Enforcement personnel.   NO  All unexpected deaths of children   NO Solid organ donors   NO Unidentified bodies   NO Deaths of persons whose bodies are to be cremated or otherwise disposed of so that the bodies will later be unavailable for examination;   NO Deaths unattended by a physician outside of a licensed healthcare facility or licensed residential hospice program   NO Deaths occurring within 24 hours of arrival to a health care facility if death is unexpected.    NO Deaths associated with the decedent s employment.   NO Deaths attributed to acts of terrorism.   NO Any death in which there is uncertainty as to whether it is a medical examiner s care should be discussed with the medical investigator.        Body disposition: Autopsy was discussed with family member:  Ex wife and step daughter in person.  Permission for autopsy was declined.

## 2024-04-26 LAB
B19V DNA SER QL NAA+PROBE: NOT DETECTED
BACTERIA ASPIRATE CULT: ABNORMAL
BACTERIA ASPIRATE CULT: ABNORMAL
EVEROLIMUS BLD-MCNC: 4.9 UG/L (ref 3–8)
GRAM STAIN RESULT: ABNORMAL
GRAM STAIN RESULT: ABNORMAL
TME LAST DOSE: NORMAL H
TME LAST DOSE: NORMAL H

## 2024-04-26 NOTE — PROCEDURES
Procedure: HD line Placement    Consent:   1. Obtained from ex spouse after discussion of risk/benefit/alternatives). This signed consent is in the chart.     Universal Protocol:   Patient Identification was verified, time out was performed and site marking done. Full Barrier precaution done: Hands washed, mask, gloves, gown, cap and eye protection all used.    Indication: IV access for CRRT     Narrative: The RIJ was identified with portable ultrasound device.  Area prepped with chlorhexedine and draped. Sterile technique and full barrier precautions used. 1% lidocaine injected subcutaneously for local anesthesia. A double lumen catheter was inserted using standard Seldinger technique under real time US guidance. The central line was sutured after lidocaine injection at the site. The wire was visualized under US in the correct vessel prior to dilation.     Post-procedure imaging:  The line placement was discussed with radiologist on the CT imaging and lline is in good position and no visible pneumothorax per my review.     Complications: No apparent complication.    The attending critical care physician, Dr. Olivier, was present during the procedure.    Procedure performed by ROSELYN Diaz 4/24/2024

## 2024-04-29 ENCOUNTER — PATIENT OUTREACH (OUTPATIENT)
Dept: CARE COORDINATION | Facility: CLINIC | Age: 69
End: 2024-04-29
Payer: COMMERCIAL

## 2024-04-29 LAB
BACTERIA BLD CULT: NO GROWTH
BACTERIA BLD CULT: NO GROWTH

## 2024-04-29 NOTE — PROGRESS NOTES
Anemia Management Note - Discharge    Referred by Dr. Alden Marrufo on 11/17/2023     Passed away 4/24/24.         Latest Ref Rng & Units 3/25/2024 3/31/2024 4/5/2024 4/7/2024 4/14/2024 4/22/2024 4/24/2024   Anemia   Hemoglobin 13.3 - 17.7 g/dL 9.8  8.8  8.9  9.0  8.6  9.9  8.5     9.1     9.2    TSAT 15 - 46 %   24        Ferritin 31 - 409 ng/mL   1,392                Anemia labs discontinued/outside lab/clinic notified (if applicable), Rx discontinued/Therapy Plans cancelled, removed from active patient list, patient and/or caregiver and referring provider notified as appropriate.     Elise Grady RN   Anemia Services  [facility]  [email or web address]   Office : [phone]  Fax: [phone]

## 2024-11-04 NOTE — PLAN OF CARE
"Faxed completed form.     Placed in \"to be scanned\" bin     Fax: [phone]     " BP (!) 139/93 (BP Location: Right arm)   Pulse 74   Temp 97.9  F (36.6  C) (Oral)   Resp 18   Wt 80.9 kg (178 lb 6.4 oz)   SpO2 100%   BMI 26.15 kg/m      Shift: 8876-3840  VS: stable  on RA, afebrile  Neuro: AOx4  BG: ACHS 152  Labs: K+ 5.1  Respiratory: WNL  Cardiac: on tele, sinus rhythm   Pain/Nausea/PRN: denies nausea pain controlled with prn dilaudid   Diet: 2 gm k+  LDA: R internal jugular   GI/: foly ; LBM 10/27, passing gas   Skin: ABD incision covered with dressing with small drainage.     Mobility: assist of 1   Plan: continue POC     Handoff given to following RN.

## 2025-01-03 NOTE — TELEPHONE ENCOUNTER
ISSUE: Cr elevated, CO2 16, no improvement in WBC after 3/3 neupogen doses.     Dr. Jose Armando Quiroga recommends pt go to ED at [facility]. Pt agrees, but he is going to wait until morning - he has no ride, and needs to line up care for his dog.   
no known allergies

## (undated) DEVICE — SU SILK 2-0 TIE 12X30" A305H

## (undated) DEVICE — DRAPE SHEET REV FOLD 3/4 9349

## (undated) DEVICE — LINEN TOWEL PACK X6 WHITE 5487

## (undated) DEVICE — RX SURGIFLO HEMOSTATIC MATRIX W/THROMBIN 8ML 2994

## (undated) DEVICE — DECANTER BAG 2002S

## (undated) DEVICE — BLADE KNIFE SURG 11 371111

## (undated) DEVICE — STPL SKIN 35W ROTATING HEAD PRW35

## (undated) DEVICE — SYR 03ML LL W/O NDL 309657

## (undated) DEVICE — SU PROLENE 5-0 RB-1DA 36"  8556H

## (undated) DEVICE — BNDG ABDOMINAL BINDER 9X45-62" 79-89071

## (undated) DEVICE — SURGICEL ABSORBABLE HEMOSTAT SNOW 4"X4" 2083

## (undated) DEVICE — PACK AV FISTULA CUSTOM SCV15AVFS1

## (undated) DEVICE — ESU GROUND PAD THERMOGUARD PLUS ABC PEDS 7-382

## (undated) DEVICE — SU PROLENE 6-0 RB-2DA 30" 8711H

## (undated) DEVICE — PREP CHLORAPREP 26ML TINTED ORANGE  260815

## (undated) DEVICE — Device

## (undated) DEVICE — SU SILK 3-0 TIE 24" SA74H

## (undated) DEVICE — DEVICE CATH STABILIZATION STATLOCK FOLEY 3-WAY FOL0105

## (undated) DEVICE — KIT ENDO TURNOVER/PROCEDURE W/CLEAN A SCOPE LINERS 103888

## (undated) DEVICE — WIPES FOLEY CARE SURESTEP PROVON DFC100

## (undated) DEVICE — NDL 19GA 1.5"

## (undated) DEVICE — SU PROLENE 7-0 BV-1DA 24" 8702H

## (undated) DEVICE — SU VICRYL 3-0 SH 27" UND J416H

## (undated) DEVICE — LABEL MEDICATION SYSTEM 3303-P

## (undated) DEVICE — SU SILK 3-0 SH CR 8X18" C013D

## (undated) DEVICE — SYR 10ML SLIP TIP W/O NDL

## (undated) DEVICE — TUBING IRRIG CYSTO/BLADDER SET 81" LF 2C4040

## (undated) DEVICE — SU SILK 2-0 TIE 24" SA75H

## (undated) DEVICE — SU PDS II 6-0 RB-2DA 30" Z149H

## (undated) DEVICE — DRSG STERI STRIP 1/2X4" R1547

## (undated) DEVICE — CLIP APPLIER 11" MED LIGACLIP MCM20

## (undated) DEVICE — SUCTION MANIFOLD NEPTUNE 2 SYS 4 PORT 0702-020-000

## (undated) DEVICE — BASIN SET SINGLE STERILE 13752-624

## (undated) DEVICE — SOL WATER IRRIG 1000ML BOTTLE 2F7114

## (undated) DEVICE — TUBING SUCTION 12"X1/4" N612

## (undated) DEVICE — SU MONOCRYL 4-0 PS-2 18" UND Y496G

## (undated) DEVICE — SU MONOCRYL 4-0 PS-2 27" UND Y426H

## (undated) DEVICE — SU SILK 4-0 TIE 12X30" A303H

## (undated) DEVICE — SOL NACL 0.9% IRRIG 1000ML BOTTLE 2F7124

## (undated) DEVICE — ESU PENCIL SMOKE EVAC W/ROCKER SWITCH 0703-047-000

## (undated) DEVICE — CLIP APPLIER 09 3/8" SM LIGACLIP MCS20

## (undated) DEVICE — QUICK TRAP

## (undated) DEVICE — ENDO SNARE POLYPECTOMY OVAL 15MM LOOP SD-240U-15

## (undated) DEVICE — DRAPE IOBAN INCISE 23X17" 6650EZ

## (undated) DEVICE — SU CHROMIC 4-0 RB-1 27" U203H

## (undated) DEVICE — DRAPE FLUID WARMING 52 X 60" ORS-321

## (undated) DEVICE — GLOVE PROTEXIS W/NEU-THERA 8.0  2D73TE80

## (undated) DEVICE — ESU HANDPIECE ABC BEND-A-BEAM 6" 134006

## (undated) DEVICE — ENDO FORCEP ENDOJAW BIOPSY 2.8MMX230CM FB-220U

## (undated) DEVICE — INSERT FOGARTY 33MM TRACTION HYDRAJAW HYDRA33

## (undated) DEVICE — ADH LIQUID MASTISOL TOPICAL VIAL 2-3ML 0523-48

## (undated) DEVICE — GLOVE BIOGEL PI ULTRATOUCH SZ 6.5 41165

## (undated) DEVICE — GLOVE PROTEXIS BLUE W/NEU-THERA 8.0  2D73EB80

## (undated) DEVICE — LINEN TOWEL PACK X30 5481

## (undated) DEVICE — ESU GROUND PAD UNIVERSAL W/O CORD

## (undated) DEVICE — DRSG MEDIPORE 3 1/2X13 3/4" 3573

## (undated) DEVICE — SU PROLENE 1 CTX 30" 8455H

## (undated) DEVICE — GOWN IMPERVIOUS BREATHABLE SMART LG 89015

## (undated) DEVICE — SU PDS II 4-0 RB-1 27" Z304H

## (undated) DEVICE — PREP CHLORAPREP 26ML TINTED HI-LITE ORANGE 930815

## (undated) DEVICE — DECANTER VIAL 2006S

## (undated) DEVICE — CLIP HORIZON SM RED WIDE SLOT 001201

## (undated) DEVICE — SOL NACL 0.9% INJ 250ML BAG 2B1322Q

## (undated) DEVICE — LINEN TOWEL PACK X5 5464

## (undated) DEVICE — SU SILK 1 TIE 6X30" A307H

## (undated) DEVICE — DRSG TELFA ISLAND 4X8" 7541

## (undated) DEVICE — SU PDS II 5-0 RB-1 27" Z303H

## (undated) DEVICE — CATH PLUG W/CAP 000076

## (undated) DEVICE — BLADE CLIPPER SGL USE 9680

## (undated) DEVICE — CATH FOLEY 3WAY 16FR 30ML SIL 73016SI

## (undated) DEVICE — SOL NACL 0.9% INJ 1000ML BAG 2B1324X

## (undated) DEVICE — ESU GROUND PAD ADULT W/CORD E7507

## (undated) DEVICE — SU SILK 3-0 TIE 12X30" A304H

## (undated) DEVICE — SU SILK 0 TIE 6X30" A306H

## (undated) RX ORDER — BUPIVACAINE HYDROCHLORIDE 2.5 MG/ML
INJECTION, SOLUTION EPIDURAL; INFILTRATION; INTRACAUDAL
Status: DISPENSED
Start: 2021-12-14

## (undated) RX ORDER — LIDOCAINE HYDROCHLORIDE 10 MG/ML
INJECTION, SOLUTION INFILTRATION; PERINEURAL
Status: DISPENSED
Start: 2021-11-01

## (undated) RX ORDER — PAPAVERINE HYDROCHLORIDE 30 MG/ML
INJECTION INTRAMUSCULAR; INTRAVENOUS
Status: DISPENSED
Start: 2023-01-01

## (undated) RX ORDER — HYDROMORPHONE HYDROCHLORIDE 1 MG/ML
INJECTION, SOLUTION INTRAMUSCULAR; INTRAVENOUS; SUBCUTANEOUS
Status: DISPENSED
Start: 2020-08-13

## (undated) RX ORDER — PROPOFOL 10 MG/ML
INJECTION, EMULSION INTRAVENOUS
Status: DISPENSED
Start: 2021-12-14

## (undated) RX ORDER — DEXTROSE, SODIUM CHLORIDE, SODIUM LACTATE, POTASSIUM CHLORIDE, AND CALCIUM CHLORIDE 5; .6; .31; .03; .02 G/100ML; G/100ML; G/100ML; G/100ML; G/100ML
INJECTION, SOLUTION INTRAVENOUS
Status: DISPENSED
Start: 2023-01-01

## (undated) RX ORDER — HYDROMORPHONE HCL IN WATER/PF 6 MG/30 ML
PATIENT CONTROLLED ANALGESIA SYRINGE INTRAVENOUS
Status: DISPENSED
Start: 2023-01-01

## (undated) RX ORDER — SIMETHICONE 40MG/0.6ML
SUSPENSION, DROPS(FINAL DOSAGE FORM)(ML) ORAL
Status: DISPENSED
Start: 2023-01-01

## (undated) RX ORDER — FENTANYL CITRATE 50 UG/ML
INJECTION, SOLUTION INTRAMUSCULAR; INTRAVENOUS
Status: DISPENSED
Start: 2023-01-01

## (undated) RX ORDER — DEXAMETHASONE SODIUM PHOSPHATE 4 MG/ML
INJECTION, SOLUTION INTRA-ARTICULAR; INTRALESIONAL; INTRAMUSCULAR; INTRAVENOUS; SOFT TISSUE
Status: DISPENSED
Start: 2021-12-14

## (undated) RX ORDER — HEPARIN SODIUM 1000 [USP'U]/ML
INJECTION, SOLUTION INTRAVENOUS; SUBCUTANEOUS
Status: DISPENSED
Start: 2023-01-01

## (undated) RX ORDER — OXYCODONE HYDROCHLORIDE 5 MG/1
TABLET ORAL
Status: DISPENSED
Start: 2020-08-13

## (undated) RX ORDER — HYDROMORPHONE HCL IN WATER/PF 6 MG/30 ML
PATIENT CONTROLLED ANALGESIA SYRINGE INTRAVENOUS
Status: DISPENSED
Start: 2021-12-14

## (undated) RX ORDER — CEFUROXIME SODIUM 1.5 G/16ML
INJECTION, POWDER, FOR SOLUTION INTRAVENOUS
Status: DISPENSED
Start: 2023-01-01

## (undated) RX ORDER — ATROPINE SULFATE 0.4 MG/ML
AMPUL (ML) INJECTION
Status: DISPENSED
Start: 2021-10-06

## (undated) RX ORDER — MANNITOL 20 G/100ML
INJECTION, SOLUTION INTRAVENOUS
Status: DISPENSED
Start: 2023-01-01

## (undated) RX ORDER — FENTANYL CITRATE 50 UG/ML
INJECTION, SOLUTION INTRAMUSCULAR; INTRAVENOUS
Status: DISPENSED
Start: 2021-11-01

## (undated) RX ORDER — METOPROLOL TARTRATE 1 MG/ML
INJECTION, SOLUTION INTRAVENOUS
Status: DISPENSED
Start: 2021-10-06

## (undated) RX ORDER — CEFAZOLIN SODIUM 1 G/3ML
INJECTION, POWDER, FOR SOLUTION INTRAMUSCULAR; INTRAVENOUS
Status: DISPENSED
Start: 2023-01-01

## (undated) RX ORDER — FENTANYL CITRATE 0.05 MG/ML
INJECTION, SOLUTION INTRAMUSCULAR; INTRAVENOUS
Status: DISPENSED
Start: 2021-12-14

## (undated) RX ORDER — OXYCODONE HYDROCHLORIDE 5 MG/1
TABLET ORAL
Status: DISPENSED
Start: 2021-12-14

## (undated) RX ORDER — HEPARIN SODIUM 1000 [USP'U]/ML
INJECTION, SOLUTION INTRAVENOUS; SUBCUTANEOUS
Status: DISPENSED
Start: 2021-12-14

## (undated) RX ORDER — PROPOFOL 10 MG/ML
INJECTION, EMULSION INTRAVENOUS
Status: DISPENSED
Start: 2023-01-01

## (undated) RX ORDER — DEXTROSE MONOHYDRATE 25 G/50ML
INJECTION, SOLUTION INTRAVENOUS
Status: DISPENSED
Start: 2023-01-01

## (undated) RX ORDER — CEFAZOLIN SODIUM 2 G/100ML
INJECTION, SOLUTION INTRAVENOUS
Status: DISPENSED
Start: 2021-11-01

## (undated) RX ORDER — FENTANYL CITRATE 50 UG/ML
INJECTION, SOLUTION INTRAMUSCULAR; INTRAVENOUS
Status: DISPENSED
Start: 2021-12-14

## (undated) RX ORDER — SODIUM CHLORIDE 9 MG/ML
INJECTION, SOLUTION INTRAVENOUS
Status: DISPENSED
Start: 2024-01-01

## (undated) RX ORDER — VERAPAMIL HYDROCHLORIDE 2.5 MG/ML
INJECTION, SOLUTION INTRAVENOUS
Status: DISPENSED
Start: 2023-01-01

## (undated) RX ORDER — CEFAZOLIN SODIUM 2 G/100ML
INJECTION, SOLUTION INTRAVENOUS
Status: DISPENSED
Start: 2021-12-14

## (undated) RX ORDER — DOBUTAMINE HYDROCHLORIDE 200 MG/100ML
INJECTION INTRAVENOUS
Status: DISPENSED
Start: 2021-10-06

## (undated) RX ORDER — LIDOCAINE HYDROCHLORIDE 10 MG/ML
INJECTION, SOLUTION EPIDURAL; INFILTRATION; INTRACAUDAL; PERINEURAL
Status: DISPENSED
Start: 2024-01-01

## (undated) RX ORDER — HEPARIN SODIUM 1000 [USP'U]/ML
INJECTION, SOLUTION INTRAVENOUS; SUBCUTANEOUS
Status: DISPENSED
Start: 2021-11-01

## (undated) RX ORDER — ONDANSETRON 2 MG/ML
INJECTION INTRAMUSCULAR; INTRAVENOUS
Status: DISPENSED
Start: 2021-12-14